# Patient Record
Sex: MALE | Race: BLACK OR AFRICAN AMERICAN | NOT HISPANIC OR LATINO | Employment: OTHER | URBAN - METROPOLITAN AREA
[De-identification: names, ages, dates, MRNs, and addresses within clinical notes are randomized per-mention and may not be internally consistent; named-entity substitution may affect disease eponyms.]

---

## 2017-01-02 ENCOUNTER — TELEPHONE (OUTPATIENT)
Dept: PEDIATRICS | Facility: CLINIC | Age: 62
End: 2017-01-02

## 2017-01-02 ENCOUNTER — OFFICE VISIT (OUTPATIENT)
Dept: PEDIATRICS | Facility: CLINIC | Age: 62
End: 2017-01-02
Payer: COMMERCIAL

## 2017-01-02 VITALS
HEART RATE: 81 BPM | BODY MASS INDEX: 23.13 KG/M2 | DIASTOLIC BLOOD PRESSURE: 87 MMHG | WEIGHT: 186 LBS | HEIGHT: 75 IN | TEMPERATURE: 97 F | OXYGEN SATURATION: 100 % | SYSTOLIC BLOOD PRESSURE: 123 MMHG

## 2017-01-02 DIAGNOSIS — Z12.11 COLON CANCER SCREENING: Primary | ICD-10-CM

## 2017-01-02 DIAGNOSIS — I73.9 PAD (PERIPHERAL ARTERY DISEASE) (H): ICD-10-CM

## 2017-01-02 DIAGNOSIS — I50.22 CHRONIC SYSTOLIC HEART FAILURE (H): ICD-10-CM

## 2017-01-02 DIAGNOSIS — M54.50 CHRONIC BILATERAL LOW BACK PAIN WITHOUT SCIATICA: ICD-10-CM

## 2017-01-02 DIAGNOSIS — I48.20 CHRONIC ATRIAL FIBRILLATION (H): ICD-10-CM

## 2017-01-02 DIAGNOSIS — C61 MALIGNANT NEOPLASM OF PROSTATE (H): ICD-10-CM

## 2017-01-02 DIAGNOSIS — G89.29 CHRONIC BILATERAL LOW BACK PAIN WITHOUT SCIATICA: ICD-10-CM

## 2017-01-02 DIAGNOSIS — K85.80 OTHER ACUTE PANCREATITIS WITHOUT INFECTION OR NECROSIS: ICD-10-CM

## 2017-01-02 PROCEDURE — 99214 OFFICE O/P EST MOD 30 MIN: CPT | Performed by: INTERNAL MEDICINE

## 2017-01-02 RX ORDER — ACETAMINOPHEN 500 MG
500-1000 TABLET ORAL EVERY 6 HOURS PRN
Status: ON HOLD | COMMUNITY
End: 2019-10-25

## 2017-01-02 ASSESSMENT — ANXIETY QUESTIONNAIRES
2. NOT BEING ABLE TO STOP OR CONTROL WORRYING: NOT AT ALL
GAD7 TOTAL SCORE: 0
IF YOU CHECKED OFF ANY PROBLEMS ON THIS QUESTIONNAIRE, HOW DIFFICULT HAVE THESE PROBLEMS MADE IT FOR YOU TO DO YOUR WORK, TAKE CARE OF THINGS AT HOME, OR GET ALONG WITH OTHER PEOPLE: NOT DIFFICULT AT ALL
3. WORRYING TOO MUCH ABOUT DIFFERENT THINGS: NOT AT ALL
6. BECOMING EASILY ANNOYED OR IRRITABLE: NOT AT ALL
1. FEELING NERVOUS, ANXIOUS, OR ON EDGE: NOT AT ALL
5. BEING SO RESTLESS THAT IT IS HARD TO SIT STILL: NOT AT ALL
7. FEELING AFRAID AS IF SOMETHING AWFUL MIGHT HAPPEN: NOT AT ALL

## 2017-01-02 ASSESSMENT — PAIN SCALES - GENERAL: PAINLEVEL: NO PAIN (0)

## 2017-01-02 ASSESSMENT — PATIENT HEALTH QUESTIONNAIRE - PHQ9: 5. POOR APPETITE OR OVEREATING: NOT AT ALL

## 2017-01-02 NOTE — TELEPHONE ENCOUNTER
Forms received from:  [facility]   Phone number listed: [phone]   Fax listed: [phone]  Date received: 1-2-17  Form description: SN 1 day 1  Once forms are completed, please return to [facility] via fax.  Is patient requesting to be contacted when forms are completed: n/a  Form placed: provider desk  Mela Merida

## 2017-01-02 NOTE — NURSING NOTE
"Chief Complaint   Patient presents with     Hospital F/U       Initial /87 mmHg  Pulse 81  Temp(Src) 97  F (36.1  C) (Oral)  Ht 6' 2.5\" (1.892 m)  Wt 186 lb (84.369 kg)  BMI 23.57 kg/m2  SpO2 100% Estimated body mass index is 23.57 kg/(m^2) as calculated from the following:    Height as of this encounter: 6' 2.5\" (1.892 m).    Weight as of this encounter: 186 lb (84.369 kg).  BP completed using cuff size: regular, right arm.  Rosa Conti MA      "

## 2017-01-02 NOTE — Clinical Note
[facility]    01/02/2017    Patient: Constantino Taylor  YOB: 1955  Medical Record Number: [id number]    Controlled Substance Agreement  I understand that my care provider has prescribed controlled substances (narcotics, tranquilizers, and/or stimulants) to help manage my condition(s).  I am taking this medicine to help me function or work.  I know that this is strong medicine.  It could have serious side effects and even cause a dependency on the drug.  If I stop these medicines suddenly, I could have severe withdrawal symptoms.    The risks, benefits, and side effects of these medication(s) were explained to me.  I agree that:  1. I will take part in other treatments as advised by my provider.  This may be psychiatry or counseling, physical therapy, behavioral therapy, group treatment, or a referral to a pain clinic.  I will reduce or stop my medicine when my provider tells me to do so.   2. I will take my medicines as prescribed.  I will not change the dose or schedule unless my provider tells me to.  There will be no refills if I  run out early.   I may be contacted at any time without warning and asked to complete a drug test or pill count.   3. I will keep all my appointments at the clinic.  If I miss appointments or fail to follow instructions, my provider may stop my medicine.  4. I will not ask other providers to prescribe controlled substances. And I will not accept controlled substances from other people. If I need another prescribed controlled substance for a new reason, I will notify my provider within one business day.  5. If I enroll in the [place] Medical Marijuana program, I will tell my provider.  I will also sign an agreement to share my medical records with my provider.  6. I will use one pharmacy to fill all of my controlled substance prescriptions.  If my prescription is mailed to my pharmacy, it may take 5 to 7 days for my medicine to be ready.  7. I  understand that my provider, clinic care team, and pharmacy can track controlled substance prescriptions from other providers through a central database (prescription monitoring program).  8. I will bring in my list of medications (or my medicine bottles) each time I come to the clinic.  Page 1 of 2      [facility]    01/02/2017    Patient: Constantino Taylor  YOB: 1955  Medical Record Number: [id number]    9. Refills of controlled substances will be made only during office hours.  It is up to me to make sure that I do not run out of my medicines on weekends or holidays.    10. I am responsible for my prescriptions.  If the medicine is lost or stolen, it will not be replaced.   I also agree not to share these medicines with anyone.  11. I agree to not use ANY illegal or recreational drugs.  This includes marijuana, cocaine, bath salts or other drugs.  I agree not to use alcohol unless my provider says I may.  I agree to give urine samples whenever asked.  If I fail to give a urine sample, the provider may stop my medicine.     12. I will tell my nurse or provider right away if I become pregnant or have a new medical problem treated outside of [facility].  13. I understand that this medicine can affect my thinking and judgment.  It may be unsafe for me to drive, use machinery and do dangerous tasks.  I will not do any of these things until I know how the medicine affects me.  If my dose changes, I will wait to see how it affects me.  I will contact my provider if I have concerns about medicine side effects.  I understand that if I do not follow any of the conditions above, my prescriptions or treatment may be stopped.    I agree that my provider, clinic care team, and pharmacy may work with any city, state or federal law enforcement agency that investigates the misuse, sale, or other diversion of my controlled medicine. I will allow my provider to discuss my care with or share  a copy of this agreement with any other treating provider, pharmacy or emergency room where I receive care.  I agree to give up (waive) any right of privacy or confidentiality with respect to these authorizations.   I have read this agreement and have asked questions about anything I did not understand.   ___________________________________    ___________________________  Patient Signature                                                             Date and Time  ___________________________________     ____________________________  Witness                                                                              Date and Time  ___________________________________  Avery Duke MD  Page 2 of 2  Opioid Pain Medicines (also known as Narcotics)  What You Need to Know      What are opioids?   Opioids are pain medicines that must be prescribed by a doctor. Examples are:     morphine (MS Contin, Josefina)    oxycodone (Oxycontin)    oxycodone and acetaminophen (Percocet)    hydrocodone and acetaminophen (Vicodin, Norco)     fentanyl patch (Duragesic)     hydromorphone (Dilaudid)     methadone     What do opioids do well?   Opioids are best for short-term pain after a surgery or injury. They also work well for cancer pain. Unlike other pain medicines, they do not cause liver or kidney failure or ulcers. They may help some people with long-lasting (chronic) pain.     What do opioids NOT do well?   Opioids never get rid of pain entirely, and they do not work well for most patients with chronic pain. Opioids do not reduce swelling, one of the causes of pain. They also don t work well for nerve pain.     Side effects  Talk to your doctor before you start or decide to keep taking one of these medicines. Side effects include:    Lowers your breathing rate enough that it could cause death    Death due to taking more than the prescribed dose    Serious lifelong opioid use      Dependence is not the same as addiction. Addiction  is when people keep using a substance that harms their body, their mind or their relations with others. If you have a history of drug or alcohol abuse, taking opioids can cause a relapse.  Over time, opioids don t work as well. Most people will need higher and higher doses. The higher the dose, the more serious the side effects. We don t know the long-term effects of opioids.   People who have used opioids for a long time have a lower quality of life, worse depression, higher levels of pain and more visits to doctors.  Overdose from prescription drugs is the second leading cause of death in the [place] The risk of overdose rises when opioids are taken with other drugs such as:    Medicines used for anxiety and panic attacks (such as lorazepam, alprazolam, clonazepam    Other sedatives    Alcohol    Illegal drugs such as heroin  Never share your opioids with others. Be sure to store opioids in a secure place, locked if possible.Young children can easily swallow them and overdose.     Are there other ways to manage pain?  Ways to help reduce pain:    Exercise every day.    Treat health problems that may be causing pain.    Treat mental health problems like depression and anxiety.     Worse depression symptoms; Less pleasure in things you usually enjoy    Feeling tired or sluggish    Slower thoughts or cloudy thinking    Being more sensitive to pain over time; Pain is harder to control.    Trouble sleeping or restless sleep    Changes in hormone levels (for example, less testosterone).     Changes in sex drive or ability to have sex    Long lasting nausea and constipation    Trouble breathing while asleep; This is worse with lung problems like COPD or sleep apnea.    Unsafe driving    Getting sick more often    Itching    Feeling dizzy    Dry mouth    Sweating    Trouble emptying the bladder (peeing). This is worse if you have an enlarged prostate or get urinary tract infections (UTIs).    What else should I know about  opioids?  When someone takes opioids for too long or too often, they become dependent. This means that if you stop or reduce the medicine too quickly, you will have withdrawal symptoms.          Practice good sleep habits.  Try to go to bed and get up at the same time every day.    Stop smoking.  Tobacco use can make pain worse.    Do things that you enjoy.    Find a way to work through pain without drugs.  Try deep breathing, meditation, visual imagery and aromatherapy.    Ask your doctor to help you create a plan to manage your pain.

## 2017-01-02 NOTE — PROGRESS NOTES
SUBJECTIVE:                                                    Constantino Taylor is a 61 year old male who presents to clinic today for the following health issues:    Hospital Follow-up Visit:    Hospital/Nursing Home/IP Rehab Facility: [facility]  Date of Admission: 12/20/16  Date of Discharge: 12/23/16  Reason(s) for Admission: Pancreatitis             Problems taking medications regularly:  None       Medication changes since discharge: Tylenol and Nicotine Gum       Problems adhering to non-medication therapy:  None  Pancreatitis developed time each year.  Not drinking much over the holidays 4 beers and a little wine  Two small bottles and 3 beers.  Tuesday and got out Friday.  marajuana use in the past.  Sent out on tylenol.  Oxycodone ( not using it since that time.)  Without medications.  (  After coming home from the hospital few days.  Leg still hurting, right in the stump area.  Throbbing.    Bone is hurting not able to put weight on the area.  Working on placing less socks and holders in the area.  Gabapentin did not help, lyrica.  Kept on liquid diet while there.  hepatitis  Summary of hospitalization:  CareEverywhere information obtained and reviewed  Diagnostic Tests/Treatments reviewed.  Follow up needed: continued follow-up with urology, hepatology, vascular.  Other Healthcare Providers Involved in Patient s Care:         None  Update since discharge: improved. Patient has stopped all alcohol use and still wants to be considered for medical THC program  Does still use marajuana intermittently when pain is increased.  Still feeling severe bone pain when trying to use prosthesis      Post Discharge Medication Reconciliation: discharge medications reconciled, continue medications without change.  Plan of care communicated with patient     Coding guidelines for this visit:  Type of Medical   Decision Making Face-to-Face Visit       within 7 Days of discharge Face-to-Face Visit        within 14 days  of discharge   Moderate Complexity [id number]   High Complexity [id number]                Problem list and histories reviewed & adjusted, as indicated.  Additional history: as documented    Patient Active Problem List   Diagnosis     Cerebral infarction (H)     Hepatitis C     Tobacco use disorder     Chronic low back pain     Acute pancreatitis     Hyperlipidemia LDL goal <70     Hypertension goal BP (blood pressure) < 140/90     Nonischemic dilated cardiomyopathy (HCC)     Malignant neoplasm of prostate (H)     Erectile dysfunction     Eczema     PAD (peripheral artery disease) (H)     S/P BKA (below knee amputation) unilateral (H)     Unilateral complete BKA, left, sequela (H)     Constipation     Encounter for counseling     Acute renal failure (H)     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     Past Surgical History   Procedure Laterality Date     Removal of blood clots in arm       Amputate leg below knee Left 6/19/2015     Procedure: AMPUTATE LEG BELOW KNEE;  Surgeon: Odessa Browning MD;  Location:  OR       Social History   Substance Use Topics     Smoking status: Former Smoker -- 0.30 packs/day for 40 years     Types: Cigarettes     Quit date: 02/08/2015     Smokeless tobacco: Former User     Alcohol Use: 3.0 oz/week     6 Cans of beer per week      Comment: couple of beers per episode, several times a week     Family History   Problem Relation Age of Onset     CANCER Mother      brain     CANCER Brother          Current Outpatient Prescriptions   Medication Sig Dispense Refill     acetaminophen (TYLENOL) 500 MG tablet Take 500-1,000 mg by mouth every 6 hours as needed for mild pain       nicotine polacrilex (NICORELIEF) 2 MG gum Place 2 mg inside cheek as needed for smoking cessation       oxyCODONE (ROXICODONE) 10 MG IR tablet Take 1 tablet (10 mg) by mouth  every 6 hours as needed for moderate to severe pain 3 per day 90 tablet 0     UNABLE TO FIND MEDICATION NAME: Hydrocortisone 0.5 % topical cream-Take 1 application by topical route as needed.       polyethylene glycol (MIRALAX) powder Take 17 g by mouth as needed for constipation 510 g 1     triamcinolone (KENALOG) 0.1 % cream Take 1 apply by topical route two times a day as needed       sennosides (SENOKOT) 8.6 MG tablet Take 2 tablets by mouth 2 times daily 120 tablet 3     lisinopril (PRINIVIL,ZESTRIL) 10 MG tablet Take 1 tablet (10 mg) by mouth daily 90 tablet 0     Tadalafil (CIALIS) 2.5 MG TABS Take 2.5 mg by mouth daily Never use with nitroglycerin, terazosin or doxazosin. 90 tablet 1     rivaroxaban ANTICOAGULANT (XARELTO) 20 MG TABS tablet Take 1 tablet (20 mg) by mouth daily (with dinner) 90 tablet 1     aspirin 81 MG EC tablet Take 1 tablet (81 mg) by mouth daily 90 tablet 3     metoprolol (TOPROL-XL) 200 MG 24 hr tablet Take 1 tablet (200 mg) by mouth daily 90 tablet 4     atorvastatin (LIPITOR) 40 MG tablet Take 1 tablet (40 mg) by mouth At Bedtime 90 tablet 4     No Known Allergies  Recent Labs   Lab Test  10/17/16   1143  09/21/16   0932  05/18/16   0859  04/05/16   1229   05/10/15   1648   04/08/15   0720  11/25/14   1203   04/30/14   1021  01/06/14   1103  07/12/13   1109   11/15/12   0845   A1C   --    --    --    --    --    --    --    --   5.8   --    --   5.8  5.4   --    --    LDL  46   --    --    --    --    --    --   89   --    --   78   --    --    < >   --    HDL  41   --    --    --    --    --    --   40*   --    --   41   --    --    < >   --    TRIG  91   --    --    --    --    --    --   92   --    --   85   --    --    < >   --    ALT   --   24  16  20   < >   --    --   34   --    < >   --   69  37   --   52   CR  1.53*  1.30*  1.21  1.34*   < >  1.90*   < >  1.37*  1.02   < >   --   0.86  1.08   < >  0.87   GFRESTIMATED  47*  57*  61  55*   < >  37*   < >  53*  75   < >   --    ">90  71   < >  >90   GFRESTBLACK  57*  68  74  66   < >  44*   < >  65  >90   GFR Calc     < >   --   >90  86   < >  >90   POTASSIUM  4.6  4.1  3.9  4.3   < >  5.0   < >  3.9  4.5   < >   --   4.5  4.1   < >  3.6   TSH   --    --    --    --    --   0.99   --    --    --    --    --    --    --    --   1.18    < > = values in this interval not displayed.        ROS:  Constitutional, HEENT, cardiovascular, pulmonary, gi and gu systems are negative, except as otherwise noted.    OBJECTIVE:                                                    /87 mmHg  Pulse 81  Temp(Src) 97  F (36.1  C) (Oral)  Ht 6' 2.5\" (1.892 m)  Wt 186 lb (84.369 kg)  BMI 23.57 kg/m2  SpO2 100%  Body mass index is 23.57 kg/(m^2).  GENERAL: healthy, alert and no distress  EYES: Eyes grossly normal to inspection, PERRL and conjunctivae and sclerae normal  HENT: ear canals and TM's normal, nose and mouth without ulcers or lesions  NECK: no adenopathy, no asymmetry, masses, or scars and thyroid normal to palpation  RESP: lungs clear to auscultation - no rales, rhonchi or wheezes  CV: regular rate and rhythm, normal S1 S2, no S3 or S4, no murmur, click or rub, no peripheral edema and peripheral pulses strong  ABDOMEN: soft, nontender, no hepatosplenomegaly, no masses and bowel sounds normal  MS: no gross musculoskeletal defects noted, no edema  SKIN: no suspicious lesions or rashes  NEURO: Normal strength and tone, mentation intact and speech normal  BACK: no CVA tenderness, no paralumbar tenderness    Diagnostic Test Results:  Results for orders placed or performed in visit on 10/17/16   Lipid panel reflex to direct LDL   Result Value Ref Range    Cholesterol 105 <200 mg/dL    Triglycerides 91 <150 mg/dL    HDL Cholesterol 41 >39 mg/dL    LDL Cholesterol Calculated 46 <100 mg/dL    Non HDL Cholesterol 64 <130 mg/dL   Basic metabolic panel   Result Value Ref Range    Sodium 139 133 - 144 mmol/L    Potassium 4.6 3.4 - 5.3 " mmol/L    Chloride 104 94 - 109 mmol/L    Carbon Dioxide 29 20 - 32 mmol/L    Anion Gap 6 3 - 14 mmol/L    Glucose 90 70 - 99 mg/dL    Urea Nitrogen 27 7 - 30 mg/dL    Creatinine 1.53 (H) 0.66 - 1.25 mg/dL    GFR Estimate 47 (L) >60 mL/min/1.7m2    GFR Estimate If Black 57 (L) >60 mL/min/1.7m2    Calcium 9.9 8.5 - 10.1 mg/dL        ASSESSMENT/PLAN:                                                        ICD-10-CM    1. Colon cancer screening Z12.11 Fecal colorectal cancer screen (FIT)   2. Other acute pancreatitis without infection or necrosis K85.80    3. Chronic bilateral low back pain without sciatica M54.5     G89.29    4. Malignant neoplasm of prostate (H) C61    5. PAD (peripheral artery disease) (H) I73.9    6. Chronic systolic heart failure (H) I50.22    7. Chronic atrial fibrillation (H) I48.2        Patient updated on pain contract. No alcohol and will ask to weam off marajuana as we approach pain evaluation  Patient updated pain contract.  At this time with acute pancreatitis and chronic back and stump pain without ability to respond to gabapentin or lyrica, will continue oxycodone 90 per month until better soluation presents itself.          Avery Duke MD  [facility]

## 2017-01-02 NOTE — MR AVS SNAPSHOT
"              After Visit Summary   1/2/2017    Constantino Taylor    MRN: [id number]           Patient Information     Date Of Birth          1955        Visit Information        Provider Department      1/2/2017 10:40 AM Avery Duke MD [facility]         Follow-ups after your visit        Your next 10 appointments already scheduled     Feb 15, 2017  9:00 AM   Lab with [facility] LAB   [facility] ([facility])    [address]  1st Floor  [place] [id number] [phone]            Feb 15, 2017  9:40 AM   (Arrive by 9:25 AM)   RETURN HEART FAILURE with Norah Brown MD   [facility] ([facility])    [address]  3rd Floor  [place] [id number] [phone]              Who to contact     If you have questions or need follow up information about today's clinic visit or your schedule please contact [facility] directly at [phone].  Normal or non-critical lab and imaging results will be communicated to you by [employer], letter or phone within 4 business days after the clinic has received the results. If you do not hear from us within 7 days, please contact the clinic through [employer] or phone. If you have a critical or abnormal lab result, we will notify you by phone as soon as possible.  Submit refill requests through [employer] or call your pharmacy and they will forward the refill request to us. Please allow 3 business days for your refill to be completed.          Additional Information About Your Visit        [employer] Information     [employer] lets you send messages to your doctor, view your test results, renew your prescriptions, schedule appointments and more. To sign up, go to [email or web address] . Click on \"Log in\" on the left side of the screen, which will take you to the Welcome page. Then click on \"Sign up Now\" on the right side of the page.     You will be asked " "to enter the access code listed below, as well as some personal information. Please follow the directions to create your username and password.     Your access code is: [email or web address]  Expires: 1/15/2017 10:38 AM     Your access code will  in 90 days. If you need help or a new code, please call your [facility] or [phone].        Your Vitals Were     Pulse Temperature Height BMI (Body Mass Index) Pulse Oximetry       81 97  F (36.1  C) (Oral) 6' 2.5\" (1.892 m) 23.57 kg/m2 100%        Blood Pressure from Last 3 Encounters:   17 123/87   10/17/16 126/84   16 136/90    Weight from Last 3 Encounters:   17 186 lb (84.369 kg)   10/17/16 193 lb (87.544 kg)   16 189 lb 11.2 oz (86.047 kg)              Today, you had the following     No orders found for display         Today's Medication Changes          These changes are accurate as of: 17 11:26 AM.  If you have any questions, ask your nurse or doctor.               Stop taking these medicines if you haven't already. Please contact your care team if you have questions.     nicotine 21 MG/24HR 24 hr patch   Commonly known as:  NICODERM CQ   Stopped by:  Avery Duke MD                    Primary Care Provider Office Phone # Fax #    Avery Duke [phone]       [address] [address]        Thank you!     Thank you for choosing [facility]  for your care. Our goal is always to provide you with excellent care. Hearing back from our patients is one way we can continue to improve our services. Please take a few minutes to complete the written survey that you may receive in the mail after your visit with us. Thank you!             Your Updated Medication List - Protect others around you: Learn how to safely use, store and throw away your medicines at www.disposemymeds.org.          This list is accurate as of: 17 11:26 AM.  Always use " your most recent med list.                   Brand Name Dispense Instructions for use    acetaminophen 500 MG tablet    TYLENOL     Take 500-1,000 mg by mouth every 6 hours as needed for mild pain       aspirin 81 MG EC tablet     90 tablet    Take 1 tablet (81 mg) by mouth daily       atorvastatin 40 MG tablet    LIPITOR    90 tablet    Take 1 tablet (40 mg) by mouth At Bedtime       lisinopril 10 MG tablet    PRINIVIL/ZESTRIL    90 tablet    Take 1 tablet (10 mg) by mouth daily       metoprolol 200 MG 24 hr tablet    TOPROL-XL    90 tablet    Take 1 tablet (200 mg) by mouth daily       MIRALAX powder   Generic drug:  polyethylene glycol     510 g    Take 17 g by mouth as needed for constipation       NICORELIEF 2 MG gum   Generic drug:  nicotine polacrilex      Place 2 mg inside cheek as needed for smoking cessation       oxyCODONE 10 MG IR tablet    ROXICODONE    90 tablet    Take 1 tablet (10 mg) by mouth every 6 hours as needed for moderate to severe pain 3 per day       rivaroxaban ANTICOAGULANT 20 MG Tabs tablet    XARELTO    90 tablet    Take 1 tablet (20 mg) by mouth daily (with dinner)       sennosides 8.6 MG tablet    SENOKOT    120 tablet    Take 2 tablets by mouth 2 times daily       Tadalafil 2.5 MG Tabs    CIALIS    90 tablet    Take 2.5 mg by mouth daily Never use with nitroglycerin, terazosin or doxazosin.       triamcinolone 0.1 % cream    KENALOG     Take 1 apply by topical route two times a day as needed       UNABLE TO FIND      MEDICATION NAME: Hydrocortisone 0.5 % topical cream-Take 1 application by topical route as needed.

## 2017-01-03 ASSESSMENT — PATIENT HEALTH QUESTIONNAIRE - PHQ9: SUM OF ALL RESPONSES TO PHQ QUESTIONS 1-9: 3

## 2017-01-03 ASSESSMENT — ANXIETY QUESTIONNAIRES: GAD7 TOTAL SCORE: 0

## 2017-01-04 ENCOUNTER — TELEPHONE (OUTPATIENT)
Dept: PEDIATRICS | Facility: CLINIC | Age: 62
End: 2017-01-04

## 2017-01-04 NOTE — TELEPHONE ENCOUNTER
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       PT:     OT:     Skilled Nursing:     Pt Provider: Dr Avery Duke    Phone Number Homecare Nurse can be reached at: [phone]    Can we leave a detailed message on this number? no            Best Time: Today    Call taken on 1/4/2017 at 2:26 PM by Dayanara Cooley

## 2017-01-04 NOTE — TELEPHONE ENCOUNTER
Called Tracie from [facility] to notify of message below from provider. Unable to reach, left a message to call back to RN triage line.    Viviane Love RN  [facility]

## 2017-01-04 NOTE — TELEPHONE ENCOUNTER
Ana returned call, I advised her provider approved recertification for home care.  Janet Tucker RN  [facility]

## 2017-01-04 NOTE — TELEPHONE ENCOUNTER
Routed to provider to ok re-certification for HC.     Viviane Love, RN  [facility]

## 2017-01-06 DIAGNOSIS — B19.20 HEPATITIS C VIRUS INFECTION WITHOUT HEPATIC COMA, UNSPECIFIED CHRONICITY: Primary | ICD-10-CM

## 2017-01-09 ENCOUNTER — TELEPHONE (OUTPATIENT)
Dept: PEDIATRICS | Facility: CLINIC | Age: 62
End: 2017-01-09

## 2017-01-09 NOTE — TELEPHONE ENCOUNTER
Forms received from:  [facility]   Phone number listed: [phone]   Fax listed: [phone]  Date received: 1-9-17  Form description: SN 1 week 1  Once forms are completed, please return to [facility] via fax.  Is patient requesting to be contacted when forms are completed: n/a  Form placed: provider desk  Mela Merida

## 2017-01-10 ENCOUNTER — TELEPHONE (OUTPATIENT)
Dept: PEDIATRICS | Facility: CLINIC | Age: 62
End: 2017-01-10

## 2017-01-10 NOTE — TELEPHONE ENCOUNTER
Forms received from:  [facility]   Phone number listed: [phone]   Fax listed: [phone]  Date received: 1-10-17  Form description: SN 1 day 1  Once forms are completed, please return to [facility] via fax.  Is patient requesting to be contacted when forms are completed: n/a  Form placed: provider desk  Mela Merida

## 2017-01-11 ENCOUNTER — TELEPHONE (OUTPATIENT)
Dept: PEDIATRICS | Facility: CLINIC | Age: 62
End: 2017-01-11

## 2017-01-11 DIAGNOSIS — I10 HYPERTENSION GOAL BP (BLOOD PRESSURE) < 140/90: Primary | ICD-10-CM

## 2017-01-11 DIAGNOSIS — I63.19 CEREBRAL INFARCTION DUE TO EMBOLISM OF OTHER PRECEREBRAL ARTERY (H): ICD-10-CM

## 2017-01-11 RX ORDER — ATORVASTATIN CALCIUM 40 MG/1
40 TABLET, FILM COATED ORAL AT BEDTIME
Qty: 90 TABLET | Refills: 4 | Status: SHIPPED | OUTPATIENT
Start: 2017-01-11 | End: 2017-08-31

## 2017-01-11 RX ORDER — METOPROLOL SUCCINATE 200 MG/1
200 TABLET, EXTENDED RELEASE ORAL DAILY
Qty: 90 TABLET | Refills: 4 | Status: SHIPPED | OUTPATIENT
Start: 2017-01-11 | End: 2017-08-31

## 2017-01-11 NOTE — TELEPHONE ENCOUNTER
Metoprolol    Last Written Prescription Date: 12/4/16  Last Fill Quantity: 90, # refills: 4  Last Office Visit with [facility] or [facility] prescribing provider: 1/2/17       POTASSIUM   Date Value Ref Range Status   10/17/2016 4.6 3.4 - 5.3 mmol/L Final     CREATININE   Date Value Ref Range Status   10/17/2016 1.53* 0.66 - 1.25 mg/dL Final     BP Readings from Last 3 Encounters:   01/02/17 123/87   10/17/16 126/84   09/21/16 136/90     Routing refill request to provider for review/approval because:  Plan not noted.    Atorvastatin       Last Written Prescription Date: 12/30/16  Last Fill Quantity: 90, # refills: 4  Last Office Visit with [facility], [facility] or [facility] prescribing provider:  1/2/1/17   Future Office Visit:      CHOLESTEROL   Date Value Ref Range Status   10/17/2016 105 <200 mg/dL Final     HDL CHOLESTEROL   Date Value Ref Range Status   10/17/2016 41 >39 mg/dL Final     LDL CHOLESTEROL CALCULATED   Date Value Ref Range Status   10/17/2016 46 <100 mg/dL Final     Comment:     Desirable:       <100 mg/dl     TRIGLYCERIDES   Date Value Ref Range Status   10/17/2016 91 <150 mg/dL Final     Comment:     Fasting specimen     CHOLESTEROL/HDL RATIO   Date Value Ref Range Status   04/08/2015 3.6 0.0 - 5.0 Final     ALT   Date Value Ref Range Status   09/21/2016 24 0 - 70 U/L Final      Routing refill request to provider for review/approval because:  Plan not noted.  Angelica Siegel RN [facility] Triage.

## 2017-01-11 NOTE — TELEPHONE ENCOUNTER
Reason for Call:  Medication or medication refill:    Do you use a [facility]?  Name of the pharmacy and phone number for the current request:  [facility] [address], [facility] [phone]    Name of the medication requested: atorvastatin 40mg & metoprolol 200mg    Other request: If medication can't be refilled Le would like a phone call.      Can we leave a detailed message on this number? YES    Phone number patient can be reached at: Other phone number:  [phone]    Best Time: anytime    Call taken on 1/11/2017 at 9:01 AM by Cinthya Villanueva

## 2017-01-27 ENCOUNTER — TELEPHONE (OUTPATIENT)
Dept: FAMILY MEDICINE | Facility: CLINIC | Age: 62
End: 2017-01-27

## 2017-01-27 DIAGNOSIS — G90.50 RSD (REFLEX SYMPATHETIC DYSTROPHY): Primary | ICD-10-CM

## 2017-01-27 RX ORDER — OXYCODONE HYDROCHLORIDE 10 MG/1
10 TABLET ORAL EVERY 6 HOURS PRN
Qty: 90 TABLET | Refills: 0 | Status: SHIPPED | OUTPATIENT
Start: 2017-01-30 | End: 2017-03-06

## 2017-01-27 NOTE — TELEPHONE ENCOUNTER
Reason for Call:  Medication or medication refill:    Do you use a [facility]?  Name of the pharmacy and phone number for the current request:  [facility]     Name of the medication requested: oxycodone    Other request:     Can we leave a detailed message on this number? YES    Phone number patient can be reached at: Home number on file [phone] (home)    Best Time: anytime    Call taken on 1/27/2017 at 10:20 AM by Mely Smith

## 2017-01-27 NOTE — TELEPHONE ENCOUNTER
Oxycodone  10 mg IR  Last Written Prescription Date: 12/20/16  Last Fill Quantity: 90,  # refills: 0   Last Office Visit with [facility] or [facility] prescribing provider: 1/2/17    Routing refill request to provider for review/approval because:  Drug not on the [facility] refill protocol   Viviane Love RN  [facility]

## 2017-01-27 NOTE — TELEPHONE ENCOUNTER
Prescription of oxyCODONE (ROXICODONE) 10 MG IR tablet was brought to the  and patient informed to .

## 2017-02-06 ENCOUNTER — OFFICE VISIT (OUTPATIENT)
Dept: GASTROENTEROLOGY | Facility: CLINIC | Age: 62
End: 2017-02-06
Attending: INTERNAL MEDICINE
Payer: COMMERCIAL

## 2017-02-06 ENCOUNTER — TELEPHONE (OUTPATIENT)
Dept: PEDIATRICS | Facility: CLINIC | Age: 62
End: 2017-02-06

## 2017-02-06 VITALS
SYSTOLIC BLOOD PRESSURE: 151 MMHG | TEMPERATURE: 98.5 F | HEIGHT: 74 IN | OXYGEN SATURATION: 99 % | HEART RATE: 94 BPM | DIASTOLIC BLOOD PRESSURE: 93 MMHG | BODY MASS INDEX: 24.49 KG/M2 | RESPIRATION RATE: 18 BRPM | WEIGHT: 190.8 LBS

## 2017-02-06 DIAGNOSIS — B18.2 CHRONIC HEPATITIS C WITHOUT HEPATIC COMA (H): ICD-10-CM

## 2017-02-06 DIAGNOSIS — B19.20 HEPATITIS C VIRUS INFECTION WITHOUT HEPATIC COMA, UNSPECIFIED CHRONICITY: ICD-10-CM

## 2017-02-06 DIAGNOSIS — I50.22 CHRONIC SYSTOLIC HEART FAILURE (H): Primary | ICD-10-CM

## 2017-02-06 DIAGNOSIS — B18.2 CHRONIC HEPATITIS C WITHOUT HEPATIC COMA (H): Primary | ICD-10-CM

## 2017-02-06 LAB
ALBUMIN SERPL-MCNC: 3.8 G/DL (ref 3.4–5)
ALP SERPL-CCNC: 84 U/L (ref 40–150)
ALT SERPL W P-5'-P-CCNC: 20 U/L (ref 0–70)
ANION GAP SERPL CALCULATED.3IONS-SCNC: 9 MMOL/L (ref 3–14)
AST SERPL W P-5'-P-CCNC: 17 U/L (ref 0–45)
BILIRUB DIRECT SERPL-MCNC: 0.1 MG/DL (ref 0–0.2)
BILIRUB SERPL-MCNC: 0.6 MG/DL (ref 0.2–1.3)
BUN SERPL-MCNC: 16 MG/DL (ref 7–30)
CALCIUM SERPL-MCNC: 8.9 MG/DL (ref 8.5–10.1)
CHLORIDE SERPL-SCNC: 110 MMOL/L (ref 94–109)
CO2 SERPL-SCNC: 25 MMOL/L (ref 20–32)
CREAT SERPL-MCNC: 1.4 MG/DL (ref 0.66–1.25)
ERYTHROCYTE [DISTWIDTH] IN BLOOD BY AUTOMATED COUNT: 13.4 % (ref 10–15)
GFR SERPL CREATININE-BSD FRML MDRD: 52 ML/MIN/1.7M2
GLUCOSE SERPL-MCNC: 120 MG/DL (ref 70–99)
HCT VFR BLD AUTO: 44 % (ref 40–53)
HGB BLD-MCNC: 14.7 G/DL (ref 13.3–17.7)
INR PPP: 1.11 (ref 0.86–1.14)
MCH RBC QN AUTO: 31 PG (ref 26.5–33)
MCHC RBC AUTO-ENTMCNC: 33.4 G/DL (ref 31.5–36.5)
MCV RBC AUTO: 93 FL (ref 78–100)
PLATELET # BLD AUTO: 206 10E9/L (ref 150–450)
POTASSIUM SERPL-SCNC: 3.9 MMOL/L (ref 3.4–5.3)
PROT SERPL-MCNC: 8.4 G/DL (ref 6.8–8.8)
RBC # BLD AUTO: 4.74 10E12/L (ref 4.4–5.9)
SODIUM SERPL-SCNC: 144 MMOL/L (ref 133–144)
WBC # BLD AUTO: 5.6 10E9/L (ref 4–11)

## 2017-02-06 PROCEDURE — 80076 HEPATIC FUNCTION PANEL: CPT | Performed by: INTERNAL MEDICINE

## 2017-02-06 PROCEDURE — 80048 BASIC METABOLIC PNL TOTAL CA: CPT | Performed by: INTERNAL MEDICINE

## 2017-02-06 PROCEDURE — 36415 COLL VENOUS BLD VENIPUNCTURE: CPT | Performed by: INTERNAL MEDICINE

## 2017-02-06 PROCEDURE — 87522 HEPATITIS C REVRS TRNSCRPJ: CPT | Performed by: INTERNAL MEDICINE

## 2017-02-06 PROCEDURE — 85027 COMPLETE CBC AUTOMATED: CPT | Performed by: INTERNAL MEDICINE

## 2017-02-06 PROCEDURE — 99213 OFFICE O/P EST LOW 20 MIN: CPT | Mod: ZF

## 2017-02-06 PROCEDURE — G0179 MD RECERTIFICATION HHA PT: HCPCS | Performed by: INTERNAL MEDICINE

## 2017-02-06 PROCEDURE — 85610 PROTHROMBIN TIME: CPT | Performed by: INTERNAL MEDICINE

## 2017-02-06 ASSESSMENT — PAIN SCALES - GENERAL: PAINLEVEL: SEVERE PAIN (6)

## 2017-02-06 NOTE — MR AVS SNAPSHOT
After Visit Summary   2/6/2017    Constantino Taylor    MRN: [id number]           Patient Information     Date Of Birth          1955        Visit Information        Provider Department      2/6/2017 10:00 AM Cheyenne Mansfield MD [facility]        Today's Diagnoses     Chronic hepatitis C without hepatic coma (H)    -  1        Follow-ups after your visit        Follow-up notes from your care team     Return if symptoms worsen or fail to improve.      Your next 10 appointments already scheduled     Feb 08, 2017  3:00 PM   Return Visit with Zeeshan Varghese MD   Radiation Oncology Clinic ([facility])    [facility]  1st Floor  [address] [facility] [address]   [phone]            Feb 15, 2017  9:00 AM   Lab with  LAB   [facility] ([facility])    [address] [place] [address]  1st Floor  [facility] [address] [phone]            Feb 15, 2017  9:40 AM   (Arrive by 9:25 AM)   RETURN HEART FAILURE with Norah Brown MD   [facility] ([facility])    [address] [place] [address]  3rd Floor  [facility] [address] [phone]            Mar 03, 2017  1:30 PM   (Arrive by 1:15 PM)   New Patient Visit with Az Morton MD   [facility] ([facility])    [address] [place] [address]  4th Floor  [facility] [address] [phone]              Future tests that were ordered for you today     Open Future Orders        Priority Expected Expires Ordered    Hepatitis C RNA quantitative Routine 2/15/2017 3/8/2017 2/6/2017    Hepatitis C RNA quantitative Routine  3/8/2017 2/6/2017            Who to contact     If you have questions or need follow up information about today's clinic visit or your schedule please contact [facility] directly at [phone].  Normal or non-critical lab and imaging results will be  "communicated to you by MyChart, letter or phone within 4 business days after the clinic has received the results. If you do not hear from us within 7 days, please contact the clinic through [employer] or phone. If you have a critical or abnormal lab result, we will notify you by phone as soon as possible.  Submit refill requests through [employer] or call your pharmacy and they will forward the refill request to us. Please allow 3 business days for your refill to be completed.          Additional Information About Your Visit        DineroMail Information     [employer] gives you secure access to your electronic health record. If you see a primary care provider, you can also send messages to your care team and make appointments. If you have questions, please call your primary care clinic.  If you do not have a primary care provider, please call [phone] and they will assist you.        Care EveryWhere ID     This is your Care EveryWhere ID. This could be used by other organizations to access your [facility] medical records  [id number]        Your Vitals Were     Pulse Temperature Respirations Height BMI (Body Mass Index) Pulse Oximetry    94 98.5  F (36.9  C) (Oral) 18 1.892 m (6' 2.49\") 24.18 kg/m2 99%       Blood Pressure from Last 3 Encounters:   02/06/17 151/93   01/02/17 123/87   10/17/16 126/84    Weight from Last 3 Encounters:   02/06/17 86.546 kg (190 lb 12.8 oz)   01/02/17 84.369 kg (186 lb)   10/17/16 87.544 kg (193 lb)               Primary Care Provider Office Phone # Fax #    Avery Duke [phone]       [facility] [address] [facility] [address]        Thank you!     Thank you for choosing [facility]  for your care. Our goal is always to provide you with excellent care. Hearing back from our patients is one way we can continue to improve our services. Please take a few minutes to complete the written survey that you may receive in the mail after your " visit with us. Thank you!             Your Updated Medication List - Protect others around you: Learn how to safely use, store and throw away your medicines at www.disposemymeds.org.          This list is accurate as of: 2/6/17 10:25 AM.  Always use your most recent med list.                   Brand Name Dispense Instructions for use    acetaminophen 500 MG tablet    TYLENOL     Take 500-1,000 mg by mouth every 6 hours as needed for mild pain       aspirin 81 MG EC tablet     90 tablet    Take 1 tablet (81 mg) by mouth daily       atorvastatin 40 MG tablet    LIPITOR    90 tablet    Take 1 tablet (40 mg) by mouth At Bedtime       lisinopril 10 MG tablet    PRINIVIL/ZESTRIL    90 tablet    Take 1 tablet (10 mg) by mouth daily       metoprolol 200 MG 24 hr tablet    TOPROL-XL    90 tablet    Take 1 tablet (200 mg) by mouth daily       MIRALAX powder   Generic drug:  polyethylene glycol     510 g    Take 17 g by mouth as needed for constipation       NICORELIEF 2 MG gum   Generic drug:  nicotine polacrilex      Place 2 mg inside cheek as needed for smoking cessation       oxyCODONE 10 MG IR tablet    ROXICODONE    90 tablet    Take 1 tablet (10 mg) by mouth every 6 hours as needed for moderate to severe pain 3 per day       rivaroxaban ANTICOAGULANT 20 MG Tabs tablet    XARELTO    90 tablet    Take 1 tablet (20 mg) by mouth daily (with dinner)       sennosides 8.6 MG tablet    SENOKOT    120 tablet    Take 2 tablets by mouth 2 times daily       Tadalafil 2.5 MG Tabs    CIALIS    90 tablet    Take 2.5 mg by mouth daily Never use with nitroglycerin, terazosin or doxazosin.       triamcinolone 0.1 % cream    KENALOG     Take 1 apply by topical route two times a day as needed       UNABLE TO FIND      MEDICATION NAME: Hydrocortisone 0.5 % topical cream-Take 1 application by topical route as needed.
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
## 2017-02-06 NOTE — NURSING NOTE
"Chief Complaint   Patient presents with     RECHECK     Hep C, finished Harvoni medication approx 6 months ago       Initial /93 mmHg  Pulse 94  Temp(Src) 98.5  F (36.9  C) (Oral)  Resp 18  Ht 1.892 m (6' 2.49\")  Wt 86.546 kg (190 lb 12.8 oz)  BMI 24.18 kg/m2  SpO2 99% Estimated body mass index is 24.18 kg/(m^2) as calculated from the following:    Height as of this encounter: 1.892 m (6' 2.49\").    Weight as of this encounter: 86.546 kg (190 lb 12.8 oz).  Medication Reconciliation: complete    "

## 2017-02-06 NOTE — TELEPHONE ENCOUNTER
Forms received from:  [facility]   Phone number listed: [phone]   Fax listed: [phone]  Date received: 2-6-17  Form description: [facility]  Once forms are completed, please return to [place] via fax.  Is patient requesting to be contacted when forms are completed: n/a  Form placed: RN folder  Mela Merida    Form given to RN to review med list.  Order pended for provider to sign.
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
## 2017-02-07 DIAGNOSIS — C61 MALIGNANT NEOPLASM OF PROSTATE (H): Primary | ICD-10-CM

## 2017-02-08 ENCOUNTER — OFFICE VISIT (OUTPATIENT)
Dept: RADIATION ONCOLOGY | Facility: CLINIC | Age: 62
End: 2017-02-08
Attending: RADIOLOGY
Payer: COMMERCIAL

## 2017-02-08 ENCOUNTER — PRE VISIT (OUTPATIENT)
Dept: CARDIOLOGY | Facility: CLINIC | Age: 62
End: 2017-02-08

## 2017-02-08 VITALS
HEART RATE: 95 BPM | WEIGHT: 190 LBS | DIASTOLIC BLOOD PRESSURE: 93 MMHG | BODY MASS INDEX: 24.08 KG/M2 | SYSTOLIC BLOOD PRESSURE: 138 MMHG | OXYGEN SATURATION: 97 %

## 2017-02-08 DIAGNOSIS — C61 MALIGNANT NEOPLASM OF PROSTATE (H): ICD-10-CM

## 2017-02-08 DIAGNOSIS — B18.2 CHRONIC HEPATITIS C WITHOUT HEPATIC COMA (H): ICD-10-CM

## 2017-02-08 DIAGNOSIS — I50.22 CHRONIC SYSTOLIC HEART FAILURE (H): ICD-10-CM

## 2017-02-08 DIAGNOSIS — I50.22 CHRONIC SYSTOLIC HEART FAILURE (H): Primary | ICD-10-CM

## 2017-02-08 DIAGNOSIS — C61 MALIGNANT NEOPLASM OF PROSTATE (H): Primary | ICD-10-CM

## 2017-02-08 LAB
ANION GAP SERPL CALCULATED.3IONS-SCNC: 7 MMOL/L (ref 3–14)
BUN SERPL-MCNC: 14 MG/DL (ref 7–30)
CALCIUM SERPL-MCNC: 9.1 MG/DL (ref 8.5–10.1)
CHLORIDE SERPL-SCNC: 103 MMOL/L (ref 94–109)
CO2 SERPL-SCNC: 28 MMOL/L (ref 20–32)
CREAT SERPL-MCNC: 1.31 MG/DL (ref 0.66–1.25)
GFR SERPL CREATININE-BSD FRML MDRD: 56 ML/MIN/1.7M2
GLUCOSE SERPL-MCNC: 81 MG/DL (ref 70–99)
POTASSIUM SERPL-SCNC: 3.5 MMOL/L (ref 3.4–5.3)
PSA SERPL-MCNC: 3.03 UG/L (ref 0–4)
SODIUM SERPL-SCNC: 138 MMOL/L (ref 133–144)

## 2017-02-08 PROCEDURE — 84153 ASSAY OF PSA TOTAL: CPT | Performed by: INTERNAL MEDICINE

## 2017-02-08 PROCEDURE — 36415 COLL VENOUS BLD VENIPUNCTURE: CPT | Performed by: INTERNAL MEDICINE

## 2017-02-08 PROCEDURE — 99212 OFFICE O/P EST SF 10 MIN: CPT | Performed by: RADIOLOGY

## 2017-02-08 PROCEDURE — 80048 BASIC METABOLIC PNL TOTAL CA: CPT | Performed by: INTERNAL MEDICINE

## 2017-02-08 PROCEDURE — 87522 HEPATITIS C REVRS TRNSCRPJ: CPT | Performed by: INTERNAL MEDICINE

## 2017-02-08 ASSESSMENT — PAIN SCALES - GENERAL: PAINLEVEL: SEVERE PAIN (6)

## 2017-02-08 NOTE — MR AVS SNAPSHOT
After Visit Summary   2/8/2017    Constantino Taylor    MRN: [id number]           Patient Information     Date Of Birth          1955        Visit Information        Provider Department      2/8/2017 3:00 PM Zeeshan Varghese MD Rad[facility]        Today's Diagnoses     Malignant neoplasm of prostate (H)    -  1        Follow-ups after your visit        Your next 10 appointments already scheduled     Feb 08, 2017  3:00 PM   Return Visit with Zeeshan Varghese MD   Radiation Oncology Clinic ([facility])    [facility]  1st Floor  [address] [address]   [phone]            Feb 15, 2017  9:00 AM   Lab with  LAB   [facility] ([facility])    [address]  1st Floor  [place] [address]   [phone]            Feb 15, 2017  9:40 AM   (Arrive by 9:25 AM)   RETURN HEART FAILURE with Norah Brown MD   [facility] ([facility])    [address]  3rd Floor  [place] [address]   [phone]            Mar 03, 2017  1:30 PM   (Arrive by 1:15 PM)   New Patient Visit with Az Morton MD   [facility] ([facility])    [address]  4th Floor  [place] [address]   [phone]            Aug 07, 2017  2:00 PM   LAB with ACUTE CARE LAB [facility] [place] ([facility])    [address] [address]              Patient must bring picture ID.  Patient should be prepared to give a urine specimen  Please do not eat 10-12 hours before your appointment if you are coming in fasting for labs on lipids, cholesterol, or glucose (sugar).  Pregnant women should follow their Care Team instructions. Water with medications is okay. Do not drink coffee or other fluids.   If you have concerns about taking   your medications, please ask at office or if scheduling via [employer], send a message by clicking on Secure Messaging, Message Your Care Team.            Aug 09, 2017  2:00 PM   Return Visit with Zeeshan Varghese MD   Radiation Oncology Clinic ([facility])    [facility]  1st Floor  [address] [address]   [phone]              Who to contact     Please call your clinic at [phone] to:    Ask questions about your health    Make or cancel appointments    Discuss your medicines    Learn about your test results    Speak to your doctor   If you have compliments or concerns about an experience at your clinic, or if you wish to file a complaint, please contact [facility] Patient Relations at [phone] or email us at [email or web address]         Additional Information About Your Visit        [employer] Information     [employer] gives you secure access to your electronic health record. If you see a primary care provider, you can also send messages to your care team and make appointments. If you have questions, please call your primary care clinic.  If you do not have a primary care provider, please call [phone] and they will assist you.      [employer] is an electronic gateway that provides easy, online access to your medical records. With [employer], you can request a clinic appointment, read your test results, renew a prescription or communicate with your care team.     To access your existing account, please contact your [facility] or call [phone] for assistance.        Care EveryWhere ID     This is your Care EveryWhere ID. This could be used by other organizations to access your [facility] medical records  [id number]        Your Vitals Were     Pulse Pulse Oximetry                95 97%           Blood Pressure from Last 3 Encounters:   02/08/17 138/93   02/06/17 151/93   01/02/17  123/87    Weight from Last 3 Encounters:   02/08/17 86.183 kg (190 lb)   02/06/17 86.546 kg (190 lb 12.8 oz)   01/02/17 84.369 kg (186 lb)              Today, you had the following     No orders found for display       Primary Care Provider Office Phone # Fax #    A[facility] [phone]       [facility] [address] [facility] [address]        Thank you!     Thank you for choosing [facility]  for your care. Our goal is always to provide you with excellent care. Hearing back from our patients is one way we can continue to improve our services. Please take a few minutes to complete the written survey that you may receive in the mail after your visit with us. Thank you!             Your Updated Medication List - Protect others around you: Learn how to safely use, store and throw away your medicines at www.disposemymeds.org.          This list is accurate as of: 2/8/17  2:22 PM.  Always use your most recent med list.                   Brand Name Dispense Instructions for use    acetaminophen 500 MG tablet    TYLENOL     Take 500-1,000 mg by mouth every 6 hours as needed for mild pain       aspirin 81 MG EC tablet     90 tablet    Take 1 tablet (81 mg) by mouth daily       atorvastatin 40 MG tablet    LIPITOR    90 tablet    Take 1 tablet (40 mg) by mouth At Bedtime       lisinopril 10 MG tablet    PRINIVIL/ZESTRIL    90 tablet    Take 1 tablet (10 mg) by mouth daily       metoprolol 200 MG 24 hr tablet    TOPROL-XL    90 tablet    Take 1 tablet (200 mg) by mouth daily       MIRALAX powder   Generic drug:  polyethylene glycol     510 g    Take 17 g by mouth as needed for constipation       NICORELIEF 2 MG gum   Generic drug:  nicotine polacrilex      Place 2 mg inside cheek as needed for smoking cessation       oxyCODONE 10 MG IR tablet    ROXICODONE    90 tablet    Take 1 tablet (10 mg) by mouth every 6 hours as needed for moderate to severe pain 3 per  day       rivaroxaban ANTICOAGULANT 20 MG Tabs tablet    XARELTO    90 tablet    Take 1 tablet (20 mg) by mouth daily (with dinner)       sennosides 8.6 MG tablet    SENOKOT    120 tablet    Take 2 tablets by mouth 2 times daily       Tadalafil 2.5 MG Tabs    CIALIS    90 tablet    Take 2.5 mg by mouth daily Never use with nitroglycerin, terazosin or doxazosin.       triamcinolone 0.1 % cream    KENALOG     Take 1 apply by topical route two times a day as needed       UNABLE TO FIND      MEDICATION NAME: Hydrocortisone 0.5 % topical cream-Take 1 application by topical route as needed.

## 2017-02-08 NOTE — LETTER
2017       RE: Constantino Taylor  [address]     [place] [address]     Dear Colleague,    Thank you for referring your patient, Constantino Taylor, to the RAD[facility]ATION ONCOLOGY CLINIC. Please see a copy of my visit note below.    FOLLOW-UP VISIT    Patient Name: Constantino Taylor      : 1955     Age: 61 year old        ______________________________________________________________________________     Chief Complaint   Patient presents with     Cancer     follow up for Radiation therapy pelvis, prostate cancer 7560cGy completed 10/26/12      /93 mmHg  Pulse 95  Wt 86.183 kg (190 lb)  SpO2 97%     Date Radiation Completed: 10/26/12    Pain  Current history of pain associated with this visit:   Intensity: 6/10  Current: sharp  Location: left lower leg  Treatment: oxycodone    Labs  Other Labs: Yes: 17    Imaging  None        PSA:   PSA   Date Value Ref Range Status   2016 0.94 0 - 4 ug/L Final   10/06/2015 0.72 0 - 4 ug/L Final   2015 1.10 0 - 4 ug/L Final   10/08/2014 0.42 0 - 4 ug/L Final   2014 0.34 0 - 4 ug/L Final   2013 1.19 0 - 4 ug/L Final     Comment:     PSA results are about 7% lower than our prior method due to a methodology   change   on 2011.   2013 2.72 0 - 4 ug/L Final     Comment:     PSA results are about 7% lower than our prior method due to a methodology   change   on 2011.   11/15/2012 3.82 0 - 4 ug/L Final     Comment:     PSA results are about 7% lower than our prior method due to a methodology   change   on 2011.   2012 8.02* 0 - 4 ug/L Final   10/26/2011 4.76* 0 - 4 ug/L Final     Testosterone Level:   TESTOSTERONE TOTAL   Date Value Ref Range Status   10/06/2015 547 240 - 950 ng/dL Final     Comment:     This test was developed and its performance characteristics determined by the   [facility],  [facility] Laboratory. It has   not been cleared  or approved by the FDA. The laboratory is regulated under   CLIA   as qualified to perform high-complexity testing. This test is used for   clinical   purposes. It should not be regarded as investigational or for research.     04/08/2015 694 240 - 950 ng/dL Final     Comment:     This test was developed and its performance characteristics determined by the   [facility],  Special Chemistry Laboratory. It has   not been cleared or approved by the FDA. The laboratory is regulated under   CLIA   as qualified to perform high-complexity testing. This test is used for   clinical   purposes. It should not be regarded as investigational or for research.     01/06/2014 194* 240 - 950 ng/dL Final       On-Study AUA Symptom Score (PQ)  AUA (American Urological Association) Symptom Score  1. Over the past month, how often have you had a sensation of not emptying your bladder completely after you finished urinating?: Less than 1 time in 5  2. Over the past month, how often have you had to urinate again less than two hours after you finished urinating?: About half the time  3. Over the past month, how often have you found you stopped and started again several times when you urinated?: Less than 1 time in 5  4. Over the past month, how often have you found it difficult to postpone urination?: Not at all  5. Over the past month, how often have you had a weak urine stream?: Less than 1 time in 5  6. Over the past month, how often have you had to push or strain to begin urinating?: Less than 1 time in 5  7. Over the past month, how many times did you typically get up to urinate from the time you went to bed at night until the time you got up in the morning?: 4 times  AUA Score: 11    Diarrhea: 0- None    Constipation: 1- Occassional or intermittent s/s; occasional use of stool softners, laxatives, enema         Other Appointments:     MD Name: Dr Brown Appointment Date: 2/15/17   MD Name: Appointment Date:    MD Name: Appointment Date:   Other Appointment Notes:     Residual Radiation side effect: Denies any side effects     Additional Instructions:                 RADIATION ONCOLOGY FOLLOW UP  February 8, 2017    DISEASE TREATED: Prostate carcinoma, [id number], pre-RT PSA 8.02, [place] 3+4=7.    INTERVAL SINCE COMPLETION OF RADIOTHERAPY: Approximately 4.5 years (radiation completed 10/26/2012)     TYPE OF RADIATION THERAPY ADMINISTERED:  7560 cGy (4500 cGy to the pelvis + 3060 cGy boost to the prostate).    HORMONE THERAPY:   Lupron:  #1-5/2012   #2-1/14/13  #3-8/14/13   #4-3/4/14 (45 mg)  Total duration: 28 months    SUBJECTIVE:    is a 61 year old gentleman here for routine follow up after completion of radiation therapy and long term ADT. He was lost to followup after our last visit in October 2015, and returns now after realizing he was overdue for a prostate cancer follow up.     On our visit today, he reports that his urination and bowel habits are stable. His AUA symptom score is 11/35; in October 2015 it was 15/35. He reports no persistent ADT effects, and reports his erectile function has essentially returned to baseline. He obtained PSA prior to our visit so results were not available during our discussions.     OBJECTIVE:  /93 mmHg  Pulse 95  Wt 86.183 kg (190 lb)  SpO2 97%  GENERAL:  Appears well, in no acute distress.   Rectal examination was deferred.     LABS:      PSA   Date Value Ref Range Status   02/08/2017 3.03 0 - 4 ug/L Final     Comment:     Assay Method:  Chemiluminescence using Siemens Vista analyzer   02/12/2016 0.94 0 - 4 ug/L Final   10/06/2015 0.72 0 - 4 ug/L Final   04/08/2015 1.10 0 - 4 ug/L Final   10/08/2014 0.42 0 - 4 ug/L Final     Testosterone Total   4/8/15: 694  1/6/14: 194    IMPRESSION: He has an elevated PSA today at 3.0, compared to his prior post-ADT baseline closer to 1.0. He has no new bony pain or obstructive symptoms.  RECOMMENDATIONS: Repeat PSA in 1  month. If it remains elevated, we will obtain bone scan and CT abdomen/pelvis and see him back in clinic. If it is closer to his previous levels (~1.0), we will have him repeat and see him for followup in 6 months.     Franky Leigh MD PGY-3  Radiation Oncology Resident, [facility]  Phone: [phone]      Again, thank you for allowing me to participate in the care of your patient.      Sincerely,    Zeeshan Varghese MD

## 2017-02-08 NOTE — PROGRESS NOTES
RADIATION ONCOLOGY FOLLOW UP  February 8, 2017    DISEASE TREATED: Prostate carcinoma, [id number], pre-RT PSA 8.02, [place] 3+4=7.    INTERVAL SINCE COMPLETION OF RADIOTHERAPY: Approximately 4.5 years (radiation completed 10/26/2012)     TYPE OF RADIATION THERAPY ADMINISTERED:  7560 cGy (4500 cGy to the pelvis + 3060 cGy boost to the prostate).    HORMONE THERAPY:   Lupron:  #1-5/2012   #2-1/14/13  #3-8/14/13   #4-3/4/14 (45 mg)  Total duration: 28 months    SUBJECTIVE:    is a 61 year old gentleman here for routine follow up after completion of radiation therapy and long term ADT. He was lost to followup after our last visit in October 2015, and returns now after realizing he was overdue for a prostate cancer follow up.     On our visit today, he reports that his urination and bowel habits are stable. His AUA symptom score is 11/35; in October 2015 it was 15/35. He reports no persistent ADT effects, and reports his erectile function has essentially returned to baseline. He obtained PSA prior to our visit so results were not available during our discussions.     OBJECTIVE:  /93 mmHg  Pulse 95  Wt 86.183 kg (190 lb)  SpO2 97%  GENERAL:  Appears well, in no acute distress.   Rectal examination was deferred.     LABS:      PSA   Date Value Ref Range Status   02/08/2017 3.03 0 - 4 ug/L Final     Comment:     Assay Method:  Chemiluminescence using Siemens Vista analyzer   02/12/2016 0.94 0 - 4 ug/L Final   10/06/2015 0.72 0 - 4 ug/L Final   04/08/2015 1.10 0 - 4 ug/L Final   10/08/2014 0.42 0 - 4 ug/L Final     Testosterone Total   4/8/15: 694  1/6/14: 194    IMPRESSION: He has an elevated PSA today at 3.0, compared to his prior post-ADT baseline closer to 1.0. He has no new bony pain or obstructive symptoms.  RECOMMENDATIONS: Repeat PSA in 1 month. If it remains elevated, we will obtain bone scan and CT abdomen/pelvis and see him back in clinic. If it is closer to his previous levels (~1.0), we will have  him repeat and see him for followup in 6 months.     Franky Leigh MD PGY-3  Radiation Oncology Resident, [facility]  Phone: [phone]

## 2017-02-08 NOTE — LETTER
February 14, 2017       TO: Constantino LA Brandon  [address]     [place] [address]       Dear Mr. Taylor,    We recently retested you for the Hepatitis C virus, and the results show that the virus was Not Detected in your system. This means you are cured of hepatitis C! Congratulations! Please feel free to contact me at [phone] if you have any questions or concerns.     Thank you,     Paula Goins LPN  [facility] - Hepatitis C Program      Component      Latest Ref Rng & Units 2/8/2017   HCV RNA Quant IU/ml      HCVND [IU]/mL HCV RNA Not Detected . . .   Log of HCV RNA Qt      <1.2 Log IU/mL Not Calculated

## 2017-02-08 NOTE — PROGRESS NOTES
FOLLOW-UP VISIT    Patient Name: Constantino Taylor      : 1955     Age: 61 year old        ______________________________________________________________________________     Chief Complaint   Patient presents with     Cancer     follow up for Radiation therapy pelvis, prostate cancer 7560cGy completed 10/26/12      /93 mmHg  Pulse 95  Wt 86.183 kg (190 lb)  SpO2 97%     Date Radiation Completed: 10/26/12    Pain  Current history of pain associated with this visit:   Intensity: 6/10  Current: sharp  Location: left lower leg  Treatment: oxycodone    Labs  Other Labs: Yes: 17    Imaging  None        PSA:   PSA   Date Value Ref Range Status   2016 0.94 0 - 4 ug/L Final   10/06/2015 0.72 0 - 4 ug/L Final   2015 1.10 0 - 4 ug/L Final   10/08/2014 0.42 0 - 4 ug/L Final   2014 0.34 0 - 4 ug/L Final   2013 1.19 0 - 4 ug/L Final     Comment:     PSA results are about 7% lower than our prior method due to a methodology   change   on 2011.   2013 2.72 0 - 4 ug/L Final     Comment:     PSA results are about 7% lower than our prior method due to a methodology   change   on 2011.   11/15/2012 3.82 0 - 4 ug/L Final     Comment:     PSA results are about 7% lower than our prior method due to a methodology   change   on 2011.   2012 8.02* 0 - 4 ug/L Final   10/26/2011 4.76* 0 - 4 ug/L Final     Testosterone Level:   TESTOSTERONE TOTAL   Date Value Ref Range Status   10/06/2015 547 240 - 950 ng/dL Final     Comment:     This test was developed and its performance characteristics determined by the   [facility],  Special Chemistry Laboratory. It has   not been cleared or approved by the FDA. The laboratory is regulated under   CLIA   as qualified to perform high-complexity testing. This test is used for   clinical   purposes. It should not be regarded as investigational or for research.     2015 694 240 - 950 ng/dL  Final     Comment:     This test was developed and its performance characteristics determined by the   [facility],  Special Chemistry Laboratory. It has   not been cleared or approved by the FDA. The laboratory is regulated under   CLIA   as qualified to perform high-complexity testing. This test is used for   clinical   purposes. It should not be regarded as investigational or for research.     01/06/2014 194* 240 - 950 ng/dL Final       On-Study AUA Symptom Score (PQ)  AUA (American Urological Association) Symptom Score  1. Over the past month, how often have you had a sensation of not emptying your bladder completely after you finished urinating?: Less than 1 time in 5  2. Over the past month, how often have you had to urinate again less than two hours after you finished urinating?: About half the time  3. Over the past month, how often have you found you stopped and started again several times when you urinated?: Less than 1 time in 5  4. Over the past month, how often have you found it difficult to postpone urination?: Not at all  5. Over the past month, how often have you had a weak urine stream?: Less than 1 time in 5  6. Over the past month, how often have you had to push or strain to begin urinating?: Less than 1 time in 5  7. Over the past month, how many times did you typically get up to urinate from the time you went to bed at night until the time you got up in the morning?: 4 times  AUA Score: 11    Diarrhea: 0- None    Constipation: 1- Occassional or intermittent s/s; occasional use of stool softners, laxatives, enema         Other Appointments:     MD Name: Dr Brown Appointment Date: 2/15/17   MD Name: Appointment Date:   MD Name: Appointment Date:   Other Appointment Notes:     Residual Radiation side effect: Denies any side effects     Additional Instructions:

## 2017-02-09 LAB
HCV RNA SERPL NAA+PROBE-ACNC: NORMAL [IU]/ML
HCV RNA SERPL NAA+PROBE-ACNC: NORMAL [IU]/ML
HCV RNA SERPL NAA+PROBE-LOG IU: NORMAL LOG IU/ML
HCV RNA SERPL NAA+PROBE-LOG IU: NORMAL LOG IU/ML

## 2017-02-09 NOTE — PROGRESS NOTES
Quick Note:    Results discussed directly with patient while patient was present. Any further details documented in the note.     Wandy Kent RN  ______

## 2017-02-10 NOTE — TELEPHONE ENCOUNTER
Med Rec completed and returned to Team 3 TC.    Susan Burton, Pharm.D., [facility]  Medication Therapy Management Pharmacist  [phone]

## 2017-02-13 ENCOUNTER — PRE VISIT (OUTPATIENT)
Dept: CARDIOLOGY | Facility: CLINIC | Age: 62
End: 2017-02-13

## 2017-02-15 ENCOUNTER — OFFICE VISIT (OUTPATIENT)
Dept: CARDIOLOGY | Facility: CLINIC | Age: 62
End: 2017-02-15
Attending: INTERNAL MEDICINE
Payer: COMMERCIAL

## 2017-02-15 VITALS
BODY MASS INDEX: 24.29 KG/M2 | SYSTOLIC BLOOD PRESSURE: 139 MMHG | HEIGHT: 75 IN | WEIGHT: 195.4 LBS | DIASTOLIC BLOOD PRESSURE: 84 MMHG | OXYGEN SATURATION: 97 % | HEART RATE: 71 BPM

## 2017-02-15 DIAGNOSIS — I10 ESSENTIAL HYPERTENSION WITH GOAL BLOOD PRESSURE LESS THAN 140/90: ICD-10-CM

## 2017-02-15 DIAGNOSIS — C61 MALIGNANT NEOPLASM OF PROSTATE (H): ICD-10-CM

## 2017-02-15 DIAGNOSIS — I48.20 CHRONIC ATRIAL FIBRILLATION (H): ICD-10-CM

## 2017-02-15 DIAGNOSIS — I50.22 CHRONIC SYSTOLIC HEART FAILURE (H): Primary | ICD-10-CM

## 2017-02-15 LAB — PSA SERPL-MCNC: 3.08 UG/L (ref 0–4)

## 2017-02-15 PROCEDURE — 84153 ASSAY OF PSA TOTAL: CPT | Performed by: RADIOLOGY

## 2017-02-15 PROCEDURE — 36415 COLL VENOUS BLD VENIPUNCTURE: CPT | Performed by: RADIOLOGY

## 2017-02-15 PROCEDURE — 99214 OFFICE O/P EST MOD 30 MIN: CPT | Mod: GC | Performed by: INTERNAL MEDICINE

## 2017-02-15 PROCEDURE — 99212 OFFICE O/P EST SF 10 MIN: CPT | Mod: ZF

## 2017-02-15 ASSESSMENT — PAIN SCALES - GENERAL: PAINLEVEL: NO PAIN (0)
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
## 2017-02-15 NOTE — MR AVS SNAPSHOT
After Visit Summary   2/15/2017    Constantino Taylor    MRN: [id number]           Patient Information     Date Of Birth          1955        Visit Information        Provider Department      2/15/2017 9:40 AM Norah Brown MD [facility]        Care Instructions    You were seen today in the Cardiovascular Clinic at the [facility].     Cardiology Providers you saw during your visit:  Dr. Brown    Recommendations:   No changes to your plan of care.  Take your medication as prescribed  Eat a heart healthy, low sodium diet.  Get 20 to 30 minutes of aerobic exercise 4 to 5 times per week as tolerated. (Examples of aerobic exercise include: walking, bicycling, swimming, running).    Follow-up:  With Dr. Brown in 6 months with labs    Results:     Orders Only on 02/15/2017   Component Date Value Ref Range Status     PSA 02/15/2017 3.08  0 - 4 ug/L Final    Assay Method:  Chemiluminescence using Siemens Vista analyzer       For emergencies call 911.    For any scheduling needs, please call [phone], press option #1 then option #1 again.    Thank you for your visit today!     Please call if you have any questions or concerns.    Maribel Kent RN  Cardiology Care Coordinator  [phone], press option # 1 to be routed to the [place] then option # 3 for medical questions to speak with a nurse    If you have an urgent need after business hours or over the weekend please call [phone] and ask for the cardiology fellow on call.     If you have a hard time paying for your medications visit http://www.needymeds.org/ to see where you might be able to get your medications the cheapest along with patient assistance programs available.    Heart failure patients and family members are welcome you to come to one of our heart failure support group meetings on the following dates from 1-2 pm : 12/5/16. These all take place on the 8th floor of the our hospital building in  the [facility] Cafeteria Conference Room. Let us know if you have any questions.            Follow-ups after your visit        Your next 10 appointments already scheduled     Mar 03, 2017  1:30 PM CST   (Arrive by 1:15 PM)   New Patient Visit with Az Morton MD   [facility] ([facility])    [address]  4th Floor  [facility] [address]   [phone]            Mar 08, 2017  1:00 PM CST   LAB with ACUTE CARE LAB [facility] [place], Lab ([facility])    [address] [address]              Patient must bring picture ID.  Patient should be prepared to give a urine specimen  Please do not eat 10-12 hours before your appointment if you are coming in fasting for labs on lipids, cholesterol, or glucose (sugar).  Pregnant women should follow their Care Team instructions. Water with medications is okay. Do not drink coffee or other fluids.   If you have concerns about taking  your medications, please ask at office or if scheduling via Kle[employer], send a message by clicking on Secure Messaging, Message Your Care Team.            Aug 07, 2017  2:00 PM CDT   LAB with ACUTE CARE LAB [facility] [place], [facility] ([facility])    [address] [address]              Patient must bring picture ID.  Patient should be prepared to give a urine specimen  Please do not eat 10-12 hours before your appointment if you are coming in fasting for labs on lipids, cholesterol, or glucose (sugar).  Pregnant women should follow their Care Team instructions. Water with medications is okay. Do not drink coffee or other fluids.   If you have concerns about taking  your medications, please ask at office or if scheduling via Kleer, send a message by clicking on Secure Messaging, Message Your Care Team.            Aug 09, 2017  2:00 PM CDT   Return Visit  with Zeeshan Varghese MD   [facility] ([facility])    [facility]  1st Floor  [address] [address]   [phone]            Aug 30, 2017  9:00 AM CDT   Lab with  LAB   [facility] ([facility])    [address]  1st Floor  [facility] [id number] [phone]            Aug 30, 2017  9:40 AM CDT   (Arrive by 9:25 AM)   RETURN HEART FAILURE with Norah Brown MD   [facility] ([facility])    [address]  3rd Floor  [facility] [id number] [phone]              Who to contact     If you have questions or need follow up information about today's clinic visit or your schedule please contact [facility] directly at [phone].  Normal or non-critical lab and imaging results will be communicated to you by [employer], letter or phone within 4 business days after the clinic has received the results. If you do not hear from us within 7 days, please contact the clinic through [employer] or phone. If you have a critical or abnormal lab result, we will notify you by phone as soon as possible.  Submit refill requests through [employer] or call your pharmacy and they will forward the refill request to us. Please allow 3 business days for your refill to be completed.          Additional Information About Your Visit        MyChart Information     [employer] gives you secure access to your electronic health record. If you see a primary care provider, you can also send messages to your care team and make appointments. If you have questions, please call your primary care clinic.  If you do not have a primary care provider, please call [phone] and they will assist you.        Care EveryWhere ID     This is your Care EveryWhere ID. This could be used by other organizations to access your [place] medical records  [id number]        Your Vitals Were     Pulse  "Height Pulse Oximetry BMI (Body Mass Index)          71 1.905 m (6' 3\") 97% 24.42 kg/m2         Blood Pressure from Last 3 Encounters:   02/15/17 139/84   02/08/17 (!) 138/93   02/06/17 (!) 151/93    Weight from Last 3 Encounters:   02/15/17 88.6 kg (195 lb 6.4 oz)   02/08/17 86.2 kg (190 lb)   02/06/17 86.5 kg (190 lb 12.8 oz)              Today, you had the following     No orders found for display       Primary Care Provider Office Phone # Fax #    Avery Duke -[phone]       [address] [address]        Thank you!     Thank you for choosing [facility]  for your care. Our goal is always to provide you with excellent care. Hearing back from our patients is one way we can continue to improve our services. Please take a few minutes to complete the written survey that you may receive in the mail after your visit with us. Thank you!             Your Updated Medication List - Protect others around you: Learn how to safely use, store and throw away your medicines at www.disposemymeds.org.          This list is accurate as of: 2/15/17  9:57 AM.  Always use your most recent med list.                   Brand Name Dispense Instructions for use    acetaminophen 500 MG tablet    TYLENOL     Take 500-1,000 mg by mouth every 6 hours as needed for mild pain       aspirin 81 MG EC tablet     90 tablet    Take 1 tablet (81 mg) by mouth daily       atorvastatin 40 MG tablet    LIPITOR    90 tablet    Take 1 tablet (40 mg) by mouth At Bedtime       lisinopril 10 MG tablet    PRINIVIL/ZESTRIL    90 tablet    Take 1 tablet (10 mg) by mouth daily       metoprolol 200 MG 24 hr tablet    TOPROL-XL    90 tablet    Take 1 tablet (200 mg) by mouth daily       MIRALAX powder   Generic drug:  polyethylene glycol     510 g    Take 17 g by mouth as needed for constipation       NICORELIEF 2 MG gum   Generic drug:  nicotine polacrilex      Place 2 mg inside cheek " as needed for smoking cessation       oxyCODONE 10 MG IR tablet    ROXICODONE    90 tablet    Take 1 tablet (10 mg) by mouth every 6 hours as needed for moderate to severe pain 3 per day       rivaroxaban ANTICOAGULANT 20 MG Tabs tablet    XARELTO    90 tablet    Take 1 tablet (20 mg) by mouth daily (with dinner)       sennosides 8.6 MG tablet    SENOKOT    120 tablet    Take 2 tablets by mouth 2 times daily       Tadalafil 2.5 MG Tabs    CIALIS    90 tablet    Take 2.5 mg by mouth daily Never use with nitroglycerin, terazosin or doxazosin.       triamcinolone 0.1 % cream    KENALOG     Take 1 apply by topical route two times a day as needed       UNABLE TO FIND      MEDICATION NAME: Hydrocortisone 0.5 % topical cream-Take 1 application by topical route as needed.

## 2017-02-15 NOTE — PATIENT INSTRUCTIONS
You were seen today in the Cardiovascular Clinic at the [facility].     Cardiology Providers you saw during your visit:  Dr. Brown    Recommendations:   No changes to your plan of care.  Take your medication as prescribed  Eat a heart healthy, low sodium diet.  Get 20 to 30 minutes of aerobic exercise 4 to 5 times per week as tolerated. (Examples of aerobic exercise include: walking, bicycling, swimming, running).    Follow-up:  With Dr. Brown in 6 months with labs    Results:     Orders Only on 02/15/2017   Component Date Value Ref Range Status     PSA 02/15/2017 3.08  0 - 4 ug/L Final    Assay Method:  Chemiluminescence using Siemens Vista analyzer       For emergencies call 911.    For any scheduling needs, please call [phone], press option #1 then option #1 again.    Thank you for your visit today!     Please call if you have any questions or concerns.    Maribel Kent RN  Cardiology Care Coordinator  [phone], press option # 1 to be routed to the [place] then option # 3 for medical questions to speak with a nurse    If you have an urgent need after business hours or over the weekend please call [phone] and ask for the cardiology fellow on call.     If you have a hard time paying for your medications visit http://www.needymeds.org/ to see where you might be able to get your medications the cheapest along with patient assistance programs available.    Heart failure patients and family members are welcome you to come to one of our heart failure support group meetings on the following dates from 1-2 pm : 12/5/16. These all take place on the 8th floor of the our hospital building in the [facility] Cafeteria Conference Room. Let us know if you have any questions.

## 2017-02-15 NOTE — NURSING NOTE
Chief Complaint   Patient presents with     Follow Up For     61 yr old male with h/o chronic systolic HF presenting for follow up

## 2017-02-15 NOTE — NURSING NOTE
Diet: Patient instructed regarding a heart healthy diet, including discussion of reduced fat and sodium intake. Patient demonstrated understanding of this information and agreed to call with further questions or concerns.  Labs: Patient was given results of the laboratory testing obtained today.  Patient demonstrated understanding of this information and agreed to call with further questions or concerns.   Return Appointment: Patient given instructions regarding scheduling next clinic visit. Patient demonstrated understanding of this information and agreed to call with further questions or concerns.  Patient stated he understood all health information given and agreed to call with further questions or concerns.

## 2017-02-20 ENCOUNTER — PRE VISIT (OUTPATIENT)
Dept: ANESTHESIOLOGY | Facility: CLINIC | Age: 62
End: 2017-02-20

## 2017-02-20 NOTE — TELEPHONE ENCOUNTER
1.  Date/reason for appt:  3/03/17   Reflex Sympathetic Dystrophy    2.  Referring provider:  Internal, Dr Duke    3.  Call to patient (Yes / No - short description):  No, referred.  Records reviewed.  All records are in [facility] and imaging is in PACS.

## 2017-02-23 ENCOUNTER — TELEPHONE (OUTPATIENT)
Dept: PEDIATRICS | Facility: CLINIC | Age: 62
End: 2017-02-23

## 2017-02-23 NOTE — TELEPHONE ENCOUNTER
Spoke with daughter and confirmed that form was received.  She would like a call when this has been done.

## 2017-02-23 NOTE — TELEPHONE ENCOUNTER
Forms received from: [employer]   Phone number listed: [phone]   Fax listed: [phone]  Date received: 2-23-17  Form description: FMLA  Once forms are completed, please return to Adriano De Leon via fax.  Is patient requesting to be contacted when forms are completed: n/a  Form placed: provider richard Merida

## 2017-03-01 ENCOUNTER — TELEPHONE (OUTPATIENT)
Dept: PEDIATRICS | Facility: CLINIC | Age: 62
End: 2017-03-01

## 2017-03-02 NOTE — TELEPHONE ENCOUNTER
Daughter calling to check on status.  She did fax a second copy to the clinic.    Dr Duke, do you still have forms for this patient?

## 2017-03-03 ENCOUNTER — OFFICE VISIT (OUTPATIENT)
Dept: ANESTHESIOLOGY | Facility: CLINIC | Age: 62
End: 2017-03-03

## 2017-03-03 DIAGNOSIS — M79.609 AMPUTATION STUMP PAIN (H): Primary | ICD-10-CM

## 2017-03-03 DIAGNOSIS — T87.89 AMPUTATION STUMP PAIN (H): Primary | ICD-10-CM
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
## 2017-03-03 NOTE — TELEPHONE ENCOUNTER
Left detailed message, informing daughter that this was done.  Bee had copy of form, sent to be abstracted.

## 2017-03-03 NOTE — NURSING NOTE
Pt was here for Medical Marijuana,  informed him that our clinic does not prescribe, he was asked to follow up with is PCP.     Pt was agreeable and pleasant.   LPN brought pt to the first floor .  Wandy Jean-Baptiste LPN

## 2017-03-03 NOTE — MR AVS SNAPSHOT
After Visit Summary   3/3/2017    Constantino Taylor    MRN: [id number]           Patient Information     Date Of Birth          1955        Visit Information        Provider Department      3/3/2017 1:30 PM Az Morton MD [facility]         Follow-ups after your visit        Your next 10 appointments already scheduled     Feb 15, 2017  9:00 AM   Lab with  LAB   [facility] ([facility])    [address]  1st Floor  [facility] [address]   [phone]            Feb 15, 2017  9:40 AM   (Arrive by 9:25 AM)   RETURN HEART FAILURE with Norah Brown MD   [facility] ([facility])    [address]  3rd Floor  [facility] [address]   [phone]            Mar 03, 2017  1:30 PM   (Arrive by 1:15 PM)   New Patient Visit with Az Morton MD   [facility] ([facility])    [address]  4th Floor  [facility] [address]   [phone]            Mar 08, 2017  1:00 PM   [facility] ([facility])    [address] [facility] [address]              Patient must bring picture ID.  Patient should be prepared to give a urine specimen  Please do not eat 10-12 hours before your appointment if you are coming in fasting for labs on lipids, cholesterol, or glucose (sugar).  Pregnant women should follow their Care Team instructions. Water with medications is okay. Do not drink coffee or other fluids.   If you have concerns about taking  your medications, please ask at office or if scheduling via Olac[employer]st, send a message by clicking on Secure Messaging, Message Your Care Team.            Aug 07, 2017  2:00 PM   LAB [facility] ([facility])    [address]  [facility] [address]              Patient must bring picture ID.  Patient should be prepared to give a urine specimen  Please do not eat 10-12 hours before your appointment if you are coming in fasting for labs on lipids, cholesterol, or glucose (sugar).  Pregnant women should follow their Care Team instructions. Water with medications is okay. Do not drink coffee or other fluids.   If you have concerns about taking  your medications, please ask at office or if scheduling via [employer], send a message by clicking on Secure Messaging, Message Your Care Team.            Aug 09, 2017  2:00 PM   Return Visit with Zeeshan Varghese MD   [facility] Oncology [facility] ([facility])    [facility]  1st Floor  [address] [facility] [address] [phone]              Future tests that were ordered for you today     Open Future Orders        Priority Expected Expires Ordered    PSA tumor marker Routine 3/8/2017 8/7/2017 2/8/2017            Who to contact     Please call your clinic at [phone] to:    Ask questions about your health    Make or cancel appointments    Discuss your medicines    Learn about your test results    Speak to your doctor   If you have compliments or concerns about an experience at your clinic, or if you wish to file a complaint, please contact [facility] Patient Relations at [phone] or email us at [email or web address]         Additional Information About Your Visit        Etablehart Information     [employer] gives you secure access to your electronic health record. If you see a primary care provider, you can also send messages to your care team and make appointments. If you have questions, please call your primary care clinic.  If you do not have a primary care provider, please call [phone] and they will assist you.      W[employer]lect is an electronic gateway that provides easy, online access to your  medical records. With Cloudnexa, you can request a clinic appointment, read your test results, renew a prescription or communicate with your care team.     To access your existing account, please contact your [facility] or call [phone] for assistance.        Care EveryWhere ID     This is your Care EveryWhere ID. This could be used by other organizations to access your [place] medical records  [id number]         Blood Pressure from Last 3 Encounters:   02/08/17 138/93   02/06/17 151/93   01/02/17 123/87    Weight from Last 3 Encounters:   02/08/17 86.183 kg (190 lb)   02/06/17 86.546 kg (190 lb 12.8 oz)   01/02/17 84.369 kg (186 lb)              Today, you had the following     No orders found for display       Primary Care Provider Office Phone # Fax #    Avery Duke [phone]       [place] [address] [facility] [address]        Thank you!     Thank you for choosing [facility]  for your care. Our goal is always to provide you with excellent care. Hearing back from our patients is one way we can continue to improve our services. Please take a few minutes to complete the written survey that you may receive in the mail after your visit with us. Thank you!             Your Updated Medication List - Protect others around you: Learn how to safely use, store and throw away your medicines at www.disposemymeds.org.          This list is accurate as of: 2/9/17 12:09 PM.  Always use your most recent med list.                   Brand Name Dispense Instructions for use    acetaminophen 500 MG tablet    TYLENOL     Take 500-1,000 mg by mouth every 6 hours as needed for mild pain       aspirin 81 MG EC tablet     90 tablet    Take 1 tablet (81 mg) by mouth daily       atorvastatin 40 MG tablet    LIPITOR    90 tablet    Take 1 tablet (40 mg) by mouth At Bedtime       lisinopril 10 MG tablet     PRINIVIL/ZESTRIL    90 tablet    Take 1 tablet (10 mg) by mouth daily       metoprolol 200 MG 24 hr tablet    TOPROL-XL    90 tablet    Take 1 tablet (200 mg) by mouth daily       MIRALAX powder   Generic drug:  polyethylene glycol     510 g    Take 17 g by mouth as needed for constipation       NICORELIEF 2 MG gum   Generic drug:  nicotine polacrilex      Place 2 mg inside cheek as needed for smoking cessation       oxyCODONE 10 MG IR tablet    ROXICODONE    90 tablet    Take 1 tablet (10 mg) by mouth every 6 hours as needed for moderate to severe pain 3 per day       rivaroxaban ANTICOAGULANT 20 MG Tabs tablet    XARELTO    90 tablet    Take 1 tablet (20 mg) by mouth daily (with dinner)       sennosides 8.6 MG tablet    SENOKOT    120 tablet    Take 2 tablets by mouth 2 times daily       Tadalafil 2.5 MG Tabs    CIALIS    90 tablet    Take 2.5 mg by mouth daily Never use with nitroglycerin, terazosin or doxazosin.       triamcinolone 0.1 % cream    KENALOG     Take 1 apply by topical route two times a day as needed       UNABLE TO FIND      MEDICATION NAME: Hydrocortisone 0.5 % topical cream-Take 1 application by topical route as needed.
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
## 2017-03-06 ENCOUNTER — TELEPHONE (OUTPATIENT)
Dept: PEDIATRICS | Facility: CLINIC | Age: 62
End: 2017-03-06

## 2017-03-06 DIAGNOSIS — G90.50 RSD (REFLEX SYMPATHETIC DYSTROPHY): ICD-10-CM

## 2017-03-06 NOTE — TELEPHONE ENCOUNTER
Reason for Call: Request for an order or referral:    Order or referral being requested: Skilled Nursing 1x/week for 6 weeks + 3 PRN    Date needed: as soon as possible    Has the patient been seen by the PCP for this problem? YES    Additional comments:     Phone number Patient can be reached at:  Other phone number:  [phone]    Best Time:      Can we leave a detailed message on this number?  YES    Call taken on 3/6/2017 at 1:45 PM by Lucas Winter

## 2017-03-06 NOTE — TELEPHONE ENCOUNTER
Routing refill request to provider for review/approval because:  Drug not on the [facility] refill protocol   Angelica Siegel RN [facility] Triage.

## 2017-03-06 NOTE — TELEPHONE ENCOUNTER
Patient's daughter calling in to state that [employer] still states that they have not received the FMLA forms from our clinic.  Daughter, Dacia, is requesting that the forms be faxed to her at this fax number: [phone].  In addition to faxing, she is requesting that a hard copy be mailed to her at this address:  Dacia Taylor, [address] [place] [address], [place]  [address].  If any questions, please give her a call.    Izabela LIMON.  Patient Representative  [place]

## 2017-03-06 NOTE — TELEPHONE ENCOUNTER
Reason for Call:  Medication or medication refill:    Do you use a [facility]?  Name of the pharmacy and phone number for the current request:  Will  from     Name of the medication requested: oxyCODONE (ROXICODONE) 10 MG IR tablet    Other request: none    Can we leave a detailed message on this number? YES    Phone number patient can be reached at: Home number on file [phone] (home)    Best Time: any      Call taken on 3/6/2017 at 4:24 PM by Wandy Pereira

## 2017-03-06 NOTE — TELEPHONE ENCOUNTER
Called jim and she stated that patient had a change in condition. He went to the ER for HTN. She is requesting SN listed below.    Routed to provider to OK orders. RN unable to do so as patient had change in condition.    Viviane Love RN  [facility]

## 2017-03-07 RX ORDER — OXYCODONE HYDROCHLORIDE 10 MG/1
10 TABLET ORAL EVERY 6 HOURS PRN
Qty: 90 TABLET | Refills: 0 | Status: SHIPPED | OUTPATIENT
Start: 2017-03-07 | End: 2017-03-09

## 2017-03-09 ENCOUNTER — OFFICE VISIT (OUTPATIENT)
Dept: PEDIATRICS | Facility: CLINIC | Age: 62
End: 2017-03-09
Payer: COMMERCIAL

## 2017-03-09 VITALS
BODY MASS INDEX: 24.37 KG/M2 | HEART RATE: 94 BPM | TEMPERATURE: 97.3 F | WEIGHT: 195 LBS | DIASTOLIC BLOOD PRESSURE: 73 MMHG | SYSTOLIC BLOOD PRESSURE: 121 MMHG | OXYGEN SATURATION: 98 %

## 2017-03-09 DIAGNOSIS — G90.50 RSD (REFLEX SYMPATHETIC DYSTROPHY): ICD-10-CM

## 2017-03-09 DIAGNOSIS — Z71.6 ENCOUNTER FOR SMOKING CESSATION COUNSELING: ICD-10-CM

## 2017-03-09 DIAGNOSIS — Z12.11 SCREEN FOR COLON CANCER: Primary | ICD-10-CM

## 2017-03-09 PROCEDURE — 80307 DRUG TEST PRSMV CHEM ANLYZR: CPT | Mod: 90 | Performed by: INTERNAL MEDICINE

## 2017-03-09 PROCEDURE — 99000 SPECIMEN HANDLING OFFICE-LAB: CPT | Performed by: INTERNAL MEDICINE

## 2017-03-09 PROCEDURE — 99214 OFFICE O/P EST MOD 30 MIN: CPT | Performed by: INTERNAL MEDICINE

## 2017-03-09 RX ORDER — OXYCODONE HYDROCHLORIDE 10 MG/1
10 TABLET ORAL EVERY 6 HOURS PRN
Qty: 90 TABLET | Refills: 0 | Status: SHIPPED | OUTPATIENT
Start: 2017-03-09 | End: 2017-04-07

## 2017-03-09 ASSESSMENT — PAIN SCALES - GENERAL: PAINLEVEL: MODERATE PAIN (5)

## 2017-03-09 NOTE — MR AVS SNAPSHOT
After Visit Summary   3/9/2017    Constantino Taylor    MRN: [id number]           Patient Information     Date Of Birth          1955        Visit Information        Provider Department      3/9/2017 9:20 AM Avery Duke MD [facility]        Today's Diagnoses     Screen for colon cancer    -  1    RSD (reflex sympathetic dystrophy)        Encounter for smoking cessation counseling           Follow-ups after your visit        Your next 10 appointments already scheduled     Aug 07, 2017  2:00 PM CDT   LAB with ACUTE CARE LAB [facility] ([facility])    [address] [facility] [address]              Patient must bring picture ID.  Patient should be prepared to give a urine specimen  Please do not eat 10-12 hours before your appointment if you are coming in fasting for labs on lipids, cholesterol, or glucose (sugar).  Pregnant women should follow their Care Team instructions. Water with medications is okay. Do not drink coffee or other fluids.   If you have concerns about taking  your medications, please ask at office or if scheduling via Private [employer], send a message by clicking on Secure Messaging, Message Your Care Team.            Aug 09, 2017  2:00 PM CDT   Return Visit with Zeeshan Varghese MD   Radiation Oncology Clinic ([facility])    [facility]  1st Floor  [address] [facility] [address] [phone]            Aug 30, 2017  9:00 AM CDT   Lab with  LAB   [facility] ([facility])    [address] [place] [address]  [address] [facility] [id number] [phone]            Aug 30, 2017  9:40 AM CDT   (Arrive by 9:25 AM)   RETURN HEART FAILURE with Norah Brown MD   [facility] Care ([facility])    [address] [facility]  3rd Floor  [facility] [address] [phone]              Future tests  that were ordered for you today     Open Future Orders        Priority Expected Expires Ordered    Fecal colorectal cancer screen FIT - Future (S+30) Routine 3/30/2017 4/8/2017 3/9/2017            Who to contact     If you have questions or need follow up information about today's clinic visit or your schedule please contact [facility] directly at [phone].  Normal or non-critical lab and imaging results will be communicated to you by Quantum OP[employer]art, letter or phone within 4 business days after the clinic has received the results. If you do not hear from us within 7 days, please contact the clinic through Quantum OPShart or phone. If you have a critical or abnormal lab result, we will notify you by phone as soon as possible.  Submit refill requests through [employer] or call your pharmacy and they will forward the refill request to us. Please allow 3 business days for your refill to be completed.          Additional Information About Your Visit        Quantum OPSharMailana Information     [employer] gives you secure access to your electronic health record. If you see a primary care provider, you can also send messages to your care team and make appointments. If you have questions, please call your primary care clinic.  If you do not have a primary care provider, please call [phone] and they will assist you.        Care EveryWhere ID     This is your Care EveryWhere ID. This could be used by other organizations to access your [facility] medical records  [id number]        Your Vitals Were     Pulse Temperature Pulse Oximetry BMI (Body Mass Index)          94 97.3  F (36.3  C) (Oral) 98% 24.37 kg/m2         Blood Pressure from Last 3 Encounters:   03/09/17 121/73   02/15/17 139/84   02/08/17 (!) 138/93    Weight from Last 3 Encounters:   03/09/17 195 lb (88.5 kg)   02/15/17 195 lb 6.4 oz (88.6 kg)   02/08/17 190 lb (86.2 kg)              We Performed the Following     Drug Screen Comprehensive , Urine with Reported Meds  (Pain Care Package)          Where to get your medicines      These medications were sent to [facility] [address]  [address] [facility] [address]     Phone:  [phone]     nicotine polacrilex 2 MG gum         Some of these will need a paper prescription and others can be bought over the counter.  Ask your nurse if you have questions.     Bring a paper prescription for each of these medications     oxyCODONE 10 MG IR tablet          Primary Care Provider Office Phone # Fax #    Avery Duke [phone]       [facility] [address] [facility] [address]        Thank you!     Thank you for choosing [facility]  for your care. Our goal is always to provide you with excellent care. Hearing back from our patients is one way we can continue to improve our services. Please take a few minutes to complete the written survey that you may receive in the mail after your visit with us. Thank you!             Your Updated Medication List - Protect others around you: Learn how to safely use, store and throw away your medicines at www.disposemymeds.org.          This list is accurate as of: 3/9/17  9:45 AM.  Always use your most recent med list.                   Brand Name Dispense Instructions for use    acetaminophen 500 MG tablet    TYLENOL     Take 500-1,000 mg by mouth every 6 hours as needed for mild pain       aspirin 81 MG EC tablet     90 tablet    Take 1 tablet (81 mg) by mouth daily       atorvastatin 40 MG tablet    LIPITOR    90 tablet    Take 1 tablet (40 mg) by mouth At Bedtime       lisinopril 10 MG tablet    PRINIVIL/ZESTRIL    90 tablet    Take 1 tablet (10 mg) by mouth daily       metoprolol 200 MG 24 hr tablet    TOPROL-XL    90 tablet    Take 1 tablet (200 mg) by mouth daily       MIRALAX powder   Generic drug:  polyethylene glycol     510 g    Take 17 g by  mouth as needed for constipation       nicotine polacrilex 2 MG gum    NICORELIEF    30 tablet    Place 1 each (2 mg) inside cheek as needed for smoking cessation Reported on 3/9/2017       oxyCODONE 10 MG IR tablet    ROXICODONE    90 tablet    Take 1 tablet (10 mg) by mouth every 6 hours as needed for moderate to severe pain 3 per day       rivaroxaban ANTICOAGULANT 20 MG Tabs tablet    XARELTO    90 tablet    Take 1 tablet (20 mg) by mouth daily (with dinner)       sennosides 8.6 MG tablet    SENOKOT    120 tablet    Take 2 tablets by mouth 2 times daily       Tadalafil 2.5 MG Tabs    CIALIS    90 tablet    Take 2.5 mg by mouth daily Never use with nitroglycerin, terazosin or doxazosin.       triamcinolone 0.1 % cream    KENALOG     Take 1 apply by topical route two times a day as needed       UNABLE TO FIND      MEDICATION NAME: Hydrocortisone 0.5 % topical cream-Take 1 application by topical route as needed.

## 2017-03-09 NOTE — NURSING NOTE
"Chief Complaint   Patient presents with     Recheck Medication       Initial /73 (BP Location: Right arm, Patient Position: Chair, Cuff Size: Adult Regular)  Pulse 94  Temp 97.3  F (36.3  C) (Oral)  Wt 195 lb (88.5 kg)  SpO2 98%  BMI 24.37 kg/m2 Estimated body mass index is 24.37 kg/(m^2) as calculated from the following:    Height as of 2/15/17: 6' 3\" (1.905 m).    Weight as of this encounter: 195 lb (88.5 kg).  Medication Reconciliation: complete   SMA Luther    "

## 2017-03-09 NOTE — PROGRESS NOTES
SUBJECTIVE:                                                    Constantino Taylor is a 61 year old male who presents to clinic today for the following health issues:      Medication Followup of all medications    Taking Medication as prescribed: yes    Side Effects:  None    Medication Helping Symptoms:  yes     Pain was not helpful.  Did not have the certification for the marajuana  Blood pressure  Smoking tends to make it go up  Tried the gum in th past   Not drinking that much        Problem list and histories reviewed & adjusted, as indicated.  Additional history: as documented    Patient Active Problem List   Diagnosis     Cerebral infarction (H)     Hepatitis C     Tobacco use disorder     Chronic low back pain     Acute pancreatitis     Hyperlipidemia LDL goal <70     Hypertension goal BP (blood pressure) < 140/90     Nonischemic dilated cardiomyopathy (HCC)     Malignant neoplasm of prostate (H)     Erectile dysfunction     Eczema     PAD (peripheral artery disease) (H)     S/P BKA (below knee amputation) unilateral (H)     Unilateral complete BKA, left, sequela (H)     Constipation     Encounter for counseling     Acute renal failure (H)     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     Past Surgical History   Procedure Laterality Date     Removal of blood clots in arm       Amputate leg below knee Left 6/19/2015     Procedure: AMPUTATE LEG BELOW KNEE;  Surgeon: Odessa Browning MD;  Location:  OR       Social History   Substance Use Topics     Smoking status: Current Every Day Smoker     Packs/day: 0.30     Years: 40.00     Types: Cigarettes     Last attempt to quit: 2/8/2015     Smokeless tobacco: Former User     Alcohol use 1.2 - 3.6 oz/week     2 - 6 Cans of beer per week      Comment: couple of beers per episode, several times a week     Family History   Problem  Relation Age of Onset     CANCER Mother      brain     CANCER Brother          Current Outpatient Prescriptions   Medication Sig Dispense Refill     oxyCODONE (ROXICODONE) 10 MG IR tablet Take 1 tablet (10 mg) by mouth every 6 hours as needed for moderate to severe pain 3 per day 90 tablet 0     nicotine polacrilex (NICORELIEF) 2 MG gum Place 1 each (2 mg) inside cheek as needed for smoking cessation Reported on 3/9/2017 30 tablet 3     metoprolol (TOPROL-XL) 200 MG 24 hr tablet Take 1 tablet (200 mg) by mouth daily 90 tablet 4     atorvastatin (LIPITOR) 40 MG tablet Take 1 tablet (40 mg) by mouth At Bedtime 90 tablet 4     acetaminophen (TYLENOL) 500 MG tablet Take 500-1,000 mg by mouth every 6 hours as needed for mild pain       UNABLE TO FIND MEDICATION NAME: Hydrocortisone 0.5 % topical cream-Take 1 application by topical route as needed.       polyethylene glycol (MIRALAX) powder Take 17 g by mouth as needed for constipation 510 g 1     triamcinolone (KENALOG) 0.1 % cream Take 1 apply by topical route two times a day as needed       lisinopril (PRINIVIL,ZESTRIL) 10 MG tablet Take 1 tablet (10 mg) by mouth daily 90 tablet 0     rivaroxaban ANTICOAGULANT (XARELTO) 20 MG TABS tablet Take 1 tablet (20 mg) by mouth daily (with dinner) 90 tablet 1     aspirin 81 MG EC tablet Take 1 tablet (81 mg) by mouth daily 90 tablet 3     sennosides (SENOKOT) 8.6 MG tablet Take 2 tablets by mouth 2 times daily (Patient not taking: Reported on 2/15/2017) 120 tablet 3     Tadalafil (CIALIS) 2.5 MG TABS Take 2.5 mg by mouth daily Never use with nitroglycerin, terazosin or doxazosin. (Patient not taking: Reported on 3/9/2017) 90 tablet 1     No Known Allergies  Recent Labs   Lab Test  02/08/17   1306  02/06/17   0855  10/17/16   1143  09/21/16   0932  05/18/16   0859   05/10/15   1648   04/08/15   0720  11/25/14   1203   04/30/14   1021  01/06/14   1103  07/12/13   1109   11/15/12   0845   A1C   --    --    --    --    --    --     --    --    --   5.8   --    --   5.8  5.4   --    --    LDL   --    --   46   --    --    --    --    --   89   --    --   78   --    --    < >   --    HDL   --    --   41   --    --    --    --    --   40*   --    --   41   --    --    < >   --    TRIG   --    --   91   --    --    --    --    --   92   --    --   85   --    --    < >   --    ALT   --   20   --   24  16   < >   --    --   34   --    < >   --   69  37   --   52   CR  1.31*  1.40*  1.53*  1.30*  1.21   < >  1.90*   < >  1.37*  1.02   < >   --   0.86  1.08   < >  0.87   GFRESTIMATED  56*  52*  47*  57*  61   < >  37*   < >  53*  75   < >   --   >90  71   < >  >90   GFRESTBLACK  68  63  57*  68  74   < >  44*   < >  65  >90   GFR Calc     < >   --   >90  86   < >  >90   POTASSIUM  3.5  3.9  4.6  4.1  3.9   < >  5.0   < >  3.9  4.5   < >   --   4.5  4.1   < >  3.6   TSH   --    --    --    --    --    --   0.99   --    --    --    --    --    --    --    --   1.18    < > = values in this interval not displayed.        Reviewed and updated as needed this visit by clinical staff  Tobacco  Allergies  Med Hx  Surg Hx  Fam Hx  Soc Hx      Reviewed and updated as needed this visit by Provider         ROS:  Constitutional, HEENT, cardiovascular, pulmonary, gi and gu systems are negative, except as otherwise noted.    OBJECTIVE:                                                    /73 (BP Location: Right arm, Patient Position: Chair, Cuff Size: Adult Regular)  Pulse 94  Temp 97.3  F (36.3  C) (Oral)  Wt 195 lb (88.5 kg)  SpO2 98%  BMI 24.37 kg/m2  Body mass index is 24.37 kg/(m^2).  GENERAL: healthy, alert and no distress  NECK: no adenopathy, no asymmetry, masses, or scars and thyroid normal to palpation  RESP: lungs clear to auscultation - no rales, rhonchi or wheezes  CV: regular rate and rhythm, normal S1 S2, no S3 or S4, no murmur, click or rub, no peripheral edema and peripheral pulses strong  ABDOMEN: soft, nontender, no  hepatosplenomegaly, no masses and bowel sounds normal  MS: no gross musculoskeletal defects noted, no edema    Diagnostic Test Results:  Results for orders placed or performed in visit on 02/15/17   PSA tumor marker   Result Value Ref Range    PSA 3.08 0 - 4 ug/L        ASSESSMENT/PLAN:                                                        ICD-10-CM    1. Screen for colon cancer Z12.11 Fecal colorectal cancer screen FIT - Future (S+30)   2. RSD (reflex sympathetic dystrophy) G90.50 Drug Screen Comprehensive , Urine with Reported Meds (Pain Care Package)     oxyCODONE (ROXICODONE) 10 MG IR tablet   3. Encounter for smoking cessation counseling Z71.6 nicotine polacrilex (NICORELIEF) 2 MG gum    Z72.0              Avery Duke MD  [facility]

## 2017-03-13 LAB — PAIN DRUG SCR UR W RPTD MEDS: NORMAL

## 2017-03-16 ENCOUNTER — MEDICAL CORRESPONDENCE (OUTPATIENT)
Dept: HEALTH INFORMATION MANAGEMENT | Facility: CLINIC | Age: 62
End: 2017-03-16

## 2017-03-17 PROCEDURE — 82274 ASSAY TEST FOR BLOOD FECAL: CPT | Performed by: INTERNAL MEDICINE

## 2017-03-23 DIAGNOSIS — Z12.11 COLON CANCER SCREENING: ICD-10-CM

## 2017-03-24 ENCOUNTER — TELEPHONE (OUTPATIENT)
Dept: FAMILY MEDICINE | Facility: CLINIC | Age: 62
End: 2017-03-24

## 2017-03-24 DIAGNOSIS — R19.5 POSITIVE FIT (FECAL IMMUNOCHEMICAL TEST): Primary | ICD-10-CM

## 2017-03-24 LAB — HEMOCCULT STL QL IA: POSITIVE

## 2017-03-24 NOTE — TELEPHONE ENCOUNTER
Please call patient. His FIT test was positive today. We will now need him to complete a colonoscopy.   I have placed the referral and he should help schedule.

## 2017-03-24 NOTE — TELEPHONE ENCOUNTER
Called patient at [phone] (home) to notify him of message below from provider. Unable to reach, left a VM to call back to RN triage line.  '  Viviane Love RN  [facility]

## 2017-03-24 NOTE — TELEPHONE ENCOUNTER
Constantino's return call transferred to me; I advised him of FIT test result.  He is willing to do the colonoscopy and did this at [facility] [place] in the past.  Provided him with scheduling number: [phone].   He will call on Monday, they close at 4 pm on Friday.  Janet Tucker RN  [facility]

## 2017-04-07 DIAGNOSIS — I10 HYPERTENSION GOAL BP (BLOOD PRESSURE) < 140/90: ICD-10-CM

## 2017-04-07 DIAGNOSIS — I48.20 CHRONIC ATRIAL FIBRILLATION (H): ICD-10-CM

## 2017-04-07 DIAGNOSIS — G90.50 RSD (REFLEX SYMPATHETIC DYSTROPHY): ICD-10-CM

## 2017-04-07 RX ORDER — LISINOPRIL 10 MG/1
10 TABLET ORAL DAILY
Qty: 90 TABLET | Refills: 0 | Status: SHIPPED | OUTPATIENT
Start: 2017-04-07 | End: 2017-06-10

## 2017-04-07 RX ORDER — OXYCODONE HYDROCHLORIDE 10 MG/1
10 TABLET ORAL EVERY 6 HOURS PRN
Qty: 90 TABLET | Refills: 0 | Status: SHIPPED | OUTPATIENT
Start: 2017-04-07 | End: 2017-05-15

## 2017-04-07 NOTE — TELEPHONE ENCOUNTER
Reason for Call:  Medication or medication refill:    Do you use a [facility]?  Name of the pharmacy and phone number for the current request:  [facility] [address], [facility]\"" [phone]    Name of the medication requested: oxyCODONE (ROXICODONE) 10 MG IR tablet    Other request: please call patient when ready for     Can we leave a detailed message on this number? YES    Phone number patient can be reached at: Home number on file [phone] (home)    Best Time: any    Call taken on 4/7/2017 at 9:43 AM by Mela Merida    oxyCODONE (ROXICODONE) 10 MG IR tablet      Last Written Prescription Date:  3-9-17  Last Fill Quantity: 90,   # refills: 0  Last Office Visit with [facility] prescribing provider: 3-9-17  Future Office visit:   none    Routing refill request to provider for review/approval because:  Drug not on the [facility] refill protocol or controlled substance

## 2017-04-07 NOTE — TELEPHONE ENCOUNTER
Xarelto 20 mg           Last Written Prescription Date: 7/8/16  Last Fill Quantity: 90, # refills: 1    Last Office Visit with [facility] or [facility] prescribing provider:  3/9/17   Future Office Visit:       Lab Results   Component Value Date    WBC 5.6 02/06/2017     Lab Results   Component Value Date    RBC 4.74 02/06/2017     Lab Results   Component Value Date    HGB 14.7 02/06/2017     Lab Results   Component Value Date    HCT 44.0 02/06/2017     No components found for: MCT  Lab Results   Component Value Date    MCV 93 02/06/2017     Lab Results   Component Value Date    MCH 31.0 02/06/2017     Lab Results   Component Value Date    MCHC 33.4 02/06/2017     Lab Results   Component Value Date    RDW 13.4 02/06/2017     Lab Results   Component Value Date     02/06/2017     Lab Results   Component Value Date    AST 17 02/06/2017     Lab Results   Component Value Date    ALT 20 02/06/2017     Creatinine   Date Value Ref Range Status   02/08/2017 1.31 (H) 0.66 - 1.25 mg/dL Final   Routing refill request to provider for review/approval because:  Cr is high    Lisinopril 10 mg      Last Written Prescription Date: 11/28/16  Last Fill Quantity: 90, # refills: 0  Last Office Visit with [facility] or [facility] prescribing provider: 3/9/17       Potassium   Date Value Ref Range Status   02/08/2017 3.5 3.4 - 5.3 mmol/L Final     Creatinine   Date Value Ref Range Status   02/08/2017 1.31 (H) 0.66 - 1.25 mg/dL Final     BP Readings from Last 3 Encounters:   03/09/17 121/73   02/15/17 139/84   02/08/17 (!) 138/93     Routing refill request to provider for review/approval because:  Cr is high  Viviane Love RN  [facility]

## 2017-04-07 NOTE — TELEPHONE ENCOUNTER
Reason for Call:  Medication or medication refill:    Do you use a [facility]?  Name of the pharmacy and phone number for the current request:  [facility], [facility] [phone]    Name of the medication requested: lisinopril (PRINIVIL,ZESTRIL) 10 MG tablet, rivaroxaban ANTICOAGULANT (XARELTO) 20 MG    Other request: Home care calling, this was requested a few weeks ago, but do not see encounter started.  Please call patient when done.    Can we leave a detailed message on this number? YES    Phone number patient can be reached at: Home number on file [phone] (home)    Best Time: any    Call taken on 4/7/2017 at 1:02 PM by Mela Merida    lisinopril (PRINIVIL,ZESTRIL) 10 MG tablet      Last Written Prescription Date: 11-28-16  Last Fill Quantity: 90, # refills: 0  [place] [facility] [place] [facility] or  Health prescribing provider: 3-9-17       Potassium   Date Value Ref Range Status   02/08/2017 3.5 3.4 - 5.3 mmol/L Final     Creatinine   Date Value Ref Range Status   02/08/2017 1.31 (H) 0.66 - 1.25 mg/dL Final     BP Readings from Last 3 Encounters:   03/09/17 121/73   02/15/17 139/84   02/08/17 (!) 138/93     rivaroxaban ANTICOAGULANT (XARELTO) 20 MG           Last Written Prescription Date: 7-8-16  Last Fill Quantity: 90, # refills: 1    Last Office Visit with [facility] or [facility] prescribing provider:  3-9-17   Future Office Visit:   none    Lab Results   Component Value Date    WBC 5.6 02/06/2017     Lab Results   Component Value Date    RBC 4.74 02/06/2017     Lab Results   Component Value Date    HGB 14.7 02/06/2017     Lab Results   Component Value Date    HCT 44.0 02/06/2017     No components found for: MCT  Lab Results   Component Value Date    MCV 93 02/06/2017     Lab Results   Component Value Date    MCH 31.0 02/06/2017     Lab Results   Component Value Date    MCHC 33.4 02/06/2017     Lab Results   Component Value Date    RDW 13.4 02/06/2017     Lab Results   Component Value  Date     02/06/2017     Lab Results   Component Value Date    AST 17 02/06/2017     Lab Results   Component Value Date    ALT 20 02/06/2017     Creatinine   Date Value Ref Range Status   02/08/2017 1.31 (H) 0.66 - 1.25 mg/dL Final

## 2017-04-14 ENCOUNTER — TELEPHONE (OUTPATIENT)
Dept: PEDIATRICS | Facility: CLINIC | Age: 62
End: 2017-04-14

## 2017-04-14 NOTE — TELEPHONE ENCOUNTER
Provider most recently Ok'd orders for SN for patient 3/6/17.     SN ordered as requested per [facility] 5 standing orders for Home Care, Assisted Living or Nursing Home Evaluations and Treatments, Mammogram (Reflex diagnostic), FIT test, Colonoscopy  Called Amna to give a verbal ok for SN as requested. Left a detailed message on confidential VM. Also left RN triage line to call back in case of any questions.      Viviane Love RN  [facility]

## 2017-04-14 NOTE — TELEPHONE ENCOUNTER
Reason for Call: Request for an order or referral:    Order or referral being requested: SKilled Nursing 1x week for 3 weeks and 3 visits as needed.    Date needed: as soon as possible    Has the patient been seen by the PCP for this problem? YES    Additional comments: Please call RNAmna, from [facility] back with verbal orders.    Phone number Patient can be reached at:  Other phone number:  [phone]*    Best Time:  anytime    Can we leave a detailed message on this number?  YES    Call taken on 4/14/2017 at 4:37 PM by Izabela Buchanan

## 2017-04-18 ENCOUNTER — TELEPHONE (OUTPATIENT)
Dept: PEDIATRICS | Facility: CLINIC | Age: 62
End: 2017-04-18

## 2017-04-18 ENCOUNTER — TRANSFERRED RECORDS (OUTPATIENT)
Dept: HEALTH INFORMATION MANAGEMENT | Facility: CLINIC | Age: 62
End: 2017-04-18

## 2017-04-18 NOTE — TELEPHONE ENCOUNTER
Forms received from: [facility]   Phone number listed: [phone]   Fax listed: [phone]  Date received: 04/18/2017  Form description: Orders-SN 1 Week 3, 3 As Needed  Once forms are completed, please return to [facility] via fax.  Is patient requesting to be contacted when forms are completed: NA    Form placed: In provider's basket  Natty Guerrier

## 2017-04-21 ENCOUNTER — TELEPHONE (OUTPATIENT)
Dept: PEDIATRICS | Facility: CLINIC | Age: 62
End: 2017-04-21

## 2017-04-21 NOTE — TELEPHONE ENCOUNTER
Yes, we can hold xarelto for 3 days and with that short of time, bridging will likely not be beneficial

## 2017-04-21 NOTE — TELEPHONE ENCOUNTER
Reason for Call:  Other     Detailed comments: Renetta with [facility] calling to communicate that they attempted to do a colonoscopy on 04/18 and Dr Duke did not allow to hold the patient's blood thinner. They did find a large polyp and are wondering if Dr Duke would agree to holding patient's xarelto for 3 days for polyp removal  and whether any bridging is needed.    Phone Number Patient can be reached at: Other phone number: [phone], press option 1 twice    Best Time: any    Can we leave a detailed message on this number? YES    Call taken on 4/21/2017 at 12:11 PM by Natty Guerrier      [phone], press option 1 twice

## 2017-04-21 NOTE — TELEPHONE ENCOUNTER
Called [facility] and notified them of message below regarding hold orders for the Xarelto. They had no other concerns/requests.     Viviane Love RN  [facility]

## 2017-04-27 ENCOUNTER — TRANSFERRED RECORDS (OUTPATIENT)
Dept: HEALTH INFORMATION MANAGEMENT | Facility: CLINIC | Age: 62
End: 2017-04-27

## 2017-05-11 ENCOUNTER — CARE COORDINATION (OUTPATIENT)
Dept: CARE COORDINATION | Facility: CLINIC | Age: 62
End: 2017-05-11

## 2017-05-11 NOTE — LETTER
Health Care Home - Access Care Plan    About Me  Patient Name:  Constantino Taylor    YOB: 1955  Age:                            61 year old   [place] MRN:         [id number] Telephone Information:     Home Phone [phone]   Mobile [phone]       Address:    [address]     [place] [address] Email address:  [email or web address]      Emergency Contact(s)  Name Relationship Lgl Grd Work Phone Home Phone Mobile Phone   1. JUAN MIGUEL HENSON Sister No  [phone]   2. NO SECONDARY C* Other   None              Health Maintenance:      My Access Plan  Medical Emergency 911   Questions or concerns during clinic hours Primary Clinic Line, I will call the clinic directly: Primary Clinic: [facility]- [phone]   24 Hour Appointment Line [phone] or  [phone])  (toll free)   24 Hour Nurse Line [phone] (toll free)   Questions or concerns outside clinic hours 24 Hour Appointment Line, I will call the after-hours on-call line:   [facility] [phone] or [phone]) (toll-free)   Preferred Urgent Care     Preferred Hospital     Preferred Pharmacy [facility] [address] [address] Behavioral Health Crisis Line [facility], [phone] or [phone]     My Care Team Members  Patient Care Team       Relationship Specialty Notifications Start End    Avery Duke MD PCP - General Internal Medicine  5/14/15     Phone: [phone] Fax: [phone]         [facility] [address] [facility] [address]    Wandy Kent, RN Nurse Coordinator Cardiology Admissions 6/2/15     Phone: [phone] Fax: [phone]        Piper Polk, RN Nurse Coordinator Vascular Surgery Abnormal results only, Admissions 7/16/15     Phone: [phone] Pager: [phone]        Odessa Browning  MD PATTEN Vascular Surgery  8/10/15     Phone: [phone] Fax: [phone]         [facility] [address] [facility] [address] [facility] [address]    Aydin Myers MD MD Physical Medicine & Rehabilitation - Pain Medicine  1/25/16     Phone: [phone] Fax: [phone]         [facility] [address] [facility] [address]    Norah Brown MD MD Cardiology  2/7/16     Phone: [phone] Fax: [phone]         [facility] [address] [facility] [address]    Cheyenne Mansfield MD MD Internal Medicine  2/9/16     Phone: [phone] Fax: [phone]         [facility] [address] [facility] [address]    Darin Dickson, RN Clinic Care Coordinator Nurse Admissions 5/11/17     Comment:  phone:  [phone]        My Medical and Care Information  Problem List   Patient Active Problem List   Diagnosis     Cerebral infarction (H)     Hepatitis C     Tobacco use disorder     Chronic low back pain     Acute pancreatitis     Hyperlipidemia LDL goal <70     Hypertension goal BP (blood pressure) < 140/90     Nonischemic dilated cardiomyopathy (HCC)     Malignant neoplasm of prostate (H)     Erectile dysfunction     Eczema     PAD (peripheral artery disease) (H)     S/P BKA (below knee amputation) unilateral (H)     Unilateral complete BKA, left, sequela (H)     Constipation     Encounter for counseling     Acute renal failure (H)     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

## 2017-05-11 NOTE — LETTER
[address] [address]  [phone]    June 1, 2017    Constantino ABHINAV Taylor  [address]     [place] [address]    Dear Constantino,  I am the Clinic Care Coordinator that works with your primary care provider's clinic. I wanted to introduce myself and provide you with my contact information for you to be able to call me with any questions or concerns. Below is a description of what Clinic Care Coordination is and how I can further assist you.     The Clinic Care Coordinator role is a Registered Nurse and/or  who understands the health care system. The goal of Clinic Care Coordination is to help you manage your health and improve access to the [facility] in the most efficient manner.  The Registered Nurse can assist you in meeting your health care goals by providing education, coordinating services, and strengthening the communication among your providers. The  can assist you with financial, behavioral, psychosocial, and chemical dependency and counseling/psychiatric resources.    Please feel free to keep this letter and contact information to contact me at [phone] with any further questions or concerns that may arise. We at [facility] are focused on providing you with the highest-quality healthcare experience possible and that all starts with you.       Sincerely,     Darin Yan, MSN, RN, PHN  N Clinic Care Coordinator  [facility]  Phone: [phone]  [email or web address]    Enclosed: I have enclosed a copy of a 24 Hour Access Plan. This has helpful phone numbers for you to call when needed. Please keep this in an easy to access place to use as needed.

## 2017-05-15 DIAGNOSIS — G90.50 RSD (REFLEX SYMPATHETIC DYSTROPHY): ICD-10-CM

## 2017-05-15 NOTE — TELEPHONE ENCOUNTER
Controlled Substance Refill Request for Oxycodone 10mg    Last refill: 4/7/17 - 90qty    Last clinic visit: 3/9/17     Next appt: None with PCP    Controlled substance agreement on file: Yes:  Date 1.2.17.    Documentation in problem list reviewed:  No - not in problem list (RSD)    Processing:  Mail to pended pharmacy

## 2017-05-15 NOTE — TELEPHONE ENCOUNTER
Routing refill request to provider for review/approval because:  Drug not on the [facility] refill protocol       Karin Stone RN  [facility]

## 2017-05-15 NOTE — TELEPHONE ENCOUNTER
Reason for Call:  Medication or medication refill:    Do you use a [facility]?  Name of the pharmacy and phone number for the current request:  [facility] in chart    Name of the medication requested: oxyCODONE (ROXICODONE) 10 MG IR tablet    Other request: .    Can we leave a detailed message on this number? YES    Phone number patient can be reached at: Home number on file [phone] (home)    Best Time: any    Call taken on 5/15/2017 at 10:24 AM by Mely Smith

## 2017-05-16 RX ORDER — OXYCODONE HYDROCHLORIDE 10 MG/1
10 TABLET ORAL EVERY 6 HOURS PRN
Qty: 90 TABLET | Refills: 0 | Status: SHIPPED | OUTPATIENT
Start: 2017-05-16 | End: 2017-06-15

## 2017-05-16 NOTE — TELEPHONE ENCOUNTER
Patient calling to check on status of refill request. TC asked if it was correct that script was to be mailed to pended pharmacy as documented by RN in message below. Patient got very upset and started yelling at TC stating he never told them to mail it to the pharmacy. Patient would like to pick the script up at the .

## 2017-05-16 NOTE — TELEPHONE ENCOUNTER
Prescription of oxyCODONE (ROXICODONE) 10 MG IR tablet was brought to the .  Left detailed message, informing patient.

## 2017-06-01 NOTE — PROGRESS NOTES
Clinic Care Coordination Contact  [facility]/Voicemail    Referral Source: [employer] Outreach  Clinical Data: Care Coordinator Outreach  Outreach attempted x 1.  Left message on voicemail with call back information and requested return call.  Plan: Care Coordinator will mail out care coordination introduction letter with care coordinator contact information and explanation of care coordination services. Care Coordinator will try to reach patient again in 3-5 business days.  The patient was driving and unavailable for talking.  The Care Coordination RN will attempt to contact the patient next week.      Darin Yan, MSN, RN, PHN  [facility] Clinic Care Coordinator  [facility]  Phone: [phone]  [email or web address]

## 2017-06-06 ENCOUNTER — CARE COORDINATION (OUTPATIENT)
Dept: CARE COORDINATION | Facility: CLINIC | Age: 62
End: 2017-06-06

## 2017-06-06 NOTE — LETTER
Health Care Home - Access Care Plan    About Me  Patient Name:  Constantino Taylor    YOB: 1955  Age:                            61 year old   [place] MRN:         [id number] Telephone Information:     Home Phone [phone]   Mobile [phone]       Address:    [address]     [facility] [address] Email address:  [email or web address]      Emergency Contact(s)  Name Relationship Lgl Grd Work Phone Home Phone Mobile Phone   1. JUAN MIGUEL HENSON Sister No  [phone]   2. NO SECONDARY C* Other   None              Health Maintenance:      My Access Plan  Medical Emergency 914   Questions or concerns during clinic hours Primary Clinic Line, I will call the clinic directly:     24 Hour Appointment Line [phone] or  [phone])  (toll free)   24 Hour Nurse Line [phone] (toll free)   Questions or concerns outside clinic hours 24 Hour Appointment Line, I will call the after-hours on-call line:   [place] [phone] or [phone]) (toll-free)   Preferred Urgent Care     Preferred Hospital     Preferred Pharmacy [facility] [address] [place] [address] AT SEC OF [facility]     Behavioral Health Crisis Line Crisis Connection, [phone] or 911     My Care Team Members  Patient Care Team       Relationship Specialty Notifications Start End    Avery Duke MD PCP - General Internal Medicine  5/14/15     Phone: [phone] Fax: [phone]         [facility] [address] [facility] [address]    Wandy Kent, RN Nurse Coordinator Cardiology Admissions 6/2/15     Phone: [phone] Fax: [phone]        Piper Polk, RN Nurse Coordinator Vascular Surgery Abnormal results only, Admissions 7/16/15     Phone: [phone] Pager: [phone]        Odessa Browning MD MD Vascular Surgery  8/10/15     Phone: [phone] Fax:  [phone]         [facility] [address] [facility] [address] [facility] [address]    Aydin Myers MD MD Physical Medicine & Rehabilitation - Pain Medicine  1/25/16     Phone: [phone] Fax: [phone]         [facility] [address] [facility] [address]    Norah Brown MD MD Cardiology  2/7/16     Phone: [phone] Fax: [phone]         [facility] [address] [facility] [address] [facility] [address]    Cheyenne Mansfield MD MD Internal Medicine  2/9/16     Phone: [phone] Fax: [phone]         [facility] [address] [facility] [address] [facility] [address]    Darin Dickson, RN Clinic Care Coordinator Nurse Admissions 5/11/17     Comment:  phone:  [phone]        My Medical and Care Information  Problem List   Patient Active Problem List   Diagnosis     Cerebral infarction (H)     Hepatitis C     Tobacco use disorder     Chronic low back pain     Acute pancreatitis     Hyperlipidemia LDL goal <70     Hypertension goal BP (blood pressure) < 140/90     Nonischemic dilated cardiomyopathy (HCC)     Malignant neoplasm of prostate (H)     Erectile dysfunction     Eczema     PAD (peripheral artery disease) (H)     S/P BKA (below knee amputation) unilateral (H)     Unilateral complete BKA, left, sequela (H)     Constipation     Encounter for counseling     Acute renal failure (H)     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

## 2017-06-06 NOTE — LETTER
[address] [address]  [phone]  June 6, 2017    Constantino ABHINAV Taylor  [address]     [facility] [address]    Dear Constantino,  I am the Clinic Care Coordinator that works with your primary care provider's clinic. I wanted to introduce myself and provide you with my contact information for you to be able to call me with any questions or concerns. Below is a description of what Clinic Care Coordination is and how I can further assist you.     The Clinic Care Coordinator role is a Registered Nurse and/or  who understands the health care system. The goal of Clinic Care Coordination is to help you manage your health and improve access to the [facility] in the most efficient manner.  The Registered Nurse can assist you in meeting your health care goals by providing education, coordinating services, and strengthening the communication among your providers. The  can assist you with financial, behavioral, psychosocial, and chemical dependency and counseling/psychiatric resources.    Please feel free to keep this letter and contact information to contact me at [phone] with any further questions or concerns that may arise. We at [facility] are focused on providing you with the highest-quality healthcare experience possible and that all starts with you.       Sincerely,     Darin Yan, MSN, RN, PHN  N Clinic Care Coordinator  [facility]  Phone: [phone]  [email or web address]  Enclosed: I have enclosed a copy of a 24 Hour Access Plan. This has helpful phone numbers for you to call when needed. Please keep this in an easy to access place to use as needed. and I have enclosed a copy of the Complex Care Plan. This has helpful information and goals that we have talked about. Please keep this in an easy to access place to use as needed.

## 2017-06-06 NOTE — PROGRESS NOTES
Clinic Care Coordination Contact  OUTREACH    Referral Information:  Referral Source: Pro-Active Outreach  Reason for Contact: follow up complex medical problems. Cerebral infarction, pancreatitis, low back pain, hep C, left BKA, cardiovascular, PAD, CKD. Renal disease, aseptic necrosis of head and neck of femur.  Care Conference: No     Universal Utilization:   ED Visits in last year: 0  Hospital visits in last year: 0  Last PCP appointment: 03/09/17  Missed Appointments: 4  Concerns: oncology  Multiple Providers or Specialists: FP. cardiology, gastro,     Clinical Concerns:  Current Medical Concerns: Cerebral infarction, pancreatitis, low back pain, hep C, left BKA, cardiovascular, PAD, CKD. Renal disease, aseptic necrosis of head and neck of femur.    Current Behavioral Concerns: none noted    Education Provided to patient: rationale for good meal planning and reinforced the patient's desire to plan better meals.   Clinical Pathway Name: None  Clinical Pathway: None    Medication Management:  Reviewed and questions answered and concerns addressed.     Functional Status:  Mobility Status: Independent  Equipment Currently Used at Home: none  Transportation: family and bus.           Psychosocial:  Current living arrangement:: I live alone  Financial/Insurance: medica access ability       Resources and Interventions:  Current Resources:  (n/a);  (n/a)  PAS Number:  (n/a)  Senior Linkage Line Referral Placed:  (n/a)  Advanced Care Plans/Directives on file:: No  Referrals Placed:  (n/a)     Goals:   Goal 1 Statement: I will work on planning meals ahead with a starting goal of 1-2 per week.  Goal 1 Progression Percent: 0%  Goal 1 Progression Date: 06/06/17              Barriers: hard to do on a limited income  Strengths: motivation  Patient/Caregiver understanding: The patient has a good understanding of the disease process, not always good with his limitations.  Frequency of Care Coordination: 2-3 weeks  Upcoming  appointment: 06/15/17     Plan: 1).  The patient will work on planning meals to improve the nutritional intake.  2).  The patient will attend all upcoming appointments with providers.  3).  The patient will call with any questions or concerns that may arise.   4).  The Care Coordination RN will contact the patient for a follow up call in 2-3 weeks.  5).  Care Coordination to remain available for the patient to contact in the event of future needs.      Darin Yan, MSN, RN, PHN  [facility] Clinic Care Coordinator  [facility]  Phone: [phone]  [email or web address]

## 2017-06-06 NOTE — LETTER
[facility]  Complex Care Plan  About Me  Patient Name:  Constantino Taylor    YOB: 1955  Age:   61 year old   [place] MRN: [id number] Telephone Information:     Home Phone [phone]   Mobile [phone]       Address:    [facility]     [facility] [address] Email address:  [email or web address]      Emergency Contact(s)  Name Relationship Lgl Grd Work Phone Home Phone Mobile Phone   1. JUAN MIGUEL HENSON Sister No  [phone]   2. NO SECONDARY C* Other   None            Primary language:  English     needed? No   [place] [facility]:  [phone] op. 1  Other communication barriers: No  Preferred Method of Communication:  Phone  Current living arrangement: I live alone  Mobility Status/ Medical Equipment: Independent  Other information to know about me:    Health Maintenance  Health Maintenance Reviewed: Up to date    My Access Plan  Medical Emergency 911   Primary Clinic Line [facility]- [phone]   24 Hour Appointment Line [phone] or  [phone]) (toll-free)   24 Hour Nurse Line [phone] (toll-free)   Preferred Urgent Care     Preferred Hospital     Preferred Pharmacy [facility] [address] [address] AT [address] OF [address] Behavioral Health Crisis Line Crisis Connection, [phone] or 911     My Care Team Members  Patient Care Team       Relationship Specialty Notifications Start End    Avery Duke MD PCP - General Internal Medicine  5/14/15     Phone: [phone] Fax: [phone]         [facility] [address] [facility] [address]    Wandy Kent, RN Nurse Coordinator Cardiology Admissions 6/2/15     Phone: [phone] Fax: [phone]        Piper Polk, RN Nurse Coordinator Vascular Surgery Abnormal results only, Admissions 7/16/15     Phone: [phone] Pager:  [phone]        Odessa Browning MD MD Vascular Surgery  8/10/15     Phone: [phone] Fax: [phone]         [facility] [address] [facility] [address] [facility] [address]    Aydin Myers MD MD Physical Medicine & Rehabilitation - Pain Medicine  1/25/16     Phone: [phone] Fax: [phone]         [facility] [address] [facility] [address] [facility] [address]    Norah Brown MD MD Cardiology  2/7/16     Phone: [phone] Fax: [phone]         [facility] [address] [facility] [address] [facility] [address]    Cheyenne Mansfield MD MD Internal Medicine  2/9/16     Phone: [phone] Fax: [phone]         [facility] [address] [facility] [address] [facility] [address]    Darin Dickson, RN Clinic Care Coordinator Nurse Admissions 5/11/17     Comment:  phone:  [phone]         My Care Plans  Self Management and Treatment Plan  Goals and (Comments)  Goal #1: I will work on planning meals ahead with a starting goal of 1-2 per week.      0% of goal reached        Action Plans on File: None  Advance Care Plans/Directives Type:   Type Advanced Care Plans/Directives:  (unknown)    My Medical and Care Information  Problem List   Patient Active Problem List   Diagnosis     Cerebral infarction (H)     Hepatitis C     Tobacco use disorder     Chronic low back pain     Acute pancreatitis     Hyperlipidemia LDL goal <70     Hypertension goal BP (blood pressure) < 140/90     Nonischemic dilated cardiomyopathy (HCC)     Malignant neoplasm of prostate (H)     Erectile dysfunction     Eczema     PAD (peripheral artery disease) (H)     S/P BKA (below knee amputation) unilateral (H)     Unilateral complete BKA, left, sequela (H)     Constipation     Encounter for counseling     Acute renal failure (H)     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

## 2017-06-10 DIAGNOSIS — I10 HYPERTENSION GOAL BP (BLOOD PRESSURE) < 140/90: ICD-10-CM

## 2017-06-12 RX ORDER — LISINOPRIL 10 MG/1
TABLET ORAL
Qty: 90 TABLET | Refills: 0 | Status: SHIPPED | OUTPATIENT
Start: 2017-06-12 | End: 2017-07-14

## 2017-06-12 NOTE — TELEPHONE ENCOUNTER
Lisinopril      Last Written Prescription Date: 4/7/17  Last Fill Quantity: 90, # refills: 0  Last Office Visit with [facility], [facility] or [facility] prescribing provider: 3/9/17  Next 5 appointments (look out 90 days)     Jun 23, 2017 11:20 AM CDT   SHORT with Avery Duke MD   [facility] ([facility])    [address] [place] [id number] [phone]                   Potassium   Date Value Ref Range Status   02/08/2017 3.5 3.4 - 5.3 mmol/L Final     Creatinine   Date Value Ref Range Status   02/08/2017 1.31 (H) 0.66 - 1.25 mg/dL Final     BP Readings from Last 3 Encounters:   03/09/17 121/73   02/15/17 139/84   02/08/17 (!) 138/93       Prescription approved per [facility] Refill Protocol.     Lilia Simental RN

## 2017-06-15 ENCOUNTER — TELEPHONE (OUTPATIENT)
Dept: FAMILY MEDICINE | Facility: CLINIC | Age: 62
End: 2017-06-15

## 2017-06-15 DIAGNOSIS — G90.50 RSD (REFLEX SYMPATHETIC DYSTROPHY): ICD-10-CM

## 2017-06-15 RX ORDER — OXYCODONE HYDROCHLORIDE 10 MG/1
10 TABLET ORAL EVERY 6 HOURS PRN
Qty: 90 TABLET | Refills: 0 | Status: SHIPPED | OUTPATIENT
Start: 2017-06-15 | End: 2017-07-20

## 2017-06-15 NOTE — TELEPHONE ENCOUNTER
Oxycodone (ROXICODONE) 10 MG IR tablet    Last Written Prescription Date:  05/16/2017  Last Fill Quantity: 90,   # refills: 0  Last Office Visit with [facility], [facility] or [facility] prescribing provider: 03/09/2017  Future Office visit:    Next 5 appointments (look out 90 days)     Jun 23, 2017 11:20 AM CDT   SHORT with Avery Duke MD   [facility] ([facility])    [address] [place] [id number] [phone]                   Routing refill request to provider for review/approval because:  Drug not on the [facility], [facility] or [facility] refill protocol or controlled substance

## 2017-06-15 NOTE — TELEPHONE ENCOUNTER
Prescription of oxyCODONE (ROXICODONE) 10 MG IR tablet was brought to the  and patient informed to .  Reminded patient on 6-23-17 appointment with PCP.

## 2017-06-21 ENCOUNTER — TELEPHONE (OUTPATIENT)
Dept: FAMILY MEDICINE | Facility: CLINIC | Age: 62
End: 2017-06-21

## 2017-06-21 NOTE — TELEPHONE ENCOUNTER
Forms received from: [employer]   Phone number listed: [phone]   Fax listed: [phone]  Date received: 6-21-17  Form description: ICD 10 codes  Once forms are completed, please return to [facility] via fax.  Is patient requesting to be contacted when forms are completed: n/a  Form placed: provider desk  Mela Merida

## 2017-06-23 ENCOUNTER — TELEPHONE (OUTPATIENT)
Dept: FAMILY MEDICINE | Facility: CLINIC | Age: 62
End: 2017-06-23

## 2017-06-23 DIAGNOSIS — I50.22 CHRONIC SYSTOLIC (CONGESTIVE) HEART FAILURE (H): Primary | ICD-10-CM

## 2017-06-23 NOTE — TELEPHONE ENCOUNTER
L/M to call nurse line in voicemail for [facility]  for this patient. [facility] medications do not match.    Sandra Chan RN  [facility]

## 2017-06-23 NOTE — TELEPHONE ENCOUNTER
Forms received from:  [facility]   Phone number listed: [phone]   Fax listed: [phone]  Date received: 6-23-17  Form description: [facility]  Once forms are completed, please return to [facility] via fax.  Is patient requesting to be contacted when forms are completed: n/a  Form placed: RN folder  Mela Merida    Form given to RN to review med list.  Order pended for provider to sign.

## 2017-06-26 PROCEDURE — G0179 MD RECERTIFICATION HHA PT: HCPCS | Performed by: INTERNAL MEDICINE

## 2017-06-26 NOTE — TELEPHONE ENCOUNTER
Mis Galvan [phone] spoke to Mis who requests we write correct medications on form and have PCP sign and return forms.    Medications reconciled - form to PCP for signature.      Sandra Chan RN  [facility]

## 2017-07-03 ENCOUNTER — CARE COORDINATION (OUTPATIENT)
Dept: CARE COORDINATION | Facility: CLINIC | Age: 62
End: 2017-07-03

## 2017-07-03 NOTE — PROGRESS NOTES
Clinic Care Coordination Contact  OUTREACH    Referral Information:  Referral Source: Pro-Active Outreach  Reason for Contact: RN CC call to patient for follow up  Care Conference: No     Universal Utilization:   ED Visits in last year: 0  Hospital visits in last year: 0  Last PCP appointment: 03/09/17  Missed Appointments: 5  Concerns: oncology  Multiple Providers or Specialists: FP. cardiology, gastro,     Clinical Concerns:  Current Medical Concerns: Cerebral infarction, pancreatitis, low back pain, hep C, left BKA, cardiovascular, PAD, CKD. Renal disease, aseptic necrosis of head and neck of femur.    Current Behavioral Concerns: n/a    Education Provided to patient: RN CC continued to reinforce education for proper nutrition.   Clinical Pathway Name: None    Medication Management:  Patient independent in medication management and verbalizes adherence and understanding of medication regimen.       Functional Status:  Mobility Status: Independent  Equipment Currently Used at Home: none  Transportation: family provides, pt takes bus if needed           Psychosocial:  Current living arrangement:: I live alone  Financial/Insurance: No concerns at this time.  Patient reports home care discharged him 1 month ago.     Resources and Interventions:  Current Resources:  (n/a);  (n/a)  PAS Number:  (n/a)  Senior Linkage Line Referral Placed:  (n/a)  Advanced Care Plans/Directives on file:: No  Referrals Placed:  (n/a)     Goals:   Goal 1 Statement: I will work on planning meals ahead with a starting goal of 1-2 per week.  Goal 1 Progression Percent: 50%  Goal 1 Progression Date: 07/03/17              Barriers: limited income, multiple health issues  Strengths: Pt verbalizes willingness to work with care coordination and is making progress towards his goals.  Patient/Caregiver understanding: Patient states he has been able to plan at least 1 meal per week and will work towards planning 2 meals/week.  Patient did not keep  his scheduled appointment with is PCP on 6/23/17.  Patient states he plans on keeping his future appointment with his PCP on 7/6/17.  Frequency of Care Coordination: monthly  Upcoming appointment: 07/06/17 (PCP)     Plan:   Patient will keep his upcoming appointment with PCP on 7/6/17.  Patient will work towards planning 2 meals/week.  The patient will call with any questions or concerns that may arise.  RN CC will follow up with patient in 1 month.    Melissa Behl BSN, RN, PHN  [facility] Care Coordinator  [phone]  [email or web address]

## 2017-07-10 ENCOUNTER — CARE COORDINATION (OUTPATIENT)
Dept: CARE COORDINATION | Facility: CLINIC | Age: 62
End: 2017-07-10

## 2017-07-10 NOTE — PROGRESS NOTES
Clinic Care Coordination Contact    Situation: Patient chart reviewed by care coordinator.    Background: The Care Coordination RN is receiving a warm handoff from another care coordinator.  The case will be marked accept.      Darin Yan, MSN, RN, PHN  [facility] Clinic Care Coordinator  [facility]  Phone: [phone]  [email or web address]

## 2017-07-14 DIAGNOSIS — I10 HYPERTENSION GOAL BP (BLOOD PRESSURE) < 140/90: ICD-10-CM

## 2017-07-14 RX ORDER — LISINOPRIL 10 MG/1
TABLET ORAL
Qty: 90 TABLET | Refills: 3 | Status: SHIPPED | OUTPATIENT
Start: 2017-07-14 | End: 2017-08-31

## 2017-07-14 NOTE — TELEPHONE ENCOUNTER
lisinopril (PRINIVIL/ZESTRIL) 10 MG tablet      Last Written Prescription Date: 6-12-17  Last Fill Quantity: 90, # refills: 0  Last Office Visit with G, P or [facility] prescribing provider: 3-9-17       Potassium   Date Value Ref Range Status   02/08/2017 3.5 3.4 - 5.3 mmol/L Final     Creatinine   Date Value Ref Range Status   02/08/2017 1.31 (H) 0.66 - 1.25 mg/dL Final     BP Readings from Last 3 Encounters:   03/09/17 121/73   02/15/17 139/84   02/08/17 (!) 138/93

## 2017-07-14 NOTE — TELEPHONE ENCOUNTER
Routing refill request to provider for review/approval because:  Labs out of range:  Lisinopril.    Lilia Simental RN

## 2017-07-20 ENCOUNTER — TELEPHONE (OUTPATIENT)
Dept: FAMILY MEDICINE | Facility: CLINIC | Age: 62
End: 2017-07-20

## 2017-07-20 DIAGNOSIS — G90.50 RSD (REFLEX SYMPATHETIC DYSTROPHY): ICD-10-CM

## 2017-07-20 RX ORDER — OXYCODONE HYDROCHLORIDE 10 MG/1
10 TABLET ORAL EVERY 6 HOURS PRN
Qty: 90 TABLET | Refills: 0 | Status: SHIPPED | OUTPATIENT
Start: 2017-07-20 | End: 2017-08-31

## 2017-07-20 NOTE — TELEPHONE ENCOUNTER
Reason for Call:  Medication or medication refill:    Do you use a [facility]?  Name of the pharmacy and phone number for the current request:   at desk    Name of the medication requested: Oxycodone refill    Other request:     Can we leave a detailed message on this number? YES    Phone number patient can be reached at: Home number on file [phone] (home)    Best Time: Any    Call taken on 7/20/2017 at 8:37 AM by Lucas Winter

## 2017-07-20 NOTE — TELEPHONE ENCOUNTER
Prescription of oxyCODONE (ROXICODONE) 10 MG IR tablet was brought to the  and patient informed to .  Patient scheduled with PCP on 8-21-17.

## 2017-07-20 NOTE — TELEPHONE ENCOUNTER
Routing refill request to provider for review/approval because:  Drug not on the [facility] refill protocol  Oxycodone    Last filled 6/15/17 for 90 tabs    Sandra Chan RN  [facility]

## 2017-08-04 DIAGNOSIS — C61 MALIGNANT NEOPLASM OF PROSTATE (H): Primary | ICD-10-CM

## 2017-08-18 ENCOUNTER — CARE COORDINATION (OUTPATIENT)
Dept: CARE COORDINATION | Facility: CLINIC | Age: 62
End: 2017-08-18

## 2017-08-18 NOTE — PROGRESS NOTES
Clinic Care Coordination Contact    Situation: Patient chart reviewed by care coordinator.    Background: The patient has a problem list that includes.  Acute pancreatitis      Other    Advanced directives, counseling/discussion     Cerebral infarction (H)     Hepatitis C     Tobacco use disorder     Chronic low back pain     Hyperlipidemia LDL goal <70     Hypertension goal BP (blood pressure) < 140/90     Nonischemic dilated cardiomyopathy (HCC)     Malignant neoplasm of prostate (H)     Erectile dysfunction     Eczema     PAD (peripheral artery disease) (H)     S/P BKA (below knee amputation) unilateral (H)     Unilateral complete BKA, left, sequela (H)     Constipation     Encounter for counseling     Acute renal failure (H)     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     Coronary atherosclerosis     Atrial fibrillation (H)     Chronic systolic heart failure (H)       Assessment: The patient was working on better nutrition as a goal.  Per the chart the patient has been a frequent no show or cancellation for appointments.  The next scheduled appointment is 8-31-17 with Dr. Duke.  The patient does have transportation with the insurance so that should not be the issue for attending the appointments.  The Care Coordination RN will monitor the chart for attendance at the next appointment.  The patient has a history of a cerebral infarct and a below the knee amputation the concern is could either of these diagnoses be interfering with the patient being able to attend appointments.        Plan/Recommendations: 1).  The patient will continue to work on nutritious meals to eat.  2).  The patient will attend all scheduled appointments with the PCP and any specialists.  3).  Care Coordination RN will monitor chart to see that the patient attends appointments.  4).  Care Coordination RN will make a follow up call to the patient again in 4 weeks.  5).  Care Coordination to remain available for the patient to contact  in the event of future needs.          Darin Yan, MSN, RN, PHN  N Clinic Care Coordinator  [facility]  Phone: [phone]  [email or web address]

## 2017-08-31 ENCOUNTER — OFFICE VISIT (OUTPATIENT)
Dept: FAMILY MEDICINE | Facility: CLINIC | Age: 62
End: 2017-08-31
Payer: COMMERCIAL

## 2017-08-31 VITALS
OXYGEN SATURATION: 100 % | DIASTOLIC BLOOD PRESSURE: 64 MMHG | SYSTOLIC BLOOD PRESSURE: 126 MMHG | TEMPERATURE: 97.8 F | WEIGHT: 201 LBS | HEART RATE: 80 BPM | BODY MASS INDEX: 25.12 KG/M2

## 2017-08-31 DIAGNOSIS — Z71.6 ENCOUNTER FOR SMOKING CESSATION COUNSELING: ICD-10-CM

## 2017-08-31 DIAGNOSIS — I48.20 CHRONIC ATRIAL FIBRILLATION (H): ICD-10-CM

## 2017-08-31 DIAGNOSIS — L30.9 CHRONIC DERMATITIS: ICD-10-CM

## 2017-08-31 DIAGNOSIS — I48.91 ATRIAL FIBRILLATION WITH RVR (H): ICD-10-CM

## 2017-08-31 DIAGNOSIS — G90.50 RSD (REFLEX SYMPATHETIC DYSTROPHY): ICD-10-CM

## 2017-08-31 DIAGNOSIS — I63.19 CEREBRAL INFARCTION DUE TO EMBOLISM OF OTHER PRECEREBRAL ARTERY (H): ICD-10-CM

## 2017-08-31 DIAGNOSIS — I10 HYPERTENSION GOAL BP (BLOOD PRESSURE) < 140/90: ICD-10-CM

## 2017-08-31 DIAGNOSIS — I42.0 DILATED CARDIOMYOPATHY (H): ICD-10-CM

## 2017-08-31 DIAGNOSIS — K59.01 SLOW TRANSIT CONSTIPATION: Primary | ICD-10-CM

## 2017-08-31 LAB
ANION GAP SERPL CALCULATED.3IONS-SCNC: 8 MMOL/L (ref 3–14)
BUN SERPL-MCNC: 15 MG/DL (ref 7–30)
CALCIUM SERPL-MCNC: 9.6 MG/DL (ref 8.5–10.1)
CHLORIDE SERPL-SCNC: 105 MMOL/L (ref 94–109)
CO2 SERPL-SCNC: 26 MMOL/L (ref 20–32)
CREAT SERPL-MCNC: 1.44 MG/DL (ref 0.66–1.25)
GFR SERPL CREATININE-BSD FRML MDRD: 50 ML/MIN/1.7M2
GLUCOSE SERPL-MCNC: 93 MG/DL (ref 70–99)
POTASSIUM SERPL-SCNC: 4.5 MMOL/L (ref 3.4–5.3)
SODIUM SERPL-SCNC: 139 MMOL/L (ref 133–144)

## 2017-08-31 PROCEDURE — 99214 OFFICE O/P EST MOD 30 MIN: CPT | Performed by: INTERNAL MEDICINE

## 2017-08-31 PROCEDURE — 80048 BASIC METABOLIC PNL TOTAL CA: CPT | Performed by: INTERNAL MEDICINE

## 2017-08-31 PROCEDURE — 36415 COLL VENOUS BLD VENIPUNCTURE: CPT | Performed by: INTERNAL MEDICINE

## 2017-08-31 RX ORDER — POLYETHYLENE GLYCOL 3350 17 G/17G
17 POWDER, FOR SOLUTION ORAL PRN
Qty: 510 G | Refills: 1 | Status: SHIPPED | OUTPATIENT
Start: 2017-08-31 | End: 2022-09-30

## 2017-08-31 RX ORDER — TRIAMCINOLONE ACETONIDE 1 MG/G
CREAM TOPICAL
Qty: 80 G | Refills: 3 | Status: SHIPPED | OUTPATIENT
Start: 2017-08-31 | End: 2020-02-21

## 2017-08-31 RX ORDER — METOPROLOL SUCCINATE 200 MG/1
200 TABLET, EXTENDED RELEASE ORAL DAILY
Qty: 90 TABLET | Refills: 4 | Status: SHIPPED | OUTPATIENT
Start: 2017-08-31 | End: 2018-01-12

## 2017-08-31 RX ORDER — OXYCODONE HYDROCHLORIDE 10 MG/1
10 TABLET ORAL EVERY 6 HOURS PRN
Qty: 90 TABLET | Refills: 0 | Status: SHIPPED | OUTPATIENT
Start: 2017-08-31 | End: 2017-10-05

## 2017-08-31 RX ORDER — ATORVASTATIN CALCIUM 40 MG/1
40 TABLET, FILM COATED ORAL AT BEDTIME
Qty: 90 TABLET | Refills: 4 | Status: SHIPPED | OUTPATIENT
Start: 2017-08-31 | End: 2018-01-12

## 2017-08-31 RX ORDER — LISINOPRIL 10 MG/1
TABLET ORAL
Qty: 90 TABLET | Refills: 3 | Status: SHIPPED | OUTPATIENT
Start: 2017-08-31 | End: 2017-09-13

## 2017-08-31 ASSESSMENT — PAIN SCALES - GENERAL: PAINLEVEL: NO PAIN (0)

## 2017-08-31 NOTE — PROGRESS NOTES
SUBJECTIVE:   Constantino Taylor is a 61 year old male who presents to clinic today for the following health issues:      Chronic Pain Follow-Up  Type / Location of Pain: Back and leg  Analgesia/pain control:       Recent changes:  worse      Overall control: Tolerable with discomfort  Activity level/function:      Daily activities:  Unable to perform most daily activities - chores, hobbies, social activities, driving.    Walking a lot scooter is broken       Work:  Unable to work  Adverse effects:  No  Adherance    Taking medication as directed?  Yes    Participating in other treatments: not applicable  Risk Factors:    Sleep:  Fair only about 5 hrs a night    Mood/anxiety:  controlled    Recent family or social stressors:  none noted    Other aggravating factors: none  PHQ-9 SCORE 12/10/2015 1/2/2017   Total Score 7 3     MAGDALENA-7 SCORE 1/2/2017   Total Score 0     Encounter-Level CSA - 01/02/2017:          Controlled Substance Agreement - Scan on 1/3/2017  1:03 PM : CONTROLLED SUBSTANCE AGREEMENT (below)                  Amount of exercise or physical activity: 1 day/week for an average of 30-45 minutes    Problems taking medications regularly: No    Medication side effects: none    Diet: regular (no restrictions)          Problem list and histories reviewed & adjusted, as indicated.  Additional history: as documented    Patient Active Problem List   Diagnosis     Cerebral infarction (H)     Hepatitis C     Tobacco use disorder     Chronic low back pain     Acute pancreatitis     Hyperlipidemia LDL goal <70     Hypertension goal BP (blood pressure) < 140/90     Nonischemic dilated cardiomyopathy (HCC)     Malignant neoplasm of prostate (H)     Erectile dysfunction     Eczema     PAD (peripheral artery disease) (H)     S/P BKA (below knee amputation) unilateral (H)     Unilateral complete BKA, left, sequela (H)     Constipation     Encounter for counseling     Acute renal failure (H)     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2 - 3.6 oz/week     2 - 6 Cans of beer per week      Comment: couple of beers per episode, several times a week     Family History   Problem Relation Age of Onset     CANCER Mother      brain     CANCER Brother          Current Outpatient Prescriptions   Medication Sig Dispense Refill     aspirin 81 MG EC tablet Take 1 tablet (81 mg) by mouth daily 90 tablet 3     atorvastatin (LIPITOR) 40 MG tablet Take 1 tablet (40 mg) by mouth At Bedtime 90 tablet 4     lisinopril (PRINIVIL/ZESTRIL) 10 MG tablet TAKE 1 TABLET(10 MG) BY MOUTH DAILY 90 tablet 3     metoprolol (TOPROL-XL) 200 MG 24 hr tablet Take 1 tablet (200 mg) by mouth daily 90 tablet 4     nicotine polacrilex (NICORELIEF) 2 MG gum Place 1 each (2 mg) inside cheek as needed for smoking cessation Reported on 3/9/2017 30 tablet 3     oxyCODONE (ROXICODONE) 10 MG IR tablet Take 1 tablet (10 mg) by mouth every 6 hours as needed for moderate to severe pain 3 per day 90 tablet 0     polyethylene glycol (MIRALAX) powder Take 17 g by mouth as needed for constipation 510 g 1     rivaroxaban ANTICOAGULANT (XARELTO) 20 MG TABS tablet Take 1 tablet (20 mg) by mouth daily (with dinner) 90 tablet 1     triamcinolone (KENALOG) 0.1 % cream Take 1 apply by topical route two times a day as needed 80 g 3     acetaminophen (TYLENOL) 500 MG tablet Take 500-1,000 mg by mouth every 6  hours as needed for mild pain       UNABLE TO FIND MEDICATION NAME: Hydrocortisone 0.5 % topical cream-Take 1 application by topical route as needed.       sennosides (SENOKOT) 8.6 MG tablet Take 2 tablets by mouth 2 times daily (Patient not taking: Reported on 8/31/2017) 120 tablet 3     Tadalafil (CIALIS) 2.5 MG TABS Take 2.5 mg by mouth daily Never use with nitroglycerin, terazosin or doxazosin. (Patient not taking: Reported on 8/31/2017) 90 tablet 1     No Known Allergies  Recent Labs   Lab Test  08/31/17   1250  02/08/17   1306  02/06/17   0855  10/17/16   1143  09/21/16   0932  05/18/16   0859   05/10/15   1648   04/08/15   0720  11/25/14   1203   04/30/14   1021  01/06/14   1103  07/12/13   1109   11/15/12   0845   A1C   --    --    --    --    --    --    --    --    --    --   5.8   --    --   5.8  5.4   --    --    LDL   --    --    --   46   --    --    --    --    --   89   --    --   78   --    --    < >   --    HDL   --    --    --   41   --    --    --    --    --   40*   --    --   41   --    --    < >   --    TRIG   --    --    --   91   --    --    --    --    --   92   --    --   85   --    --    < >   --    ALT   --    --   20   --   24  16   < >   --    --   34   --    < >   --   69  37   --   52   CR  1.44*  1.31*  1.40*  1.53*  1.30*  1.21   < >  1.90*   < >  1.37*  1.02   < >   --   0.86  1.08   < >  0.87   GFRESTIMATED  50*  56*  52*  47*  57*  61   < >  37*   < >  53*  75   < >   --   >90  71   < >  >90   GFRESTBLACK  61  68  63  57*  68  74   < >  44*   < >  65  >90   GFR Calc     < >   --   >90  86   < >  >90   POTASSIUM  4.5  3.5  3.9  4.6  4.1  3.9   < >  5.0   < >  3.9  4.5   < >   --   4.5  4.1   < >  3.6   TSH   --    --    --    --    --    --    --   0.99   --    --    --    --    --    --    --    --   1.18    < > = values in this interval not displayed.            Reviewed and updated as needed this visit by clinical staffTobacclouis  Allergies  Meds  Med Hx  Surg  Hx  Fam Hx  Soc Hx      Reviewed and updated as needed this visit by Provider         ROS:  Constitutional, HEENT, cardiovascular, pulmonary, gi and gu systems are negative, except as otherwise noted.      OBJECTIVE:   /64  Pulse 80  Temp 97.8  F (36.6  C) (Oral)  Wt 201 lb (91.2 kg)  SpO2 100%  BMI 25.12 kg/m2  Body mass index is 25.12 kg/(m^2).  GENERAL: healthy, alert and no distress  NECK: no adenopathy, no asymmetry, masses, or scars and thyroid normal to palpation  RESP: lungs clear to auscultation - no rales, rhonchi or wheezes  CV: regular rate and rhythm, normal S1 S2, no S3 or S4, no murmur, click or rub, no peripheral edema and peripheral pulses strong  ABDOMEN: soft, nontender, no hepatosplenomegaly, no masses and bowel sounds normal  MS: no gross musculoskeletal defects noted, no edema  Stump intact with prosthesis attached    Diagnostic Test Results:  Results for orders placed or performed in visit on 08/31/17   Basic metabolic panel   Result Value Ref Range    Sodium 139 133 - 144 mmol/L    Potassium 4.5 3.4 - 5.3 mmol/L    Chloride 105 94 - 109 mmol/L    Carbon Dioxide 26 20 - 32 mmol/L    Anion Gap 8 3 - 14 mmol/L    Glucose 93 70 - 99 mg/dL    Urea Nitrogen 15 7 - 30 mg/dL    Creatinine 1.44 (H) 0.66 - 1.25 mg/dL    GFR Estimate 50 (L) >60 mL/min/1.7m2    GFR Estimate If Black 61 >60 mL/min/1.7m2    Calcium 9.6 8.5 - 10.1 mg/dL       ASSESSMENT/PLAN:       ICD-10-CM    1. Slow transit constipation K59.01 polyethylene glycol (MIRALAX) powder   2. Atrial fibrillation with RVR (H) I48.91 aspirin 81 MG EC tablet   3. Dilated cardiomyopathy (H) I42.0 aspirin 81 MG EC tablet   4. 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 Hypertension goal BP (blood pressure) < 140/90 I10 lisinopril (PRINIVIL/ZESTRIL) 10 MG tablet     metoprolol (TOPROL-XL) 200 MG 24 hr tablet     Basic metabolic panel   6. Encounter for smoking  cessation counseling Z71.6 nicotine polacrilex (NICORELIEF) 2 MG gum    Z72.0    7. RSD (reflex sympathetic dystrophy) G90.50 oxyCODONE (ROXICODONE) 10 MG IR tablet   8. Chronic atrial fibrillation (H) I48.2 rivaroxaban ANTICOAGULANT (XARELTO) 20 MG TABS tablet   9. Chronic dermatitis L30.9 triamcinolone (KENALOG) 0.1 % cream             Avery Duke MD  [facility]

## 2017-08-31 NOTE — NURSING NOTE
"Chief Complaint   Patient presents with     Pain Management       Initial BP (!) 151/101 (BP Location: Right arm, Patient Position: Chair, Cuff Size: Adult Regular)  Pulse 80  Temp 97.8  F (36.6  C) (Oral)  Wt 201 lb (91.2 kg)  SpO2 100%  BMI 25.12 kg/m2 Estimated body mass index is 25.12 kg/(m^2) as calculated from the following:    Height as of 2/15/17: 6' 3\" (1.905 m).    Weight as of this encounter: 201 lb (91.2 kg).  Medication Reconciliation: complete   Rosa Conti MA      "

## 2017-08-31 NOTE — MR AVS SNAPSHOT
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)        Hypertension goal BP (blood pressure) < 140/90        Encounter for smoking cessation counseling        RSD (reflex sympathetic dystrophy)        Chronic atrial fibrillation (H)        Chronic dermatitis           Follow-ups after your visit        Your next 10 appointments already scheduled     Sep 06, 2017 10:00 AM CDT   (Arrive by 9:45 AM)   LAB with ACUTE CARE LAB [facility], [place], Lab ([facility], [facility])    [address] [facility] [address]              Patient must bring picture ID. Patient should be prepared to give a urine specimen  Please do not eat 10-12 hours before your appointment if you are coming in fasting for labs on lipids, cholesterol, or glucose (sugar). Pregnant women should follow their Care Team instructions. Water with medications is okay. Do not drink coffee or other fluids. If you have concerns about taking  your medications, please ask at office or if scheduling via [employer], send a message by clicking on Secure Messaging, Message Your Care Team.            Sep 06, 2017 11:00 AM CDT   Return Visit with Zeeshan Varghese MD   Radiation Oncology Clinic ([facility])    [facility]  1st Floor  [address] [facility] [id number] [phone]            Sep 13, 2017  8:30 AM CDT   Lab with  LAB    Health Lab ([facility])    [address] [place] [address]    [phone]            Sep 13, 2017  9:00 AM CDT   (Arrive by 8:45 AM)   RETURN HEART FAILURE with Norah Brown MD   [facility] ([facility])    [address] [address] [phone]              Who to contact     If you have questions or need follow up information about today's clinic visit or your schedule please contact [facility] directly at [phone].  Normal or non-critical lab and imaging results will be communicated to you by [employer], letter or phone within 4 business days after the clinic has received the results. If you do not hear from us within 7 days, please contact the clinic through [employer] ([employer]t or phone. If you have a critical or abnormal lab result, we will notify you by phone as soon as possible.  Submit refill requests through [email or web address] or call your pharmacy and they will forward the refill request to us. Please allow 3 business days for your refill to be completed.          Additional Information About Your Visit        [employer] Information     [email or web address] gives you secure access to your electronic health record. If you see a primary care provider, you can also send messages to your care team and make appointments. If you have questions, please call your primary care clinic.  If you do not have a primary care provider, please call [phone] and they will assist you.        Care EveryWhere ID     This is your Care EveryWhere ID. This could be used by other organizations to access your [place] medical records  [id number]        Your Vitals Were     Pulse Temperature Pulse Oximetry BMI (Body Mass Index)          80 97.8  F (36.6  C) (Oral) 100% 25.12 kg/m2         Blood Pressure from Last 3 Encounters:   08/31/17 126/64   03/09/17 121/73   02/15/17 139/84    Weight from Last 3 Encounters:   08/31/17 201 lb (91.2 kg)   03/09/17 195 lb (88.5 kg)   02/15/17 195 lb 6.4 oz (88.6 kg)              We  Performed the Following     Basic metabolic panel          Where to get your medicines      These medications were sent to [facility] [address] [facility] [address]  [address] [facility] [address]     Phone:  [phone]     aspirin 81 MG EC tablet    atorvastatin 40 MG tablet    lisinopril 10 MG tablet    metoprolol 200 MG 24 hr tablet    nicotine polacrilex 2 MG gum    polyethylene glycol powder    rivaroxaban ANTICOAGULANT 20 MG Tabs tablet    triamcinolone 0.1 % cream         Some of these will need a paper prescription and others can be bought over the counter.  Ask your nurse if you have questions.     Bring a paper prescription for each of these medications     oxyCODONE 10 MG IR tablet          Primary Care Provider Office Phone # Fax #    Avery Duke [phone]       [address] [facility] [address]        Equal Access to Services     [facility] : Hadii pravin cox hadasho Soomaali, waaxda luqadaha, qaybta kaalmada adeegyada, tahir newsome haydimitri [place] . So [facility] [phone].    ATENCIÓN: Si habla español, tiene a chacon disposición servicios gratuitos de asistencia lingüística. Slade al [phone].    We comply with applicable federal civil rights laws and [place] laws. We do not discriminate on the basis of race, color, national origin, age, disability sex, sexual orientation or gender identity.            Thank you!     Thank you for choosing [facility]  for your care. Our goal is always to provide you with excellent care. Hearing back from our patients is one way we can continue to improve our services. Please take a few minutes to complete the written survey that you may receive in the mail after your visit with us. Thank you!             Your Updated Medication List - Protect others around you: Learn how to safely use, store and throw away your medicines at  www.disposemymeds.org.          This list is accurate as of: 8/31/17 12:44 PM.  Always use your most recent med list.                   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)       lisinopril 10 MG tablet    PRINIVIL/ZESTRIL    90 tablet    TAKE 1 TABLET(10 MG) BY MOUTH DAILY    Hypertension goal BP (blood pressure) < 140/90       metoprolol 200 MG 24 hr tablet    TOPROL-XL    90 tablet    Take 1 tablet (200 mg) by mouth daily    Hypertension goal BP (blood pressure) < 140/90       nicotine polacrilex 2 MG gum    NICORELIEF    30 tablet    Place 1 each (2 mg) inside cheek as needed for smoking cessation Reported on 3/9/2017    Encounter for smoking cessation counseling       oxyCODONE 10 MG IR tablet    ROXICODONE    90 tablet    Take 1 tablet (10 mg) by mouth every 6 hours as needed for moderate to severe pain 3 per day    RSD (reflex sympathetic dystrophy)       polyethylene glycol powder    MIRALAX    510 g    Take 17 g by mouth as needed for constipation    Slow transit constipation       rivaroxaban ANTICOAGULANT 20 MG Tabs tablet    XARELTO    90 tablet    Take 1 tablet (20 mg) by mouth daily (with dinner)    Chronic atrial fibrillation (H)       sennosides 8.6 MG tablet    SENOKOT    120 tablet    Take 2 tablets by mouth 2 times daily    Slow transit constipation       Tadalafil 2.5 MG Tabs    CIALIS    90 tablet    Take 2.5 mg by mouth daily Never use with nitroglycerin, terazosin or doxazosin.    Drug-induced erectile dysfunction       triamcinolone 0.1 % cream    KENALOG     80 g    Take 1 apply by topical route two times a day as needed    Chronic dermatitis       UNABLE TO FIND      MEDICATION NAME: Hydrocortisone 0.5 % topical cream-Take 1 application by topical route as needed.

## 2017-09-05 DIAGNOSIS — C61 MALIGNANT NEOPLASM OF PROSTATE (H): ICD-10-CM

## 2017-09-05 LAB — PSA SERPL-MCNC: 4.36 UG/L (ref 0–4)

## 2017-09-05 PROCEDURE — 84153 ASSAY OF PSA TOTAL: CPT | Performed by: RADIOLOGY

## 2017-09-05 PROCEDURE — 36415 COLL VENOUS BLD VENIPUNCTURE: CPT | Performed by: RADIOLOGY

## 2017-09-06 ENCOUNTER — OFFICE VISIT (OUTPATIENT)
Dept: RADIATION ONCOLOGY | Facility: CLINIC | Age: 62
End: 2017-09-06
Attending: RADIOLOGY
Payer: COMMERCIAL

## 2017-09-06 DIAGNOSIS — C61 MALIGNANT NEOPLASM OF PROSTATE (H): Primary | ICD-10-CM

## 2017-09-06 PROCEDURE — 99213 OFFICE O/P EST LOW 20 MIN: CPT | Performed by: RADIOLOGY

## 2017-09-06 NOTE — LETTER
2017       RE: Constantino Taylor  [address]   [place] [facility] [address]     Dear Colleague,    Thank you for referring your patient, Constantino Taylor, to the RADIATION ONCOLOGY CLINIC. Please see a copy of my visit note below.    FOLLOW-UP VISIT    Patient Name: Constantino Taylor      : 1955     Age: 61 year old        ______________________________________________________________________________     Chief Complaint   Patient presents with     Cancer     F/U for radiation to the pelvis     There were no vitals taken for this visit.     Date Radiation Completed: Radiation therapy pelvis, prostate cancer 7560cGy completed 10/26/12     Pain  Denies    Labs  Other Labs: Yes: PSA 17, here at the [facility]    Imaging  None        PSA:   PSA   Date Value Ref Range Status   2017 4.36 (H) 0 - 4 ug/L Final     Comment:     Assay Method:  Chemiluminescence using Siemens Vista analyzer   02/15/2017 3.08 0 - 4 ug/L Final     Comment:     Assay Method:  Chemiluminescence using Siemens Vista analyzer   2017 3.03 0 - 4 ug/L Final     Comment:     Assay Method:  Chemiluminescence using Siemens Vista analyzer   2016 0.94 0 - 4 ug/L Final   10/06/2015 0.72 0 - 4 ug/L Final   2015 1.10 0 - 4 ug/L Final   10/08/2014 0.42 0 - 4 ug/L Final   2014 0.34 0 - 4 ug/L Final   2013 1.19 0 - 4 ug/L Final     Comment:     PSA results are about 7% lower than our prior method due to a methodology   change   on 2011.   2013 2.72 0 - 4 ug/L Final     Comment:     PSA results are about 7% lower than our prior method due to a methodology   change   on 2011.     Testosterone Level:   Testosterone Total   Date Value Ref Range Status   10/06/2015 547 240 - 950 ng/dL Final     Comment:     This test was developed and its performance characteristics determined by the   [facility],  Special Chemistry Laboratory. It has   not been cleared  or approved by the FDA. The laboratory is regulated under   CLIA   as qualified to perform high-complexity testing. This test is used for   clinical   purposes. It should not be regarded as investigational or for research.     04/08/2015 694 240 - 950 ng/dL Final     Comment:     This test was developed and its performance characteristics determined by the   [facility],  Special Chemistry Laboratory. It has   not been cleared or approved by the FDA. The laboratory is regulated under   CLIA   as qualified to perform high-complexity testing. This test is used for   clinical   purposes. It should not be regarded as investigational or for research.     01/06/2014 194 (L) 240 - 950 ng/dL Final       On-Study AUA Symptom Score (PQ)  AUA (American Urological Association) Symptom Score  1. Over the past month, how often have you had a sensation of not emptying your bladder completely after you finished urinating?: Less than half the time  2. Over the past month, how often have you had to urinate again less than two hours after you finished urinating?: Almost always  3. Over the past month, how often have you found you stopped and started again several times when you urinated?: Not at all  4. Over the past month, how often have you found it difficult to postpone urination?: Almost always  5. Over the past month, how often have you had a weak urine stream?: Not at all  6. Over the past month, how often have you had to push or strain to begin urinating?: Not at all  7. Over the past month, how many times did you typically get up to urinate from the time you went to bed at night until the time you got up in the morning?: 4 times  AUA Score: 16    Diarrhea: 0- None    Constipation: 0- None    Other Appointments: No    MD Name:  Appointment Date:    MD Name: Appointment Date:   MD Name: Appointment Date:   Other Appointment Notes:     Residual Radiation side effect: No complaints r/t radiation. Pt does  get up frequently at night to urinate causing lack of sleep and fatigue     Additional Instructions: Recently had a colonoscopy where they removed 3 polyps.   Pt will be seeing cardiology soon and will talk about the elevated B/P    Nurse face-to-face time: Level 3:  10 min face to face time        RADIATION ONCOLOGY FOLLOW UP  February 8, 2017    DISEASE TREATED: Prostate carcinoma, [id number], pre-RT PSA 8.02, [place] 3+4=7.  INTERVAL SINCE COMPLETION OF RADIOTHERAPY: Approximately 5 years (radiation completed 10/26/2012)     TYPE OF RADIATION THERAPY ADMINISTERED:  7560 cGy (4500 cGy to the pelvis + 3060 cGy boost to the prostate).    HORMONE THERAPY:   Lupron:  #1-5/25/2012 (30mg)  #2-1/14/13(30 mg)  #3-8/14/13(30 mg)  #4-3/4/14 (45 mg)  Total duration: 28 months (Not continuous due to compliance)    SUBJECTIVE:    is a 61 year old gentleman here for routine follow up after completion of radiation therapy and long term ADT. At last visit in February 2017, the patient's PSA was noted to have increased to a value of 3.08, after having nadired at 0.34(1/6/2014) after definitive RT.  On our visit today, he reports continued sexual dysfunction, for which he is taking Viagra. His AUA symptom score today is 16/35; worsened form 11/35 at last visit. The largest contributions to his current score are urinary frequency and urinary urgency. He reports no persistent ADT effects. He denies any back pain, fatigue, constipation, diarrhea or pain at any other site.    OBJECTIVE:  Vitals: Weight: 203.1, Pulse: 80, RR: 16, BP: 163/112, O2 sat: 99% on RA  GENERAL:  Appears well, in no acute distress.   Rectal: GABRIELA unremarkable for gross recurrence    LABS:  09/05/2017 4.36 (H)   02/15/2017 3.08   02/08/2017 3.03   02/12/2016 0.94   10/06/2015 0.72   04/08/2015 1.10   10/08/2014 0.42   01/06/2014 0.34   08/13/2013 1.19   01/07/2013 2.72         Testosterone Total   4/8/15: 694  1/6/14: 194  IMPRESSION: Rising post treatment  PSA, indicative of biochemical recurrence. Doubling time ~ 12 months.  RECOMMENDATIONS: We will proceed with a CT scan of the abdomen and pelvis, as well as a bone scan. We'll have the patient's testosterone assessed and refer him to see medical oncology, Dr. Rodriguez.  Facundo Guadarrama MD-PhD PGY-2  Radiation Oncology Resident, [facility]    I saw the patient with the resident.  I agree with the resident's note and plan of care.      MOI Varghese M.D.  Department of Radiation Oncology  [facility]

## 2017-09-06 NOTE — MR AVS SNAPSHOT
After Visit Summary   9/6/2017    Constantino Taylor    MRN: [id number]           Patient Information     Date Of Birth          1955        Visit Information        Provider Department      9/6/2017 11:00 AM Zeeshan aVrghese MD Radiation Oncology Clinic        Today's Diagnoses     Malignant neoplasm of prostate (H)    -  1       Follow-ups after your visit        Additional Services     ONC/HEME ADULT REFERRAL       Your provider has referred you to: [facility]: Adult Specialty and Infusion Clinic [facility] [phone]   To see Dr Rodriguez [email or web address]    Please be aware that coverage of these services is subject to the terms and limitations of your health insurance plan.  Call member services at your health plan with any benefit or coverage questions.      Please bring the following with you to your appointment:    (1) Any X-Rays, CTs or MRIs which have been performed.  Contact the facility where they were done to arrange for  prior to your scheduled appointment.   (2) List of current medications  (3) This referral request   (4) Any documents/labs given to you for this referral                  Your next 10 appointments already scheduled     Sep 13, 2017  8:30 AM CDT   Lab with  LAB   [facility] ([facility])    [address]  1st Floor  [facility] [address] [phone]            Sep 13, 2017  9:00 AM CDT   (Arrive by 8:45 AM)   RETURN HEART FAILURE with Norah Brown MD   [facility] ([facility])    [address]  3rd Floor  [place] [address] [phone]            Sep 20, 2017  3:15 PM CDT   (Arrive by 3:00 PM)   New Patient Visit with Quinn Rodriguez MD   [facility] ([facility])    [address]  2nd Floor  [place] [address] [phone]               Who to contact     Please call your clinic at [phone] to:    Ask questions about your health    Make or cancel appointments    Discuss your medicines    Learn about your test results    Speak to your doctor   If you have compliments or concerns about an experience at your clinic, or if you wish to file a complaint, please contact [facility] Patient Relations at [phone] or email us at [email or web address]         Additional Information About Your Visit        aXess americahart Information     Boundlesst gives you secure access to your electronic health record. If you see a primary care provider, you can also send messages to your care team and make appointments. If you have questions, please call your primary care clinic.  If you do not have a primary care provider, please call [phone] and they will assist you.      Jugo is an electronic gateway that provides easy, online access to your medical records. With Jugo, you can request a clinic appointment, read your test results, renew a prescription or communicate with your care team.     To access your existing account, please contact your [facility] or call [phone] for assistance.        Care EveryWhere ID     This is your Care EveryWhere ID. This could be used by other organizations to access your [place] medical records  [id number]         Blood Pressure from Last 3 Encounters:   08/31/17 126/64   03/09/17 121/73   02/15/17 139/84    Weight from Last 3 Encounters:   08/31/17 91.2 kg (201 lb)   03/09/17 88.5 kg (195 lb)   02/15/17 88.6 kg (195 lb 6.4 oz)              We Performed the Following     ONC/HEME ADULT REFERRAL     Testosterone total        Primary Care Provider Office Phone # Fax #    [facility] [phone]       [address] [facility] [address]        Equal Access to Services     PARKER DOMINGUEZ : philip Erickson  territiffany tahir jordan. So [facility] [phone].    ATENCIÓN: Si ingrid garduno, tiene a chacon disposición servicios gratuitos de asistencia lingüística. Slade al [phone].    We comply with applicable federal civil rights laws and [place] laws. We do not discriminate on the basis of race, color, national origin, age, disability sex, sexual orientation or gender identity.            Thank you!     Thank you for choosing [facility]  for your care. Our goal is always to provide you with excellent care. Hearing back from our patients is one way we can continue to improve our services. Please take a few minutes to complete the written survey that you may receive in the mail after your visit with us. Thank you!             Your Updated Medication List - Protect others around you: Learn how to safely use, store and throw away your medicines at www.disposemymeds.org.          This list is accurate as of: 9/6/17 11:33 AM.  Always use your most recent med list.                   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)       lisinopril 10 MG tablet    PRINIVIL/ZESTRIL    90 tablet    TAKE 1 TABLET(10 MG) BY MOUTH DAILY    Hypertension goal BP (blood pressure) < 140/90       metoprolol 200 MG 24 hr tablet    TOPROL-XL    90 tablet    Take 1 tablet (200 mg) by mouth daily    Hypertension goal BP (blood pressure) < 140/90       nicotine polacrilex 2 MG gum    NICORELIEF    30 tablet    Place 1 each (2 mg) inside cheek as  needed for smoking cessation Reported on 3/9/2017    Encounter for smoking cessation counseling       oxyCODONE 10 MG IR tablet    ROXICODONE    90 tablet    Take 1 tablet (10 mg) by mouth every 6 hours as needed for moderate to severe pain 3 per day    RSD (reflex sympathetic dystrophy)       polyethylene glycol powder    MIRALAX    510 g    Take 17 g by mouth as needed for constipation    Slow transit constipation       rivaroxaban ANTICOAGULANT 20 MG Tabs tablet    XARELTO    90 tablet    Take 1 tablet (20 mg) by mouth daily (with dinner)    Chronic atrial fibrillation (H)       sennosides 8.6 MG tablet    SENOKOT    120 tablet    Take 2 tablets by mouth 2 times daily    Slow transit constipation       Tadalafil 2.5 MG Tabs    CIALIS    90 tablet    Take 2.5 mg by mouth daily Never use with nitroglycerin, terazosin or doxazosin.    Drug-induced erectile dysfunction       triamcinolone 0.1 % cream    KENALOG    80 g    Take 1 apply by topical route two times a day as needed    Chronic dermatitis       UNABLE TO FIND      MEDICATION NAME: Hydrocortisone 0.5 % topical cream-Take 1 application by topical route as needed.

## 2017-09-06 NOTE — PROGRESS NOTES
FOLLOW-UP VISIT    Patient Name: Constantino Taylor      : 1955     Age: 61 year old        ______________________________________________________________________________     Chief Complaint   Patient presents with     Cancer     F/U for radiation to the pelvis     There were no vitals taken for this visit.     Date Radiation Completed: Radiation therapy pelvis, prostate cancer 7560cGy completed 10/26/12     Pain  Denies    Labs  Other Labs: Yes: PSA 17, here at the [facility]    Imaging  None        PSA:   PSA   Date Value Ref Range Status   2017 4.36 (H) 0 - 4 ug/L Final     Comment:     Assay Method:  Chemiluminescence using Siemens Vista analyzer   02/15/2017 3.08 0 - 4 ug/L Final     Comment:     Assay Method:  Chemiluminescence using Siemens Vista analyzer   2017 3.03 0 - 4 ug/L Final     Comment:     Assay Method:  Chemiluminescence using Siemens Vista analyzer   2016 0.94 0 - 4 ug/L Final   10/06/2015 0.72 0 - 4 ug/L Final   2015 1.10 0 - 4 ug/L Final   10/08/2014 0.42 0 - 4 ug/L Final   2014 0.34 0 - 4 ug/L Final   2013 1.19 0 - 4 ug/L Final     Comment:     PSA results are about 7% lower than our prior method due to a methodology   change   on 2011.   2013 2.72 0 - 4 ug/L Final     Comment:     PSA results are about 7% lower than our prior method due to a methodology   change   on 2011.     Testosterone Level:   Testosterone Total   Date Value Ref Range Status   10/06/2015 547 240 - 950 ng/dL Final     Comment:     This test was developed and its performance characteristics determined by the   [facility],  Special Chemistry Laboratory. It has   not been cleared or approved by the FDA. The laboratory is regulated under   CLIA   as qualified to perform high-complexity testing. This test is used for   clinical   purposes. It should not be regarded as investigational or for research.     2015 699 204 -  950 ng/dL Final     Comment:     This test was developed and its performance characteristics determined by the   [facility],  Special Chemistry Laboratory. It has   not been cleared or approved by the FDA. The laboratory is regulated under   CLIA   as qualified to perform high-complexity testing. This test is used for   clinical   purposes. It should not be regarded as investigational or for research.     01/06/2014 194 (L) 240 - 950 ng/dL Final       On-Study AUA Symptom Score (PQ)  AUA (American Urological Association) Symptom Score  1. Over the past month, how often have you had a sensation of not emptying your bladder completely after you finished urinating?: Less than half the time  2. Over the past month, how often have you had to urinate again less than two hours after you finished urinating?: Almost always  3. Over the past month, how often have you found you stopped and started again several times when you urinated?: Not at all  4. Over the past month, how often have you found it difficult to postpone urination?: Almost always  5. Over the past month, how often have you had a weak urine stream?: Not at all  6. Over the past month, how often have you had to push or strain to begin urinating?: Not at all  7. Over the past month, how many times did you typically get up to urinate from the time you went to bed at night until the time you got up in the morning?: 4 times  AUA Score: 16    Diarrhea: 0- None    Constipation: 0- None    Other Appointments: No    MD Name:  Appointment Date:    MD Name: Appointment Date:   MD Name: Appointment Date:   Other Appointment Notes:     Residual Radiation side effect: No complaints r/t radiation. Pt does get up frequently at night to urinate causing lack of sleep and fatigue     Additional Instructions: Recently had a colonoscopy where they removed 3 polyps.   Pt will be seeing cardiology soon and will talk about the elevated B/P    Nurse  face-to-face time: Level 3:  10 min face to face time

## 2017-09-06 NOTE — PROGRESS NOTES
RADIATION ONCOLOGY FOLLOW UP  February 8, 2017    DISEASE TREATED: Prostate carcinoma, [id number], pre-RT PSA 8.02, [place] 3+4=7.  INTERVAL SINCE COMPLETION OF RADIOTHERAPY: Approximately 5 years (radiation completed 10/26/2012)     TYPE OF RADIATION THERAPY ADMINISTERED:  7560 cGy (4500 cGy to the pelvis + 3060 cGy boost to the prostate).    HORMONE THERAPY:   Lupron:  #1-5/25/2012 (30mg)  #2-1/14/13(30 mg)  #3-8/14/13(30 mg)  #4-3/4/14 (45 mg)  Total duration: 28 months (Not continuous due to compliance)    SUBJECTIVE:    is a 61 year old gentleman here for routine follow up after completion of radiation therapy and long term ADT. At last visit in February 2017, the patient's PSA was noted to have increased to a value of 3.08, after having nadired at 0.34(1/6/2014) after definitive RT.  On our visit today, he reports continued sexual dysfunction, for which he is taking Viagra. His AUA symptom score today is 16/35; worsened form 11/35 at last visit. The largest contributions to his current score are urinary frequency and urinary urgency. He reports no persistent ADT effects. He denies any back pain, fatigue, constipation, diarrhea or pain at any other site.    OBJECTIVE:  Vitals: Weight: 203.1, Pulse: 80, RR: 16, BP: 163/112, O2 sat: 99% on RA  GENERAL:  Appears well, in no acute distress.   Rectal: GABRIELA unremarkable for gross recurrence    LABS:  09/05/2017 4.36 (H)   02/15/2017 3.08   02/08/2017 3.03   02/12/2016 0.94   10/06/2015 0.72   04/08/2015 1.10   10/08/2014 0.42   01/06/2014 0.34   08/13/2013 1.19   01/07/2013 2.72         Testosterone Total   4/8/15: 694  1/6/14: 194  IMPRESSION: Rising post treatment PSA, indicative of biochemical recurrence. Doubling time ~ 12 months.  RECOMMENDATIONS: We will proceed with a CT scan of the abdomen and pelvis, as well as a bone scan. We'll have the patient's testosterone assessed and refer him to see medical oncology, Dr. Rodriguez.  Facundo Guadarrama MD-PhD  PGY-2  Radiation Oncology Resident, [facility]    I saw the patient with the resident.  I agree with the resident's note and plan of care.      MOI Varghese M.D.  Department of Radiation Oncology  [employer]

## 2017-09-11 ENCOUNTER — HOSPITAL ENCOUNTER (OUTPATIENT)
Dept: NUCLEAR MEDICINE | Facility: CLINIC | Age: 62
Setting detail: NUCLEAR MEDICINE
End: 2017-09-11
Attending: RADIOLOGY
Payer: COMMERCIAL

## 2017-09-11 ENCOUNTER — HOSPITAL ENCOUNTER (OUTPATIENT)
Dept: CT IMAGING | Facility: CLINIC | Age: 62
Discharge: HOME | End: 2017-09-11
Attending: RADIOLOGY | Admitting: RADIOLOGY
Payer: COMMERCIAL

## 2017-09-11 ENCOUNTER — PRE VISIT (OUTPATIENT)
Dept: CARDIOLOGY | Facility: CLINIC | Age: 62
End: 2017-09-11

## 2017-09-11 DIAGNOSIS — C61 MALIGNANT NEOPLASM OF PROSTATE (H): ICD-10-CM

## 2017-09-11 DIAGNOSIS — I50.22 CHRONIC SYSTOLIC CONGESTIVE HEART FAILURE (H): Primary | ICD-10-CM

## 2017-09-11 PROCEDURE — A9503 TC99M MEDRONATE: HCPCS | Performed by: RADIOLOGY

## 2017-09-11 PROCEDURE — 78306 BONE IMAGING WHOLE BODY: CPT

## 2017-09-11 PROCEDURE — 34300033 ZZH RX 343: Performed by: RADIOLOGY

## 2017-09-11 RX ORDER — TC 99M MEDRONATE 20 MG/10ML
20-30 INJECTION, POWDER, LYOPHILIZED, FOR SOLUTION INTRAVENOUS ONCE
Status: COMPLETED | OUTPATIENT
Start: 2017-09-11 | End: 2017-09-11

## 2017-09-11 RX ORDER — IOPAMIDOL 755 MG/ML
123 INJECTION, SOLUTION INTRAVASCULAR ONCE
Status: COMPLETED | OUTPATIENT
Start: 2017-09-11 | End: 2017-09-11

## 2017-09-11 RX ADMIN — IOPAMIDOL 123 ML: 755 INJECTION, SOLUTION INTRAVENOUS at 09:33

## 2017-09-11 RX ADMIN — TC 99M MEDRONATE 24.2 MCI.: 20 INJECTION, POWDER, LYOPHILIZED, FOR SOLUTION INTRAVENOUS at 09:16

## 2017-09-13 ENCOUNTER — TELEPHONE (OUTPATIENT)
Dept: FAMILY MEDICINE | Facility: CLINIC | Age: 62
End: 2017-09-13

## 2017-09-13 ENCOUNTER — OFFICE VISIT (OUTPATIENT)
Dept: CARDIOLOGY | Facility: CLINIC | Age: 62
End: 2017-09-13
Attending: INTERNAL MEDICINE
Payer: COMMERCIAL

## 2017-09-13 VITALS
DIASTOLIC BLOOD PRESSURE: 97 MMHG | BODY MASS INDEX: 25.29 KG/M2 | OXYGEN SATURATION: 99 % | WEIGHT: 203.4 LBS | SYSTOLIC BLOOD PRESSURE: 135 MMHG | HEART RATE: 84 BPM | HEIGHT: 75 IN

## 2017-09-13 DIAGNOSIS — I10 HYPERTENSION GOAL BP (BLOOD PRESSURE) < 140/90: ICD-10-CM

## 2017-09-13 DIAGNOSIS — I50.22 CHRONIC SYSTOLIC CONGESTIVE HEART FAILURE (H): ICD-10-CM

## 2017-09-13 LAB
ANION GAP SERPL CALCULATED.3IONS-SCNC: 5 MMOL/L (ref 3–14)
BUN SERPL-MCNC: 18 MG/DL (ref 7–30)
CALCIUM SERPL-MCNC: 8.8 MG/DL (ref 8.5–10.1)
CHLORIDE SERPL-SCNC: 105 MMOL/L (ref 94–109)
CO2 SERPL-SCNC: 27 MMOL/L (ref 20–32)
CREAT SERPL-MCNC: 1.48 MG/DL (ref 0.66–1.25)
GFR SERPL CREATININE-BSD FRML MDRD: 49 ML/MIN/1.7M2
GLUCOSE SERPL-MCNC: 119 MG/DL (ref 70–99)
POTASSIUM SERPL-SCNC: 3.9 MMOL/L (ref 3.4–5.3)
SODIUM SERPL-SCNC: 137 MMOL/L (ref 133–144)

## 2017-09-13 PROCEDURE — 99212 OFFICE O/P EST SF 10 MIN: CPT | Mod: ZF

## 2017-09-13 PROCEDURE — 36415 COLL VENOUS BLD VENIPUNCTURE: CPT | Performed by: INTERNAL MEDICINE

## 2017-09-13 PROCEDURE — 99214 OFFICE O/P EST MOD 30 MIN: CPT | Mod: GC | Performed by: INTERNAL MEDICINE

## 2017-09-13 PROCEDURE — 80048 BASIC METABOLIC PNL TOTAL CA: CPT | Performed by: INTERNAL MEDICINE

## 2017-09-13 RX ORDER — LISINOPRIL 20 MG/1
20 TABLET ORAL DAILY
Qty: 90 TABLET | Refills: 3 | Status: SHIPPED | OUTPATIENT
Start: 2017-09-13 | End: 2018-01-12

## 2017-09-13 ASSESSMENT — PAIN SCALES - GENERAL: PAINLEVEL: NO PAIN (0)

## 2017-09-13 NOTE — MR AVS SNAPSHOT
After Visit Summary   9/13/2017    Constantino Taylor    MRN: [id number]           Patient Information     Date Of Birth          1955        Visit Information        Provider Department      9/13/2017 9:00 AM Norah Brown MD [facility]        Today's Diagnoses     Hypertension goal BP (blood pressure) < 140/90          Care Instructions    You were seen today in the Cardiovascular Clinic at the [facility].     Cardiology Providers you saw during your visit: Dr Norah Brown    Diagnosis: History of cardiomyopathy     Results: Dr Brown reviewed the results of your labs with you in clinic today    Recommendations:   1.  Increase your Lisinopril to 20 mg 1 tablet daily.  2. Will have labs drawn on 9/20- BMP.  Please make sure that you are taking the Lisinopril 20 mg daily for these labs.  3.  Please monitor your heart rate and blood pressure daily and call in a couple of weeks with these numbers and report if your headaches have resolved.  You can take the prescription for a blood pressure cuff to a durable medical supply company-  [facility] on [facility] in [facility].       Follow-up: Follow up with Dr Brown in 6 months.       For emergencies call 911.    For any scheduling needs or nursing related questions, please call [phone].    Thank you for your visit today! If you have questions or concerns about today's visit, please call me.      Juan Nelson RN  RN Care Coordinator  [facility]  [phone]    Press option 1 for the University and then 3 for nursing related questions                  Follow-ups after your visit        Follow-up notes from your care team     Return in about 6 months (around 3/13/2018) for Dr Brown Follow up Dx: Chronic systolic heart failure.      Your next 10 appointments already scheduled     Sep 20, 2017  2:00 PM CDT   LAB with  LAB    [facility] Lab ([facility])     [address] [place] [id number] [phone]           Patient must bring picture ID. Patient should be prepared to give a urine specimen  Please do not eat 10-12 hours before your appointment if you are coming in fasting for labs on lipids, cholesterol, or glucose (sugar). Pregnant women should follow their Care Team instructions. Water with medications is okay. Do not drink coffee or other fluids. If you have concerns about taking  your medications, please ask at office or if scheduling via Glowpoint, send a message by clicking on Secure Messaging, Message Your Care Team.            Sep 20, 2017  3:15 PM CDT   (Arrive by 3:00 PM)   New Patient Visit with Quinn Rodriguez MD   [facility] ([facility])    [address] [place] [id number] [phone]            Mar 14, 2018  8:00 AM CDT   (Arrive by 7:45 AM)   RETURN HEART FAILURE with Norah Brown MD   [facility] ([facility])    [address] [place] [id number] [phone]              Who to contact     If you have questions or need follow up information about today's clinic visit or your schedule please contact [facility] directly at [phone].  Normal or non-critical lab and imaging results will be communicated to you by [employer], letter or phone within 4 business days after the clinic has received the results. If you do not hear from us within 7 days, please contact the clinic through [employer] or phone. If you have a critical or abnormal lab result, we will notify you by phone as soon as possible.  Submit refill requests through Glowpoint or call your pharmacy and they will forward the refill request to us. Please allow 3 business days for your refill to be completed.          Additional Information About Your Visit        Glowpoint Information     Glowpoint gives you secure access to  "your electronic health record. If you see a primary care provider, you can also send messages to your care team and make appointments. If you have questions, please call your primary care clinic.  If you do not have a primary care provider, please call [phone] and they will assist you.        Care EveryWhere ID     This is your Care EveryWhere ID. This could be used by other organizations to access your [place] medical records  [id number]        Your Vitals Were     Pulse Height Pulse Oximetry BMI (Body Mass Index)          84 1.905 m (6' 3\") 99% 25.42 kg/m2         Blood Pressure from Last 3 Encounters:   09/13/17 (!) 135/97   08/31/17 126/64   03/09/17 121/73    Weight from Last 3 Encounters:   09/13/17 92.3 kg (203 lb 6.4 oz)   08/31/17 91.2 kg (201 lb)   03/09/17 88.5 kg (195 lb)              Today, you had the following     No orders found for display         Today's Medication Changes          These changes are accurate as of: 9/13/17 10:10 AM.  If you have any questions, ask your nurse or doctor.               Start taking these medicines.        Dose/Directions    order for DME   Used for:  Hypertension goal BP (blood pressure) < 140/90   Started by:  Norah Brown MD        Please dispense blood pressure machine and cuff.   Quantity:  1 each   Refills:  0         These medicines have changed or have updated prescriptions.        Dose/Directions    lisinopril 20 MG tablet   Commonly known as:  PRINIVIL/ZESTRIL   This may have changed:    - medication strength  - how much to take  - how to take this  - when to take this   Used for:  Hypertension goal BP (blood pressure) < 140/90   Changed by:  Norah Brown MD        Dose:  20 mg   Take 1 tablet (20 mg) by mouth daily TAKE 1 TABLET(10 MG) BY MOUTH DAILY   Quantity:  90 tablet   Refills:  3            Where to get your medicines      These medications were sent to [facility] - [place], [place] - [facility] [address]  [address] [facility] [address]    Hours:  Mon-Fri 8:30am-5:00pm Toll Free [phone] Phone:  [phone]     lisinopril 20 MG tablet         Some of these will need a paper prescription and others can be bought over the counter.  Ask your nurse if you have questions.     Bring a paper prescription for each of these medications     order for DME                Primary Care Provider Office Phone # Fax #    [facility] [phone] [address] [facility] [address]        Equal Access to Services     [facility]: Hadii aad ku hadasho Soomaali, waaxda luqadaha, qaybta kaalmada ademechelleyada, tahir [place] . So [facility] [phone].    ATENCIÓN: Si habla español, tiene a chacon disposición servicios gratuitos de asistencia lingüística. Ll[facility] [phone].    We comply with applicable federal civil rights laws and [place] laws. We do not discriminate on the basis of race, color, national origin, age, disability sex, sexual orientation or gender identity.            Thank you!     Thank you for choosing [facility]  for your care. Our goal is always to provide you with excellent care. Hearing back from our patients is one way we can continue to improve our services. Please take a few minutes to complete the written survey that you may receive in the mail after your visit with us. Thank you!             Your Updated Medication List - Protect others around you: Learn how to safely use, store and throw away your medicines at www.disposemymeds.org.          This list is accurate as of: 9/13/17 10:10 AM.  Always use your most recent med list.                   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)       lisinopril 20 MG tablet    PRINIVIL/ZESTRIL    90 tablet    Take 1 tablet (20 mg) by mouth daily TAKE 1 TABLET(10 MG) BY MOUTH DAILY    Hypertension goal BP (blood pressure) < 140/90       metoprolol 200 MG 24 hr tablet    TOPROL-XL    90 tablet    Take 1 tablet (200 mg) by mouth daily    Hypertension goal BP (blood pressure) < 140/90       nicotine polacrilex 2 MG gum    NICORELIEF    30 tablet    Place 1 each (2 mg) inside cheek as needed for smoking cessation Reported on 3/9/2017    Encounter for smoking cessation counseling       order for DME     1 each    Please dispense blood pressure machine and cuff.    Hypertension goal BP (blood pressure) < 140/90       oxyCODONE 10 MG IR tablet    ROXICODONE    90 tablet    Take 1 tablet (10 mg) by mouth every 6 hours as needed for moderate to severe pain 3 per day    RSD (reflex sympathetic dystrophy)       polyethylene glycol powder    MIRALAX    510 g    Take 17 g by mouth as needed for constipation    Slow transit constipation       rivaroxaban ANTICOAGULANT 20 MG Tabs tablet    XARELTO    90 tablet    Take 1 tablet (20 mg) by mouth daily (with dinner)    Chronic atrial fibrillation (H)       sennosides 8.6 MG tablet    SENOKOT    120 tablet    Take 2 tablets by mouth 2 times daily    Slow transit constipation       Tadalafil 2.5 MG Tabs    CIALIS    90 tablet    Take 2.5 mg by mouth daily Never use with nitroglycerin, terazosin or doxazosin.    Drug-induced erectile dysfunction       triamcinolone 0.1 % cream    KENALOG    80 g    Take 1 apply by topical route two times a day as needed    Chronic dermatitis       UNABLE TO FIND      MEDICATION NAME: Hydrocortisone 0.5 % topical cream-Take 1 application by topical route as needed.

## 2017-09-13 NOTE — NURSING NOTE
Labs: Patient was given results of the laboratory testing obtained today. Patient was instructed to return for the next laboratory testing in 1 week . Patient demonstrated understanding of this information and agreed to call with further questions or concerns.     Med Reconcile: Reviewed and verified all current medications with the patient. The updated medication list was printed and given to the patient.    Return Appointment: Follow up with Dr Brown in 6 month with labs.  Patient given instructions regarding scheduling next clinic visit. Patient demonstrated understanding of this information and agreed to call with further questions or concerns.    Medication Change: Increase Lisinopril to 20 mg daily. Patient was educated regarding prescribed medication change, including discussion of the indication, administration, side effects, and when to report to MD or RN. Patient demonstrated understanding of this information and agreed to call with further questions or concerns.    Patient stated he understood all health information given and agreed to call with further questions or concerns.    Juan Nelson, RN  RN Care Coordinator  [facility]  [phone]

## 2017-09-13 NOTE — PATIENT INSTRUCTIONS
You were seen today in the Cardiovascular Clinic at the [facility].     Cardiology Providers you saw during your visit: Dr Norah Brown    Diagnosis: History of cardiomyopathy     Results: Dr Brown reviewed the results of your labs with you in clinic today    Recommendations:   1.  Increase your Lisinopril to 20 mg 1 tablet daily.  2. Will have labs drawn on 9/20- BMP.  Please make sure that you are taking the Lisinopril 20 mg daily for these labs.  3.  Please monitor your heart rate and blood pressure daily and call in a couple of weeks with these numbers and report if your headaches have resolved.  You can take the prescription for a blood pressure cuff to a durable medical supply company-  [employer] on [facility] in [facility].       Follow-up: Follow up with Dr Brown in 6 months.       For emergencies call 911.    For any scheduling needs or nursing related questions, please call [phone].    Thank you for your visit today! If you have questions or concerns about today's visit, please call me.      Juan Nelson RN  RN Care Coordinator  [facility]  [phone]    Press option 1 for the University and then 3 for nursing related questions
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
## 2017-09-13 NOTE — NURSING NOTE
Chief Complaint   Patient presents with     Follow Up For     heart problem--61 year old male with history of A fib, CVA, Left BKA, hypertension, hyperlipidemia and chronic systolic heart failure secondary to nonischemic cardiomyopathy presenting for follow up     Vitals were taken and medications were reconciled.  Abdi Rodriguez, LONA  8:46 AM

## 2017-09-13 NOTE — TELEPHONE ENCOUNTER
Forms received from: [employer]   Phone number listed: [phone]   Fax listed: [phone]  Date received: 9-13-17  Form description: mobility products, repair  Once forms are completed, please return to [employer] via fax.  Is patient requesting to be contacted when forms are completed: n/a  Form placed: provider desk  Mela Merida
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
## 2017-09-15 ENCOUNTER — CARE COORDINATION (OUTPATIENT)
Dept: CARE COORDINATION | Facility: CLINIC | Age: 62
End: 2017-09-15

## 2017-09-15 NOTE — PROGRESS NOTES
Clinic Care Coordination Contact  [facility]/Voicemail    Referral Source: [employer] Outreach  Clinical Data: Care Coordinator Outreach  Outreach attempted x 1.  Left message on voicemail with call back information and requested return call.  Plan: Care Coordinator mailed out care coordination introduction letter on 6-6-17. Care Coordinator will try to reach patient again in 3-5 business days.      Darin Yan, MSN, RN, PHN  N Clinic Care Coordinator  [facility]  Phone: [phone]  [email or web address]

## 2017-09-20 ENCOUNTER — ONCOLOGY VISIT (OUTPATIENT)
Dept: ONCOLOGY | Facility: CLINIC | Age: 62
End: 2017-09-20
Attending: INTERNAL MEDICINE
Payer: COMMERCIAL

## 2017-09-20 VITALS
SYSTOLIC BLOOD PRESSURE: 104 MMHG | OXYGEN SATURATION: 99 % | TEMPERATURE: 98.7 F | WEIGHT: 202 LBS | HEART RATE: 71 BPM | RESPIRATION RATE: 18 BRPM | BODY MASS INDEX: 25.25 KG/M2 | DIASTOLIC BLOOD PRESSURE: 79 MMHG

## 2017-09-20 DIAGNOSIS — C61 MALIGNANT NEOPLASM OF PROSTATE (H): Primary | ICD-10-CM

## 2017-09-20 LAB
ALBUMIN SERPL-MCNC: 3.6 G/DL (ref 3.4–5)
ALP SERPL-CCNC: 80 U/L (ref 40–150)
ALT SERPL W P-5'-P-CCNC: 23 U/L (ref 0–70)
ANION GAP SERPL CALCULATED.3IONS-SCNC: 4 MMOL/L (ref 3–14)
AST SERPL W P-5'-P-CCNC: 14 U/L (ref 0–45)
BASOPHILS # BLD AUTO: 0 10E9/L (ref 0–0.2)
BASOPHILS NFR BLD AUTO: 0.3 %
BILIRUB SERPL-MCNC: 0.5 MG/DL (ref 0.2–1.3)
BUN SERPL-MCNC: 18 MG/DL (ref 7–30)
CALCIUM SERPL-MCNC: 9.2 MG/DL (ref 8.5–10.1)
CHLORIDE SERPL-SCNC: 106 MMOL/L (ref 94–109)
CO2 SERPL-SCNC: 29 MMOL/L (ref 20–32)
CREAT SERPL-MCNC: 1.53 MG/DL (ref 0.66–1.25)
DIFFERENTIAL METHOD BLD: NORMAL
EOSINOPHIL # BLD AUTO: 0.4 10E9/L (ref 0–0.7)
EOSINOPHIL NFR BLD AUTO: 5.2 %
ERYTHROCYTE [DISTWIDTH] IN BLOOD BY AUTOMATED COUNT: 12.5 % (ref 10–15)
GFR SERPL CREATININE-BSD FRML MDRD: 47 ML/MIN/1.7M2
GLUCOSE SERPL-MCNC: 84 MG/DL (ref 70–99)
HCT VFR BLD AUTO: 45.5 % (ref 40–53)
HGB BLD-MCNC: 15.5 G/DL (ref 13.3–17.7)
IMM GRANULOCYTES # BLD: 0 10E9/L (ref 0–0.4)
IMM GRANULOCYTES NFR BLD: 0.3 %
LDH SERPL L TO P-CCNC: 119 U/L (ref 85–227)
LYMPHOCYTES # BLD AUTO: 1.4 10E9/L (ref 0.8–5.3)
LYMPHOCYTES NFR BLD AUTO: 19.9 %
MCH RBC QN AUTO: 31.3 PG (ref 26.5–33)
MCHC RBC AUTO-ENTMCNC: 34.1 G/DL (ref 31.5–36.5)
MCV RBC AUTO: 92 FL (ref 78–100)
MONOCYTES # BLD AUTO: 0.5 10E9/L (ref 0–1.3)
MONOCYTES NFR BLD AUTO: 7.6 %
NEUTROPHILS # BLD AUTO: 4.7 10E9/L (ref 1.6–8.3)
NEUTROPHILS NFR BLD AUTO: 66.7 %
NRBC # BLD AUTO: 0 10*3/UL
NRBC BLD AUTO-RTO: 0 /100
PLATELET # BLD AUTO: 213 10E9/L (ref 150–450)
POTASSIUM SERPL-SCNC: 4.3 MMOL/L (ref 3.4–5.3)
PROT SERPL-MCNC: 8.4 G/DL (ref 6.8–8.8)
PSA SERPL-MCNC: 4.62 UG/L (ref 0–4)
RBC # BLD AUTO: 4.95 10E12/L (ref 4.4–5.9)
SODIUM SERPL-SCNC: 139 MMOL/L (ref 133–144)
WBC # BLD AUTO: 7.1 10E9/L (ref 4–11)

## 2017-09-20 PROCEDURE — 85025 COMPLETE CBC W/AUTO DIFF WBC: CPT | Performed by: INTERNAL MEDICINE

## 2017-09-20 PROCEDURE — 84403 ASSAY OF TOTAL TESTOSTERONE: CPT | Performed by: INTERNAL MEDICINE

## 2017-09-20 PROCEDURE — 99212 OFFICE O/P EST SF 10 MIN: CPT | Mod: ZF

## 2017-09-20 PROCEDURE — 99205 OFFICE O/P NEW HI 60 MIN: CPT | Mod: ZP | Performed by: INTERNAL MEDICINE

## 2017-09-20 PROCEDURE — 84153 ASSAY OF PSA TOTAL: CPT | Performed by: INTERNAL MEDICINE

## 2017-09-20 PROCEDURE — 80053 COMPREHEN METABOLIC PANEL: CPT | Performed by: INTERNAL MEDICINE

## 2017-09-20 PROCEDURE — 83615 LACTATE (LD) (LDH) ENZYME: CPT | Performed by: INTERNAL MEDICINE

## 2017-09-20 PROCEDURE — 36415 COLL VENOUS BLD VENIPUNCTURE: CPT | Performed by: INTERNAL MEDICINE

## 2017-09-20 ASSESSMENT — PAIN SCALES - GENERAL: PAINLEVEL: NO PAIN (0)
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
## 2017-09-20 NOTE — NURSING NOTE
"Oncology Rooming Note    September 20, 2017 3:15 PM   Constantino Taylor is a 61 year old male who presents for:    Chief Complaint   Patient presents with     Oncology Clinic Visit     Prostate CA     Initial Vitals: /79  Pulse 71  Temp 98.7  F (37.1  C) (Oral)  Resp 18  Wt 91.6 kg (202 lb)  SpO2 99%  BMI 25.25 kg/m2 Estimated body mass index is 25.25 kg/(m^2) as calculated from the following:    Height as of 9/13/17: 1.905 m (6' 3\").    Weight as of this encounter: 91.6 kg (202 lb). Body surface area is 2.2 meters squared.  No Pain (0) Comment: Data Unavailable   No LMP for male patient.  Allergies reviewed: Yes  Medications reviewed: Yes    Medications: Medication refills not needed today.  Pharmacy name entered into [facility]:    [id number] - [place], [place] - [address] [id number] - [place], [place] - [address] AT [address] [facility] - [place], [place] - [address] [place] [address] AVE   WRITTEN PRESCRIPTION REQUESTED    Clinical concerns:      6 minutes for nursing intake (face to face time)     Rosi Marie CMA              "

## 2017-09-20 NOTE — MR AVS SNAPSHOT
After Visit Summary   9/20/2017    Constantino Taylor    MRN: [id number]           Patient Information     Date Of Birth          1955        Visit Information        Provider Department      9/20/2017 3:15 PM Quinn Rodriguez MD [facility]        Today's Diagnoses     Malignant neoplasm of prostate (H)    -  1       Follow-ups after your visit        Your next 10 appointments already scheduled     Dec 20, 2017 10:15 AM CST   [facility] with  [facility]   [facility] ([facility])    [address] [place] [address]  2nd Floor  [facility] [id number] [phone]            Dec 20, 2017 10:45 AM CST   (Arrive by 10:30 AM)   New Patient Visit with Quinn Rodriguez MD   [facility] ([facility])    [address] [place] [address]  2nd [facility] [address] [phone]            Mar 14, 2018  8:00 AM CDT   (Arrive by 7:45 AM)   RETURN HEART FAILURE with Norah Brown MD   [facility] ([facility])    [address] [place] [address]  3rd Floor  [facility] [address] [phone]              Future tests that were ordered for you today     Open Standing Orders        Priority Remaining Interval Expires Ordered    CBC with platelets differential Routine 24/25 9/20/2018 9/20/2017    Comprehensive metabolic panel Routine 24/25 9/20/2018 9/20/2017    Lactate Dehydrogenase Routine 24/25 9/20/2018 9/20/2017    PSA tumor marker Routine 24/25 9/20/2018 9/20/2017    Testosterone total Routine 24/25 9/20/2018 9/20/2017            Who to contact     If you have questions or need follow up information about today's clinic visit or your schedule please contact [facility] directly at [phone].  Normal or non-critical lab and imaging results will be communicated to you by MyChart, letter or phone within 4 business days after the clinic  has received the results. If you do not hear from us within 7 days, please contact the clinic through [employer] or phone. If you have a critical or abnormal lab result, we will notify you by phone as soon as possible.  Submit refill requests through [employer] or call your pharmacy and they will forward the refill request to us. Please allow 3 business days for your refill to be completed.          Additional Information About Your Visit        [employer] Information     [employer] gives you secure access to your electronic health record. If you see a primary care provider, you can also send messages to your care team and make appointments. If you have questions, please call your primary care clinic.  If you do not have a primary care provider, please call [phone] and they will assist you.        Care EveryWhere ID     This is your Care EveryWhere ID. This could be used by other organizations to access your [place] medical records  [id number]        Your Vitals Were     Pulse Temperature Respirations Pulse Oximetry BMI (Body Mass Index)       71 98.7  F (37.1  C) (Oral) 18 99% 25.25 kg/m2        Blood Pressure from Last 3 Encounters:   09/20/17 104/79   09/13/17 (!) 135/97   08/31/17 126/64    Weight from Last 3 Encounters:   09/20/17 91.6 kg (202 lb)   09/13/17 92.3 kg (203 lb 6.4 oz)   08/31/17 91.2 kg (201 lb)              We Performed the Following     CBC with platelets differential     Comprehensive metabolic panel     Lactate Dehydrogenase     PSA tumor marker     Testosterone total        Primary Care Provider Office Phone # Fax #    Avery Duke [phone]       [address] [facility] [address]        Equal Access to Services     MARYANN DOMINGUEZ : Hadisela Solis, philip waller, tahir jordan. So [facility] [phone].    ATENCIÓN: Si habla español, tiene a chacon disposición servicios gratuitos de asistencia  lingüística. Slade al [phone].    We comply with applicable federal civil rights laws and [place] laws. We do not discriminate on the basis of race, color, national origin, age, disability sex, sexual orientation or gender identity.            Thank you!     Thank you for choosing [facility]  for your care. Our goal is always to provide you with excellent care. Hearing back from our patients is one way we can continue to improve our services. Please take a few minutes to complete the written survey that you may receive in the mail after your visit with us. Thank you!             Your Updated Medication List - Protect others around you: Learn how to safely use, store and throw away your medicines at www.disposemymeds.org.          This list is accurate as of: 9/20/17  4:23 PM.  Always use your most recent med list.                   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)       lisinopril 20 MG tablet    PRINIVIL/ZESTRIL    90 tablet    Take 1 tablet (20 mg) by mouth daily TAKE 1 TABLET(10 MG) BY MOUTH DAILY    Hypertension goal BP (blood pressure) < 140/90       metoprolol 200 MG 24 hr tablet    TOPROL-XL    90 tablet    Take 1 tablet (200 mg) by mouth daily    Hypertension goal BP (blood pressure) < 140/90       nicotine polacrilex 2 MG gum    NICORELIEF    30 tablet    Place 1 each (2 mg) inside cheek as needed for smoking cessation Reported on 3/9/2017    Encounter for smoking cessation counseling       order for DME     1 each    Please dispense blood  pressure machine and cuff.    Hypertension goal BP (blood pressure) < 140/90       oxyCODONE 10 MG IR tablet    ROXICODONE    90 tablet    Take 1 tablet (10 mg) by mouth every 6 hours as needed for moderate to severe pain 3 per day    RSD (reflex sympathetic dystrophy)       polyethylene glycol powder    MIRALAX    510 g    Take 17 g by mouth as needed for constipation    Slow transit constipation       rivaroxaban ANTICOAGULANT 20 MG Tabs tablet    XARELTO    90 tablet    Take 1 tablet (20 mg) by mouth daily (with dinner)    Chronic atrial fibrillation (H)       sennosides 8.6 MG tablet    SENOKOT    120 tablet    Take 2 tablets by mouth 2 times daily    Slow transit constipation       tadalafil 2.5 MG tablet    CIALIS    90 tablet    Take 2.5 mg by mouth daily Never use with nitroglycerin, terazosin or doxazosin.    Drug-induced erectile dysfunction       triamcinolone 0.1 % cream    KENALOG    80 g    Take 1 apply by topical route two times a day as needed    Chronic dermatitis       UNABLE TO FIND      MEDICATION NAME: Hydrocortisone 0.5 % topical cream-Take 1 application by topical route as needed.

## 2017-09-21 NOTE — PROGRESS NOTES
Clinic Care Coordination Contact  [facility]/Voicemail    Referral Source: [employer] Outreach  Clinical Data: Care Coordinator Outreach  Outreach attempted x 3.  Left message on voicemail with call back information and requested return call.  Plan: Care Coordinator mailed out care coordination introduction letter on 6-6-17. Care Coordinator will outreach again in 4 weeks.  The patient did attend the 8-31-17 appointment as was scheduled.  The patient has transportation with the insurance company so there should not be a barrier there.  The patient has a broken scooter per that visit and therefore is needing to walk most of the time.  The Care Coordination RN will make a follow up call to the patient again in 4 weeks.    Darin Yan, MSN, RN, PHN  [facility] Care Coordinator  [facility]  Phone: [phone]  [email or web address]

## 2017-09-22 LAB — TESTOST SERPL-MCNC: 474 NG/DL (ref 240–950)

## 2017-09-22 NOTE — PROGRESS NOTES
"Clinic Care Coordination Contact  Care Team Conversations    The patient called today and stated that he was doing fine.  He is getting a new PCA.  The other concern that the patient had was that his bus card did not work.  He will be contacting the [employer] worker to have that fixed.  The patient would not go into much detail although he did state that he was doing fine.  Very difficult to direct the call away from the \"immediate\" needs of the patient in his eyes.      The Care Coordination RN will contact the patient again in 4 weeks for follow up.    Darin Yan, MSN, RN, PHN  N Clinic Care Coordinator  [facility]  Phone: [phone]  [email or web address]        "

## 2017-10-05 DIAGNOSIS — G90.50 RSD (REFLEX SYMPATHETIC DYSTROPHY): ICD-10-CM

## 2017-10-05 NOTE — TELEPHONE ENCOUNTER
Reason for Call:  Medication or medication refill:    Name of the pharmacy and phone number for the current request:  Hard Copy    Name of the medication requested: Oxycodone    Other request: Please call when completed.    Can we leave a detailed message on this number? YES    Phone number patient can be reached at: Home number on file [phone] (home)    Best Time: anytime    Call taken on 10/5/2017 at 5:39 PM by Nilam Kemp

## 2017-10-05 NOTE — TELEPHONE ENCOUNTER
Last Rx was 90 tabs on 8/31/17.  Last seen by PCP 8/31/17, unsure of follow up plan, has been seen by cardiology and had some imaging since then.    Routing refill request to provider for review/approval because:  Drug not on the [facility] refill protocol   Janet Tucker, RN  [facility]

## 2017-10-06 RX ORDER — OXYCODONE HYDROCHLORIDE 10 MG/1
10 TABLET ORAL EVERY 6 HOURS PRN
Qty: 90 TABLET | Refills: 0 | Status: SHIPPED | OUTPATIENT
Start: 2017-10-06 | End: 2017-11-08

## 2017-10-06 NOTE — TELEPHONE ENCOUNTER
Patient calling to get up date on medication process for refill, patient was notified refills take 48 to 72 business hours to complete. Patient states he is completely out of this medication.    .Charley Rodriguez  Patient Representative

## 2017-10-13 ENCOUNTER — TELEPHONE (OUTPATIENT)
Dept: FAMILY MEDICINE | Facility: CLINIC | Age: 62
End: 2017-10-13

## 2017-10-13 NOTE — TELEPHONE ENCOUNTER
Reason for Call:  Other     Detailed comments: Dacia lynch is the patients daughter and she needs some FMLA papers filled out by the TC for PCP, she will be faxing the forms today 10/13/17 please call  Dacia with any questions     Phone Number Patient can be reached at: Other phone number:  [phone]    Best Time: any    Can we leave a detailed message on this number? YES    Call taken on 10/13/2017 at 1:37 PM by Luann Mercado

## 2017-10-17 ENCOUNTER — CARE COORDINATION (OUTPATIENT)
Dept: CARDIOLOGY | Facility: CLINIC | Age: 62
End: 2017-10-17

## 2017-10-17 NOTE — PROGRESS NOTES
Patient was last seen by Dr. Brown in September.  His lisinopril was increased and it was recommended he get a BP cuff for home monitoring and call us with his BP's.  We have not heard back from him as yet.  Attempted to leave a message on his phone, but phone would not take the message, it was cut off.

## 2017-10-17 NOTE — TELEPHONE ENCOUNTER
Forms received from: Dacia-daughter   Phone number listed: [phone]   Fax listed: [phone]  Date received: 10-17-17  Form description: FMLA  Once forms are completed, please return to Dacia, [phone] and mail.  Is patient requesting to be contacted when forms are completed: n/a  Form placed: provider richard Merida

## 2017-10-19 ENCOUNTER — CARE COORDINATION (OUTPATIENT)
Dept: CARE COORDINATION | Facility: CLINIC | Age: 62
End: 2017-10-19

## 2017-10-19 NOTE — PROGRESS NOTES
Clinic Care Coordination Contact  Care Team Conversations    The Care Coordination RN spoke with the patient.  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  The patient is concerned for a sore opening in this area and requested seeing the PCP.  The Care Coordination RN assisted the patient in making an appointment for Tuesday 10-24-17 at 8:20 am.  In completing the chart review it is noted that the patient was to be monitoring his B/P and when asked he has not been monitoring it.  The patient did state that his CAD[employer] worker ordered a B/P cuff for him today and it will be delivered to his house.  The Care Coordination RN instructed the patient that if he receives it prior to the appointment on Tuesday to bring it along and someone can go through the teaching with him and answer any questions that he may have.  The patient agreed to this plan.  The patient has no other questions or concerns at this time.  Reminded of the appointment on Tuesday prior to ending the call.    Plan:  1).  The patient will get the blood pressure cuff and begin recording the value of a daily reading.  2).   The patient will make and attend all follow up appointments with the PCP and the specialists.   3).   The patient will continue to work on his nutritional meals.  4).  The Care Coordination RN will make a follow up call to the patient again in 3-4 weeks.  5).   Care Coordination to remain available for the patient to contact in the event of future needs.      Darin Yan, MSN, RN, PHN  [facility] Care Coordinator  [facility]  Phone: [phone]  [email or web address]

## 2017-10-30 ENCOUNTER — CARE COORDINATION (OUTPATIENT)
Dept: CARDIOLOGY | Facility: CLINIC | Age: 62
End: 2017-10-30

## 2017-10-30 NOTE — PROGRESS NOTES
Patient called to follow up in regards to blood pressures after Lisinopril increased at last visit.  Constantino states that he has not gotten his blood pressure cuff yet.  He took the prescription for a blood pressure cuff and monitor to a [facility] but they would not accept it.  He states that he faxed it to his  and was told that it would be mailed to him.  He has not checked his mail yet.  He notes that his headaches have improved with the increase in the Lisinopril, not gone completely.  Asked patient to check his mail and follow up with his  if he has not received his blood pressure cuff.  Also informed patient that we could fax the prescription for the monitor to a DME as well.  Constantino states that he understands information provided and will call back with any further questions or concerns.    Juan Nelson, RN  RN Care Coordinator  [facility]  [phone]

## 2017-11-07 ENCOUNTER — TRANSFERRED RECORDS (OUTPATIENT)
Dept: HEALTH INFORMATION MANAGEMENT | Facility: CLINIC | Age: 62
End: 2017-11-07

## 2017-11-08 ENCOUNTER — TELEPHONE (OUTPATIENT)
Dept: FAMILY MEDICINE | Facility: CLINIC | Age: 62
End: 2017-11-08

## 2017-11-08 DIAGNOSIS — G90.50 RSD (REFLEX SYMPATHETIC DYSTROPHY): ICD-10-CM

## 2017-11-08 RX ORDER — OXYCODONE HYDROCHLORIDE 10 MG/1
10 TABLET ORAL EVERY 6 HOURS PRN
Qty: 90 TABLET | Refills: 0 | Status: SHIPPED | OUTPATIENT
Start: 2017-11-08 | End: 2017-12-06

## 2017-11-08 NOTE — TELEPHONE ENCOUNTER
oxycodone      Last Written Prescription Date: 10/6/17  Last Fill Quantity: 90,  # refills: 0   Last Office Visit with [facility], P or [facility] prescribing provider: 8/31/17    Routing refill request to provider for review/approval because:  Drug not on the [facility] refill protocol     Janet Tucker RN  [facility]

## 2017-11-08 NOTE — TELEPHONE ENCOUNTER
Patient picked up his script at the  on 11/8/17.    Izabela ALY  Patient Representative  [place]

## 2017-11-30 ENCOUNTER — CARE COORDINATION (OUTPATIENT)
Dept: CARE COORDINATION | Facility: CLINIC | Age: 62
End: 2017-11-30

## 2017-11-30 NOTE — PROGRESS NOTES
Clinic Care Coordination Contact  Care Team Conversations    The patient has a care coordinator with [employer] as well as CADI worker, ILS worker, and PCA services.  Therefore the clinic Care Coordination RN will no longer contact the patient.      Darin Yan, MSN, RN, PHN  [facility] Care Coordinator  [facility]  Phone: [phone]  [email or web address]

## 2017-12-06 ENCOUNTER — TELEPHONE (OUTPATIENT)
Dept: FAMILY MEDICINE | Facility: CLINIC | Age: 62
End: 2017-12-06

## 2017-12-06 DIAGNOSIS — G90.50 RSD (REFLEX SYMPATHETIC DYSTROPHY): ICD-10-CM

## 2017-12-06 NOTE — TELEPHONE ENCOUNTER
Reason for Call:  Other prescription    Detailed comments: needs refill of oxyCODONE IR (ROXICODONE) 10 MG tablet please call when ready to be picked up at      Phone Number Patient can be reached at: Home number on file [phone] (home)    Best Time: any    Can we leave a detailed message on this number? YES    Call taken on 12/6/2017 at 2:50 PM by Luann Mercado

## 2017-12-07 RX ORDER — OXYCODONE HYDROCHLORIDE 10 MG/1
10 TABLET ORAL EVERY 6 HOURS PRN
Qty: 90 TABLET | Refills: 0 | Status: SHIPPED | OUTPATIENT
Start: 2017-12-07 | End: 2018-01-10

## 2017-12-08 ENCOUNTER — TRANSFERRED RECORDS (OUTPATIENT)
Dept: HEALTH INFORMATION MANAGEMENT | Facility: CLINIC | Age: 62
End: 2017-12-08

## 2017-12-08 NOTE — TELEPHONE ENCOUNTER
Prescription of oxyCODONE IR (ROXICODONE) 10 MG tablet was brought to the  and patient informed to .  Patient is scheduled with PCP on 12-14-17, reminded of appointment.

## 2018-01-10 DIAGNOSIS — G90.50 RSD (REFLEX SYMPATHETIC DYSTROPHY): ICD-10-CM

## 2018-01-10 RX ORDER — OXYCODONE HYDROCHLORIDE 10 MG/1
10 TABLET ORAL EVERY 6 HOURS PRN
Qty: 90 TABLET | Refills: 0 | Status: SHIPPED | OUTPATIENT
Start: 2018-01-10 | End: 2018-02-14

## 2018-01-10 NOTE — TELEPHONE ENCOUNTER
Prescription of oxyCODONE IR (ROXICODONE) 10 MG tablet was brought to the  and patient informed to .  Reminded patient of appointment scheduled with PCP on 1-12-18.

## 2018-01-10 NOTE — TELEPHONE ENCOUNTER
Requested Prescriptions   Pending Prescriptions Disp Refills     oxyCODONE IR (ROXICODONE) 10 MG tablet 90 tablet 0     Sig: Take 1 tablet (10 mg) by mouth every 6 hours as needed for moderate to severe pain 3 per day    There is no refill protocol information for this order         Last Written Prescription Date:  12-7-17  Last Fill Quantity: 90,   # refills: 0  Last Office Visit: 8-31-17  Future Office visit:    Next 5 appointments (look out 90 days)     Jan 12, 2018  8:20 AM CST   SHORT with Avery Duke MD   [facility] ([facility]    [address] [place] [id number] [phone]                   Routing refill request to provider for review/approval because:  Drug not on the [facility], [facility] or [facility] refill protocol or controlled substance

## 2018-01-12 ENCOUNTER — OFFICE VISIT (OUTPATIENT)
Dept: FAMILY MEDICINE | Facility: CLINIC | Age: 63
End: 2018-01-12
Payer: COMMERCIAL

## 2018-01-12 VITALS
SYSTOLIC BLOOD PRESSURE: 121 MMHG | WEIGHT: 205 LBS | DIASTOLIC BLOOD PRESSURE: 78 MMHG | TEMPERATURE: 96.6 F | OXYGEN SATURATION: 100 % | HEART RATE: 66 BPM | BODY MASS INDEX: 25.62 KG/M2

## 2018-01-12 DIAGNOSIS — I10 HYPERTENSION GOAL BP (BLOOD PRESSURE) < 140/90: ICD-10-CM

## 2018-01-12 DIAGNOSIS — I42.0 DILATED CARDIOMYOPATHY (H): ICD-10-CM

## 2018-01-12 DIAGNOSIS — G89.4 CHRONIC PAIN SYNDROME: ICD-10-CM

## 2018-01-12 DIAGNOSIS — E78.5 HYPERLIPIDEMIA LDL GOAL <100: Primary | ICD-10-CM

## 2018-01-12 DIAGNOSIS — N52.2 DRUG-INDUCED ERECTILE DYSFUNCTION: ICD-10-CM

## 2018-01-12 DIAGNOSIS — I48.20 CHRONIC ATRIAL FIBRILLATION (H): ICD-10-CM

## 2018-01-12 DIAGNOSIS — I63.19 CEREBRAL INFARCTION DUE TO EMBOLISM OF OTHER PRECEREBRAL ARTERY (H): ICD-10-CM

## 2018-01-12 DIAGNOSIS — I48.91 ATRIAL FIBRILLATION WITH RVR (H): ICD-10-CM

## 2018-01-12 LAB
ALT SERPL W P-5'-P-CCNC: 29 U/L (ref 0–70)
ANION GAP SERPL CALCULATED.3IONS-SCNC: 7 MMOL/L (ref 3–14)
BUN SERPL-MCNC: 17 MG/DL (ref 7–30)
CALCIUM SERPL-MCNC: 9.2 MG/DL (ref 8.5–10.1)
CHLORIDE SERPL-SCNC: 104 MMOL/L (ref 94–109)
CHOLEST SERPL-MCNC: 96 MG/DL
CO2 SERPL-SCNC: 27 MMOL/L (ref 20–32)
CREAT SERPL-MCNC: 1.64 MG/DL (ref 0.66–1.25)
GFR SERPL CREATININE-BSD FRML MDRD: 43 ML/MIN/1.7M2
GLUCOSE SERPL-MCNC: 115 MG/DL (ref 70–99)
HDLC SERPL-MCNC: 39 MG/DL
LDLC SERPL CALC-MCNC: 42 MG/DL
NONHDLC SERPL-MCNC: 57 MG/DL
POTASSIUM SERPL-SCNC: 4 MMOL/L (ref 3.4–5.3)
SODIUM SERPL-SCNC: 138 MMOL/L (ref 133–144)
TRIGL SERPL-MCNC: 74 MG/DL

## 2018-01-12 PROCEDURE — 99214 OFFICE O/P EST MOD 30 MIN: CPT | Performed by: INTERNAL MEDICINE

## 2018-01-12 PROCEDURE — 84460 ALANINE AMINO (ALT) (SGPT): CPT | Performed by: INTERNAL MEDICINE

## 2018-01-12 PROCEDURE — 80061 LIPID PANEL: CPT | Performed by: INTERNAL MEDICINE

## 2018-01-12 PROCEDURE — 36415 COLL VENOUS BLD VENIPUNCTURE: CPT | Performed by: INTERNAL MEDICINE

## 2018-01-12 PROCEDURE — 80048 BASIC METABOLIC PNL TOTAL CA: CPT | Performed by: INTERNAL MEDICINE

## 2018-01-12 PROCEDURE — 99000 SPECIMEN HANDLING OFFICE-LAB: CPT | Performed by: INTERNAL MEDICINE

## 2018-01-12 PROCEDURE — 80307 DRUG TEST PRSMV CHEM ANLYZR: CPT | Mod: 90 | Performed by: INTERNAL MEDICINE

## 2018-01-12 RX ORDER — TADALAFIL 2.5 MG/1
2.5 TABLET ORAL DAILY
Qty: 90 TABLET | Refills: 1 | Status: SHIPPED | OUTPATIENT
Start: 2018-01-12 | End: 2019-03-07

## 2018-01-12 RX ORDER — ATORVASTATIN CALCIUM 40 MG/1
40 TABLET, FILM COATED ORAL AT BEDTIME
Qty: 90 TABLET | Refills: 4 | Status: SHIPPED | OUTPATIENT
Start: 2018-01-12 | End: 2019-03-07

## 2018-01-12 RX ORDER — METOPROLOL SUCCINATE 200 MG/1
200 TABLET, EXTENDED RELEASE ORAL DAILY
Qty: 90 TABLET | Refills: 4 | Status: SHIPPED | OUTPATIENT
Start: 2018-01-12 | End: 2019-03-07

## 2018-01-12 RX ORDER — LISINOPRIL 20 MG/1
20 TABLET ORAL DAILY
Qty: 90 TABLET | Refills: 3 | Status: SHIPPED | OUTPATIENT
Start: 2018-01-12 | End: 2018-05-09

## 2018-01-12 ASSESSMENT — ANXIETY QUESTIONNAIRES
3. WORRYING TOO MUCH ABOUT DIFFERENT THINGS: NOT AT ALL
7. FEELING AFRAID AS IF SOMETHING AWFUL MIGHT HAPPEN: NOT AT ALL
5. BEING SO RESTLESS THAT IT IS HARD TO SIT STILL: NOT AT ALL
6. BECOMING EASILY ANNOYED OR IRRITABLE: NOT AT ALL
IF YOU CHECKED OFF ANY PROBLEMS ON THIS QUESTIONNAIRE, HOW DIFFICULT HAVE THESE PROBLEMS MADE IT FOR YOU TO DO YOUR WORK, TAKE CARE OF THINGS AT HOME, OR GET ALONG WITH OTHER PEOPLE: NOT DIFFICULT AT ALL
1. FEELING NERVOUS, ANXIOUS, OR ON EDGE: NOT AT ALL
2. NOT BEING ABLE TO STOP OR CONTROL WORRYING: NOT AT ALL
GAD7 TOTAL SCORE: 0

## 2018-01-12 ASSESSMENT — PATIENT HEALTH QUESTIONNAIRE - PHQ9
5. POOR APPETITE OR OVEREATING: NOT AT ALL
SUM OF ALL RESPONSES TO PHQ QUESTIONS 1-9: 0

## 2018-01-12 NOTE — NURSING NOTE
"Chief Complaint   Patient presents with     Recheck Medication     Health Maintenance     Lipid, MAGDALENA, PHQ-9       Initial /78 (BP Location: Right arm, Patient Position: Chair, Cuff Size: Adult Regular)  Pulse 66  Temp 96.6  F (35.9  C) (Oral)  Wt 205 lb (93 kg)  SpO2 100%  BMI 25.62 kg/m2 Estimated body mass index is 25.62 kg/(m^2) as calculated from the following:    Height as of 9/13/17: 6' 3\" (1.905 m).    Weight as of this encounter: 205 lb (93 kg).  Medication Reconciliation: complete   Ericka See KENNY Ortiz      "

## 2018-01-12 NOTE — PROGRESS NOTES
SUBJECTIVE:   Constantino Taylor is a 62 year old male who presents to clinic today for the following health issues:      Medication Followup of all meds    Taking Medication as prescribed: yes    Side Effects:  None    Medication Helping Symptoms:  yes       Area of inclusion cyst.  8 years.   Left sided propsthesis      Pt would like refills on all his meds.        Problem list and histories reviewed & adjusted, as indicated.  Additional history: as documented    Patient Active Problem List   Diagnosis     Cerebral infarction (H)     Hepatitis C     Tobacco use disorder     Chronic low back pain     Acute pancreatitis     Hyperlipidemia LDL goal <70     Hypertension goal BP (blood pressure) < 140/90     Nonischemic dilated cardiomyopathy (HCC)     Malignant neoplasm of prostate (H)     Erectile dysfunction     Eczema     PAD (peripheral artery disease) (H)     S/P BKA (below knee amputation) unilateral (H)     Unilateral complete BKA, left, sequela (H)     Constipation     Encounter for counseling     Acute renal failure (H)     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2 - 3.6 oz/week     2 - 6 Cans of beer per week      Comment: couple of beers per episode, several times a week     Family History   Problem Relation Age of Onset     CANCER Mother      brain     CANCER Brother          Current Outpatient Prescriptions    Medication Sig Dispense Refill     lisinopril (PRINIVIL/ZESTRIL) 20 MG tablet Take 1 tablet (20 mg) by mouth daily TAKE 1 TABLET(10 MG) BY MOUTH DAILY 90 tablet 3     atorvastatin (LIPITOR) 40 MG tablet Take 1 tablet (40 mg) by mouth At Bedtime 90 tablet 4     metoprolol succinate (TOPROL-XL) 200 MG 24 hr tablet Take 1 tablet (200 mg) by mouth daily 90 tablet 4     aspirin 81 MG EC tablet Take 1 tablet (81 mg) by mouth daily 90 tablet 3     rivaroxaban ANTICOAGULANT (XARELTO) 20 MG TABS tablet Take 1 tablet (20 mg) by mouth daily (with dinner) 90 tablet 3     tadalafil (CIALIS) 2.5 MG tablet Take 1 tablet (2.5 mg) by mouth daily Never use with nitroglycerin, terazosin or doxazosin. 90 tablet 1     oxyCODONE IR (ROXICODONE) 10 MG tablet Take 1 tablet (10 mg) by mouth every 6 hours as needed for moderate to severe pain 3 per day 90 tablet 0     order for DME Please dispense blood pressure machine and cuff. 1 each 0     nicotine polacrilex (NICORELIEF) 2 MG gum Place 1 each (2 mg) inside cheek as needed for smoking cessation Reported on 3/9/2017 30 tablet 3     polyethylene glycol (MIRALAX) powder Take 17 g by mouth as needed for constipation 510 g 1     triamcinolone (KENALOG) 0.1 % cream Take 1 apply by topical route two times a day as needed 80 g 3     acetaminophen (TYLENOL) 500 MG tablet Take 500-1,000 mg by mouth every 6 hours as needed for mild pain       UNABLE TO FIND MEDICATION NAME: Hydrocortisone 0.5 % topical cream-Take 1 application by topical route as needed.       sennosides (SENOKOT) 8.6 MG tablet Take 2 tablets by mouth 2 times daily 120 tablet 3     [DISCONTINUED] lisinopril (PRINIVIL/ZESTRIL) 20 MG tablet Take 1 tablet (20 mg) by mouth daily TAKE 1 TABLET(10 MG) BY MOUTH DAILY 90 tablet 3     [DISCONTINUED] atorvastatin (LIPITOR) 40 MG tablet Take 1 tablet (40 mg) by mouth At Bedtime 90 tablet 4     [DISCONTINUED] metoprolol (TOPROL-XL) 200 MG 24 hr tablet Take 1 tablet (200 mg) by mouth daily 90  tablet 4     [DISCONTINUED] rivaroxaban ANTICOAGULANT (XARELTO) 20 MG TABS tablet Take 1 tablet (20 mg) by mouth daily (with dinner) 90 tablet 1     [DISCONTINUED] Tadalafil (CIALIS) 2.5 MG TABS Take 2.5 mg by mouth daily Never use with nitroglycerin, terazosin or doxazosin. 90 tablet 1     No Known Allergies  Recent Labs   Lab Test  09/20/17   1432  09/13/17   0814   02/06/17   0855  10/17/16   1143  09/21/16   0932   05/10/15   1648   04/08/15   0720  11/25/14   1203   04/30/14   1021  01/06/14   1103  07/12/13   1109   11/15/12   0845   A1C   --    --    --    --    --    --    --    --    --    --   5.8   --    --   5.8  5.4   --    --    LDL   --    --    --    --   46   --    --    --    --   89   --    --   78   --    --    < >   --    HDL   --    --    --    --   41   --    --    --    --   40*   --    --   41   --    --    < >   --    TRIG   --    --    --    --   91   --    --    --    --   92   --    --   85   --    --    < >   --    ALT  23   --    --   20   --   24   < >   --    --   34   --    < >   --   69  37   --   52   CR  1.53*  1.48*   < >  1.40*  1.53*  1.30*   < >  1.90*   < >  1.37*  1.02   < >   --   0.86  1.08   < >  0.87   GFRESTIMATED  47*  49*   < >  52*  47*  57*   < >  37*   < >  53*  75   < >   --   >90  71   < >  >90   GFRESTBLACK  56*  59*   < >  63  57*  68   < >  44*   < >  65  >90   GFR Calc     < >   --   >90  86   < >  >90   POTASSIUM  4.3  3.9   < >  3.9  4.6  4.1   < >  5.0   < >  3.9  4.5   < >   --   4.5  4.1   < >  3.6   TSH   --    --    --    --    --    --    --   0.99   --    --    --    --    --    --    --    --   1.18    < > = values in this interval not displayed.            Reviewed and updated as needed this visit by clinical staffTobacco  Allergies  Meds  Med Hx  Surg Hx  Fam Hx  Soc Hx      Reviewed and updated as needed this visit by Provider         ROS:  Constitutional, HEENT, cardiovascular, pulmonary, gi and gu systems are negative,  except as otherwise noted.      OBJECTIVE:   /78 (BP Location: Right arm, Patient Position: Chair, Cuff Size: Adult Regular)  Pulse 66  Temp 96.6  F (35.9  C) (Oral)  Wt 205 lb (93 kg)  SpO2 100%  BMI 25.62 kg/m2  Body mass index is 25.62 kg/(m^2).  GENERAL: healthy, alert and no distress  EYES: Eyes grossly normal to inspection, PERRL and conjunctivae and sclerae normal  HENT: ear canals and TM's normal, nose and mouth without ulcers or lesions  NECK: no adenopathy, no asymmetry, masses, or scars and thyroid normal to palpation  RESP: lungs clear to auscultation - no rales, rhonchi or wheezes  CV: regular rate and rhythm, normal S1 S2, no S3 or S4, no murmur, click or rub, no peripheral edema and peripheral pulses strong  ABDOMEN: soft, nontender, no hepatosplenomegaly, no masses and bowel sounds normal  MS: no gross musculoskeletal defects noted, no edema  SKIN: no suspicious lesions or rashes  NEURO: Normal strength and tone, mentation intact and speech normal  BACK: no CVA tenderness, no paralumbar tenderness  PSYCH: mentation appears normal, affect normal/bright    Diagnostic Test Results:  Results for orders placed or performed in visit on 09/20/17   CBC with platelets differential   Result Value Ref Range    WBC 7.1 4.0 - 11.0 10e9/L    RBC Count 4.95 4.4 - 5.9 10e12/L    Hemoglobin 15.5 13.3 - 17.7 g/dL    Hematocrit 45.5 40.0 - 53.0 %    MCV 92 78 - 100 fl    MCH 31.3 26.5 - 33.0 pg    MCHC 34.1 31.5 - 36.5 g/dL    RDW 12.5 10.0 - 15.0 %    Platelet Count 213 150 - 450 10e9/L    Diff Method Automated Method     % Neutrophils 66.7 %    % Lymphocytes 19.9 %    % Monocytes 7.6 %    % Eosinophils 5.2 %    % Basophils 0.3 %    % Immature Granulocytes 0.3 %    Nucleated RBCs 0 0 /100    Absolute Neutrophil 4.7 1.6 - 8.3 10e9/L    Absolute Lymphocytes 1.4 0.8 - 5.3 10e9/L    Absolute Monocytes 0.5 0.0 - 1.3 10e9/L    Absolute Eosinophils 0.4 0.0 - 0.7 10e9/L    Absolute Basophils 0.0 0.0 - 0.2 10e9/L     Abs Immature Granulocytes 0.0 0 - 0.4 10e9/L    Absolute Nucleated RBC 0.0    Comprehensive metabolic panel   Result Value Ref Range    Sodium 139 133 - 144 mmol/L    Potassium 4.3 3.4 - 5.3 mmol/L    Chloride 106 94 - 109 mmol/L    Carbon Dioxide 29 20 - 32 mmol/L    Anion Gap 4 3 - 14 mmol/L    Glucose 84 70 - 99 mg/dL    Urea Nitrogen 18 7 - 30 mg/dL    Creatinine 1.53 (H) 0.66 - 1.25 mg/dL    GFR Estimate 47 (L) >60 mL/min/1.7m2    GFR Estimate If Black 56 (L) >60 mL/min/1.7m2    Calcium 9.2 8.5 - 10.1 mg/dL    Bilirubin Total 0.5 0.2 - 1.3 mg/dL    Albumin 3.6 3.4 - 5.0 g/dL    Protein Total 8.4 6.8 - 8.8 g/dL    Alkaline Phosphatase 80 40 - 150 U/L    ALT 23 0 - 70 U/L    AST 14 0 - 45 U/L   Lactate Dehydrogenase   Result Value Ref Range    Lactate Dehydrogenase 119 85 - 227 U/L   PSA tumor marker   Result Value Ref Range    PSA 4.62 (H) 0 - 4 ug/L   Testosterone total   Result Value Ref Range    Testosterone Total 474 240 - 950 ng/dL       ASSESSMENT/PLAN:       ICD-10-CM    1. Hyperlipidemia LDL goal <100 E78.5 Lipid panel reflex to direct LDL Fasting     ALT   2. Hypertension goal BP (blood pressure) < 140/90 I10 lisinopril (PRINIVIL/ZESTRIL) 20 MG tablet     metoprolol succinate (TOPROL-XL) 200 MG 24 hr tablet     Basic metabolic panel   3. 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 Atrial fibrillation with RVR (H) I48.91 aspirin 81 MG EC tablet   5. Dilated cardiomyopathy (H) I42.0 aspirin 81 MG EC tablet   6. Chronic atrial fibrillation (H) I48.2 rivaroxaban ANTICOAGULANT (XARELTO) 20 MG TABS tablet   7. Drug-induced erectile dysfunction N52.2 tadalafil (CIALIS) 2.5 MG tablet   8. Chronic pain syndrome G89.4 Drug  Screen Comprehensive, Urine w/o Reported Meds (Pain Care Package)             Avery Duke MD  [facility]

## 2018-01-12 NOTE — MR AVS SNAPSHOT
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  1st Floor  [place] [address] [phone]            Mar 14, 2018  8:00 AM CDT   (Arrive by 7:45 AM)   RETURN HEART FAILURE with Norah Brown MD   [facility] ([facility])    [address]  [address]  [place] [address] [phone]              Who to contact     If you have questions or need follow up information about today's clinic visit or your schedule please contact [facility] directly at [phone].  Normal or non-critical lab and imaging results will be communicated to you by MyChart, letter or phone within 4 business days after the clinic has received the results. If you do not hear from us within 7 days, please contact the clinic through MyChart or phone. If you have a critical or abnormal lab result, we will notify you by phone as soon as possible.  Submit refill requests through [employer] or call your pharmacy and they will forward the refill request to us. Please allow 3 business days for your refill to be completed.          Additional Information About Your  Visit        [employer] Information     [employer] gives you secure access to your electronic health record. If you see a primary care provider, you can also send messages to your care team and make appointments. If you have questions, please call your primary care clinic.  If you do not have a primary care provider, please call [phone] and they will assist you.        Care EveryWhere ID     This is your Care EveryWhere ID. This could be used by other organizations to access your [facility] medical records  [id number]        Your Vitals Were     Pulse Temperature Pulse Oximetry BMI (Body Mass Index)          66 96.6  F (35.9  C) (Oral) 100% 25.62 kg/m2         Blood Pressure from Last 3 Encounters:   01/12/18 121/78   09/20/17 104/79   09/13/17 (!) 135/97    Weight from Last 3 Encounters:   01/12/18 205 lb (93 kg)   09/20/17 202 lb (91.6 kg)   09/13/17 203 lb 6.4 oz (92.3 kg)              We Performed the Following     ALT     Basic metabolic panel     Drug  Screen Comprehensive, Urine w/o Reported Meds (Pain Care Package)     Lipid panel reflex to direct LDL Fasting          Where to get your medicines      These medications [facility] [address] [address] [facility] [address] [address]     Phone:  [phone]     aspirin 81 MG EC tablet    atorvastatin 40 MG tablet    lisinopril 20 MG tablet    metoprolol succinate 200 MG 24 hr tablet    rivaroxaban ANTICOAGULANT 20 MG Tabs tablet    tadalafil 2.5 MG tablet          Primary Care Provider Office Phone # Fax #    Avery Duke [phone]       [address] [facility] [address]        Equal Access to Services     MARYANN DOMINGUEZ AH: Hadii pravin Solis, waaxda luqadaha, qaybta kaalmada christieda, tahir nava. So [facility] [phone].    ATENCIÓN: Si habla español, tiene a chacon disposición servicios gratuitos de asistencia  lingüística. Slade al [phone].    We comply with applicable federal civil rights laws and [place] laws. We do not discriminate on the basis of race, color, national origin, age, disability, sex, sexual orientation, or gender identity.            Thank you!     Thank you for choosing [facility]  for your care. Our goal is always to provide you with excellent care. Hearing back from our patients is one way we can continue to improve our services. Please take a few minutes to complete the written survey that you may receive in the mail after your visit with us. Thank you!             Your Updated Medication List - Protect others around you: Learn how to safely use, store and throw away your medicines at www.disposemymeds.org.          This list is accurate as of: 1/12/18  9:03 AM.  Always use your most recent med list.                   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)       lisinopril 20 MG tablet    PRINIVIL/ZESTRIL    90 tablet    Take 1 tablet (20 mg) by mouth daily TAKE 1 TABLET(10 MG) BY MOUTH DAILY    Hypertension goal BP (blood pressure) < 140/90       metoprolol succinate 200 MG 24 hr tablet    TOPROL-XL    90 tablet    Take 1 tablet (200 mg) by mouth daily    Hypertension goal BP (blood pressure) < 140/90       nicotine polacrilex 2 MG gum    NICORELIEF    30 tablet    Place 1 each (2 mg) inside cheek as needed for smoking cessation Reported on 3/9/2017    Encounter for smoking cessation counseling       order for DME     1 each    Please  dispense blood pressure machine and cuff.    Hypertension goal BP (blood pressure) < 140/90       oxyCODONE IR 10 MG tablet    ROXICODONE    90 tablet    Take 1 tablet (10 mg) by mouth every 6 hours as needed for moderate to severe pain 3 per day    RSD (reflex sympathetic dystrophy)       polyethylene glycol powder    MIRALAX    510 g    Take 17 g by mouth as needed for constipation    Slow transit constipation       rivaroxaban ANTICOAGULANT 20 MG Tabs tablet    XARELTO    90 tablet    Take 1 tablet (20 mg) by mouth daily (with dinner)    Chronic atrial fibrillation (H)       sennosides 8.6 MG tablet    SENOKOT    120 tablet    Take 2 tablets by mouth 2 times daily    Slow transit constipation       tadalafil 2.5 MG tablet    CIALIS    90 tablet    Take 1 tablet (2.5 mg) by mouth daily Never use with nitroglycerin, terazosin or doxazosin.    Drug-induced erectile dysfunction       triamcinolone 0.1 % cream    KENALOG    80 g    Take 1 apply by topical route two times a day as needed    Chronic dermatitis       UNABLE TO FIND      MEDICATION NAME: Hydrocortisone 0.5 % topical cream-Take 1 application by topical route as needed.

## 2018-01-13 ASSESSMENT — ANXIETY QUESTIONNAIRES: GAD7 TOTAL SCORE: 0

## 2018-01-18 LAB — COMPREHEN DRUG ANALYSIS UR: NORMAL

## 2018-01-25 ENCOUNTER — OFFICE VISIT (OUTPATIENT)
Dept: FAMILY MEDICINE | Facility: CLINIC | Age: 63
End: 2018-01-25
Payer: COMMERCIAL

## 2018-01-25 VITALS
DIASTOLIC BLOOD PRESSURE: 103 MMHG | SYSTOLIC BLOOD PRESSURE: 154 MMHG | TEMPERATURE: 97.8 F | OXYGEN SATURATION: 98 % | HEART RATE: 127 BPM

## 2018-01-25 DIAGNOSIS — Z89.512 HX OF BKA, LEFT (H): Primary | ICD-10-CM

## 2018-01-25 DIAGNOSIS — Z71.6 ENCOUNTER FOR SMOKING CESSATION COUNSELING: ICD-10-CM

## 2018-01-25 PROCEDURE — 99213 OFFICE O/P EST LOW 20 MIN: CPT | Performed by: INTERNAL MEDICINE

## 2018-01-25 RX ORDER — VARENICLINE TARTRATE 1 MG/1
1 TABLET, FILM COATED ORAL 2 TIMES DAILY
Qty: 56 TABLET | Refills: 2 | Status: SHIPPED | OUTPATIENT
Start: 2018-01-25 | End: 2019-03-07

## 2018-01-25 ASSESSMENT — PAIN SCALES - GENERAL: PAINLEVEL: MODERATE PAIN (5)

## 2018-01-25 NOTE — NURSING NOTE
"Chief Complaint   Patient presents with     Forms       Initial BP (!) 154/103 (BP Location: Right arm, Patient Position: Chair, Cuff Size: Adult Regular)  Pulse 127  Temp 97.8  F (36.6  C) (Oral)  SpO2 98% Estimated body mass index is 25.62 kg/(m^2) as calculated from the following:    Height as of 9/13/17: 6' 3\" (1.905 m).    Weight as of 1/12/18: 205 lb (93 kg).  Medication Reconciliation: complete   Rosa Conti MA      "

## 2018-01-25 NOTE — MR AVS SNAPSHOT
After Visit Summary   1/25/2018    Constantino Taylor    MRN: [id number]           Patient Information     Date Of Birth          1955        Visit Information        Provider Department      1/25/2018 12:20 PM Avery Duke MD [facility]        Today's Diagnoses     Hx of BKA, left (H)    -  1    Encounter for smoking cessation counseling          Care Instructions    1.  Take script to medical supply store ( [facility])              Follow-ups after your visit        Your next 10 appointments already scheduled     Mar 14, 2018  7:30 AM CDT   Lab with  LAB   [facility] ([facility])    [address]  1st Floor  [facility] [id number] [phone]            Mar 14, 2018  8:00 AM CDT   (Arrive by 7:45 AM)   RETURN HEART FAILURE with Norah Brown MD   [facility] ([facility])    [address]  [address]  [place] [facility] [place] [id number] [phone]              Who to contact     If you have questions or need follow up information about today's clinic visit or your schedule please contact [facility] directly at [phone].  Normal or non-critical lab and imaging results will be communicated to you by MyChart, letter or phone within 4 business days after the clinic has received the results. If you do not hear from us within 7 days, please contact the clinic through MyChart or phone. If you have a critical or abnormal lab result, we will notify you by phone as soon as possible.  Submit refill requests through [employer] or call your pharmacy and they will forward the refill request to us. Please allow 3 business days for your refill to be completed.          Additional Information About Your Visit        MyChart Information     [employer] gives you secure access to your electronic health record. If you see a primary care provider, you can also  send messages to your care team and make appointments. If you have questions, please call your primary care clinic.  If you do not have a primary care provider, please call [phone] and they will assist you.        Care EveryWhere ID     This is your Care EveryWhere ID. This could be used by other organizations to access your [place] medical records  [id number]        Your Vitals Were     Pulse Temperature Pulse Oximetry             127 97.8  F (36.6  C) (Oral) 98%          Blood Pressure from Last 3 Encounters:   01/25/18 (!) 154/103   01/12/18 121/78   09/20/17 104/79    Weight from Last 3 Encounters:   01/12/18 205 lb (93 kg)   09/20/17 202 lb (91.6 kg)   09/13/17 203 lb 6.4 oz (92.3 kg)              Today, you had the following     No orders found for display         Today's Medication Changes          These changes are accurate as of 1/25/18 12:26 PM.  If you have any questions, ask your nurse or doctor.               Start taking these medicines.        Dose/Directions    varenicline 1 MG tablet   Commonly known as:  CHANTIX   Used for:  Hx of BKA, left (H), Encounter for smoking cessation counseling   Started by:  Avery Duke MD        Dose:  1 mg   Take 1 tablet (1 mg) by mouth 2 times daily   Quantity:  56 tablet   Refills:  2         These medicines have changed or have updated prescriptions.        Dose/Directions    * order for DME   This may have changed:  Another medication with the same name was added. Make sure you understand how and when to take each.   Used for:  Hypertension goal BP (blood pressure) < 140/90   Changed by:  Avery Duke MD        Please dispense blood pressure machine and cuff.   Quantity:  1 each   Refills:  0       * order for DME   This may have changed:  You were already taking a medication with the same name, and this prescription was added. Make sure you understand how and when to take each.   Used for:  Hx of BKA, left (H)   Changed by:  Avery Duke  "MD        Equipment being ordered: self adhesive bandage 4\" by 8\"   Quantity:  30 each   Refills:  11       * Notice:  This list has 2 medication(s) that are the same as other medications prescribed for you. Read the directions carefully, and ask your doctor or other care provider to review them with you.         Where to get your medicines      These medications were sent to [facility] [id number] [id number] [facility] AT SEC OF [facility] & [place]  [address] [address]     Phone:  [phone]     varenicline 1 MG tablet         Some of these will need a paper prescription and others can be bought over the counter.  Ask your nurse if you have questions.     Bring a paper prescription for each of these medications     order for DME                Primary Care Provider Office Phone # Fax #    Avery Duke [phone]       [address] [facility] [address]        Equal Access to Services     PARKER DOMINGUEZ AH: Hadii pravin ku hadasho Soomaali, waaxda luqadaha, qaybta kaalmada adeegyada, waxay idiin haylettyn marija [place] . So [facility] [phone].    ATENCIÓN: Si habla español, tiene a chacon disposición servicios gratuitos de asistencia lingüística. Llame al [phone].    We comply with applicable federal civil rights laws and [place] laws. We do not discriminate on the basis of race, color, national origin, age, disability, sex, sexual orientation, or gender identity.            Thank you!     Thank you for choosing [facility]  for your care. Our goal is always to provide you with excellent care. Hearing back from our patients is one way we can continue to improve our services. Please take a few minutes to complete the written survey that you may receive in the mail after your visit with us. Thank you!             Your Updated Medication List - Protect others around you: Learn how to safely use, store and throw away your " "medicines at www.disposemymeds.org.          This list is accurate as of 1/25/18 12:26 PM.  Always use your most recent med list.                   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)       lisinopril 20 MG tablet    PRINIVIL/ZESTRIL    90 tablet    Take 1 tablet (20 mg) by mouth daily TAKE 1 TABLET(10 MG) BY MOUTH DAILY    Hypertension goal BP (blood pressure) < 140/90       metoprolol succinate 200 MG 24 hr tablet    TOPROL-XL    90 tablet    Take 1 tablet (200 mg) by mouth daily    Hypertension goal BP (blood pressure) < 140/90       nicotine polacrilex 2 MG gum    NICORELIEF    30 tablet    Place 1 each (2 mg) inside cheek as needed for smoking cessation Reported on 3/9/2017    Encounter for smoking cessation counseling       * order for DME     1 each    Please dispense blood pressure machine and cuff.    Hypertension goal BP (blood pressure) < 140/90       * order for DME     30 each    Equipment being ordered: self adhesive bandage 4\" by 8\"    Hx of BKA, left (H)       oxyCODONE IR 10 MG tablet    ROXICODONE    90 tablet    Take 1 tablet (10 mg) by mouth every 6 hours as needed for moderate to severe pain 3 per day    RSD (reflex sympathetic dystrophy)       polyethylene glycol powder    MIRALAX    510 g    Take 17 g by mouth as needed for constipation    Slow transit constipation       rivaroxaban ANTICOAGULANT 20 MG Tabs tablet    XARELTO    90 tablet    Take 1 tablet (20 mg) by mouth daily (with dinner)    Chronic atrial fibrillation (H)       sennosides 8.6 MG tablet "    SENOKOT    120 tablet    Take 2 tablets by mouth 2 times daily    Slow transit constipation       tadalafil 2.5 MG tablet    CIALIS    90 tablet    Take 1 tablet (2.5 mg) by mouth daily Never use with nitroglycerin, terazosin or doxazosin.    Drug-induced erectile dysfunction       triamcinolone 0.1 % cream    KENALOG    80 g    Take 1 apply by topical route two times a day as needed    Chronic dermatitis       UNABLE TO FIND      MEDICATION NAME: Hydrocortisone 0.5 % topical cream-Take 1 application by topical route as needed.        varenicline 1 MG tablet    CHANTIX    56 tablet    Take 1 tablet (1 mg) by mouth 2 times daily    Hx of MARIO, left (H), Encounter for smoking cessation counseling       * Notice:  This list has 2 medication(s) that are the same as other medications prescribed for you. Read the directions carefully, and ask your doctor or other care provider to review them with you.

## 2018-01-25 NOTE — PROGRESS NOTES
SUBJECTIVE:   Constantino Taylor is a 62 year old male who presents to clinic today for the following health issues:    Needs paperwork fill out    Completed    Patient with need for new bandage on left knee prosthesis to keep from rubbing          Problem list and histories reviewed & adjusted, as indicated.  Additional history: as documented    Patient Active Problem List   Diagnosis     Cerebral infarction (H)     Hepatitis C     Tobacco use disorder     Chronic low back pain     Acute pancreatitis     Hyperlipidemia LDL goal <70     Hypertension goal BP (blood pressure) < 140/90     Nonischemic dilated cardiomyopathy (HCC)     Malignant neoplasm of prostate (H)     Erectile dysfunction     Eczema     PAD (peripheral artery disease) (H)     S/P BKA (below knee amputation) unilateral (H)     Unilateral complete BKA, left, sequela (H)     Constipation     Encounter for counseling     Acute renal failure (H)     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2 - 3.6 oz/week     2 - 6 Cans of beer per week      Comment: couple of beers per episode, several times a week     Family History   Problem Relation Age of Onset     CANCER Mother      brain     CANCER Brother          Current Outpatient Prescriptions   Medication Sig Dispense Refill     varenicline (CHANTIX) 1 MG tablet Take 1 tablet (1 mg) by mouth 2 times daily 56 tablet 2      "order for DME Equipment being ordered: self adhesive bandage 4\" by 8\" 30 each 11     lisinopril (PRINIVIL/ZESTRIL) 20 MG tablet Take 1 tablet (20 mg) by mouth daily TAKE 1 TABLET(10 MG) BY MOUTH DAILY 90 tablet 3     atorvastatin (LIPITOR) 40 MG tablet Take 1 tablet (40 mg) by mouth At Bedtime 90 tablet 4     metoprolol succinate (TOPROL-XL) 200 MG 24 hr tablet Take 1 tablet (200 mg) by mouth daily 90 tablet 4     aspirin 81 MG EC tablet Take 1 tablet (81 mg) by mouth daily 90 tablet 3     rivaroxaban ANTICOAGULANT (XARELTO) 20 MG TABS tablet Take 1 tablet (20 mg) by mouth daily (with dinner) 90 tablet 3     tadalafil (CIALIS) 2.5 MG tablet Take 1 tablet (2.5 mg) by mouth daily Never use with nitroglycerin, terazosin or doxazosin. 90 tablet 1     oxyCODONE IR (ROXICODONE) 10 MG tablet Take 1 tablet (10 mg) by mouth every 6 hours as needed for moderate to severe pain 3 per day 90 tablet 0     order for DME Please dispense blood pressure machine and cuff. 1 each 0     nicotine polacrilex (NICORELIEF) 2 MG gum Place 1 each (2 mg) inside cheek as needed for smoking cessation Reported on 3/9/2017 30 tablet 3     polyethylene glycol (MIRALAX) powder Take 17 g by mouth as needed for constipation 510 g 1     triamcinolone (KENALOG) 0.1 % cream Take 1 apply by topical route two times a day as needed 80 g 3     acetaminophen (TYLENOL) 500 MG tablet Take 500-1,000 mg by mouth every 6 hours as needed for mild pain       UNABLE TO FIND MEDICATION NAME: Hydrocortisone 0.5 % topical cream-Take 1 application by topical route as needed.       sennosides (SENOKOT) 8.6 MG tablet Take 2 tablets by mouth 2 times daily 120 tablet 3     No Known Allergies    Reviewed and updated as needed this visit by clinical staff       Reviewed and updated as needed this visit by Provider         ROS:  Constitutional, HEENT, cardiovascular, pulmonary, gi and gu systems are negative, except as otherwise noted.    OBJECTIVE:     BP (!) 154/103 (BP " Location: Right arm, Patient Position: Chair, Cuff Size: Adult Regular)  Pulse 127  Temp 97.8  F (36.6  C) (Oral)  SpO2 98%  There is no height or weight on file to calculate BMI.  GENERAL: healthy, alert and no distress  EYES: Eyes grossly normal to inspection, PERRL and conjunctivae and sclerae normal  NECK: no adenopathy, no asymmetry, masses, or scars and thyroid normal to palpation  RESP: lungs clear to auscultation - no rales, rhonchi or wheezes  CV: regular rate and rhythm, normal S1 S2, no S3 or S4, no murmur, click or rub, no peripheral edema and peripheral pulses strong  ABDOMEN: soft, nontender, no hepatosplenomegaly, no masses and bowel sounds normal  MS: no gross musculoskeletal defects noted, no edema    Diagnostic Test Results:  Results for orders placed or performed in visit on 01/12/18   Basic metabolic panel   Result Value Ref Range    Sodium 138 133 - 144 mmol/L    Potassium 4.0 3.4 - 5.3 mmol/L    Chloride 104 94 - 109 mmol/L    Carbon Dioxide 27 20 - 32 mmol/L    Anion Gap 7 3 - 14 mmol/L    Glucose 115 (H) 70 - 99 mg/dL    Urea Nitrogen 17 7 - 30 mg/dL    Creatinine 1.64 (H) 0.66 - 1.25 mg/dL    GFR Estimate 43 (L) >60 mL/min/1.7m2    GFR Estimate If Black 52 (L) >60 mL/min/1.7m2    Calcium 9.2 8.5 - 10.1 mg/dL   Lipid panel reflex to direct LDL Fasting   Result Value Ref Range    Cholesterol 96 <200 mg/dL    Triglycerides 74 <150 mg/dL    HDL Cholesterol 39 (L) >39 mg/dL    LDL Cholesterol Calculated 42 <100 mg/dL    Non HDL Cholesterol 57 <130 mg/dL   Drug  Screen Comprehensive, Urine w/o Reported Meds (Pain Care Package)   Result Value Ref Range    Comprehen Drug Analysis UR FINAL    ALT   Result Value Ref Range    ALT 29 0 - 70 U/L       ASSESSMENT/PLAN:       ICD-10-CM    1. Hx of BKA, left (H) Z89.512 varenicline (CHANTIX) 1 MG tablet     order for DME   2. Encounter for smoking cessation counseling Z71.6 varenicline (CHANTIX) 1 MG tablet    Z72.0      Patient did not take BP meds this  morning  Will recheck at next visit    Paperwork filled out      Avery Duke MD  [facility]

## 2018-02-14 DIAGNOSIS — G90.50 RSD (REFLEX SYMPATHETIC DYSTROPHY): ICD-10-CM

## 2018-02-14 RX ORDER — OXYCODONE HYDROCHLORIDE 10 MG/1
10 TABLET ORAL EVERY 6 HOURS PRN
Qty: 90 TABLET | Refills: 0 | Status: SHIPPED | OUTPATIENT
Start: 2018-02-14 | End: 2018-03-16

## 2018-02-14 NOTE — TELEPHONE ENCOUNTER
Prescription of oxyCODONE IR (ROXICODONE) 10 MG tablet was brought to the  and patient informed to .

## 2018-02-14 NOTE — TELEPHONE ENCOUNTER
Reason for Call:  Medication or medication refill:    Do you use a [facility]?  Name of the pharmacy and phone number for the current request:  [facility] [address] [address] AT [address] [facility]    Name of the medication requested: oxyCODONE IR (ROXICODONE) 10 MG tablet    Other request:     Can we leave a detailed message on this number? YES    Phone number patient can be reached at: Home number on file [phone] (home)    Best Time: anytime    Call taken on 2/14/2018 at 1:07 PM by Nini Singh

## 2018-02-15 NOTE — TELEPHONE ENCOUNTER
Patient picked up his script at the  on 2/15/18.    Izabela ALY  Patient Representative  [place]

## 2018-03-13 ENCOUNTER — PRE VISIT (OUTPATIENT)
Dept: CARDIOLOGY | Facility: CLINIC | Age: 63
End: 2018-03-13

## 2018-03-13 DIAGNOSIS — I50.22 CHRONIC SYSTOLIC HEART FAILURE (H): Primary | ICD-10-CM

## 2018-03-13 DIAGNOSIS — I42.0 NONISCHEMIC DILATED CARDIOMYOPATHY (H): ICD-10-CM

## 2018-03-16 ENCOUNTER — TELEPHONE (OUTPATIENT)
Dept: FAMILY MEDICINE | Facility: CLINIC | Age: 63
End: 2018-03-16

## 2018-03-16 DIAGNOSIS — G90.50 RSD (REFLEX SYMPATHETIC DYSTROPHY): ICD-10-CM

## 2018-03-16 RX ORDER — OXYCODONE HYDROCHLORIDE 10 MG/1
10 TABLET ORAL EVERY 6 HOURS PRN
Qty: 90 TABLET | Refills: 0 | Status: SHIPPED | OUTPATIENT
Start: 2018-03-16 | End: 2018-04-19

## 2018-03-16 NOTE — TELEPHONE ENCOUNTER
Requested Prescriptions   Pending Prescriptions Disp Refills     oxyCODONE IR (ROXICODONE) 10 MG tablet 90 tablet 0     Sig: Take 1 tablet (10 mg) by mouth every 6 hours as needed for moderate to severe pain 3 per day    There is no refill protocol information for this order        Last Written Prescription Date:  2/14/2018  Last Fill Quantity: 90,  # refills: 0   Last office visit: 1/25/2018 with prescribing provider:  Srikanth MARTIN   Future Office Visit:    Routing refill request to provider for review/approval because:  Drug not on the G refill protocol       Sandra Chan RN  [facility]

## 2018-03-16 NOTE — TELEPHONE ENCOUNTER
Reason for Call:  Other prescription    Detailed comments: patient called and would like to get a prescription for oxyCODONE IR (ROXICODONE) 10 MG tablet to be picked up at the . Please call when ready to e picked up     Phone Number Patient can be reached at: Home number on file [phone] (home)    Best Time: any    Can we leave a detailed message on this number? YES    Call taken on 3/16/2018 at 2:13 PM by Luann Mercado

## 2018-04-02 ENCOUNTER — CARE COORDINATION (OUTPATIENT)
Dept: CARDIOLOGY | Facility: CLINIC | Age: 63
End: 2018-04-02

## 2018-04-02 NOTE — PROGRESS NOTES
Contacted patient after receiving message from triage this morning that patient was requesting a sooner appointment with Dr. Brown as he has been having chest pain. He reports that he has been having chest pain which he states is hard to describe, but it is near the left side of his chest and feels like a tightness, not sharp or pressure type occurring intermittently, not related to exertion. He continues to use his [employer] and denies it is worse with activity. He feels it could be from his medicines or possibly from milk.  He has not tried antacids. I will review with Dr. Brown to see if we can move his May appointment up a few weeks, but instructed him to come to the ER if pain worsens and/or accompanied by palpitations, nausea, diaphoresis.  He also wanted to try antacids first and will get some today.

## 2018-04-19 ENCOUNTER — TELEPHONE (OUTPATIENT)
Dept: FAMILY MEDICINE | Facility: CLINIC | Age: 63
End: 2018-04-19

## 2018-04-19 DIAGNOSIS — G90.50 RSD (REFLEX SYMPATHETIC DYSTROPHY): ICD-10-CM

## 2018-04-19 RX ORDER — OXYCODONE HYDROCHLORIDE 10 MG/1
10 TABLET ORAL EVERY 6 HOURS PRN
Qty: 90 TABLET | Refills: 0 | Status: SHIPPED | OUTPATIENT
Start: 2018-04-19 | End: 2018-05-21

## 2018-04-19 NOTE — TELEPHONE ENCOUNTER
Reason for Call:  Medication or medication refill:    Do you use a [facility]?  Name of the pharmacy and phone number for the current request:  [facility] [place] [address] [place] [address]    Name of the medication requested: oxyCODONE IR (ROXICODONE) 10 MG tablet    Other request:     Can we leave a detailed message on this number? YES    Phone number patient can be reached at: Home number on file [phone] (home)    Best Time: anytime    Call taken on 4/19/2018 at 8:16 AM by Nini Singh

## 2018-04-19 NOTE — TELEPHONE ENCOUNTER
Requested Prescriptions   Pending Prescriptions Disp Refills     oxyCODONE IR (ROXICODONE) 10 MG tablet 90 tablet 0     Sig: Take 1 tablet (10 mg) by mouth every 6 hours as needed for moderate to severe pain 3 per day    There is no refill protocol information for this order        Last Written Prescription Date:  3/16/2018  Last Fill Quantity: 90,  # refills: 0   Last office visit: 1/25/2018 with prescribing provider:  Srikanth - history of BKA   Future Office Visit:    Routing refill request to provider for review/approval because:  Drug not on the G refill protocol       Sandra Chan RN  [facility]

## 2018-04-20 NOTE — TELEPHONE ENCOUNTER
Reason for Call:  Other call back    Detailed comments: Patient called back upset that nobody called him back yesterday telling him if the prescription was ready or not. Patient states he will be calling back at 12:00 if no one calls him back    Phone Number Patient can be reached at: Home number on file [phone] (home)    Best Time: anytime    Can we leave a detailed message on this number? YES    Call taken on 4/20/2018 at 8:17 AM by Nini Singh

## 2018-04-20 NOTE — TELEPHONE ENCOUNTER
Prescription of oxyCODONE IR (ROXICODONE) 10 MG tablet was brought to the  and patient informed to .  Reminded patient of 72 hour refill process.

## 2018-04-23 ENCOUNTER — TELEPHONE (OUTPATIENT)
Dept: FAMILY MEDICINE | Facility: CLINIC | Age: 63
End: 2018-04-23

## 2018-04-23 NOTE — TELEPHONE ENCOUNTER
Forms received from: [facility]   Phone number listed: [phone]   Fax listed: [phone]  Date received: 4-23-18  Form description: wheelchair repairs  Once forms are completed, please return to [place] via fax.  Is patient requesting to be contacted when forms are completed: n/a  Form placed: provider richard Merida

## 2018-05-07 ENCOUNTER — TELEPHONE (OUTPATIENT)
Dept: FAMILY MEDICINE | Facility: CLINIC | Age: 63
End: 2018-05-07

## 2018-05-07 ENCOUNTER — TELEPHONE (OUTPATIENT)
Dept: CARDIOLOGY | Facility: CLINIC | Age: 63
End: 2018-05-07

## 2018-05-07 DIAGNOSIS — I50.9 CHF (CONGESTIVE HEART FAILURE) (H): Primary | ICD-10-CM

## 2018-05-07 DIAGNOSIS — R26.81 UNSTEADY GAIT: Primary | ICD-10-CM

## 2018-05-07 NOTE — TELEPHONE ENCOUNTER
Called patient.  He stated that he had a stroke in 2006, and he has never got his balance back.  He would like physical therapy to help with this.  He stated his balance is not worse, but he is thinking that physical therapy will help him.    Routed to PCP to advise.  Angelica Siegel RN [facility] Triage.

## 2018-05-07 NOTE — TELEPHONE ENCOUNTER
[facility] Call Center    Phone Message    May a detailed message be left on voicemail: yes    Reason for Call: Other: Per call from PT is requesting a call back to discuss if he needs labs prior to his APPT on 5/9/18 with Dr Brown     Action Taken: Message routed to:  [facility] ([facility] Cardiology

## 2018-05-07 NOTE — TELEPHONE ENCOUNTER
Reason for Call:  Other     Detailed comments: Patient would like a referral to physical therapy    Phone Number Patient can be reached at: Home number on file [phone] (home)    Best Time:     Can we leave a detailed message on this number? Not Applicable    Call taken on 5/7/2018 at 11:35 AM by Tavia Easton

## 2018-05-08 NOTE — TELEPHONE ENCOUNTER
I called Mr. Taylor back, he denies having chest pain however he was trying to get in with Dr. Brown as he missed his last appt in March due to being in [place] for his uncle's , and was rescheduled for 3 months later. I let him know that he needs labs and ECHO prior. The ECHO though will likely be after his appt.  Constantino verbalized ubderstanding.

## 2018-05-09 ENCOUNTER — OFFICE VISIT (OUTPATIENT)
Dept: CARDIOLOGY | Facility: CLINIC | Age: 63
End: 2018-05-09
Attending: INTERNAL MEDICINE
Payer: COMMERCIAL

## 2018-05-09 ENCOUNTER — RADIANT APPOINTMENT (OUTPATIENT)
Dept: CARDIOLOGY | Facility: CLINIC | Age: 63
End: 2018-05-09
Payer: COMMERCIAL

## 2018-05-09 VITALS
OXYGEN SATURATION: 99 % | DIASTOLIC BLOOD PRESSURE: 88 MMHG | SYSTOLIC BLOOD PRESSURE: 149 MMHG | HEIGHT: 75 IN | BODY MASS INDEX: 25.61 KG/M2 | WEIGHT: 206 LBS | HEART RATE: 88 BPM

## 2018-05-09 DIAGNOSIS — I42.0 NONISCHEMIC DILATED CARDIOMYOPATHY (H): ICD-10-CM

## 2018-05-09 DIAGNOSIS — I50.9 CHF (CONGESTIVE HEART FAILURE) (H): ICD-10-CM

## 2018-05-09 DIAGNOSIS — Z89.519 S/P BKA (BELOW KNEE AMPUTATION) UNILATERAL (H): Primary | ICD-10-CM

## 2018-05-09 DIAGNOSIS — I48.20 CHRONIC ATRIAL FIBRILLATION (H): ICD-10-CM

## 2018-05-09 DIAGNOSIS — I10 HYPERTENSION GOAL BP (BLOOD PRESSURE) < 140/90: ICD-10-CM

## 2018-05-09 DIAGNOSIS — I50.22 CHRONIC SYSTOLIC HEART FAILURE (H): ICD-10-CM

## 2018-05-09 LAB
ANION GAP SERPL CALCULATED.3IONS-SCNC: 9 MMOL/L (ref 3–14)
BUN SERPL-MCNC: 20 MG/DL (ref 7–30)
CALCIUM SERPL-MCNC: 8.8 MG/DL (ref 8.5–10.1)
CHLORIDE SERPL-SCNC: 107 MMOL/L (ref 94–109)
CO2 SERPL-SCNC: 21 MMOL/L (ref 20–32)
CREAT SERPL-MCNC: 1.41 MG/DL (ref 0.66–1.25)
GFR SERPL CREATININE-BSD FRML MDRD: 51 ML/MIN/1.7M2
GLUCOSE SERPL-MCNC: 192 MG/DL (ref 70–99)
POTASSIUM SERPL-SCNC: 4.1 MMOL/L (ref 3.4–5.3)
SODIUM SERPL-SCNC: 137 MMOL/L (ref 133–144)

## 2018-05-09 PROCEDURE — 93010 ELECTROCARDIOGRAM REPORT: CPT | Mod: ZP | Performed by: INTERNAL MEDICINE

## 2018-05-09 PROCEDURE — 99214 OFFICE O/P EST MOD 30 MIN: CPT | Mod: ZP | Performed by: INTERNAL MEDICINE

## 2018-05-09 PROCEDURE — 36415 COLL VENOUS BLD VENIPUNCTURE: CPT | Performed by: INTERNAL MEDICINE

## 2018-05-09 PROCEDURE — 93005 ELECTROCARDIOGRAM TRACING: CPT | Mod: ZF

## 2018-05-09 PROCEDURE — G0463 HOSPITAL OUTPT CLINIC VISIT: HCPCS | Mod: 25,ZF

## 2018-05-09 PROCEDURE — 80048 BASIC METABOLIC PNL TOTAL CA: CPT | Performed by: INTERNAL MEDICINE

## 2018-05-09 RX ORDER — LISINOPRIL 40 MG/1
40 TABLET ORAL DAILY
Qty: 90 TABLET | Refills: 3 | Status: SHIPPED | OUTPATIENT
Start: 2018-05-09 | End: 2019-03-07

## 2018-05-09 ASSESSMENT — PAIN SCALES - GENERAL: PAINLEVEL: NO PAIN (0)

## 2018-05-09 NOTE — NURSING NOTE
Cardiac Monitors: Patient was instructed regarding the indication, function, care and prompt return of a 24 hour BP monitor.   Cardiac Testing: Patient given instructions regarding EKG. Discussed purpose, preparation, procedure and when to expect results reported back to the patient. Patient demonstrated understanding of this information and agreed to call with further questions or concerns.  Diet: Patient instructed regarding a heart healthy diet, including discussion of reduced fat and sodium intake. Patient demonstrated understanding of this information and agreed to call with further questions or concerns.  Labs: Patient was given results of the laboratory testing obtained today. Patient was instructed to return for the next laboratory testing in 4 weeks (BMP) . Patient demonstrated understanding of this information and agreed to call with further questions or concerns.   Med Reconcile: Reviewed and verified all current medications with the patient. The updated medication list was printed and given to the patient.  Return Appointment: Patient given instructions regarding scheduling next clinic visit,  in 4 months with labs prior with Dr. Brown. Referral sent to pain clinic.Patient demonstrated understanding of this information and agreed to call with further questions or concerns.  Medication Change: Patient was educated regarding prescribed medication change, including discussion of the indication, administration, side effects, and when to report to MD or RN. Patient demonstrated understanding of this information and agreed to call with further questions or concerns.  Patient stated he understood all health information given and agreed to call with further questions or concerns.

## 2018-05-09 NOTE — PATIENT INSTRUCTIONS
Cardiology Providers you saw during your visit:  Dr. Brown    Medication changes:  Please increase Lisinopril to 40 mg by mouth daily.    Follow up:  1-24 hour ambulatory BP  2-4 weeks labs BMP  3-Referral to pain clinic for medical Marijuana.  4-EKG today- Atrial fibrillation    Please return to clinic for follow-up:  With Dr. Brown in 4 months with labs prior.      Please call if you have any questions or concerns.    Follow the American Heart Association Diet and Lifestyle recommendations:  Limit saturated fat, trans fat, sodium, red meat, sweets and sugar-sweetened beverages. If you choose to eat red meat, compare labels and select the leanest cuts available.  Aim for at least 150 minutes of moderate physical activity or 75 minutes of vigorous physical activity - or an equal combination of both - each week.    During business hours: [phone], press option # 1 to be routed to the [facility] then option # 3 for medical questions to speak with a nurse      After hours, weekends or holidays: On Call Cardiologist- [phone]   option #4 and ask to speak to the on-call Cardiologist. Inform them you are a CORE/heart failure patient at the [facility].      Silvia Ballesteros, RN  Cardiology Nurse Care Coordinator    Keep up the good work!    Take Care!
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
## 2018-05-09 NOTE — MR AVS SNAPSHOT
After Visit Summary   5/9/2018    Constantino Taylor    MRN: [id number]           Patient Information     Date Of Birth          1955        Visit Information        Provider Department      5/9/2018 8:20 AM Norah Brown MD [facility] Care        Today's Diagnoses     S/P BKA (below knee amputation) unilateral (H)    -  1    Chronic atrial fibrillation (H)        Nonischemic dilated cardiomyopathy (HCC)        Hypertension goal BP (blood pressure) < 140/90          Care Instructions    Cardiology Providers you saw during your visit:  Dr. Brown    Medication changes:  Please increase Lisinopril to 40 mg by mouth daily.    Follow up:  1-24 hour ambulatory BP  2-4 weeks labs BMP  3-Referral to pain clinic for medical Marijuana.  4-EKG today- Atrial fibrillation    Please return to clinic for follow-up:  With Dr. Brown in 4 months with labs prior.      Please call if you have any questions or concerns.    Follow the American Heart Association Diet and Lifestyle recommendations:  Limit saturated fat, trans fat, sodium, red meat, sweets and sugar-sweetened beverages. If you choose to eat red meat, compare labels and select the leanest cuts available.  Aim for at least 150 minutes of moderate physical activity or 75 minutes of vigorous physical activity - or an equal combination of both - each week.    During business hours: [phone], press option # 1 to be routed to the [facility] then option # 3 for medical questions to speak with a nurse      After hours, weekends or holidays: On Call Cardiologist- [phone]   option #4 and ask to speak to the on-call Cardiologist. Inform them you are a CORE/heart failure patient at the [facility].      Silvia Ballesteros RN  Cardiology Nurse Care Coordinator    Keep up the good work!    Take Care!            Follow-ups after your visit        Additional Services     [facility] REFERRAL       Please call [phone] to make  an appointment. Clinic is located: [facility] [address] [place] [address] [id number] 4th [place] [address]    Please complete the following questions:    Procedure/Referral: Referral Only -  Comprehensive Evaluation and Management  What are your specific questions for the pain specialist? Medical Hilda- Patient currently smokes Marijuana for pain and appetite            Follow-Up with Advanced Heart Failure Clinic                 Your next 10 appointments already scheduled     Jun 20, 2018  7:30 AM CDT   Lab with  LAB   [facility] ([facility])    [address] [place] [address]  1st Floor  [facility] [id number] [phone]            Jun 20, 2018  8:00 AM CDT   (Arrive by 7:45 AM)   RETURN HEART FAILURE with Norah Brown MD   [facility] ([facility])    [address] [place] [address]  [address]  [facility] [id number] [phone]              Future tests that were ordered for you today     Open Future Orders        Priority Expected Expires Ordered    24 Hour Blood Pressure Monitor - Adult Routine  6/23/2018 5/9/2018    Basic metabolic panel Routine 9/9/2018 5/9/2019 5/9/2018    Follow-Up with Advanced Heart Failure Clinic Routine 9/9/2018 5/9/2019 5/9/2018    Basic metabolic panel Routine 6/6/2018 5/9/2019 5/9/2018            Who to contact     If you have questions or need follow up information about today's clinic visit or your schedule please contact [facility] directly at [phone].  Normal or non-critical lab and imaging results will be communicated to you by MyChart, letter or phone within 4 business days after the clinic has received the results. If you do not hear from us within 7 days, please contact the clinic through MyChart or phone. If you have a critical or abnormal lab result, we will notify you by phone as soon as possible.  Submit refill requests through [facility] [facility] or call your pharmacy and they will forward  "the refill request to us. Please allow 3 business days for your refill to be completed.          Additional Information About Your Visit        Its Time Compliancehart Information     [employer] gives you secure access to your electronic health record. If you see a primary care provider, you can also send messages to your care team and make appointments. If you have questions, please call your primary care clinic.  If you do not have a primary care provider, please call [phone] and they will assist you.        Care EveryWhere ID     This is your Care EveryWhere ID. This could be used by other organizations to access your [facility] medical records  [id number]        Your Vitals Were     Pulse Height Pulse Oximetry BMI (Body Mass Index)          88 1.905 m (6' 3\") 99% 25.75 kg/m2         Blood Pressure from Last 3 Encounters:   05/09/18 149/88   01/25/18 (!) 154/103   01/12/18 121/78    Weight from Last 3 Encounters:   05/09/18 93.4 kg (206 lb)   01/12/18 93 kg (205 lb)   09/20/17 91.6 kg (202 lb)              We Performed the Following     EKG 12-lead, tracing only (Same Day)     [facility] REFERRAL          Today's Medication Changes          These changes are accurate as of 5/9/18  9:57 AM.  If you have any questions, ask your nurse or doctor.               These medicines have changed or have updated prescriptions.        Dose/Directions    lisinopril 40 MG tablet   Commonly known as:  PRINIVIL/ZESTRIL   This may have changed:    - medication strength  - how much to take   Used for:  Hypertension goal BP (blood pressure) < 140/90   Changed by:  Norah Brown MD        Dose:  40 mg   Take 1 tablet (40 mg) by mouth daily TAKE 1 TABLET(10 MG) BY MOUTH DAILY   Quantity:  90 tablet   Refills:  3            Where to get your medicines      These medications were sent to [id number] - [place] [address]  [address] [place] [address] " [id number]     Phone:  [phone]     lisinopril 40 MG tablet                Primary Care Provider Office Phone # Fax #    [facility] [phone] [address] [facility] [address]        Equal Access to Services     [facility] ALBERTO : Hadii aad ku hadasho Soomaali, waaxda luqadaha, qaybta kaalmada adeegyada, waxay blakein haylettyn trevamechelle dhillon padmini nava. So [facility] [phone].    ATENCIÓN: Si habla español, tiene a chacon disposición servicios gratuitos de asistencia lingüística. Brittanyame al [phone].    We comply with applicable federal civil rights laws and [place] laws. We do not discriminate on the basis of race, color, national origin, age, disability, sex, sexual orientation, or gender identity.            Thank you!     Thank you for choosing [facility]  for your care. Our goal is always to provide you with excellent care. Hearing back from our patients is one way we can continue to improve our services. Please take a few minutes to complete the written survey that you may receive in the mail after your visit with us. Thank you!             Your Updated Medication List - Protect others around you: Learn how to safely use, store and throw away your medicines at www.disposemymeds.org.          This list is accurate as of 5/9/18  9:57 AM.  Always use your most recent med list.                   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)       lisinopril 40 MG tablet    PRINIVIL/ZESTRIL    90 tablet    Take 1 tablet (40 mg) by mouth daily  "TAKE 1 TABLET(10 MG) BY MOUTH DAILY    Hypertension goal BP (blood pressure) < 140/90       metoprolol succinate 200 MG 24 hr tablet    TOPROL-XL    90 tablet    Take 1 tablet (200 mg) by mouth daily    Hypertension goal BP (blood pressure) < 140/90       nicotine polacrilex 2 MG gum    NICORELIEF    30 tablet    Place 1 each (2 mg) inside cheek as needed for smoking cessation Reported on 3/9/2017    Encounter for smoking cessation counseling       * order for DME     1 each    Please dispense blood pressure machine and cuff.    Hypertension goal BP (blood pressure) < 140/90       * order for DME     30 each    Equipment being ordered: self adhesive bandage 4\" by 8\"    Hx of BKA, left (H)       oxyCODONE IR 10 MG tablet    ROXICODONE    90 tablet    Take 1 tablet (10 mg) by mouth every 6 hours as needed for moderate to severe pain 3 per day    RSD (reflex sympathetic dystrophy)       polyethylene glycol powder    MIRALAX    510 g    Take 17 g by mouth as needed for constipation    Slow transit constipation       rivaroxaban ANTICOAGULANT 20 MG Tabs tablet    XARELTO    90 tablet    Take 1 tablet (20 mg) by mouth daily (with dinner)    Chronic atrial fibrillation (H)       sennosides 8.6 MG tablet    SENOKOT    120 tablet    Take 2 tablets by mouth 2 times daily    Slow transit constipation       tadalafil 2.5 MG tablet    CIALIS    90 tablet    Take 1 tablet (2.5 mg) by mouth daily Never use with nitroglycerin, terazosin or doxazosin.    Drug-induced erectile dysfunction       triamcinolone 0.1 % cream    KENALOG    80 g    Take 1 apply by topical route two times a day as needed    Chronic dermatitis       UNABLE TO FIND      MEDICATION NAME: Hydrocortisone 0.5 % topical cream-Take 1 application by topical route as needed.        varenicline 1 MG tablet    CHANTIX    56 tablet    Take 1 tablet (1 mg) by mouth 2 times daily    Hx of BKA, left (H), Encounter for smoking cessation counseling       * Notice:  This list " has 2 medication(s) that are the same as other medications prescribed for you. Read the directions carefully, and ask your doctor or other care provider to review them with you.

## 2018-05-09 NOTE — NURSING NOTE
Chief Complaint   Patient presents with     Follow Up For     Return Heart Failure     Vitals were taken and medications were reconciled.     Renee Oneal,HYACINTHA  7:56 AM

## 2018-05-10 LAB — INTERPRETATION ECG - MUSE: NORMAL

## 2018-05-17 NOTE — TELEPHONE ENCOUNTER
FUTURE VISIT INFORMATION      FUTURE VISIT INFORMATION:    Date: 5/24/18    Time:     Location: [facility]  REFERRAL INFORMATION:    Referring provider:  Dr. Brown    Referring providers clinic:   Orthopedics    Reason for visit/diagnosis  s/p bka unilateral    RECORDS REQUESTED FROM:       Clinic name Comments Records Status Imaging Status    Dr. Brown referring internal internal                                   RECORDS STATUS

## 2018-05-21 DIAGNOSIS — G90.50 RSD (REFLEX SYMPATHETIC DYSTROPHY): ICD-10-CM

## 2018-05-21 NOTE — TELEPHONE ENCOUNTER
Reason for Call:  Medication or medication refill:    Do you use a [facility]?  Name of the pharmacy and phone number for the current request:  [facility]     Name of the medication requested: oxyCODONE IR (ROXICODONE) 10 MG tablet    Other request: Patient will  at     Can we leave a detailed message on this number? YES    Phone number patient can be reached at: Home number on file [phone] (home)    Best Time: Any time    Thank you!  Princess ALY  Patient Representative  [facility]      Call taken on 5/21/2018 at 8:22 AM by Princess Zaman

## 2018-05-21 NOTE — TELEPHONE ENCOUNTER
Requested Prescriptions   Pending Prescriptions Disp Refills     oxyCODONE IR (ROXICODONE) 10 MG tablet 90 tablet 0     Sig: Take 1 tablet (10 mg) by mouth every 6 hours as needed for moderate to severe pain 3 per day    There is no refill protocol information for this order        Last Written Prescription Date:  4/16/2018  Last Fill Quantity: 90,  # refills: 0   Last office visit: 1/25/2018 with prescribing provider:  Srikanth - history of BKA   Future Office Visit:    Routing refill request to provider for review/approval because:  Drug not on the G refill protocol       Sandra Chan RN  [facility]

## 2018-05-22 RX ORDER — OXYCODONE HYDROCHLORIDE 10 MG/1
10 TABLET ORAL EVERY 6 HOURS PRN
Qty: 90 TABLET | Refills: 0 | Status: SHIPPED | OUTPATIENT
Start: 2018-05-22 | End: 2018-06-20

## 2018-05-24 ENCOUNTER — OFFICE VISIT (OUTPATIENT)
Dept: ANESTHESIOLOGY | Facility: CLINIC | Age: 63
End: 2018-05-24
Payer: COMMERCIAL

## 2018-05-24 ENCOUNTER — PRE VISIT (OUTPATIENT)
Dept: ANESTHESIOLOGY | Facility: CLINIC | Age: 63
End: 2018-05-24

## 2018-05-24 VITALS — OXYGEN SATURATION: 100 % | SYSTOLIC BLOOD PRESSURE: 152 MMHG | DIASTOLIC BLOOD PRESSURE: 103 MMHG | HEART RATE: 71 BPM

## 2018-05-24 DIAGNOSIS — T87.89 PAINFUL AMPUTATION STUMP (H): Primary | ICD-10-CM

## 2018-05-24 DIAGNOSIS — M79.609 PAINFUL AMPUTATION STUMP (H): Primary | ICD-10-CM

## 2018-05-24 ASSESSMENT — PAIN SCALES - GENERAL: PAINLEVEL: SEVERE PAIN (6)

## 2018-05-24 NOTE — MR AVS SNAPSHOT
After Visit Summary   5/24/2018    Constantino Taylor    MRN: [id number]           Patient Information     Date Of Birth          1955        Visit Information        Provider Department      5/24/2018 9:00 AM Chas Hatch MD [facility]        Care Instructions    1. Start taking tylenol 1000mg up to three times daily as needed for pain.     2. Medical cannabis program:  [phone].    3. DME prescription for TENs unit given to you.  You may take this prescription to any medical supply store.      Follow up: As needed       To speak with a nurse, schedule/reschedule/cancel a clinic appointment, or request a medication refill call: [phone]     You can also reach us by I-Works: [email or web address]    For refills, please call on Monday, 1 week before your medication runs out. The doctors are not always in clinic, so this gives us time to get your prescriptions ready.  Please let us know the name of the medication you are requesting a refill of.                                     Follow-ups after your visit        Your next 10 appointments already scheduled     May 30, 2018 10:00 AM CDT   (Arrive by 9:45 AM)   Vestibular Eval with Adriano Miller PT   [facility] ([facility])    [address]  4th Floor  [facility] [address] [phone]            Jun 20, 2018  7:30 AM CDT   Lab with  LAB   [facility] ([facility])    [address] [place] [address]  1st Floor  [facility] [address] [phone]            Jun 20, 2018  8:00 AM CDT   (Arrive by 7:45 AM)   RETURN HEART FAILURE with Norah Brown MD   [facility] ([facility])    [address] [place] [address]  [address]  [facility] [address] [phone]              Who to contact     Please call your clinic at [phone] to:    Ask questions about your health    Make or cancel  appointments    Discuss your medicines    Learn about your test results    Speak to your doctor            Additional Information About Your Visit        CENTERSONICharZorap Information     Jinn gives you secure access to your electronic health record. If you see a primary care provider, you can also send messages to your care team and make appointments. If you have questions, please call your primary care clinic.  If you do not have a primary care provider, please call [phone] and they will assist you.      Jinn is an electronic gateway that provides easy, online access to your medical records. With Jinn, you can request a clinic appointment, read your test results, renew a prescription or communicate with your care team.     To access your existing account, please contact your [facility] or call [phone] for assistance.        Care EveryWhere ID     This is your Care EveryWhere ID. This could be used by other organizations to access your [place] medical records  [id number]        Your Vitals Were     Pulse Pulse Oximetry                71 100%           Blood Pressure from Last 3 Encounters:   05/24/18 (!) 152/103   05/09/18 149/88   01/25/18 (!) 154/103    Weight from Last 3 Encounters:   05/09/18 93.4 kg (206 lb)   01/12/18 93 kg (205 lb)   09/20/17 91.6 kg (202 lb)              Today, you had the following     No orders found for display       Primary Care Provider Office Phone # Fax #    [facility] [phone]       [address] [facility] [address]        Equal Access to Services     PARKER DOMINGUEZ : Hadii aad ku hadasho Soanaali, waaxda luqadaha, qaybta kaalmada ciera, tahir [place] . So [facility] [phone].    ATENCIÓN: Si habla español, tiene a chacon disposición servicios gratuitos de asistencia lingüística. Llame al [phone].    We comply with applicable federal civil rights laws and [place] laws. We  do not discriminate on the basis of race, color, national origin, age, disability, sex, sexual orientation, or gender identity.            Thank you!     Thank you for choosing [facility]  for your care. Our goal is always to provide you with excellent care. Hearing back from our patients is one way we can continue to improve our services. Please take a few minutes to complete the written survey that you may receive in the mail after your visit with us. Thank you!             Your Updated Medication List - Protect others around you: Learn how to safely use, store and throw away your medicines at www.disposemymeds.org.          This list is accurate as of 5/24/18  9:44 AM.  Always use your most recent med list.                   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)       lisinopril 40 MG tablet    PRINIVIL/ZESTRIL    90 tablet    Take 1 tablet (40 mg) by mouth daily TAKE 1 TABLET(10 MG) BY MOUTH DAILY    Hypertension goal BP (blood pressure) < 140/90       metoprolol succinate 200 MG 24 hr tablet    TOPROL-XL    90 tablet    Take 1 tablet (200 mg) by mouth daily    Hypertension goal BP (blood pressure) < 140/90       nicotine polacrilex 2 MG gum    NICORELIEF    30 tablet    Place 1 each (2 mg) inside cheek as needed for smoking cessation Reported on 3/9/2017    Encounter for smoking cessation counseling       * order for DME     1 each    Please dispense blood pressure machine and cuff.    Hypertension goal BP (blood pressure) < 140/90     "   * order for DME     30 each    Equipment being ordered: self adhesive bandage 4\" by 8\"    Hx of BKA, left (H)       oxyCODONE IR 10 MG tablet    ROXICODONE    90 tablet    Take 1 tablet (10 mg) by mouth every 6 hours as needed for moderate to severe pain 3 per day    RSD (reflex sympathetic dystrophy)       polyethylene glycol powder    MIRALAX    510 g    Take 17 g by mouth as needed for constipation    Slow transit constipation       rivaroxaban ANTICOAGULANT 20 MG Tabs tablet    XARELTO    90 tablet    Take 1 tablet (20 mg) by mouth daily (with dinner)    Chronic atrial fibrillation (H)       sennosides 8.6 MG tablet    SENOKOT    120 tablet    Take 2 tablets by mouth 2 times daily    Slow transit constipation       tadalafil 2.5 MG tablet    CIALIS    90 tablet    Take 1 tablet (2.5 mg) by mouth daily Never use with nitroglycerin, terazosin or doxazosin.    Drug-induced erectile dysfunction       triamcinolone 0.1 % cream    KENALOG    80 g    Take 1 apply by topical route two times a day as needed    Chronic dermatitis       UNABLE TO FIND      MEDICATION NAME: Hydrocortisone 0.5 % topical cream-Take 1 application by topical route as needed.        varenicline 1 MG tablet    CHANTIX    56 tablet    Take 1 tablet (1 mg) by mouth 2 times daily    Hx of BKA, left (H), Encounter for smoking cessation counseling       * Notice:  This list has 2 medication(s) that are the same as other medications prescribed for you. Read the directions carefully, and ask your doctor or other care provider to review them with you.      "
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
## 2018-05-24 NOTE — NURSING NOTE
AVS given and reviewed with pt.  Pt verbalized understanding and declined any questions.     Priscila Camp, RN, BSN

## 2018-05-24 NOTE — PATIENT INSTRUCTIONS
1. Start taking tylenol 1000mg up to three times daily as needed for pain.     2. Medical cannabis program:  [phone].    3. DME prescription for TENs unit given to you.  You may take this prescription to any medical supply store.      Follow up: As needed       To speak with a nurse, schedule/reschedule/cancel a clinic appointment, or request a medication refill call: [phone]     You can also reach us by [employer]: [email or web address]    For refills, please call on Monday, 1 week before your medication runs out. The doctors are not always in clinic, so this gives us time to get your prescriptions ready.  Please let us know the name of the medication you are requesting a refill of.

## 2018-05-29 ENCOUNTER — HOSPITAL ENCOUNTER (OUTPATIENT)
Dept: CARDIOLOGY | Facility: CLINIC | Age: 63
Discharge: HOME | End: 2018-05-29
Attending: INTERNAL MEDICINE | Admitting: INTERNAL MEDICINE
Payer: COMMERCIAL

## 2018-05-29 DIAGNOSIS — I42.0 NONISCHEMIC DILATED CARDIOMYOPATHY (H): ICD-10-CM

## 2018-05-29 PROCEDURE — 93790 AMBL BP MNTR W/SW I&R: CPT | Performed by: INTERNAL MEDICINE

## 2018-05-29 PROCEDURE — 93788 AMBL BP MNTR W/SW A/R: CPT | Performed by: INTERNAL MEDICINE

## 2018-05-30 ENCOUNTER — THERAPY VISIT (OUTPATIENT)
Dept: PHYSICAL THERAPY | Facility: CLINIC | Age: 63
End: 2018-05-30
Attending: INTERNAL MEDICINE
Payer: COMMERCIAL

## 2018-05-30 DIAGNOSIS — R26.81 UNSTEADY GAIT: ICD-10-CM

## 2018-05-30 NOTE — MR AVS SNAPSHOT
After Visit Summary   5/30/2018    Constantino Taylor    MRN: [id number]           Patient Information     Date Of Birth          1955        Visit Information        Provider Department      5/30/2018 10:00 AM Adriano Miller PT [facility]        Today's Diagnoses     Unsteady gait           Follow-ups after your visit        Your next 10 appointments already scheduled     Jun 13, 2018  1:15 PM CDT   (Arrive by 1:00 PM)   Neuro Treatment with Adriano Miller PT    [facility] ([facility])    [address] [place] [address]  4th [facility] [address]   [phone]            Jun 20, 2018  7:30 AM CDT   Lab with ECHO LAB   [facility] ([facility])    [address] [place] [id number]   [phone]            Jun 20, 2018  8:00 AM CDT   (Arrive by 7:45 AM)   RETURN HEART FAILURE with Norah Borwn MD   [facility] ([facility])    [address] [place] [address]   [phone]            Jun 27, 2018  9:00 AM CDT   (Arrive by 8:45 AM)   [facility])    [address] [place] [address]   [phone]            Jul 18, 2018  9:00 AM CDT   (Arrive by 8:45 AM)   Neuro Treatment with Adriano Miller PT   [facility] ([facility])    [address] [place] [id number] [phone]            Aug 08, 2018  9:00 AM CDT   (Arrive by 8:45 AM)   Neuro Treatment with [facility] ([facility])    [address] [place] [id number] [phone]              Who to contact     Please call your clinic at [phone] to:    Ask questions about your health    Make or cancel appointments    Discuss your medicines    Learn about  your test results    Speak to your doctor            Additional Information About Your Visit        [employer] Information     [employer] gives you secure access to your electronic health record. If you see a primary care provider, you can also send messages to your care team and make appointments. If you have questions, please call your primary care clinic.  If you do not have a primary care provider, please call [phone] and they will assist you.      [employer] is an electronic gateway that provides easy, online access to your medical records. With [employer], you can request a clinic appointment, read your test results, renew a prescription or communicate with your care team.     To access your existing account, please contact your [facility] or call [phone] for assistance.        Care EveryWhere ID     This is your Care EveryWhere ID. This could be used by other organizations to access your [facility] medical records  [id number]         Blood Pressure from Last 3 Encounters:   05/24/18 (!) 152/103   05/09/18 149/88   01/25/18 (!) 154/103    Weight from Last 3 Encounters:   05/09/18 93.4 kg (206 lb)   01/12/18 93 kg (205 lb)   09/20/17 91.6 kg (202 lb)              Today, you had the following     No orders found for display       Primary Care Provider Office Phone # Fax #    Avery Duke [phone]       [address] [facility] [address]        Equal Access to Services     [facility]: Hadii pravin cox hadasho Soomaali, waaxda luqadaha, qaybta kaalmada adeegyada, tahir [place] . So [facility] [phone].    ATENCIÓN: Si habla español, tiene a chacon disposición servicios gratuitos de asistencia lingüística. Llame al [phone].    We comply with applicable federal civil rights laws and [place] laws. We do not discriminate on the basis of race, color, national origin, age, disability, sex, sexual orientation, or gender  "identity.            Thank you!     Thank you for choosing [facility]  for your care. Our goal is always to provide you with excellent care. Hearing back from our patients is one way we can continue to improve our services. Please take a few minutes to complete the written survey that you may receive in the mail after your visit with us. Thank you!             Your Updated Medication List - Protect others around you: Learn how to safely use, store and throw away your medicines at www.disposemymeds.org.          This list is accurate as of 5/30/18  3:02 PM.  Always use your most recent med list.                   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)       lisinopril 40 MG tablet    PRINIVIL/ZESTRIL    90 tablet    Take 1 tablet (40 mg) by mouth daily TAKE 1 TABLET(10 MG) BY MOUTH DAILY    Hypertension goal BP (blood pressure) < 140/90       metoprolol succinate 200 MG 24 hr tablet    TOPROL-XL    90 tablet    Take 1 tablet (200 mg) by mouth daily    Hypertension goal BP (blood pressure) < 140/90       nicotine polacrilex 2 MG gum    NICORELIEF    30 tablet    Place 1 each (2 mg) inside cheek as needed for smoking cessation Reported on 3/9/2017    Encounter for smoking cessation counseling       * order for DME     1 each    Please dispense blood pressure machine and cuff.    Hypertension goal BP (blood pressure) < 140/90       * order for DME     30 each    Equipment being ordered: self adhesive bandage 4\" by 8\"    Hx of BKA, left (H)       oxyCODONE IR 10 MG tablet    " ROXICODONE    90 tablet    Take 1 tablet (10 mg) by mouth every 6 hours as needed for moderate to severe pain 3 per day    RSD (reflex sympathetic dystrophy)       polyethylene glycol powder    MIRALAX    510 g    Take 17 g by mouth as needed for constipation    Slow transit constipation       rivaroxaban ANTICOAGULANT 20 MG Tabs tablet    XARELTO    90 tablet    Take 1 tablet (20 mg) by mouth daily (with dinner)    Chronic atrial fibrillation (H)       sennosides 8.6 MG tablet    SENOKOT    120 tablet    Take 2 tablets by mouth 2 times daily    Slow transit constipation       tadalafil 2.5 MG tablet    CIALIS    90 tablet    Take 1 tablet (2.5 mg) by mouth daily Never use with nitroglycerin, terazosin or doxazosin.    Drug-induced erectile dysfunction       triamcinolone 0.1 % cream    KENALOG    80 g    Take 1 apply by topical route two times a day as needed    Chronic dermatitis       UNABLE TO FIND      MEDICATION NAME: Hydrocortisone 0.5 % topical cream-Take 1 application by topical route as needed.        varenicline 1 MG tablet    CHANTIX    56 tablet    Take 1 tablet (1 mg) by mouth 2 times daily    Hx of BKA, left (H), Encounter for smoking cessation counseling       * Notice:  This list has 2 medication(s) that are the same as other medications prescribed for you. Read the directions carefully, and ask your doctor or other care provider to review them with you.
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
## 2018-06-04 ENCOUNTER — CARE COORDINATION (OUTPATIENT)
Dept: CARDIOLOGY | Facility: CLINIC | Age: 63
End: 2018-06-04
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
## 2018-06-19 ENCOUNTER — TELEPHONE (OUTPATIENT)
Dept: FAMILY MEDICINE | Facility: CLINIC | Age: 63
End: 2018-06-19

## 2018-06-19 NOTE — TELEPHONE ENCOUNTER
Forms received from: Eun Kohler   Phone number listed: [phone]  Date received: 6-19-18  Form description: FMLA  Once forms are completed, please return to [facility] via [phone].  Is patient requesting to be contacted when forms are completed: n/a  Form placed: provider desk  Mela Merida

## 2018-06-20 ENCOUNTER — TELEPHONE (OUTPATIENT)
Dept: FAMILY MEDICINE | Facility: CLINIC | Age: 63
End: 2018-06-20

## 2018-06-20 DIAGNOSIS — G90.50 RSD (REFLEX SYMPATHETIC DYSTROPHY): ICD-10-CM

## 2018-06-20 RX ORDER — OXYCODONE HYDROCHLORIDE 10 MG/1
10 TABLET ORAL EVERY 6 HOURS PRN
Qty: 90 TABLET | Refills: 0 | Status: SHIPPED | OUTPATIENT
Start: 2018-06-20 | End: 2018-07-18

## 2018-06-20 NOTE — TELEPHONE ENCOUNTER
Requested Prescriptions   Pending Prescriptions Disp Refills     oxyCODONE IR (ROXICODONE) 10 MG tablet 90 tablet 0     Sig: Take 1 tablet (10 mg) by mouth every 6 hours as needed for moderate to severe pain 3 per day    There is no refill protocol information for this order         Last Written Prescription Date:  5/22/18  Last Fill Quantity: 90,   # refills: 0  Last Office Visit: 1/25/18  Future Office visit:       Routing refill request to provider for review/approval because:  Drug not on the [facility], [facility] or [facility] refill protocol or controlled substance

## 2018-06-21 NOTE — TELEPHONE ENCOUNTER
Reason for Call:  Other call back    Detailed comments: Dacia, patient's daughter, calling to make sure the dr received these forms. She would like a call when they are finished if you have time.    Phone Number Patient can be reached at: Other phone number:  [phone]*    Best Time: anytime    Can we leave a detailed message on this number? YES    Call taken on 6/21/2018 at 4:30 PM by Juan Max

## 2018-06-29 ENCOUNTER — CARE COORDINATION (OUTPATIENT)
Dept: CARDIOLOGY | Facility: CLINIC | Age: 63
End: 2018-06-29

## 2018-06-29 DIAGNOSIS — I42.0 NONISCHEMIC DILATED CARDIOMYOPATHY (H): ICD-10-CM

## 2018-06-29 LAB
ANION GAP SERPL CALCULATED.3IONS-SCNC: 8 MMOL/L (ref 3–14)
BUN SERPL-MCNC: 23 MG/DL (ref 7–30)
CALCIUM SERPL-MCNC: 8.7 MG/DL (ref 8.5–10.1)
CHLORIDE SERPL-SCNC: 110 MMOL/L (ref 94–109)
CO2 SERPL-SCNC: 23 MMOL/L (ref 20–32)
CREAT SERPL-MCNC: 1.39 MG/DL (ref 0.66–1.25)
GFR SERPL CREATININE-BSD FRML MDRD: 52 ML/MIN/1.7M2
GLUCOSE SERPL-MCNC: 124 MG/DL (ref 70–99)
POTASSIUM SERPL-SCNC: 4 MMOL/L (ref 3.4–5.3)
SODIUM SERPL-SCNC: 140 MMOL/L (ref 133–144)

## 2018-06-29 NOTE — PROGRESS NOTES
Last Basic Metabolic Panel:  Lab Results   Component Value Date     06/29/2018      Lab Results   Component Value Date    POTASSIUM 4.0 06/29/2018     Lab Results   Component Value Date    CHLORIDE 110 06/29/2018     Lab Results   Component Value Date    STUART 8.7 06/29/2018     Lab Results   Component Value Date    CO2 23 06/29/2018     Lab Results   Component Value Date    BUN 23 06/29/2018     Lab Results   Component Value Date    CR 1.39 06/29/2018     Lab Results   Component Value Date     06/29/2018     Post increasing Lisinopril labs above stable; no changes at this time. Patient verbalized understanding and is agreeable with plan

## 2018-07-18 ENCOUNTER — TELEPHONE (OUTPATIENT)
Dept: FAMILY MEDICINE | Facility: CLINIC | Age: 63
End: 2018-07-18

## 2018-07-18 ENCOUNTER — THERAPY VISIT (OUTPATIENT)
Dept: PHYSICAL THERAPY | Facility: CLINIC | Age: 63
End: 2018-07-18
Payer: COMMERCIAL

## 2018-07-18 DIAGNOSIS — R26.81 UNSTEADY GAIT: Primary | ICD-10-CM

## 2018-07-18 DIAGNOSIS — G90.50 RSD (REFLEX SYMPATHETIC DYSTROPHY): ICD-10-CM

## 2018-07-18 RX ORDER — OXYCODONE HYDROCHLORIDE 10 MG/1
10 TABLET ORAL EVERY 6 HOURS PRN
Qty: 90 TABLET | Refills: 0 | Status: SHIPPED | OUTPATIENT
Start: 2018-07-18 | End: 2018-08-20

## 2018-07-18 NOTE — TELEPHONE ENCOUNTER
Reason for Call:  Medication or medication refill:    Do you use a [facility]?  Name of the pharmacy and phone number for the current request:  Will  at     Name of the medication requested: oxyCODONE IR (ROXICODONE) 10 MG tablet    Other request: please call patient when ready, he states he's due for a refill on 7/20    Can we leave a detailed message on this number? YES    Phone number patient can be reached at: Home number on file [phone] (home)    Best Time: anytime    Call taken on 7/18/2018 at 12:01 PM by Juan Max

## 2018-07-18 NOTE — TELEPHONE ENCOUNTER
Requested Prescriptions   Pending Prescriptions Disp Refills     oxyCODONE IR (ROXICODONE) 10 MG tablet 90 tablet 0     Sig: Take 1 tablet (10 mg) by mouth every 6 hours as needed for moderate to severe pain 3 per day    There is no refill protocol information for this order        Last Written Prescription Date:  6/20/2018  Last Fill Quantity: 90,  # refills: 0   Last office visit: 1/25/2018 with prescribing provider:  Srikanth - history of BKA   Future Office Visit:      Routing refill request to provider for review/approval because:  Drug not on the [facility] refill protocol       Sandra Chan RN  [facility]

## 2018-08-20 ENCOUNTER — TELEPHONE (OUTPATIENT)
Dept: FAMILY MEDICINE | Facility: CLINIC | Age: 63
End: 2018-08-20

## 2018-08-20 DIAGNOSIS — G90.50 RSD (REFLEX SYMPATHETIC DYSTROPHY): ICD-10-CM

## 2018-08-20 NOTE — TELEPHONE ENCOUNTER
Requested Prescriptions   Pending Prescriptions Disp Refills     oxyCODONE IR (ROXICODONE) 10 MG tablet 90 tablet 0     Sig: Take 1 tablet (10 mg) by mouth every 6 hours as needed for moderate to severe pain 3 per day    There is no refill protocol information for this order        Last Written Prescription Date:  7/18/2018  Last Fill Quantity: 90,  # refills: 0   Last office visit: 1/25/2018 with prescribing provider:  Srikanth - history of BKA   Future Office Visit:    Routing refill request to provider for review/approval because:  Drug not on the [facility] refill protocol       Sandra Chan RN  [facility]

## 2018-08-20 NOTE — TELEPHONE ENCOUNTER
Reason for Call:  Medication or medication refill:    Do you use a [facility]?  Name of the pharmacy and phone number for the current request:   at     Name of the medication requested: oxyCODONE IR (ROXICODONE) 10 MG tablet    Other request: call patient when script ir ready for     Can we leave a detailed message on this number? YES    Phone number patient can be reached at: Home number on file [phone] (home)    Best Time: anytime    Call taken on 8/20/2018 at 8:56 AM by Belen Pearl

## 2018-08-21 RX ORDER — OXYCODONE HYDROCHLORIDE 10 MG/1
10 TABLET ORAL EVERY 6 HOURS PRN
Qty: 90 TABLET | Refills: 0 | Status: SHIPPED | OUTPATIENT
Start: 2018-08-21 | End: 2018-09-19

## 2018-08-21 NOTE — TELEPHONE ENCOUNTER
Attempt # 1  Called patient at home number.[phone]  Was call answered?  Yes, explained to patient prescription is in process but will not be ready to  until tomorrow. Patient verbalized understanding.    Sandra Chan RN  [facility]

## 2018-08-21 NOTE — TELEPHONE ENCOUNTER
Patient calling again regarding refill request. He states it was due to be filled yesterday and he is out of the medication.

## 2018-08-22 NOTE — TELEPHONE ENCOUNTER
Patient picked up his script at the  on 8/22/18.    Izabela ALY  Patient Representative  [place]

## 2018-09-12 ENCOUNTER — OFFICE VISIT (OUTPATIENT)
Dept: CARDIOLOGY | Facility: CLINIC | Age: 63
End: 2018-09-12
Attending: INTERNAL MEDICINE
Payer: COMMERCIAL

## 2018-09-12 VITALS
BODY MASS INDEX: 25.95 KG/M2 | HEART RATE: 81 BPM | DIASTOLIC BLOOD PRESSURE: 91 MMHG | HEIGHT: 75 IN | SYSTOLIC BLOOD PRESSURE: 130 MMHG | WEIGHT: 208.7 LBS | OXYGEN SATURATION: 100 %

## 2018-09-12 DIAGNOSIS — I42.0 NONISCHEMIC DILATED CARDIOMYOPATHY (H): Primary | ICD-10-CM

## 2018-09-12 DIAGNOSIS — I42.0 NONISCHEMIC DILATED CARDIOMYOPATHY (H): ICD-10-CM

## 2018-09-12 LAB
ANION GAP SERPL CALCULATED.3IONS-SCNC: 7 MMOL/L (ref 3–14)
BUN SERPL-MCNC: 16 MG/DL (ref 7–30)
CALCIUM SERPL-MCNC: 9 MG/DL (ref 8.5–10.1)
CHLORIDE SERPL-SCNC: 108 MMOL/L (ref 94–109)
CO2 SERPL-SCNC: 26 MMOL/L (ref 20–32)
CREAT SERPL-MCNC: 1.48 MG/DL (ref 0.66–1.25)
GFR SERPL CREATININE-BSD FRML MDRD: 48 ML/MIN/1.7M2
GLUCOSE SERPL-MCNC: 109 MG/DL (ref 70–99)
POTASSIUM SERPL-SCNC: 3.6 MMOL/L (ref 3.4–5.3)
SODIUM SERPL-SCNC: 142 MMOL/L (ref 133–144)

## 2018-09-12 PROCEDURE — 36415 COLL VENOUS BLD VENIPUNCTURE: CPT | Performed by: INTERNAL MEDICINE

## 2018-09-12 PROCEDURE — 80048 BASIC METABOLIC PNL TOTAL CA: CPT | Performed by: INTERNAL MEDICINE

## 2018-09-12 PROCEDURE — G0463 HOSPITAL OUTPT CLINIC VISIT: HCPCS | Mod: ZF

## 2018-09-12 PROCEDURE — 99214 OFFICE O/P EST MOD 30 MIN: CPT | Mod: GC | Performed by: INTERNAL MEDICINE

## 2018-09-12 RX ORDER — SPIRONOLACTONE 25 MG/1
25 TABLET ORAL DAILY
Qty: 90 TABLET | Refills: 3 | Status: SHIPPED | OUTPATIENT
Start: 2018-09-12 | End: 2019-03-07

## 2018-09-12 ASSESSMENT — PAIN SCALES - GENERAL: PAINLEVEL: NO PAIN (0)

## 2018-09-12 NOTE — PATIENT INSTRUCTIONS
Cardiology Providers you saw during your visit:  Dr. Brown    Medication changes:  Please START taking Spironolactone 25 mg by mouth once daily.    Follow up:  1- Blood work ( basic metabolic panel) next week 9/19/2018    2- Please return to clinic for follow-up:  With Dr. Brown in 4 months with labs prior    3- We will contact the pain clinic for considering medical Marijuana.    Please call if you have:  1. Weight gain of more than 2 pounds in a day or 5 pounds in a week  2. Increased shortness of breath, swelling or bloating  3. Dizziness, lightheadedness   4. Any questions or concerns.     Follow the American Heart Association Diet and Lifestyle recommendations:  Limit saturated fat, trans fat, sodium, red meat, sweets and sugar-sweetened beverages. If you choose to eat red meat, compare labels and select the leanest cuts available.  Aim for at least 150 minutes of moderate physical activity or 75 minutes of vigorous physical activity - or an equal combination of both - each week.    During business hours: [phone], press option # 1 to be routed to the [facility] then option # 3 for medical questions to speak with a nurse     After hours, weekends or holidays: On Call Cardiologist- [phone]   option #4 and ask to speak to the on-call Cardiologist. Inform them you are a CORE/heart failure patient at the [facility].      Silvia Ballesteros RN  Cardiology Nurse Care Coordinator    Keep up the good work!    Take Care!

## 2018-09-12 NOTE — NURSING NOTE
Chief Complaint   Patient presents with     Follow Up For     Chronic systolic heart failure     Vitals were taken and medications were reconciled.     Maribel Moncada MA    8:22 AM
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
## 2018-09-12 NOTE — MR AVS SNAPSHOT
After Visit Summary   9/12/2018    Constantino Taylor    MRN: [id number]           Patient Information     Date Of Birth          1955        Visit Information        Provider Department      9/12/2018 8:20 AM Norah Brown MD [facility]        Today's Diagnoses     Nonischemic dilated cardiomyopathy (HCC)    -  1      Care Instructions    Cardiology Providers you saw during your visit:  Dr. Brown    Medication changes:  Please START taking Spironolactone 25 mg by mouth once daily.    Follow up:  1- Blood work ( basic metabolic panel) next week 9/19/2018    2- Please return to clinic for follow-up:  With Dr. Brown in 4 months with labs prior    3- We will contact the pain clinic for considering medical Marijuana.    Please call if you have:  1. Weight gain of more than 2 pounds in a day or 5 pounds in a week  2. Increased shortness of breath, swelling or bloating  3. Dizziness, lightheadedness   4. Any questions or concerns.     Follow the American Heart Association Diet and Lifestyle recommendations:  Limit saturated fat, trans fat, sodium, red meat, sweets and sugar-sweetened beverages. If you choose to eat red meat, compare labels and select the leanest cuts available.  Aim for at least 150 minutes of moderate physical activity or 75 minutes of vigorous physical activity - or an equal combination of both - each week.    During business hours: [phone], press option # 1 to be routed to the [facility] then option # 3 for medical questions to speak with a nurse     After hours, weekends or holidays: On Call Cardiologist- [phone]   option #4 and ask to speak to the on-call Cardiologist. Inform them you are a CORE/heart failure patient at the [facility].      Silvia Ballesteros, RN  Cardiology Nurse Care Coordinator    Keep up the good work!    Take Care!            Follow-ups after your visit        Additional Services     Follow-Up with Advanced Heart Failure Clinic                  Your next 10 appointments already scheduled     Sep 19, 2018  7:00 AM CDT   LAB with  LAB   [facility] ([facility])    [address]  [address] [facility] [address] [phone]           Please do not eat 10-12 hours before your appointment if you are coming in fasting for labs on lipids, cholesterol, or glucose (sugar). This does not apply to pregnant women. Water, hot tea and black coffee (with nothing added) are okay. Do not drink other fluids, diet soda or chew gum.            Jan 16, 2019 10:30 AM CST   Lab with  LAB   [facility] ([facility])    [address]  [address] [facility] [address] [phone]            Jan 16, 2019 11:00 AM CST   (Arrive by 10:45 AM)   RETURN HEART FAILURE with Norah Brown MD   [facility] ([facility])    [address] [place] [id number] [phone]              Future tests that were ordered for you today     Open Future Orders        Priority Expected Expires Ordered    Basic metabolic panel Routine 1/12/2019 9/12/2019 9/12/2018    Follow-Up with Advanced Heart Failure Clinic Routine 1/12/2019 9/12/2019 9/12/2018    Basic metabolic panel Routine 9/19/2018 9/12/2019 9/12/2018            Who to contact     If you have questions or need follow up information about today's clinic visit or your schedule please contact [facility] directly at [phone].  Normal or non-critical lab and imaging results will be communicated to you by MyChart, letter or phone within 4 business days after the clinic has received the results. If you do not hear from us within 7 days, please contact the clinic through [employer] or phone. If you have a critical or abnormal lab result, we will notify you by phone as soon as possible.  Submit refill requests through Epiclist or call your pharmacy and they will forward the refill request to us.  "Please allow 3 business days for your refill to be completed.          Additional Information About Your Visit        Send the [employer] Information     [employer] gives you secure access to your electronic health record. If you see a primary care provider, you can also send messages to your care team and make appointments. If you have questions, please call your primary care clinic.  If you do not have a primary care provider, please call [phone] and they will assist you.        Care EveryWhere ID     This is your Care EveryWhere ID. This could be used by other organizations to access your [place] medical records  [id number]        Your Vitals Were     Pulse Height Pulse Oximetry BMI (Body Mass Index)          81 1.905 m (6' 3\") 100% 26.09 kg/m2         Blood Pressure from Last 3 Encounters:   09/12/18 (!) 130/91   05/24/18 (!) 152/103   05/09/18 149/88    Weight from Last 3 Encounters:   09/12/18 94.7 kg (208 lb 11.2 oz)   05/09/18 93.4 kg (206 lb)   01/12/18 93 kg (205 lb)              We Performed the Following     Follow-Up with Advanced Heart Failure Clinic          Today's Medication Changes          These changes are accurate as of 9/12/18  9:12 AM.  If you have any questions, ask your nurse or doctor.               Start taking these medicines.        Dose/Directions    spironolactone 25 MG tablet   Commonly known as:  ALDACTONE   Used for:  Nonischemic dilated cardiomyopathy (H)   Started by:  Norah Brown MD        Dose:  25 mg   Take 1 tablet (25 mg) by mouth daily   Quantity:  90 tablet   Refills:  3            Where to get your medicines      These medications were sent to [facility] [address] [address] [address] [facility] [address] [address] [address] [facility] [address]     Phone:  [phone]     spironolactone 25 MG tablet                Primary Care Provider Office Phone # Fax #    [facility] [phone]       [address]  Levine, Susan. \Hospital Has a New Name and Outlook.\"" [id number]        Equal Access to Services     PARKER DOMINGUEZ : Hadii pravin cox hadflorencioo Soanaali, waaxda luqadaha, qaybta kadommitali duttonrufinomitali, tahir newsome haydimitri butlerlissetteaustin nava. So [facility] [place] [phone].    ATENCIÓN: Si habla español, tiene a chacon disposición servicios gratuitos de asistencia lingüística. Llame al [phone].    We comply with applicable federal civil rights laws and [place] laws. We do not discriminate on the basis of race, color, national origin, age, disability, sex, sexual orientation, or gender identity.            Thank you!     Thank you for choosing [facility]  for your care. Our goal is always to provide you with excellent care. Hearing back from our patients is one way we can continue to improve our services. Please take a few minutes to complete the written survey that you may receive in the mail after your visit with us. Thank you!             Your Updated Medication List - Protect others around you: Learn how to safely use, store and throw away your medicines at www.disposemymeds.org.          This list is accurate as of 9/12/18  9:12 AM.  Always use your most recent med list.                   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)       lisinopril 40 MG tablet    PRINIVIL/ZESTRIL    90 tablet    Take 1 tablet (40 mg) by mouth daily TAKE 1 TABLET(10 MG) BY MOUTH DAILY    Hypertension goal BP (blood pressure) < 140/90       metoprolol succinate 200 MG 24 hr tablet    TOPROL-XL    90 tablet    Take 1 tablet (200 mg) by  "mouth daily    Hypertension goal BP (blood pressure) < 140/90       nicotine polacrilex 2 MG gum    NICORELIEF    30 tablet    Place 1 each (2 mg) inside cheek as needed for smoking cessation Reported on 3/9/2017    Encounter for smoking cessation counseling       * order for DME     1 each    Please dispense blood pressure machine and cuff.    Hypertension goal BP (blood pressure) < 140/90       * order for DME     30 each    Equipment being ordered: self adhesive bandage 4\" by 8\"    Hx of BKA, left (H)       oxyCODONE IR 10 MG tablet    ROXICODONE    90 tablet    Take 1 tablet (10 mg) by mouth every 6 hours as needed for moderate to severe pain 3 per day    RSD (reflex sympathetic dystrophy)       polyethylene glycol powder    MIRALAX    510 g    Take 17 g by mouth as needed for constipation    Slow transit constipation       rivaroxaban ANTICOAGULANT 20 MG Tabs tablet    XARELTO    90 tablet    Take 1 tablet (20 mg) by mouth daily (with dinner)    Chronic atrial fibrillation (H)       sennosides 8.6 MG tablet    SENOKOT    120 tablet    Take 2 tablets by mouth 2 times daily    Slow transit constipation       spironolactone 25 MG tablet    ALDACTONE    90 tablet    Take 1 tablet (25 mg) by mouth daily    Nonischemic dilated cardiomyopathy (H)       tadalafil 2.5 MG tablet    CIALIS    90 tablet    Take 1 tablet (2.5 mg) by mouth daily Never use with nitroglycerin, terazosin or doxazosin.    Drug-induced erectile dysfunction       triamcinolone 0.1 % cream    KENALOG    80 g    Take 1 apply by topical route two times a day as needed    Chronic dermatitis       UNABLE TO FIND      MEDICATION NAME: Hydrocortisone 0.5 % topical cream-Take 1 application by topical route as needed.        varenicline 1 MG tablet    CHANTIX    56 tablet    Take 1 tablet (1 mg) by mouth 2 times daily    Hx of BKA, left (H), Encounter for smoking cessation counseling       * Notice:  This list has 2 medication(s) that are the same as other " medications prescribed for you. Read the directions carefully, and ask your doctor or other care provider to review them with you.

## 2018-09-12 NOTE — NURSING NOTE
Diet: Patient instructed regarding a heart healthy diet, including discussion of reduced fat and sodium intake. Patient demonstrated understanding of this information and agreed to call with further questions or concerns.  Labs: Patient was given results of the laboratory testing obtained today. Patient was instructed to return for the next laboratory testing in 1 week 9/19/2018 . Patient demonstrated understanding of this information and agreed to call with further questions or concerns.     New Medication: Patient was educated regarding newly prescribed medication, including discussion of  the indication, administration, side effects, and when to report to MD or RN. Patient demonstrated understanding of this information and agreed to call with further questions or concerns.  Return Appointment: Patient given instructions regarding scheduling next clinic visit. Patient demonstrated understanding of this information and agreed to call with further questions or concerns.  Patient stated he understood all health information given and agreed to call with further questions or concerns.
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
## 2018-09-19 ENCOUNTER — TELEPHONE (OUTPATIENT)
Dept: FAMILY MEDICINE | Facility: CLINIC | Age: 63
End: 2018-09-19

## 2018-09-19 ENCOUNTER — CARE COORDINATION (OUTPATIENT)
Dept: CARDIOLOGY | Facility: CLINIC | Age: 63
End: 2018-09-19

## 2018-09-19 DIAGNOSIS — G90.50 RSD (REFLEX SYMPATHETIC DYSTROPHY): ICD-10-CM

## 2018-09-19 RX ORDER — OXYCODONE HYDROCHLORIDE 10 MG/1
10 TABLET ORAL EVERY 6 HOURS PRN
Qty: 90 TABLET | Refills: 0 | Status: SHIPPED | OUTPATIENT
Start: 2018-09-19 | End: 2018-10-17

## 2018-09-19 NOTE — TELEPHONE ENCOUNTER
Patient picked up his script at the  on 9/19/18.    Izabela ALY  Patient Representative  [place]

## 2018-09-19 NOTE — TELEPHONE ENCOUNTER
Reason for Call:  Medication or medication refill:    Do you use a [facility]?  Name of the pharmacy and phone number for the current request:   Will  at .    Name of the medication requested: oxyCODONE IR (ROXICODONE) 10 MG tablet    Other request:     Can we leave a detailed message on this number? YES    Phone number patient can be reached at: Home number on file [phone] (home)    Best Time:     Call taken on 9/19/2018 at 9:36 AM by Mely Smith

## 2018-09-19 NOTE — TELEPHONE ENCOUNTER
Requested Prescriptions   Pending Prescriptions Disp Refills     oxyCODONE IR (ROXICODONE) 10 MG tablet 90 tablet 0     Sig: Take 1 tablet (10 mg) by mouth every 6 hours as needed for moderate to severe pain 3 per day    There is no refill protocol information for this order        Last refill 8/21/18 # 90  Last OV 1/25/18.    Routing refill request to provider for review/approval because:  Drug not on the [facility] refill protocol   Angelica Siegel RN [facility] Triage.

## 2018-09-19 NOTE — PROGRESS NOTES
Constantino was seen by Dr. Brown 9/20, started on spironolactone and scheduled to have labs rechecked 9/19.  He missed his scheduled lab appt today 9/19.  I called him, he confirmed he started the sipro last week, forgot his lab today today. We -rescheduled his lab appt to tomorrow 9/20 at 9am.  Esperanza Orona RN

## 2018-09-20 DIAGNOSIS — I42.0 NONISCHEMIC DILATED CARDIOMYOPATHY (H): ICD-10-CM

## 2018-09-20 LAB
ANION GAP SERPL CALCULATED.3IONS-SCNC: 8 MMOL/L (ref 3–14)
BUN SERPL-MCNC: 22 MG/DL (ref 7–30)
CALCIUM SERPL-MCNC: 9.2 MG/DL (ref 8.5–10.1)
CHLORIDE SERPL-SCNC: 106 MMOL/L (ref 94–109)
CO2 SERPL-SCNC: 24 MMOL/L (ref 20–32)
CREAT SERPL-MCNC: 1.42 MG/DL (ref 0.66–1.25)
GFR SERPL CREATININE-BSD FRML MDRD: 51 ML/MIN/1.7M2
GLUCOSE SERPL-MCNC: 114 MG/DL (ref 70–99)
POTASSIUM SERPL-SCNC: 4.2 MMOL/L (ref 3.4–5.3)
SODIUM SERPL-SCNC: 138 MMOL/L (ref 133–144)

## 2018-09-24 ENCOUNTER — TELEPHONE (OUTPATIENT)
Dept: FAMILY MEDICINE | Facility: CLINIC | Age: 63
End: 2018-09-24

## 2018-09-24 NOTE — TELEPHONE ENCOUNTER
Forms received from: [employer]   Phone number listed: [phone]   Fax listed: [phone]  Date received: 09/24/18  Form description: Response Form  Once forms are completed, please return to [employer] via Fax.  Is patient requesting to be contacted when forms are completed: NA    Form placed: In providers linda Daly

## 2018-09-28 ENCOUNTER — TELEPHONE (OUTPATIENT)
Dept: ONCOLOGY | Facility: CLINIC | Age: 63
End: 2018-09-28

## 2018-09-28 NOTE — TELEPHONE ENCOUNTER
Tobacco Treatment Team at the [facility] attempted to reach Mr. Taylor on 9/28/2018 regarding the tobacco cessation program to help Mr. Taylor to quit smoking. We will attempt to reach Mr. Taylor another time.

## 2018-10-17 DIAGNOSIS — G90.50 RSD (REFLEX SYMPATHETIC DYSTROPHY): ICD-10-CM

## 2018-10-17 NOTE — TELEPHONE ENCOUNTER
Reason for Call:  Medication or medication refill:    Do you use a [facility]?  Name of the pharmacy and phone number for the current request:  [facility]     Name of the medication requested: oxyCODONE IR (ROXICODONE)    Other request: No    Can we leave a detailed message on this number? YES    Phone number patient can be reached at: Cell number on file:    Telephone Information:   Mobile [phone]       Best Time: Anytime    Call taken on 10/17/2018 at 3:58 PM by Tati So

## 2018-10-17 NOTE — TELEPHONE ENCOUNTER
Oxycodone 10 mg       Last Written Prescription Date:  9/19/18  Last Fill Quantity: 90,   # refills: 0  Last Office Visit: 1/25/18  Future Office visit:       Routing refill request to provider for review/approval because:  Drug not on the [facility], [facility] or [facility] refill protocol or controlled substance    Viviane Love RN  [facility]

## 2018-10-19 ENCOUNTER — TRANSFERRED RECORDS (OUTPATIENT)
Dept: HEALTH INFORMATION MANAGEMENT | Facility: CLINIC | Age: 63
End: 2018-10-19

## 2018-10-19 RX ORDER — OXYCODONE HYDROCHLORIDE 10 MG/1
10 TABLET ORAL EVERY 6 HOURS PRN
Qty: 90 TABLET | Refills: 0 | Status: SHIPPED | OUTPATIENT
Start: 2018-10-19 | End: 2018-11-20

## 2018-10-22 DIAGNOSIS — I48.91 ATRIAL FIBRILLATION WITH RVR (H): ICD-10-CM

## 2018-10-22 DIAGNOSIS — I42.0 DILATED CARDIOMYOPATHY (H): ICD-10-CM

## 2018-10-22 NOTE — TELEPHONE ENCOUNTER
"Requested Prescriptions   Pending Prescriptions Disp Refills     ASPIRIN LOW DOSE 81 MG EC tablet [Pharmacy Med Name: ASPIRIN 81MG EC LOW DOSE TABLETS] 90 tablet 0    Last Written Prescription Date:  1-12-18  Last Fill Quantity: 90,  # refills: 3   Last office visit: 1/25/2018 with prescribing provider:  1-25-18   Future Office Visit:     Sig: TAKE 1 TABLET(81 MG) BY MOUTH DAILY    Analgesics (Non-Narcotic Tylenol and ASA Only) Passed    10/22/2018 10:30 AM       Passed - Recent (12 mo) or future (30 days) visit within the authorizing provider's specialty    Patient had office visit in the last 12 months or has a visit in the next 30 days with authorizing provider or within the authorizing provider's specialty.  See \"Patient Info\" tab in inbasket, or \"Choose Columns\" in Meds & Orders section of the refill encounter.             Passed - Patient is age 20 years or older    If ASA is flagged for ages under 20 years old. Forward to provider for confirmation Ryes Syndrome is not a concern.                "

## 2018-10-23 RX ORDER — ASPIRIN 81 MG/1
TABLET, COATED ORAL
Qty: 90 TABLET | Refills: 0 | Status: SHIPPED | OUTPATIENT
Start: 2018-10-23 | End: 2019-03-07

## 2018-10-23 NOTE — TELEPHONE ENCOUNTER
"It appears 90 tabs with 3 refills were sent to another [facility] on 1/12/18.   Can't they pull this refill over?       I called [facility], on hold an extended period with \"one caller ahead of me\".     I cued up one 90 day refill, patient last seen in Jan 2018.    Routing refill request to provider for review/approval because:  Drug interaction warning        Janet Tucker RN  [facility]      "

## 2018-11-05 ENCOUNTER — TELEPHONE (OUTPATIENT)
Dept: FAMILY MEDICINE | Facility: CLINIC | Age: 63
End: 2018-11-05

## 2018-11-05 NOTE — TELEPHONE ENCOUNTER
"Reason for Call: Appointment, Requested Provider:  Avery Duke MD    PCP: Avery Duke    Reason for visit: Leg swollen    Duration of symptoms: Ongoing issue    Have you been treated for this in the past? Yes    Additional comments: patient asked to see Dr. Duke as soon as possible. Informed patient that the soonest appointment with him is 11/14. Patient began shouting \" I cant wait that long!\" and repeated this multiple times. Informed patient I will send a message to see if Dr. Duke could work him in sooner. Please call back to discuss.    Can we leave a detailed message on this number? YES    Phone number patient can be reached at: Home number on file [phone] (home)    Best Time: Anytime    Call taken on 11/5/2018 at 3:17 PM by Bryanna Herrera    "

## 2018-11-06 NOTE — TELEPHONE ENCOUNTER
TC contacted patient and he states that the problem is that his prosthetic leg is not fitting correctly. He needs forms completed to get a new prosthetic leg. TC scheduled patient for appointment on 11/07/18.

## 2018-11-07 ENCOUNTER — OFFICE VISIT (OUTPATIENT)
Dept: FAMILY MEDICINE | Facility: CLINIC | Age: 63
End: 2018-11-07
Payer: COMMERCIAL

## 2018-11-07 VITALS
TEMPERATURE: 97.5 F | DIASTOLIC BLOOD PRESSURE: 75 MMHG | OXYGEN SATURATION: 98 % | SYSTOLIC BLOOD PRESSURE: 112 MMHG | HEART RATE: 76 BPM

## 2018-11-07 DIAGNOSIS — E63.9 POOR NUTRITION: Primary | ICD-10-CM

## 2018-11-07 DIAGNOSIS — T87.9 BKA STUMP COMPLICATION (H): ICD-10-CM

## 2018-11-07 PROCEDURE — 99213 OFFICE O/P EST LOW 20 MIN: CPT | Performed by: INTERNAL MEDICINE

## 2018-11-07 RX ORDER — MV-MINS/FOLIC/LYCOPENE/GINKGO 400-300MCG
1 TABLET ORAL DAILY
Qty: 100 TABLET | Refills: 3 | Status: SHIPPED | OUTPATIENT
Start: 2018-11-07 | End: 2020-06-01

## 2018-11-07 NOTE — PROGRESS NOTES
SUBJECTIVE:   Constantino Taylor is a 62 year old male who presents to clinic today for the following health issues:    Discuss forms for a new prosthetic leg, old prosthetic is rubbing against stump.    Patient getting skin irritation, callous and breakdown from rubbing in the socket of current artificial leg            Problem list and histories reviewed & adjusted, as indicated.  Additional history: as documented    Patient Active Problem List   Diagnosis     Cerebral infarction (H)     Hepatitis C     Tobacco use disorder     Chronic low back pain     Acute pancreatitis     Hyperlipidemia LDL goal <70     Hypertension goal BP (blood pressure) < 140/90     Nonischemic dilated cardiomyopathy (HCC)     Malignant neoplasm of prostate (H)     Erectile dysfunction     Eczema     PAD (peripheral artery disease) (H)     S/P BKA (below knee amputation) unilateral (H)     Unilateral complete BKA, left, sequela (H)     Constipation     Encounter for counseling     Acute renal failure (H)     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     Past Surgical History:   Procedure Laterality Date     AMPUTATE LEG BELOW KNEE Left 6/19/2015    Procedure: AMPUTATE LEG BELOW KNEE;  Surgeon: Odessa Browning MD;  Location: [facility] OR     removal of blood clots in arm         Social History   Substance Use Topics     Smoking status: Former Smoker     Packs/day: 0.30     Years: 40.00     Types: Cigarettes     Quit date: 3/12/2018     Smokeless tobacco: Former User     Alcohol use 1.2 - 3.6 oz/week     2 - 6 Cans of beer per week      Comment: couple of beers per episode, several times a week     Family History   Problem Relation Age of Onset     Cancer Mother      brain     Cancer Brother          Current Outpatient Prescriptions   Medication Sig Dispense Refill     ASPIRIN LOW DOSE 81 MG EC tablet TAKE 1  "TABLET(81 MG) BY MOUTH DAILY 90 tablet 0     atorvastatin (LIPITOR) 40 MG tablet Take 1 tablet (40 mg) by mouth At Bedtime 90 tablet 4     lisinopril (PRINIVIL/ZESTRIL) 40 MG tablet Take 1 tablet (40 mg) by mouth daily TAKE 1 TABLET(10 MG) BY MOUTH DAILY 90 tablet 3     metoprolol succinate (TOPROL-XL) 200 MG 24 hr tablet Take 1 tablet (200 mg) by mouth daily 90 tablet 4     Multiple Vitamins-Minerals (ONE-A-DAY MENS 50+ ADVANTAGE) TABS Take 1 each by mouth daily 100 tablet 3     order for DME Equipment being ordered: self adhesive bandage 4\" by 8\" 30 each 11     order for DME Please dispense blood pressure machine and cuff. 1 each 0     oxyCODONE IR (ROXICODONE) 10 MG tablet Take 1 tablet (10 mg) by mouth every 6 hours as needed for moderate to severe pain 3 per day 90 tablet 0     polyethylene glycol (MIRALAX) powder Take 17 g by mouth as needed for constipation 510 g 1     rivaroxaban ANTICOAGULANT (XARELTO) 20 MG TABS tablet Take 1 tablet (20 mg) by mouth daily (with dinner) 90 tablet 3     sennosides (SENOKOT) 8.6 MG tablet Take 2 tablets by mouth 2 times daily 120 tablet 3     spironolactone (ALDACTONE) 25 MG tablet Take 1 tablet (25 mg) by mouth daily 90 tablet 3     triamcinolone (KENALOG) 0.1 % cream Take 1 apply by topical route two times a day as needed 80 g 3     UNABLE TO FIND MEDICATION NAME: Hydrocortisone 0.5 % topical cream-Take 1 application by topical route as needed.       acetaminophen (TYLENOL) 500 MG tablet Take 500-1,000 mg by mouth every 6 hours as needed for mild pain       nicotine polacrilex (NICORELIEF) 2 MG gum Place 1 each (2 mg) inside cheek as needed for smoking cessation Reported on 3/9/2017 (Patient not taking: Reported on 5/9/2018) 30 tablet 3     tadalafil (CIALIS) 2.5 MG tablet Take 1 tablet (2.5 mg) by mouth daily Never use with nitroglycerin, terazosin or doxazosin. (Patient not taking: Reported on 5/9/2018) 90 tablet 1     varenicline (CHANTIX) 1 MG tablet Take 1 tablet (1 " mg) by mouth 2 times daily (Patient not taking: Reported on 5/9/2018) 56 tablet 2     No Known Allergies  Recent Labs   Lab Test  09/20/18   1016  09/12/18   0803   01/12/18   0923  09/20/17   1432   02/06/17   0855  10/17/16   1143   05/10/15   1648   04/08/15   0720  11/25/14   1203   01/06/14   1103  07/12/13   1109   11/15/12   0845   A1C   --    --    --    --    --    --    --    --    --    --    --    --   5.8   --   5.8  5.4   --    --    LDL   --    --    --   42   --    --    --   46   --    --    --   89   --    < >   --    --    < >   --    HDL   --    --    --   39*   --    --    --   41   --    --    --   40*   --    < >   --    --    < >   --    TRIG   --    --    --   74   --    --    --   91   --    --    --   92   --    < >   --    --    < >   --    ALT   --    --    --   29  23   --   20   --    < >   --    --   34   --    < >  69  37   --   52   CR  1.42*  1.48*   < >  1.64*  1.53*   < >  1.40*  1.53*   < >  1.90*   < >  1.37*  1.02   < >  0.86  1.08   < >  0.87   GFRESTIMATED  51*  48*   < >  43*  47*   < >  52*  47*   < >  37*   < >  53*  75   < >  >90  71   < >  >90   GFRESTBLACK  61  59*   < >  52*  56*   < >  63  57*   < >  44*   < >  65  >90   GFR Calc     < >  >90  86   < >  >90   POTASSIUM  4.2  3.6   < >  4.0  4.3   < >  3.9  4.6   < >  5.0   < >  3.9  4.5   < >  4.5  4.1   < >  3.6   TSH   --    --    --    --    --    --    --    --    --   0.99   --    --    --    --    --    --    --   1.18    < > = values in this interval not displayed.      BP Readings from Last 3 Encounters:   11/07/18 112/75   09/12/18 (!) 130/91   05/24/18 (!) 152/103    Wt Readings from Last 3 Encounters:   09/12/18 208 lb 11.2 oz (94.7 kg)   05/09/18 206 lb (93.4 kg)   01/12/18 205 lb (93 kg)                    Reviewed and updated as needed this visit by clinical staff  Tobacco  Allergies  Meds  Med Hx  Surg Hx  Fam Hx  Soc Hx      Reviewed and updated as needed this visit by  Provider         ROS:  Constitutional, HEENT, cardiovascular, pulmonary, gi and gu systems are negative, except as otherwise noted.    OBJECTIVE:     /75 (BP Location: Left arm, Patient Position: Chair, Cuff Size: Adult Regular)  Pulse 76  Temp 97.5  F (36.4  C) (Oral)  SpO2 98%  There is no height or weight on file to calculate BMI.  GENERAL: healthy, alert and no distress  EYES: Eyes grossly normal to inspection, PERRL and conjunctivae and sclerae normal  HENT: ear canals and TM's normal, nose and mouth without ulcers or lesions  NECK: no adenopathy, no asymmetry, masses, or scars and thyroid normal to palpation  RESP: lungs clear to auscultation - no rales, rhonchi or wheezes  CV: regular rate and rhythm, normal S1 S2, no S3 or S4, no murmur, click or rub, no peripheral edema and peripheral pulses strong  ABDOMEN: soft, nontender, no hepatosplenomegaly, no masses and bowel sounds normal  MS: no gross musculoskeletal defects noted, no edema  SKIN: no suspicious lesions or rashes  NEURO: Normal strength and tone, mentation intact and speech normal  BACK: no CVA tenderness, no paralumbar tenderness  PSYCH: mentation appears normal, affect normal/bright  LYMPH: no cervical, supraclavicular, axillary, or inguinal adenopathy  Callous formation with skin breakdown from rubbing  BKA stump    Diagnostic Test Results:  Results for orders placed or performed in visit on 09/20/18   Basic metabolic panel   Result Value Ref Range    Sodium 138 133 - 144 mmol/L    Potassium 4.2 3.4 - 5.3 mmol/L    Chloride 106 94 - 109 mmol/L    Carbon Dioxide 24 20 - 32 mmol/L    Anion Gap 8 3 - 14 mmol/L    Glucose 114 (H) 70 - 99 mg/dL    Urea Nitrogen 22 7 - 30 mg/dL    Creatinine 1.42 (H) 0.66 - 1.25 mg/dL    GFR Estimate 51 (L) >60 mL/min/1.7m2    GFR Estimate If Black 61 >60 mL/min/1.7m2    Calcium 9.2 8.5 - 10.1 mg/dL       ASSESSMENT/PLAN:       ICD-10-CM    1. Poor nutrition E63.9 Multiple Vitamins-Minerals (ONE-A-DAY MENS  50+ ADVANTAGE) TABS   2. BKA stump complication (H) T87.9      Completed paperwork for re-do of the prosthesis socket  Continue controll of risk factors          Avery Duke MD  [facility]

## 2018-11-07 NOTE — MR AVS SNAPSHOT
After Visit Summary   11/7/2018    Constantino Taylor    MRN: [id number]           Patient Information     Date Of Birth          1955        Visit Information        Provider Department      11/7/2018 12:40 PM Avery Duke MD [facility]        Today's Diagnoses     Poor nutrition    -  1       Follow-ups after your visit        Your next 10 appointments already scheduled     Jan 16, 2019 10:30 AM CST   Lab with [facility] LAB   [facility] ([facility]    [address]  1st Floor  [facility] [id number] [phone]            Jan 16, 2019 11:00 AM CST   (Arrive by 10:45 AM)   RETURN HEART FAILURE with Norah Brown MD   [facility] ([facility])    [address]  [address]  [facility] [address] [phone]              Who to contact     If you have questions or need follow up information about today's clinic visit or your schedule please contact [facility] directly at [phone].  Normal or non-critical lab and imaging results will be communicated to you by MyChart, letter or phone within 4 business days after the clinic has received the results. If you do not hear from us within 7 days, please contact the clinic through [employer] or phone. If you have a critical or abnormal lab result, we will notify you by phone as soon as possible.  Submit refill requests through Buffer or call your pharmacy and they will forward the refill request to us. Please allow 3 business days for your refill to be completed.          Additional Information About Your Visit        [employer] Information     Buffer gives you secure access to your electronic health record. If you see a primary care provider, you can also send messages to your care team and make appointments. If you have questions, please call your primary care clinic.  If you do not have a primary care provider, please  call [phone] and they will assist you.        Care EveryWhere ID     This is your Care EveryWhere ID. This could be used by other organizations to access your [place] medical records  [id number]        Your Vitals Were     Pulse Temperature Pulse Oximetry             76 97.5  F (36.4  C) (Oral) 98%          Blood Pressure from Last 3 Encounters:   11/07/18 112/75   09/12/18 (!) 130/91   05/24/18 (!) 152/103    Weight from Last 3 Encounters:   09/12/18 208 lb 11.2 oz (94.7 kg)   05/09/18 206 lb (93.4 kg)   01/12/18 205 lb (93 kg)              Today, you had the following     No orders found for display         Today's Medication Changes          These changes are accurate as of 11/7/18  1:23 PM.  If you have any questions, ask your nurse or doctor.               Start taking these medicines.        Dose/Directions    ONE-A-DAY MENS 50+ ADVANTAGE Tabs   Used for:  Poor nutrition   Started by:  Avery Duke MD        Dose:  1 each   Take 1 each by mouth daily   Quantity:  100 tablet   Refills:  3            Where to get your medicines      These medications were sent to [email or web address] [address] [place] [address] [address] [address] [address] [facility] [address]     Phone:  [phone]     ONE-A-DAY MENS 50+ ADVANTAGE Tabs                Primary Care Provider Office Phone # Fax #    Avery Duke [phone]       [address] [address]        Equal Access to Services     [facility]: Hadii pravin velascoo Sodarlin, waaxda luqadaha, qaybta kaalmatahir meza . So [facility] [phone].    ATENCIÓN: Si habla español, tiene a chacon disposición servicios gratuitos de asistencia lingüística. Llame al [phone].    We comply with applicable federal civil rights laws and [place] laws. We do not discriminate on the basis of race, color, national origin, age, disability, sex,  sexual orientation, or gender identity.            Thank you!     Thank you for choosing [facility]  for your care. Our goal is always to provide you with excellent care. Hearing back from our patients is one way we can continue to improve our services. Please take a few minutes to complete the written survey that you may receive in the mail after your visit with us. Thank you!             Your Updated Medication List - Protect others around you: Learn how to safely use, store and throw away your medicines at www.disposemymeds.org.          This list is accurate as of 11/7/18  1:23 PM.  Always use your most recent med list.                   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)       lisinopril 40 MG tablet    PRINIVIL/ZESTRIL    90 tablet    Take 1 tablet (40 mg) by mouth daily TAKE 1 TABLET(10 MG) BY MOUTH DAILY    Hypertension goal BP (blood pressure) < 140/90       metoprolol succinate 200 MG 24 hr tablet    TOPROL-XL    90 tablet    Take 1 tablet (200 mg) by mouth daily    Hypertension goal BP (blood pressure) < 140/90       nicotine polacrilex 2 MG gum    NICORELIEF    30 tablet    Place 1 each (2 mg) inside cheek as needed for smoking cessation Reported on 3/9/2017    Encounter for smoking cessation counseling       ONE-A-DAY MENS 50+ ADVANTAGE Tabs     100 tablet    Take 1 each by mouth daily    Poor nutrition       * order for DME     1 each    Please dispense blood pressure machine and cuff.    Hypertension goal  "BP (blood pressure) < 140/90       * order for DME     30 each    Equipment being ordered: self adhesive bandage 4\" by 8\"    Hx of BKA, left (H)       oxyCODONE IR 10 MG tablet    ROXICODONE    90 tablet    Take 1 tablet (10 mg) by mouth every 6 hours as needed for moderate to severe pain 3 per day    RSD (reflex sympathetic dystrophy)       polyethylene glycol powder    MIRALAX    510 g    Take 17 g by mouth as needed for constipation    Slow transit constipation       rivaroxaban ANTICOAGULANT 20 MG Tabs tablet    XARELTO    90 tablet    Take 1 tablet (20 mg) by mouth daily (with dinner)    Chronic atrial fibrillation (H)       sennosides 8.6 MG tablet    SENOKOT    120 tablet    Take 2 tablets by mouth 2 times daily    Slow transit constipation       spironolactone 25 MG tablet    ALDACTONE    90 tablet    Take 1 tablet (25 mg) by mouth daily    Nonischemic dilated cardiomyopathy (H)       tadalafil 2.5 MG tablet    CIALIS    90 tablet    Take 1 tablet (2.5 mg) by mouth daily Never use with nitroglycerin, terazosin or doxazosin.    Drug-induced erectile dysfunction       triamcinolone 0.1 % cream    KENALOG    80 g    Take 1 apply by topical route two times a day as needed    Chronic dermatitis       UNABLE TO FIND      MEDICATION NAME: Hydrocortisone 0.5 % topical cream-Take 1 application by topical route as needed.        varenicline 1 MG tablet    CHANTIX    56 tablet    Take 1 tablet (1 mg) by mouth 2 times daily    Hx of BKA, left (H), Encounter for smoking cessation counseling       * Notice:  This list has 2 medication(s) that are the same as other medications prescribed for you. Read the directions carefully, and ask your doctor or other care provider to review them with you.      "

## 2018-11-14 ENCOUNTER — TELEPHONE (OUTPATIENT)
Dept: FAMILY MEDICINE | Facility: CLINIC | Age: 63
End: 2018-11-14

## 2018-11-14 NOTE — TELEPHONE ENCOUNTER
Forms received from: [facility]   Phone number listed: [phone]   Fax listed: [phone]  Date received: 11/14/18  Form description: Letter of Medical Necessity  Once forms are completed, please return to [facility] via Fax.  Is patient requesting to be contacted when forms are completed: NA    Form placed: in providers linda Daly

## 2018-11-15 ENCOUNTER — MEDICAL CORRESPONDENCE (OUTPATIENT)
Dept: HEALTH INFORMATION MANAGEMENT | Facility: CLINIC | Age: 63
End: 2018-11-15

## 2018-11-20 DIAGNOSIS — G90.50 RSD (REFLEX SYMPATHETIC DYSTROPHY): ICD-10-CM

## 2018-11-20 RX ORDER — OXYCODONE HYDROCHLORIDE 10 MG/1
10 TABLET ORAL EVERY 6 HOURS PRN
Qty: 90 TABLET | Refills: 0 | Status: SHIPPED | OUTPATIENT
Start: 2018-11-20 | End: 2018-12-19

## 2018-11-20 NOTE — TELEPHONE ENCOUNTER
Requested Prescriptions   Pending Prescriptions Disp Refills     oxyCODONE IR (ROXICODONE) 10 MG tablet 90 tablet 0     Sig: Take 1 tablet (10 mg) by mouth every 6 hours as needed for moderate to severe pain 3 per day    There is no refill protocol information for this order        Last Written Prescription Date:  10/19/2018  Last Fill Quantity: 90,  # refills: 0  Last office visit: 11/7/2018 with prescribing provider:  Srikanth guevara   Future Office Visit:    Routing refill request to provider for review/approval because:  Drug not on the [facility] refill protocol       Sandra Chan RN  [facility]

## 2018-11-20 NOTE — TELEPHONE ENCOUNTER
Reason for Call:  Medication or medication refill:    Do you use a [facility]?  Name of the pharmacy and phone number for the current request:   Patient     Name of the medication requested: oxyCODONE IR (ROXICODONE) 10 MG tablet    Other request:  no    Can we leave a detailed message on this number? YES    Phone number patient can be reached at: Home number on file [phone] (home)    Best Time:  Anytime     Call taken on 11/20/2018 at 9:01 AM by Jayleen Wright

## 2018-12-06 ENCOUNTER — OFFICE VISIT (OUTPATIENT)
Dept: FAMILY MEDICINE | Facility: CLINIC | Age: 63
End: 2018-12-06
Payer: COMMERCIAL

## 2018-12-06 VITALS
BODY MASS INDEX: 25.87 KG/M2 | DIASTOLIC BLOOD PRESSURE: 78 MMHG | WEIGHT: 207 LBS | TEMPERATURE: 97.7 F | SYSTOLIC BLOOD PRESSURE: 118 MMHG | OXYGEN SATURATION: 99 % | HEART RATE: 94 BPM

## 2018-12-06 DIAGNOSIS — I73.9 PAD (PERIPHERAL ARTERY DISEASE) (H): ICD-10-CM

## 2018-12-06 DIAGNOSIS — I42.0 NONISCHEMIC DILATED CARDIOMYOPATHY (H): ICD-10-CM

## 2018-12-06 DIAGNOSIS — I10 HYPERTENSION GOAL BP (BLOOD PRESSURE) < 140/90: ICD-10-CM

## 2018-12-06 DIAGNOSIS — I48.0 PAROXYSMAL ATRIAL FIBRILLATION (H): ICD-10-CM

## 2018-12-06 DIAGNOSIS — E78.5 HYPERLIPIDEMIA LDL GOAL <70: ICD-10-CM

## 2018-12-06 DIAGNOSIS — G90.50 REFLEX SYMPATHETIC DYSTROPHY: Primary | ICD-10-CM

## 2018-12-06 DIAGNOSIS — C61 MALIGNANT NEOPLASM OF PROSTATE (H): ICD-10-CM

## 2018-12-06 DIAGNOSIS — Z23 NEED FOR SHINGLES VACCINE: ICD-10-CM

## 2018-12-06 PROCEDURE — 99214 OFFICE O/P EST MOD 30 MIN: CPT | Mod: 25 | Performed by: INTERNAL MEDICINE

## 2018-12-06 PROCEDURE — 90750 HZV VACC RECOMBINANT IM: CPT | Performed by: INTERNAL MEDICINE

## 2018-12-06 PROCEDURE — 90471 IMMUNIZATION ADMIN: CPT | Performed by: INTERNAL MEDICINE

## 2018-12-06 RX ORDER — LIDOCAINE 50 MG/G
OINTMENT TOPICAL PRN
Qty: 50 G | Refills: 3 | Status: SHIPPED | OUTPATIENT
Start: 2018-12-06 | End: 2019-01-07

## 2018-12-06 NOTE — PROGRESS NOTES
SUBJECTIVE:   Constantino Taylor is a 63 year old male who presents to clinic today for the following health issues:    Discuss cream for leg for pain.  Forms for new pain clinic;    [facility]   [address] [place] [address]   Phone# [phone], Fax# [phone]  Pt's email [email or web address]            Problem list and histories reviewed & adjusted, as indicated.  Additional history: as documented    Patient Active Problem List   Diagnosis     Cerebral infarction (H)     Hepatitis C     Tobacco use disorder     Chronic low back pain     Acute pancreatitis     Hyperlipidemia LDL goal <70     Hypertension goal BP (blood pressure) < 140/90     Nonischemic dilated cardiomyopathy (HCC)     Malignant neoplasm of prostate (H)     Erectile dysfunction     Eczema     PAD (peripheral artery disease) (H)     S/P BKA (below knee amputation) unilateral (H)     Unilateral complete BKA, left, sequela (H)     Constipation     Encounter for counseling     Acute renal failure (H)     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     Past Surgical History:   Procedure Laterality Date     AMPUTATE LEG BELOW KNEE Left 6/19/2015    Procedure: AMPUTATE LEG BELOW KNEE;  Surgeon: Odessa Browning MD;  Location: [facility] OR     removal of blood clots in arm         Social History   Substance Use Topics     Smoking status: Former Smoker     Packs/day: 0.30     Years: 40.00     Types: Cigarettes     Quit date: 3/12/2018     Smokeless tobacco: Former User     Alcohol use 1.2 - 3.6 oz/week     2 - 6 Cans of beer per week      Comment: couple of beers per episode, several times a week     Family History   Problem Relation Age of Onset     Cancer Mother      brain     Cancer Brother          Current Outpatient Prescriptions   Medication Sig Dispense Refill     ASPIRIN LOW  "DOSE 81 MG EC tablet TAKE 1 TABLET(81 MG) BY MOUTH DAILY 90 tablet 0     atorvastatin (LIPITOR) 40 MG tablet Take 1 tablet (40 mg) by mouth At Bedtime 90 tablet 4     lidocaine (XYLOCAINE) 5 % external ointment Apply topically as needed for moderate pain 50 g 3     lisinopril (PRINIVIL/ZESTRIL) 40 MG tablet Take 1 tablet (40 mg) by mouth daily TAKE 1 TABLET(10 MG) BY MOUTH DAILY 90 tablet 3     metoprolol succinate (TOPROL-XL) 200 MG 24 hr tablet Take 1 tablet (200 mg) by mouth daily 90 tablet 4     Multiple Vitamins-Minerals (ONE-A-DAY MENS 50+ ADVANTAGE) TABS Take 1 each by mouth daily 100 tablet 3     order for DME Equipment being ordered: self adhesive bandage 4\" by 8\" 30 each 11     order for DME Please dispense blood pressure machine and cuff. 1 each 0     oxyCODONE IR (ROXICODONE) 10 MG tablet Take 1 tablet (10 mg) by mouth every 6 hours as needed for moderate to severe pain 3 per day 90 tablet 0     polyethylene glycol (MIRALAX) powder Take 17 g by mouth as needed for constipation 510 g 1     rivaroxaban ANTICOAGULANT (XARELTO) 20 MG TABS tablet Take 1 tablet (20 mg) by mouth daily (with dinner) 90 tablet 3     sennosides (SENOKOT) 8.6 MG tablet Take 2 tablets by mouth 2 times daily 120 tablet 3     spironolactone (ALDACTONE) 25 MG tablet Take 1 tablet (25 mg) by mouth daily 90 tablet 3     triamcinolone (KENALOG) 0.1 % cream Take 1 apply by topical route two times a day as needed 80 g 3     UNABLE TO FIND MEDICATION NAME: Hydrocortisone 0.5 % topical cream-Take 1 application by topical route as needed.       acetaminophen (TYLENOL) 500 MG tablet Take 500-1,000 mg by mouth every 6 hours as needed for mild pain       nicotine polacrilex (NICORELIEF) 2 MG gum Place 1 each (2 mg) inside cheek as needed for smoking cessation Reported on 3/9/2017 (Patient not taking: Reported on 5/9/2018) 30 tablet 3     tadalafil (CIALIS) 2.5 MG tablet Take 1 tablet (2.5 mg) by mouth daily Never use with nitroglycerin, terazosin " or doxazosin. (Patient not taking: Reported on 5/9/2018) 90 tablet 1     varenicline (CHANTIX) 1 MG tablet Take 1 tablet (1 mg) by mouth 2 times daily (Patient not taking: Reported on 5/9/2018) 56 tablet 2     No Known Allergies  Recent Labs   Lab Test  09/20/18   1016  09/12/18   0803   01/12/18   0923  09/20/17   1432   02/06/17   0855  10/17/16   1143   05/10/15   1648   04/08/15   0720  11/25/14   1203   01/06/14   1103  07/12/13   1109   11/15/12   0845   A1C   --    --    --    --    --    --    --    --    --    --    --    --   5.8   --   5.8  5.4   --    --    LDL   --    --    --   42   --    --    --   46   --    --    --   89   --    < >   --    --    < >   --    HDL   --    --    --   39*   --    --    --   41   --    --    --   40*   --    < >   --    --    < >   --    TRIG   --    --    --   74   --    --    --   91   --    --    --   92   --    < >   --    --    < >   --    ALT   --    --    --   29  23   --   20   --    < >   --    --   34   --    < >  69  37   --   52   CR  1.42*  1.48*   < >  1.64*  1.53*   < >  1.40*  1.53*   < >  1.90*   < >  1.37*  1.02   < >  0.86  1.08   < >  0.87   GFRESTIMATED  51*  48*   < >  43*  47*   < >  52*  47*   < >  37*   < >  53*  75   < >  >90  71   < >  >90   GFRESTBLACK  61  59*   < >  52*  56*   < >  63  57*   < >  44*   < >  65  >90   GFR Calc     < >  >90  86   < >  >90   POTASSIUM  4.2  3.6   < >  4.0  4.3   < >  3.9  4.6   < >  5.0   < >  3.9  4.5   < >  4.5  4.1   < >  3.6   TSH   --    --    --    --    --    --    --    --    --   0.99   --    --    --    --    --    --    --   1.18    < > = values in this interval not displayed.      BP Readings from Last 3 Encounters:   12/06/18 118/78   11/07/18 112/75   09/12/18 (!) 130/91    Wt Readings from Last 3 Encounters:   12/06/18 207 lb (93.9 kg)   09/12/18 208 lb 11.2 oz (94.7 kg)   05/09/18 206 lb (93.4 kg)                    Reviewed and updated as needed this visit by clinical  staff  Tobacco  Allergies  Meds  Med Hx  Surg Hx  Fam Hx  Soc Hx      Reviewed and updated as needed this visit by Provider         ROS:  Constitutional, HEENT, cardiovascular, pulmonary, gi and gu systems are negative, except as otherwise noted.    OBJECTIVE:     /78 (BP Location: Right arm, Patient Position: Chair, Cuff Size: Adult Large)  Pulse 94  Temp 97.7  F (36.5  C) (Oral)  Wt 207 lb (93.9 kg)  SpO2 99%  BMI 25.87 kg/m2  Body mass index is 25.87 kg/(m^2).  GENERAL: healthy, alert and no distress  EYES: Eyes grossly normal to inspection, PERRL and conjunctivae and sclerae normal  HENT: ear canals and TM's normal, nose and mouth without ulcers or lesions  NECK: no adenopathy, no asymmetry, masses, or scars and thyroid normal to palpation  RESP: lungs clear to auscultation - no rales, rhonchi or wheezes  CV: regular rate and rhythm, normal S1 S2, no S3 or S4, no murmur, click or rub, no peripheral edema and peripheral pulses strong  ABDOMEN: soft, nontender, no hepatosplenomegaly, no masses and bowel sounds normal  MS: no gross musculoskeletal defects noted, no edema  SKIN: no suspicious lesions or rashes  NEURO: Normal strength and tone, mentation intact and speech normal  BACK: no CVA tenderness, no paralumbar tenderness  PSYCH: mentation appears normal, affect normal/bright  LYMPH: no cervical, supraclavicular, axillary, or inguinal adenopathy    Diagnostic Test Results:  Results for orders placed or performed in visit on 09/20/18   Basic metabolic panel   Result Value Ref Range    Sodium 138 133 - 144 mmol/L    Potassium 4.2 3.4 - 5.3 mmol/L    Chloride 106 94 - 109 mmol/L    Carbon Dioxide 24 20 - 32 mmol/L    Anion Gap 8 3 - 14 mmol/L    Glucose 114 (H) 70 - 99 mg/dL    Urea Nitrogen 22 7 - 30 mg/dL    Creatinine 1.42 (H) 0.66 - 1.25 mg/dL    GFR Estimate 51 (L) >60 mL/min/1.7m2    GFR Estimate If Black 61 >60 mL/min/1.7m2    Calcium 9.2 8.5 - 10.1 mg/dL       ASSESSMENT/PLAN:        ICD-10-CM    1. Reflex sympathetic dystrophy G90.50 lidocaine (XYLOCAINE) 5 % external ointment   2. Need for shingles vaccine Z23 HC ZOSTER VACCINE RECOMBINANT ADJUVANTED IM NJX     VACCINE ADMINISTRATION, INITIAL   3. Nonischemic dilated cardiomyopathy (HCC) I42.0    4. Malignant neoplasm of prostate (H) C61    5. Hyperlipidemia LDL goal <70 E78.5    6. Hypertension goal BP (blood pressure) < 140/90 I10    7. PAD (peripheral artery disease) (H) I73.9    8. Paroxysmal atrial fibrillation (H) I48.0        Records shared with pain clinic  Will trial topical lidocaine around the sore areas on the stump      Avery Duke MD  [facility]

## 2018-12-06 NOTE — MR AVS SNAPSHOT
After Visit Summary   12/6/2018    Constantino Taylor    MRN: [id number]           Patient Information     Date Of Birth          1955        Visit Information        Provider Department      12/6/2018 9:00 AM Avery Duke MD [facility]        Today's Diagnoses     Reflex sympathetic dystrophy    -  1    Need for shingles vaccine           Follow-ups after your visit        Your next 10 appointments already scheduled     Jan 16, 2019 10:30 AM CST   Lab with  LAB   [facility] ([facility])    [address]  1st Floor  [facility] [id number] [phone]            Jan 16, 2019 11:00 AM CST   (Arrive by 10:45 AM)   RETURN HEART FAILURE with Norah Brown MD   [facility] ([facility])    [address]  [address]  [place] [address] [phone]              Who to contact     If you have questions or need follow up information about today's clinic visit or your schedule please contact [facility] directly at [phone].  Normal or non-critical lab and imaging results will be communicated to you by Microsaichart, letter or phone within 4 business days after the clinic has received the results. If you do not hear from us within 7 days, please contact the clinic through Microsaichart or phone. If you have a critical or abnormal lab result, we will notify you by phone as soon as possible.  Submit refill requests through Face-Me or call your pharmacy and they will forward the refill request to us. Please allow 3 business days for your refill to be completed.          Additional Information About Your Visit        Microsaichart Information     Face-Me gives you secure access to your electronic health record. If you see a primary care provider, you can also send messages to your care team and make appointments. If you have questions, please call your primary care clinic.  If you  do not have a primary care provider, please call [phone] and they will assist you.        Care EveryWhere ID     This is your Care EveryWhere ID. This could be used by other organizations to access your [place] medical records  [id number]        Your Vitals Were     Pulse Temperature Pulse Oximetry BMI (Body Mass Index)          94 97.7  F (36.5  C) (Oral) 99% 25.87 kg/m2         Blood Pressure from Last 3 Encounters:   12/06/18 118/78   11/07/18 112/75   09/12/18 (!) 130/91    Weight from Last 3 Encounters:   12/06/18 207 lb (93.9 kg)   09/12/18 208 lb 11.2 oz (94.7 kg)   05/09/18 206 lb (93.4 kg)              We Performed the Following     HC ZOSTER VACCINE RECOMBINANT ADJUVANTED IM NJX          Today's Medication Changes          These changes are accurate as of 12/6/18 10:01 AM.  If you have any questions, ask your nurse or doctor.               Start taking these medicines.        Dose/Directions    lidocaine 5 % external ointment   Commonly known as:  XYLOCAINE   Used for:  Reflex sympathetic dystrophy   Started by:  Avery Duke MD        Apply topically as needed for moderate pain   Quantity:  50 g   Refills:  3            Where to get your medicines      These medications were sent to [facility] [address] [place] [address] [employer] AT [address] OF [address] [place] [address] [employer] [address]     Phone:  [phone]     lidocaine 5 % external ointment                Primary Care Provider Office Phone # Fax #    Avery Duke [phone]       [address] [facility] [address]        Equal Access to Services     [facility] HospitalZEESHAN : Hadii pravin bass Sodarlin, waaxda luqadaha, qaybta kaalmatahir meza. So [facility] [phone].    ATENCIÓN: Si habla español, tiene a chacon disposición servicios gratuitos de asistencia lingüística. Llame al [phone].    We comply with applicable federal  civil rights laws and [place] laws. We do not discriminate on the basis of race, color, national origin, age, disability, sex, sexual orientation, or gender identity.            Thank you!     Thank you for choosing [facility]  for your care. Our goal is always to provide you with excellent care. Hearing back from our patients is one way we can continue to improve our services. Please take a few minutes to complete the written survey that you may receive in the mail after your visit with us. Thank you!             Your Updated Medication List - Protect others around you: Learn how to safely use, store and throw away your medicines at www.disposemymeds.org.          This list is accurate as of 12/6/18 10:01 AM.  Always use your most recent med list.                   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)       lidocaine 5 % external ointment    XYLOCAINE    50 g    Apply topically as needed for moderate pain    Reflex sympathetic dystrophy       lisinopril 40 MG tablet    PRINIVIL/ZESTRIL    90 tablet    Take 1 tablet (40 mg) by mouth daily TAKE 1 TABLET(10 MG) BY MOUTH DAILY    Hypertension goal BP (blood pressure) < 140/90       metoprolol succinate  MG 24 hr tablet    TOPROL-XL    90 tablet    Take 1 tablet (200 mg) by mouth daily    Hypertension goal BP (blood pressure) < 140/90       nicotine 2 MG gum    NICORELIEF    30 tablet    Place 1 each (2 mg) inside cheek as needed for smoking cessation Reported on  "3/9/2017    Encounter for smoking cessation counseling       ONE-A-DAY MENS 50+ ADVANTAGE Tabs     100 tablet    Take 1 each by mouth daily    Poor nutrition       * order for DME     1 each    Please dispense blood pressure machine and cuff.    Hypertension goal BP (blood pressure) < 140/90       * order for DME     30 each    Equipment being ordered: self adhesive bandage 4\" by 8\"    Hx of BKA, left (H)       oxyCODONE IR 10 MG tablet    ROXICODONE    90 tablet    Take 1 tablet (10 mg) by mouth every 6 hours as needed for moderate to severe pain 3 per day    RSD (reflex sympathetic dystrophy)       polyethylene glycol powder    MIRALAX    510 g    Take 17 g by mouth as needed for constipation    Slow transit constipation       rivaroxaban ANTICOAGULANT 20 MG Tabs tablet    XARELTO    90 tablet    Take 1 tablet (20 mg) by mouth daily (with dinner)    Chronic atrial fibrillation (H)       sennosides 8.6 MG tablet    SENOKOT    120 tablet    Take 2 tablets by mouth 2 times daily    Slow transit constipation       spironolactone 25 MG tablet    ALDACTONE    90 tablet    Take 1 tablet (25 mg) by mouth daily    Nonischemic dilated cardiomyopathy (H)       tadalafil 2.5 MG tablet    CIALIS    90 tablet    Take 1 tablet (2.5 mg) by mouth daily Never use with nitroglycerin, terazosin or doxazosin.    Drug-induced erectile dysfunction       triamcinolone 0.1 % external cream    KENALOG    80 g    Take 1 apply by topical route two times a day as needed    Chronic dermatitis       UNABLE TO FIND      MEDICATION NAME: Hydrocortisone 0.5 % topical cream-Take 1 application by topical route as needed.        varenicline 1 MG tablet    CHANTIX    56 tablet    Take 1 tablet (1 mg) by mouth 2 times daily    Hx of BKA, left (H), Encounter for smoking cessation counseling       * Notice:  This list has 2 medication(s) that are the same as other medications prescribed for you. Read the directions carefully, and ask your doctor or other " care provider to review them with you.

## 2018-12-19 DIAGNOSIS — G90.50 RSD (REFLEX SYMPATHETIC DYSTROPHY): ICD-10-CM

## 2018-12-19 RX ORDER — OXYCODONE HYDROCHLORIDE 10 MG/1
10 TABLET ORAL EVERY 6 HOURS PRN
Qty: 90 TABLET | Refills: 0 | Status: SHIPPED | OUTPATIENT
Start: 2018-12-19 | End: 2019-01-21

## 2018-12-19 NOTE — TELEPHONE ENCOUNTER
Reason for Call:  Medication or medication refill:    Do you use a [facility]?  Name of the pharmacy and phone number for the current request:   at     Name of the medication requested: oxyCODONE IR (ROXICODONE) 10 MG tablet      Can we leave a detailed message on this number? YES    Phone number patient can be reached at: Home number on file [phone] (home)    Best Time: Anytime    Call taken on 12/19/2018 at 12:19 PM by Susan Daly

## 2018-12-28 ENCOUNTER — OFFICE VISIT (OUTPATIENT)
Dept: FAMILY MEDICINE | Facility: CLINIC | Age: 63
End: 2018-12-28
Payer: COMMERCIAL

## 2018-12-28 VITALS
DIASTOLIC BLOOD PRESSURE: 76 MMHG | OXYGEN SATURATION: 99 % | WEIGHT: 207 LBS | BODY MASS INDEX: 25.87 KG/M2 | SYSTOLIC BLOOD PRESSURE: 120 MMHG | TEMPERATURE: 96.9 F | HEART RATE: 84 BPM

## 2018-12-28 DIAGNOSIS — E78.5 HYPERLIPIDEMIA LDL GOAL <70: ICD-10-CM

## 2018-12-28 DIAGNOSIS — I10 HYPERTENSION GOAL BP (BLOOD PRESSURE) < 140/90: Primary | ICD-10-CM

## 2018-12-28 DIAGNOSIS — I48.0 PAROXYSMAL ATRIAL FIBRILLATION (H): ICD-10-CM

## 2018-12-28 DIAGNOSIS — T87.9 BKA STUMP COMPLICATION (H): ICD-10-CM

## 2018-12-28 DIAGNOSIS — C61 MALIGNANT NEOPLASM OF PROSTATE (H): ICD-10-CM

## 2018-12-28 PROCEDURE — 99213 OFFICE O/P EST LOW 20 MIN: CPT | Performed by: FAMILY MEDICINE

## 2018-12-28 ASSESSMENT — PAIN SCALES - GENERAL: PAINLEVEL: EXTREME PAIN (8)
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
## 2019-01-07 ENCOUNTER — OFFICE VISIT (OUTPATIENT)
Dept: FAMILY MEDICINE | Facility: CLINIC | Age: 64
End: 2019-01-07
Payer: COMMERCIAL

## 2019-01-07 VITALS — TEMPERATURE: 97.5 F | DIASTOLIC BLOOD PRESSURE: 91 MMHG | SYSTOLIC BLOOD PRESSURE: 139 MMHG

## 2019-01-07 DIAGNOSIS — G90.523 REFLEX SYMPATHETIC DYSTROPHY OF BOTH LOWER EXTREMITIES: Primary | ICD-10-CM

## 2019-01-07 PROCEDURE — 99214 OFFICE O/P EST MOD 30 MIN: CPT | Performed by: INTERNAL MEDICINE

## 2019-01-07 RX ORDER — GABAPENTIN 300 MG/1
300-600 CAPSULE ORAL 3 TIMES DAILY
Qty: 540 CAPSULE | Refills: 3 | Status: SHIPPED | OUTPATIENT
Start: 2019-01-07 | End: 2019-08-15

## 2019-01-07 NOTE — PATIENT INSTRUCTIONS
1.  First 3 days 300 mg at bedtime increase by 1 capsule every three days until 2 capsule three times a day

## 2019-01-07 NOTE — PROGRESS NOTES
SUBJECTIVE:   Constantino Taylor is a 63 year old male who presents to clinic today for the following health issues:    Recheck left leg; having swelling and pain in left leg.    Wheelchair  This first week put it on  Working with the wheelchair   Scooter and transition   Not wearing the prosthesis  Pressure hurting the area  [facility]   Got it previously   Leg getting sore and initially stay off the leg and worsening  Then start swelling up and then swelling goes down             Problem list and histories reviewed & adjusted, as indicated.  Additional history: as documented    Patient Active Problem List   Diagnosis     Cerebral infarction (H)     Hepatitis C     Tobacco use disorder     Chronic low back pain     Acute pancreatitis     Hyperlipidemia LDL goal <70     Hypertension goal BP (blood pressure) < 140/90     Nonischemic dilated cardiomyopathy (HCC)     Malignant neoplasm of prostate (H)     Erectile dysfunction     Eczema     PAD (peripheral artery disease) (H)     S/P BKA (below knee amputation) unilateral (H)     Unilateral complete BKA, left, sequela (H)     Constipation     Encounter for counseling     Acute renal failure (H)     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     Past Surgical History:   Procedure Laterality Date     AMPUTATE LEG BELOW KNEE Left 2015    Procedure: AMPUTATE LEG BELOW KNEE;  Surgeon: Odessa Browning MD;  Location: [facility] OR     removal of blood clots in arm         Social History     Tobacco Use     Smoking status: Former Smoker     Packs/day: 0.30     Years: 40.00     Pack years: 12.00     Types: Cigarettes     Last attempt to quit: 3/12/2018     Years since quittin.8     Smokeless tobacco: Former User   Substance Use Topics     Alcohol use: Yes     Alcohol/week: 1.2 - 3.6 oz     Types: 2 - 6 Cans of beer per week  "    Comment: couple of beers per episode, several times a week     Family History   Problem Relation Age of Onset     Cancer Mother         brain     Cancer Brother          Current Outpatient Medications   Medication Sig Dispense Refill     ASPIRIN LOW DOSE 81 MG EC tablet TAKE 1 TABLET(81 MG) BY MOUTH DAILY 90 tablet 0     atorvastatin (LIPITOR) 40 MG tablet Take 1 tablet (40 mg) by mouth At Bedtime 90 tablet 4     gabapentin (NEURONTIN) 300 MG capsule Take 1-2 capsules (300-600 mg) by mouth 3 times daily 540 capsule 3     lisinopril (PRINIVIL/ZESTRIL) 40 MG tablet Take 1 tablet (40 mg) by mouth daily TAKE 1 TABLET(10 MG) BY MOUTH DAILY 90 tablet 3     metoprolol succinate (TOPROL-XL) 200 MG 24 hr tablet Take 1 tablet (200 mg) by mouth daily 90 tablet 4     Multiple Vitamins-Minerals (ONE-A-DAY MENS 50+ ADVANTAGE) TABS Take 1 each by mouth daily 100 tablet 3     order for DME Equipment being ordered: self adhesive bandage 4\" by 8\" 30 each 11     order for DME Please dispense blood pressure machine and cuff. 1 each 0     oxyCODONE IR (ROXICODONE) 10 MG tablet Take 1 tablet (10 mg) by mouth every 6 hours as needed for moderate to severe pain 3 per day 90 tablet 0     polyethylene glycol (MIRALAX) powder Take 17 g by mouth as needed for constipation 510 g 1     rivaroxaban ANTICOAGULANT (XARELTO) 20 MG TABS tablet Take 1 tablet (20 mg) by mouth daily (with dinner) 90 tablet 3     sennosides (SENOKOT) 8.6 MG tablet Take 2 tablets by mouth 2 times daily 120 tablet 3     spironolactone (ALDACTONE) 25 MG tablet Take 1 tablet (25 mg) by mouth daily 90 tablet 3     triamcinolone (KENALOG) 0.1 % cream Take 1 apply by topical route two times a day as needed 80 g 3     UNABLE TO FIND MEDICATION NAME: Hydrocortisone 0.5 % topical cream-Take 1 application by topical route as needed.       acetaminophen (TYLENOL) 500 MG tablet Take 500-1,000 mg by mouth every 6 hours as needed for mild pain       nicotine polacrilex (NICORELIEF) " 2 MG gum Place 1 each (2 mg) inside cheek as needed for smoking cessation Reported on 3/9/2017 (Patient not taking: Reported on 5/9/2018) 30 tablet 3     tadalafil (CIALIS) 2.5 MG tablet Take 1 tablet (2.5 mg) by mouth daily Never use with nitroglycerin, terazosin or doxazosin. (Patient not taking: Reported on 5/9/2018) 90 tablet 1     varenicline (CHANTIX) 1 MG tablet Take 1 tablet (1 mg) by mouth 2 times daily (Patient not taking: Reported on 5/9/2018) 56 tablet 2     No Known Allergies  Recent Labs   Lab Test 09/20/18  1016 09/12/18  0803  01/12/18  0923 09/20/17  1432  02/06/17  0855 10/17/16  1143  05/10/15  1648  04/08/15  0720 11/25/14  1203  01/06/14  1103 07/12/13  1109  11/15/12  0845   A1C  --   --   --   --   --   --   --   --   --   --   --   --  5.8  --  5.8 5.4  --   --    LDL  --   --   --  42  --   --   --  46  --   --   --  89  --    < >  --   --    < >  --    HDL  --   --   --  39*  --   --   --  41  --   --   --  40*  --    < >  --   --    < >  --    TRIG  --   --   --  74  --   --   --  91  --   --   --  92  --    < >  --   --    < >  --    ALT  --   --   --  29 23  --  20  --    < >  --   --  34  --    < > 69 37  --  52   CR 1.42* 1.48*   < > 1.64* 1.53*   < > 1.40* 1.53*   < > 1.90*   < > 1.37* 1.02   < > 0.86 1.08   < > 0.87   GFRESTIMATED 51* 48*   < > 43* 47*   < > 52* 47*   < > 37*   < > 53* 75   < > >90 71   < > >90   GFRESTBLACK 61 59*   < > 52* 56*   < > 63 57*   < > 44*   < > 65 >90   GFR Calc     < > >90 86   < > >90   POTASSIUM 4.2 3.6   < > 4.0 4.3   < > 3.9 4.6   < > 5.0   < > 3.9 4.5   < > 4.5 4.1   < > 3.6   TSH  --   --   --   --   --   --   --   --   --  0.99  --   --   --   --   --   --   --  1.18    < > = values in this interval not displayed.      BP Readings from Last 3 Encounters:   01/07/19 (!) 139/91   12/28/18 120/76   12/06/18 118/78    Wt Readings from Last 3 Encounters:   12/28/18 93.9 kg (207 lb)   12/06/18 93.9 kg (207 lb)   09/12/18 94.7 kg (208 lb  11.2 oz)                    Reviewed and updated as needed this visit by clinical staff  Allergies       Reviewed and updated as needed this visit by Provider         ROS:  Constitutional, HEENT, cardiovascular, pulmonary, gi and gu systems are negative, except as otherwise noted.    OBJECTIVE:     BP (!) 139/91 (BP Location: Right arm, Patient Position: Chair, Cuff Size: Adult Regular)   Temp 97.5  F (36.4  C) (Oral)   There is no height or weight on file to calculate BMI.  GENERAL: healthy, alert and no distress  EYES: Eyes grossly normal to inspection, PERRL and conjunctivae and sclerae normal  HENT: ear canals and TM's normal, nose and mouth without ulcers or lesions  NECK: no adenopathy, no asymmetry, masses, or scars and thyroid normal to palpation  RESP: lungs clear to auscultation - no rales, rhonchi or wheezes  CV: regular rate and rhythm, normal S1 S2, no S3 or S4, no murmur, click or rub, no peripheral edema and peripheral pulses strong  ABDOMEN: soft, nontender, no hepatosplenomegaly, no masses and bowel sounds normal  MS: no gross musculoskeletal defects noted, no edema  SKIN: no specific lesion or skin breakdown    NEURO: Normal strength and tone, mentation intact and changes of neuropathy in the stump base    Diagnostic Test Results:  Results for orders placed or performed in visit on 09/20/18   Basic metabolic panel   Result Value Ref Range    Sodium 138 133 - 144 mmol/L    Potassium 4.2 3.4 - 5.3 mmol/L    Chloride 106 94 - 109 mmol/L    Carbon Dioxide 24 20 - 32 mmol/L    Anion Gap 8 3 - 14 mmol/L    Glucose 114 (H) 70 - 99 mg/dL    Urea Nitrogen 22 7 - 30 mg/dL    Creatinine 1.42 (H) 0.66 - 1.25 mg/dL    GFR Estimate 51 (L) >60 mL/min/1.7m2    GFR Estimate If Black 61 >60 mL/min/1.7m2    Calcium 9.2 8.5 - 10.1 mg/dL       ASSESSMENT/PLAN:       ICD-10-CM    1. Reflex sympathetic dystrophy of both lower extremities G90.523 gabapentin (NEURONTIN) 300 MG capsule     Patient with likely reflex  sympatheitc dystrophy  Will titrate gabapentin  Continue narcotics per plan and may need to update chronic pain care package          Avery Duke MD  [facility]

## 2019-01-08 ENCOUNTER — TELEPHONE (OUTPATIENT)
Dept: PHYSICAL MEDICINE AND REHAB | Facility: CLINIC | Age: 64
End: 2019-01-08

## 2019-01-08 NOTE — TELEPHONE ENCOUNTER
[facility] Call Center    Phone Message    May a detailed message be left on voicemail: yes    Reason for Call: Other: Pt called in frustrated, he says he has been leaving messages for over a week trying to get an appt. Patient says [facility] has referred him to see Dr. Allen. Please call him back to make an appointment.      Action Taken: Message routed to:  [facility] ([facility] Physical Medicine & Rehab

## 2019-01-08 NOTE — TELEPHONE ENCOUNTER
Patient scheduled with Dr Marti 1/18/19. Patient will arrange to have the prosthetist from [facility] at the appointment.

## 2019-01-16 ENCOUNTER — TELEPHONE (OUTPATIENT)
Dept: FAMILY MEDICINE | Facility: CLINIC | Age: 64
End: 2019-01-16

## 2019-01-16 NOTE — TELEPHONE ENCOUNTER
Reason for Call:  Other call back    Detailed comments: Patient's daughter, Dacia calling. She will be faxing FMLA paper to you, would like it filled out asap. Please look out for the fax. Thank you.    Phone Number Patient can be reached at: Other phone number:  [phone]    Best Time: Anytime    Can we leave a detailed message on this number? YES    Call taken on 1/16/2019 at 5:17 AM by Chilo Ortiz

## 2019-01-17 NOTE — TELEPHONE ENCOUNTER
Forms received from: Patient   Phone number listed: [phone]   Fax listed: [phone]  Date received: 01/17/19  Form description: FMLA  Once forms are completed, please return to Iliana Kim via Fax.  Is patient requesting to be contacted when forms are completed: NA    Form placed: in providers basket  [facility]

## 2019-01-18 ENCOUNTER — OFFICE VISIT (OUTPATIENT)
Dept: PHYSICAL MEDICINE AND REHAB | Facility: CLINIC | Age: 64
End: 2019-01-18
Payer: COMMERCIAL

## 2019-01-18 VITALS
BODY MASS INDEX: 26.75 KG/M2 | SYSTOLIC BLOOD PRESSURE: 170 MMHG | DIASTOLIC BLOOD PRESSURE: 109 MMHG | OXYGEN SATURATION: 100 % | WEIGHT: 214 LBS | HEART RATE: 101 BPM

## 2019-01-18 DIAGNOSIS — Z89.512 HX OF BKA, LEFT (H): Primary | ICD-10-CM

## 2019-01-18 DIAGNOSIS — G54.6 PHANTOM LIMB PAIN (H): ICD-10-CM

## 2019-01-18 DIAGNOSIS — T87.89 PAIN OF AMPUTATION STUMP OF LEFT LOWER EXTREMITY (H): ICD-10-CM

## 2019-01-18 DIAGNOSIS — R26.2 IMPAIRED AMBULATION: ICD-10-CM

## 2019-01-18 DIAGNOSIS — M79.605 PAIN OF AMPUTATION STUMP OF LEFT LOWER EXTREMITY (H): ICD-10-CM

## 2019-01-18 ASSESSMENT — PAIN SCALES - GENERAL: PAINLEVEL: SEVERE PAIN (7)
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
## 2019-02-05 ENCOUNTER — TELEPHONE (OUTPATIENT)
Dept: FAMILY MEDICINE | Facility: CLINIC | Age: 64
End: 2019-02-05

## 2019-02-05 NOTE — TELEPHONE ENCOUNTER
Reason for Call:  Other call back    Detailed comments: Patient's daughter, delisa, called in regarding the [facility] paperwork that Dr. Duke had filled out. She states the employer never received the paperwork and she would like to have it re faxed to [phone] as well as have a copy mailed to her at [address] [place] [address]. Please call with any questions     Phone Number Patient can be reached at: [phone]    Best Time: any     Can we leave a detailed message on this number? YES    Call taken on 2/5/2019 at 1:42 PM by Radha Aguilar

## 2019-02-05 NOTE — TELEPHONE ENCOUNTER
Requested information faxed to number provided and Mailed.  [facility]  Team 3 Coordinator

## 2019-02-06 NOTE — TELEPHONE ENCOUNTER
Reason for Call:  Other     Detailed comments:  Dacia, daughter of patient is calling because she called yesterday and gave us the fax number as to where the FMLA forms needed to be faxed.  She gave us the wrong fax number.  Please re-fax the FMLA forms to fax # [phone]    Phone Number Patient can be reached at: Other phone number:  Dacia/ daughter/ [phone]    Best Time: any    Can we leave a detailed message on this number? YES    Call taken on 2/6/2019 at 10:56 AM by Mely Smith

## 2019-02-18 ENCOUNTER — TELEPHONE (OUTPATIENT)
Dept: FAMILY MEDICINE | Facility: CLINIC | Age: 64
End: 2019-02-18

## 2019-02-18 NOTE — TELEPHONE ENCOUNTER
Forms received from: [facility]   Phone number listed: [phone]   Fax listed: [phone]  Date received: 02/18/19  Form description: Letter of Medical Necessity  Once forms are completed, please return to [facility] via Fax.  Is patient requesting to be contacted when forms are completed: NA    Form placed: in providers linda Daly

## 2019-02-19 ENCOUNTER — TELEPHONE (OUTPATIENT)
Dept: FAMILY MEDICINE | Facility: CLINIC | Age: 64
End: 2019-02-19

## 2019-02-19 DIAGNOSIS — G90.50 RSD (REFLEX SYMPATHETIC DYSTROPHY): ICD-10-CM

## 2019-02-19 RX ORDER — OXYCODONE HYDROCHLORIDE 10 MG/1
10 TABLET ORAL EVERY 6 HOURS PRN
Qty: 90 TABLET | Refills: 0 | Status: SHIPPED | OUTPATIENT
Start: 2019-02-19 | End: 2019-03-19

## 2019-02-19 NOTE — TELEPHONE ENCOUNTER
Reason for Call:  Medication or medication refill:    Do you use a [facility]?  Name of the pharmacy and phone number for the current request:  Will  script at     Name of the medication requested: oxyCODONE IR (ROXICODONE) 10 MG tablet    Other request:     Can we leave a detailed message on this number? YES    Phone number patient can be reached at: Home number on file [phone] (home)    Best Time: any    Call taken on 2/19/2019 at 12:06 PM by Mely Smith

## 2019-02-19 NOTE — TELEPHONE ENCOUNTER
Requested information faxed to number provided.  [facility]  Team 3 Coordinator

## 2019-02-19 NOTE — TELEPHONE ENCOUNTER
Requested Prescriptions   Pending Prescriptions Disp Refills     oxyCODONE IR (ROXICODONE) 10 MG tablet 90 tablet 0     Sig: Take 1 tablet (10 mg) by mouth every 6 hours as needed for moderate to severe pain 3 per day    There is no refill protocol information for this order        Last Written Prescription Date:  1/21/2019  Last Fill Quantity: 90,  # refills: 0   Last office visit: 1/7/2019 with prescribing provider:  Srikanth   Future Office Visit:      Routing refill request to provider for review/approval because:  Drug not on the G refill protocol       Sandra Chan RN  [facility]

## 2019-03-01 ENCOUNTER — TRANSFERRED RECORDS (OUTPATIENT)
Dept: HEALTH INFORMATION MANAGEMENT | Facility: CLINIC | Age: 64
End: 2019-03-01

## 2019-03-01 LAB
ALT SERPL-CCNC: 25 IU/L (ref 12–68)
AST SERPL-CCNC: 17 IU/L (ref 12–37)
EJECTION FRACTION: 40
GLUCOSE SERPL-MCNC: 105 MG/DL (ref 74–106)

## 2019-03-02 LAB
CHOLEST SERPL-MCNC: 112 MG/DL
HDLC SERPL-MCNC: 35 MG/DL
LDLC SERPL CALC-MCNC: 58 MG/DL
TRIGL SERPL-MCNC: 97 MG/DL

## 2019-03-03 LAB
CREAT SERPL-MCNC: 1.44 MG/DL (ref 0.7–1.3)
GFR SERPL CREATININE-BSD FRML MDRD: 50 ML/MIN
POTASSIUM SERPL-SCNC: 4.1 MMOL/L (ref 3.5–5.1)

## 2019-03-07 ENCOUNTER — OFFICE VISIT (OUTPATIENT)
Dept: FAMILY MEDICINE | Facility: CLINIC | Age: 64
End: 2019-03-07
Payer: COMMERCIAL

## 2019-03-07 VITALS
WEIGHT: 210 LBS | OXYGEN SATURATION: 99 % | SYSTOLIC BLOOD PRESSURE: 112 MMHG | BODY MASS INDEX: 26.25 KG/M2 | HEART RATE: 94 BPM | DIASTOLIC BLOOD PRESSURE: 74 MMHG

## 2019-03-07 DIAGNOSIS — I48.20 CHRONIC ATRIAL FIBRILLATION (H): ICD-10-CM

## 2019-03-07 DIAGNOSIS — N40.0 BENIGN PROSTATIC HYPERPLASIA, UNSPECIFIED WHETHER LOWER URINARY TRACT SYMPTOMS PRESENT: Primary | ICD-10-CM

## 2019-03-07 DIAGNOSIS — I63.19 CEREBRAL INFARCTION DUE TO EMBOLISM OF OTHER PRECEREBRAL ARTERY (H): ICD-10-CM

## 2019-03-07 DIAGNOSIS — I42.0 DILATED CARDIOMYOPATHY (H): ICD-10-CM

## 2019-03-07 DIAGNOSIS — I48.91 ATRIAL FIBRILLATION WITH RVR (H): ICD-10-CM

## 2019-03-07 DIAGNOSIS — I10 HYPERTENSION GOAL BP (BLOOD PRESSURE) < 140/90: ICD-10-CM

## 2019-03-07 PROCEDURE — 99214 OFFICE O/P EST MOD 30 MIN: CPT | Performed by: INTERNAL MEDICINE

## 2019-03-07 RX ORDER — METOPROLOL SUCCINATE 100 MG/1
100 TABLET, EXTENDED RELEASE ORAL DAILY
Qty: 90 TABLET | Refills: 3 | COMMUNITY
Start: 2019-03-07 | End: 2019-05-15

## 2019-03-07 RX ORDER — SENNOSIDES A AND B 8.6 MG/1
1 TABLET, FILM COATED ORAL 2 TIMES DAILY
COMMUNITY
End: 2019-10-14

## 2019-03-07 RX ORDER — ATORVASTATIN CALCIUM 40 MG/1
40 TABLET, FILM COATED ORAL AT BEDTIME
Qty: 90 TABLET | Refills: 4 | Status: SHIPPED | OUTPATIENT
Start: 2019-03-07 | End: 2019-08-15

## 2019-03-07 RX ORDER — LISINOPRIL 40 MG/1
40 TABLET ORAL DAILY
Qty: 90 TABLET | Refills: 3 | COMMUNITY
Start: 2019-03-07 | End: 2019-05-15

## 2019-03-07 RX ORDER — TAMSULOSIN HYDROCHLORIDE 0.4 MG/1
0.4 CAPSULE ORAL DAILY
Qty: 90 CAPSULE | Refills: 3 | Status: SHIPPED | OUTPATIENT
Start: 2019-03-07 | End: 2020-02-05

## 2019-03-07 RX ORDER — ATORVASTATIN CALCIUM 40 MG/1
TABLET, FILM COATED ORAL
Qty: 90 TABLET | Refills: 0 | Status: CANCELLED | OUTPATIENT
Start: 2019-03-07

## 2019-03-07 RX ORDER — RIVAROXABAN 20 MG/1
TABLET, FILM COATED ORAL
Qty: 90 TABLET | Refills: 0 | Status: CANCELLED | OUTPATIENT
Start: 2019-03-07

## 2019-03-07 RX ORDER — METOPROLOL SUCCINATE 100 MG/1
100 TABLET, EXTENDED RELEASE ORAL DAILY
COMMUNITY
Start: 2019-03-04 | End: 2019-08-15

## 2019-03-07 RX ORDER — GABAPENTIN 300 MG/1
300-600 CAPSULE ORAL 3 TIMES DAILY
COMMUNITY
End: 2019-03-07

## 2019-03-07 NOTE — TELEPHONE ENCOUNTER
"Requested Prescriptions   Pending Prescriptions Disp Refills     ASPIRIN LOW DOSE 81 MG EC tablet [Pharmacy Med Name: ASPIRIN 81MG EC LOW DOSE  TABLETS] 90 tablet 0    Last Written Prescription Date:  10-23-18  Last Fill Quantity: 90,  # refills: 0   Last office visit: 1/7/2019 with prescribing provider:  1-7-19   Future Office Visit:   Next 5 appointments (look out 90 days)    Mar 07, 2019  2:20 PM CST  SHORT with Avery Duke MD  [facility] [address] [place] [address]  [phone]          Sig: TAKE 1 TABLET(81 MG) BY MOUTH DAILY    Analgesics (Non-Narcotic Tylenol and ASA Only) Passed - 3/7/2019 11:55 AM       Passed - Recent (12 mo) or future (30 days) visit within the authorizing provider's specialty    Patient had office visit in the last 12 months or has a visit in the next 30 days with authorizing provider or within the authorizing provider's specialty.  See \"Patient Info\" tab in inbasket, or \"Choose Columns\" in Meds & Orders section of the refill encounter.             Passed - Patient is age 20 years or older    If ASA is flagged for ages under 20 years old. Forward to provider for confirmation Ryes Syndrome is not a concern.             Passed - Medication is active on med list        atorvastatin (LIPITOR) 40 MG tablet [Pharmacy Med Name: ATORVASTATIN 40MG TABLETS] 90 tablet 0    Last Written Prescription Date:  1-12-18  Last Fill Quantity: 90,  # refills: 4   Last office visit: 1/7/2019 with prescribing provider:     Future Office Visit:   Next 5 appointments (look out 90 days)    Mar 07, 2019  2:20 PM CST  SHORT with Avery Duke MD  [facility] ([facility]) [address] [address]  [phone]          Sig: TAKE 1 TABLET(40 MG) BY MOUTH AT BEDTIME    Statins Protocol Failed - 3/7/2019 11:55 AM       Failed - LDL on " "file in past 12 months    Recent Labs   Lab Test 01/12/18  0923   LDL 42            Passed - No abnormal creatine kinase in past 12 months    Recent Labs   Lab Test 08/11/11  1036                  Passed - Recent (12 mo) or future (30 days) visit within the authorizing provider's specialty    Patient had office visit in the last 12 months or has a visit in the next 30 days with authorizing provider or within the authorizing provider's specialty.  See \"Patient Info\" tab in inbasket, or \"Choose Columns\" in Meds & Orders section of the refill encounter.             Passed - Medication is active on med list       Passed - Patient is age 18 or older        XARELTO 20 MG TABS tablet [Pharmacy Med Name: XARELTO 20MG TABLETS] 90 tablet 0    Last Written Prescription Date:  1-12-18  Last Fill Quantity: 90,  # refills: 3   Last office visit: 1/7/2019 with prescribing provider:     Future Office Visit:   Next 5 appointments (look out 90 days)    Mar 07, 2019  2:20 PM CST  SHORT with Avery Duke MD  [facility] ([facility]) [address] [place] [id number] [phone]          Sig: TAKE 1 TABLET(20 MG) BY MOUTH DAILY WITH DINNER    Direct Oral Anticoagulant Agents Failed - 3/7/2019 11:55 AM       Failed - Normal Platelets on file in past 12 months    Recent Labs   Lab Test 09/20/17  1432                 Failed - Creatinine Clearance greater than 50 ml/min on file in past 3 mos    No lab results found.         Failed - Serum creatinine less than or equal to 1.4 on file in past 3 mos    Recent Labs   Lab Test 09/20/18  1016   CR 1.42*            Passed - Medication is active on med list       Passed - Patient is 18 years of age or older       Passed - Recent (6 mo) or future (30 days) visit within the authorizing provider's specialty          "

## 2019-03-07 NOTE — PROGRESS NOTES
SUBJECTIVE:   Constantino Taylor is a 63 year old male who presents to clinic today for the following health issues:  1. Left sided weakness; now resolved, he has history of CVA 2005 : patient has chronic atrial fibrillation and on xarelto but has not taken over past several days and prior was taking every other day. He did take it in AM of admission. MRI did show thromboembolic occlusion right distal M1 segment but did not show acute infarct And was taken to IR and status post Embolectomy of near occlusive right MCA thrombus. he is now back to baseline and back on Xare;to. The importance of taking his medications were explained to patient number of times. His echo did not describe clot.   2. Chronic atrial fibrillation, heart rate OK, needs to be compliant with his  anticoagulation , continue rate control with beta blocker, he takes metoprolol 200 qd But admits to not taking over past week or so and prir to that has missed a lot of doses so restaretd at lower dose of 100 mg a day. his blood pressure will need to be monitored as out patient and medications adjusted. Back on xarelto   3. Chronic systolic heart failure, nonischemic cardiomyopathy,  HFrEF; last EF was 40-45%, compensated , EF still at 40 %, needs to be on medications, for now restarted beta blocker , ? Start back Lisinopril as has occluded a renal artery , . Needs follow up echo as out patient and eval for potential ICD. stress test 2015 showed normal myocardial perfusion , had coronary angio 2012  That showed normal coronary arteries   4. Acute kidney injury on chronic kidney disease, received gentle IV fluids As has cardiomyopathy , avoid nephrotoxic agents. ACEi on hold   5. Alcohol use: did not go through withdrawal   6. Hypertension: back on lower dose beta blocker As above, lisinopril stopped as also has left renal artery occlusion with left renal atrophy on CT  9/2017 so ? ACEi ARB use   7. Chronic heparin c: followed by hepatology , liver  function tests OK   8. PVD: history left BKA , had R ICA  PTA MARGARITA occlusion  , left distal SFA , left renal artery occlusion   9. Chronic phantom pain: on chronic narcotics , was recently started on  Neurontin ? Dose started 100 tid   10. Dyslipidemia: continue statins  11. Code status: full     Saturday 5-6 days   xarelto   Urinating too much.  FLomax.      DISCHARGE MEDICATIONS:  Current Discharge Medication List     MEDICATIONS CONTINUED WITH CHANGES   Details   metoprolol succinate, XL, (TOPROL XL) 100 mg oral extended release tablet 24 HR Take 1 tablet (100 mg) by mouth once daily.  Qty: 60 tablet, Refills: 0       MEDICATIONS CONTINUED UNCHANGED   Details   acetaminophen (TYLENOL) 500 mg oral tablet Take 2 tablets (1,000 mg) by mouth every 6 (six) hours as needed for Pain (Max 6 tabs in 24 hrs).  Qty: 30 tablet, Refills: 0     aspirin 81 mg oral enteric coated tablet Take 81 mg by mouth once daily.     atorvastatin (LIPITOR) 40 mg oral tablet Take 40 mg by mouth at bedtime.     gabapentin (NEURONTIN) 300 mg oral capsule Take 300-600 mg by mouth three times a day.     oxyCODONE 10 mg oral tablet Take 10 mg by mouth every 6 (six) hours as needed.     rivaroxaban (XARELTO) 20 mg oral tablet Take 20 mg by mouth once a day with evening meal.     senna (SENOKOT) 8.6 mg oral tablet Take 1 tablet by mouth twice a day as needed.            Hospital Follow-up Visit:    Hospital/Nursing Home/ Rehab Facility: [facility]  Date of Admission: 3/1/19  Date of Discharge: 3/3/19  Reason(s) for Admission: CVA            Problems taking medications regularly:  None       Medication changes since discharge: REVIEW MEDICATION       Problems adhering to non-medication therapy:  None    Summary of hospitalization:  CareEverywhere information obtained and reviewed  Diagnostic Tests/Treatments reviewed.  Follow up needed: has follow-up with neurology and cardiology    Other Healthcare Providers Involved in Patient s Care:          None  Update since discharge: improved.     Post Discharge Medication Reconciliation: discharge medications reconciled and changed, per note/orders (see AVS).  Plan of care communicated with patient     Coding guidelines for this visit:  Type of Medical   Decision Making Face-to-Face Visit       within 7 Days of discharge Face-to-Face Visit        within 14 days of discharge   Moderate Complexity 85307 [id number]   High Complexity [id number]                  Problem list and histories reviewed & adjusted, as indicated.  Additional history: as documented    Patient Active Problem List   Diagnosis     Cerebral infarction (H)     Hepatitis C     Tobacco use disorder     Chronic low back pain     Acute pancreatitis     Hyperlipidemia LDL goal <70     Hypertension goal BP (blood pressure) < 140/90     Nonischemic dilated cardiomyopathy (HCC)     Malignant neoplasm of prostate (H)     Erectile dysfunction     Eczema     PAD (peripheral artery disease) (H)     S/P BKA (below knee amputation) unilateral (H)     Unilateral complete BKA, left, sequela (H)     Constipation     Encounter for counseling     Acute renal failure (H)     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     Past Surgical History:   Procedure Laterality Date     AMPUTATE LEG BELOW KNEE Left 2015    Procedure: AMPUTATE LEG BELOW KNEE;  Surgeon: Odessa Browning MD;  Location: [facility] OR     removal of blood clots in arm         Social History     Tobacco Use     Smoking status: Former Smoker     Packs/day: 0.30     Years: 40.00     Pack years: 12.00     Types: Cigarettes     Last attempt to quit: 3/12/2018     Years since quittin.9     Smokeless tobacco: Former User   Substance Use Topics     Alcohol use: Yes     Alcohol/week: 1.2 - 3.6 oz     Types: 2 - 6 Cans of beer per week     Comment: couple of beers per episode,  "several times a week     Family History   Problem Relation Age of Onset     Cancer Mother         brain     Cancer Brother          Current Outpatient Medications   Medication Sig Dispense Refill     acetaminophen (TYLENOL) 500 MG tablet Take 500-1,000 mg by mouth every 6 hours as needed for mild pain       aspirin (ASPIRIN LOW DOSE) 81 MG EC tablet TAKE 1 TABLET(81 MG) BY MOUTH DAILY 90 tablet 3     atorvastatin (LIPITOR) 40 MG tablet Take 1 tablet (40 mg) by mouth At Bedtime 90 tablet 4     gabapentin (NEURONTIN) 300 MG capsule Take 1-2 capsules (300-600 mg) by mouth 3 times daily 540 capsule 3     lisinopril (PRINIVIL/ZESTRIL) 40 MG tablet Take 1 tablet (40 mg) by mouth daily 90 tablet 3     metoprolol succinate ER (TOPROL-XL) 100 MG 24 hr tablet Take 100 mg by mouth daily       metoprolol succinate ER (TOPROL-XL) 100 MG 24 hr tablet Take 1 tablet (100 mg) by mouth daily 90 tablet 3     Multiple Vitamins-Minerals (ONE-A-DAY MENS 50+ ADVANTAGE) TABS Take 1 each by mouth daily 100 tablet 3     order for DME Equipment being ordered: self adhesive bandage 4\" by 8\" 30 each 11     order for DME Please dispense blood pressure machine and cuff. 1 each 0     oxyCODONE IR (ROXICODONE) 10 MG tablet Take 1 tablet (10 mg) by mouth every 6 hours as needed for moderate to severe pain 3 per day 90 tablet 0     polyethylene glycol (MIRALAX) powder Take 17 g by mouth as needed for constipation 510 g 1     rivaroxaban ANTICOAGULANT (XARELTO) 20 MG TABS tablet Take 1 tablet (20 mg) by mouth daily (with dinner) 90 tablet 3     senna (SENNA-TIME) 8.6 MG tablet Take 1 tablet by mouth 2 times daily       sennosides (SENOKOT) 8.6 MG tablet Take 2 tablets by mouth 2 times daily 120 tablet 3     tamsulosin (FLOMAX) 0.4 MG capsule Take 1 capsule (0.4 mg) by mouth daily 90 capsule 3     triamcinolone (KENALOG) 0.1 % cream Take 1 apply by topical route two times a day as needed 80 g 3     UNABLE TO FIND MEDICATION NAME: Hydrocortisone 0.5 % " topical cream-Take 1 application by topical route as needed.       No Known Allergies  Recent Labs   Lab Test 09/20/18  1016 09/12/18  0803  01/12/18  0923 09/20/17  1432  02/06/17  0855 10/17/16  1143  05/10/15  1648  04/08/15  0720 11/25/14  1203  01/06/14  1103 07/12/13  1109  11/15/12  0845   A1C  --   --   --   --   --   --   --   --   --   --   --   --  5.8  --  5.8 5.4  --   --    LDL  --   --   --  42  --   --   --  46  --   --   --  89  --    < >  --   --    < >  --    HDL  --   --   --  39*  --   --   --  41  --   --   --  40*  --    < >  --   --    < >  --    TRIG  --   --   --  74  --   --   --  91  --   --   --  92  --    < >  --   --    < >  --    ALT  --   --   --  29 23  --  20  --    < >  --   --  34  --    < > 69 37  --  52   CR 1.42* 1.48*   < > 1.64* 1.53*   < > 1.40* 1.53*   < > 1.90*   < > 1.37* 1.02   < > 0.86 1.08   < > 0.87   GFRESTIMATED 51* 48*   < > 43* 47*   < > 52* 47*   < > 37*   < > 53* 75   < > >90 71   < > >90   GFRESTBLACK 61 59*   < > 52* 56*   < > 63 57*   < > 44*   < > 65 >90   GFR Calc     < > >90 86   < > >90   POTASSIUM 4.2 3.6   < > 4.0 4.3   < > 3.9 4.6   < > 5.0   < > 3.9 4.5   < > 4.5 4.1   < > 3.6   TSH  --   --   --   --   --   --   --   --   --  0.99  --   --   --   --   --   --   --  1.18    < > = values in this interval not displayed.      BP Readings from Last 3 Encounters:   03/07/19 112/74   01/18/19 (!) 170/109   01/07/19 (!) 139/91    Wt Readings from Last 3 Encounters:   03/07/19 95.3 kg (210 lb)   01/18/19 97.1 kg (214 lb)   12/28/18 93.9 kg (207 lb)                    Reviewed and updated as needed this visit by clinical staff  Allergies  Meds       Reviewed and updated as needed this visit by Provider         ROS:  Constitutional, HEENT, cardiovascular, pulmonary, gi and gu systems are negative, except as otherwise noted.    OBJECTIVE:     /74 (BP Location: Right arm, Patient Position: Chair, Cuff Size: Adult Regular)   Pulse 94    Wt 95.3 kg (210 lb)   SpO2 99%   BMI 26.25 kg/m    Body mass index is 26.25 kg/m .  GENERAL: healthy, alert and no distress  EYES: Eyes grossly normal to inspection, PERRL and conjunctivae and sclerae normal  HENT: ear canals and TM's normal, nose and mouth without ulcers or lesions  NECK: no adenopathy, no asymmetry, masses, or scars and thyroid normal to palpation  RESP: lungs clear to auscultation - no rales, rhonchi or wheezes  CV: regular rate and rhythm, normal S1 S2, no S3 or S4, no murmur, click or rub, no peripheral edema and peripheral pulses strong  ABDOMEN: soft, nontender, no hepatosplenomegaly, no masses and bowel sounds normal  MS: no gross musculoskeletal defects noted, no edema  SKIN: no suspicious lesions or rashes  NEURO: Normal strength and tone, mentation intact and speech normal  BACK: no CVA tenderness, no paralumbar tenderness  PSYCH: mentation appears normal, affect normal/bright  LYMPH: no cervical, supraclavicular, axillary, or inguinal adenopathy    Diagnostic Test Results:  Results for orders placed or performed in visit on 09/20/18   Basic metabolic panel   Result Value Ref Range    Sodium 138 133 - 144 mmol/L    Potassium 4.2 3.4 - 5.3 mmol/L    Chloride 106 94 - 109 mmol/L    Carbon Dioxide 24 20 - 32 mmol/L    Anion Gap 8 3 - 14 mmol/L    Glucose 114 (H) 70 - 99 mg/dL    Urea Nitrogen 22 7 - 30 mg/dL    Creatinine 1.42 (H) 0.66 - 1.25 mg/dL    GFR Estimate 51 (L) >60 mL/min/1.7m2    GFR Estimate If Black 61 >60 mL/min/1.7m2    Calcium 9.2 8.5 - 10.1 mg/dL       ASSESSMENT/PLAN:       ICD-10-CM    1. Benign prostatic hyperplasia, unspecified whether lower urinary tract symptoms present N40.0 tamsulosin (FLOMAX) 0.4 MG capsule     aspirin (ASPIRIN LOW DOSE) 81 MG EC tablet   2. Hypertension goal BP (blood pressure) < 140/90 I10 lisinopril (PRINIVIL/ZESTRIL) 40 MG tablet     aspirin (ASPIRIN LOW DOSE) 81 MG EC tablet   3. Cerebral infarction due to embolism of other precerebral artery  (H) I63.19 atorvastatin (LIPITOR) 40 MG tablet     aspirin (ASPIRIN LOW DOSE) 81 MG EC tablet   4. Atrial fibrillation with RVR (H) I48.91 aspirin (ASPIRIN LOW DOSE) 81 MG EC tablet     CARDIOLOGY EVAL ADULT REFERRAL   5. Dilated cardiomyopathy (H) I42.0 aspirin (ASPIRIN LOW DOSE) 81 MG EC tablet     CARDIOLOGY EVAL ADULT REFERRAL   6. Chronic atrial fibrillation (H) I48.2 aspirin (ASPIRIN LOW DOSE) 81 MG EC tablet     rivaroxaban ANTICOAGULANT (XARELTO) 20 MG TABS tablet     CARDIOLOGY EVAL ADULT REFERRAL       Reviewed the importance of compliance.  Patient is already quoting TV commercials about why he wants off his xarelto and other medications  Reviewed the stroke risk on and off medication and the reasons for these medications over 10 minutes  Patient is aggreeable to continuing  Has restarted lisinopril .  Was off at hospital due to renal insufficiency    Avery Duke MD  [facility]

## 2019-03-08 RX ORDER — ASPIRIN 81 MG/1
TABLET, COATED ORAL
Qty: 90 TABLET | Refills: 0 | OUTPATIENT
Start: 2019-03-08

## 2019-03-19 ENCOUNTER — TELEPHONE (OUTPATIENT)
Dept: FAMILY MEDICINE | Facility: CLINIC | Age: 64
End: 2019-03-19

## 2019-03-19 DIAGNOSIS — G90.50 RSD (REFLEX SYMPATHETIC DYSTROPHY): ICD-10-CM

## 2019-03-19 RX ORDER — OXYCODONE HYDROCHLORIDE 10 MG/1
10 TABLET ORAL EVERY 6 HOURS PRN
Qty: 90 TABLET | Refills: 0 | Status: SHIPPED | OUTPATIENT
Start: 2019-03-19 | End: 2019-04-15

## 2019-03-19 NOTE — TELEPHONE ENCOUNTER
Reason for Call:  Medication or medication refill:    Do you use a [facility]?  Name of the pharmacy and phone number for the current request:  Patient      Name of the medication requested: oxyCODONE IR (ROXICODONE) 10 MG tablet    Other request:  no    Can we leave a detailed message on this number? YES    Phone number patient can be reached at: Home number on file [phone] (home)    Best Time:  Anytime     Call taken on 3/19/2019 at 12:25 PM by Jayleen Wright

## 2019-03-19 NOTE — TELEPHONE ENCOUNTER
Refilled and given to TC.   Please remind patient Dr. Duke wanted to see him in 4 weeks from his last appointment, so he should schedule soon.   Cass Willoughby PA-C

## 2019-03-19 NOTE — TELEPHONE ENCOUNTER
Requested Prescriptions   Pending Prescriptions Disp Refills     oxyCODONE IR (ROXICODONE) 10 MG tablet 90 tablet 0     Sig: Take 1 tablet (10 mg) by mouth every 6 hours as needed for moderate to severe pain 3 per day    There is no refill protocol information for this order        Last Written Prescription Date:  2/19/2019  Last Fill Quantity: 90,  # refills: 0   Last office visit: 3/7/2019 with prescribing provider:  Srikanth    Future Office Visit:    Routing refill request to provider for review/approval because:  Drug not on the [facility] refill protocol       Sandra Chan RN  [facility]

## 2019-03-19 NOTE — TELEPHONE ENCOUNTER
Informed pt prescription for Oxycodone was brought to  for , informed him to schedule appt.  Susan Daly  Team 3 Coordinator

## 2019-04-11 ENCOUNTER — TELEPHONE (OUTPATIENT)
Dept: FAMILY MEDICINE | Facility: CLINIC | Age: 64
End: 2019-04-11

## 2019-04-11 NOTE — TELEPHONE ENCOUNTER
Forms received from: [facility] [employer]   Phone number listed: [phone]   Fax listed: [phone]  Date received: 04/11/19  Form description: Order  Once forms are completed, please return to [facility] via Fax.  Is patient requesting to be contacted when forms are completed: NA   Form placed: in providers linda Daly

## 2019-04-12 NOTE — TELEPHONE ENCOUNTER
Requested information faxed to number provided.  [facility]  Team 3 Coordinator

## 2019-04-15 ENCOUNTER — OFFICE VISIT (OUTPATIENT)
Dept: FAMILY MEDICINE | Facility: CLINIC | Age: 64
End: 2019-04-15
Payer: COMMERCIAL

## 2019-04-15 VITALS
SYSTOLIC BLOOD PRESSURE: 111 MMHG | HEART RATE: 82 BPM | DIASTOLIC BLOOD PRESSURE: 68 MMHG | OXYGEN SATURATION: 99 % | TEMPERATURE: 97.6 F

## 2019-04-15 DIAGNOSIS — I63.30 CEREBRAL INFARCTION DUE TO THROMBOSIS OF CEREBRAL ARTERY (H): ICD-10-CM

## 2019-04-15 DIAGNOSIS — G90.50 RSD (REFLEX SYMPATHETIC DYSTROPHY): ICD-10-CM

## 2019-04-15 DIAGNOSIS — Z12.11 SPECIAL SCREENING FOR MALIGNANT NEOPLASMS, COLON: Primary | ICD-10-CM

## 2019-04-15 DIAGNOSIS — Z11.4 SCREENING FOR HIV (HUMAN IMMUNODEFICIENCY VIRUS): ICD-10-CM

## 2019-04-15 LAB
BASOPHILS # BLD AUTO: 0 10E9/L (ref 0–0.2)
BASOPHILS NFR BLD AUTO: 0.1 %
DIFFERENTIAL METHOD BLD: NORMAL
EOSINOPHIL # BLD AUTO: 0.2 10E9/L (ref 0–0.7)
EOSINOPHIL NFR BLD AUTO: 3.5 %
ERYTHROCYTE [DISTWIDTH] IN BLOOD BY AUTOMATED COUNT: 14.2 % (ref 10–15)
HCT VFR BLD AUTO: 42.4 % (ref 40–53)
HGB BLD-MCNC: 14.1 G/DL (ref 13.3–17.7)
LYMPHOCYTES # BLD AUTO: 1.7 10E9/L (ref 0.8–5.3)
LYMPHOCYTES NFR BLD AUTO: 25 %
MCH RBC QN AUTO: 30.1 PG (ref 26.5–33)
MCHC RBC AUTO-ENTMCNC: 33.3 G/DL (ref 31.5–36.5)
MCV RBC AUTO: 91 FL (ref 78–100)
MONOCYTES # BLD AUTO: 0.6 10E9/L (ref 0–1.3)
MONOCYTES NFR BLD AUTO: 8.4 %
NEUTROPHILS # BLD AUTO: 4.4 10E9/L (ref 1.6–8.3)
NEUTROPHILS NFR BLD AUTO: 63 %
PLATELET # BLD AUTO: 228 10E9/L (ref 150–450)
RBC # BLD AUTO: 4.68 10E12/L (ref 4.4–5.9)
WBC # BLD AUTO: 6.9 10E9/L (ref 4–11)

## 2019-04-15 PROCEDURE — 87389 HIV-1 AG W/HIV-1&-2 AB AG IA: CPT | Performed by: INTERNAL MEDICINE

## 2019-04-15 PROCEDURE — 99214 OFFICE O/P EST MOD 30 MIN: CPT | Performed by: INTERNAL MEDICINE

## 2019-04-15 PROCEDURE — 85025 COMPLETE CBC W/AUTO DIFF WBC: CPT | Performed by: INTERNAL MEDICINE

## 2019-04-15 PROCEDURE — 80048 BASIC METABOLIC PNL TOTAL CA: CPT | Performed by: INTERNAL MEDICINE

## 2019-04-15 PROCEDURE — 36415 COLL VENOUS BLD VENIPUNCTURE: CPT | Performed by: INTERNAL MEDICINE

## 2019-04-15 RX ORDER — OXYCODONE HYDROCHLORIDE 10 MG/1
10 TABLET ORAL EVERY 6 HOURS PRN
Qty: 90 TABLET | Refills: 0 | Status: SHIPPED | OUTPATIENT
Start: 2019-04-15 | End: 2019-05-20

## 2019-04-15 ASSESSMENT — ANXIETY QUESTIONNAIRES
GAD7 TOTAL SCORE: 1
6. BECOMING EASILY ANNOYED OR IRRITABLE: NOT AT ALL
7. FEELING AFRAID AS IF SOMETHING AWFUL MIGHT HAPPEN: NOT AT ALL
5. BEING SO RESTLESS THAT IT IS HARD TO SIT STILL: NOT AT ALL
1. FEELING NERVOUS, ANXIOUS, OR ON EDGE: NOT AT ALL
2. NOT BEING ABLE TO STOP OR CONTROL WORRYING: NOT AT ALL
3. WORRYING TOO MUCH ABOUT DIFFERENT THINGS: NOT AT ALL
IF YOU CHECKED OFF ANY PROBLEMS ON THIS QUESTIONNAIRE, HOW DIFFICULT HAVE THESE PROBLEMS MADE IT FOR YOU TO DO YOUR WORK, TAKE CARE OF THINGS AT HOME, OR GET ALONG WITH OTHER PEOPLE: NOT DIFFICULT AT ALL

## 2019-04-15 ASSESSMENT — PATIENT HEALTH QUESTIONNAIRE - PHQ9
SUM OF ALL RESPONSES TO PHQ QUESTIONS 1-9: 5
5. POOR APPETITE OR OVEREATING: SEVERAL DAYS

## 2019-04-15 NOTE — PROGRESS NOTES
SUBJECTIVE:   Constantino Taylor is a 63 year old male who presents to clinic today for the following   health issues:    Medication review  Headaches are present  One time big dizzy spell  Moving and     Since the stroke messing with eyes   getting eye exam        Additional history: as documented    Reviewed  and updated as needed this visit by clinical staff  Tobacco  Allergies  Meds  Med Hx  Surg Hx  Fam Hx  Soc Hx        Reviewed and updated as needed this visit by Provider         Patient Active Problem List   Diagnosis     Cerebral infarction (H)     Hepatitis C     Tobacco use disorder     Chronic low back pain     Acute pancreatitis     Hyperlipidemia LDL goal <70     Hypertension goal BP (blood pressure) < 140/90     Nonischemic dilated cardiomyopathy (HCC)     Malignant neoplasm of prostate (H)     Erectile dysfunction     Eczema     PAD (peripheral artery disease) (H)     S/P BKA (below knee amputation) unilateral (H)     Unilateral complete BKA, left, sequela (H)     Constipation     Encounter for counseling     Acute renal failure (H)     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     Acquired absence of leg below knee (H)     Avascular necrosis of hip (H)     Personal history of prostate cancer     Past Surgical History:   Procedure Laterality Date     AMPUTATE LEG BELOW KNEE Left 2015    Procedure: AMPUTATE LEG BELOW KNEE;  Surgeon: Odessa Browning MD;  Location: [facility] OR     removal of blood clots in arm         Social History     Tobacco Use     Smoking status: Former Smoker     Packs/day: 0.30     Years: 40.00     Pack years: 12.00     Types: Cigarettes     Last attempt to quit: 3/12/2018     Years since quittin.0     Smokeless tobacco: Former User   Substance Use Topics     Alcohol use: Yes     Alcohol/week: 1.2 - 3.6 oz     Types: 2 - 6 Cans  "of beer per week     Comment: couple of beers per episode, several times a week     Family History   Problem Relation Age of Onset     Cancer Mother         brain     Cancer Brother          Current Outpatient Medications   Medication Sig Dispense Refill     aspirin (ASPIRIN LOW DOSE) 81 MG EC tablet TAKE 1 TABLET(81 MG) BY MOUTH DAILY 90 tablet 3     atorvastatin (LIPITOR) 40 MG tablet Take 1 tablet (40 mg) by mouth At Bedtime 90 tablet 4     gabapentin (NEURONTIN) 300 MG capsule Take 1-2 capsules (300-600 mg) by mouth 3 times daily 540 capsule 3     lisinopril (PRINIVIL/ZESTRIL) 40 MG tablet Take 1 tablet (40 mg) by mouth daily 90 tablet 3     metoprolol succinate ER (TOPROL-XL) 100 MG 24 hr tablet Take 100 mg by mouth daily       metoprolol succinate ER (TOPROL-XL) 100 MG 24 hr tablet Take 1 tablet (100 mg) by mouth daily 90 tablet 3     Multiple Vitamins-Minerals (ONE-A-DAY MENS 50+ ADVANTAGE) TABS Take 1 each by mouth daily 100 tablet 3     order for DME Equipment being ordered: self adhesive bandage 4\" by 8\" 30 each 11     order for DME Please dispense blood pressure machine and cuff. 1 each 0     oxyCODONE IR (ROXICODONE) 10 MG tablet Take 1 tablet (10 mg) by mouth every 6 hours as needed for moderate to severe pain 3 per day 90 tablet 0     polyethylene glycol (MIRALAX) powder Take 17 g by mouth as needed for constipation 510 g 1     rivaroxaban ANTICOAGULANT (XARELTO) 20 MG TABS tablet Take 1 tablet (20 mg) by mouth daily (with dinner) 90 tablet 3     senna (SENNA-TIME) 8.6 MG tablet Take 1 tablet by mouth 2 times daily       sennosides (SENOKOT) 8.6 MG tablet Take 2 tablets by mouth 2 times daily 120 tablet 3     tamsulosin (FLOMAX) 0.4 MG capsule Take 1 capsule (0.4 mg) by mouth daily 90 capsule 3     triamcinolone (KENALOG) 0.1 % cream Take 1 apply by topical route two times a day as needed 80 g 3     UNABLE TO FIND MEDICATION NAME: Hydrocortisone 0.5 % topical cream-Take 1 application by topical route " as needed.       acetaminophen (TYLENOL) 500 MG tablet Take 500-1,000 mg by mouth every 6 hours as needed for mild pain       No Known Allergies  Recent Labs   Lab Test 04/15/19  1024 03/03/19 03/02/19 03/01/19 01/12/18  0923 09/20/17  1432  10/17/16  1143  05/10/15  1648  11/25/14  1203  01/06/14  1103 07/12/13  1109  11/15/12  0845   A1C  --   --   --   --   --   --   --   --   --   --   --   --  5.8  --  5.8 5.4  --   --    LDL  --   --  58  --   --  42  --   --  46  --   --    < >  --    < >  --   --    < >  --    HDL  --   --  35*  --   --  39*  --   --  41  --   --    < >  --    < >  --   --    < >  --    TRIG  --   --  97  --   --  74  --   --  91  --   --    < >  --    < >  --   --    < >  --    ALT  --   --   --  25  --  29 23   < >  --    < >  --    < >  --    < > 69 37  --  52   CR 1.92* 1.44*  --   --    < > 1.64* 1.53*   < > 1.53*   < > 1.90*   < > 1.02   < > 0.86 1.08   < > 0.87   GFRESTIMATED 37* 50*  --   --    < > 43* 47*   < > 47*   < > 37*   < > 75   < > >90 71   < > >90   GFRESTBLACK 42* 60*  --   --    < > 52* 56*   < > 57*   < > 44*   < > >90   GFR Calc     < > >90 86   < > >90   POTASSIUM 4.5 4.1  --   --    < > 4.0 4.3   < > 4.6   < > 5.0   < > 4.5   < > 4.5 4.1   < > 3.6   TSH  --   --   --   --   --   --   --   --   --   --  0.99  --   --   --   --   --   --  1.18    < > = values in this interval not displayed.      BP Readings from Last 3 Encounters:   04/15/19 111/68   03/07/19 112/74   01/18/19 (!) 170/109    Wt Readings from Last 3 Encounters:   03/07/19 95.3 kg (210 lb)   01/18/19 97.1 kg (214 lb)   12/28/18 93.9 kg (207 lb)                    ROS:  Constitutional, HEENT, cardiovascular, pulmonary, gi and gu systems are negative, except as otherwise noted.    OBJECTIVE:     /68 (BP Location: Left arm, Patient Position: Chair, Cuff Size: Adult Regular)   Pulse 82   Temp 97.6  F (36.4  C) (Oral)   SpO2 99%   There is no height or weight on file to calculate  BMI.  GENERAL: healthy, alert and no distress  EYES: Eyes grossly normal to inspection, PERRL and conjunctivae and sclerae normal  HENT: ear canals and TM's normal, nose and mouth without ulcers or lesions  NECK: no adenopathy, no asymmetry, masses, or scars and thyroid normal to palpation  RESP: lungs clear to auscultation - no rales, rhonchi or wheezes  CV: regular rate and rhythm, normal S1 S2, no S3 or S4, no murmur, click or rub, no peripheral edema and peripheral pulses strong  ABDOMEN: soft, nontender, no hepatosplenomegaly, no masses and bowel sounds normal  MS: no gross musculoskeletal defects noted, no edema  SKIN: no suspicious lesions or rashes  NEURO: Normal strength and tone, mentation intact and speech normal  BACK: no CVA tenderness, no paralumbar tenderness  PSYCH: mentation appears normal, affect normal/bright  LYMPH: no cervical, supraclavicular, axillary, or inguinal adenopathy    Diagnostic Test Results:  Results for orders placed or performed in visit on 04/15/19   HIV Antigen Antibody Combo   Result Value Ref Range    HIV Antigen Antibody Combo Nonreactive NR^Nonreactive       CBC with platelets differential   Result Value Ref Range    WBC 6.9 4.0 - 11.0 10e9/L    RBC Count 4.68 4.4 - 5.9 10e12/L    Hemoglobin 14.1 13.3 - 17.7 g/dL    Hematocrit 42.4 40.0 - 53.0 %    MCV 91 78 - 100 fl    MCH 30.1 26.5 - 33.0 pg    MCHC 33.3 31.5 - 36.5 g/dL    RDW 14.2 10.0 - 15.0 %    Platelet Count 228 150 - 450 10e9/L    Diff Method Automated Method     % Neutrophils 63.0 %    % Lymphocytes 25.0 %    % Monocytes 8.4 %    % Eosinophils 3.5 %    % Basophils 0.1 %    Absolute Neutrophil 4.4 1.6 - 8.3 10e9/L    Absolute Lymphocytes 1.7 0.8 - 5.3 10e9/L    Absolute Monocytes 0.6 0.0 - 1.3 10e9/L    Absolute Eosinophils 0.2 0.0 - 0.7 10e9/L    Absolute Basophils 0.0 0.0 - 0.2 10e9/L   Basic metabolic panel   Result Value Ref Range    Sodium 144 133 - 144 mmol/L    Potassium 4.5 3.4 - 5.3 mmol/L    Chloride 110  (H) 94 - 109 mmol/L    Carbon Dioxide 27 20 - 32 mmol/L    Anion Gap 7 3 - 14 mmol/L    Glucose 97 70 - 99 mg/dL    Urea Nitrogen 32 (H) 7 - 30 mg/dL    Creatinine 1.92 (H) 0.66 - 1.25 mg/dL    GFR Estimate 37 (L) >60 mL/min/[1.73_m2]    GFR Estimate If Black 42 (L) >60 mL/min/[1.73_m2]    Calcium 9.3 8.5 - 10.1 mg/dL       ASSESSMENT/PLAN:       ICD-10-CM    1. Special screening for malignant neoplasms, colon Z12.11 Fecal colorectal cancer screen (FIT)   2. RSD (reflex sympathetic dystrophy) G90.50 CBC with platelets differential     Basic metabolic panel     oxyCODONE IR (ROXICODONE) 10 MG tablet     CANCELED: Drug  Screen Comprehensive, Urine w/o Reported Meds (Pain Care Package)   3. Screening for HIV (human immunodeficiency virus) Z11.4 HIV Antigen Antibody Combo   4. Cerebral infarction due to thrombosis of cerebral artery (H) I63.30        Patient with thrombectomy.  S/p CVA with residual slurred speech, weakness  Right distal M1 segment and right MCA branches  Partially related to non compliance with medications  Left sided weakness mostly resolved although is at fall risk due to left BKA amputation  Back on xarelto  Patient with afib on metoprolol at 200 mg daily although does miss doses    Chronic heart failure last EF = 40 -45 percent  Consider icd  Lisinopril has to be used carefully due to NINA  Continue neurontin and decrease narcotic dependence      Avery Duke MD  [facility]

## 2019-04-16 LAB
ANION GAP SERPL CALCULATED.3IONS-SCNC: 7 MMOL/L (ref 3–14)
BUN SERPL-MCNC: 32 MG/DL (ref 7–30)
CALCIUM SERPL-MCNC: 9.3 MG/DL (ref 8.5–10.1)
CHLORIDE SERPL-SCNC: 110 MMOL/L (ref 94–109)
CO2 SERPL-SCNC: 27 MMOL/L (ref 20–32)
CREAT SERPL-MCNC: 1.92 MG/DL (ref 0.66–1.25)
GFR SERPL CREATININE-BSD FRML MDRD: 37 ML/MIN/{1.73_M2}
GLUCOSE SERPL-MCNC: 97 MG/DL (ref 70–99)
HIV 1+2 AB+HIV1 P24 AG SERPL QL IA: NONREACTIVE
POTASSIUM SERPL-SCNC: 4.5 MMOL/L (ref 3.4–5.3)
SODIUM SERPL-SCNC: 144 MMOL/L (ref 133–144)

## 2019-04-16 ASSESSMENT — ANXIETY QUESTIONNAIRES: GAD7 TOTAL SCORE: 1

## 2019-04-17 PROBLEM — M87.059 AVASCULAR NECROSIS OF HIP (H): Status: ACTIVE | Noted: 2019-04-17

## 2019-05-15 ENCOUNTER — MEDICAL CORRESPONDENCE (OUTPATIENT)
Dept: HEALTH INFORMATION MANAGEMENT | Facility: CLINIC | Age: 64
End: 2019-05-15

## 2019-05-15 ENCOUNTER — PATIENT OUTREACH (OUTPATIENT)
Dept: PHYSICAL MEDICINE AND REHAB | Facility: CLINIC | Age: 64
End: 2019-05-15

## 2019-05-15 ENCOUNTER — TELEPHONE (OUTPATIENT)
Dept: FAMILY MEDICINE | Facility: CLINIC | Age: 64
End: 2019-05-15

## 2019-05-15 DIAGNOSIS — I10 HYPERTENSION GOAL BP (BLOOD PRESSURE) < 140/90: ICD-10-CM

## 2019-05-15 RX ORDER — LISINOPRIL 40 MG/1
40 TABLET ORAL DAILY
Qty: 90 TABLET | Refills: 3 | Status: SHIPPED | OUTPATIENT
Start: 2019-05-15 | End: 2019-08-15

## 2019-05-15 RX ORDER — METOPROLOL SUCCINATE 100 MG/1
100 TABLET, EXTENDED RELEASE ORAL DAILY
Qty: 90 TABLET | Refills: 3 | Status: SHIPPED | OUTPATIENT
Start: 2019-05-15 | End: 2019-08-15

## 2019-05-15 NOTE — TELEPHONE ENCOUNTER
Nurse sent my chart message informing patient to contact pharmacy for refills.      Sandra Chan RN  [facility]

## 2019-05-15 NOTE — TELEPHONE ENCOUNTER
Called patient who states pharmacy never received these two scripts.   Nurse sees now they were cued up as historical so did not print or go electronically to pharmacy.    Nurse sent per protocol to pharmacy.    Sandra Chan RN  [facility]

## 2019-05-15 NOTE — TELEPHONE ENCOUNTER
Reason for Call:  Medication or medication refill:    Do you use a [facility]?  Name of the pharmacy and phone number for the current request: [facility] on  [address], [place] [address]    Name of the medication requested: metoprolol succinate ER (TOPROL-XL) 100 MG 24 hr tablet and lisinopril (PRINIVIL/ZESTRIL) 40 MG tablet    Other request: None    Can we leave a detailed message on this number? YES    Phone number patient can be reached at: Cell number on file:    Telephone Information:   Mobile [phone]       Best Time: Anytime    Call taken on 5/15/2019 at 10:54 AM by Dorothy Yeung

## 2019-05-17 DIAGNOSIS — G90.50 RSD (REFLEX SYMPATHETIC DYSTROPHY): ICD-10-CM

## 2019-05-17 NOTE — TELEPHONE ENCOUNTER
Reason for Call:  Medication or medication refill:    Do you use a [facility]?  Name of the pharmacy and phone number for the current request:  attached below     Name of the medication requested: oxyCODONE IR (ROXICODONE) 10 MG tablet    Other request: n/a    Can we leave a detailed message on this number? YES    Phone number patient can be reached at: Home number on file [phone] (home)    Best Time: Anytime    Call taken on 5/17/2019 at 9:51 AM by Shelbi Day

## 2019-05-20 RX ORDER — OXYCODONE HYDROCHLORIDE 10 MG/1
10 TABLET ORAL EVERY 6 HOURS PRN
Qty: 90 TABLET | Refills: 0 | Status: SHIPPED | OUTPATIENT
Start: 2019-05-20 | End: 2019-06-20

## 2019-05-20 NOTE — TELEPHONE ENCOUNTER
Requested Prescriptions   Pending Prescriptions Disp Refills     oxyCODONE IR (ROXICODONE) 10 MG tablet 90 tablet 0     Sig: Take 1 tablet (10 mg) by mouth every 6 hours as needed for moderate to severe pain 3 per day       There is no refill protocol information for this order        Routing to PCP.  Rosa Conti MA

## 2019-05-20 NOTE — TELEPHONE ENCOUNTER
Prescription for Oxycodone was brought to the  for   Susan [facility]  Team 3 Coordinator

## 2019-06-20 DIAGNOSIS — G90.50 RSD (REFLEX SYMPATHETIC DYSTROPHY): ICD-10-CM

## 2019-06-20 RX ORDER — OXYCODONE HYDROCHLORIDE 10 MG/1
10 TABLET ORAL EVERY 6 HOURS PRN
Qty: 90 TABLET | Refills: 0 | Status: SHIPPED | OUTPATIENT
Start: 2019-06-20 | End: 2019-07-18

## 2019-06-20 NOTE — TELEPHONE ENCOUNTER
Reason for Call:  Medication or medication refill:    Do you use a [facility]?  Name of the pharmacy and phone number for the current request:  Will at     Name of the medication requested: oxyCODONE IR (ROXICODONE) 10 MG tablet    Other request: N/A    Can we leave a detailed message on this number? YES    Phone number patient can be reached at: Home number on file [phone] (home)    Best Time: Anytime    Call taken on 6/20/2019 at 7:53 AM by Osmany Gee

## 2019-06-20 NOTE — TELEPHONE ENCOUNTER
Requested Prescriptions   Pending Prescriptions Disp Refills     oxyCODONE IR (ROXICODONE) 10 MG tablet 90 tablet 0     Sig: Take 1 tablet (10 mg) by mouth every 6 hours as needed for moderate to severe pain 3 per day       There is no refill protocol information for this order        Last refill 5/20/19 # 90  Last OV 4/15/19    Routing refill request to provider for review/approval because:  Drug not on the [facility] refill protocol   Angelica Siegel RN [facility].

## 2019-06-21 NOTE — TELEPHONE ENCOUNTER
Prescription  For Roxicodone was brought to the   [facility]  Team 3 Coordinator

## 2019-06-21 NOTE — TELEPHONE ENCOUNTER
Patient picked up his script at the  on 6/21/19.    Izabela ALY  Patient Representative  [place]

## 2019-07-17 DIAGNOSIS — G90.50 RSD (REFLEX SYMPATHETIC DYSTROPHY): ICD-10-CM

## 2019-07-17 NOTE — TELEPHONE ENCOUNTER
Reason for Call:  Medication or medication refill:    Do you use a [facility]?  No    Name of the medication requested: oxyCODONE IR (ROXICODONE)    Other request: Please call patient when ready for pickup    Can we leave a detailed message on this number? YES    Phone number patient can be reached at: Home number on file [phone] (home)    Best Time: Anytime    Call taken on 7/17/2019 at 1:06 PM by Makenna Baugh

## 2019-07-17 NOTE — TELEPHONE ENCOUNTER
Requested Prescriptions   Pending Prescriptions Disp Refills     oxyCODONE IR (ROXICODONE) 10 MG tablet 90 tablet 0     Sig: Take 1 tablet (10 mg) by mouth every 6 hours as needed for moderate to severe pain 3 per day       There is no refill protocol information for this order        Last Written Prescription Date:  6/20/2019  Last Fill Quantity: 90,  # refills: 0   Last office visit: 4/15/2019 with prescribing provider:     Future Office Visit:    Routing refill request to provider for review/approval because:  Drug not on the [facility] refill protocol       Sandra Chan RN  [facility]

## 2019-07-18 RX ORDER — OXYCODONE HYDROCHLORIDE 10 MG/1
10 TABLET ORAL EVERY 6 HOURS PRN
Qty: 90 TABLET | Refills: 0 | Status: SHIPPED | OUTPATIENT
Start: 2019-07-18 | End: 2019-08-15

## 2019-08-01 NOTE — TELEPHONE ENCOUNTER
Patient picked up script @  7-2017/md   coagulation(Bleeding disorder R/T clinical cond/anti-coags)/age(85 years old or older)

## 2019-08-02 ENCOUNTER — TELEPHONE (OUTPATIENT)
Dept: FAMILY MEDICINE | Facility: CLINIC | Age: 64
End: 2019-08-02

## 2019-08-02 NOTE — TELEPHONE ENCOUNTER
Patients daughter would like to talk with a TC about the patients FMLA please call Dacia at [phone]

## 2019-08-02 NOTE — TELEPHONE ENCOUNTER
"Reason for Call:  Other     Detailed comments: patients daughter would like to talk to a TC about the patients FMLA     Phone Number Patient can be reached at: Other phone number:      Best Time: ***    Can we leave a detailed message on this number? { :[id number]::\"YES\"}    Call taken on 8/2/2019 at 3:29 PM by Luann Mercado    "

## 2019-08-13 DIAGNOSIS — I63.19 CEREBRAL INFARCTION DUE TO EMBOLISM OF OTHER PRECEREBRAL ARTERY (H): ICD-10-CM

## 2019-08-13 DIAGNOSIS — I48.20 CHRONIC ATRIAL FIBRILLATION (H): ICD-10-CM

## 2019-08-13 DIAGNOSIS — I10 HYPERTENSION GOAL BP (BLOOD PRESSURE) < 140/90: ICD-10-CM

## 2019-08-13 DIAGNOSIS — I42.0 DILATED CARDIOMYOPATHY (H): ICD-10-CM

## 2019-08-13 DIAGNOSIS — N40.0 BENIGN PROSTATIC HYPERPLASIA, UNSPECIFIED WHETHER LOWER URINARY TRACT SYMPTOMS PRESENT: ICD-10-CM

## 2019-08-13 DIAGNOSIS — I48.91 ATRIAL FIBRILLATION WITH RVR (H): ICD-10-CM

## 2019-08-13 NOTE — TELEPHONE ENCOUNTER
"Requested Prescriptions   Pending Prescriptions Disp Refills     aspirin (ASA) 81 MG EC tablet [Pharmacy Med Name: ASPIRIN EC 81 MG TABLET] 10 tablet 0     Sig: TAKE 1 TABLET BY MOUTH EVERY DAY  Last Written Prescription Date:  3/7/19  Last Fill Quantity: 90,  # refills: 3   Last office visit: 4/15/2019 with prescribing provider:  Srikanth   Future Office Visit:   Next 5 appointments (look out 90 days)    Aug 15, 2019 10:40 AM CDT  SHORT with Avery Duke MD  [facility] ([address] [address]  [phone]                Analgesics (Non-Narcotic Tylenol and ASA Only) Passed - 8/13/2019  1:26 PM        Passed - Recent (12 mo) or future (30 days) visit within the authorizing provider's specialty     Patient had office visit in the last 12 months or has a visit in the next 30 days with authorizing provider or within the authorizing provider's specialty.  See \"Patient Info\" tab in inbasket, or \"Choose Columns\" in Meds & Orders section of the refill encounter.              Passed - Patient is age 20 years or older     If ASA is flagged for ages under 20 years old. Forward to provider for confirmation Ryes Syndrome is not a concern.              Passed - Medication is active on med list          "

## 2019-08-15 ENCOUNTER — OFFICE VISIT (OUTPATIENT)
Dept: FAMILY MEDICINE | Facility: CLINIC | Age: 64
End: 2019-08-15
Payer: COMMERCIAL

## 2019-08-15 ENCOUNTER — TELEPHONE (OUTPATIENT)
Dept: FAMILY MEDICINE | Facility: CLINIC | Age: 64
End: 2019-08-15

## 2019-08-15 ENCOUNTER — TELEPHONE (OUTPATIENT)
Dept: ONCOLOGY | Facility: CLINIC | Age: 64
End: 2019-08-15

## 2019-08-15 VITALS
HEART RATE: 79 BPM | OXYGEN SATURATION: 99 % | WEIGHT: 196 LBS | BODY MASS INDEX: 24.5 KG/M2 | DIASTOLIC BLOOD PRESSURE: 83 MMHG | SYSTOLIC BLOOD PRESSURE: 122 MMHG | TEMPERATURE: 98.5 F

## 2019-08-15 DIAGNOSIS — I48.91 ATRIAL FIBRILLATION WITH RVR (H): ICD-10-CM

## 2019-08-15 DIAGNOSIS — C61 PROSTATE CANCER (H): Primary | ICD-10-CM

## 2019-08-15 DIAGNOSIS — G90.523 REFLEX SYMPATHETIC DYSTROPHY OF BOTH LOWER EXTREMITIES: ICD-10-CM

## 2019-08-15 DIAGNOSIS — G90.50 RSD (REFLEX SYMPATHETIC DYSTROPHY): ICD-10-CM

## 2019-08-15 DIAGNOSIS — I63.19 CEREBRAL INFARCTION DUE TO EMBOLISM OF OTHER PRECEREBRAL ARTERY (H): ICD-10-CM

## 2019-08-15 DIAGNOSIS — I10 HYPERTENSION GOAL BP (BLOOD PRESSURE) < 140/90: ICD-10-CM

## 2019-08-15 DIAGNOSIS — I48.20 CHRONIC ATRIAL FIBRILLATION (H): ICD-10-CM

## 2019-08-15 DIAGNOSIS — N40.0 BENIGN PROSTATIC HYPERPLASIA, UNSPECIFIED WHETHER LOWER URINARY TRACT SYMPTOMS PRESENT: ICD-10-CM

## 2019-08-15 DIAGNOSIS — I42.0 DILATED CARDIOMYOPATHY (H): ICD-10-CM

## 2019-08-15 LAB
AMPHETAMINES UR QL: NOT DETECTED NG/ML
ANION GAP SERPL CALCULATED.3IONS-SCNC: 5 MMOL/L (ref 3–14)
BARBITURATES UR QL SCN: NOT DETECTED NG/ML
BASOPHILS # BLD AUTO: 0 10E9/L (ref 0–0.2)
BASOPHILS NFR BLD AUTO: 0.1 %
BENZODIAZ UR QL SCN: NOT DETECTED NG/ML
BUN SERPL-MCNC: 20 MG/DL (ref 7–30)
BUPRENORPHINE UR QL: NOT DETECTED NG/ML
CALCIUM SERPL-MCNC: 9.7 MG/DL (ref 8.5–10.1)
CANNABINOIDS UR QL: ABNORMAL NG/ML
CHLORIDE SERPL-SCNC: 104 MMOL/L (ref 94–109)
CO2 SERPL-SCNC: 29 MMOL/L (ref 20–32)
COCAINE UR QL SCN: NOT DETECTED NG/ML
CREAT SERPL-MCNC: 1.44 MG/DL (ref 0.66–1.25)
D-METHAMPHET UR QL: NOT DETECTED NG/ML
DIFFERENTIAL METHOD BLD: NORMAL
EOSINOPHIL # BLD AUTO: 0.1 10E9/L (ref 0–0.7)
EOSINOPHIL NFR BLD AUTO: 1.2 %
ERYTHROCYTE [DISTWIDTH] IN BLOOD BY AUTOMATED COUNT: 13.9 % (ref 10–15)
GFR SERPL CREATININE-BSD FRML MDRD: 52 ML/MIN/{1.73_M2}
GLUCOSE SERPL-MCNC: 117 MG/DL (ref 70–99)
HCT VFR BLD AUTO: 45 % (ref 40–53)
HGB BLD-MCNC: 15.3 G/DL (ref 13.3–17.7)
LYMPHOCYTES # BLD AUTO: 1.2 10E9/L (ref 0.8–5.3)
LYMPHOCYTES NFR BLD AUTO: 15.8 %
MCH RBC QN AUTO: 31.2 PG (ref 26.5–33)
MCHC RBC AUTO-ENTMCNC: 34 G/DL (ref 31.5–36.5)
MCV RBC AUTO: 92 FL (ref 78–100)
METHADONE UR QL SCN: NOT DETECTED NG/ML
MONOCYTES # BLD AUTO: 0.5 10E9/L (ref 0–1.3)
MONOCYTES NFR BLD AUTO: 6.9 %
NEUTROPHILS # BLD AUTO: 5.5 10E9/L (ref 1.6–8.3)
NEUTROPHILS NFR BLD AUTO: 76 %
OPIATES UR QL SCN: NOT DETECTED NG/ML
OXYCODONE UR QL SCN: ABNORMAL NG/ML
PCP UR QL SCN: NOT DETECTED NG/ML
PLATELET # BLD AUTO: 236 10E9/L (ref 150–450)
POTASSIUM SERPL-SCNC: 4.4 MMOL/L (ref 3.4–5.3)
PROPOXYPH UR QL: NOT DETECTED NG/ML
PSA SERPL-MCNC: 32.3 UG/L (ref 0–4)
RBC # BLD AUTO: 4.91 10E12/L (ref 4.4–5.9)
SODIUM SERPL-SCNC: 138 MMOL/L (ref 133–144)
TRICYCLICS UR QL SCN: NOT DETECTED NG/ML
WBC # BLD AUTO: 7.3 10E9/L (ref 4–11)

## 2019-08-15 PROCEDURE — 84153 ASSAY OF PSA TOTAL: CPT | Performed by: INTERNAL MEDICINE

## 2019-08-15 PROCEDURE — 80306 DRUG TEST PRSMV INSTRMNT: CPT | Performed by: INTERNAL MEDICINE

## 2019-08-15 PROCEDURE — 99214 OFFICE O/P EST MOD 30 MIN: CPT | Performed by: INTERNAL MEDICINE

## 2019-08-15 PROCEDURE — 85025 COMPLETE CBC W/AUTO DIFF WBC: CPT | Performed by: INTERNAL MEDICINE

## 2019-08-15 PROCEDURE — 80048 BASIC METABOLIC PNL TOTAL CA: CPT | Performed by: INTERNAL MEDICINE

## 2019-08-15 PROCEDURE — 36415 COLL VENOUS BLD VENIPUNCTURE: CPT | Performed by: INTERNAL MEDICINE

## 2019-08-15 RX ORDER — OXYCODONE HYDROCHLORIDE 10 MG/1
10 TABLET ORAL EVERY 6 HOURS PRN
Qty: 90 TABLET | Refills: 0 | Status: SHIPPED | OUTPATIENT
Start: 2019-08-15 | End: 2019-09-18

## 2019-08-15 RX ORDER — ATORVASTATIN CALCIUM 40 MG/1
40 TABLET, FILM COATED ORAL AT BEDTIME
Qty: 90 TABLET | Refills: 4 | Status: SHIPPED | OUTPATIENT
Start: 2019-08-15 | End: 2020-06-01

## 2019-08-15 RX ORDER — GABAPENTIN 300 MG/1
300-600 CAPSULE ORAL 3 TIMES DAILY
Qty: 540 CAPSULE | Refills: 3 | Status: SHIPPED | OUTPATIENT
Start: 2019-08-15 | End: 2020-02-05

## 2019-08-15 RX ORDER — METOPROLOL SUCCINATE 100 MG/1
100 TABLET, EXTENDED RELEASE ORAL DAILY
Qty: 90 TABLET | Refills: 3 | Status: SHIPPED | OUTPATIENT
Start: 2019-08-15 | End: 2020-06-01

## 2019-08-15 RX ORDER — LISINOPRIL 40 MG/1
40 TABLET ORAL DAILY
Qty: 90 TABLET | Refills: 3 | Status: SHIPPED | OUTPATIENT
Start: 2019-08-15 | End: 2020-06-01

## 2019-08-15 NOTE — TELEPHONE ENCOUNTER
Reason for Call:  Other call back    Detailed comments: Pt's daughter would like to speak with nurse regarding FMLA forms.    Phone Number Patient can be reached at: Other phone number:  [phone]    Best Time: Anytime    Can we leave a detailed message on this number? NO    Call taken on 8/15/2019 at 10:29 AM by Khalida Pozo

## 2019-08-15 NOTE — TELEPHONE ENCOUNTER
Spoke with pts daughter and she stated he had a ppt today and she was going to check with pt to see about the forms  Susan Daly  Team 3 Coordinator     Susan Daly  Team 3 Coordinator

## 2019-08-15 NOTE — TELEPHONE ENCOUNTER
Reason for Call:  Other     Detailed comments: Patient daughter requesting for care team to refax to [phone]. Please advise.     Phone Number Patient can be reached at: Other phone number:  [phone]    Best Time: Anytime    Can we leave a detailed message on this number? YES    Call taken on 8/15/2019 at 4:19 PM by Makenna Baugh

## 2019-08-15 NOTE — PROGRESS NOTES
Subjective     Constantino Taylor is a 63 year old male who presents to clinic today for the following health issues:    HPI   Recheck leg pain  Aspirin  Getting headaches    Patient has been on aspirin and   xarelto since the time of the stroke  IS maintaining on the metoprolo and xarelto for AFIB  Gabapentin has run low and phatom pains return  Still on chronic narcotic medications for the phantom limb pain  As well    Has good prosthesis for right BKA stump is intact    Patient Active Problem List   Diagnosis     Cerebral infarction (H)     Hepatitis C     Tobacco use disorder     Chronic low back pain     Acute pancreatitis     Hyperlipidemia LDL goal <70     Hypertension goal BP (blood pressure) < 140/90     Nonischemic dilated cardiomyopathy (HCC)     Malignant neoplasm of prostate (H)     Erectile dysfunction     Eczema     PAD (peripheral artery disease) (H)     S/P BKA (below knee amputation) unilateral (H)     Unilateral complete BKA, left, sequela (H)     Constipation     Encounter for counseling     Acute renal failure (H)     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     Acquired absence of leg below knee (H)     Avascular necrosis of hip (H)     Personal history of prostate cancer     Past Surgical History:   Procedure Laterality Date     AMPUTATE LEG BELOW KNEE Left 2015    Procedure: AMPUTATE LEG BELOW KNEE;  Surgeon: Odessa Browning MD;  Location: [facility] OR     removal of blood clots in arm         Social History     Tobacco Use     Smoking status: Former Smoker     Packs/day: 0.30     Years: 40.00     Pack years: 12.00     Types: Cigarettes     Last attempt to quit: 3/12/2018     Years since quittin.4     Smokeless tobacco: Former User   Substance Use Topics     Alcohol use: Yes     Alcohol/week: 1.2 - 3.6 oz     Types: 2 - 6 Cans of beer per week      "Comment: couple of beers per episode, several times a week     Family History   Problem Relation Age of Onset     Cancer Mother         brain     Cancer Brother          Current Outpatient Medications   Medication Sig Dispense Refill     aspirin (ASPIRIN LOW DOSE) 81 MG EC tablet TAKE 1 TABLET(81 MG) BY MOUTH DAILY 90 tablet 3     atorvastatin (LIPITOR) 40 MG tablet Take 1 tablet (40 mg) by mouth At Bedtime 90 tablet 4     gabapentin (NEURONTIN) 300 MG capsule Take 1-2 capsules (300-600 mg) by mouth 3 times daily 540 capsule 3     lisinopril (PRINIVIL/ZESTRIL) 40 MG tablet Take 1 tablet (40 mg) by mouth daily 90 tablet 3     metoprolol succinate ER (TOPROL-XL) 100 MG 24 hr tablet Take 1 tablet (100 mg) by mouth daily 90 tablet 3     Multiple Vitamins-Minerals (ONE-A-DAY MENS 50+ ADVANTAGE) TABS Take 1 each by mouth daily 100 tablet 3     oxyCODONE IR (ROXICODONE) 10 MG tablet Take 1 tablet (10 mg) by mouth every 6 hours as needed for moderate to severe pain 3 per day 90 tablet 0     polyethylene glycol (MIRALAX) powder Take 17 g by mouth as needed for constipation 510 g 1     rivaroxaban ANTICOAGULANT (XARELTO) 20 MG TABS tablet Take 1 tablet (20 mg) by mouth daily (with dinner) 90 tablet 3     acetaminophen (TYLENOL) 500 MG tablet Take 500-1,000 mg by mouth every 6 hours as needed for mild pain       order for DME Equipment being ordered: self adhesive bandage 4\" by 8\" (Patient not taking: Reported on 8/15/2019) 30 each 11     order for DME Please dispense blood pressure machine and cuff. (Patient not taking: Reported on 8/15/2019) 1 each 0     senna (SENNA-TIME) 8.6 MG tablet Take 1 tablet by mouth 2 times daily       sennosides (SENOKOT) 8.6 MG tablet Take 2 tablets by mouth 2 times daily 120 tablet 3     tamsulosin (FLOMAX) 0.4 MG capsule Take 1 capsule (0.4 mg) by mouth daily 90 capsule 3     triamcinolone (KENALOG) 0.1 % cream Take 1 apply by topical route two times a day as needed 80 g 3     UNABLE TO FIND " MEDICATION NAME: Hydrocortisone 0.5 % topical cream-Take 1 application by topical route as needed.       No Known Allergies  Recent Labs   Lab Test 08/15/19  1126 04/15/19  1024  03/02/19 03/01/19 01/12/18  0923 09/20/17  1432  10/17/16  1143  05/10/15  1648  11/25/14  1203  01/06/14  1103 07/12/13  1109  11/15/12  0845   A1C  --   --   --   --   --   --   --   --   --   --   --   --   --  5.8  --  5.8 5.4  --   --    LDL  --   --   --  58  --   --  42  --   --  46  --   --    < >  --    < >  --   --    < >  --    HDL  --   --   --  35*  --   --  39*  --   --  41  --   --    < >  --    < >  --   --    < >  --    TRIG  --   --   --  97  --   --  74  --   --  91  --   --    < >  --    < >  --   --    < >  --    ALT  --   --   --   --  25  --  29 23   < >  --    < >  --    < >  --    < > 69 37  --  52   CR 1.44* 1.92*   < >  --   --    < > 1.64* 1.53*   < > 1.53*   < > 1.90*   < > 1.02   < > 0.86 1.08   < > 0.87   GFRESTIMATED 52* 37*   < >  --   --    < > 43* 47*   < > 47*   < > 37*   < > 75   < > >90 71   < > >90   GFRESTBLACK 60* 42*   < >  --   --    < > 52* 56*   < > 57*   < > 44*   < > >90   GFR Calc     < > >90 86   < > >90   POTASSIUM 4.4 4.5   < >  --   --    < > 4.0 4.3   < > 4.6   < > 5.0   < > 4.5   < > 4.5 4.1   < > 3.6   TSH  --   --   --   --   --   --   --   --   --   --   --  0.99  --   --   --   --   --   --  1.18    < > = values in this interval not displayed.          Reviewed and updated as needed this visit by Provider         Review of Systems   ROS COMP: Constitutional, HEENT, cardiovascular, pulmonary, gi and gu systems are negative, except as otherwise noted.      Objective    /83 (BP Location: Left arm, Patient Position: Chair, Cuff Size: Adult Large)   Pulse 79   Temp 98.5  F (36.9  C) (Oral)   Wt 88.9 kg (196 lb)   SpO2 99%   BMI 24.50 kg/m    Body mass index is 24.5 kg/m .  Physical Exam   GENERAL: healthy, alert and no distress  EYES: Eyes grossly normal to  inspection, PERRL and conjunctivae and sclerae normal  HENT: ear canals and TM's normal, nose and mouth without ulcers or lesions  NECK: no adenopathy, no asymmetry, masses, or scars and thyroid normal to palpation  RESP: lungs clear to auscultation - no rales, rhonchi or wheezes  CV: regular rate and rhythm, normal S1 S2, no S3 or S4, no murmur, click or rub, no peripheral edema and peripheral pulses strong  ABDOMEN: soft, nontender, no hepatosplenomegaly, no masses and bowel sounds normal  MS: right AKA stump clean dry and intact    SKIN: no suspicious lesions or rashes  NEURO: Normal strength and tone, mentation intact and speech normal  PSYCH: mentation appears normal, affect normal/bright    Diagnostic Test Results:  Labs reviewed in Epic  Results for orders placed or performed in visit on 08/15/19   Drug Abuse Screen Panel 13, Urine (Pain Care Package)   Result Value Ref Range    Cannabinoids (88-pvr-5-carboxy-9-THC) Detected, Abnormal Result (A) NDET^Not Detected ng/mL    Phencyclidine (Phencyclidine) Not Detected NDET^Not Detected ng/mL    Cocaine (Benzoylecgonine) Not Detected NDET^Not Detected ng/mL    Methamphetamine (d-Methamphetamine) Not Detected NDET^Not Detected ng/mL    Opiates (Morphine) Not Detected NDET^Not Detected ng/mL    Amphetamine (d-Amphetamine) Not Detected NDET^Not Detected ng/mL    Benzodiazepines (Nordiazepam) Not Detected NDET^Not Detected ng/mL    Tricyclic Antidepressants (Desipramine) Not Detected NDET^Not Detected ng/mL    Methadone (Methadone) Not Detected NDET^Not Detected ng/mL    Barbiturates (Butalbital) Not Detected NDET^Not Detected ng/mL    Oxycodone (Oxycodone) Detected, Abnormal Result (A) NDET^Not Detected ng/mL    Propoxyphene (Norpropoxyphene) Not Detected NDET^Not Detected ng/mL    Buprenorphine (Buprenorphine) Not Detected NDET^Not Detected ng/mL   Basic metabolic panel   Result Value Ref Range    Sodium 138 133 - 144 mmol/L    Potassium 4.4 3.4 - 5.3 mmol/L     Chloride 104 94 - 109 mmol/L    Carbon Dioxide 29 20 - 32 mmol/L    Anion Gap 5 3 - 14 mmol/L    Glucose 117 (H) 70 - 99 mg/dL    Urea Nitrogen 20 7 - 30 mg/dL    Creatinine 1.44 (H) 0.66 - 1.25 mg/dL    GFR Estimate 52 (L) >60 mL/min/[1.73_m2]    GFR Estimate If Black 60 (L) >60 mL/min/[1.73_m2]    Calcium 9.7 8.5 - 10.1 mg/dL   CBC with platelets and differential   Result Value Ref Range    WBC 7.3 4.0 - 11.0 10e9/L    RBC Count 4.91 4.4 - 5.9 10e12/L    Hemoglobin 15.3 13.3 - 17.7 g/dL    Hematocrit 45.0 40.0 - 53.0 %    MCV 92 78 - 100 fl    MCH 31.2 26.5 - 33.0 pg    MCHC 34.0 31.5 - 36.5 g/dL    RDW 13.9 10.0 - 15.0 %    Platelet Count 236 150 - 450 10e9/L    % Neutrophils 76.0 %    % Lymphocytes 15.8 %    % Monocytes 6.9 %    % Eosinophils 1.2 %    % Basophils 0.1 %    Absolute Neutrophil 5.5 1.6 - 8.3 10e9/L    Absolute Lymphocytes 1.2 0.8 - 5.3 10e9/L    Absolute Monocytes 0.5 0.0 - 1.3 10e9/L    Absolute Eosinophils 0.1 0.0 - 0.7 10e9/L    Absolute Basophils 0.0 0.0 - 0.2 10e9/L    Diff Method Automated Method    PSA, tumor marker   Result Value Ref Range    PSA 32.30 (H) 0 - 4 ug/L           Assessment & Plan       ICD-10-CM    1. Prostate cancer (H) C61 ONC/HEME ADULT REFERRAL     PSA, tumor marker   2. Atrial fibrillation with RVR (H) I48.91 aspirin (ASPIRIN LOW DOSE) 81 MG EC tablet   3. Dilated cardiomyopathy (H) I42.0 aspirin (ASPIRIN LOW DOSE) 81 MG EC tablet   4. Hypertension goal BP (blood pressure) < 140/90 I10 aspirin (ASPIRIN LOW DOSE) 81 MG EC tablet     metoprolol succinate ER (TOPROL-XL) 100 MG 24 hr tablet     lisinopril (PRINIVIL/ZESTRIL) 40 MG tablet     Basic metabolic panel     CBC with platelets and differential   5. 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) I63.19 aspirin (ASPIRIN LOW DOSE) 81 MG EC tablet     atorvastatin (LIPITOR) 40 MG tablet   6. Chronic atrial fibrillation (H) I48.2 aspirin (ASPIRIN LOW DOSE) 81 MG EC tablet     rivaroxaban ANTICOAGULANT  (XARELTO) 20 MG TABS tablet     CARDIOLOGY EVAL ADULT REFERRAL   7. Benign prostatic hyperplasia, unspecified whether lower urinary tract symptoms present N40.0 aspirin (ASPIRIN LOW DOSE) 81 MG EC tablet   8. Reflex sympathetic dystrophy of both lower extremities G90.523 gabapentin (NEURONTIN) 300 MG capsule     Drug Abuse Screen Panel 13, Urine (Pain Care Package)   9. RSD (reflex sympathetic dystrophy) G90.50 oxyCODONE IR (ROXICODONE) 10 MG tablet     Drug Abuse Screen Panel 13, Urine (Pain Care Package)              No follow-ups on file.    Avery Duke MD  [facility]

## 2019-08-15 NOTE — TELEPHONE ENCOUNTER
Daughter called back in stating that she never received these forms. I told daughter that our  faxed them over around 1:30pm. Daughter is requesting for them to be faxed over again to make sure she receives them. Please fax them over again as requested by daughter Dacia.    Thank you,    Klever Dawson  Patient Representative

## 2019-08-15 NOTE — TELEPHONE ENCOUNTER
ONCOLOGY INTAKE: Records Information      APPT INFORMATION:  Referring provider:  Dr. Avery Duke  Referring provider s clinic:  [facility]  Reason for visit/diagnosis:  Prostate cancer (H) [C61]  Has patient been notified of appointment date and time?: NA    RECORDS INFORMATION:  Were the records received with the referral (via [employer])? Internal Referral    ADDITIONAL INFORMATION:  Left VM with hours and phone. Will try again on [id number] if Pt has not been scheduled. Referral in [facility].

## 2019-08-16 NOTE — TELEPHONE ENCOUNTER
- PPI daily    Requested information faxed to number provided.  [facility]  Team 3 Coordinator

## 2019-08-16 NOTE — TELEPHONE ENCOUNTER
Patient's daughter Dacia calling stating she had problems with her fax machine. She is requesting that forms be refaxed to her at [phone].

## 2019-08-16 NOTE — TELEPHONE ENCOUNTER
Requested information faxed to number provided.  [facility]  Team 3 Coordinator

## 2019-08-21 ENCOUNTER — MEDICAL CORRESPONDENCE (OUTPATIENT)
Dept: HEALTH INFORMATION MANAGEMENT | Facility: CLINIC | Age: 64
End: 2019-08-21

## 2019-09-05 ENCOUNTER — TELEPHONE (OUTPATIENT)
Dept: FAMILY MEDICINE | Facility: CLINIC | Age: 64
End: 2019-09-05

## 2019-09-05 NOTE — TELEPHONE ENCOUNTER
Requested information faxed to number provided.  [facility]  Team 3 Coordinator

## 2019-09-05 NOTE — TELEPHONE ENCOUNTER
Forms received from: [employer]   Phone number listed: [phone]   Fax listed: [phone]  Date received: 09/05/19  Form description: Physician RX  Once forms are completed, please return to [employer] via Fax.  Is patient requesting to be contacted when forms are completed: NA    Form placed: in providers linda Daly

## 2019-09-16 DIAGNOSIS — G90.50 RSD (REFLEX SYMPATHETIC DYSTROPHY): ICD-10-CM

## 2019-09-16 NOTE — TELEPHONE ENCOUNTER
Reason for Call:  Medication or medication refill:    Do you use a [facility]?  Name of the pharmacy and phone number for the current request: [facility] [id number] [place], [place] - [address]     Name of the medication requested: oxyCODONE IR (ROXICODONE) 10 MG tablet    Other request: Would like call back if he is able to receive a refill. It was explained to him that it says 0 refills on his chart.    Can we leave a detailed message on this number? YES    Phone number patient can be reached at: Cell number on file:    Telephone Information:   Mobile [phone]       Best Time: Anytime    Call taken on 9/16/2019 at 2:52 PM by Stephanie Rowan

## 2019-09-16 NOTE — TELEPHONE ENCOUNTER
Requested Prescriptions   Pending Prescriptions Disp Refills     oxyCODONE IR (ROXICODONE) 10 MG tablet 90 tablet 0     Sig: Take 1 tablet (10 mg) by mouth every 6 hours as needed for moderate to severe pain 3 per day       There is no refill protocol information for this order        Last refill 8/15/19  Last OV 8/15/19.    Routing refill request to provider for review/approval because:  Drug not on the [facility] refill protocol   Angelica Siegel RN,BSN  [facility]

## 2019-09-18 RX ORDER — OXYCODONE HYDROCHLORIDE 10 MG/1
10 TABLET ORAL EVERY 6 HOURS PRN
Qty: 90 TABLET | Refills: 0 | Status: SHIPPED | OUTPATIENT
Start: 2019-09-18 | End: 2019-10-18

## 2019-09-18 NOTE — TELEPHONE ENCOUNTER
Prescription for Oxycodone IR was brought to the   [facility]  Team 3 Coordinator

## 2019-09-20 NOTE — TELEPHONE ENCOUNTER
Patient picked up his script at the  on 9/20/19.    Izabela ALY  Patient Representative  [place]

## 2019-10-02 ENCOUNTER — ONCOLOGY VISIT (OUTPATIENT)
Dept: ONCOLOGY | Facility: CLINIC | Age: 64
End: 2019-10-02
Attending: INTERNAL MEDICINE
Payer: COMMERCIAL

## 2019-10-02 ENCOUNTER — TRANSFERRED RECORDS (OUTPATIENT)
Dept: HEALTH INFORMATION MANAGEMENT | Facility: CLINIC | Age: 64
End: 2019-10-02

## 2019-10-02 VITALS
TEMPERATURE: 98.2 F | OXYGEN SATURATION: 99 % | WEIGHT: 208 LBS | SYSTOLIC BLOOD PRESSURE: 124 MMHG | HEART RATE: 76 BPM | BODY MASS INDEX: 26 KG/M2 | RESPIRATION RATE: 18 BRPM | DIASTOLIC BLOOD PRESSURE: 88 MMHG

## 2019-10-02 DIAGNOSIS — C61 MALIGNANT NEOPLASM OF PROSTATE (H): Primary | ICD-10-CM

## 2019-10-02 LAB
ALBUMIN SERPL-MCNC: 4 G/DL (ref 3.4–5)
ALP SERPL-CCNC: 87 U/L (ref 40–150)
ALT SERPL W P-5'-P-CCNC: 19 U/L (ref 0–70)
ANION GAP SERPL CALCULATED.3IONS-SCNC: 6 MMOL/L (ref 3–14)
AST SERPL W P-5'-P-CCNC: 21 U/L (ref 0–45)
BASOPHILS # BLD AUTO: 0 10E9/L (ref 0–0.2)
BASOPHILS NFR BLD AUTO: 0.3 %
BILIRUB SERPL-MCNC: 0.7 MG/DL (ref 0.2–1.3)
BUN SERPL-MCNC: 24 MG/DL (ref 7–30)
CALCIUM SERPL-MCNC: 9.3 MG/DL (ref 8.5–10.1)
CHLORIDE SERPL-SCNC: 102 MMOL/L (ref 94–109)
CO2 SERPL-SCNC: 26 MMOL/L (ref 20–32)
CREAT SERPL-MCNC: 1.35 MG/DL (ref 0.66–1.25)
DIFFERENTIAL METHOD BLD: NORMAL
EOSINOPHIL # BLD AUTO: 0.2 10E9/L (ref 0–0.7)
EOSINOPHIL NFR BLD AUTO: 3.6 %
ERYTHROCYTE [DISTWIDTH] IN BLOOD BY AUTOMATED COUNT: 12.7 % (ref 10–15)
GFR SERPL CREATININE-BSD FRML MDRD: 56 ML/MIN/{1.73_M2}
GLUCOSE SERPL-MCNC: 111 MG/DL (ref 70–99)
HCT VFR BLD AUTO: 45.5 % (ref 40–53)
HGB BLD-MCNC: 15.3 G/DL (ref 13.3–17.7)
IMM GRANULOCYTES # BLD: 0 10E9/L (ref 0–0.4)
IMM GRANULOCYTES NFR BLD: 0.3 %
LDH SERPL L TO P-CCNC: 142 U/L (ref 85–227)
LYMPHOCYTES # BLD AUTO: 1.1 10E9/L (ref 0.8–5.3)
LYMPHOCYTES NFR BLD AUTO: 18.1 %
MCH RBC QN AUTO: 31 PG (ref 26.5–33)
MCHC RBC AUTO-ENTMCNC: 33.6 G/DL (ref 31.5–36.5)
MCV RBC AUTO: 92 FL (ref 78–100)
MONOCYTES # BLD AUTO: 0.5 10E9/L (ref 0–1.3)
MONOCYTES NFR BLD AUTO: 8.3 %
NEUTROPHILS # BLD AUTO: 4.1 10E9/L (ref 1.6–8.3)
NEUTROPHILS NFR BLD AUTO: 69.4 %
NRBC # BLD AUTO: 0 10*3/UL
NRBC BLD AUTO-RTO: 0 /100
PLATELET # BLD AUTO: 218 10E9/L (ref 150–450)
POTASSIUM SERPL-SCNC: 4.2 MMOL/L (ref 3.4–5.3)
PROT SERPL-MCNC: 8.6 G/DL (ref 6.8–8.8)
PSA SERPL-MCNC: 31.7 UG/L (ref 0–4)
RBC # BLD AUTO: 4.94 10E12/L (ref 4.4–5.9)
SODIUM SERPL-SCNC: 134 MMOL/L (ref 133–144)
WBC # BLD AUTO: 5.9 10E9/L (ref 4–11)

## 2019-10-02 PROCEDURE — 99215 OFFICE O/P EST HI 40 MIN: CPT | Mod: ZP | Performed by: INTERNAL MEDICINE

## 2019-10-02 PROCEDURE — 84403 ASSAY OF TOTAL TESTOSTERONE: CPT | Performed by: INTERNAL MEDICINE

## 2019-10-02 PROCEDURE — 84153 ASSAY OF PSA TOTAL: CPT | Performed by: INTERNAL MEDICINE

## 2019-10-02 PROCEDURE — G0463 HOSPITAL OUTPT CLINIC VISIT: HCPCS | Mod: ZF

## 2019-10-02 PROCEDURE — 83615 LACTATE (LD) (LDH) ENZYME: CPT | Performed by: INTERNAL MEDICINE

## 2019-10-02 PROCEDURE — 85025 COMPLETE CBC W/AUTO DIFF WBC: CPT | Performed by: INTERNAL MEDICINE

## 2019-10-02 PROCEDURE — 36415 COLL VENOUS BLD VENIPUNCTURE: CPT

## 2019-10-02 PROCEDURE — 80053 COMPREHEN METABOLIC PANEL: CPT | Performed by: INTERNAL MEDICINE

## 2019-10-02 ASSESSMENT — PAIN SCALES - GENERAL: PAINLEVEL: SEVERE PAIN (7)
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
## 2019-10-02 NOTE — NURSING NOTE
"Oncology Rooming Note    October 2, 2019 11:11 AM   Constantino Taylor is a 63 year old male who presents for:    Chief Complaint   Patient presents with     Oncology Clinic Visit     New; Prostate Ca     Initial Vitals: /88   Pulse 76   Temp 98.2  F (36.8  C) (Oral)   Resp 18   Wt 94.3 kg (208 lb)   SpO2 99%   BMI 26.00 kg/m   Estimated body mass index is 26 kg/m  as calculated from the following:    Height as of 9/12/18: 1.905 m (6' 3\").    Weight as of this encounter: 94.3 kg (208 lb). Body surface area is 2.23 meters squared.  Severe Pain (7) Comment: Data Unavailable   No LMP for male patient.  Allergies reviewed: Yes  Medications reviewed: Yes    Medications: Medication refills not needed today.  Pharmacy name entered into WHOOP:    [id number] - [place], [place] - [address] AT [facility]  [id number] - [place], [place] - [address] AT [facility] [address] [facility] - [place], [place] - [address]  WRITTEN PRESCRIPTION REQUESTED  [id number] - [place], [place] - [address] AT [facility] [address]  [facility] [id number] [place] [facility] - [place], [place] - [address]    Clinical concerns: Pt here to establish care for prostate cancer. Dr Rodriguez was notified.      Rosi Marie CMA              "

## 2019-10-02 NOTE — NURSING NOTE
Labs completed via venipuncture, patient discharged.    See flow sheets.    Rosi Marie CMA ([facility])
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
## 2019-10-04 LAB — TESTOST SERPL-MCNC: 523 NG/DL (ref 240–950)

## 2019-10-07 ENCOUNTER — DOCUMENTATION ONLY (OUTPATIENT)
Dept: CARE COORDINATION | Facility: CLINIC | Age: 64
End: 2019-10-07

## 2019-10-09 ENCOUNTER — HOSPITAL ENCOUNTER (OUTPATIENT)
Dept: CT IMAGING | Facility: CLINIC | Age: 64
Discharge: HOME | End: 2019-10-09
Attending: INTERNAL MEDICINE | Admitting: INTERNAL MEDICINE
Payer: COMMERCIAL

## 2019-10-09 ENCOUNTER — HOSPITAL ENCOUNTER (OUTPATIENT)
Dept: NUCLEAR MEDICINE | Facility: CLINIC | Age: 64
Setting detail: NUCLEAR MEDICINE
End: 2019-10-09
Attending: INTERNAL MEDICINE
Payer: COMMERCIAL

## 2019-10-09 DIAGNOSIS — C61 MALIGNANT NEOPLASM OF PROSTATE (H): ICD-10-CM

## 2019-10-09 PROCEDURE — 78306 BONE IMAGING WHOLE BODY: CPT

## 2019-10-09 PROCEDURE — 25000128 H RX IP 250 OP 636: Performed by: STUDENT IN AN ORGANIZED HEALTH CARE EDUCATION/TRAINING PROGRAM

## 2019-10-09 PROCEDURE — 71260 CT THORAX DX C+: CPT

## 2019-10-09 PROCEDURE — 34300033 ZZH RX 343: Performed by: INTERNAL MEDICINE

## 2019-10-09 PROCEDURE — A9503 TC99M MEDRONATE: HCPCS | Performed by: INTERNAL MEDICINE

## 2019-10-09 PROCEDURE — 74177 CT ABD & PELVIS W/CONTRAST: CPT

## 2019-10-09 RX ORDER — TC 99M MEDRONATE 20 MG/10ML
25 INJECTION, POWDER, LYOPHILIZED, FOR SOLUTION INTRAVENOUS ONCE
Status: COMPLETED | OUTPATIENT
Start: 2019-10-09 | End: 2019-10-09

## 2019-10-09 RX ORDER — IOPAMIDOL 755 MG/ML
127 INJECTION, SOLUTION INTRAVASCULAR ONCE
Status: COMPLETED | OUTPATIENT
Start: 2019-10-09 | End: 2019-10-09

## 2019-10-09 RX ADMIN — TC 99M MEDRONATE 27.2 MCI.: 20 INJECTION, POWDER, LYOPHILIZED, FOR SOLUTION INTRAVENOUS at 10:44

## 2019-10-09 RX ADMIN — IOPAMIDOL 127 ML: 755 INJECTION, SOLUTION INTRAVENOUS at 11:06

## 2019-10-14 ENCOUNTER — ONCOLOGY VISIT (OUTPATIENT)
Dept: ONCOLOGY | Facility: CLINIC | Age: 64
End: 2019-10-14
Attending: INTERNAL MEDICINE
Payer: COMMERCIAL

## 2019-10-14 ENCOUNTER — APPOINTMENT (OUTPATIENT)
Dept: LAB | Facility: CLINIC | Age: 64
End: 2019-10-14
Attending: INTERNAL MEDICINE
Payer: COMMERCIAL

## 2019-10-14 VITALS
OXYGEN SATURATION: 97 % | TEMPERATURE: 98.8 F | RESPIRATION RATE: 16 BRPM | DIASTOLIC BLOOD PRESSURE: 94 MMHG | SYSTOLIC BLOOD PRESSURE: 155 MMHG | HEART RATE: 97 BPM

## 2019-10-14 DIAGNOSIS — C61 MALIGNANT NEOPLASM OF PROSTATE (H): Primary | ICD-10-CM

## 2019-10-14 DIAGNOSIS — C61 PROSTATE CANCER (H): ICD-10-CM

## 2019-10-14 PROBLEM — E78.5 DYSLIPIDEMIA: Status: ACTIVE | Noted: 2019-03-02

## 2019-10-14 LAB
ALBUMIN SERPL-MCNC: 3.8 G/DL (ref 3.4–5)
ALP SERPL-CCNC: 78 U/L (ref 40–150)
ALT SERPL W P-5'-P-CCNC: 17 U/L (ref 0–70)
ANION GAP SERPL CALCULATED.3IONS-SCNC: 5 MMOL/L (ref 3–14)
AST SERPL W P-5'-P-CCNC: 12 U/L (ref 0–45)
BASOPHILS # BLD AUTO: 0 10E9/L (ref 0–0.2)
BASOPHILS NFR BLD AUTO: 0.3 %
BILIRUB SERPL-MCNC: 0.5 MG/DL (ref 0.2–1.3)
BUN SERPL-MCNC: 19 MG/DL (ref 7–30)
CALCIUM SERPL-MCNC: 8.8 MG/DL (ref 8.5–10.1)
CHLORIDE SERPL-SCNC: 105 MMOL/L (ref 94–109)
CO2 SERPL-SCNC: 29 MMOL/L (ref 20–32)
CREAT SERPL-MCNC: 1.34 MG/DL (ref 0.66–1.25)
DIFFERENTIAL METHOD BLD: NORMAL
EOSINOPHIL # BLD AUTO: 0.1 10E9/L (ref 0–0.7)
EOSINOPHIL NFR BLD AUTO: 0.9 %
ERYTHROCYTE [DISTWIDTH] IN BLOOD BY AUTOMATED COUNT: 13.1 % (ref 10–15)
GFR SERPL CREATININE-BSD FRML MDRD: 56 ML/MIN/{1.73_M2}
GLUCOSE SERPL-MCNC: 92 MG/DL (ref 70–99)
HCT VFR BLD AUTO: 41 % (ref 40–53)
HGB BLD-MCNC: 13.9 G/DL (ref 13.3–17.7)
IMM GRANULOCYTES # BLD: 0 10E9/L (ref 0–0.4)
IMM GRANULOCYTES NFR BLD: 0.3 %
LDH SERPL L TO P-CCNC: 155 U/L (ref 85–227)
LYMPHOCYTES # BLD AUTO: 1.3 10E9/L (ref 0.8–5.3)
LYMPHOCYTES NFR BLD AUTO: 16.3 %
MCH RBC QN AUTO: 31.4 PG (ref 26.5–33)
MCHC RBC AUTO-ENTMCNC: 33.9 G/DL (ref 31.5–36.5)
MCV RBC AUTO: 93 FL (ref 78–100)
MONOCYTES # BLD AUTO: 0.6 10E9/L (ref 0–1.3)
MONOCYTES NFR BLD AUTO: 7.2 %
NEUTROPHILS # BLD AUTO: 5.8 10E9/L (ref 1.6–8.3)
NEUTROPHILS NFR BLD AUTO: 75 %
NRBC # BLD AUTO: 0 10*3/UL
NRBC BLD AUTO-RTO: 0 /100
PLATELET # BLD AUTO: 205 10E9/L (ref 150–450)
POTASSIUM SERPL-SCNC: 3.9 MMOL/L (ref 3.4–5.3)
PROT SERPL-MCNC: 8 G/DL (ref 6.8–8.8)
PSA SERPL-MCNC: 30 UG/L (ref 0–4)
RBC # BLD AUTO: 4.43 10E12/L (ref 4.4–5.9)
SODIUM SERPL-SCNC: 140 MMOL/L (ref 133–144)
WBC # BLD AUTO: 7.7 10E9/L (ref 4–11)

## 2019-10-14 PROCEDURE — 84153 ASSAY OF PSA TOTAL: CPT | Performed by: INTERNAL MEDICINE

## 2019-10-14 PROCEDURE — G0463 HOSPITAL OUTPT CLINIC VISIT: HCPCS | Mod: ZF,25

## 2019-10-14 PROCEDURE — 80053 COMPREHEN METABOLIC PANEL: CPT | Performed by: INTERNAL MEDICINE

## 2019-10-14 PROCEDURE — G0008 ADMIN INFLUENZA VIRUS VAC: HCPCS

## 2019-10-14 PROCEDURE — 85025 COMPLETE CBC W/AUTO DIFF WBC: CPT | Performed by: INTERNAL MEDICINE

## 2019-10-14 PROCEDURE — 25000128 H RX IP 250 OP 636: Mod: ZF | Performed by: PHYSICIAN ASSISTANT

## 2019-10-14 PROCEDURE — 99214 OFFICE O/P EST MOD 30 MIN: CPT | Mod: ZP | Performed by: PHYSICIAN ASSISTANT

## 2019-10-14 PROCEDURE — 96402 CHEMO HORMON ANTINEOPL SQ/IM: CPT

## 2019-10-14 PROCEDURE — 96372 THER/PROPH/DIAG INJ SC/IM: CPT | Mod: ZF

## 2019-10-14 PROCEDURE — 84403 ASSAY OF TOTAL TESTOSTERONE: CPT | Performed by: INTERNAL MEDICINE

## 2019-10-14 PROCEDURE — 83615 LACTATE (LD) (LDH) ENZYME: CPT | Performed by: INTERNAL MEDICINE

## 2019-10-14 RX ADMIN — LEUPROLIDE ACETATE 22.5 MG: KIT at 16:07

## 2019-10-14 ASSESSMENT — PAIN SCALES - GENERAL: PAINLEVEL: NO PAIN (0)

## 2019-10-14 NOTE — NURSING NOTE
Lupron 22.5 mg was administered intramusclar in Right Ventrogluteal. Patient tolerated injection with no incident. See MAR.     Mela Smith CMA ([facility]) October 14, 2019 4:13 PM
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
## 2019-10-14 NOTE — NURSING NOTE
Chief Complaint   Patient presents with     Oncology Clinic Visit     Return - Malignant neoplasm of prostate       Blood Draw     Labs drawn via  by RN in lab. VS taken.      Labs drawn via venipuncture. Vital signs taken. Checked into next appointment.   Regina Lind RN

## 2019-10-17 LAB — TESTOST SERPL-MCNC: 476 NG/DL (ref 240–950)

## 2019-10-18 ENCOUNTER — TELEPHONE (OUTPATIENT)
Dept: FAMILY MEDICINE | Facility: CLINIC | Age: 64
End: 2019-10-18

## 2019-10-18 DIAGNOSIS — G90.50 RSD (REFLEX SYMPATHETIC DYSTROPHY): ICD-10-CM

## 2019-10-18 DIAGNOSIS — I42.0 NONISCHEMIC DILATED CARDIOMYOPATHY (H): ICD-10-CM

## 2019-10-18 DIAGNOSIS — I48.0 PAROXYSMAL ATRIAL FIBRILLATION (H): Primary | ICD-10-CM

## 2019-10-18 RX ORDER — OXYCODONE HYDROCHLORIDE 10 MG/1
10 TABLET ORAL EVERY 6 HOURS PRN
Qty: 90 TABLET | Refills: 0 | Status: ON HOLD | OUTPATIENT
Start: 2019-10-18 | End: 2019-11-01

## 2019-10-18 NOTE — TELEPHONE ENCOUNTER
Patient was last seen 8/15/19 by Dr. Duke.    Follow up next month was advised.  Not yet scheduled.    Requested Prescriptions   Pending Prescriptions Disp Refills     oxyCODONE IR (ROXICODONE) 10 MG tablet 90 tablet 0     Sig: Take 1 tablet (10 mg) by mouth every 6 hours as needed for moderate to severe pain 3 per day       There is no refill protocol information for this order        Routing refill request to provider for review/approval because:  Drug not on the [facility] refill protocol     Janet Tucker RN  [facility]

## 2019-10-18 NOTE — TELEPHONE ENCOUNTER
Reason for Call:  Medication or medication refill:    Do you use a [facility]?  No     Name of the pharmacy and phone number for the current request:     [id number] - [place], [place] - [address] [place] [address] AT [address]    Name of the medication requested: Hydrocodone    Other request: Patient is asking to  a script today at the [facility] .  Patient was informed to call his pharmacy to request, but refused states he needs to  at the clinic    Can we leave a detailed message on this number? YES    Phone number patient can be reached at: Home number on file [phone] (home)    Best Time: ASAP    Call taken on 10/18/2019 at 8:43 AM by Corine Sanchez

## 2019-10-18 NOTE — TELEPHONE ENCOUNTER
Reason for Call:  Medication or medication refill:    Do you use a [facility]?  No    Name of the medication requested: oxyCODONE IR (ROXICODONE)     Other request: Patient stated he usually gets medication filled at [facility] - [phone]    Can we leave a detailed message on this number? YES    Phone number patient can be reached at: Home number on file [phone] (home)    Best Time: Anytime    Call taken on 10/18/2019 at 8:45 AM by Makenna Baugh

## 2019-10-21 ENCOUNTER — TRANSFERRED RECORDS (OUTPATIENT)
Dept: HEALTH INFORMATION MANAGEMENT | Facility: CLINIC | Age: 64
End: 2019-10-21

## 2019-10-21 NOTE — TELEPHONE ENCOUNTER
Attempt # 1  Called patient at home number.[phone]  Was call answered?  No answer, left message to call nurse line at [phone]    Sandra Chan RN  [facility]

## 2019-10-22 ENCOUNTER — TRANSFERRED RECORDS (OUTPATIENT)
Dept: HEALTH INFORMATION MANAGEMENT | Facility: CLINIC | Age: 64
End: 2019-10-22

## 2019-10-22 NOTE — TELEPHONE ENCOUNTER
RN spoke with patient, states he received his medication and does not have further questions.     Estrella Ratliff RN, BSN, PHN  [facility] [place]

## 2019-10-23 ENCOUNTER — TELEPHONE (OUTPATIENT)
Dept: FAMILY MEDICINE | Facility: CLINIC | Age: 64
End: 2019-10-23
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
## 2019-10-23 NOTE — TELEPHONE ENCOUNTER
Reason for call:  Patient reporting a symptom    Symptom or request: Leg swollen    Duration (how long have symptoms been present): Unknown    Have you been treated for this before? Yes    Additional comments: Pt states he is currently admitted at [facility] and they want to operate. Pt would like to speak with Dr Duke or team ASAP.    Phone Number patient can be reached at:  Home number on file [phone] (home)    Best Time:  ASAP    Can we leave a detailed message on this number:  NO    Call taken on 10/23/2019 at 10:35 AM by Khalida Pozo

## 2019-10-24 ENCOUNTER — HOSPITAL ENCOUNTER (INPATIENT)
Facility: CLINIC | Age: 64
LOS: 8 days | Discharge: HOME HEALTH | DRG: 493 | End: 2019-11-01
Attending: INTERNAL MEDICINE | Admitting: INTERNAL MEDICINE
Payer: COMMERCIAL

## 2019-10-24 DIAGNOSIS — G47.00 INSOMNIA, UNSPECIFIED TYPE: ICD-10-CM

## 2019-10-24 DIAGNOSIS — Z89.519 S/P BKA (BELOW KNEE AMPUTATION) UNILATERAL (H): Primary | ICD-10-CM

## 2019-10-24 DIAGNOSIS — M86.00 ACUTE HEMATOGENOUS OSTEOMYELITIS, UNSPECIFIED SITE (H): ICD-10-CM

## 2019-10-24 DIAGNOSIS — I73.9 PAD (PERIPHERAL ARTERY DISEASE) (H): ICD-10-CM

## 2019-10-24 PROBLEM — M86.9 OSTEOMYELITIS (H): Status: ACTIVE | Noted: 2019-10-24

## 2019-10-24 PROCEDURE — 40000141 ZZH STATISTIC PERIPHERAL IV START W/O US GUIDANCE

## 2019-10-24 PROCEDURE — 36415 COLL VENOUS BLD VENIPUNCTURE: CPT | Performed by: STUDENT IN AN ORGANIZED HEALTH CARE EDUCATION/TRAINING PROGRAM

## 2019-10-24 PROCEDURE — 87040 BLOOD CULTURE FOR BACTERIA: CPT | Performed by: STUDENT IN AN ORGANIZED HEALTH CARE EDUCATION/TRAINING PROGRAM

## 2019-10-24 PROCEDURE — 25000128 H RX IP 250 OP 636: Performed by: INTERNAL MEDICINE

## 2019-10-24 PROCEDURE — 80053 COMPREHEN METABOLIC PANEL: CPT | Performed by: STUDENT IN AN ORGANIZED HEALTH CARE EDUCATION/TRAINING PROGRAM

## 2019-10-24 PROCEDURE — 86140 C-REACTIVE PROTEIN: CPT | Performed by: STUDENT IN AN ORGANIZED HEALTH CARE EDUCATION/TRAINING PROGRAM

## 2019-10-24 PROCEDURE — 80320 DRUG SCREEN QUANTALCOHOLS: CPT | Performed by: STUDENT IN AN ORGANIZED HEALTH CARE EDUCATION/TRAINING PROGRAM

## 2019-10-24 PROCEDURE — 99223 1ST HOSP IP/OBS HIGH 75: CPT | Mod: AI | Performed by: INTERNAL MEDICINE

## 2019-10-24 PROCEDURE — 85610 PROTHROMBIN TIME: CPT | Performed by: STUDENT IN AN ORGANIZED HEALTH CARE EDUCATION/TRAINING PROGRAM

## 2019-10-24 PROCEDURE — 25000132 ZZH RX MED GY IP 250 OP 250 PS 637: Performed by: STUDENT IN AN ORGANIZED HEALTH CARE EDUCATION/TRAINING PROGRAM

## 2019-10-24 PROCEDURE — 25800030 ZZH RX IP 258 OP 636: Performed by: INTERNAL MEDICINE

## 2019-10-24 PROCEDURE — 12000001 ZZH R&B MED SURG/OB UMMC

## 2019-10-24 PROCEDURE — 85027 COMPLETE CBC AUTOMATED: CPT | Performed by: STUDENT IN AN ORGANIZED HEALTH CARE EDUCATION/TRAINING PROGRAM

## 2019-10-24 RX ORDER — LIDOCAINE 40 MG/G
CREAM TOPICAL
Status: DISCONTINUED | OUTPATIENT
Start: 2019-10-24 | End: 2019-11-01 | Stop reason: HOSPADM

## 2019-10-24 RX ORDER — LISINOPRIL 40 MG/1
40 TABLET ORAL DAILY
Status: DISCONTINUED | OUTPATIENT
Start: 2019-10-25 | End: 2019-11-01 | Stop reason: HOSPADM

## 2019-10-24 RX ORDER — OXYCODONE HYDROCHLORIDE 10 MG/1
10 TABLET ORAL EVERY 8 HOURS
Status: DISCONTINUED | OUTPATIENT
Start: 2019-10-24 | End: 2019-10-27

## 2019-10-24 RX ORDER — POLYETHYLENE GLYCOL 3350 17 G/17G
17 POWDER, FOR SOLUTION ORAL DAILY PRN
Status: DISCONTINUED | OUTPATIENT
Start: 2019-10-24 | End: 2019-10-27

## 2019-10-24 RX ORDER — ATORVASTATIN CALCIUM 40 MG/1
40 TABLET, FILM COATED ORAL AT BEDTIME
Status: DISCONTINUED | OUTPATIENT
Start: 2019-10-24 | End: 2019-11-01 | Stop reason: HOSPADM

## 2019-10-24 RX ORDER — ACETAMINOPHEN 500 MG
500 TABLET ORAL EVERY 6 HOURS PRN
Status: DISCONTINUED | OUTPATIENT
Start: 2019-10-24 | End: 2019-11-01 | Stop reason: HOSPADM

## 2019-10-24 RX ORDER — METOPROLOL SUCCINATE 100 MG/1
100 TABLET, EXTENDED RELEASE ORAL DAILY
Status: DISCONTINUED | OUTPATIENT
Start: 2019-10-25 | End: 2019-11-01 | Stop reason: HOSPADM

## 2019-10-24 RX ORDER — ACETAMINOPHEN 325 MG/1
325 TABLET ORAL EVERY 4 HOURS PRN
Status: DISCONTINUED | OUTPATIENT
Start: 2019-10-24 | End: 2019-10-24

## 2019-10-24 RX ORDER — TAMSULOSIN HYDROCHLORIDE 0.4 MG/1
0.4 CAPSULE ORAL DAILY
Status: DISCONTINUED | OUTPATIENT
Start: 2019-10-25 | End: 2019-11-01 | Stop reason: HOSPADM

## 2019-10-24 RX ORDER — GABAPENTIN 300 MG/1
300-600 CAPSULE ORAL 3 TIMES DAILY
Status: DISCONTINUED | OUTPATIENT
Start: 2019-10-25 | End: 2019-11-01 | Stop reason: HOSPADM

## 2019-10-24 RX ORDER — NALOXONE HYDROCHLORIDE 0.4 MG/ML
.1-.4 INJECTION, SOLUTION INTRAMUSCULAR; INTRAVENOUS; SUBCUTANEOUS
Status: DISCONTINUED | OUTPATIENT
Start: 2019-10-24 | End: 2019-11-01 | Stop reason: HOSPADM

## 2019-10-24 RX ADMIN — VANCOMYCIN HYDROCHLORIDE 2000 MG: 10 INJECTION, POWDER, LYOPHILIZED, FOR SOLUTION INTRAVENOUS at 22:53

## 2019-10-24 RX ADMIN — OXYCODONE HYDROCHLORIDE 10 MG: 10 TABLET ORAL at 22:53

## 2019-10-24 ASSESSMENT — ACTIVITIES OF DAILY LIVING (ADL)
BATHING: 0-->INDEPENDENT
TRANSFERRING: 1-->ASSISTIVE EQUIPMENT
DRESS: 0-->INDEPENDENT
BATHING: 0-->INDEPENDENT
RETIRED_COMMUNICATION: 0-->UNDERSTANDS/COMMUNICATES WITHOUT DIFFICULTY
RETIRED_COMMUNICATION: 0-->UNDERSTANDS/COMMUNICATES WITHOUT DIFFICULTY
COGNITION: 0 - NO COGNITION ISSUES REPORTED
AMBULATION: 1-->ASSISTIVE EQUIPMENT
FALL_HISTORY_WITHIN_LAST_SIX_MONTHS: NO
TOILETING: 0-->INDEPENDENT
FALL_HISTORY_WITHIN_LAST_SIX_MONTHS: NO
RETIRED_EATING: 0-->INDEPENDENT
TOILETING: 0-->INDEPENDENT
DRESS: 0-->INDEPENDENT
COGNITION: 0 - NO COGNITION ISSUES REPORTED
AMBULATION: 1-->ASSISTIVE EQUIPMENT
RETIRED_EATING: 0-->INDEPENDENT
SWALLOWING: 0-->SWALLOWS FOODS/LIQUIDS WITHOUT DIFFICULTY
SWALLOWING: 0-->SWALLOWS FOODS/LIQUIDS WITHOUT DIFFICULTY
TRANSFERRING: 1-->ASSISTIVE EQUIPMENT

## 2019-10-24 ASSESSMENT — MIFFLIN-ST. JEOR: SCORE: 1805.63

## 2019-10-24 NOTE — PROGRESS NOTES
[facility]  Transfer Triage Note    Date of call: 10/24/19  Time of call: 10:06 AM    Reason for Transfer:2nd opinion on need for I&D in OR  Diagnosis: Hx lt BKA (4-5 yrs ago at [facility]), admitted to [facility] with 5 days of stump pain/swelling, imaging shows OM, myositic, cellulitis, and now has GPC bacteremia. [facility] ortho recomm I and D in  OR, but pt disagrees. Dr. Cano hospitalist at [facility] spoke with [facility] ortho who deferred to vasc surg Dr. Wilkins at [facility] (who did amputation), and they looked at MRI and feel a second opinion is merited and she doesn't think I and D in the OR is necessary.    Outside Records: Available Care Everywhere, Disc with imaging will come with patient  Additional records requested to be faxed to [phone].    Stability of Patient: Patient is vitally stable, with no critical labs, and will likely remain stable throughout the transfer process    Expected Time of Arrival for Transfer: 0-8 hours    Recommendations for Management and Stabilization: Not needed    Additional Comments: to med surg    Rosa Cast MD

## 2019-10-24 NOTE — LETTER
Transition Communication Hand-off for Care Transitions to Next Level of Care Provider    Name: Constantino Taylor  : 1955  MRN #: [id number]  Primary Care Provider: Avery Duke     Primary Clinic: [address] [place] [id number]     Reason for Hospitalization:  left knee abscess  below the knee amputation  Osteomyelitis (H)  Admit Date/Time: 10/24/2019  7:06 PM  Discharge Date: 2019  Payor Source: Payor: MEDICA / Plan: MEDICA ACCESS ABILITY MA / Product Type: HMO /            Reason for Communication Hand-off Referral: Other Continuity of care    Discharge Plan:       Concern for non-adherence with plan of care:  No  Discharge Needs Assessment:  Needs      Most Recent Value   Anticipated Changes Related to Illness  none          Follow-up specialty is recommended: Yes    Follow-up plan:    Future Appointments   Date Time Provider Department Center   2019  4:00 PM  LAB UCLAB [facility]   2019  4:30 PM Norah Brown MD [facility]   2019  2:20 PM Michelle Matute MD [facility]   2019  4:00 PM Lon Davis MD [facility]   2020  9:45 AM  [facility] LAB DRAW UCONL [facility]   2020 10:20 AM Debbi Short PA-C [facility]   2020 12:00 PM  ONCOLOGY INFUSION [facility]       Any outstanding tests or procedures:        Radiology & Cardiology Orders     Future Labs/Procedures Complete By Expires    MR Tibia Fibula Lower Leg Right wo Contrast  2019 (Approximate) 2020    Process Instructions:    Administration of IV contrast (contrast agent, dose, and amount) will be tailored to this examination per the appropriate written protocol listed in the Protocol E-Book, or by the supervising imaging provider.     Comments:    MRI without contrast of LLE to be done in 5-6 weeks        Referrals     Future Labs/Procedures    Home care nursing referral     Comments:    ___________________________________________________  [facility]  Phone: [phone]  Fax: [phone]  ___________________________________________________      RN skilled nursing visit. RN to assess vital signs and weight, respiratory and cardiac status, pain level and activity tolerance, incision for signs/symptoms of infection, hydration, nutrition and bowel status and home safety.  RN to teach medication management.  RN to provide lab draws and wound vac dressing changes MWF.    Your provider has ordered home care nursing services. If you have not been contacted within 2 days of your discharge please call the inpatient department phone number at [phone] .            So Recommendations:  Patient discharging home today with a home wound vac and plan for [facility] RN support for wound vac dressing changes.  Please refer to AVS for additional information.      Wandy Lind RN    AVS/Discharge Summary is the source of truth; this is a helpful guide for improved communication of patient story

## 2019-10-25 ENCOUNTER — HOME INFUSION (PRE-WILLOW HOME INFUSION) (OUTPATIENT)
Dept: PHARMACY | Facility: CLINIC | Age: 64
End: 2019-10-25

## 2019-10-25 PROBLEM — M87.059 AVASCULAR NECROSIS OF HIP (H): Status: RESOLVED | Noted: 2019-04-17 | Resolved: 2019-10-25

## 2019-10-25 LAB
ALBUMIN SERPL-MCNC: 3 G/DL (ref 3.4–5)
ALP SERPL-CCNC: 81 U/L (ref 40–150)
ALT SERPL W P-5'-P-CCNC: 14 U/L (ref 0–70)
ANION GAP SERPL CALCULATED.3IONS-SCNC: 5 MMOL/L (ref 3–14)
AST SERPL W P-5'-P-CCNC: 15 U/L (ref 0–45)
BILIRUB SERPL-MCNC: 0.4 MG/DL (ref 0.2–1.3)
BUN SERPL-MCNC: 16 MG/DL (ref 7–30)
CALCIUM SERPL-MCNC: 8.8 MG/DL (ref 8.5–10.1)
CHLORIDE SERPL-SCNC: 105 MMOL/L (ref 94–109)
CO2 SERPL-SCNC: 26 MMOL/L (ref 20–32)
CREAT SERPL-MCNC: 1.41 MG/DL (ref 0.66–1.25)
CRP SERPL-MCNC: 81 MG/L (ref 0–8)
ERYTHROCYTE [DISTWIDTH] IN BLOOD BY AUTOMATED COUNT: 12.5 % (ref 10–15)
ETHANOL SERPL-MCNC: <0.01 G/DL
GFR SERPL CREATININE-BSD FRML MDRD: 53 ML/MIN/{1.73_M2}
GLUCOSE BLDC GLUCOMTR-MCNC: 106 MG/DL (ref 70–99)
GLUCOSE BLDC GLUCOMTR-MCNC: 131 MG/DL (ref 70–99)
GLUCOSE SERPL-MCNC: 110 MG/DL (ref 70–99)
HCT VFR BLD AUTO: 40.6 % (ref 40–53)
HGB BLD-MCNC: 13 G/DL (ref 13.3–17.7)
INR PPP: 2.12 (ref 0.86–1.14)
MCH RBC QN AUTO: 30.1 PG (ref 26.5–33)
MCHC RBC AUTO-ENTMCNC: 32 G/DL (ref 31.5–36.5)
MCV RBC AUTO: 94 FL (ref 78–100)
PLATELET # BLD AUTO: 267 10E9/L (ref 150–450)
POTASSIUM SERPL-SCNC: 4.1 MMOL/L (ref 3.4–5.3)
PROT SERPL-MCNC: 8.1 G/DL (ref 6.8–8.8)
RBC # BLD AUTO: 4.32 10E12/L (ref 4.4–5.9)
SODIUM SERPL-SCNC: 136 MMOL/L (ref 133–144)
VANCOMYCIN SERPL-MCNC: 11.7 MG/L
WBC # BLD AUTO: 11.1 10E9/L (ref 4–11)

## 2019-10-25 PROCEDURE — 00000146 ZZHCL STATISTIC GLUCOSE BY METER IP

## 2019-10-25 PROCEDURE — 80202 ASSAY OF VANCOMYCIN: CPT | Performed by: INTERNAL MEDICINE

## 2019-10-25 PROCEDURE — 25000132 ZZH RX MED GY IP 250 OP 250 PS 637: Performed by: STUDENT IN AN ORGANIZED HEALTH CARE EDUCATION/TRAINING PROGRAM

## 2019-10-25 PROCEDURE — 36415 COLL VENOUS BLD VENIPUNCTURE: CPT | Performed by: INTERNAL MEDICINE

## 2019-10-25 PROCEDURE — 40000556 ZZH STATISTIC PERIPHERAL IV START W US GUIDANCE

## 2019-10-25 PROCEDURE — 12000001 ZZH R&B MED SURG/OB UMMC

## 2019-10-25 PROCEDURE — 99233 SBSQ HOSP IP/OBS HIGH 50: CPT | Mod: GC | Performed by: INTERNAL MEDICINE

## 2019-10-25 RX ORDER — DOXYCYCLINE 100 MG/1
100 CAPSULE ORAL 2 TIMES DAILY
Status: DISCONTINUED | OUTPATIENT
Start: 2019-10-25 | End: 2019-11-01 | Stop reason: HOSPADM

## 2019-10-25 RX ORDER — AMOXICILLIN 500 MG/1
1000 CAPSULE ORAL 2 TIMES DAILY
Status: DISCONTINUED | OUTPATIENT
Start: 2019-10-25 | End: 2019-11-01 | Stop reason: HOSPADM

## 2019-10-25 RX ORDER — ACETAMINOPHEN 325 MG/1
325-650 TABLET ORAL EVERY 6 HOURS PRN
COMMUNITY
End: 2020-06-01

## 2019-10-25 RX ADMIN — GABAPENTIN 600 MG: 300 CAPSULE ORAL at 13:08

## 2019-10-25 RX ADMIN — ACETAMINOPHEN 500 MG: 500 TABLET, FILM COATED ORAL at 13:11

## 2019-10-25 RX ADMIN — TAMSULOSIN HYDROCHLORIDE 0.4 MG: 0.4 CAPSULE ORAL at 08:20

## 2019-10-25 RX ADMIN — LISINOPRIL 40 MG: 40 TABLET ORAL at 08:20

## 2019-10-25 RX ADMIN — OXYCODONE HYDROCHLORIDE 10 MG: 10 TABLET ORAL at 22:24

## 2019-10-25 RX ADMIN — AMOXICILLIN 1000 MG: 500 CAPSULE ORAL at 19:44

## 2019-10-25 RX ADMIN — ATORVASTATIN CALCIUM 40 MG: 40 TABLET, FILM COATED ORAL at 00:08

## 2019-10-25 RX ADMIN — METOPROLOL SUCCINATE 100 MG: 100 TABLET, EXTENDED RELEASE ORAL at 08:20

## 2019-10-25 RX ADMIN — GABAPENTIN 600 MG: 300 CAPSULE ORAL at 19:45

## 2019-10-25 RX ADMIN — OXYCODONE HYDROCHLORIDE 10 MG: 10 TABLET ORAL at 13:40

## 2019-10-25 RX ADMIN — OXYCODONE HYDROCHLORIDE 10 MG: 10 TABLET ORAL at 06:49

## 2019-10-25 RX ADMIN — GABAPENTIN 600 MG: 300 CAPSULE ORAL at 08:20

## 2019-10-25 RX ADMIN — DOXYCYCLINE 100 MG: 100 CAPSULE ORAL at 19:44

## 2019-10-25 RX ADMIN — ATORVASTATIN CALCIUM 40 MG: 40 TABLET, FILM COATED ORAL at 22:24

## 2019-10-25 ASSESSMENT — ACTIVITIES OF DAILY LIVING (ADL)
ADLS_ACUITY_SCORE: 16
ADLS_ACUITY_SCORE: 12

## 2019-10-25 NOTE — PROGRESS NOTES
[phone].     Pt is A&Ox4. VSS on RA. Pt tolerated 2grNa diet. Vanco was discontinued, is now on PO abx. Denies pain. Continue to monitor and follow POC.

## 2019-10-25 NOTE — PHARMACY-VANCOMYCIN DOSING SERVICE
Pharmacy Vancomycin Initial Note  Date of Service 2019  Patient's  1955  63 year old, male    Indication: Bacteremia    Current estimated CrCl = Estimated Creatinine Clearance: 74.9 mL/min (A) (based on SCr of 1.34 mg/dL (H)).    Creatinine for last 3 days  No results found for requested labs within last 72 hours.    Recent Vancomycin Level(s) for last 3 days  No results found for requested labs within last 72 hours.      Vancomycin IV Administrations (past 72 hours)      No vancomycin orders with administrations in past 72 hours.                Nephrotoxins and other renal medications (From now, onward)    Start     Dose/Rate Route Frequency Ordered Stop    10/24/19 2115  vancomycin (VANCOCIN) 2,000 mg in sodium chloride 0.9 % 500 mL intermittent infusion      2,000 mg  over 2 Hours Intravenous EVERY 24 HOURS 10/24/19 2103            Contrast Orders - past 72 hours (72h ago, onward)    None                Plan:  1.  Continue vancomycin 2000 mg IV q24h (initiated at [facility] on 10/22 at ~2100 daily- continuing this dose)  2.  Goal Trough Level: 15-20 mg/L   3.  Pharmacy will check trough levels as appropriate in 1-3 Days.    4. Serum creatinine levels will be ordered daily for the first week of therapy and at least twice weekly for subsequent weeks.    5. [place] method utilized to dose vancomycin therapy: continuing dose initiated at [facility] on 10/22     Dacia Caba RP
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
## 2019-10-25 NOTE — PLAN OF CARE
"BP (!) 151/63 (BP Location: Left arm)   Pulse 87   Temp 99.3  F (37.4  C) (Oral)   Resp 18   Ht 1.905 m (6' 3\")   Wt 91.5 kg (201 lb 11.5 oz)   SpO2 96%   BMI 25.21 kg/m      Status: Pain & swelling of L BKA site  Activity: Independently transfers into wheelchair.  Neuros: A&Ox4.  Cardiac: WDL ex: chronic afib, denies chest pain.  Respiratory: WDL on RA, denies SOB.  GI/: +BS, Voids spont, not saving. LBM 10/22, at baseline per pt.  Diet: Tolerates regular diet; NPO at midnight.  Skin/Incisions: Intact, ex: L BKA site red, slightly swollen, and warm to the touch.  Lines/Drains: L PIV SL + int abx.  Pain/Nausea: C/o tolerable pain with movement or touching of L BKA site. TID PO oxycodone given x1. Denies nausea.  New Changes: Pt was admitted to unit at 1915 this shift for increased pain and swelling at L BKA site after refusing an I&D at OSH.  Plan: Continue to monitor and follow POC.    "

## 2019-10-25 NOTE — PROGRESS NOTES
[facility] [place]    Progress Note - Renaldo 2 Service        Date of Admission:  10/24/2019    Assessment & Plan   Constantino Taylor is a 63 year old male admitted on 10/24/2019. His PMH is significant for L BKA, A fib on Xarelto, HFrEF (40%), HTN, HLD, CAD, prostate cancer, chronic HCV, alcohol & marijuana us who was transferred from [facility] on 10/24 for further work-up & management of L tibia osteomyelitis with abscess and bacteremia.     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 MRI showing abscess & osteomyelitis of L stump. On Vancomycin per [facility] ID. Will need source control which could be AKA (patient refused), debridement, or I&D.  Currently with no sign of systemic infection. Blood cultures at [facility] is positive for Staphylococcus pettenkoferi with repeat blood cultures negative to date, likely contaminant.  - Discontinue Vancomycin  - Switch to Doxycycline (100 mg BID) + Amoxicillin (1 g BID) (plan > 6 weeks)  - F/U blood cultures  - Vascular surgery consult; appreciate recommendations  - Orthopedics consult; appreciate recommendations  - ID consult; appreciate recommendations      Atrial fibrillation  In AF with controlled rate. [id number] = 5, on Xarelto for anticoagulation.  - Holding Xarelto (possible procedure), last dose 10/24   - Continue PTA Metoprolol 100 mg/day for rate control      Chronic systolic HF, EF ~40%  NICM  History of CVA  HTN  HLD  PVD (R ICA  PTA MARGARITA occlusion, L distal SFA , L renal artery occlusion)  Last echo in March 2019, EF ~40%.  - Monitor BP  - Hold-off Aspirin (possible procedure)  - Continue PTA Atorvastatin 40 mg/day  - Continue PTA Metoprolol 100 mg/day, Lisinopril 40 mg/day    CKD  Baseline Cr 1.4-1.5  - Trend BMP  - Renally dose medications & avoid nephrotoxic agent     Prostate Cancer  BPH  History of stage III prostate cancer treated with  Lupron & radiation. Has biochemical relapse without evidence of metastatic disease on bone scan (10/9/19). Has elevated PSA at 30 on admission. No significant change from 08/2019 at 32.3.  - Continue PTA Tamsulosin 0.4 mg/day     Chronic LBP  Phantom limb pain  - Tylenol 500 mg q6 prn   - Continue PTA Oxycodone 10 mg q8h  - Continue PTA Gabapentin 300-600 mg TID    Alcohol and marijuana abuse   Drinks 1 pint vodka per week + 6 pack of beer. Last drink just prior to arrival. No history of withdrawal. Blood alcohol level negative.  - Observe for signs of withdrawal (autonomic hyperactivity)  - Low threshold to start CIWA, nursing aware      Diet: Combination Diet 2 gm NA Diet    Fluids: None  Lines: PIV  DVT Prophylaxis: None (Anticipating procedures)  Garcia Catheter: not present  Code Status: Full Code      Disposition Plan   Expected discharge: 4 - 7 days, recommended to prior living arrangement once antibiotic plan established.  Entered: Tomer Ponce MD 10/25/2019, 6:30 PM     The patient's care was discussed with the Attending Physician, Dr. Tin Mo.    Tomer Ponce MD  [address]  Pager: [phone]  Please see sticky note for cross cover information  ______________________________________________________________________    Interval History   He complains of mild pain on left leg stump but states that this is much more better compared to how it looked like 5 days ago. Denies fever & chills. Denies chest pain, SOB, or increasing edema. No abnormal bleeding.    Data reviewed today: I reviewed all medications, new labs and imaging results over the last 24 hours. I personally reviewed labs and imaging.    Physical Exam   Vital Signs: Temp: 97.6  F (36.4  C) Temp src: Oral BP: 131/84 Pulse: 91   Resp: 18 SpO2: 99 % O2 Device: None (Room air)    Weight: 201 lbs 11.53 oz     General Appearance: well appearing male laying in bed in [facility], engaged in  conversation.  HEENT: EOMI/PERRL. Neck is supple with full ROM.  Respiratory: Lungs are clear to auscultation bilaterally.  Cardiovascular: irregularly irregular rhythm. No rubs/murmurs/or gallops.  GI: +BS. NT/ND.  Skin: No rashes or petechiae.  LLE: L BKA, stump without laceration or open area or draining discharge, warm to touch, mild pain with palpation. No edema on R leg.  Neurologic: A&Ox3. no FND.    Data   Recent Labs   Lab 10/24/19  2327   WBC 11.1*   HGB 13.0*   MCV 94      INR 2.12*      POTASSIUM 4.1   CHLORIDE 105   CO2 26   BUN 16   CR 1.41*   ANIONGAP 5   STUART 8.8   *   ALBUMIN 3.0*   PROTTOTAL 8.1   BILITOTAL 0.4   ALKPHOS 81   ALT 14   AST 15     No results found for this or any previous visit (from the past 24 hour(s)).  Medications       amoxicillin  1,000 mg Oral BID     atorvastatin  40 mg Oral At Bedtime     doxycycline hyclate  100 mg Oral BID     gabapentin  300-600 mg Oral TID     lisinopril  40 mg Oral Daily     metoprolol succinate ER  100 mg Oral Daily     oxyCODONE  10 mg Oral Q8H     sodium chloride (PF)  3 mL Intracatheter Q8H     tamsulosin  0.4 mg Oral Daily

## 2019-10-25 NOTE — PROGRESS NOTES
Therapy:  IV abx  Insurance: [employer]    Co-Insurance: 100%    In reference to admission on 10/24/19 to check IV abx coverage      Please contact Intake with any questions, [phone] or In Basket pool, [facility] ([phone]).

## 2019-10-25 NOTE — PLAN OF CARE
"Time: 2300 - 0730  Reason for Admission: (L) stump swelling, pain, & redness  Vitals: /72 (BP Location: Left arm)   Pulse 71   Temp 99.2  F (37.3  C) (Oral)   Resp 16   Ht 1.905 m (6' 3\")   Wt 91.5 kg (201 lb 11.5 oz)   SpO2 98%   BMI 25.21 kg/m       Activity: Pt independently transfers into wheelchair. Remained in bed for duration of the shift.   Pain: Pt reports pain around base of residual limb. Scheduled oxy given on previous shift with relief. No additional interventions per pt request.   Neuro: A&O x 4. Calm. CMS intact. Calls appropriately.   Cardiac: WDL. Denies chest pain.  Respiratory: LS slightly diminished. On RA. Denies SOB.   GI/: Voiding spontaneously. Urinal at bedside. BS+. No BM this shift.   Diet: NPO  Skin: WDL ex (L) BKA. Residual limb red, warm to the touch, and slightly swollen.   LDAs: Pt's IV tape came undone overnight causing IV to come out. Vascular contacted for replacement.   New change for this shift: None.  Plan: Continue with POC.     "

## 2019-10-25 NOTE — PLAN OF CARE
NEURO: A&O x4, calls appropriately   RESPIRATORY: LS diminished, denies SOB. On RA   CARDIAC: WDL ex chronic Afib. Denies chest pain   GI/: Voiding spontaneously. No BM this shift   DIET: NPO except meds   PAIN/NAUSEA: C/o pain in L BKA site, PRN Tylenol given. On scheduled Oxycodone   IV ACCESS: R PIV SL   SKIN: L BKA ex erythema, warmth, slight swelling, and sensitivity to touch   ACTIVITY: Independent with WC   LAB: Daily    PLAN: Monitor pain and BKA site inflammation/temperature
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
## 2019-10-25 NOTE — PHARMACY-ADMISSION MEDICATION HISTORY
"Admission medication history interview status for the 10/24/2019 admission is complete. See Epic admission navigator for allergy information, pharmacy, prior to admission medications and immunization status.     Medication history interview sources:  Patient and Epic Dispense Report    Changes made to PTA medication list (reason)  Added: None  Deleted: Hydrocortisone  Changed: Tylenol 500mg-100mg q6h PRN --> Tylenol 325-650mg q6h PRN    Additional medication history information (including reliability of information, actions taken by pharmacist): Patient knew the name and number of medications he takes each day.     - Pt didn't know whether he was applying hydrocortisone or triamcinolone to his arms and legs during different seasons, however he recalled using the \"more potent, 0.1%\" topical steroid.       Prior to Admission medications    Medication Sig Last Dose Taking? Auth Provider   acetaminophen (TYLENOL) 325 MG tablet Take 325-650 mg by mouth every 6 hours as needed for mild pain (Takes infrequently) Past Month at Unknown time Yes Unknown, Entered By History   aspirin (ASPIRIN LOW DOSE) 81 MG EC tablet TAKE 1 TABLET(81 MG) BY MOUTH DAILY Past Week at Unknown time Yes Avery Duke MD   atorvastatin (LIPITOR) 40 MG tablet Take 1 tablet (40 mg) by mouth At Bedtime Past Week at Unknown time Yes Avery Duke MD   gabapentin (NEURONTIN) 300 MG capsule Take 1-2 capsules (300-600 mg) by mouth 3 times daily Past Week at Unknown time Yes Avery Duke MD   lisinopril (PRINIVIL/ZESTRIL) 40 MG tablet Take 1 tablet (40 mg) by mouth daily Past Week at Unknown time Yes Avery Duke MD   metoprolol succinate ER (TOPROL-XL) 100 MG 24 hr tablet Take 1 tablet (100 mg) by mouth daily Past Week at Unknown time Yes Avery Duke MD   oxyCODONE IR (ROXICODONE) 10 MG tablet Take 1 tablet (10 mg) by mouth every 6 hours as needed for moderate to severe pain 3 per day Past Week at Unknown time Yes Avery Duke" "MD   polyethylene glycol (MIRALAX) powder Take 17 g by mouth as needed for constipation  Patient taking differently: Take 17 g by mouth as needed for constipation (1-2 times per month on average)  Past Month at Unknown time Yes Avery Duke MD   rivaroxaban ANTICOAGULANT (XARELTO) 20 MG TABS tablet Take 1 tablet (20 mg) by mouth daily (with dinner) 10/24/2019 at AM Yes Avery Duke MD   tamsulosin (FLOMAX) 0.4 MG capsule Take 1 capsule (0.4 mg) by mouth daily Past Week at Unknown time Yes Avery Duke MD   triamcinolone (KENALOG) 0.1 % cream Take 1 apply by topical route two times a day as needed  Patient taking differently: Take 1 apply by topical route two times a day as needed (uses infrequently) Past Week at Unknown time Yes Avery Duke MD   Multiple Vitamins-Minerals (ONE-A-DAY MENS 50+ ADVANTAGE) TABS Take 1 each by mouth daily More than a month at Unknown time  Avery Duke MD   order for DME Equipment being ordered: self adhesive bandage 4\" by 8\"  Patient not taking: Reported on 8/15/2019   Avery Duke MD   order for DME Please dispense blood pressure machine and cuff.  Patient not taking: Reported on 8/15/2019   Norah Brown MD       Medication history completed by: Yunior Pierre, CierraD    "
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
## 2019-10-26 LAB
ANION GAP SERPL CALCULATED.3IONS-SCNC: 6 MMOL/L (ref 3–14)
BUN SERPL-MCNC: 16 MG/DL (ref 7–30)
CALCIUM SERPL-MCNC: 9.1 MG/DL (ref 8.5–10.1)
CHLORIDE SERPL-SCNC: 104 MMOL/L (ref 94–109)
CO2 SERPL-SCNC: 26 MMOL/L (ref 20–32)
CREAT SERPL-MCNC: 1.36 MG/DL (ref 0.66–1.25)
CRP SERPL-MCNC: 68 MG/L (ref 0–8)
ERYTHROCYTE [DISTWIDTH] IN BLOOD BY AUTOMATED COUNT: 12.7 % (ref 10–15)
GFR SERPL CREATININE-BSD FRML MDRD: 55 ML/MIN/{1.73_M2}
GLUCOSE BLDC GLUCOMTR-MCNC: 123 MG/DL (ref 70–99)
GLUCOSE BLDC GLUCOMTR-MCNC: 163 MG/DL (ref 70–99)
GLUCOSE SERPL-MCNC: 116 MG/DL (ref 70–99)
HCT VFR BLD AUTO: 41.6 % (ref 40–53)
HGB BLD-MCNC: 13.7 G/DL (ref 13.3–17.7)
MCH RBC QN AUTO: 30.3 PG (ref 26.5–33)
MCHC RBC AUTO-ENTMCNC: 32.9 G/DL (ref 31.5–36.5)
MCV RBC AUTO: 92 FL (ref 78–100)
PLATELET # BLD AUTO: 283 10E9/L (ref 150–450)
POTASSIUM SERPL-SCNC: 4.2 MMOL/L (ref 3.4–5.3)
RBC # BLD AUTO: 4.52 10E12/L (ref 4.4–5.9)
SODIUM SERPL-SCNC: 135 MMOL/L (ref 133–144)
WBC # BLD AUTO: 7.6 10E9/L (ref 4–11)

## 2019-10-26 PROCEDURE — 85027 COMPLETE CBC AUTOMATED: CPT | Performed by: STUDENT IN AN ORGANIZED HEALTH CARE EDUCATION/TRAINING PROGRAM

## 2019-10-26 PROCEDURE — 36415 COLL VENOUS BLD VENIPUNCTURE: CPT | Performed by: STUDENT IN AN ORGANIZED HEALTH CARE EDUCATION/TRAINING PROGRAM

## 2019-10-26 PROCEDURE — 86140 C-REACTIVE PROTEIN: CPT | Performed by: STUDENT IN AN ORGANIZED HEALTH CARE EDUCATION/TRAINING PROGRAM

## 2019-10-26 PROCEDURE — 25000132 ZZH RX MED GY IP 250 OP 250 PS 637: Performed by: STUDENT IN AN ORGANIZED HEALTH CARE EDUCATION/TRAINING PROGRAM

## 2019-10-26 PROCEDURE — 87040 BLOOD CULTURE FOR BACTERIA: CPT | Performed by: STUDENT IN AN ORGANIZED HEALTH CARE EDUCATION/TRAINING PROGRAM

## 2019-10-26 PROCEDURE — 00000146 ZZHCL STATISTIC GLUCOSE BY METER IP

## 2019-10-26 PROCEDURE — 25000132 ZZH RX MED GY IP 250 OP 250 PS 637: Performed by: INTERNAL MEDICINE

## 2019-10-26 PROCEDURE — 82565 ASSAY OF CREATININE: CPT | Performed by: STUDENT IN AN ORGANIZED HEALTH CARE EDUCATION/TRAINING PROGRAM

## 2019-10-26 PROCEDURE — 12000001 ZZH R&B MED SURG/OB UMMC

## 2019-10-26 PROCEDURE — 25000128 H RX IP 250 OP 636: Performed by: INTERNAL MEDICINE

## 2019-10-26 PROCEDURE — 80048 BASIC METABOLIC PNL TOTAL CA: CPT | Performed by: STUDENT IN AN ORGANIZED HEALTH CARE EDUCATION/TRAINING PROGRAM

## 2019-10-26 PROCEDURE — 99233 SBSQ HOSP IP/OBS HIGH 50: CPT | Performed by: INTERNAL MEDICINE

## 2019-10-26 RX ORDER — ASPIRIN 81 MG/1
81 TABLET ORAL DAILY
Status: DISCONTINUED | OUTPATIENT
Start: 2019-10-26 | End: 2019-11-01 | Stop reason: HOSPADM

## 2019-10-26 RX ADMIN — OXYCODONE HYDROCHLORIDE 10 MG: 10 TABLET ORAL at 14:26

## 2019-10-26 RX ADMIN — ATORVASTATIN CALCIUM 40 MG: 40 TABLET, FILM COATED ORAL at 21:58

## 2019-10-26 RX ADMIN — TAMSULOSIN HYDROCHLORIDE 0.4 MG: 0.4 CAPSULE ORAL at 08:44

## 2019-10-26 RX ADMIN — METOPROLOL SUCCINATE 100 MG: 100 TABLET, EXTENDED RELEASE ORAL at 08:44

## 2019-10-26 RX ADMIN — AMOXICILLIN 1000 MG: 500 CAPSULE ORAL at 19:52

## 2019-10-26 RX ADMIN — DOXYCYCLINE 100 MG: 100 CAPSULE ORAL at 08:49

## 2019-10-26 RX ADMIN — GABAPENTIN 600 MG: 300 CAPSULE ORAL at 13:16

## 2019-10-26 RX ADMIN — OXYCODONE HYDROCHLORIDE 10 MG: 10 TABLET ORAL at 23:48

## 2019-10-26 RX ADMIN — OXYCODONE HYDROCHLORIDE 10 MG: 10 TABLET ORAL at 05:35

## 2019-10-26 RX ADMIN — ASPIRIN 81 MG: 81 TABLET, COATED ORAL at 14:26

## 2019-10-26 RX ADMIN — LISINOPRIL 40 MG: 40 TABLET ORAL at 08:44

## 2019-10-26 RX ADMIN — ACETAMINOPHEN 500 MG: 500 TABLET, FILM COATED ORAL at 08:44

## 2019-10-26 RX ADMIN — ENOXAPARIN SODIUM 90 MG: 100 INJECTION SUBCUTANEOUS at 19:52

## 2019-10-26 RX ADMIN — AMOXICILLIN 1000 MG: 500 CAPSULE ORAL at 08:44

## 2019-10-26 RX ADMIN — GABAPENTIN 600 MG: 300 CAPSULE ORAL at 08:44

## 2019-10-26 RX ADMIN — DOXYCYCLINE 100 MG: 100 CAPSULE ORAL at 19:53

## 2019-10-26 RX ADMIN — GABAPENTIN 600 MG: 300 CAPSULE ORAL at 19:52

## 2019-10-26 ASSESSMENT — ACTIVITIES OF DAILY LIVING (ADL)
ADLS_ACUITY_SCORE: 14
ADLS_ACUITY_SCORE: 13
ADLS_ACUITY_SCORE: 14
ADLS_ACUITY_SCORE: 13

## 2019-10-26 ASSESSMENT — PAIN DESCRIPTION - DESCRIPTORS: DESCRIPTORS: ACHING

## 2019-10-26 NOTE — PROGRESS NOTES
"VASCULAR SURGERY PROGRESS NOTE    Subjective:  No acute events overnight. Reports swelling in stump significantly improved.    Objective:    Intake/Output Summary (Last 24 hours) at 10/26/2019 1302  Last data filed at 10/26/2019 0841  Gross per 24 hour   Intake 360 ml   Output 1575 ml   Net -1215 ml     Labs:  ROUTINE IP LABS (Last four results)  BMP  Recent Labs   Lab 10/26/19  0652 10/24/19  2327    136   POTASSIUM 4.2 4.1   CHLORIDE 104 105   STUART 9.1 8.8   CO2 26 26   BUN 16 16   CR 1.36* 1.41*   * 110*     CBC  Recent Labs   Lab 10/26/19  0652 10/24/19  2327   WBC 7.6 11.1*   RBC 4.52 4.32*   HGB 13.7 13.0*   HCT 41.6 40.6   MCV 92 94   MCH 30.3 30.1   MCHC 32.9 32.0   RDW 12.7 12.5    267     INR  Recent Labs   Lab 10/24/19  2327   INR 2.12*     PHYSICAL EXAM:  BP (!) 145/92 (BP Location: Left arm)   Pulse 85   Temp 98.4  F (36.9  C) (Oral)   Resp 18   Ht 1.905 m (6' 3\")   Wt 91.5 kg (201 lb 11.5 oz)   SpO2 94%   BMI 25.21 kg/m    General: The patient is alert and oriented. Appropriate. No acute distress  Psych: pleasant affect, answers questions appropriately  Skin: Color appropriate for race, warm, dry.  Extremities: LLE stump intact, mild edema        ASSESSMENT:  63M with L BKA stump abscess and possible osteomyelitis      PLAN:  Continue antibiotics  Keep LLE elevated  Will discuss on Monday debridement vs. SAROJ Gaona MD  Vascular Surgery Fellow  Pgr [phone]                  "

## 2019-10-26 NOTE — PLAN OF CARE
7B PT: Cancel; activity orders remain non-clarified as current activity orders are up ad robby, however waiting for response from ortho to ensure safe mobilization with osteomyelitis and abscess with use of prosthetic. PT will continue to follow and initiate as indicated/appropriate with further clarification.    Addendum 1358: Following discussion with Renaldo Lopez medical team, PT to hold until surgical plan discussed/made on Monday 10/28 as they recommend NWB of L residual limb until then. Per chart review, pt has been pivoting to and from wc independently. Will reschedule PT evaluation as indicated.

## 2019-10-26 NOTE — PLAN OF CARE
1930-2330  Patient voided 325cc, reported pain managed well with scheduled oxycodone, offered no further complaints. Continue POC.

## 2019-10-26 NOTE — PROGRESS NOTES
"BP (!) 145/92 (BP Location: Left arm)   Pulse 85   Temp 98.4  F (36.9  C) (Oral)   Resp 18   Ht 1.905 m (6' 3\")   Wt 91.5 kg (201 lb 11.5 oz)   SpO2 94%   BMI 25.21 kg/m       Neuro: A&Ox4.   Cardiac: Afebrile, VSS.   Respiratory: RA   GI/: Voiding spontaneously. x1 BM this shift.   Diet/appetite: Tolerating diet. Denies nausea   Activity: Up independently.   Pain: oxy- scheduled  Skin: No new deficits noted.  Lines: PIV   Drains: None  Possible  I&D per team.No new complaints.Will continue to monitor and follow plan of care.       "

## 2019-10-26 NOTE — PLAN OF CARE
Patient denies SOB, clear LS. +BS, soft non distended abdomen. Voiding in urinal, adequate urine output. L BKA/ stump edema subsided, compression stocking is worn at all times. Scheduled Oxycodone 10 mg po given. Continue poc.

## 2019-10-26 NOTE — PROGRESS NOTES
[facility], [place]    Progress Note - Renaldo 2 Service        Date of Admission:  10/24/2019    Assessment & Plan   Constantino Taylor is a 63 year old male with history of peripheral vascular disease, atrial fibrillation on Xarelto, HFrEF, coronary artery disease, h/o CVA, prostate cancer on anti-androgen therapy, chronic HCV, as well as left BKA (2015) who presents as a transfer on 10/24 due to left tibia osteomyelitis and intraosseus abscess.     # Left tibia osteomyelitis with interosseus abscess and bacteremia at site of prior BKA  Presents with multiple days of stump pain. MRI showed abscess and osteomyelitis. Narrowed from vancomycin to oral therapy per ID suggestions. Vascular considering I&D early next week.  - continue doxycycline 100 mg twice daily and amoxicililn 1 g twice daily for at least six weeks  - f/u with Dr. Ryan wilson in 5-6 weeks with MRI + contrast of SARA  - appreciate vascular surgery assistance -- would appreciate obtain cultures from abscess should you pursue I&D (if no I&D, would ask IR to drain abscess)    # Atrial fibrillation  Rate controlled, CHADSVASC = 5.   - holding Xarelto given potential I&D  - start therapeutic enoxaparin pending procedure  - pta metoprolol 100 mg daily    CHRONIC PROBLEMS  # HFrEF (EF 40%) 2/2 NICM: pta lisinopril 40 mg daily, metoprolol 100 mg daily  # H/o CVA and PVD: pta aspirin 81 mg daily, atorvastatin 40 mg daily  # CKD: avoid nephrotoxins  # Prostate cancer, BPH  # Phantom limb pain: pta Tylenol, oxycodone, gabapentin  # Alcohol abuse (1 pint weekly + 6 pack of beer), monitor for signs of w/d       Diet: Combination Diet 2 gm NA Diet    Fluids: None  Lines: PIV  DVT Prophylaxis: Therapeutic enoxaparin  Garcia Catheter: not present  Code Status: Full Code      Disposition Plan   Expected discharge: 4 - 7 days, recommended to prior living arrangement once s/p I&D vs vascular procedure.  Entered: Tin Mo MD 10/26/2019,  1:42 PM       MD Renaldo Pemberton 2 Service  [facility] [place]  Pager: [phone]  Please see sticky note for cross cover information  ______________________________________________________________________    Interval History   Reports that the pain in his leg has improved substantially as has the swelling. No fevers or chills; no diarrhea. Hoping to avoid surgery. No acute events overnight.    Data reviewed today: I reviewed all medications, new labs and imaging results over the last 24 hours.    Physical Exam   Vital Signs: Temp: 98.4  F (36.9  C) Temp src: Oral BP: (!) 145/92 Pulse: 85(pulse was 85 but then changed 111 irregular heartbeat pt say)   Resp: 18 SpO2: 94 % O2 Device: None (Room air)    Weight: 201 lbs 11.53 oz  Gen: laying in bed, no acute distress  Pulm: CTAB, nonlabored  GI: non-distended, non-tender  Ext: LLE w/ BKA; stump mildly warm but no induration, swelling, erythema, slight pain w/ palpation.    Data   Reviewed

## 2019-10-27 LAB
CRP SERPL-MCNC: 40 MG/L (ref 0–8)
GLUCOSE BLDC GLUCOMTR-MCNC: 133 MG/DL (ref 70–99)
GLUCOSE BLDC GLUCOMTR-MCNC: 194 MG/DL (ref 70–99)
GLUCOSE SERPL-MCNC: 112 MG/DL (ref 70–99)

## 2019-10-27 PROCEDURE — 36415 COLL VENOUS BLD VENIPUNCTURE: CPT | Performed by: INTERNAL MEDICINE

## 2019-10-27 PROCEDURE — 25000132 ZZH RX MED GY IP 250 OP 250 PS 637: Performed by: STUDENT IN AN ORGANIZED HEALTH CARE EDUCATION/TRAINING PROGRAM

## 2019-10-27 PROCEDURE — 12000001 ZZH R&B MED SURG/OB UMMC

## 2019-10-27 PROCEDURE — 99231 SBSQ HOSP IP/OBS SF/LOW 25: CPT | Mod: GC | Performed by: INTERNAL MEDICINE

## 2019-10-27 PROCEDURE — 82947 ASSAY GLUCOSE BLOOD QUANT: CPT | Performed by: INTERNAL MEDICINE

## 2019-10-27 PROCEDURE — 25000132 ZZH RX MED GY IP 250 OP 250 PS 637: Performed by: INTERNAL MEDICINE

## 2019-10-27 PROCEDURE — 25000128 H RX IP 250 OP 636: Performed by: INTERNAL MEDICINE

## 2019-10-27 PROCEDURE — 00000146 ZZHCL STATISTIC GLUCOSE BY METER IP

## 2019-10-27 PROCEDURE — 86140 C-REACTIVE PROTEIN: CPT | Performed by: INTERNAL MEDICINE

## 2019-10-27 RX ORDER — POLYETHYLENE GLYCOL 3350 17 G/17G
17 POWDER, FOR SOLUTION ORAL DAILY PRN
Status: DISCONTINUED | OUTPATIENT
Start: 2019-10-27 | End: 2019-11-01 | Stop reason: HOSPADM

## 2019-10-27 RX ORDER — POLYETHYLENE GLYCOL 3350 17 G/17G
17 POWDER, FOR SOLUTION ORAL DAILY
Status: DISCONTINUED | OUTPATIENT
Start: 2019-10-27 | End: 2019-10-27

## 2019-10-27 RX ORDER — OXYCODONE HYDROCHLORIDE 5 MG/1
5-10 TABLET ORAL EVERY 8 HOURS PRN
Status: DISCONTINUED | OUTPATIENT
Start: 2019-10-27 | End: 2019-10-30

## 2019-10-27 RX ADMIN — OXYCODONE HYDROCHLORIDE 10 MG: 5 TABLET ORAL at 16:31

## 2019-10-27 RX ADMIN — ATORVASTATIN CALCIUM 40 MG: 40 TABLET, FILM COATED ORAL at 22:16

## 2019-10-27 RX ADMIN — GABAPENTIN 600 MG: 300 CAPSULE ORAL at 20:06

## 2019-10-27 RX ADMIN — DOXYCYCLINE 100 MG: 100 CAPSULE ORAL at 20:07

## 2019-10-27 RX ADMIN — OXYCODONE HYDROCHLORIDE 10 MG: 10 TABLET ORAL at 08:22

## 2019-10-27 RX ADMIN — GABAPENTIN 600 MG: 300 CAPSULE ORAL at 13:11

## 2019-10-27 RX ADMIN — METOPROLOL SUCCINATE 100 MG: 100 TABLET, EXTENDED RELEASE ORAL at 08:22

## 2019-10-27 RX ADMIN — POLYETHYLENE GLYCOL 3350 17 G: 17 POWDER, FOR SOLUTION ORAL at 09:39

## 2019-10-27 RX ADMIN — AMOXICILLIN 1000 MG: 500 CAPSULE ORAL at 20:06

## 2019-10-27 RX ADMIN — GABAPENTIN 600 MG: 300 CAPSULE ORAL at 08:21

## 2019-10-27 RX ADMIN — DOXYCYCLINE 100 MG: 100 CAPSULE ORAL at 09:02

## 2019-10-27 RX ADMIN — ACETAMINOPHEN 500 MG: 500 TABLET, FILM COATED ORAL at 15:31

## 2019-10-27 RX ADMIN — AMOXICILLIN 1000 MG: 500 CAPSULE ORAL at 08:22

## 2019-10-27 RX ADMIN — ENOXAPARIN SODIUM 90 MG: 100 INJECTION SUBCUTANEOUS at 08:22

## 2019-10-27 RX ADMIN — ASPIRIN 81 MG: 81 TABLET, COATED ORAL at 08:21

## 2019-10-27 RX ADMIN — TAMSULOSIN HYDROCHLORIDE 0.4 MG: 0.4 CAPSULE ORAL at 08:21

## 2019-10-27 RX ADMIN — ENOXAPARIN SODIUM 90 MG: 100 INJECTION SUBCUTANEOUS at 20:11

## 2019-10-27 RX ADMIN — LISINOPRIL 40 MG: 40 TABLET ORAL at 08:21

## 2019-10-27 ASSESSMENT — ACTIVITIES OF DAILY LIVING (ADL)
ADLS_ACUITY_SCORE: 13

## 2019-10-27 ASSESSMENT — PAIN DESCRIPTION - DESCRIPTORS: DESCRIPTORS: THROBBING

## 2019-10-27 NOTE — PLAN OF CARE
"Status: Pt on 7b management of L tibia osteomyelitis with abscess and bacteremia. Pt also has hx of a.fib (not currently on xarelto pending surgery), CVA, Chronic systolic HF, Prostate cancer see MD notes for more.   Vitals: /84 (BP Location: Left arm)   Pulse 87   Temp 98.9  F (37.2  C) (Oral)   Resp 18   Ht 1.905 m (6' 3\")   Wt 91.5 kg (201 lb 11.5 oz)   SpO2 100%   BMI 25.21 kg/m    Neuros: A&Ox4. Pleasant affect.   IV: No PIV, MD's aware.   Resp/trach: On RA. Lungs CTA.   Diet: 2G sodium diet, tolerating well. Denies nausea.   Bowel status: No BM this shift. Miralax given.   : Voiding spontaneously via urinal.   Skin: L BKA swelling decreased per pt.   Pain: Phantom limb pain managed with oxy, gabapentin, Tylenol.   Activity: Independently transfers to . Moves independently in bed.   Social: No visitors this shift.   Plan: Consulting vascular surgery regarding debridement vs AKA on Monday. On Abx for >6 wks. Continue to monitor and follow POC.   "

## 2019-10-27 NOTE — PROGRESS NOTES
[facility], [place]    Progress Note - Renaldo 2 Service        Date of Admission:  10/24/2019    Assessment & Plan   Constantino Taylor is a 63 year old male admitted on 10/24/2019. His PMH is significant for L BKA, A fib on Xarelto, HFrEF (40%), HTN, HLD, CAD, prostate cancer, chronic HCV, alcohol & marijuana us who was transferred from [facility] on 10/24 for further work-up & management of L tibia osteomyelitis with abscess and bacteremia.     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 MRI showing abscess & osteomyelitis of L stump. On Vancomycin per [facility] ID. Will need source control which could be AKA (patient refused), debridement, or I&D.  Currently with no sign of systemic infection. Blood cultures at [facility] is positive for Staphylococcus pettenkoferi (R to Oxacillin, S to Clindamycin, Levofloxacin, TMP-SMX, Erythromycin, Vancomycin). Repeat blood cultures (10/24/19) negative to date, likely contaminant.  Remain afebrile with clinical improvement.   - Continue Doxycycline (100 mg BID) + Amoxicillin (1 g BID) (plan > 6 weeks)  - Vascular surgery will discuss options (debridement vs AKA) on Monday  - F/U blood cultures  - Vascular surgery consult; appreciate recommendations  - Orthopedics consult; appreciate recommendations  - ID consult; appreciate recommendations  - Plan f/u CBC, BMP, CRP at 3 weeks and f/u with Dr. Ryan wilson in 6 weeks with MRI + contrast of SARA     Atrial fibrillation  In AF with controlled rate. [id number] = 5, on Xarelto for anticoagulation.  - Holding Xarelto (possible procedure), last dose 10/24   - continue therapeutic enoxaparin pending procedure  - Continue PTA Metoprolol 100 mg/day for rate control      Chronic systolic HF, EF ~40%  NICM  History of CVA  HTN  HLD  PVD (R ICA  PTA MARGARITA occlusion, L distal SFA , L renal artery occlusion)  Last echo in March  2019, EF ~40%.  - Monitor BP  - Hold-off Aspirin (possible procedure)  - Continue PTA Atorvastatin 40 mg/day  - Continue PTA Metoprolol 100 mg/day, Lisinopril 40 mg/day    CKD  Baseline Cr 1.4-1.5  - Trend BMP  - Renally dose medications & avoid nephrotoxic agent     Prostate Cancer  BPH  History of stage III prostate cancer treated with Lupron & radiation. Has biochemical relapse without evidence of metastatic disease on bone scan (10/9/19). Has elevated PSA at 30 on admission. No significant change from 08/2019 at 32.3.  - Continue PTA Tamsulosin 0.4 mg/day     Chronic LBP  Phantom limb pain  - Tylenol 500 mg q6 prn   - Continue PTA Oxycodone 10 mg q8h  - Continue PTA Gabapentin 300-600 mg TID    Alcohol and marijuana abuse   Drinks 1 pint vodka per week + 6 pack of beer. Last drink just prior to arrival. No history of withdrawal. Blood alcohol level negative.  - Observe for signs of withdrawal (autonomic hyperactivity)  - Low threshold to start CIWA, nursing aware      Diet: Combination Diet 2 gm NA Diet    Fluids: None  Lines: PIV  DVT Prophylaxis: None (Anticipating procedures)  Garcia Catheter: not present  Code Status: Full Code      Disposition Plan   Expected discharge: 4 - 7 days, recommended to prior living arrangement once antibiotic plan established.  Entered: Tomer Ponce MD 10/27/2019, 8:36 AM     The patient's care was discussed with the Attending Physician, Dr. Tin Mo.    Tomer Ponce MD  [address], [place]  Pager: [phone]  Please see sticky note for cross cover information  ______________________________________________________________________    Interval History   He complains of mild pain on left leg stump but states that this is much better. Pain is controlled with current regimen. Tolerating oral diet. Denies fever & chills. Denies chest pain, SOB, or increasing edema. No abnormal bleeding.    Data reviewed today: I reviewed  all medications, new labs and imaging results over the last 24 hours. I personally reviewed labs and imaging.    Physical Exam   Vital Signs: Temp: 98.9  F (37.2  C) Temp src: Oral BP: 138/84 Pulse: 87   Resp: 18 SpO2: 100 % O2 Device: None (Room air)    Weight: 201 lbs 11.53 oz     General Appearance: well appearing male laying in bed in [facility], engaged in conversation.  HEENT: EOMI/PERRL. Neck is supple with full ROM.  Respiratory: Lungs are clear to auscultation bilaterally.  Cardiovascular: irregularly irregular rhythm. No rubs/murmurs/or gallops.  GI: +BS. NT/ND.  Skin: No rashes or petechiae.  LLE: L BKA, stump without laceration or open area or draining discharge, warm to touch, mild pain with palpation. No edema on R leg.  Neurologic: A&Ox3. no FND.    Data   Recent Labs   Lab 10/27/19  0655 10/26/19  0652 10/24/19  2327   WBC  --  7.6 11.1*   HGB  --  13.7 13.0*   MCV  --  92 94   PLT  --  283 267   INR  --   --  2.12*   NA  --  135 136   POTASSIUM  --  4.2 4.1   CHLORIDE  --  104 105   CO2  --  26 26   BUN  --  16 16   CR  --  1.36* 1.41*   ANIONGAP  --  6 5   STUART  --  9.1 8.8   * 116* 110*   ALBUMIN  --   --  3.0*   PROTTOTAL  --   --  8.1   BILITOTAL  --   --  0.4   ALKPHOS  --   --  81   ALT  --   --  14   AST  --   --  15     No results found for this or any previous visit (from the past 24 hour(s)).  Medications       amoxicillin  1,000 mg Oral BID     aspirin  81 mg Oral Daily     atorvastatin  40 mg Oral At Bedtime     doxycycline hyclate  100 mg Oral BID     enoxaparin ANTICOAGULANT  1 mg/kg Subcutaneous Q12H     gabapentin  300-600 mg Oral TID     lisinopril  40 mg Oral Daily     metoprolol succinate ER  100 mg Oral Daily     oxyCODONE  10 mg Oral Q8H     sodium chloride (PF)  3 mL Intracatheter Q8H     tamsulosin  0.4 mg Oral Daily

## 2019-10-27 NOTE — PROGRESS NOTES
"SURGERY CROSS COVER NOTE     Paged to patient's bedside at request of medicine team to evaluate left lower extremity infection. Patient states that his leg is much less swollen than it has been at the beginning of the admission. There is a fluctuant area at the edge of his previous incision site on his left sided BKA that he believes developed over the last day.       Vital signs:  Temp: 97.2  F (36.2  C) Temp src: Oral BP: (!) 145/90 Pulse: 89   Resp: 18 SpO2: 100 % O2 Device: None (Room air)   Height: 190.5 cm (6' 3\") Weight: 91.5 kg (201 lb 11.5 oz)  Estimated body mass index is 25.21 kg/m  as calculated from the following:    Height as of this encounter: 1.905 m (6' 3\").    Weight as of this encounter: 91.5 kg (201 lb 11.5 oz).       Exam:   L BKA without any drainage. Fluctuance at the edge of his previous incision site that appears close to the surface. Skin is warm to the touch. Painful to palpation of the left lower extremity. No induration.      A/P:    62 yo male with PMH of L BKA, A fib on Xarelta, chronic HCV, who was admitted with osteomyelitis and abscess bacteremia on 10/24 that now has an area of palpable fluctuance at the end of his BKA. Patient has been vitally stable and afebrile. At this time, will not perform an I&D of abscess as this may well track directly to bone that also has osteomyelitis. Will wait for more definitive treatment.     Discussed with fellow, Dr. Gaona.     Tejal Hendrickson DO PGY-1  General Surgery    "

## 2019-10-27 NOTE — PLAN OF CARE
"Activity: Up ad robby in wheelchair, independently positioning in bed.  Neuro: A&Ox4, flirtatious, able to make needs known.  GI/: +BM today, voiding into urinal spontaneously without difficulty.  Diet: 2gm sodium diet, good appetite, good tolerance.  Incisions/Drains: Left BKA, old scar visible.  IV Access: PIV removed by patient \"accidentally,\" patient refusing another IV, stating \"I don't need no more IV meds, so I don't need no more IV!\"  Labs: BG checks 163 and 123  Vitals: Vitally stable on room air.  Pain: Patient refused HS scheduled oxycodone today, stating adequate pain management at this time.  New changes this shift: IV came out  Plan: Continue POC.    "

## 2019-10-28 LAB
ANION GAP SERPL CALCULATED.3IONS-SCNC: 3 MMOL/L (ref 3–14)
BUN SERPL-MCNC: 21 MG/DL (ref 7–30)
CALCIUM SERPL-MCNC: 9.4 MG/DL (ref 8.5–10.1)
CHLORIDE SERPL-SCNC: 107 MMOL/L (ref 94–109)
CO2 SERPL-SCNC: 25 MMOL/L (ref 20–32)
CREAT SERPL-MCNC: 1.38 MG/DL (ref 0.66–1.25)
CRP SERPL-MCNC: 24 MG/L (ref 0–8)
ERYTHROCYTE [DISTWIDTH] IN BLOOD BY AUTOMATED COUNT: 12.4 % (ref 10–15)
GFR SERPL CREATININE-BSD FRML MDRD: 54 ML/MIN/{1.73_M2}
GLUCOSE BLDC GLUCOMTR-MCNC: 112 MG/DL (ref 70–99)
GLUCOSE SERPL-MCNC: 112 MG/DL (ref 70–99)
HCT VFR BLD AUTO: 43.6 % (ref 40–53)
HGB BLD-MCNC: 14.5 G/DL (ref 13.3–17.7)
MCH RBC QN AUTO: 30.6 PG (ref 26.5–33)
MCHC RBC AUTO-ENTMCNC: 33.3 G/DL (ref 31.5–36.5)
MCV RBC AUTO: 92 FL (ref 78–100)
PLATELET # BLD AUTO: 333 10E9/L (ref 150–450)
POTASSIUM SERPL-SCNC: 4.4 MMOL/L (ref 3.4–5.3)
RBC # BLD AUTO: 4.74 10E12/L (ref 4.4–5.9)
SODIUM SERPL-SCNC: 135 MMOL/L (ref 133–144)
WBC # BLD AUTO: 7.4 10E9/L (ref 4–11)

## 2019-10-28 PROCEDURE — 40000141 ZZH STATISTIC PERIPHERAL IV START W/O US GUIDANCE

## 2019-10-28 PROCEDURE — 99231 SBSQ HOSP IP/OBS SF/LOW 25: CPT | Mod: GC | Performed by: INTERNAL MEDICINE

## 2019-10-28 PROCEDURE — 36415 COLL VENOUS BLD VENIPUNCTURE: CPT | Performed by: INTERNAL MEDICINE

## 2019-10-28 PROCEDURE — 86140 C-REACTIVE PROTEIN: CPT | Performed by: INTERNAL MEDICINE

## 2019-10-28 PROCEDURE — 85027 COMPLETE CBC AUTOMATED: CPT | Performed by: INTERNAL MEDICINE

## 2019-10-28 PROCEDURE — 12000001 ZZH R&B MED SURG/OB UMMC

## 2019-10-28 PROCEDURE — 25000128 H RX IP 250 OP 636: Performed by: INTERNAL MEDICINE

## 2019-10-28 PROCEDURE — 25000132 ZZH RX MED GY IP 250 OP 250 PS 637: Performed by: STUDENT IN AN ORGANIZED HEALTH CARE EDUCATION/TRAINING PROGRAM

## 2019-10-28 PROCEDURE — 25000132 ZZH RX MED GY IP 250 OP 250 PS 637: Performed by: INTERNAL MEDICINE

## 2019-10-28 PROCEDURE — 80048 BASIC METABOLIC PNL TOTAL CA: CPT | Performed by: INTERNAL MEDICINE

## 2019-10-28 PROCEDURE — 00000146 ZZHCL STATISTIC GLUCOSE BY METER IP

## 2019-10-28 RX ORDER — CEFAZOLIN SODIUM 1 G/3ML
1 INJECTION, POWDER, FOR SOLUTION INTRAMUSCULAR; INTRAVENOUS SEE ADMIN INSTRUCTIONS
Status: CANCELLED | OUTPATIENT
Start: 2019-10-28

## 2019-10-28 RX ORDER — CEFAZOLIN SODIUM 2 G/100ML
2 INJECTION, SOLUTION INTRAVENOUS
Status: CANCELLED | OUTPATIENT
Start: 2019-10-28

## 2019-10-28 RX ADMIN — OXYCODONE HYDROCHLORIDE 10 MG: 5 TABLET ORAL at 20:22

## 2019-10-28 RX ADMIN — LISINOPRIL 40 MG: 40 TABLET ORAL at 08:36

## 2019-10-28 RX ADMIN — METOPROLOL SUCCINATE 100 MG: 100 TABLET, EXTENDED RELEASE ORAL at 08:36

## 2019-10-28 RX ADMIN — AMOXICILLIN 1000 MG: 500 CAPSULE ORAL at 20:22

## 2019-10-28 RX ADMIN — ENOXAPARIN SODIUM 90 MG: 100 INJECTION SUBCUTANEOUS at 20:21

## 2019-10-28 RX ADMIN — TAMSULOSIN HYDROCHLORIDE 0.4 MG: 0.4 CAPSULE ORAL at 08:36

## 2019-10-28 RX ADMIN — GABAPENTIN 600 MG: 300 CAPSULE ORAL at 13:28

## 2019-10-28 RX ADMIN — GABAPENTIN 600 MG: 300 CAPSULE ORAL at 20:22

## 2019-10-28 RX ADMIN — ATORVASTATIN CALCIUM 40 MG: 40 TABLET, FILM COATED ORAL at 22:34

## 2019-10-28 RX ADMIN — GABAPENTIN 600 MG: 300 CAPSULE ORAL at 08:36

## 2019-10-28 RX ADMIN — ENOXAPARIN SODIUM 90 MG: 100 INJECTION SUBCUTANEOUS at 08:37

## 2019-10-28 RX ADMIN — OXYCODONE HYDROCHLORIDE 10 MG: 5 TABLET ORAL at 08:36

## 2019-10-28 RX ADMIN — DOXYCYCLINE 100 MG: 100 CAPSULE ORAL at 20:22

## 2019-10-28 RX ADMIN — AMOXICILLIN 1000 MG: 500 CAPSULE ORAL at 08:36

## 2019-10-28 RX ADMIN — DOXYCYCLINE 100 MG: 100 CAPSULE ORAL at 08:43

## 2019-10-28 RX ADMIN — ASPIRIN 81 MG: 81 TABLET, COATED ORAL at 08:36

## 2019-10-28 ASSESSMENT — ACTIVITIES OF DAILY LIVING (ADL)
ADLS_ACUITY_SCORE: 13

## 2019-10-28 NOTE — PLAN OF CARE
"/62 (BP Location: Left arm)   Pulse 79   Temp 96.9  F (36.1  C) (Oral)   Resp 16   Ht 1.905 m (6' 3\")   Wt 91.5 kg (201 lb 11.5 oz)   SpO2 100%   BMI 25.21 kg/m      VSS, denied pain and nausea, lungs clear diminished, slept between cares, no peripheral access, continue with plan of care  "

## 2019-10-28 NOTE — PROGRESS NOTES
"VASCULAR SURGERY PROGRESS NOTE    Subjective:  No acute events overnight, Reports swelling in Left stump improved, however, notes area that \"looks like it will burst\".  Continues to have significant pain with palpation.    Objective:    Intake/Output Summary (Last 24 hours) at 10/28/2019 0854  Last data filed at 10/28/2019 0700  Gross per 24 hour   Intake 240 ml   Output 1350 ml   Net -1110 ml     Labs:  ROUTINE IP LABS (Last four results)  BMP  Recent Labs   Lab 10/28/19  0646 10/27/19  0655 10/26/19  0652 10/24/19  2327     --  135 136   POTASSIUM 4.4  --  4.2 4.1   CHLORIDE 107  --  104 105   STUART 9.4  --  9.1 8.8   CO2 25  --  26 26   BUN 21  --  16 16   CR 1.38*  --  1.36* 1.41*   * 112* 116* 110*     CBC  Recent Labs   Lab 10/28/19  0646 10/26/19  0652 10/24/19  2327   WBC 7.4 7.6 11.1*   RBC 4.74 4.52 4.32*   HGB 14.5 13.7 13.0*   HCT 43.6 41.6 40.6   MCV 92 92 94   MCH 30.6 30.3 30.1   MCHC 33.3 32.9 32.0   RDW 12.4 12.7 12.5    283 267     INR  Recent Labs   Lab 10/24/19  2327   INR 2.12*     PHYSICAL EXAM:  /73 (BP Location: Left arm)   Pulse 104   Temp 98  F (36.7  C) (Oral)   Resp 16   Ht 1.905 m (6' 3\")   Wt 91.5 kg (201 lb 11.5 oz)   SpO2 98%   BMI 25.21 kg/m    General: The patient is alert and oriented. Appropriate. No acute distress  Psych: pleasant affect, answers questions appropriately  Skin: Color appropriate for race, warm, dry.  Respiratory: The patient does not require supplemental oxygen. Breathing unlabored  GI:  Abdomen soft, nontender to light palpation.  Extremities:  LLE stump intact with fluctuance at the edge of his previous incision.  Area of visible purulent drainage noted under skin on medial aspect of the incision.  Skin is warm to touch and very tender to palpation.       ASSESSMENT:  63M with L BKA stump abscess and possible osteomyelitis      PLAN:  -Continue antibotics  -Keep LLE elevated  -Will discuss surgical options with attending, " debridement vs. AKA.    MAGI Banerjee, CNP  Division of Vascular Surgery   [facility]   Pager: [phone]

## 2019-10-28 NOTE — PLAN OF CARE
Activity: Up ad robby to wheelchair, independently positioning in bed.  Neuro: A&Ox4, flirtatious, able to make needs known.  GI/: +Flatus, adequate output in bedside urinal. No BM this shift.  Diet: 2gm sodium diet. Good appetite and good tolerance.  Incisions/Drains: Left BKA, scar on distal end.  IV Access: none per patient preference.  Labs: BG checks were 194 and 133  Vitals: Vitally stable on room air.  Pain: Oxycodone changed to PRN, administered per MAR. Scheduled gabapentin administered.  New changes this shift:  n/a  Plan: Continue POC.

## 2019-10-28 NOTE — PLAN OF CARE
Vitals:    10/27/19 0710 10/27/19 1557 10/27/19 2207 10/28/19 0748   BP: 138/84 (!) 145/90 119/62 135/73   BP Location: Left arm Left arm Left arm Left arm   Pulse: 87 89 79 104   Resp: 18 18 16 16   Temp: 98.9  F (37.2  C) 97.2  F (36.2  C) 96.9  F (36.1  C) 98  F (36.7  C)   TempSrc: Oral Oral Oral Oral   SpO2: 100% 100% 100% 98%   Weight:       Height:           Neuro: alert and oriented     VS: BP stable remain tachycardic sating 98% on room air,     B Hgb 14.5    Cardiac: 104    Pain/Nausea: Oxycodone with relief     Lines/Drains: none     Incision/Wound:    GI/: voiding  adequate     Diet/Appetite: good appetite     Activity: wheel chair     Plan:  ID consult continue with plan of care

## 2019-10-29 ENCOUNTER — ANESTHESIA (OUTPATIENT)
Dept: SURGERY | Facility: CLINIC | Age: 64
DRG: 493 | End: 2019-10-29
Payer: COMMERCIAL

## 2019-10-29 ENCOUNTER — ANESTHESIA EVENT (OUTPATIENT)
Dept: SURGERY | Facility: CLINIC | Age: 64
DRG: 493 | End: 2019-10-29
Payer: COMMERCIAL

## 2019-10-29 LAB
CRP SERPL-MCNC: 16 MG/L (ref 0–8)
GLUCOSE BLDC GLUCOMTR-MCNC: 104 MG/DL (ref 70–99)
GLUCOSE BLDC GLUCOMTR-MCNC: 112 MG/DL (ref 70–99)
GRAM STN SPEC: ABNORMAL
GRAM STN SPEC: ABNORMAL
GRAM STN SPEC: NORMAL
Lab: ABNORMAL
SPECIMEN SOURCE: ABNORMAL
SPECIMEN SOURCE: NORMAL
SPECIMEN SOURCE: NORMAL

## 2019-10-29 PROCEDURE — 37000008 ZZH ANESTHESIA TECHNICAL FEE, 1ST 30 MIN: Performed by: SURGERY

## 2019-10-29 PROCEDURE — 25000128 H RX IP 250 OP 636: Performed by: STUDENT IN AN ORGANIZED HEALTH CARE EDUCATION/TRAINING PROGRAM

## 2019-10-29 PROCEDURE — 40000170 ZZH STATISTIC PRE-PROCEDURE ASSESSMENT II: Performed by: SURGERY

## 2019-10-29 PROCEDURE — 36000053 ZZH SURGERY LEVEL 2 EA 15 ADDTL MIN - UMMC: Performed by: SURGERY

## 2019-10-29 PROCEDURE — 27210794 ZZH OR GENERAL SUPPLY STERILE: Performed by: SURGERY

## 2019-10-29 PROCEDURE — 0QBH0ZZ EXCISION OF LEFT TIBIA, OPEN APPROACH: ICD-10-PCS | Performed by: SURGERY

## 2019-10-29 PROCEDURE — 25000128 H RX IP 250 OP 636: Performed by: SURGERY

## 2019-10-29 PROCEDURE — 37000009 ZZH ANESTHESIA TECHNICAL FEE, EACH ADDTL 15 MIN: Performed by: SURGERY

## 2019-10-29 PROCEDURE — 87077 CULTURE AEROBIC IDENTIFY: CPT | Performed by: SURGERY

## 2019-10-29 PROCEDURE — 87075 CULTR BACTERIA EXCEPT BLOOD: CPT | Performed by: SURGERY

## 2019-10-29 PROCEDURE — 87205 SMEAR GRAM STAIN: CPT | Performed by: SURGERY

## 2019-10-29 PROCEDURE — 25000128 H RX IP 250 OP 636: Performed by: INTERNAL MEDICINE

## 2019-10-29 PROCEDURE — 00000146 ZZHCL STATISTIC GLUCOSE BY METER IP

## 2019-10-29 PROCEDURE — 99233 SBSQ HOSP IP/OBS HIGH 50: CPT | Mod: GC | Performed by: INTERNAL MEDICINE

## 2019-10-29 PROCEDURE — 25800030 ZZH RX IP 258 OP 636: Performed by: STUDENT IN AN ORGANIZED HEALTH CARE EDUCATION/TRAINING PROGRAM

## 2019-10-29 PROCEDURE — 87070 CULTURE OTHR SPECIMN AEROBIC: CPT | Performed by: SURGERY

## 2019-10-29 PROCEDURE — 86140 C-REACTIVE PROTEIN: CPT | Performed by: INTERNAL MEDICINE

## 2019-10-29 PROCEDURE — 27211024 ZZHC OR SUPPLY OTHER OPNP: Performed by: SURGERY

## 2019-10-29 PROCEDURE — 36000051 ZZH SURGERY LEVEL 2 1ST 30 MIN - UMMC: Performed by: SURGERY

## 2019-10-29 PROCEDURE — 36415 COLL VENOUS BLD VENIPUNCTURE: CPT | Performed by: INTERNAL MEDICINE

## 2019-10-29 PROCEDURE — 25000132 ZZH RX MED GY IP 250 OP 250 PS 637: Performed by: STUDENT IN AN ORGANIZED HEALTH CARE EDUCATION/TRAINING PROGRAM

## 2019-10-29 PROCEDURE — 87176 TISSUE HOMOGENIZATION CULTR: CPT | Performed by: SURGERY

## 2019-10-29 PROCEDURE — 71000014 ZZH RECOVERY PHASE 1 LEVEL 2 FIRST HR: Performed by: SURGERY

## 2019-10-29 PROCEDURE — 25000125 ZZHC RX 250: Performed by: STUDENT IN AN ORGANIZED HEALTH CARE EDUCATION/TRAINING PROGRAM

## 2019-10-29 PROCEDURE — 12000001 ZZH R&B MED SURG/OB UMMC

## 2019-10-29 PROCEDURE — 25000125 ZZHC RX 250: Performed by: SURGERY

## 2019-10-29 PROCEDURE — 25000132 ZZH RX MED GY IP 250 OP 250 PS 637: Performed by: INTERNAL MEDICINE

## 2019-10-29 PROCEDURE — 25000128 H RX IP 250 OP 636: Performed by: ANESTHESIOLOGY

## 2019-10-29 PROCEDURE — 25000566 ZZH SEVOFLURANE, EA 15 MIN: Performed by: SURGERY

## 2019-10-29 PROCEDURE — 87186 SC STD MICRODIL/AGAR DIL: CPT | Performed by: SURGERY

## 2019-10-29 RX ORDER — PROPOFOL 10 MG/ML
INJECTION, EMULSION INTRAVENOUS PRN
Status: DISCONTINUED | OUTPATIENT
Start: 2019-10-29 | End: 2019-10-29

## 2019-10-29 RX ORDER — ONDANSETRON 2 MG/ML
INJECTION INTRAMUSCULAR; INTRAVENOUS PRN
Status: DISCONTINUED | OUTPATIENT
Start: 2019-10-29 | End: 2019-10-29

## 2019-10-29 RX ORDER — SODIUM CHLORIDE, SODIUM LACTATE, POTASSIUM CHLORIDE, CALCIUM CHLORIDE 600; 310; 30; 20 MG/100ML; MG/100ML; MG/100ML; MG/100ML
INJECTION, SOLUTION INTRAVENOUS CONTINUOUS
Status: DISCONTINUED | OUTPATIENT
Start: 2019-10-29 | End: 2019-10-29 | Stop reason: HOSPADM

## 2019-10-29 RX ORDER — LIDOCAINE HYDROCHLORIDE 20 MG/ML
INJECTION, SOLUTION INFILTRATION; PERINEURAL PRN
Status: DISCONTINUED | OUTPATIENT
Start: 2019-10-29 | End: 2019-10-29

## 2019-10-29 RX ORDER — METOPROLOL TARTRATE 1 MG/ML
INJECTION, SOLUTION INTRAVENOUS PRN
Status: DISCONTINUED | OUTPATIENT
Start: 2019-10-29 | End: 2019-10-29

## 2019-10-29 RX ORDER — ESMOLOL HYDROCHLORIDE 10 MG/ML
INJECTION INTRAVENOUS PRN
Status: DISCONTINUED | OUTPATIENT
Start: 2019-10-29 | End: 2019-10-29

## 2019-10-29 RX ORDER — ONDANSETRON 4 MG/1
4 TABLET, ORALLY DISINTEGRATING ORAL EVERY 30 MIN PRN
Status: DISCONTINUED | OUTPATIENT
Start: 2019-10-29 | End: 2019-10-29 | Stop reason: HOSPADM

## 2019-10-29 RX ORDER — NALOXONE HYDROCHLORIDE 0.4 MG/ML
.1-.4 INJECTION, SOLUTION INTRAMUSCULAR; INTRAVENOUS; SUBCUTANEOUS
Status: ACTIVE | OUTPATIENT
Start: 2019-10-29 | End: 2019-10-30

## 2019-10-29 RX ORDER — ONDANSETRON 2 MG/ML
4 INJECTION INTRAMUSCULAR; INTRAVENOUS EVERY 30 MIN PRN
Status: DISCONTINUED | OUTPATIENT
Start: 2019-10-29 | End: 2019-10-29 | Stop reason: HOSPADM

## 2019-10-29 RX ORDER — SODIUM CHLORIDE, SODIUM LACTATE, POTASSIUM CHLORIDE, CALCIUM CHLORIDE 600; 310; 30; 20 MG/100ML; MG/100ML; MG/100ML; MG/100ML
INJECTION, SOLUTION INTRAVENOUS CONTINUOUS PRN
Status: DISCONTINUED | OUTPATIENT
Start: 2019-10-29 | End: 2019-10-29

## 2019-10-29 RX ORDER — HYDROMORPHONE HYDROCHLORIDE 2 MG/1
2 TABLET ORAL
Status: COMPLETED | OUTPATIENT
Start: 2019-10-29 | End: 2019-10-29

## 2019-10-29 RX ORDER — HYDROMORPHONE HYDROCHLORIDE 1 MG/ML
.3-.5 INJECTION, SOLUTION INTRAMUSCULAR; INTRAVENOUS; SUBCUTANEOUS EVERY 5 MIN PRN
Status: DISCONTINUED | OUTPATIENT
Start: 2019-10-29 | End: 2019-10-29 | Stop reason: HOSPADM

## 2019-10-29 RX ORDER — OXYCODONE HYDROCHLORIDE 5 MG/1
5 TABLET ORAL EVERY 4 HOURS PRN
Status: DISCONTINUED | OUTPATIENT
Start: 2019-10-29 | End: 2019-10-29

## 2019-10-29 RX ORDER — CEFAZOLIN SODIUM 2 G/100ML
2 INJECTION, SOLUTION INTRAVENOUS
Status: COMPLETED | OUTPATIENT
Start: 2019-10-29 | End: 2019-10-29

## 2019-10-29 RX ORDER — FENTANYL CITRATE 50 UG/ML
INJECTION, SOLUTION INTRAMUSCULAR; INTRAVENOUS PRN
Status: DISCONTINUED | OUTPATIENT
Start: 2019-10-29 | End: 2019-10-29

## 2019-10-29 RX ORDER — FENTANYL CITRATE 50 UG/ML
25-50 INJECTION, SOLUTION INTRAMUSCULAR; INTRAVENOUS
Status: DISCONTINUED | OUTPATIENT
Start: 2019-10-29 | End: 2019-10-29 | Stop reason: HOSPADM

## 2019-10-29 RX ORDER — EPHEDRINE SULFATE 50 MG/ML
INJECTION, SOLUTION INTRAMUSCULAR; INTRAVENOUS; SUBCUTANEOUS PRN
Status: DISCONTINUED | OUTPATIENT
Start: 2019-10-29 | End: 2019-10-29

## 2019-10-29 RX ADMIN — ESMOLOL HYDROCHLORIDE 20 MG: 10 INJECTION, SOLUTION INTRAVENOUS at 18:11

## 2019-10-29 RX ADMIN — ESMOLOL HYDROCHLORIDE 20 MG: 10 INJECTION, SOLUTION INTRAVENOUS at 17:36

## 2019-10-29 RX ADMIN — GABAPENTIN 600 MG: 300 CAPSULE ORAL at 08:12

## 2019-10-29 RX ADMIN — NOREPINEPHRINE BITARTRATE 6.4 MCG: 1 INJECTION INTRAVENOUS at 18:40

## 2019-10-29 RX ADMIN — ESMOLOL HYDROCHLORIDE 20 MG: 10 INJECTION, SOLUTION INTRAVENOUS at 17:42

## 2019-10-29 RX ADMIN — PROPOFOL 100 MG: 10 INJECTION, EMULSION INTRAVENOUS at 17:36

## 2019-10-29 RX ADMIN — CEFAZOLIN SODIUM 2 G: 2 INJECTION, SOLUTION INTRAVENOUS at 17:58

## 2019-10-29 RX ADMIN — FENTANYL CITRATE 50 MCG: 50 INJECTION, SOLUTION INTRAMUSCULAR; INTRAVENOUS at 18:19

## 2019-10-29 RX ADMIN — TAMSULOSIN HYDROCHLORIDE 0.4 MG: 0.4 CAPSULE ORAL at 08:12

## 2019-10-29 RX ADMIN — ROCURONIUM BROMIDE 50 MG: 10 INJECTION INTRAVENOUS at 17:36

## 2019-10-29 RX ADMIN — ENOXAPARIN SODIUM 90 MG: 100 INJECTION SUBCUTANEOUS at 22:20

## 2019-10-29 RX ADMIN — FENTANYL CITRATE 25 MCG: 50 INJECTION, SOLUTION INTRAMUSCULAR; INTRAVENOUS at 19:34

## 2019-10-29 RX ADMIN — LIDOCAINE HYDROCHLORIDE 100 MG: 20 INJECTION, SOLUTION INFILTRATION; PERINEURAL at 17:36

## 2019-10-29 RX ADMIN — HYDROMORPHONE HYDROCHLORIDE 0.5 MG: 1 INJECTION, SOLUTION INTRAMUSCULAR; INTRAVENOUS; SUBCUTANEOUS at 18:19

## 2019-10-29 RX ADMIN — SUGAMMADEX 200 MG: 100 INJECTION, SOLUTION INTRAVENOUS at 18:38

## 2019-10-29 RX ADMIN — DOXYCYCLINE 100 MG: 100 CAPSULE ORAL at 08:13

## 2019-10-29 RX ADMIN — ONDANSETRON 4 MG: 2 INJECTION INTRAMUSCULAR; INTRAVENOUS at 18:12

## 2019-10-29 RX ADMIN — SODIUM CHLORIDE, POTASSIUM CHLORIDE, SODIUM LACTATE AND CALCIUM CHLORIDE: 600; 310; 30; 20 INJECTION, SOLUTION INTRAVENOUS at 17:30

## 2019-10-29 RX ADMIN — ATORVASTATIN CALCIUM 40 MG: 40 TABLET, FILM COATED ORAL at 21:02

## 2019-10-29 RX ADMIN — OXYCODONE HYDROCHLORIDE 10 MG: 5 TABLET ORAL at 20:40

## 2019-10-29 RX ADMIN — ESMOLOL HYDROCHLORIDE 20 MG: 10 INJECTION, SOLUTION INTRAVENOUS at 17:45

## 2019-10-29 RX ADMIN — ACETAMINOPHEN 500 MG: 500 TABLET, FILM COATED ORAL at 22:06

## 2019-10-29 RX ADMIN — FENTANYL CITRATE 50 MCG: 50 INJECTION, SOLUTION INTRAMUSCULAR; INTRAVENOUS at 18:11

## 2019-10-29 RX ADMIN — FENTANYL CITRATE 50 MCG: 50 INJECTION, SOLUTION INTRAMUSCULAR; INTRAVENOUS at 19:03

## 2019-10-29 RX ADMIN — NOREPINEPHRINE BITARTRATE 6.4 MCG: 1 INJECTION INTRAVENOUS at 18:43

## 2019-10-29 RX ADMIN — GABAPENTIN 600 MG: 300 CAPSULE ORAL at 14:41

## 2019-10-29 RX ADMIN — FENTANYL CITRATE 100 MCG: 50 INJECTION, SOLUTION INTRAMUSCULAR; INTRAVENOUS at 17:36

## 2019-10-29 RX ADMIN — ESMOLOL HYDROCHLORIDE 20 MG: 10 INJECTION, SOLUTION INTRAVENOUS at 18:15

## 2019-10-29 RX ADMIN — DEXMEDETOMIDINE HYDROCHLORIDE 12 MCG: 100 INJECTION, SOLUTION INTRAVENOUS at 18:28

## 2019-10-29 RX ADMIN — GABAPENTIN 600 MG: 300 CAPSULE ORAL at 22:05

## 2019-10-29 RX ADMIN — METOPROLOL SUCCINATE 100 MG: 100 TABLET, EXTENDED RELEASE ORAL at 08:13

## 2019-10-29 RX ADMIN — PHENYLEPHRINE HYDROCHLORIDE 100 MCG: 10 INJECTION INTRAVENOUS at 18:34

## 2019-10-29 RX ADMIN — DOXYCYCLINE 100 MG: 100 CAPSULE ORAL at 22:05

## 2019-10-29 RX ADMIN — HYDROMORPHONE HYDROCHLORIDE 2 MG: 2 TABLET ORAL at 22:06

## 2019-10-29 RX ADMIN — Medication 10 MG: at 18:37

## 2019-10-29 RX ADMIN — DEXMEDETOMIDINE HYDROCHLORIDE 8 MCG: 100 INJECTION, SOLUTION INTRAVENOUS at 18:30

## 2019-10-29 RX ADMIN — FENTANYL CITRATE 25 MCG: 50 INJECTION, SOLUTION INTRAMUSCULAR; INTRAVENOUS at 19:20

## 2019-10-29 RX ADMIN — FENTANYL CITRATE 50 MCG: 50 INJECTION, SOLUTION INTRAMUSCULAR; INTRAVENOUS at 19:54

## 2019-10-29 RX ADMIN — METOPROLOL TARTRATE 5 MG: 1 INJECTION, SOLUTION INTRAVENOUS at 18:58

## 2019-10-29 RX ADMIN — ESMOLOL HYDROCHLORIDE 20 MG: 10 INJECTION, SOLUTION INTRAVENOUS at 18:32

## 2019-10-29 RX ADMIN — ROCURONIUM BROMIDE 10 MG: 10 INJECTION INTRAVENOUS at 18:29

## 2019-10-29 RX ADMIN — AMOXICILLIN 1000 MG: 500 CAPSULE ORAL at 22:05

## 2019-10-29 RX ADMIN — AMOXICILLIN 1000 MG: 500 CAPSULE ORAL at 08:12

## 2019-10-29 ASSESSMENT — ACTIVITIES OF DAILY LIVING (ADL)
ADLS_ACUITY_SCORE: 13

## 2019-10-29 ASSESSMENT — PAIN DESCRIPTION - DESCRIPTORS
DESCRIPTORS: JABBING
DESCRIPTORS: JABBING;NAGGING
DESCRIPTORS: JABBING
DESCRIPTORS: JABBING

## 2019-10-29 ASSESSMENT — ENCOUNTER SYMPTOMS: DYSRHYTHMIAS: 1

## 2019-10-29 NOTE — OP NOTE
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 Incision and drainage of left stump abscess  2. Excision of infected distal tibia  3. Placement of VAC veriflo irrigation and wound suction system     Surgeon: SUZI Matuet MD     Assistant: Linda Magana MD     Anesthesia: general     Complications: None     EBL: 50 cc    Specimens: swabs, soft tissue and bone specimens sent for gram stain, culture and sensitivity     Indications: This 63 year old YO male with a remote history of left BKA presented with a deep space abscess in the distal stump  MRI demonstrated bony changes compatible with tibial osteomyelitis. The patient was scheduled for incision and drainage of the stump abscess and excision of infected distal tibial bone. I discussed the risks and benefits of the procedure with the patient, and consent was signed.    Findings: abscess drained, bone debrided back to healthy appearing bone, and VAC dressing placed.     Description of operation: The patient was brought to the operating room and placed in the supine position. After a satisfactory level of general anesthesia, the patient's left lower extremity was prepped and draped in the usual sterile fashion. A time out was called. A horizontal incision was made in the old scar in the distal stump and deepened through the fascia. Several ccs of purulent material was swabbed for culture, and the distal tibia was cleared of surrounding soft tissue. The tibial end was soft and debrided with rongeurs to healthy appearing bone, comprising approximately 2 cm from the original cut end. The surrounding soft tissue and posterior muscle flap were debrided with electrocautery and scissors to healthy appearing tissue. The wound was irrigated copiously with saline. After meticulous hemostasis was assured, a VAC veriflo wound system was placed for irrigation followed by wound suction.  The patient seemed to tolerate the procedure well and was transferred to the PACU in satisfactory condition. I was present, scrubbed and supervised all key and critical portions of this case.      SUZI Matute MD  Professor of Surgery  Division of Vascular Surgery

## 2019-10-29 NOTE — PLAN OF CARE
PT 7B: Needs unlikely for PT as pt at/near baseline functional level. Planned to meet with pt however pt unavailable (out of room) at time of attempt. Cx. Will re-assess tomorrow.
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
## 2019-10-29 NOTE — PROGRESS NOTES
Care Coordinator - Discharge Planning    Admission Date/Time:  10/24/2019  Attending MD:  Sapphire Camacho MD     Data  Chart reviewed, discussed with interdisciplinary team.   Patient was admitted for:   1. S/P BKA (below knee amputation) unilateral (H)    2. PAD (peripheral artery disease) (H)         Assessment   Concerns with insurance coverage for discharge needs: None.  Current Living Situation: Patient lives alone.  Support System: Supportive  Services Involved: DME and PCA  Transportation at Discharge: Per pt he normally takes the bus or lightrail  Transportation to Medical Appointments:    - Name of caregiver: Self  Barriers to Discharge: Medical stability    Pt status reviewed/discussed during care team rounds.  Pt admitted with l tibia osteomyelitis with interosseous abscess.  Pt with a history of left BKA, a-fib, HTN, PVD, CKD.  Plan for I & D, vascular surgery.  Final discharge plan pending medical stability, therapy recommendations.    Met with pt.  Introduced RNCC role.   Per discussion with pt he has 4 hours of PCA services per day.  Pt has a walker, w/c, scooter and crutches.  Pt notes no other services in place.  Pt has used [facility] in the past.  Pt hopeful that he will be able to discharge to home when medically stable.  Pt notes no concerns at this time.    Agreed to f/u with pt when closer to discharge.     Coordination of Care and Referrals: Provided patient/family with options for Home Care and PCA.      Plan  Anticipated Discharge Date:  TBD  Anticipated Discharge Plan:  TBD    Maribel Lind RN BSN, PHN RN Care Coordinator  Internal Medicine   [phone]  Pager: [phone]  Weekend RN Care Coordinator job code * * * [phone]  [facility]  10/29/2019 1:29 PM

## 2019-10-29 NOTE — PROGRESS NOTES
[facility], [place]    Progress Note - Renaldo 2 Service        Date of Admission:  10/24/2019    Assessment & Plan   Constantino Taylor is a 63 year old male admitted on 10/24/2019. His PMH is significant for L BKA, A fib on Xarelto, HFrEF (40%), HTN, HLD, CAD, prostate cancer, chronic HCV, alcohol & marijuana us who was transferred from [facility] on 10/24 for further work-up & management of L tibia osteomyelitis with abscess and bacteremia.     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 MRI showing abscess & osteomyelitis of L stump. On Vancomycin per [facility] ID. Will need source control which could be AKA (patient refused), debridement, or I&D.  Currently with no sign of systemic infection. Blood cultures at [facility] is positive for Staphylococcus pettenkoferi (R to Oxacillin, S to Clindamycin, Levofloxacin, TMP-SMX, Erythromycin, Vancomycin). Repeat blood cultures (10/24/19) negative to date, likely contaminant. Remain afebrile with clinical improvement.   - Continue Doxycycline (100 mg BID) + Amoxicillin (1 g BID) (plan > 6 weeks)  - I&D per vascular surgery plan  - F/U blood cultures  - Vascular surgery consult; appreciate recommendations  - Orthopedics consult; appreciate recommendations  - ID consult; appreciate recommendations (Plan f/u CBC, BMP, CRP at 3 weeks and f/u with Dr. Ryan wilson in 6 weeks with MRI + contrast of SARA)     Atrial fibrillation  In AF with controlled rate. [id number] = 5, on Xarelto for anticoagulation.  - Holding Xarelto (possible procedure), last dose 10/24   - continue therapeutic enoxaparin pending procedure  - Continue PTA Metoprolol 100 mg/day for rate control      Chronic systolic HF, EF ~40%  NICM  History of CVA  HTN  HLD  PVD (R ICA  PTA MARGARITA occlusion, L distal SFA , L renal artery occlusion)  Last echo in March 2019, EF ~40%.  - Monitor BP  - continue  PTA Aspirin 81 mg/day  - Continue PTA Atorvastatin 40 mg/day  - Continue PTA Metoprolol 100 mg/day, Lisinopril 40 mg/day    CKD  Baseline Cr 1.4-1.5  - Trend BMP  - Renally dose medications & avoid nephrotoxic agent     Prostate Cancer  BPH  History of stage III prostate cancer treated with Lupron & radiation. Has biochemical relapse without evidence of metastatic disease on bone scan (10/9/19). Has elevated PSA at 30 on admission. No significant change from 08/2019 at 32.3.  - Continue PTA Tamsulosin 0.4 mg/day     Chronic LBP  Phantom limb pain  - Tylenol 500 mg q6 prn   - Continue PTA Oxycodone 5-10 mg q8h PRN  - Continue PTA Gabapentin 300-600 mg TID    Alcohol and marijuana abuse   Drinks 1 pint vodka per week + 6 pack of beer. Last drink just prior to arrival. No history of withdrawal. Blood alcohol level negative.  - Observe for signs of withdrawal (autonomic hyperactivity)  - Low threshold to start CIWA, nursing aware      Diet: NPO per Anesthesia Guidelines for Procedure/Surgery Except for: Meds (for I&D)   Fluids: None  Lines: PIV  DVT Prophylaxis: None (Anticipating procedures)  Garcia Catheter: not present  Code Status: Full Code      Disposition Plan   Expected discharge: 2 - 3 days, recommended to prior living arrangement once antibiotic plan established.  Entered: Tomer Ponce MD 10/29/2019, 12:51 PM     The patient's care was discussed with the Attending Physician, Dr. Sapphire Camacho.    Tomer Ponce MD  [address]  Pager: [phone]  Please see sticky note for cross cover information  ______________________________________________________________________    Interval History   He complains of mild pain on left leg stump but states that this is much better. Pain is controlled with current regimen. Tolerating oral diet. Denies fever & chills. Denies chest pain, SOB, or increasing edema. No abnormal bleeding.    Data reviewed today:  I reviewed all medications, new labs and imaging results over the last 24 hours. I personally reviewed labs and imaging.    Physical Exam   Vital Signs: Temp: 96.2  F (35.7  C) Temp src: Oral BP: 136/83 Pulse: 86   Resp: 16 SpO2: 100 % O2 Device: None (Room air)    Weight: 201 lbs 11.53 oz     General Appearance: well appearing male laying in bed in [facility], engaged in conversation.  HEENT: EOMI/PERRL. Neck is supple with full ROM.  Respiratory: Lungs are clear to auscultation bilaterally.  Cardiovascular: irregularly irregular rhythm. No rubs/murmurs/or gallops.  GI: +BS. NT/ND.  Skin: No rashes or petechiae.  LLE: L BKA, stump without laceration or open area or draining discharge, warm to touch, mild pain with palpation and fluctuation. No edema on R leg.  Neurologic: A&Ox3. no FND.    Data   Recent Labs   Lab 10/28/19  0646 10/27/19  0655 10/26/19  0652 10/24/19  2327   WBC 7.4  --  7.6 11.1*   HGB 14.5  --  13.7 13.0*   MCV 92  --  92 94     --  283 267   INR  --   --   --  2.12*     --  135 136   POTASSIUM 4.4  --  4.2 4.1   CHLORIDE 107  --  104 105   CO2 25  --  26 26   BUN 21  --  16 16   CR 1.38*  --  1.36* 1.41*   ANIONGAP 3  --  6 5   STUART 9.4  --  9.1 8.8   * 112* 116* 110*   ALBUMIN  --   --   --  3.0*   PROTTOTAL  --   --   --  8.1   BILITOTAL  --   --   --  0.4   ALKPHOS  --   --   --  81   ALT  --   --   --  14   AST  --   --   --  15     No results found for this or any previous visit (from the past 24 hour(s)).  Medications       amoxicillin  1,000 mg Oral BID     aspirin  81 mg Oral Daily     atorvastatin  40 mg Oral At Bedtime     ceFAZolin  2 g Intravenous Pre-Op/Pre-procedure x 1 dose     doxycycline hyclate  100 mg Oral BID     enoxaparin ANTICOAGULANT  1 mg/kg Subcutaneous Q12H     gabapentin  300-600 mg Oral TID     [Held by provider] lisinopril  40 mg Oral Daily     metoprolol succinate ER  100 mg Oral Daily     sodium chloride (PF)  3 mL Intracatheter Q8H     tamsulosin   0.4 mg Oral Daily

## 2019-10-29 NOTE — PLAN OF CARE
Vitals:    10/28/19 2020 10/28/19 2245 10/29/19 0808 10/29/19 1523   BP: 125/74 99/81 136/83 130/80   BP Location: Left arm Left arm Left arm Left arm   Pulse: 82 98 86 75   Resp: 16 16 16 18   Temp: 97.9  F (36.6  C) 97.4  F (36.3  C) 96.2  F (35.7  C) 97  F (36.1  C)   TempSrc: Oral Oral Oral Oral   SpO2: 100% 96% 100% 100%   Weight:       Height:        Afebrile vital stable, sating 100% on room air, lungs sound clear, denies any pain, NPO, took his med with small sips of water, Asprin lisinopril and Lovenox held for procedure,    +BS left BKA started Oozing, dressing apply, Report given to 3C, pt went down at 1640.

## 2019-10-29 NOTE — PLAN OF CARE
Vital signs:  Temp: 97.4  F (36.3  C) Temp src: Oral BP: 99/81 Pulse: 98   Resp: 16 SpO2: 96 % O2 Device: None (Room air)      Neuro: A&Ox4, calm, calls appropriately.   Cardiac: WDL, denies chest pain.   Respiratory: Sats % on RA. LS clear, denies SOB.   Pain: Left BKA stump sore, prn oxycodone & scheduled gabapentin with relief.   Diet: NPO at midnight.   Nausea: Denies nausea.   GI/: Voiding spontaneously. +BS, +flatus.   Skin: Left BKA, skin intact, warm.   LDA: New L PIV SL.   Labs: .   Activity: Independently transfers to .   New changes this shift: NPO after midnight.   Plan: I&D today. Continue POC.

## 2019-10-29 NOTE — BRIEF OP NOTE
[facility], [place]    Brief Operative Note    Pre-operative diagnosis: Abscess and osteomyelitis of left BKA  Post-operative diagnosis Same as pre-operative diagnosis    Procedure: Procedure(s):  IRRIGATION AND DEBRIDEMENT, LEFT LOWER EXTREMITY w/ Veriflow Wound Vac Placement.  Surgeon: Surgeon(s) and Role:     * Michelle Matute MD - Primary     * Linda Magana MD - Resident - Assisting  Anesthesia: General   Estimated blood loss: Less than 50 ml  Drains: None  Specimens:   ID Type Source Tests Collected by Time Destination   1 : Left Stump Abscess Abscess Leg, left ABSCESS CULTURE AEROBIC BACTERIAL, ANAEROBIC BACTERIAL CULTURE, GRAM STAIN Michelle Matute MD 10/29/2019  6:04 PM    2 : Infected Subcutaneous Tissue Tissue Leg, left ANAEROBIC BACTERIAL CULTURE, GRAM STAIN, TISSUE CULTURE AEROBIC BACTERIAL Michelle Matute MD 10/29/2019  6:08 PM    3 : Infected Bone Bone Bone ANAEROBIC BACTERIAL CULTURE, BONE CULTURE AEROBIC BACTERIAL, GRAM STAIN Michelle Matute MD 10/29/2019  6:19 PM      Findings:   Purluent fluid collection, osteomyleities of left BKA stump, debrided back to healthy bone. VaraFlo vac applied..  Complications: None.  Implants: * No implants in log *    Will perform vac change Friday  10 min soak, 3.5 hr negative pressure to -125 mmHg  14 ml triple antibiotic per cycle    Linda Magana MD  Surgery Resident, PGY2

## 2019-10-30 ENCOUNTER — APPOINTMENT (OUTPATIENT)
Dept: PHYSICAL THERAPY | Facility: CLINIC | Age: 64
DRG: 493 | End: 2019-10-30
Attending: INTERNAL MEDICINE
Payer: COMMERCIAL

## 2019-10-30 LAB
BACTERIA SPEC CULT: NO GROWTH
BACTERIA SPEC CULT: NO GROWTH
GLUCOSE BLDC GLUCOMTR-MCNC: 125 MG/DL (ref 70–99)
GLUCOSE BLDC GLUCOMTR-MCNC: 137 MG/DL (ref 70–99)
GLUCOSE BLDC GLUCOMTR-MCNC: 156 MG/DL (ref 70–99)
Lab: NORMAL
Lab: NORMAL
SPECIMEN SOURCE: NORMAL
SPECIMEN SOURCE: NORMAL

## 2019-10-30 PROCEDURE — 40000894 ZZH STATISTIC OT IP EVAL DEFER: Performed by: OCCUPATIONAL THERAPIST

## 2019-10-30 PROCEDURE — 25000128 H RX IP 250 OP 636: Performed by: INTERNAL MEDICINE

## 2019-10-30 PROCEDURE — 99233 SBSQ HOSP IP/OBS HIGH 50: CPT | Mod: GC | Performed by: INTERNAL MEDICINE

## 2019-10-30 PROCEDURE — 97530 THERAPEUTIC ACTIVITIES: CPT | Mod: GP

## 2019-10-30 PROCEDURE — 25000132 ZZH RX MED GY IP 250 OP 250 PS 637: Performed by: STUDENT IN AN ORGANIZED HEALTH CARE EDUCATION/TRAINING PROGRAM

## 2019-10-30 PROCEDURE — 97161 PT EVAL LOW COMPLEX 20 MIN: CPT | Mod: GP

## 2019-10-30 PROCEDURE — 00000146 ZZHCL STATISTIC GLUCOSE BY METER IP

## 2019-10-30 PROCEDURE — 12000001 ZZH R&B MED SURG/OB UMMC

## 2019-10-30 PROCEDURE — 25000132 ZZH RX MED GY IP 250 OP 250 PS 637: Performed by: INTERNAL MEDICINE

## 2019-10-30 RX ORDER — OXYCODONE HYDROCHLORIDE 5 MG/1
5-10 TABLET ORAL EVERY 4 HOURS PRN
Status: DISCONTINUED | OUTPATIENT
Start: 2019-10-30 | End: 2019-11-01 | Stop reason: HOSPADM

## 2019-10-30 RX ADMIN — ACETAMINOPHEN 500 MG: 500 TABLET, FILM COATED ORAL at 16:25

## 2019-10-30 RX ADMIN — GABAPENTIN 600 MG: 300 CAPSULE ORAL at 19:49

## 2019-10-30 RX ADMIN — AMOXICILLIN 1000 MG: 500 CAPSULE ORAL at 19:49

## 2019-10-30 RX ADMIN — OXYCODONE HYDROCHLORIDE 10 MG: 5 TABLET ORAL at 14:01

## 2019-10-30 RX ADMIN — AMOXICILLIN 1000 MG: 500 CAPSULE ORAL at 08:39

## 2019-10-30 RX ADMIN — OXYCODONE HYDROCHLORIDE 10 MG: 5 TABLET ORAL at 04:36

## 2019-10-30 RX ADMIN — OXYCODONE HYDROCHLORIDE 10 MG: 5 TABLET ORAL at 10:09

## 2019-10-30 RX ADMIN — ENOXAPARIN SODIUM 90 MG: 100 INJECTION SUBCUTANEOUS at 08:38

## 2019-10-30 RX ADMIN — METOPROLOL SUCCINATE 100 MG: 100 TABLET, EXTENDED RELEASE ORAL at 08:38

## 2019-10-30 RX ADMIN — TAMSULOSIN HYDROCHLORIDE 0.4 MG: 0.4 CAPSULE ORAL at 08:39

## 2019-10-30 RX ADMIN — GABAPENTIN 600 MG: 300 CAPSULE ORAL at 14:02

## 2019-10-30 RX ADMIN — ASPIRIN 81 MG: 81 TABLET, COATED ORAL at 08:38

## 2019-10-30 RX ADMIN — ENOXAPARIN SODIUM 90 MG: 100 INJECTION SUBCUTANEOUS at 19:53

## 2019-10-30 RX ADMIN — GABAPENTIN 600 MG: 300 CAPSULE ORAL at 08:38

## 2019-10-30 RX ADMIN — LISINOPRIL 40 MG: 40 TABLET ORAL at 08:38

## 2019-10-30 RX ADMIN — OXYCODONE HYDROCHLORIDE 10 MG: 5 TABLET ORAL at 23:59

## 2019-10-30 RX ADMIN — ATORVASTATIN CALCIUM 40 MG: 40 TABLET, FILM COATED ORAL at 21:12

## 2019-10-30 RX ADMIN — DOXYCYCLINE 100 MG: 100 CAPSULE ORAL at 08:38

## 2019-10-30 RX ADMIN — DOXYCYCLINE 100 MG: 100 CAPSULE ORAL at 19:49

## 2019-10-30 RX ADMIN — OXYCODONE HYDROCHLORIDE 10 MG: 5 TABLET ORAL at 19:49

## 2019-10-30 ASSESSMENT — ACTIVITIES OF DAILY LIVING (ADL)
ADLS_ACUITY_SCORE: 13

## 2019-10-30 NOTE — ANESTHESIA POSTPROCEDURE EVALUATION
Anesthesia POST Procedure Evaluation    Patient: Constantino Taylor   MRN:     [id number] Gender:   male   Age:    63 year old :      1955        Preoperative Diagnosis: * No pre-op diagnosis entered *   Procedure(s):  IRRIGATION AND DEBRIDEMENT, LEFT LOWER EXTREMITY w/ Veriflow Wound Vac Placement.   Postop Comments: No value filed.       Anesthesia Type:  Not documented  MAC    Reportable Event: NO     PAIN: Uncomplicated   Sign Out status: Comfortable, Well controlled pain     PONV: No PONV   Sign Out status:  No Nausea or Vomiting     Neuro/Psych: Uneventful perioperative course   Sign Out Status: Preoperative baseline; Age appropriate mentation     Airway/Resp.: Uneventful perioperative course   Sign Out Status: Non labored breathing, age appropriate RR; Resp. Status within EXPECTED Parameters     CV: Uneventful perioperative course   Sign Out status: Appropriate BP and perfusion indices; Appropriate HR/Rhythm     Disposition:   Sign Out in:  PACU  Disposition:  Phase II; Home  Recovery Course: Uneventful  Follow-Up: Not required           Last Anesthesia Record Vitals:  CRNA VITALS  10/29/2019 1825 - 10/29/2019 1925      10/29/2019             EKG:  Sinus tachycardia          Last PACU Vitals:  Vitals Value Taken Time   /94 10/29/2019  7:45 PM   Temp 36.6  C (97.8  F) 10/29/2019  7:30 PM   Pulse 104 10/29/2019  7:40 PM   Resp 20 10/29/2019  7:45 PM   SpO2 97 % 10/29/2019  7:48 PM   Temp src     NIBP     Pulse     SpO2     Resp     Temp     Ht Rate     Temp 2     Vitals shown include unvalidated device data.      Electronically Signed By: Priscila Briscoe MD, 2019, 7:50 PM

## 2019-10-30 NOTE — PLAN OF CARE
Discharge Planner PT   Patient plan for discharge: Home with assist with PCA  Current status: PT evaluation completed and treatment initiated.  Pt has PCA help at home daily and has all equipment needs met.  Pt able to complete transfer to/from w/c IND as well as bed mobility.  Pt will use w/c as mode of mobility until able to ambulate with prosthesis and crutches.  Educated pt on L knee ROM.   Barriers to return to prior living situation: Medical needs  Recommendations for discharge: Home with continued assist from PCA   Rationale for recommendations: When medically ready       Entered by: July Grimaldo 10/30/2019 4:48 PM

## 2019-10-30 NOTE — PLAN OF CARE
Vital signs:  Temp: 98.8  F (37.1  C) Temp src: Oral BP: 123/84 Pulse: 116 Heart Rate: 110 Resp: 20 SpO2: 97 % O2 Device: None (Room air)     Status: POD 0-1 s/p Irrigation and debridement, left lower extremity w/ Veriflow Wound Vac Placement.   Arrived: Pt arrived from PACU to  around 2028.   Neuro: A&Ox4, calm, calls appropriately.   Cardiac: /84, tachy, denies chest pain.   Respiratory: LS clear, denies SOB.   Pain: Pt reported sharp pain at left stump, prn oxycodone, tylenol, dilaudid & gabapentin with relief.   Diet: Tolerating CLD.   Nausea: Denies nausea.   GI/: Voiding spontaneously. +BS, +flatus, no BM.   Skin/Drain: left lower extremity w/ Veriflow Wound Vac Placement is intact.   LDA: PIV x2 SL.   Activity: Pt resting in bed this shift, turned & repositioned with A1.   New changes this shift: Pt requested more pain meds after oxycodone was given. Estrella Obregon MD paged. New order placed for one time dose of oral dilaudid.   Plan: Continue POC.

## 2019-10-30 NOTE — PLAN OF CARE
NEURO: A&O x4  RESPIRATORY: WDL   CARDIAC: WDL   GI/: Voiding spontaneously in urinal. No BM this shift   DIET: Regular diet   PAIN/NAUSEA: C/o pain in L BKA site. PRN Oxycodone given Q 4hrs  SKIN: L BKA site wound vac intact. No new skin issues noted  INCISION/DRAINS: Wound vac   IV ACCESS: R PIV SL   ACTIVITY: Able transfer to  independently, but may need assistance with wound vac machine. Resting in bed this shift   LAB: BG checks twice daily and at NOC  PLAN: Continue with poc

## 2019-10-30 NOTE — PROGRESS NOTES
"VASCULAR SURGERY PROGRESS NOTE    Subjective:  No acute events postop. Pain moderately controlled. Otherwise, doing well. No fevers.    Objective:    Intake/Output Summary (Last 24 hours) at 10/30/2019 1234  Last data filed at 10/30/2019 0823  Gross per 24 hour   Intake 500 ml   Output 1950 ml   Net -1450 ml       Labs:  ROUTINE IP LABS (Last four results)  BMP  Recent Labs   Lab 10/28/19  0646 10/27/19  0655 10/26/19  0652 10/24/19  2327     --  135 136   POTASSIUM 4.4  --  4.2 4.1   CHLORIDE 107  --  104 105   STUART 9.4  --  9.1 8.8   CO2 25  --  26 26   BUN 21  --  16 16   CR 1.38*  --  1.36* 1.41*   * 112* 116* 110*     CBC  Recent Labs   Lab 10/28/19  0646 10/26/19  0652 10/24/19  2327   WBC 7.4 7.6 11.1*   RBC 4.74 4.52 4.32*   HGB 14.5 13.7 13.0*   HCT 43.6 41.6 40.6   MCV 92 92 94   MCH 30.6 30.3 30.1   MCHC 33.3 32.9 32.0   RDW 12.4 12.7 12.5    283 267     INR  Recent Labs   Lab 10/24/19  2327   INR 2.12*     PHYSICAL EXAM:  /78 (BP Location: Left arm)   Pulse 91   Temp 96.9  F (36.1  C) (Oral)   Resp 16   Ht 1.905 m (6' 3\")   Wt 91.5 kg (201 lb 11.5 oz)   SpO2 100%   BMI 25.21 kg/m    General: The patient is alert and oriented. Appropriate. No acute distress  Psych: Pleasant affect, answers questions appropriately  Skin: Warm, dry.  Respiratory: The patient does not require supplemental oxygen. Breathing unlabored.  Extremities:  LLE stump with vac in place, holding suction.       ASSESSMENT:  63M with L BKA stump abscess and tibial osteomyelitis s/p I&D of abscess, excision of infected distal tibia, placement of irrigating wound vac (10/29/19).       PLAN:  -Follow up intra-operative cultures  -Continue antibiotics, tailor as appropriate  -Keep LLE elevated  -Will perform vac change Friday  -Will need home wound care for vac changes after dishcarge  -Continue vac setting 10 min soak, 3.5 hr negative pressure to -125 mmHg, 14 ml triple antibiotic   -Pain control per " primary      Patient seen with vascular surgery staff, Dr. Matute.    Linda Magana MD  Surgery Resident, PGY2

## 2019-10-30 NOTE — PROGRESS NOTES
[facility], [place]    Progress Note - Renaldo 2 Service        Date of Admission:  10/24/2019    Assessment & Plan   Constantino Taylor is a 63 year old male admitted on 10/24/2019. His PMH is significant for L BKA, A fib on Xarelto, HFrEF (40%), HTN, HLD, CAD, prostate cancer, chronic HCV, alcohol & marijuana us who was transferred from [facility] on 10/24 for further work-up & management of L tibia osteomyelitis with abscess and bacteremia.     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 MRI showing abscess & osteomyelitis of L stump. On Vancomycin per [facility] ID. Will need source control which could be AKA (patient refused), debridement, or I&D.  Currently with no sign of systemic infection. Blood cultures at [facility] is positive for Staphylococcus pettenkoferi (R to Oxacillin, S to Clindamycin, Levofloxacin, TMP-SMX, Erythromycin, Vancomycin). Repeat blood cultures (10/24/19) negative to date, likely contaminant. Remain afebrile with clinical improvement.   - Continue Doxycycline (100 mg BID) + Amoxicillin (1 g BID) (plan > 6 weeks)  - Now s/p I&D with placement of irrigating wound vac (10/29/19). Will perform vac change Friday. Will need home wound care for vac changes after discharge  - Pain control with Oxycodone 5-10 mg q4h PRN  - Follow up intra-operative cultures, blood cultures  - Vascular surgery consult; appreciate recommendations  - Orthopedics consult; appreciate recommendations  - ID consult; appreciate recommendations (Plan f/u CBC, BMP, CRP at 3 weeks and f/u with Dr. Davis around in 6 weeks with MRI + contrast of SARA)     Atrial fibrillation  In AF with controlled rate. [id number] = 5, on Xarelto for anticoagulation.  - Holding Xarelto (possible procedure), last dose 10/24   - continue therapeutic enoxaparin pending procedure  - Continue PTA Metoprolol 100 mg/day for rate control       Chronic systolic HF, EF ~40%  NICM  History of CVA  HTN  HLD  PVD (R ICA  PTA MARGARITA occlusion, L distal SFA , L renal artery occlusion)  Last echo in March 2019, EF ~40%.  - Monitor BP  - continue PTA Aspirin 81 mg/day  - Continue PTA Atorvastatin 40 mg/day  - Continue PTA Metoprolol 100 mg/day, Lisinopril 40 mg/day    CKD  Baseline Cr 1.4-1.5  - Trend BMP  - Renally dose medications & avoid nephrotoxic agent     Prostate Cancer  BPH  History of stage III prostate cancer treated with Lupron & radiation. Has biochemical relapse without evidence of metastatic disease on bone scan (10/9/19). Has elevated PSA at 30 on admission. No significant change from 08/2019 at 32.3.  - Continue PTA Tamsulosin 0.4 mg/day     Chronic LBP  Phantom limb pain  - Tylenol 500 mg q6 prn   - Continue PTA Gabapentin 300-600 mg TID    Alcohol and marijuana abuse   Drinks 1 pint vodka per week + 6 pack of beer. Last drink just prior to arrival. No history of withdrawal. Blood alcohol level negative. Now ~7 days from last drink.     Diet: Advance Diet as Tolerated: Regular Diet Adult  Fluids: None  Lines: PIV  DVT Prophylaxis: On therapeutic Lovenox  Garcia Catheter: not present  Code Status: Full Code      Disposition Plan   Expected discharge: 2 - 3 days, recommended to prior living arrangement once antibiotic plan established.  Entered: Tomer Ponce MD 10/30/2019, 3:22 PM     The patient's care was discussed with the Attending Physician, Dr. Sapphire Camacho.    Tomer Ponce MD  [address]  Pager: [phone]  Please see sticky note for cross cover information  ______________________________________________________________________    Interval History   He complains of pain at surgical site that is not controlled and asks for pain medication. Tolerating oral diet. Denies fever & chills. Denies chest pain, SOB, or increasing edema. No abnormal bleeding.    Data reviewed  today: I reviewed all medications, new labs and imaging results over the last 24 hours. I personally reviewed labs and imaging.    Physical Exam   Vital Signs: Temp: 96.9  F (36.1  C) Temp src: Oral BP: 126/78 Pulse: 91 Heart Rate: 110 Resp: 16 SpO2: 100 % O2 Device: None (Room air) Oxygen Delivery: 2 LPM  Weight: 201 lbs 11.53 oz     General Appearance: well appearing male laying in bed in [facility], engaged in conversation.  HEENT: EOMI/PERRL. Neck is supple with full ROM.  Respiratory: Lungs are clear to auscultation bilaterally.  Cardiovascular: irregularly irregular rhythm. No rubs/murmurs/or gallops.  GI: +BS. NT/ND.  Skin: No rashes or petechiae.  LLE: L BKA, stump with vac drain in place. mild pain with palpation. No edema on R leg.  Neurologic: A&Ox3. no FND.    Data   Recent Labs   Lab 10/28/19  0646 10/27/19  0655 10/26/19  0652 10/24/19  2327   WBC 7.4  --  7.6 11.1*   HGB 14.5  --  13.7 13.0*   MCV 92  --  92 94     --  283 267   INR  --   --   --  2.12*     --  135 136   POTASSIUM 4.4  --  4.2 4.1   CHLORIDE 107  --  104 105   CO2 25  --  26 26   BUN 21  --  16 16   CR 1.38*  --  1.36* 1.41*   ANIONGAP 3  --  6 5   STUART 9.4  --  9.1 8.8   * 112* 116* 110*   ALBUMIN  --   --   --  3.0*   PROTTOTAL  --   --   --  8.1   BILITOTAL  --   --   --  0.4   ALKPHOS  --   --   --  81   ALT  --   --   --  14   AST  --   --   --  15     No results found for this or any previous visit (from the past 24 hour(s)).  Medications       amoxicillin  1,000 mg Oral BID     aspirin  81 mg Oral Daily     atorvastatin  40 mg Oral At Bedtime     doxycycline hyclate  100 mg Oral BID     enoxaparin ANTICOAGULANT  1 mg/kg Subcutaneous Q12H     gabapentin  300-600 mg Oral TID     lisinopril  40 mg Oral Daily     metoprolol succinate ER  100 mg Oral Daily     sodium chloride (PF)  3 mL Intracatheter Q8H     tamsulosin  0.4 mg Oral Daily

## 2019-10-30 NOTE — PLAN OF CARE
Physical Therapy Discharge Summary    Reason for therapy discharge:    All goals and outcomes met, no further needs identified.    Progress towards therapy goal(s). See goals on Care Plan in [facility] electronic health record for goal details.  Goals met    Therapy recommendation(s):    Continue home exercise program.

## 2019-10-30 NOTE — ANESTHESIA CARE TRANSFER NOTE
Patient: Constantino Taylor    Procedure(s):  IRRIGATION AND DEBRIDEMENT, LEFT LOWER EXTREMITY w/ Veriflow Wound Vac Placement.    Diagnosis: * No pre-op diagnosis entered *  Diagnosis Additional Information: No value filed.    Anesthesia Type:   MAC     Note:  Airway :Face Mask  Patient transferred to:PACU  Comments: VSS. Breathing spontaneously at a regular rate with adequate tidal volumes and maintaining O2 sats on 6L. Denies nausea. Complained of some stinging pain in LLE. Given 50 mcg of fentanyl. No apparent complications from anesthesia.     Mary Ann Vega MD on 10/29/2019 at 7:11 PM    Handoff Report: Identifed the Patient, Identified the Reponsible Provider, Reviewed the pertinent medical history, Discussed the surgical course, Reviewed Intra-OP anesthesia mangement and issues during anesthesia, Set expectations for post-procedure period and Allowed opportunity for questions and acknowledgement of understanding      Vitals: (Last set prior to Anesthesia Care Transfer)    CRNA VITALS  10/29/2019 1825 - 10/29/2019 1911      10/29/2019             EKG:  Sinus tachycardia                Electronically Signed By: Mary Ann Vega MD  October 29, 2019  7:11 PM

## 2019-10-31 PROCEDURE — 25000132 ZZH RX MED GY IP 250 OP 250 PS 637: Performed by: INTERNAL MEDICINE

## 2019-10-31 PROCEDURE — 25000128 H RX IP 250 OP 636: Performed by: INTERNAL MEDICINE

## 2019-10-31 PROCEDURE — 12000001 ZZH R&B MED SURG/OB UMMC

## 2019-10-31 PROCEDURE — 25000132 ZZH RX MED GY IP 250 OP 250 PS 637: Performed by: STUDENT IN AN ORGANIZED HEALTH CARE EDUCATION/TRAINING PROGRAM

## 2019-10-31 PROCEDURE — 99233 SBSQ HOSP IP/OBS HIGH 50: CPT | Performed by: INTERNAL MEDICINE

## 2019-10-31 RX ADMIN — ENOXAPARIN SODIUM 90 MG: 100 INJECTION SUBCUTANEOUS at 20:36

## 2019-10-31 RX ADMIN — DOXYCYCLINE 100 MG: 100 CAPSULE ORAL at 20:06

## 2019-10-31 RX ADMIN — GABAPENTIN 600 MG: 300 CAPSULE ORAL at 20:06

## 2019-10-31 RX ADMIN — OXYCODONE HYDROCHLORIDE 10 MG: 5 TABLET ORAL at 13:06

## 2019-10-31 RX ADMIN — ATORVASTATIN CALCIUM 40 MG: 40 TABLET, FILM COATED ORAL at 20:38

## 2019-10-31 RX ADMIN — TAMSULOSIN HYDROCHLORIDE 0.4 MG: 0.4 CAPSULE ORAL at 07:41

## 2019-10-31 RX ADMIN — OXYCODONE HYDROCHLORIDE 10 MG: 5 TABLET ORAL at 06:32

## 2019-10-31 RX ADMIN — METOPROLOL SUCCINATE 100 MG: 100 TABLET, EXTENDED RELEASE ORAL at 07:40

## 2019-10-31 RX ADMIN — DOXYCYCLINE 100 MG: 100 CAPSULE ORAL at 07:40

## 2019-10-31 RX ADMIN — AMOXICILLIN 1000 MG: 500 CAPSULE ORAL at 07:40

## 2019-10-31 RX ADMIN — AMOXICILLIN 1000 MG: 500 CAPSULE ORAL at 20:06

## 2019-10-31 RX ADMIN — GABAPENTIN 600 MG: 300 CAPSULE ORAL at 13:09

## 2019-10-31 RX ADMIN — LISINOPRIL 40 MG: 40 TABLET ORAL at 07:40

## 2019-10-31 RX ADMIN — GABAPENTIN 600 MG: 300 CAPSULE ORAL at 07:40

## 2019-10-31 RX ADMIN — ENOXAPARIN SODIUM 90 MG: 100 INJECTION SUBCUTANEOUS at 07:45

## 2019-10-31 RX ADMIN — OXYCODONE HYDROCHLORIDE 10 MG: 5 TABLET ORAL at 20:06

## 2019-10-31 RX ADMIN — ASPIRIN 81 MG: 81 TABLET, COATED ORAL at 07:40

## 2019-10-31 ASSESSMENT — ACTIVITIES OF DAILY LIVING (ADL)
ADLS_ACUITY_SCORE: 13

## 2019-10-31 NOTE — PLAN OF CARE
"/87 (BP Location: Left leg)   Pulse 80   Temp 98.2  F (36.8  C) (Oral)   Resp 16   Ht 1.905 m (6' 3\")   Wt 91.5 kg (201 lb 11.5 oz)   SpO2 100%   BMI 25.21 kg/m      VSS, complained of pain in L stump at wound vac site, states it gets worse when it irrigates, wound vac is intact and patent at -125 mmhg, drank apple juice during night and was able to sleep majority of shift, voiding adequate amounts, PIV is SL, continue with plan of care  "

## 2019-10-31 NOTE — PROGRESS NOTES
Clinical Nutrition Services- Brief Note    Reviewed nutrition risk factors due to LOS. Pt is tolerating a Regular diet, eating well per nursing documentation (100% of 3 meals/day). Patient expressed eating well PTA with no recent changes in appetite or PO intake. Denies any nutrition questions/concerns. No nutrition issues identified at this time. RD will continue to follow per nutrition protocol.  Yamel Gomez RD, LD  5A [phone])/7B floor pager [phone]

## 2019-10-31 NOTE — PLAN OF CARE
"/74 (BP Location: Left arm)   Pulse 80   Temp 98  F (36.7  C) (Oral)   Resp 16   Ht 1.905 m (6' 3\")   Wt 91.5 kg (201 lb 11.5 oz)   SpO2 100%   BMI 25.21 kg/m      NEURO: A&O x4  RESPIRATORY: WDL   CARDIAC: WDL.  GI/: Voiding spontaneously in urinal. +BS. Denies N/V. BMx1 this shift.  DIET: Regular diet. Tolerating well.  PAIN/NAUSEA: C/o intermittent pain in L BKA site. Pain controlled with PRN oxycodone and tylenol.  SKIN: L BKA site wound vac intact.   INCISION/DRAINS: Wound vac set to -125 mm hg with serosanguinous output.  IV ACCESS: R PIV SL   ACTIVITY: Up in wheelchair with assist of 1 for wound vac management.  LAB:  and 137.  PLAN: Continue with poc           "

## 2019-10-31 NOTE — PROGRESS NOTES
[facility], [place]    Progress Note - Renaldo 2 Service        Date of Admission:  10/24/2019    Assessment & Plan   Constantino Taylor is a 63 year old male admitted on 10/24/2019. His PMH is significant for L CALEBA, A fib on Xarelto, HFrEF (40%), HTN, HLD, CAD, prostate cancer, chronic HCV, alcohol & marijuana us who was transferred from [facility] on 10/24 for further work-up & management of L tibia osteomyelitis with abscess and bacteremia.     Changes Today:  - Plan for wound vac change with vacular surgery on Friday 11/1  - Will need home wound care for wound vac changes after discharge, RNCC working on wound vac orders and home care services  - PT -> home with continued assist from prior to admission PCA  - Tissue, Abscess cultures growing staph aureus, susceptibility testing in progress, will continue to follow  - Continue Doxy, Amoxicillin for now, can adjust as needed pending culture susceptibility finalization  - Has only used Oxycodone 10mg x2 on 10/31 -> will re-assess pain medication needs at time of discharge  - Anticipate discharge home tomorrow 11/1 after wound vac change     L tibia osteomyelitis with interosseous abscess and bacteremia  History L BKA (2015)  Presented with pain, swelling, and tenderness at stump. MRI showing abscess & osteomyelitis of L stump. On Vancomycin per [facility] ID. Will need source control which could be AKA (patient refused), debridement, or I&D.  Currently with no sign of systemic infection. Blood cultures at [facility] is positive for Staphylococcus pettenkoferi (R to Oxacillin, S to Clindamycin, Levofloxacin, TMP-SMX, Erythromycin, Vancomycin). Repeat blood cultures (10/24/19) negative to date, likely contaminant. Remain afebrile with clinical improvement.   - Continue Doxycycline (100 mg BID) + Amoxicillin (1 g BID) (plan > 6 weeks)  - Now s/p I&D with placement of irrigating wound vac (10/29/19). Will perform vac change Friday. Will need home wound care  for vac changes after discharge  - Pain control with Oxycodone 5-10 mg q4h PRN  - Follow up intra-operative cultures, blood cultures  - Vascular surgery consult; appreciate recommendations  - Orthopedics consult; appreciate recommendations  - ID consult; appreciate recommendations (Plan f/u CBC, BMP, CRP at 3 weeks and f/u with Dr. Davis around in 6 weeks with MRI + contrast of SARA)     Atrial fibrillation  In AF with controlled rate. [id number] = 5, on Xarelto for anticoagulation.  - Holding Xarelto (possible procedure), last dose 10/24   - continue therapeutic enoxaparin pending procedure  - Continue PTA Metoprolol 100 mg/day for rate control      Chronic systolic HF, EF ~40%  NICM  History of CVA  HTN  HLD  PVD (R ICA  PTA MARGARITA occlusion, L distal SFA , L renal artery occlusion)  Last echo in March 2019, EF ~40%.  - Monitor BP  - continue PTA Aspirin 81 mg/day  - Continue PTA Atorvastatin 40 mg/day  - Continue PTA Metoprolol 100 mg/day, Lisinopril 40 mg/day    CKD  Baseline Cr 1.4-1.5  - Trend BMP  - Renally dose medications & avoid nephrotoxic agent     Prostate Cancer  BPH  History of stage III prostate cancer treated with Lupron & radiation. Has biochemical relapse without evidence of metastatic disease on bone scan (10/9/19). Has elevated PSA at 30 on admission. No significant change from 08/2019 at 32.3.  - Continue PTA Tamsulosin 0.4 mg/day     Chronic LBP  Phantom limb pain  - Tylenol 500 mg q6 prn   - Continue PTA Gabapentin 300-600 mg TID    Alcohol and marijuana abuse   Drinks 1 pint vodka per week + 6 pack of beer. Last drink just prior to arrival. No history of withdrawal. Blood alcohol level negative. Now ~7 days from last drink.     Diet: Advance Diet as Tolerated: Regular Diet Adult  Fluids: None  Lines: PIV  DVT Prophylaxis: On therapeutic Lovenox  Garcia Catheter: not present  Code Status: Full Code      Disposition Plan   Expected discharge: 2 - 3 days, recommended to prior living arrangement  once antibiotic plan established.  Entered: Sapphire Camacho MD 10/31/2019, 5:47 PM     The patient's care was discussed with the Attending Physician, Dr. Sapphire Camacho.    Sapphire Camacho MD  [facility] Hospitalist  Renaldo 2   Text Page    ______________________________________________________________________    Interval History   Significant improvement in pain control today. Has only used 10mg Oxycodone x2 so far and is feeling well. Ate all of his breakfast and lunch. Feels that his mobility is good and is ready to go home. States that he is very invested in making sure that this surgery is a success and undertasnds how important good wound care and antibiotic therapy is to this plan. No concerns or questions, no fevers, chills, or other concerns. Hoping he can discharge home tomorrow.     Data reviewed today: I reviewed all medications, new labs and imaging results over the last 24 hours. I personally reviewed labs and imaging.    Physical Exam   Vital Signs: Temp: 96.6  F (35.9  C) Temp src: Axillary BP: 125/70   Heart Rate: 81 Resp: 16 SpO2: 98 % O2 Device: None (Room air)    Weight: 201 lbs 11.53 oz     General Appearance: well appearing male laying in bed in [facility], engaged in conversation.  HEENT: EOMI/PERRL. Neck is supple with full ROM.  Respiratory: Lungs are clear to auscultation bilaterally.  Cardiovascular: irregularly irregular rhythm. No rubs/murmurs/or gallops.  GI: +BS. NT/ND.  Skin: No rashes or petechiae.  LLE: L BKA, stump with vac drain in place. mild pain with palpation. No edema on R leg.  Neurologic: A&Ox3. no FND.    Data   Recent Labs   Lab 10/28/19  0646 10/27/19  0655 10/26/19  0652 10/24/19  8607   WBC 7.4  --  7.6 11.1*   HGB 14.5  --  13.7 13.0*   MCV 92  --  92 94     --  283 267   INR  --   --   --  2.12*     --  135 136   POTASSIUM 4.4  --  4.2 4.1   CHLORIDE 107  --  104 105   CO2 25  --  26 26   BUN 21  --  16 16   CR 1.38*  --  1.36* 1.41*   ANIONGAP 3  --  6 5   STUART  9.4  --  9.1 8.8   * 112* 116* 110*   ALBUMIN  --   --   --  3.0*   PROTTOTAL  --   --   --  8.1   BILITOTAL  --   --   --  0.4   ALKPHOS  --   --   --  81   ALT  --   --   --  14   AST  --   --   --  15     No results found for this or any previous visit (from the past 24 hour(s)).  Medications       amoxicillin  1,000 mg Oral BID     aspirin  81 mg Oral Daily     atorvastatin  40 mg Oral At Bedtime     doxycycline hyclate  100 mg Oral BID     enoxaparin ANTICOAGULANT  1 mg/kg Subcutaneous Q12H     gabapentin  300-600 mg Oral TID     lisinopril  40 mg Oral Daily     metoprolol succinate ER  100 mg Oral Daily     sodium chloride (PF)  3 mL Intracatheter Q8H     tamsulosin  0.4 mg Oral Daily

## 2019-10-31 NOTE — PLAN OF CARE
[facility]    [place] History and Physical    Date of Admission:  2017 10:16 AM    Primary Care Physician   Primary care provider: No primary care provider on file.    Assessment & Plan   Baby1 Alona Isaac is a Term  appropriate for gestational age female  , mother has scanty  care, diagnosed as pregnant at 33 weeks of gestation. Has Ok  care since.  - GBS postive, treated with one dose of antibiotics less than 4 hrs prior to delivery.  -There is un-clear social history with history of drug abuse in father. Maternal UDS is negative at admission.  -Prolonged and recerrrnet deceleration for at least 2 hrs.  -Meconium stained fluid.  Child was delivered by Vacuum extraction.    Plan admit the child to the nursery:  -Oxygen supplement with c- pap to keep oxygen saturation above 92%  - Normal  care.  - Anticipatory guidance given  - Encourage exclusive breastfeeding  - Hearing screen and first hepatitis B vaccine prior to discharge per orders  - Social work consult  - CBC, blood culture  - Chest xray.prelimenary left pneumothorax. Will repeat left lateral decubitus.   - IV fluid of NS bolus of 20 ml over 60 minutes.  - IV fluid maintains as D10 w at 10ml hr.  - Ampicillin 100mg/kg q12hr  - Gentamycin 4mg/kg q24hr.  - Bactroban cream for head abrasion.    Tawny Mitchell    Pregnancy History   The details of the mother's pregnancy are as follows:  OBSTETRIC HISTORY:  Information for the patient's mother:  Alona Isaac [[id number]]   30 year old    EDC:   Information for the patient's mother:  Alona Isaac [[id number]]   Estimated Date of Delivery: 17    Information for the patient's mother:  Alona Isaac [[id number]]     Obstetric History       T0      L0     SAB0   TAB0   Ectopic0   Multiple0   Live Births0       # Outcome Date GA Lbr Ole/2nd Weight Sex Delivery Anes PTL Lv   1 Current                   Prenatal Labs:   Information for  Vitals:    10/30/19 1602 10/30/19 2356 10/31/19 0700 10/31/19 1448   BP: 128/74 109/87 134/87 125/70   BP Location: Left arm Left leg Right arm Right arm   Pulse: 80      Resp: 16 16 16 16   Temp: 98  F (36.7  C) 98.2  F (36.8  C) 97.7  F (36.5  C) 96.6  F (35.9  C)   TempSrc: Oral Oral Oral Axillary   SpO2: 100% 100% 100% 98%   Weight:       Height:        Afebrile vital stable, sating 100% on room air, lungs clear, +BS,   Left BKA, wound vac intact, elevated on pillow, Oxy  for pain with relief, voiding adequate.Tolerating intake. Continue with plan of care.   the patient's mother:  Alona Isaac [[id number]]     Lab Results   Component Value Date    ABO O 2017    RH  Pos 2017    AS Neg 2017    HEPBANG Nonreactive 2017    CHPCRT  2017     Negative   Negative for C. trachomatis rRNA by transcription mediated amplification.   A negative result by transcription mediated amplification does not preclude the   presence of C. trachomatis infection because results are dependent on proper   and adequate collection, absence of inhibitors, and sufficient rRNA to be   detected.      GCPCRT  2017     Negative   Negative for N. gonorrhoeae rRNA by transcription mediated amplification.   A negative result by transcription mediated amplification does not preclude the   presence of N. gonorrhoeae infection because results are dependent on proper   and adequate collection, absence of inhibitors, and sufficient rRNA to be   detected.      TREPAB Negative 2017    HGB 11.9 2017    PATH  2017       Patient Name: ALONA ISAAC  MR#: [id number]  Specimen #: [id number]  Collected: 2017  Received: 2017  Reported: 2017 09:12  Ordering Phy(s): JOSE HARE    For improved result formatting, select 'View Enhanced Report Format'  under Linked Documents section.    SPECIMEN/STAIN PROCESS:  Pap thin layer prep screening ([facility])       Pap-Cyto x 1, HPV ordered x 1    SOURCE: Cervical, endocervical  ----------------------------------------------------------------   Pap thin layer prep screening ([facility])  SPECIMEN ADEQUACY:  Satisfactory for evaluation.  -Transformation zone component absent.    CYTOLOGIC INTERPRETATION:    Negative for intraepithelial lesion or malignancy         Organism(s):  -Fungal organisms morphologically consistent with Candida spp.  -Marked inflammation present.    Electronically signed out by:  NATALI Edmond ( ASCP)    Processed and screened at [facility],  [facility]    CLINICAL HISTORY:    Pregnant, LEEP: history,    Papanicolaou Test Limitations:  Cervical cytology is a screening test  with limited sensitivity; regular screening is critical for cancer  prevention; Pap tests are primarily effective for the  diagnosis/prevention of squamous cell carcinoma, not adenocarcinomas or  other cancers.    TESTING LAB LOCATION:  [address] [address]  [phone]    COLLECTION SITE:  Client:  [facility]  Location: [facility])         Prenatal Ultrasound:  Information for the patient's mother:  Alona Isaac [[id number]]     Results for orders placed or performed during the hospital encounter of 17   [facility] US Comprehensive Single    Narrative            Comprehensive  ---------------------------------------------------------------------------------------------------------  Pat. Name: ALONA ISAAC       Study Date:  2017 9:29am  Pat. NO:  [id number]        Referring  MD: JOSE HARE  Site:  [facility]       Sonographer: Ana Roche RDMS  :  03/15/1985        Age:   32  ---------------------------------------------------------------------------------------------------------    INDICATION  ---------------------------------------------------------------------------------------------------------  Late prenatal care.      METHOD  ---------------------------------------------------------------------------------------------------------  Transabdominal ultrasound examination. View: Suboptimal view: limited by late gestational age.      PREGNANCY  ---------------------------------------------------------------------------------------------------------  Taylor pregnancy. Number of fetuses: 1.      DATING  ---------------------------------------------------------------------------------------------------------                                           Date                                Details                                                                                       Gest. age                      MACY  LMP                                                                         Cycle: LMP date not known  External assessment          2017                        GA: 34 w + 4 d                                                                           35 w + 5 d                     2017  U/S                                   2017                         based upon AC, BPD, Femur, HC                                                35 w + 4 d                     2017  Assigned dating                  Dating performed on 2017, based on the external assessment (on 2017)             35 w + 5 d                     2017      GENERAL EVALUATION  ---------------------------------------------------------------------------------------------------------  Cardiac activity: present.  bpm.  Fetal movements: visualized.  Presentation: cephalic.  Placenta:  Placental site: anterior, no previa.  Umbilical cord: 3 vessel cord.  Amniotic fluid: Amount of AF: normal amount. MVP 5.6 cm. RUTH 18.5 cm. Q1 5.6 cm, Q2 4.2 cm, Q3 4.9 cm, Q4 3.8 cm.      FETAL BIOMETRY  ---------------------------------------------------------------------------------------------------------  Main Fetal Biometry:  BPD                                   88.6            mm                                         35w 6d                               Hadlock  OFD                                   112.7           mm                                        34w 5d                               Nicolaides  HC                                      320.8          mm                                        36w 1d                               Hadlock  AC                                      321.5          mm                                        36w 0d                               Hadlock  Femur                                  66.1            mm                                        34w 0d                               Parris  Cerebellum tr                       49.5            mm                                                                                   CM                                     7.5              mm                                                                                   Humerus                             60.6            mm                                         35w 1d                              [facility]  Fetal Weight Calculation:  EFW                                   2,702          g                    43%                  35w 2d                              Calos  EFW (lb,oz)                         5 lb 15        oz  Calculated by                            Parris (BPD-HC-AC-FL)  Head / Face / Neck Biometry:                                        5.3              mm                                          Nasal bone                          9.6              mm                                                                                   Amniotic Fluid / FHR:  AF MVP                              5.6             cm                                                                                     RUTH                                     18.5            cm                                                                                     FHR                                    144             bpm                                             FETAL ANATOMY  ---------------------------------------------------------------------------------------------------------  The following structures appear abnormal:  Heart / Thorax                      Ductal arch: tortuous.    The following structures appear normal:  Head / Neck                         Cranium. Head size. Head shape. Lateral ventricles. Choroid plexus. Midline falx. Cavum septi pellucidi. Cerebellum. Cisterna magna.                                              Thalami.                                             Neck.  Face                                   Lips. Profile. Nose. Orbits.  Heart / Thorax                      4-chamber view. RVOT. LVOT. 3-vessel-trachea view. Cardiac position. Cardiac size. Cardiac rhythm.                                             Diaphragm.  Abdomen                             Stomach. Kidneys. Bladder. Liver. Bowel.  Spine / Skelet.                     Cervical spine. Thoracic spine. Lumbar spine.  Extremities                          Right upper arm. Right forearm. Left upper arm. Left forearm. Right upper leg. Right lower leg. Left upper leg. Left lower leg.    The following structures could not be adequately visualized:  Heart / Thorax                      Aortic arch. Bicaval view.  Abdomen                             Abdominal wall. Cord insertion.  Spine / Skelet.                     Sacral spine.  Extremities                          Right hand. Right fingers. Left hand. Left fingers. Right foot. Left foot.    Gender: female.      MATERNAL STRUCTURES  ---------------------------------------------------------------------------------------------------------  Cervix                                  Visualized, Appears Closed.                                             Cervical length 37.6 mm.  Right Ovary                          Visualized.  Left Ovary                            Visualized.      RECOMMENDATION  ---------------------------------------------------------------------------------------------------------  Thank-you for referring your patient for a comprehensive anatomic ultrasound ultrasound. She is late to prenatal care and is dated by a 34 week ultrasound with an unknown  LMP.    Fetal echocardiogram was performed today due to suboptimal cardiac views and an abnormal appearance of the ductal arch, this is resulted separately.    Follow-up ultrasound to assess fetal growth is recommended in  three weeks due to inaccuracy of ultrasound in establishing dates in the third trimester. This can be  scheduled via telemedicine.    Return to primary provider for continued prenatal care.    If you have questions regarding today's evaluation or if we can be of further service, please contact the [facility].    **Fetal anomalies may be present but not detected**.        Impression    IMPRESSION  ---------------------------------------------------------------------------------------------------------  1) Taylor intrauterine pregnancy at 35 & 4/7 weeks gestational age.  2) None of the anomalies commonly detected by ultrasound were evident in the detailed fetal anatomic survey as described above. Anatomy limited by gestational age and  fetal lie as outlined above  3) The ductal arch had a tortuous and abnormal appearance  4) Growth parameters and estimated fetal weight were consistent with dates established by 34 week ultrasound.  5) The amniotic fluid volume appeared normal.  6) Normal fetal activity for gestational age.  7) On transabdominal imaging the cervix appears long and closed.           GBS Status:   Information for the patient's mother:  Alona Isaac [[id number]]     Lab Results   Component Value Date    GBS (A) 2017     Positive  Positive: GBS DNA detected, presumed positive for GBS.   Assay performed on incubated broth culture of specimen using [employer] real-time   PCR.   Group B Streptococci are susceptible to ampicillin, penicillin, and cefazolin,   but may be erythromycin and/or clindamycin resistant. Contact Microbiology if   your patient is allergic to penicillin and you need erythromycin and/or   clindamycin testing to be performed.       -    Maternal History    Information for the patient's mother:  Alona Isaac [[id number]]   No past medical history on file.      Medications given to Mother since admit:  Information for the patient's mother:  Alona Isaac  R [[id number]]     No current outpatient prescriptions on file.       Family History -    I have reviewed this patient's family history    Social History -    I have reviewed this 's social history. History of drug abuse in father. Poor  care  Social history is concerning.    Birth History   Infant Resuscitation Needed: no    Child was born at 10:16 by vacuum extraction. Terminal meconium. Apgar was 7,9 at one and 5 minutes. Point were taken for: one for breathing, one for color, and one for tone.  Child was pale with HR in 180 -190 b/m.  Micky-pharyngeal suction was performed x 2 using Fr 10. Meconium stained fluid (3-5 cc ) was suctioned from the upper  Airway.  Oxygen saturation was 77-80% at 7 minutes of life.10 second of c-pap with only PIP of 20 mmh. Oxygen saturation improved temporary to 93%  Baby has faint grunting,nasal falering and faint left intercostal retraction and substernal retraction.  Baby was transferred to the nursery at 10 minutes of life for further work  She continue to need C-pap of 4-20 for the first 90 minutes of life.  Chest xray shows left pneumothorax. Second left decubitus film shows about 10% left pneumothorax.  Head [place] of 100% Oxygen started.   Baby was able to maintain Xoygen saturation %  Cbc shows WBC of 29.9  Blood glucose is 81, Temp 37.3   Blood culture is pending.  Antibiotics IV started  Continues IV hydration    [place] Birth Information  Birth History     Gestation Age: 40 5/7 wks     Duration of Labor: 1st: 28h 29m / 2nd: 47m         Immunization History   There is no immunization history for the selected administration types on file for this patient.     Physical Exam   Vital Signs:  No data found.     Measurements:  Weight:      Length:      Head circumference:        General:  alert and normally responsive  Skin:  no abnormal markings; normal color without significant rash.  No jaundice  Head/Neck  normal anterior and posterior  fontanelle, intact scalp; Neck without masses.  Head: molding, caput succedaneum and 3cm x1cm length superficial abrasion on the site of the Vacuum.  Ears/Nose/Mouth:  intact canals, patent nares, mouth normal  Thorax:  normal contour, clavicles intact  Lungs: Faint nasal falering.no grunting.Crackles bilateral, slight left intercostal retractions, no increased work of breathing. Good air entry at the bases.  Heart:  normal rate, rhythm.  No murmurs.  Normal femoral pulses.  Abdomen  soft without mass, tenderness, organomegaly, hernia.  Umbilicus normal.  Genitalia:  normal female external genitalia  Anus:  patent  Trunk/Spine  straight, intact  Musculoskeletal:  Normal Lazo and Ortolani maneuvers.  intact without deformity.  Normal digits.  Neurologic:  normal, symmetric tone and strength.  normal reflexes.    Data    Results for orders placed or performed during the hospital encounter of 07/27/17 (from the past 24 hour(s))   Blood culture   Result Value Ref Range    Specimen Description Blood Right Arm     Special Requests 1 ML PEDS BOTTLE     Culture Micro No growth after 4 hours     Micro Report Status Pending    CBC with platelets differential   Result Value Ref Range    WBC 29.9 9.0 - 35.0 10e9/L    RBC Count 4.55 4.1 - 6.7 10e12/L    Hemoglobin 16.9 15.0 - 24.0 g/dL    Hematocrit 51.7 44.0 - 72.0 %     104 - 118 fl    MCH 37.1 33.5 - 41.4 pg    MCHC 32.7 31.5 - 36.5 g/dL    RDW 18.6 (H) 10.0 - 15.0 %    Platelet Count 316 150 - 450 10e9/L    Diff Method Manual Method     % Neutrophils 53.0 %    % Lymphocytes 34.0 %    % Monocytes 9.0 %    % Eosinophils 3.0 %    % Basophils 1.0 %    Nucleated RBCs 7 /100    Absolute Neutrophil 15.8 2.9 - 26.6 10e9/L    Absolute Lymphocytes 10.2 1.7 - 12.9 10e9/L    Absolute Monocytes 2.7 (H) 0.0 - 1.1 10e9/L    Absolute Eosinophils 0.9 (H) 0.0 - 0.7 10e9/L    Absolute Basophils 0.3 (H) 0.0 - 0.2 10e9/L    Absolute Nucleated RBC 2.1    Glucose by meter   Result Value  Ref Range    Glucose 81 40 - 99 mg/dL   XR Chest 1 View    Narrative    CHEST    REPORT:  A right lateral decubitus view of the chest was performed.  This demonstrates a small left-sided pneumothorax.  No air trapping is  seen in the right lung.    IMPRESSION:  SMALL LEFT-SIDED PNEUMOTHORAX.    CHEST - JULY 27, 2017 AT 1138 HOURS    REPORT:  There is a lucency overlying the left hemithorax suspicious  for a pneumothorax.  Decubitus view of the chest is recommended.  There is also some grainy opacity seen in both lungs, most likely  representing wet lung. Heart is normal in size.    IMPRESSION:  PROBABLE WET LUNG WITH SMALL PNEUMOTHORAX.   THE NURSERY  WAS CALLED WITH THIS CRITICAL RESULT.  Exam Date: Jul 27, 2017 02:40:27 PM  Author: VALENCIA PIRES  This report is preliminary and transcribed

## 2019-10-31 NOTE — PROGRESS NOTES
Care Coordinator - Discharge Planning    Admission Date/Time:  10/24/2019  Attending MD:  Sapphire Camacho MD     Data  Chart reviewed, discussed with interdisciplinary team.   Patient was admitted for:   1. S/P BKA (below knee amputation) unilateral (H)    2. PAD (peripheral artery disease) (H)         Assessment   Full assessment completed in previous note     Pt status reviewed/discussed during care team rounds.  Team anticipates that pt will be medically stable for discharge tomorrow. Patient will need home care RN and wound vac arranged for discharge.    Spoke with Juan [facility] rep who initiated wound vac order via [employer].  Paged vascular surgery resident. Dr Magana requesting provider note reference wound measurements as this will be needed in order to finalize wound vac order for home.  Per RNCC note pt has utilized [facility] in the past.  Referral made to [facility] for home RN.  Discharge orders updated.      Will need to fax clinical information to [facility] once provider note updated.     1300:  Script for wound vac placed in front of patients chart.  Dr. Magana aware and will have provider sign tomorrow when vascular surgery changes wound vac.   Met with pt.   Discussed home care RN f/u.   Pt notes no concerns regarding anticipated plan for discharge.  Pt requesting HE w/c transport when cleared for discharge.  Pt has his own w/c with him.     Coordination of Care and Referrals: Provided patient/family with options for DME and Home Care.      Plan  Anticipated Discharge Date:  TBD  Anticipated Discharge Plan:  TBD    Maribel Lind, RN BSN, PHN RN Care Coordinator  Internal Medicine   [phone]  Pager: [phone]  Weekend RN Care Coordinator job code * * * [phone]  [facility]  10/31/2019 12:17 PM

## 2019-10-31 NOTE — PROGRESS NOTES
"VASCULAR SURGERY PROGRESS NOTE    Subjective:  No acute events overnight. Pain better controlled this morning. No complaints.    Objective:    Intake/Output Summary (Last 24 hours) at 10/31/2019 1155  Last data filed at 10/31/2019 0629  Gross per 24 hour   Intake 1203 ml   Output 1750 ml   Net -547 ml         Labs:  ROUTINE IP LABS (Last four results)  BMP  Recent Labs   Lab 10/28/19  0646 10/27/19  0655 10/26/19  0652 10/24/19  2327     --  135 136   POTASSIUM 4.4  --  4.2 4.1   CHLORIDE 107  --  104 105   STUART 9.4  --  9.1 8.8   CO2 25  --  26 26   BUN 21  --  16 16   CR 1.38*  --  1.36* 1.41*   * 112* 116* 110*     CBC  Recent Labs   Lab 10/28/19  0646 10/26/19  0652 10/24/19  2327   WBC 7.4 7.6 11.1*   RBC 4.74 4.52 4.32*   HGB 14.5 13.7 13.0*   HCT 43.6 41.6 40.6   MCV 92 92 94   MCH 30.6 30.3 30.1   MCHC 33.3 32.9 32.0   RDW 12.4 12.7 12.5    283 267     INR  Recent Labs   Lab 10/24/19  2327   INR 2.12*     PHYSICAL EXAM:  /87 (BP Location: Right arm)   Pulse 80   Temp 97.7  F (36.5  C) (Oral)   Resp 16   Ht 1.905 m (6' 3\")   Wt 91.5 kg (201 lb 11.5 oz)   SpO2 100%   BMI 25.21 kg/m    General: The patient is alert and oriented. Appropriate. No acute distress  Psych: Pleasant affect, answers questions appropriately  Skin: Warm, dry.  Respiratory: The patient does not require supplemental oxygen. Breathing unlabored.  Extremities:  LLE stump with vac in place, holding suction.       ASSESSMENT:  63M with L BKA stump abscess and tibial osteomyelitis s/p I&D of abscess, excision of infected distal tibia, placement of irrigating wound vac (10/29/19).       PLAN:  -Follow up intra-operative cultures  -Continue antibiotics per primary   -Keep LLE elevated  -Will perform vac change Friday  -Will need home wound care for vac changes after dishcarge  -Continue vac setting 10 min soak, 3.5 hr negative pressure to -125 mmHg, 14 ml triple antibiotic       Patient seen with vascular surgery " staff, Dr. Matute.    Linda Magana MD  Surgery Resident, PGY2

## 2019-11-01 VITALS
OXYGEN SATURATION: 98 % | BODY MASS INDEX: 25.08 KG/M2 | RESPIRATION RATE: 18 BRPM | SYSTOLIC BLOOD PRESSURE: 115 MMHG | WEIGHT: 201.72 LBS | TEMPERATURE: 97.2 F | DIASTOLIC BLOOD PRESSURE: 71 MMHG | HEART RATE: 80 BPM | HEIGHT: 75 IN

## 2019-11-01 LAB
BACTERIA SPEC CULT: ABNORMAL
BACTERIA SPEC CULT: ABNORMAL
BACTERIA SPEC CULT: NO GROWTH
GLUCOSE BLDC GLUCOMTR-MCNC: 124 MG/DL (ref 70–99)
GLUCOSE BLDC GLUCOMTR-MCNC: 140 MG/DL (ref 70–99)
Lab: ABNORMAL
Lab: NORMAL
SPECIMEN SOURCE: ABNORMAL
SPECIMEN SOURCE: ABNORMAL
SPECIMEN SOURCE: NORMAL

## 2019-11-01 PROCEDURE — 25000132 ZZH RX MED GY IP 250 OP 250 PS 637: Performed by: STUDENT IN AN ORGANIZED HEALTH CARE EDUCATION/TRAINING PROGRAM

## 2019-11-01 PROCEDURE — 00000146 ZZHCL STATISTIC GLUCOSE BY METER IP

## 2019-11-01 PROCEDURE — 25000128 H RX IP 250 OP 636: Performed by: INTERNAL MEDICINE

## 2019-11-01 PROCEDURE — 25000132 ZZH RX MED GY IP 250 OP 250 PS 637: Performed by: INTERNAL MEDICINE

## 2019-11-01 PROCEDURE — 99239 HOSP IP/OBS DSCHRG MGMT >30: CPT | Mod: GC | Performed by: INTERNAL MEDICINE

## 2019-11-01 RX ORDER — OXYCODONE HYDROCHLORIDE 5 MG/1
10 TABLET ORAL EVERY 6 HOURS
Qty: 112 TABLET | Refills: 0 | Status: SHIPPED | OUTPATIENT
Start: 2019-11-01 | End: 2019-11-19

## 2019-11-01 RX ORDER — AMOXICILLIN 500 MG/1
1000 CAPSULE ORAL 2 TIMES DAILY
Qty: 160 CAPSULE | Refills: 0 | Status: SHIPPED | OUTPATIENT
Start: 2019-11-01 | End: 2019-12-11

## 2019-11-01 RX ORDER — DOXYCYCLINE 100 MG/1
100 CAPSULE ORAL 2 TIMES DAILY
Qty: 80 CAPSULE | Refills: 0 | Status: SHIPPED | OUTPATIENT
Start: 2019-11-01 | End: 2019-12-11

## 2019-11-01 RX ADMIN — ASPIRIN 81 MG: 81 TABLET, COATED ORAL at 07:53

## 2019-11-01 RX ADMIN — DOXYCYCLINE 100 MG: 100 CAPSULE ORAL at 07:54

## 2019-11-01 RX ADMIN — LISINOPRIL 40 MG: 40 TABLET ORAL at 07:53

## 2019-11-01 RX ADMIN — ACETAMINOPHEN 500 MG: 500 TABLET, FILM COATED ORAL at 11:22

## 2019-11-01 RX ADMIN — OXYCODONE HYDROCHLORIDE 10 MG: 5 TABLET ORAL at 00:08

## 2019-11-01 RX ADMIN — OXYCODONE HYDROCHLORIDE 10 MG: 5 TABLET ORAL at 16:05

## 2019-11-01 RX ADMIN — OXYCODONE HYDROCHLORIDE 10 MG: 5 TABLET ORAL at 04:04

## 2019-11-01 RX ADMIN — METOPROLOL SUCCINATE 100 MG: 100 TABLET, EXTENDED RELEASE ORAL at 07:53

## 2019-11-01 RX ADMIN — GABAPENTIN 600 MG: 300 CAPSULE ORAL at 07:53

## 2019-11-01 RX ADMIN — OXYCODONE HYDROCHLORIDE 10 MG: 5 TABLET ORAL at 12:12

## 2019-11-01 RX ADMIN — ENOXAPARIN SODIUM 90 MG: 100 INJECTION SUBCUTANEOUS at 07:54

## 2019-11-01 RX ADMIN — OXYCODONE HYDROCHLORIDE 10 MG: 5 TABLET ORAL at 07:54

## 2019-11-01 RX ADMIN — GABAPENTIN 600 MG: 300 CAPSULE ORAL at 14:17

## 2019-11-01 RX ADMIN — AMOXICILLIN 1000 MG: 500 CAPSULE ORAL at 07:53

## 2019-11-01 RX ADMIN — TAMSULOSIN HYDROCHLORIDE 0.4 MG: 0.4 CAPSULE ORAL at 07:53

## 2019-11-01 ASSESSMENT — ACTIVITIES OF DAILY LIVING (ADL)
ADLS_ACUITY_SCORE: 13

## 2019-11-01 NOTE — PROGRESS NOTES
[facility] Lia[facility] met with pt to discuss plans for HC.  Pt to be discharged home 11/1 and has agreed to have [facility] follow with services of . Patient care support center processing referral.  Pt verbalized understanding that initial visit is scheduled for 1 to 2 days after discharge.   Pt has 24 hour phone number for [facility] for any questions or concerns.    Thank you  Esther Velazquez RN, BSN  [facility]  [facility] [place]  [phone]

## 2019-11-01 NOTE — PLAN OF CARE
Vital signs:  Temp: 97.7  F (36.5  C) Temp src: Oral BP: 127/88   Heart Rate: 84 Resp: 18 SpO2: 100 % O2 Device: None (Room air)     Neuro: A&Ox4, cooperative.   Cardiac: /88, P 84, denies chest pain.   Respiratory: LS clear, denies SOB.   Pain: Pt requested prn oxycodone for left stump pain with relief.  Diet: Tolerating regular diet, denies nausea.   GI/: Voiding spontaneously. +BS, +flatus, no BM this shift.   Skin/Drain: left lower extremity with wound vac intact, LLE is elevated on pillow.   LDA: PIV SL.   Activity: Pt independently repositioning in bed, pt slept well overnight, not OOB this shift.   New changes this shift: None.   Plan: Continue POC.

## 2019-11-01 NOTE — PROGRESS NOTES
VASCULAR SURGERY PROGRESS NOTE    SUBJECTIVE:  Patient found resting in bed, endorses adequate pain control at present.       OBJECTIVE:  Vital signs:  Temp: 97.7  F (36.5  C) Temp src: Oral BP: 127/88   Heart Rate: 84 Resp: 18 SpO2: 100 % O2 Device: None (Room air)        Intake/Output Summary (Last 24 hours) at 11/1/2019 0811  Last data filed at 11/1/2019 0405  Gross per 24 hour   Intake 1410 ml   Output 950 ml   Net 460 ml       Vitals:    10/24/19 1918   Weight: 91.5 kg (201 lb 11.5 oz)       PHYSICAL EXAM:  NEURO/PSYCH: The patient is alert and oriented.  Appropriate.  Moves all extremities.    SKIN: warm and dry.  PULMONARY: The patient does not require supplemental oxygen. Unlabored breathing   EXTREMITIES: left BKA stump vac dressing removed, healthy appearing wound bed, no signs of infection, left BKA stump wound measures 6 cm long, 3 cm wide and 3 cm deep, black granufoam sponge placed in wound bed and good suction obtained.         ASSESSMENT:  63M with L BKA stump abscess and tibial osteomyelitis s/p I&D of abscess, excision of infected distal tibia, placement of irrigating wound vac (10/29/19).         PLAN:  - okay to discharge to home from vascular surgery stand point  - left BKA stump wound vac at -125 mmHg continuous suction  - patient will require left BKA stump wound vac changes, MWF with home care  - patient will follow up with Dr. Matute in 3-4 weeks  - overall care per primary team.        Rachel SOW, CNS  Division of Vascular Surgery  [facility]    Pager [phone]  Office [phone]

## 2019-11-01 NOTE — DISCHARGE SUMMARY
[facility]  Discharge Summary - Medicine & Pediatrics       Date of Admission:  10/24/2019  Date of Discharge:  11/1/2019  Discharging Provider: Sapphire Camacho MD  Discharge Service: Renaldo 2    Discharge Diagnoses   Left tibial osteomyelitis and abscess requiring I&D and IV antibiotics    Follow-ups Needed After Discharge   Follow-up Appointments     Adult [facility] Follow-up and recommended labs and tests      Follow up with primary care provider, Avery Duke, within 7 days to   evaluate medication changes, to evaluate post-op pain control, and for   hospital follow-up. No follow up labs or test are needed prior to this   visit.     Please keep appointment with Dr. Bronw, from Cardiology, on November 6, 2019.     Please keep appointment with Dr. Matute, from Vascular Surgery, on   November 21, 2019.     Please keep appointment with Dr. Davis, from Infectious Disease, on   December 11, 2019. Continue doxycycline 100mg orally BID and amoxicillin   1000mg orally BID until seen in clinic. Please have labs drawn (BMP, CBC   with diff, and CRP) in 3 weeks and have results faxed to ID clinic (phone:   [phone]). Please have MRI without contrast of LLE in 5-6 weeks.     Please keep appointment with Dr. Short, from Hematology/Oncology, on   January 14, 2019.     Appointments on [place] and/or [facility] (with [facility] or [facility]   provider or service). Call [phone] if you haven't heard regarding   these appointments within 7 days of discharge.           Unresulted Labs Ordered in the Past 30 Days of this Admission     Date and Time Order Name Status Description    10/29/2019 1820 Bone Culture Aerobic Bacterial Preliminary     10/29/2019 1820 Anaerobic bacterial culture Preliminary     10/29/2019 1809 Anaerobic bacterial culture Preliminary     10/29/2019 1805 Anaerobic bacterial culture Preliminary       These results will be followed up by PCP and infectious  disease team.     Discharge Disposition   Discharged to home  Condition at discharge: Stable    Hospital Course   Constantino Taylor is a 63 year old male with a PMH significant for L BKA, A fib on Xarelto, HFrEF (40%), HTN, HLD, CAD, prostate cancer, chronic HCV, alcohol & marijuana use who was admitted on 10/24/2019. He was transferred from [facility] on 10/24/19 for management of left tibial osteomyelitis c/by abscess and bacteremia. He presented to OSH with pain, swelling, and tenderness around his left stump. MRI at OSH confirmed abscess and osteomyelitis. He was started on Vancomycin per [facility] ID. Blood cultures at [facility] grew Staphylococcus pettenkoferi, likely contaminant. On arrival to the [facility], he was transitioned to amoxicillin and doxycyline. Repeat blood cultures on admission (10/24/19) were negative. Vascular surgery performed I&D of left stump on 10/29 and a wound vac was placed. Surgery was uncomplicated. Cultures from the left leg abscess grew Staphylococcus aureus. He remained afebrile and pain improved during the days after his surgery. He was discharged on oral amoxicillin BID and doxycycline BID, which he should take until his follow up with Dr. Davis from ID on 12/11/19. He should have labs drawn (BMP, CBC with diff, and CRP) in 3 weeks and have results faxed to the ID clinic (fax #: [phone]). He appointments with vascular surgery, cardiology and hem/onc as stated above. MWF wound vac changes to be done by home nurse. PCP to follow up pain management.     Consultations This Hospital Stay   PHARMACY TO DOSE VANCO  VASCULAR ACCESS CARE ADULT IP CONSULT  MEDICATION HISTORY IP PHARMACY CONSULT  PHYSICAL THERAPY ADULT IP CONSULT  VASCULAR SURGERY ADULT IP CONSULT  ORTHOPAEDIC SURGERY ADULT/PEDS IP CONSULT  VASCULAR ACCESS CARE ADULT IP CONSULT  ORTHOPAEDIC SURGERY ADULT/PEDS IP CONSULT  INFECTIOUS DISEASE GENERAL ADULT IP CONSULT  INFECTIOUS DISEASE GENERAL ADULT IP CONSULT  VASCULAR ACCESS CARE ADULT IP  CONSULT  VASCULAR ACCESS CARE ADULT IP CONSULT  OCCUPATIONAL THERAPY ADULT IP CONSULT    Code Status   Full Code       The patient was discussed with Dr. Camacho.     Jose De Jesus Noonan MD  [place] [id number] Service  [facility] [place]  Pager: [phone]  ______________________________________________________________________    Physical Exam   Vital Signs: Temp: 97.2  F (36.2  C) Temp src: Oral BP: 115/71   Heart Rate: 57 Resp: 18 SpO2: 98 % O2 Device: None (Room air)    Weight: 201 lbs 11.53 oz     General Appearance: AOx3, NAD, appropriate  Respiratory: nl effort, clear lung sounds   Cardiovascular: RRR, nl S1+S2, no m/r/g  GI: +BS, NTND, no r/g/r  Skin: no rashes  Extremities: Left BKA dressing c/d/i, wound vac intact and raining, stump elevated on pillow  Neuro: no focal deficits    Left BKA, wound vac intact, elevated on pillow, Oxy  for pain with relief, voiding adequate.Tolerating intake.   [phone]      Primary Care Physician   Avery Duke    Discharge Orders      MR Tibia Fibula Lower Leg Right wo Contrast    MRI without contrast of LLE to be done in 5-6 weeks     Basic metabolic panel    Fax results to ID clinic [phone].     CBC with platelets differential    Fax results to [facility] [phone].       Last Lab Result: Hemoglobin (g/dL)       Date                     Value                 10/28/2019               14.5             ----------     CRP inflammation    Fax results to [facility] [phone].     Home care nursing referral      MD face to face encounter    Documentation of Face to Face and Certification for Home Health Services    I certify that patient: Constantino Taylor is under my care and that I, or a nurse practitioner or physician's assistant working with me, had a face-to-face encounter that meets the physician face-to-face encounter requirements with this patient on: 10/31/2019.    This encounter with the patient was in whole, or in part, for the following medical  condition, which is the primary reason for home health care: His PMH is significant for L BKA, A fib on Xarelto, HFrEF (40%), HTN, HLD, CAD, prostate cancer, chronic HCV, alcohol & marijuana us who was transferred from [facility] on 10/24 for further work-up & management of L tibia osteomyelitis with abscess and bacteremia    I certify that, based on my findings, the following services are medically necessary home health services: Nursing.    My clinical findings support the need for the above services because: Nurse is needed: To assess wound bed, assist with wound vac dressing changes 3x per week after changes in medications or other medical regimen..    Further, I certify that my clinical findings support that this patient is homebound (i.e. absences from home require considerable and taxing effort and are for medical reasons or Church services or infrequently or of short duration when for other reasons) because: Requires assistance of another person or specialized equipment to access medical services because patient: Requires supervision of another for safe transfer...    Based on the above findings. I certify that this patient is confined to the home and needs intermittent skilled nursing care, physical therapy and/or speech therapy.  The patient is under my care, and I have initiated the establishment of the plan of care.  This patient will be followed by a physician who will periodically review the plan of care.  Physician/Provider to provide follow up care: Avery Duke    Attending [facility] physician (the Medicare certified [place] provider): Dr. Camacho  Physician Signature: See electronic signature associated with these discharge orders.  Date: 10/31/2019     Reason for your hospital stay    Left tibial osteomyelitis and abscess requiring I&D and IV antibiotics     Adult [facility] Follow-up and recommended labs and tests    Follow up with primary care provider, Avery Duke, within 7 days to evaluate  medication changes, to evaluate post-op pain control, and for hospital follow-up. No follow up labs or test are needed prior to this visit.     Please keep appointment with Dr. Brown, from Cardiology, on November 6, 2019.     Please keep appointment with Dr. Matute, from Vascular Surgery, on November 21, 2019.     Please keep appointment with Dr. Davis, from Infectious Disease, on December 11, 2019. Continue doxycycline 100mg orally BID and amoxicillin 1000mg orally BID until seen in clinic. Please have labs drawn (BMP, CBC with diff, and CRP) in 3 weeks and have results faxed to ID clinic (phone: [phone]). Please have MRI without contrast of LLE in 5-6 weeks.     Please keep appointment with Dr. Short, from Hematology/Oncology, on January 14, 2019.     Appointments on [place] and/or [facility] (with [facility] or [facility] provider or service). Call [phone] if you haven't heard regarding these appointments within 7 days of discharge.     Activity    Your activity upon discharge: activity as tolerated     When to contact your care team    Call your primary doctor if you have any of the following: temperature greater than 101.5,  increased shortness of breath, chest pain, significantly increased drainage from wound vac, increased swelling or increased pain.     Tubes and drains    You are going home with the following tubes or drains: wound vac.   Recommend MWF wound vac changes with your home nurse.     Full Code     Diet    Follow this diet upon discharge: Regular Diet Adult       Significant Results and Procedures   Most Recent 3 CBC's:  Recent Labs   Lab Test 10/28/19  0646 10/26/19  0652 10/24/19  2327   WBC 7.4 7.6 11.1*   HGB 14.5 13.7 13.0*   MCV 92 92 94    283 267     Most Recent 3 BMP's:  Recent Labs   Lab Test 10/28/19  0646 10/27/19  0655 10/26/19  0652 10/24/19  2327     --  135 136   POTASSIUM 4.4  --  4.2 4.1   CHLORIDE 107  --  104 105   CO2 25  --  26 26   BUN 21  --  16  16   CR 1.38*  --  1.36* 1.41*   ANIONGAP 3  --  6 5   STUART 9.4  --  9.1 8.8   * 112* 116* 110*     Most Recent 2 LFT's:  Recent Labs   Lab Test 10/24/19  2327 10/14/19  1419   AST 15 12   ALT 14 17   ALKPHOS 81 78   BILITOTAL 0.4 0.5     Most Recent 3 INR's:  Recent Labs   Lab Test 10/24/19  2327 02/06/17  0855 09/21/16  0932   INR 2.12* 1.11 1.71*   ,   Results for orders placed or performed during the hospital encounter of 10/09/19   CT Chest/Abdomen/Pelvis w Contrast    Narrative    EXAMINATION: CT CHEST/ABDOMEN/PELVIS W CONTRAST, 10/9/2019 11:23 AM    TECHNIQUE:  Helical CT images from the thoracic inlet through the  symphysis pubis were obtained  with contrast.    COMPARISON: CT 9/11/2017    HISTORY: Prostate cancer with rising PSA; Malignant neoplasm of  prostate (H)    FINDINGS:    Chest:  The thyroid is within normal limits. The heart size is normal.  No pericardial effusion. Biatrial enlargement compatible with history  of atrial fibrillation. Thoracic aorta and main pulmonary artery  diameters are within normal limits. No mediastinal or hilar  lymphadenopathy. Few calcified hilar lymph nodes. The visualized  esophagus is within normal limits.    Nonobstructing mucoid debris within the trachea. No focal  consolidation, pleural effusion, or pneumothorax. A few areas of  centrilobular emphysema in the lung apices.No suspicious pulmonary  nodule.    Abdomen and pelvis: No focal lesion. The gallbladder, spleen, and  adrenal glands are unremarkable. Mild fatty atrophy of the pancreas.  Few dystrophic calcifications located at the pancreatic head. Severely  atrophic left kidney. Multifocal cortical defects involving the right  kidney. No hydronephrosis. Small prostate. Pelvic phleboliths. No  dilated loops of bowel or bowel wall thickening. Normal air-filled  nondilated appendix. Sigmoid diverticulosis without evidence of  diverticulitis. No free air free fluid. Chronic occlusion of the left  common iliac  artery down into the common femoral and superficial  femoral arteries. Chronic occlusion of the internal iliac arteries  bilaterally. 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 Eccentric  filling defect involving the proximal aspect of the SMA resulting in  approximately 80% stenosis, this is his similar to exam from  11/20/2017. No abdominal or pelvic lymphadenopathy.    Bones and soft tissues: Multilevel degenerative changes of the  thoracic and lumbar spine with prominent facet arthropathy of the  lumbar spine. No suspicious osseous findings. Avascular necrosis of  the femoral heads bilaterally without evidence of femoral head  collapse or fragmentation.      Impression    IMPRESSION:   1. No definite evidence to suggest metastatic prostate cancer.  2. Chronic occlusion of the  left renal, bilateral internal iliac,  left common iliac, left common femoral, and proximal superficial  femoral arteries. Eccentric noncalcified plaque of the SMA results in  approximately 80% stenosis. Overall, the extent of atherosclerotic  disease and chronic thrombus burden appears similar to prior exams.  3. Bilateral femoral head avascular necrosis without evidence of  femoral head collapse or fragmentation.  4. Sigmoid diverticulosis without evidence of diverticulitis.  5. Markedly atrophic left kidney.  6. Multifocal cortical defects involving the right kidney compatible  with prior sequela of infection or ischemia.  7. Biatrial enlargement which is compatible with patient's history of  atrial fibrillation.    I have personally reviewed the examination and initial interpretation  and I agree with the findings.    RUEL LAKHANI MD     *Note: Due to a large number of results and/or encounters for the requested time period, some results have not been displayed. A complete set of results can be found in Results Review.       Discharge Medications   Current Discharge Medication List      START taking these medications    Details    amoxicillin (AMOXIL) 500 MG capsule Take 2 capsules (1,000 mg) by mouth 2 times daily  Qty: 160 capsule, Refills: 0    Associated Diagnoses: S/P BKA (below knee amputation) unilateral (H)      doxycycline hyclate (VIBRAMYCIN) 100 MG capsule Take 1 capsule (100 mg) by mouth 2 times daily  Qty: 80 capsule, Refills: 0    Associated Diagnoses: S/P BKA (below knee amputation) unilateral (H)      melatonin 1 MG TABS tablet Take 1 tablet (1 mg) by mouth nightly as needed for sleep  Qty: 30 tablet, Refills: 0    Associated Diagnoses: Insomnia, unspecified type      naloxone (EVZIO) 0.4 MG/0.4ML auto-injector Inject 0.4 mLs (0.4 mg) into the muscle as needed for opioid reversal every 2-3 minutes until assistance arrives  Qty: 0.8 mL, Refills: 1    Associated Diagnoses: S/P BKA (below knee amputation) unilateral (H)         CONTINUE these medications which have CHANGED    Details   oxyCODONE (ROXICODONE) 5 MG tablet Take 2 tablets (10 mg) by mouth every 6 hours for 14 days Try to take this medication every 8 hours or less frequently if pain is tolerable. You can use an extra dose 1 hour before any wound vac changes, if you experience increased pain.  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); Chronic atrial fibrillation; Benign prostatic hyperplasia, unspecified whether lower urinary tract symptoms present      atorvastatin (LIPITOR) 40 MG tablet Take 1 tablet (40 mg)  "by mouth At Bedtime  Qty: 90 tablet, Refills: 4    Associated Diagnoses: 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)      gabapentin (NEURONTIN) 300 MG capsule Take 1-2 capsules (300-600 mg) by mouth 3 times daily  Qty: 540 capsule, Refills: 3    Associated Diagnoses: Reflex sympathetic dystrophy of both lower extremities      lisinopril (PRINIVIL/ZESTRIL) 40 MG tablet Take 1 tablet (40 mg) by mouth daily  Qty: 90 tablet, Refills: 3    Associated Diagnoses: Hypertension goal BP (blood pressure) < 140/90      metoprolol succinate ER (TOPROL-XL) 100 MG 24 hr tablet Take 1 tablet (100 mg) by mouth daily  Qty: 90 tablet, Refills: 3    Associated Diagnoses: Hypertension goal BP (blood pressure) < 140/90      polyethylene glycol (MIRALAX) powder Take 17 g by mouth as needed for constipation  Qty: 510 g, Refills: 1    Associated Diagnoses: Slow transit constipation      rivaroxaban ANTICOAGULANT (XARELTO) 20 MG TABS tablet Take 1 tablet (20 mg) by mouth daily (with dinner)  Qty: 90 tablet, Refills: 3    Associated Diagnoses: Chronic atrial fibrillation      tamsulosin (FLOMAX) 0.4 MG capsule Take 1 capsule (0.4 mg) by mouth daily  Qty: 90 capsule, Refills: 3    Associated Diagnoses: Benign prostatic hyperplasia, unspecified whether lower urinary tract symptoms present      triamcinolone (KENALOG) 0.1 % cream Take 1 apply by topical route two times a day as needed  Qty: 80 g, Refills: 3    Associated Diagnoses: Chronic dermatitis      Multiple Vitamins-Minerals (ONE-A-DAY MENS 50+ ADVANTAGE) TABS Take 1 each by mouth daily  Qty: 100 tablet, Refills: 3    Associated Diagnoses: Poor nutrition      !! order for DME Equipment being ordered: self adhesive bandage 4\" by 8\"  Qty: 30 each, Refills: 11    Associated Diagnoses: Hx of BKA, left (H)      !! order for DME Please dispense blood pressure machine and cuff.  Qty: 1 each, Refills: 0    Associated Diagnoses: Hypertension goal BP (blood pressure) < " 140/90       !! - Potential duplicate medications found. Please discuss with provider.        Allergies   No Known Allergies

## 2019-11-01 NOTE — PROGRESS NOTES
"  Care Coordinator - Discharge Planning    Admission Date/Time:  10/24/2019  Attending MD:  Sapphire Camacho MD     Data  Chart reviewed, discussed with interdisciplinary team.   Patient was admitted for:   1. S/P BKA (below knee amputation) unilateral (H)    2. PAD (peripheral artery disease) (H)    3. Acute hematogenous osteomyelitis, unspecified site (H)         Assessment   Full assessment completed in previous note     Pt status reviewed/discussed during care team rounds.  Pt cleared for discharge today pending confirmation of approval for home wound vac.  Per provider team pt son will be able to transport pt home.    Clinical and script faxed to [facility].   Will f/u with [facility] this afternoon to confirm approval.  [facility] for home RN.  Discharge orders reviewed.     1230: Confirmed with [employer] rep that they had received script and clinical.   Per discussion with Joseph HOWE is requesting another form that the provider will need to complete.  Conference call done with local [facility] rep Juan who confirmed that the second form that Joseph referenced was not needed.   Awaiting confirmation of final approval.       1405: Spoke with Stacy [facility] rep inquiring about the approval status for the home wound vac.   Per discussion with Stacy JOHNSON approval should be coming through shortly at which time we will be able to secure a ready vac from sterile stores.  Updated GAY Lang    1525: Spoke with Humberto [facility] rep inquiring on the status of approval as the [facility] system is still listing the referral as \"submitted\".  Per discussion with Humberto approval should come through in the \"next 5 minutes\".    1535: Received call from SHYAM Martinez Liaison that vac has been approved. Masood from [employer] will release/deliver the vac to .  Updated GAY Singh.        Coordination of Care and Referrals: Provided patient/family with options for DME and Home Care.      Plan  Anticipated Discharge Date:  11/1/19  Anticipated Discharge Plan:  Home " with home care    Maribel Lind, RN BSN, PHN RN Care Coordinator  Internal Medicine   [phone]  Pager: [phone]  Weekend RN Care Coordinator job code * * * [phone]  [facility]  11/1/2019 11:43 AM

## 2019-11-01 NOTE — PLAN OF CARE
"Blood pressure 115/71, pulse 80, temperature 97.2  F (36.2  C), temperature source Oral, resp. rate 18, height 1.905 m (6' 3\"), weight 91.5 kg (201 lb 11.5 oz), SpO2 98 %.    A/O x 4. Pain controlled with oxycodone 10 mg PRN Q 4 hrs. Left BKA wound covered with dressing, waiting for vascular team to change the wound vac, so patient can be discharge to home. PIV SL. Good po intake.Transfer with SBA to wheelchair. Voiding adequate output. Appropriate with call lights. Continue to monitor and follow POC.  "

## 2019-11-01 NOTE — PLAN OF CARE
Temp: 96.6  F (35.9  C) Temp src: Axillary BP: 125/70   Heart Rate: 81 Resp: 16 SpO2: 98 % O2 Device: None (Room air)      [id number]  Pt A/o x4, VSS, C/o pain in L Stump- managed with po oxy. L BKA wound vac in place- WOC nurse will replace tomorrow. R PIV SL. Reg Diet. SBA to wheelchair. Voiding WDL. Pt calls appropriately makes needs known. Will continue to monitor and follow POC.

## 2019-11-02 NOTE — PLAN OF CARE
Discharge  D: Orders for discharge and outpatient medications written.    I: Home medications and return to clinic schedule reviewed with patient. Discharge instructions and parameters for calling Health Care Provider reviewed with pt. Copy of discharge papers given to pt. Patient left at 1715 accompanied by brother? via personal wheelchair. Education completed in EPIC.     A: Patient/family verbalized understanding and was ready for discharge. Pt A&O x4 upon discharge. PIV removed from R hand. Portable wound vac to RLE applied when received from sterile stores; suctioning appropriately. Home Care RN to manage; phone number sent with pt.      P: Patient instructed to  5 medications in Pharmacy. Follow up as scheduled Nov. 6th, 2019.

## 2019-11-04 ENCOUNTER — TELEPHONE (OUTPATIENT)
Dept: VASCULAR SURGERY | Facility: CLINIC | Age: 64
End: 2019-11-04

## 2019-11-04 ENCOUNTER — TELEPHONE (OUTPATIENT)
Dept: FAMILY MEDICINE | Facility: CLINIC | Age: 64
End: 2019-11-04

## 2019-11-04 ENCOUNTER — PATIENT OUTREACH (OUTPATIENT)
Dept: CARE COORDINATION | Facility: CLINIC | Age: 64
End: 2019-11-04

## 2019-11-04 ENCOUNTER — DOCUMENTATION ONLY (OUTPATIENT)
Dept: CARE COORDINATION | Facility: CLINIC | Age: 64
End: 2019-11-04

## 2019-11-04 DIAGNOSIS — Z71.89 OTHER SPECIFIED COUNSELING: ICD-10-CM

## 2019-11-04 ASSESSMENT — ACTIVITIES OF DAILY LIVING (ADL)
DEPENDENT_IADLS:: MEDICATION MANAGEMENT;MONEY MANAGEMENT;TRANSPORTATION;MEAL PREPARATION;SHOPPING;LAUNDRY;COOKING;CLEANING

## 2019-11-04 NOTE — PROGRESS NOTES
Date: 11/6/2019 Status: Danelle   Time: 4:30 PM Length: 30   Visit Type: UMP RETURN HEART FAILURE [[id number]] MATHEW: [id number]   Provider: Norah Brown MD Department:  CARDIOVASCULAR      Patient was contacted by an RN for post DC follow up so no duplicate post DC follow up call will be made

## 2019-11-04 NOTE — TELEPHONE ENCOUNTER
This patient was discharged from [facility] on 11/01/2019.    Discharge Diagnosis:S/P Bka (Below Knee Amputation) Unilateral (H)    Follow-up instructions: Follow up with primary care provider, Avery Duke, within 7 days to evaluate medication changes, to evaluate post-op pain control, and for hospital follow-up. No follow up labs or test are needed prior to this visit.     A follow-up visit has not been scheduled.      Please follow-up with patient.

## 2019-11-04 NOTE — TELEPHONE ENCOUNTER
Attempt #   Called patient at home [phone]  Was call answered?  No answer, left message to call nurse line at [phone] to scheduled hospital f/u appointment    Sandra Chan RN  [facility]

## 2019-11-04 NOTE — TELEPHONE ENCOUNTER
Called Cass with [facility]: instructed to clean wound with saline.  She will call for any further instructions if needed.

## 2019-11-04 NOTE — TELEPHONE ENCOUNTER
Cass called from [facility]. She is seeing the patient today at 430pm. She would like to know if the wound vac needs to be cleaned and if so with what (saline or )?   Please call her back.   Natasha Hu CMA

## 2019-11-04 NOTE — PROGRESS NOTES
Clinic Care Coordination Contact  [facility]/Voicemail    Referral Source: IP Handoff  Clinical Data: Care Coordinator Outreach  Outreach attempted x 1.  Left message on patient's voicemail with call back information and requested return call.  Plan: Care Coordinator will send care coordination introduction letter with care coordinator contact information and explanation of care coordination services via [employer]. Care Coordinator will try to reach patient again in 1-2 business days.          Darin Yan MSN, RN, PHN, [facility]   Primary Care Clinical RN Care Coordinator  [facility]  Employee of [facility] [employer]   [email or web address]  Office: [phone]

## 2019-11-04 NOTE — LETTER
[place] CARE COORDINATION  [address]  [place] [address]    November 4, 2019    Constantino Taylor  [address]   [place] [address]      Dear Constantino,    I am a clinic care coordinator who works with Avery Duke MD at [facility]. I recently tried to call and was unable to reach you. I wanted to introduce myself and provide you with my contact information so that you can call me with questions or concerns about your health care. Below is a description of clinic care coordination and how I can further assist you.     The clinic care coordinator is a registered nurse and/or  who understand the health care system. The goal of clinic care coordination is to help you manage your health and improve access to the [facility] system in the most efficient manner. The registered nurse can assist you in meeting your health care goals by providing education, coordinating services, and strengthening the communication among your providers. The  can assist you with financial, behavioral, psychosocial, chemical dependency, counseling, and/or psychiatric resources.    Please feel free to contact me at [phone], with any questions or concerns. We at [facility] are focused on providing you with the highest-quality healthcare experience possible and that all starts with you.     Sincerely,     Darin Yan MSN, RN, PHN, CCM   Primary Care Clinical RN Care Coordinator  [facility]  Employee of [facility] [employer] [facility] [employer]   [email or web address]  Office: [phone]

## 2019-11-04 NOTE — PROGRESS NOTES
Clinic Care Coordination Contact    Clinic Care Coordination Contact  OUTREACH    Referral Information:  Referral Source: IP Handoff    Primary Diagnosis: Orthopedic    Chief Complaint   Patient presents with     Clinic Care Coordination - Post Hospital     primary care RN CC        [place] Utilization: The patient uses the [facility] system.  For this recent admission he started out at [facility] and was then transferred to the [facility] hospital.  Clinic Utilization  Difficulty keeping appointments:: No  Compliance Concerns: No  No-Show Concerns: No  No PCP office visit in Past Year: No  Utilization    Last refreshed: 11/4/2019  1:09 PM:  Hospital Admissions 1           Last refreshed: 11/4/2019  1:09 PM:  ED Visits 0           Last refreshed: 11/4/2019  1:09 PM:  No Show Count (past year) 5              Current as of: 11/4/2019  1:09 PM              Clinical Concerns:  Current Medical Concerns:  The patient was hospitalized for an infection of his amputation stump requiring I&D as well as IV antibiotics.  The patient was returned to his home with a wound vac and home care.  The patient was very concerned today as he had been home since Friday and has not heard from homecare for an appointment.  The RN CC listened with empathy and encouraged the patient to call [facility] care and the number was given to him.  The RN CC reviewed the information we have on the patient in care management.  The job of the RN care coordinator was explained to the patient and he agreed to have a follow-up call in a week.  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  Education Provided to patient: Options for care coordination and how that differs from the home care nurse.  Pain  Pain (GOAL):: Yes  Type: Acute on Chronic  Location of chronic pain:: stump of amputated leg  Radiating: No  Progression: Constant  Description of pain: Throbbing  Chronic pain severity:: 5  Limitation of routine activities due to chronic pain:: Yes  Description: Unable to perform most daily activities (chores, hobbies, social activities, driving)  Alleviating Factors: Rest, Ice, Pain Medication  Aggravating Factors: Activity, Positioning  Health Maintenance Reviewed: Due/Overdue   Health Maintenance Due   Topic Date Due     PREVENTIVE CARE VISIT  01/06/2015     FIT  03/17/2018     HF ACTION PLAN  10/17/2019     ZOSTER IMMUNIZATION (3 of 3) 01/31/2019     DTAP/TDAP/TD IMMUNIZATION (2 - Td) 10/23/2019     Clinical Pathway: None    Medication Management:  Patient is knowledgeable on medications and is adherent.  No financial concerns reported at this time.  Medication reconciliation was done at this time.  Patient states that he does have assistance for setting up and giving his medications.       Functional Status:  Dependent ADLs:: Wheelchair-independent  Dependent IADLs:: Medication Management, Money Management, Transportation, Meal Preparation, Shopping, Laundry, Cooking, Cleaning  Bed or wheelchair confined:: No  Mobility Status: Independent w/Device  Fallen 2 or more times in the past year?: No  Any fall with injury in the past year?:  No    Living Situation:  Type of residence:: Apartment - handicap accessible    Diet/Exercise/Sleep:  Diet:: Regular  Inadequate nutrition (GOAL):: No  Food Insecurity: No  Tube Feeding: No  Exercise:: Currently not exercising  Inadequate activity/exercise (GOAL):: No  Significant changes in sleep pattern (GOAL): No    Transportation:  Transportation concerns (GOAL):: No  Transportation means:: Medical transport     Psychosocial:  Orthodoxy or spiritual beliefs that impact treatment:: No  Mental health DX:: No  Mental health management concern (GOAL):: No  Informal Support system:: Family     Financial/Insurance:   Financial/Insurance concerns (GOAL):: No     Resources and Interventions:  Current Resources:   List of home care services:: Skilled Nursing, Physicial Therapy, Occupational Therapy;   Community Resources: Home Care, PCA  Supplies used at home:: Wound Care Supplies  Equipment Currently Used at Home: wheelchair, manual, prosthesis    Advance Care Plan/Directive  Advanced Care Plans/Directives on file:: No  Advanced Care Plan/Directive Status: Considering Options    Referrals Placed: None     Goals:         Patient/Caregiver understanding: Patient has a fairly good understanding of the disease process.  And given the right information he also has the patient's necessary.    Outreach Frequency: weekly  Future Appointments              In 2 days  LAB [facility]    In 2 days Norah Brown MD [facility]    In 2 weeks Michelle Matute MD [facility]    In 1 month Lon Davis MD [facility] and Infectious Diseases, [facility]    In 2 months [facility] LAB DRAW [facility]    In 2 months Debbi Short PA-C [facility] Cancer [facility]    In 2 months  28 ATC;  ONCOLOGY INFUSION [facility]          Plan: 1.  The patient will make the call to [facility] to the  set up the initial call and appointment from the staff.  2.  The patient will take all medications as prescribed by the providers any questions regarding the medications should be referred to the provider.  3.  The patient will monitor his leg for signs of infection in between the visits from the home care nurse.  4.  Care Coordination to remain available for the patient to contact in the event of future needs.            Darin Yan MSN, RN, PHN, CCM   Primary Care Clinical RN Care Coordinator  [facility]  Employee of [facility]   [email or web address]  Office: [phone]

## 2019-11-05 LAB
BACTERIA SPEC CULT: NO GROWTH
BACTERIA SPEC CULT: NORMAL
Lab: NORMAL
SPECIMEN SOURCE: NORMAL

## 2019-11-05 NOTE — PROGRESS NOTES
[facility] now requests orders and shares plan of care/discharge summaries for some patients through [employer].  Please REPLY TO THIS MESSAGE OR ROUTE BACK TO THE AUTHOR in order to give authorization for orders when needed.  This is considered a verbal order, you will still receive a faxed copy of orders for signature.  Thank you for your assistance in improving collaboration for our patients.    ORDER  Nursing 2x/week for 1 week; 3x/week for 3 weeks 8 PRN    Wound care to be completed 3x/week MWF. Clean with wound  or NS. Pack with black foam and apply wound vac dressing. Wound vac to be at 125mmHg continously.     Draw CBC with Diff, BMP and CRP 11/22    Cass Osborn RN Critical access hospital  [phone]  [email or web address]

## 2019-11-05 NOTE — TELEPHONE ENCOUNTER
Attempt # 2  Called patient at home [phone]  Was call answered?  Yes, scheduled follow up appointment for Friday 11/8   Patient states is in a lot of pain. Has Oxycodone 5 mg and 10 mg         Sandra Chan RN  [facility]

## 2019-11-06 ENCOUNTER — TELEPHONE (OUTPATIENT)
Dept: FAMILY MEDICINE | Facility: CLINIC | Age: 64
End: 2019-11-06

## 2019-11-06 NOTE — TELEPHONE ENCOUNTER
Forms received from:  Home Care   Phone number listed: [phone]   Fax listed: [phone]  Date received: 11.06.19  Form description: SN  Once forms are completed, please return to  Home Care via Fax.  Is patient requesting to be contacted when forms are completed: NA    Form placed: in provides basket  Susan Resech

## 2019-11-08 ENCOUNTER — HEALTH MAINTENANCE LETTER (OUTPATIENT)
Age: 64
End: 2019-11-08

## 2019-11-08 ENCOUNTER — TELEPHONE (OUTPATIENT)
Dept: FAMILY MEDICINE | Facility: CLINIC | Age: 64
End: 2019-11-08

## 2019-11-08 DIAGNOSIS — T87.43 INFECTION OF AMPUTATION STUMP OF RIGHT LOWER EXTREMITY (H): Primary | ICD-10-CM

## 2019-11-08 NOTE — TELEPHONE ENCOUNTER
Noted, I printed the Epic med list and included in the pages to be faxed back to home care.    Form and encounter routed to Dr. Duke to sign.    Janet Tucker RN  [facility]

## 2019-11-08 NOTE — TELEPHONE ENCOUNTER
"Patient was last seen 8/15/19 by Dr. Duke.    [facility] only has tylenol, atorvastatin, zarelto, baby aspirin, and miralax matching Epic (tylenol dosing is not the same but is same med)    [facility] also lists oxycodone ER 10 mg tab, take every 6 hours PRN pain.      Remaining meds listed on [facility] are not on [facility].    Is patient on the meds listed in Epic in addition to the ones on  [facility]?   Dosing on the oxycodone?   We have 5 mg listed.    I called [facility] at [phone], reception staff put page out to nurse  \"Nicholas\" to call back to RN line at [phone] to discuss.    Maybe we just need to print our list and include with [facility]?      Janet Tucker, RN  [facility]      "

## 2019-11-08 NOTE — TELEPHONE ENCOUNTER
Forms received from:  Home Care   Phone number listed: [phone]   Fax listed: [phone]  Date received: 11.08.19  Form description: Home Health Cart(11.03.19-01.02.20)  Once forms are completed, please return to  Home Care via Fax.  Is patient requesting to be contacted when forms are completed: NA    Form placed: in providers GAY [place]

## 2019-11-08 NOTE — TELEPHONE ENCOUNTER
Mera, [facility] RN called triage line.     RN was able to reconcile medications over the phone. All were correct including oxycodone - In Epic, sign indicates 5mg, 2 tabs, Q6hrs (for total of 10mg).     [facility] RN will see patient on Monday, and confirm dose with patient.     Estrella Ratliff, RN, BSN, PHN  [facility]

## 2019-11-11 ENCOUNTER — PATIENT OUTREACH (OUTPATIENT)
Dept: CARE COORDINATION | Facility: CLINIC | Age: 64
End: 2019-11-11

## 2019-11-11 NOTE — LETTER
[facility]  Complex Care Plan  About Me:    Patient Name:  Constantino Taylor    YOB: 1955  Age:         63 year old   Joyce MRN:    [id number] Telephone Information:  Home Phone [phone]   Mobile [phone]       Address:  [address] [place] [id number] Email address:  [email or web address]      Emergency Contact(s)    Name Relationship Lgl Grd Work Phone Home Phone Mobile Phone   1JUAN MIGUEL ROBERTS Sister No  [phone]           Primary language:  English     needed? No   [place] [employer] [facility]:  [phone] op. 1  Other communication barriers:    Preferred Method of Communication:  Mail  Current living arrangement:    Mobility Status/ Medical Equipment:      Health Maintenance  Health Maintenance Reviewed:      My Access Plan  Medical Emergency 911   Primary Clinic Line [facility] [place] - [phone]   24 Hour Appointment Line [phone] or  [phone]) (toll-free)   24 Hour Nurse Line [phone] (toll-free)   Preferred Urgent Care     Preferred Hospital     [id number] [address] AT SEC OF [facility]     Behavioral Health Crisis Line The National Suicide Prevention Lifeline at 1-325.481.9236 or 911             My Care Team Members  Patient Care Team       Relationship Specialty Notifications Start End    Avery Duke MD PCP - General Internal Medicine  5/14/15     Phone: [phone] Fax: [phone]         [address] [facility] [address]    Avery Duke MD Assigned PCP   7/5/15     Phone: [phone] Fax: [phone]         [address] [facility] [address]    Piper Polk, RN Nurse Coordinator Vascular Surgery Abnormal results only, Admissions 7/16/15     Phone: [phone] Pager: [phone]        Odessa Browning MD MD Vascular Surgery  8/10/15      Phone: [phone] Fax: [phone]         [address] [facility] [address] [facility] [address]    Norah Brown MD MD Cardiology  2/7/16     Phone: [phone] Fax: [phone]         [address] [facility] [address]    Cheyenne Mansfield MD MD Internal Medicine  2/9/16     Quinn Rodriguez MD MD Oncology Admissions 9/25/17     Phone: [phone] Fax: [phone]         [address] [facility] [address]    Bryanna Rubio, RN Specialty Care Coordinator Cardiology Admissions 10/18/19     Phone: [phone]         Norah Brown MD MD Cardiology  10/18/19     Phone: [phone] Fax: [phone]         [address] [facility] [address]    [facility] ([facility]  11/1/19     Phone: [phone]         Darin Dickson, RN Lead Care Coordinator Primary Care - CC Admissions 11/4/19     Phone: [phone] Fax: [phone]                My Care Plans  Self Management and Treatment Plan  Goals and (Comments)  Goals        General    #1  Monitoring (pt-stated)     Notes - Note created  11/11/2019  2:00 PM by Darin Dickson, RN    Goal Statement: I will work with home care to ensure that my bandages and wound vac is taken care of 3 times per week.  I will monitor for signs of infection and report to homecare and the provider.  Date goal set: 11/11/19  Measure of Success: The patient has homecare come 3 times per week and there are no signs of infection.  Barriers: none currently noted  Strengths: engaged in care coordination  Date to Achieve By: 11/11/2020  Patient expressed understanding of goal: yes    Action steps to achieve this goal  1. I will allow homecare to come and care for me 3 times per week.  2. I will monitor for signs of infection such as increased redness or discharge that is a change.                   Action Plans on File:                       Advance Care Plans/Directives Type:        My Medical and Care  Information  Problem List   Patient Active Problem List   Diagnosis     Cerebral infarction (H)     Hepatitis C     Tobacco use disorder     Chronic low back pain     Acute pancreatitis     Hyperlipidemia LDL goal <70     Hypertension goal BP (blood pressure) < 140/90     Nonischemic dilated cardiomyopathy (HCC)     Malignant neoplasm of prostate (H)     Erectile dysfunction     Eczema     PAD (peripheral artery disease) (H)     S/P BKA (below knee amputation) unilateral (H)     Encounter for counseling     Acute renal failure (H)     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    Care Coordination Start Date: No linked episodes   Frequency of Care Coordination:     Form Last Updated: 11/11/2019

## 2019-11-11 NOTE — PROGRESS NOTES
Clinic Care Coordination Contact    Follow Up Progress Note      Assessment: The patient states that he is working with homecare.  They are doing the bandage changes 3 times per week.  The patient is monitoring the area for an increase in redness or drainage that is different than usual.  The patient is having pain at 7/10 and he will use the medications, as well as other modalities to try and reduce that pain.  Medication reconciliation was completed.  The patient agrees to have the RN CC contact him again in 4 weeks.    Goals addressed this encounter:   Goals Addressed                 This Visit's Progress      #1  Monitoring (pt-stated)        Goal Statement: I will work with home care to ensure that my bandages and wound vac is taken care of 3 times per week.  I will monitor for signs of infection and report to homecare and the provider.  Date goal set: 11/11/19  Measure of Success: The patient has homecare come 3 times per week and there are no signs of infection.  Barriers: none currently noted  Strengths: engaged in care coordination  Date to Achieve By: 11/11/2020  Patient expressed understanding of goal: yes    Action steps to achieve this goal  1. I will allow homecare to come and care for me 3 times per week.  2. I will monitor for signs of infection such as increased redness or discharge that is a change.                   Intervention/Education provided during outreach: monitoring for changes in the wound area between visits from homecare.            Plan:   1.  The patient will monitor the wound area for any changes in redness or drainage.  2.  The patient will use the medications and other modalities to decrease the pain level below 7/10.  3.  The patient will take all medications as prescribed by the providers..  4.  Care Coordination to remain available for the patient to contact in the event of future needs.      RN Care Coordinator will follow up in 4 weeks.          Darin Yan MSN, RN, PHN, CCM    Primary Care Clinical RN Care Coordinator  [facility]  Employee of [employer] [facility]   [email or web address]  Office: [phone]

## 2019-11-12 DIAGNOSIS — Z53.9 DIAGNOSIS NOT YET DEFINED: Primary | ICD-10-CM

## 2019-11-12 PROCEDURE — G0180 MD CERTIFICATION HHA PATIENT: HCPCS | Performed by: INTERNAL MEDICINE

## 2019-11-12 PROCEDURE — G0179 MD RECERTIFICATION HHA PT: HCPCS | Performed by: INTERNAL MEDICINE

## 2019-11-15 ENCOUNTER — TELEPHONE (OUTPATIENT)
Dept: FAMILY MEDICINE | Facility: CLINIC | Age: 64
End: 2019-11-15

## 2019-11-15 NOTE — TELEPHONE ENCOUNTER
Correct number [phone] called Renan. Scheduled for hospital follow up 11/19 with Dr. Duke - no further needs.      Sandra Chan RN  [facility]

## 2019-11-15 NOTE — TELEPHONE ENCOUNTER
Reason for Call:  Other appointment    Detailed comments: Saurabh pt's care coordinator would like a call back as pt ws just discharged from hospital and needs to have a follow-up scheduled soon.    Phone Number Patient can be reached at: Other phone number:  [phone]    Best Time: ASAP    Can we leave a detailed message on this number? NO    Call taken on 11/15/2019 at 9:55 AM by Khalida Pozo

## 2019-11-19 ENCOUNTER — OFFICE VISIT (OUTPATIENT)
Dept: FAMILY MEDICINE | Facility: CLINIC | Age: 64
End: 2019-11-19
Payer: COMMERCIAL

## 2019-11-19 VITALS
BODY MASS INDEX: 25.25 KG/M2 | WEIGHT: 202 LBS | OXYGEN SATURATION: 98 % | SYSTOLIC BLOOD PRESSURE: 130 MMHG | HEART RATE: 87 BPM | DIASTOLIC BLOOD PRESSURE: 74 MMHG

## 2019-11-19 DIAGNOSIS — I73.9 PAD (PERIPHERAL ARTERY DISEASE) (H): ICD-10-CM

## 2019-11-19 DIAGNOSIS — G89.29 CHRONIC LOW BACK PAIN WITH SCIATICA, SCIATICA LATERALITY UNSPECIFIED, UNSPECIFIED BACK PAIN LATERALITY: ICD-10-CM

## 2019-11-19 DIAGNOSIS — I50.22 CHRONIC SYSTOLIC HEART FAILURE (H): ICD-10-CM

## 2019-11-19 DIAGNOSIS — E78.5 HYPERLIPIDEMIA LDL GOAL <70: ICD-10-CM

## 2019-11-19 DIAGNOSIS — L02.419 CELLULITIS AND ABSCESS OF LEG: ICD-10-CM

## 2019-11-19 DIAGNOSIS — M54.40 CHRONIC LOW BACK PAIN WITH SCIATICA, SCIATICA LATERALITY UNSPECIFIED, UNSPECIFIED BACK PAIN LATERALITY: ICD-10-CM

## 2019-11-19 DIAGNOSIS — G89.18 ACUTE POST-OPERATIVE PAIN: Primary | ICD-10-CM

## 2019-11-19 DIAGNOSIS — L03.119 CELLULITIS AND ABSCESS OF LEG: ICD-10-CM

## 2019-11-19 DIAGNOSIS — I10 HYPERTENSION GOAL BP (BLOOD PRESSURE) < 140/90: ICD-10-CM

## 2019-11-19 LAB
ANION GAP SERPL CALCULATED.3IONS-SCNC: 6 MMOL/L (ref 3–14)
BASOPHILS # BLD AUTO: 0 10E9/L (ref 0–0.2)
BASOPHILS NFR BLD AUTO: 0.3 %
BUN SERPL-MCNC: 29 MG/DL (ref 7–30)
CALCIUM SERPL-MCNC: 9.4 MG/DL (ref 8.5–10.1)
CHLORIDE SERPL-SCNC: 111 MMOL/L (ref 94–109)
CO2 SERPL-SCNC: 23 MMOL/L (ref 20–32)
CREAT SERPL-MCNC: 1.28 MG/DL (ref 0.66–1.25)
CRP SERPL-MCNC: <2.9 MG/L (ref 0–8)
DIFFERENTIAL METHOD BLD: ABNORMAL
EOSINOPHIL # BLD AUTO: 0.2 10E9/L (ref 0–0.7)
EOSINOPHIL NFR BLD AUTO: 3.4 %
ERYTHROCYTE [DISTWIDTH] IN BLOOD BY AUTOMATED COUNT: 12.9 % (ref 10–15)
GFR SERPL CREATININE-BSD FRML MDRD: 60 ML/MIN/{1.73_M2}
GLUCOSE SERPL-MCNC: 126 MG/DL (ref 70–99)
HCT VFR BLD AUTO: 37.9 % (ref 40–53)
HGB BLD-MCNC: 12.7 G/DL (ref 13.3–17.7)
LYMPHOCYTES # BLD AUTO: 1.3 10E9/L (ref 0.8–5.3)
LYMPHOCYTES NFR BLD AUTO: 20.3 %
MCH RBC QN AUTO: 30.2 PG (ref 26.5–33)
MCHC RBC AUTO-ENTMCNC: 33.5 G/DL (ref 31.5–36.5)
MCV RBC AUTO: 90 FL (ref 78–100)
MONOCYTES # BLD AUTO: 0.5 10E9/L (ref 0–1.3)
MONOCYTES NFR BLD AUTO: 8.4 %
NEUTROPHILS # BLD AUTO: 4.3 10E9/L (ref 1.6–8.3)
NEUTROPHILS NFR BLD AUTO: 67.6 %
PLATELET # BLD AUTO: 202 10E9/L (ref 150–450)
POTASSIUM SERPL-SCNC: 4 MMOL/L (ref 3.4–5.3)
RBC # BLD AUTO: 4.2 10E12/L (ref 4.4–5.9)
SODIUM SERPL-SCNC: 140 MMOL/L (ref 133–144)
WBC # BLD AUTO: 6.4 10E9/L (ref 4–11)

## 2019-11-19 PROCEDURE — 36415 COLL VENOUS BLD VENIPUNCTURE: CPT | Performed by: INTERNAL MEDICINE

## 2019-11-19 PROCEDURE — 86140 C-REACTIVE PROTEIN: CPT | Performed by: INTERNAL MEDICINE

## 2019-11-19 PROCEDURE — 90471 IMMUNIZATION ADMIN: CPT | Performed by: INTERNAL MEDICINE

## 2019-11-19 PROCEDURE — 90714 TD VACC NO PRESV 7 YRS+ IM: CPT | Performed by: INTERNAL MEDICINE

## 2019-11-19 PROCEDURE — 99214 OFFICE O/P EST MOD 30 MIN: CPT | Mod: 25 | Performed by: INTERNAL MEDICINE

## 2019-11-19 PROCEDURE — 85025 COMPLETE CBC W/AUTO DIFF WBC: CPT | Performed by: INTERNAL MEDICINE

## 2019-11-19 PROCEDURE — 80048 BASIC METABOLIC PNL TOTAL CA: CPT | Performed by: INTERNAL MEDICINE

## 2019-11-19 RX ORDER — OXYCODONE HYDROCHLORIDE 10 MG/1
10 TABLET ORAL EVERY 6 HOURS PRN
Qty: 120 TABLET | Refills: 0 | Status: SHIPPED | OUTPATIENT
Start: 2019-11-19 | End: 2019-12-18

## 2019-11-19 NOTE — PROGRESS NOTES
Subjective     Constantino Taylor is a 63 year old male who presents to clinic today for the following health issues:  Please keep appointment with Dr. Davis, from Infectious Disease, on   December 11, 2019. Continue doxycycline 100mg orally BID and amoxicillin   1000mg orally BID until seen in clinic. Please have labs drawn (BMP, CBC   with diff, and CRP) in 3 weeks and have results faxed to ID clinic (phone:   [phone]). Please have MRI without contrast of LLE in 5-6 weeks.  [facility]     Hospital Follow-up Visit:    Hospital/Nursing Home/IP Rehab Facility: [facility]  Date of Admission: 10/24/19  Date of Discharge: 11/1/19  Reason(s) for Admission: PAD     [place] [facility] [place] wound with I and D  Wound vac in place  On antibiotics  No diarrhea vomiting and throwing up  10 mg tab 4 per day.  10 mg tab 5 times daily           Problems taking medications regularly:  None       Medication changes since discharge: None       Problems adhering to non-medication therapy:  None    Summary of hospitalization:  [facility] discharge summary reviewed  Diagnostic Tests/Treatments reviewed.  Follow up needed: ongoing wound care, surgical care and pain managmeent  Other Healthcare Providers Involved in Patient s Care:         Homecare, Specialist appointment - wound care and surgery and Surgical follow-up appointment - r  Update since discharge: improved.     Post Discharge Medication Reconciliation: discharge medications reconciled and changed, per note/orders (see AVS).  Plan of care communicated with patient     Coding guidelines for this visit:  Type of Medical   Decision Making Face-to-Face Visit       within 7 Days of discharge Face-to-Face Visit        within 14 days of discharge   Moderate Complexity [id number]   High Complexity [id number]                Patient Active Problem List   Diagnosis     Cerebral infarction (H)     Hepatitis C     Tobacco use disorder     Chronic  low back pain     Acute pancreatitis     Hyperlipidemia LDL goal <70     Hypertension goal BP (blood pressure) < 140/90     Nonischemic dilated cardiomyopathy (HCC)     Malignant neoplasm of prostate (H)     Erectile dysfunction     Eczema     PAD (peripheral artery disease) (H)     S/P BKA (below knee amputation) unilateral (H)     Encounter for counseling     Acute renal failure (H)     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     Dyslipidemia     Nicotine dependence, uncomplicated     Pyelonephritis     Prostate cancer (H)     Osteomyelitis (H)     Past Surgical History:   Procedure Laterality Date     AMPUTATE LEG BELOW KNEE Left 2015    Procedure: AMPUTATE LEG BELOW KNEE;  Surgeon: Odessa Browning MD;  Location: [facility] OR     IRRIGATION AND DEBRIDEMENT LOWER EXTREMITY, COMBINED Left 10/29/2019    Procedure: IRRIGATION AND DEBRIDEMENT, LEFT LOWER EXTREMITY w/ Veriflow Wound Vac Placement.;  Surgeon: Michelle Matute MD;  Location: [facility] OR     removal of blood clots in arm         Social History     Tobacco Use     Smoking status: Former Smoker     Packs/day: 0.30     Years: 40.00     Pack years: 12.00     Types: Cigarettes     Last attempt to quit: 3/12/2018     Years since quittin.6     Smokeless tobacco: Former User   Substance Use Topics     Alcohol use: Yes     Alcohol/week: 2.0 - 6.0 standard drinks     Types: 2 - 6 Cans of beer per week     Comment: couple of beers per episode, several times a week     Family History   Problem Relation Age of Onset     Cancer Mother         brain     Cancer Brother          Current Outpatient Medications   Medication Sig Dispense Refill     order for DME Equipment being ordered: Wheelchair, manual 1 each 0     oxyCODONE IR (ROXICODONE) 10 MG tablet Take 1 tablet (10 mg) by mouth every 6 hours as needed for moderate to  "severe pain or severe pain 120 tablet 0     acetaminophen (TYLENOL) 325 MG tablet Take 325-650 mg by mouth every 6 hours as needed for mild pain (Takes infrequently)       amoxicillin (AMOXIL) 500 MG capsule Take 2 capsules (1,000 mg) by mouth 2 times daily 160 capsule 0     aspirin (ASPIRIN LOW DOSE) 81 MG EC tablet TAKE 1 TABLET(81 MG) BY MOUTH DAILY 90 tablet 3     atorvastatin (LIPITOR) 40 MG tablet Take 1 tablet (40 mg) by mouth At Bedtime 90 tablet 4     doxycycline hyclate (VIBRAMYCIN) 100 MG capsule Take 1 capsule (100 mg) by mouth 2 times daily 80 capsule 0     gabapentin (NEURONTIN) 300 MG capsule Take 1-2 capsules (300-600 mg) by mouth 3 times daily 540 capsule 3     lisinopril (PRINIVIL/ZESTRIL) 40 MG tablet Take 1 tablet (40 mg) by mouth daily 90 tablet 3     melatonin 1 MG TABS tablet Take 1 tablet (1 mg) by mouth nightly as needed for sleep 30 tablet 0     metoprolol succinate ER (TOPROL-XL) 100 MG 24 hr tablet Take 1 tablet (100 mg) by mouth daily 90 tablet 3     Multiple Vitamins-Minerals (ONE-A-DAY MENS 50+ ADVANTAGE) TABS Take 1 each by mouth daily 100 tablet 3     naloxone (EVZIO) 0.4 MG/0.4ML auto-injector Inject 0.4 mLs (0.4 mg) into the muscle as needed for opioid reversal every 2-3 minutes until assistance arrives 0.8 mL 1     order for DME Equipment being ordered: self adhesive bandage 4\" by 8\" 30 each 11     order for DME Please dispense blood pressure machine and cuff. 1 each 0     polyethylene glycol (MIRALAX) powder Take 17 g by mouth as needed for constipation (Patient taking differently: Take 17 g by mouth as needed for constipation (1-2 times per month on average) ) 510 g 1     rivaroxaban ANTICOAGULANT (XARELTO) 20 MG TABS tablet Take 1 tablet (20 mg) by mouth daily (with dinner) 90 tablet 3     tamsulosin (FLOMAX) 0.4 MG capsule Take 1 capsule (0.4 mg) by mouth daily 90 capsule 3     triamcinolone (KENALOG) 0.1 % cream Take 1 apply by topical route two times a day as needed (Patient " taking differently: Take 1 apply by topical route two times a day as needed (uses infrequently)) 80 g 3     No Known Allergies  Recent Labs   Lab Test 11/19/19  1007 10/28/19  0646  10/24/19  2327 10/14/19  1419 10/02/19  1229  03/02/19 01/12/18  0923  10/17/16  1143  05/10/15  1648  11/25/14  1203  01/06/14  1103 07/12/13  1109  11/15/12  0845   A1C  --   --   --   --   --   --   --   --   --   --   --   --   --   --   --  5.8  --  5.8 5.4  --   --    LDL  --   --   --   --   --   --   --  58  --  42  --  46  --   --    < >  --    < >  --   --    < >  --    HDL  --   --   --   --   --   --   --  35*  --  39*  --  41  --   --    < >  --    < >  --   --    < >  --    TRIG  --   --   --   --   --   --   --  97  --  74  --  91  --   --    < >  --    < >  --   --    < >  --    ALT  --   --   --  14 17 19  --   --    < > 29   < >  --    < >  --    < >  --    < > 69 37  --  52   CR 1.28* 1.38*   < > 1.41* 1.34* 1.35*   < >  --    < > 1.64*   < > 1.53*   < > 1.90*   < > 1.02   < > 0.86 1.08   < > 0.87   GFRESTIMATED 60* 54*   < > 53* 56* 56*   < >  --    < > 43*   < > 47*   < > 37*   < > 75   < > >90 71   < > >90   GFRESTBLACK 69 63   < > 61 65 65   < >  --    < > 52*   < > 57*   < > 44*   < > >90   GFR Calc     < > >90 86   < > >90   POTASSIUM 4.0 4.4   < > 4.1 3.9 4.2   < >  --    < > 4.0   < > 4.6   < > 5.0   < > 4.5   < > 4.5 4.1   < > 3.6   TSH  --   --   --   --   --   --   --   --   --   --   --   --   --  0.99  --   --   --   --   --   --  1.18    < > = values in this interval not displayed.      BP Readings from Last 3 Encounters:   11/19/19 130/74   11/01/19 115/71   10/14/19 (!) 155/94    Wt Readings from Last 3 Encounters:   11/19/19 91.6 kg (202 lb)   10/24/19 91.5 kg (201 lb 11.5 oz)   10/02/19 94.3 kg (208 lb)                      Reviewed and updated as needed this visit by Provider         Review of Systems   ROS COMP: Constitutional, HEENT, cardiovascular, pulmonary, gi and gu systems are  negative, except as otherwise noted.      Objective    /74 (BP Location: Right arm, Patient Position: Chair, Cuff Size: Adult Large)   Pulse 87   Wt 91.6 kg (202 lb)   SpO2 98%   BMI 25.25 kg/m    Body mass index is 25.25 kg/m .  Physical Exam   GENERAL: healthy, alert and no distress  EYES: Eyes grossly normal to inspection, PERRL and conjunctivae and sclerae normal  HENT: ear canals and TM's normal, nose and mouth without ulcers or lesions  NECK: no adenopathy, no asymmetry, masses, or scars and thyroid normal to palpation  RESP: lungs clear to auscultation - no rales, rhonchi or wheezes  CV: regular rate and rhythm, normal S1 S2, no S3 or S4, no murmur, click or rub, no peripheral edema and peripheral pulses strong  ABDOMEN: soft, nontender, no hepatosplenomegaly, no masses and bowel sounds normal  MS: left stump with drain under wound vac dressing going into the open pocket with significant drainage    SKIN: no suspicious lesions or rashes  NEURO: Normal strength and tone, mentation intact and speech normal  BACK: no CVA tenderness, no paralumbar tenderness  PSYCH: mentation appears normal, affect normal/bright  LYMPH: no cervical, supraclavicular, axillary, or inguinal adenopathy    Diagnostic Test Results:  Labs reviewed in Epic  Results for orders placed or performed in visit on 11/19/19   **Basic metabolic panel FUTURE anytime     Status: Abnormal   Result Value Ref Range    Sodium 140 133 - 144 mmol/L    Potassium 4.0 3.4 - 5.3 mmol/L    Chloride 111 (H) 94 - 109 mmol/L    Carbon Dioxide 23 20 - 32 mmol/L    Anion Gap 6 3 - 14 mmol/L    Glucose 126 (H) 70 - 99 mg/dL    Urea Nitrogen 29 7 - 30 mg/dL    Creatinine 1.28 (H) 0.66 - 1.25 mg/dL    GFR Estimate 60 (L) >60 mL/min/[1.73_m2]    GFR Estimate If Black 69 >60 mL/min/[1.73_m2]    Calcium 9.4 8.5 - 10.1 mg/dL   **CBC with platelets differential FUTURE 2mo     Status: Abnormal   Result Value Ref Range    WBC 6.4 4.0 - 11.0 10e9/L    RBC Count  "4.20 (L) 4.4 - 5.9 10e12/L    Hemoglobin 12.7 (L) 13.3 - 17.7 g/dL    Hematocrit 37.9 (L) 40.0 - 53.0 %    MCV 90 78 - 100 fl    MCH 30.2 26.5 - 33.0 pg    MCHC 33.5 31.5 - 36.5 g/dL    RDW 12.9 10.0 - 15.0 %    Platelet Count 202 150 - 450 10e9/L    % Neutrophils 67.6 %    % Lymphocytes 20.3 %    % Monocytes 8.4 %    % Eosinophils 3.4 %    % Basophils 0.3 %    Absolute Neutrophil 4.3 1.6 - 8.3 10e9/L    Absolute Lymphocytes 1.3 0.8 - 5.3 10e9/L    Absolute Monocytes 0.5 0.0 - 1.3 10e9/L    Absolute Eosinophils 0.2 0.0 - 0.7 10e9/L    Absolute Basophils 0.0 0.0 - 0.2 10e9/L    Diff Method Automated Method    CRP, inflammation     Status: None   Result Value Ref Range    CRP Inflammation <2.9 0.0 - 8.0 mg/L           Assessment & Plan       ICD-10-CM    1. Acute post-operative pain G89.18 oxyCODONE IR (ROXICODONE) 10 MG tablet   2. Chronic systolic heart failure (H) I50.22    3. Hypertension goal BP (blood pressure) < 140/90 I10 **Basic metabolic panel FUTURE anytime   4. PAD (peripheral artery disease) (H) I73.9 order for DME   5. Hyperlipidemia LDL goal <70 E78.5    6. Chronic low back pain with sciatica, sciatica laterality unspecified, unspecified back pain laterality M54.40 oxyCODONE IR (ROXICODONE) 10 MG tablet    G89.29    7. Cellulitis and abscess of leg L03.119 **CBC with platelets differential FUTURE 2mo    L02.419 CRP, inflammation     order for DME        BMI:   Estimated body mass index is 25.25 kg/m  as calculated from the following:    Height as of 10/24/19: 1.905 m (6' 3\").    Weight as of this encounter: 91.6 kg (202 lb).   Weight management plan: Discussed healthy diet and exercise guidelines        Regular exercise    No follow-ups on file.    Avery Duke MD  [facility]"

## 2019-11-21 ENCOUNTER — OFFICE VISIT (OUTPATIENT)
Dept: VASCULAR SURGERY | Facility: CLINIC | Age: 64
End: 2019-11-21
Payer: COMMERCIAL

## 2019-11-21 VITALS
HEART RATE: 72 BPM | SYSTOLIC BLOOD PRESSURE: 137 MMHG | OXYGEN SATURATION: 100 % | DIASTOLIC BLOOD PRESSURE: 80 MMHG | TEMPERATURE: 97.5 F

## 2019-11-21 DIAGNOSIS — Z89.519 S/P BKA (BELOW KNEE AMPUTATION) UNILATERAL (H): ICD-10-CM

## 2019-11-21 DIAGNOSIS — Z51.89 VISIT FOR WOUND CHECK: Primary | ICD-10-CM

## 2019-11-21 ASSESSMENT — PAIN SCALES - GENERAL: PAINLEVEL: SEVERE PAIN (7)

## 2019-11-21 NOTE — LETTER
11/21/2019       RE: Constantino Taylor  [address]  [facility] [address]     Dear Colleague,    Thank you for referring your patient, Constantino Taylor, to the [facility] at [facility]. Please see a copy of my visit note below.    VASCULAR SURGERY ATTENDING ATTESTATION NOTE  I have seen and examined this patient with DR. Sood, and I agree with his findings and assessment. Briefly, this 64 YO male returns for evaluation s/p I&D of left BKA stump abscess and debridement of infected tibia. He is currently using a VAC dressing that is changed three times/week. Plan RTC in 6 weeks for monitoring of wound progress.    Michelle Matute MD          VASCULAR SURGERY CLINIC NOTE    HPI:    Pt is a 63 year old year old male with a past medical history significant for with a remote history of left BKA presented with a deep space abscess in the distal stump  MRI demonstrated bony changes compatible with tibial osteomyelitis s/p I&D left stump abscess, excision of infected distal tibia, and placement of VAC veriflo irrigation and wound suction system on 10/29.  Patient returns for postop check.    Subjective:   Patient denies any new medical problems, changes in medications, or recent hospitalizations.  Endorses compliance with wound vac changes every Monday, Wednesday, and Friday.  Denies any f/c/n/v/cp/sob.  Subjectively feels the wound and pain are improving.    Patient scheduled for followup MRI tib/fib, remains on antibiotics, and will be seen by ID on 12/11/19.    Review Of Systems:   General: Denies F/C  Respiratory: Denies SOB  Cardio: Denies CP  Gastrointestinal: Denies N/V  Neurologic: Denies HA  Psychiatric: Denies confusion    Patient Active Problem List   Diagnosis     Cerebral infarction (H)     Hepatitis C     Tobacco use disorder     Chronic low back pain     Acute pancreatitis     Hyperlipidemia LDL goal <70     Hypertension goal BP (blood  pressure) < 140/90     Nonischemic dilated cardiomyopathy (HCC)     Malignant neoplasm of prostate (H)     Erectile dysfunction     Eczema     PAD (peripheral artery disease) (H)     S/P BKA (below knee amputation) unilateral (H)     Encounter for counseling     Acute renal failure (H)     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     Dyslipidemia     Nicotine dependence, uncomplicated     Pyelonephritis     Prostate cancer (H)     Osteomyelitis (H)         Past Surgical History:  Past Surgical History:   Procedure Laterality Date     AMPUTATE LEG BELOW KNEE Left 6/19/2015    Procedure: AMPUTATE LEG BELOW KNEE;  Surgeon: Odessa Browning MD;  Location: [facility] OR     IRRIGATION AND DEBRIDEMENT LOWER EXTREMITY, COMBINED Left 10/29/2019    Procedure: IRRIGATION AND DEBRIDEMENT, LEFT LOWER EXTREMITY w/ Veriflow Wound Vac Placement.;  Surgeon: Michelle Matute MD;  Location: [facility] OR     removal of blood clots in arm           Objective:  Vital Signs:  There were no vitals taken for this visit.    Prior to Admission medications    Medication Sig Start Date End Date Taking? Authorizing Provider   acetaminophen (TYLENOL) 325 MG tablet Take 325-650 mg by mouth every 6 hours as needed for mild pain (Takes infrequently)    Unknown, Entered By History   amoxicillin (AMOXIL) 500 MG capsule Take 2 capsules (1,000 mg) by mouth 2 times daily 11/1/19 12/11/19  Sapphire Camacho MD   aspirin (ASPIRIN LOW DOSE) 81 MG EC tablet TAKE 1 TABLET(81 MG) BY MOUTH DAILY 8/15/19   Avery Duke MD   atorvastatin (LIPITOR) 40 MG tablet Take 1 tablet (40 mg) by mouth At Bedtime 8/15/19   Avery Duke MD   doxycycline hyclate (VIBRAMYCIN) 100 MG capsule Take 1 capsule (100 mg) by mouth 2 times daily 11/1/19 12/11/19  Sapphire Camacho MD   gabapentin (NEURONTIN) 300 MG capsule Take 1-2  "capsules (300-600 mg) by mouth 3 times daily 8/15/19   Avery Duke MD   lisinopril (PRINIVIL/ZESTRIL) 40 MG tablet Take 1 tablet (40 mg) by mouth daily 8/15/19   Avery Duke MD   melatonin 1 MG TABS tablet Take 1 tablet (1 mg) by mouth nightly as needed for sleep 11/1/19 12/1/19  Sapphire Camacho MD   metoprolol succinate ER (TOPROL-XL) 100 MG 24 hr tablet Take 1 tablet (100 mg) by mouth daily 8/15/19   Avery Duke MD   Multiple Vitamins-Minerals (ONE-A-DAY MENS 50+ ADVANTAGE) TABS Take 1 each by mouth daily 11/7/18   Avery Duke MD   naloxone (EVZIO) 0.4 MG/0.4ML auto-injector Inject 0.4 mLs (0.4 mg) into the muscle as needed for opioid reversal every 2-3 minutes until assistance arrives 11/1/19   Sapphire Camacho MD   order for DME Equipment being ordered: Wheelchair, manual 11/19/19   Avery Duke MD   order for DME Equipment being ordered: self adhesive bandage 4\" by 8\" 1/25/18   Avery Duke MD   order for DME Please dispense blood pressure machine and cuff. 9/13/17   Norah Brown MD   oxyCODONE IR (ROXICODONE) 10 MG tablet Take 1 tablet (10 mg) by mouth every 6 hours as needed for moderate to severe pain or severe pain 11/19/19   Avery Duke MD   polyethylene glycol (MIRALAX) powder Take 17 g by mouth as needed for constipation  Patient taking differently: Take 17 g by mouth as needed for constipation (1-2 times per month on average)  8/31/17   Avery Duke MD   rivaroxaban ANTICOAGULANT (XARELTO) 20 MG TABS tablet Take 1 tablet (20 mg) by mouth daily (with dinner) 8/15/19   Avery Duke MD   tamsulosin (FLOMAX) 0.4 MG capsule Take 1 capsule (0.4 mg) by mouth daily 3/7/19   Avery Duke MD   triamcinolone (KENALOG) 0.1 % cream Take 1 apply by topical route two times a day as needed  Patient taking differently: Take 1 apply by topical route two times a day as needed (uses infrequently) 8/31/17   Avery Duke MD       PHYSICAL " EXAM:  General: The patient is alert and oriented.  Moves all extremities.   HEENT: Color appropriate for race, warm, dry  Heart:: RRR  Lungs: Breathing unlabored    GI: Bowel sounds present.  Abdomen soft, nontender to light palpation.  LLE: wound vac in place, good seal, minimal drainage and edema  Neurologic: Grossly intact, EOMs grossly intact    Imaging:  n/a      Assessment:  Pt is a 63 year old year old male with a past medical history significant for with a remote history of left BKA presented with a deep space abscess in the distal stump  MRI demonstrated bony changes compatible with tibial osteomyelitis s/p I&D left stump abscess, excision of infected distal tibia, and placement of VAC veriflo irrigation and wound suction system on 10/29.  Patient returns for postop check.    Plan:  -stable postop  -continue outpatient wound vac therapy  -RTC in 6 weeks for another wound check  -patient understands and agrees to plan    Kevin Sood MD on 11/21/2019 at 2:04 PM

## 2019-11-21 NOTE — NURSING NOTE
Vascular Rooming Note     Constantino Taylor's goals for this visit include:   Chief Complaint   Patient presents with     Follow Up     Constantino, is being seen today for a 6 week  follow up,BKA left leg, shobha vang, no concerns at this time, as reported by patient.     Stacy Nunes LPN

## 2019-11-21 NOTE — PROGRESS NOTES
VASCULAR SURGERY ATTENDING ATTESTATION NOTE  I have seen and examined this patient with DR. Sood, and I agree with his findings and assessment. Briefly, this 64 YO male returns for evaluation s/p I&D of left BKA stump abscess and debridement of infected tibia. He is currently using a VAC dressing that is changed three times/week. Plan RTC in 6 weeks for monitoring of wound progress.    Michelle Matute MD          VASCULAR SURGERY CLINIC NOTE    HPI:    Pt is a 63 year old year old male with a past medical history significant for with a remote history of left BKA presented with a deep space abscess in the distal stump  MRI demonstrated bony changes compatible with tibial osteomyelitis s/p I&D left stump abscess, excision of infected distal tibia, and placement of VAC veriflo irrigation and wound suction system on 10/29.  Patient returns for postop check.    Subjective:   Patient denies any new medical problems, changes in medications, or recent hospitalizations.  Endorses compliance with wound vac changes every Monday, Wednesday, and Friday.  Denies any f/c/n/v/cp/sob.  Subjectively feels the wound and pain are improving.    Patient scheduled for followup MRI tib/fib, remains on antibiotics, and will be seen by ID on 12/11/19.    Review Of Systems:   General: Denies F/C  Respiratory: Denies SOB  Cardio: Denies CP  Gastrointestinal: Denies N/V  Neurologic: Denies HA  Psychiatric: Denies confusion    Patient Active Problem List   Diagnosis     Cerebral infarction (H)     Hepatitis C     Tobacco use disorder     Chronic low back pain     Acute pancreatitis     Hyperlipidemia LDL goal <70     Hypertension goal BP (blood pressure) < 140/90     Nonischemic dilated cardiomyopathy (HCC)     Malignant neoplasm of prostate (H)     Erectile dysfunction     Eczema     PAD (peripheral artery disease) (H)     S/P BKA (below knee amputation) unilateral (H)     Encounter for counseling     Acute renal failure (H)     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     Coronary atherosclerosis     Atrial fibrillation (H)     Chronic systolic heart failure (H)     Advanced directives, counseling/discussion     Dyslipidemia     Nicotine dependence, uncomplicated     Pyelonephritis     Prostate cancer (H)     Osteomyelitis (H)         Past Surgical History:  Past Surgical History:   Procedure Laterality Date     AMPUTATE LEG BELOW KNEE Left 6/19/2015    Procedure: AMPUTATE LEG BELOW KNEE;  Surgeon: Odessa Browning MD;  Location: [facility] OR     IRRIGATION AND DEBRIDEMENT LOWER EXTREMITY, COMBINED Left 10/29/2019    Procedure: IRRIGATION AND DEBRIDEMENT, LEFT LOWER EXTREMITY w/ Veriflow Wound Vac Placement.;  Surgeon: Michelle Matute MD;  Location: [facility] OR     removal of blood clots in arm           Objective:  Vital Signs:  There were no vitals taken for this visit.    Prior to Admission medications    Medication Sig Start Date End Date Taking? Authorizing Provider   acetaminophen (TYLENOL) 325 MG tablet Take 325-650 mg by mouth every 6 hours as needed for mild pain (Takes infrequently)    Unknown, Entered By History   amoxicillin (AMOXIL) 500 MG capsule Take 2 capsules (1,000 mg) by mouth 2 times daily 11/1/19 12/11/19  Sapphire Camacho MD   aspirin (ASPIRIN LOW DOSE) 81 MG EC tablet TAKE 1 TABLET(81 MG) BY MOUTH DAILY 8/15/19   Avery Duke MD   atorvastatin (LIPITOR) 40 MG tablet Take 1 tablet (40 mg) by mouth At Bedtime 8/15/19   Avery Duke MD   doxycycline hyclate (VIBRAMYCIN) 100 MG capsule Take 1 capsule (100 mg) by mouth 2 times daily 11/1/19 12/11/19  Sapphire Camacho MD   gabapentin (NEURONTIN) 300 MG capsule Take 1-2 capsules (300-600 mg) by mouth 3 times daily 8/15/19   Avery Duke MD   lisinopril (PRINIVIL/ZESTRIL) 40 MG tablet Take 1 tablet (40 mg) by mouth daily 8/15/19   Avery Duke MD   melatonin 1 MG TABS tablet Take 1 tablet (1 mg) by mouth nightly as needed for sleep 11/1/19 12/1/19  Janice  "Sapphire Claros MD   metoprolol succinate ER (TOPROL-XL) 100 MG 24 hr tablet Take 1 tablet (100 mg) by mouth daily 8/15/19   Avery Duke MD   Multiple Vitamins-Minerals (ONE-A-DAY MENS 50+ ADVANTAGE) TABS Take 1 each by mouth daily 11/7/18   Avery Duke MD   naloxone (EVZIO) 0.4 MG/0.4ML auto-injector Inject 0.4 mLs (0.4 mg) into the muscle as needed for opioid reversal every 2-3 minutes until assistance arrives 11/1/19   [facility]   order for DME Equipment being ordered: Wheelchair, manual 11/19/19   Avery Duke MD   order for DME Equipment being ordered: self adhesive bandage 4\" by 8\" 1/25/18   Avery Duke MD   order for DME Please dispense blood pressure machine and cuff. 9/13/17   Norah Brown MD   oxyCODONE IR (ROXICODONE) 10 MG tablet Take 1 tablet (10 mg) by mouth every 6 hours as needed for moderate to severe pain or severe pain 11/19/19   Avery Duke MD   polyethylene glycol (MIRALAX) powder Take 17 g by mouth as needed for constipation  Patient taking differently: Take 17 g by mouth as needed for constipation (1-2 times per month on average)  8/31/17   Avery Duke MD   rivaroxaban ANTICOAGULANT (XARELTO) 20 MG TABS tablet Take 1 tablet (20 mg) by mouth daily (with dinner) 8/15/19   Avery Duke MD   tamsulosin (FLOMAX) 0.4 MG capsule Take 1 capsule (0.4 mg) by mouth daily 3/7/19   Avery Duke MD   triamcinolone (KENALOG) 0.1 % cream Take 1 apply by topical route two times a day as needed  Patient taking differently: Take 1 apply by topical route two times a day as needed (uses infrequently) 8/31/17   Avery Duke MD       PHYSICAL EXAM:  General: The patient is alert and oriented.  Moves all extremities.   HEENT: Color appropriate for race, warm, dry  Heart:: RRR  Lungs: Breathing unlabored    GI: Bowel sounds present.  Abdomen soft, nontender to light palpation.  LLE: wound vac in place, good seal, minimal drainage and " edema  Neurologic: Grossly intact, EOMs grossly intact    Imaging:  n/a      Assessment:  Pt is a 63 year old year old male with a past medical history significant for with a remote history of left BKA presented with a deep space abscess in the distal stump  MRI demonstrated bony changes compatible with tibial osteomyelitis s/p I&D left stump abscess, excision of infected distal tibia, and placement of VAC veriflo irrigation and wound suction system on 10/29.  Patient returns for postop check.    Plan:  -stable postop  -continue outpatient wound vac therapy  -RTC in 6 weeks for another wound check  -patient understands and agrees to plan    Kevin Sood MD on 11/21/2019 at 2:04 PM

## 2019-11-22 ENCOUNTER — MEDICAL CORRESPONDENCE (OUTPATIENT)
Dept: HEALTH INFORMATION MANAGEMENT | Facility: CLINIC | Age: 64
End: 2019-11-22

## 2019-11-22 ENCOUNTER — TELEPHONE (OUTPATIENT)
Dept: FAMILY MEDICINE | Facility: CLINIC | Age: 64
End: 2019-11-22

## 2019-11-22 LAB
ANION GAP SERPL CALCULATED.3IONS-SCNC: 8 MMOL/L (ref 3–14)
BASOPHILS # BLD AUTO: 0 10E9/L (ref 0–0.2)
BASOPHILS NFR BLD AUTO: 0.2 %
BUN SERPL-MCNC: 23 MG/DL (ref 7–30)
CALCIUM SERPL-MCNC: 9.2 MG/DL (ref 8.5–10.1)
CHLORIDE SERPL-SCNC: 108 MMOL/L (ref 94–109)
CO2 SERPL-SCNC: 23 MMOL/L (ref 20–32)
CREAT SERPL-MCNC: 1.18 MG/DL (ref 0.66–1.25)
CRP SERPL-MCNC: <2.9 MG/L (ref 0–8)
DIFFERENTIAL METHOD BLD: NORMAL
EOSINOPHIL # BLD AUTO: 0.3 10E9/L (ref 0–0.7)
EOSINOPHIL NFR BLD AUTO: 4.5 %
ERYTHROCYTE [DISTWIDTH] IN BLOOD BY AUTOMATED COUNT: 13 % (ref 10–15)
GFR SERPL CREATININE-BSD FRML MDRD: 66 ML/MIN/{1.73_M2}
GLUCOSE SERPL-MCNC: 131 MG/DL (ref 70–99)
HCT VFR BLD AUTO: 41.2 % (ref 40–53)
HGB BLD-MCNC: 13.6 G/DL (ref 13.3–17.7)
IMM GRANULOCYTES # BLD: 0 10E9/L (ref 0–0.4)
IMM GRANULOCYTES NFR BLD: 0.2 %
LYMPHOCYTES # BLD AUTO: 2 10E9/L (ref 0.8–5.3)
LYMPHOCYTES NFR BLD AUTO: 30.5 %
MCH RBC QN AUTO: 30 PG (ref 26.5–33)
MCHC RBC AUTO-ENTMCNC: 33 G/DL (ref 31.5–36.5)
MCV RBC AUTO: 91 FL (ref 78–100)
MONOCYTES # BLD AUTO: 0.4 10E9/L (ref 0–1.3)
MONOCYTES NFR BLD AUTO: 6.6 %
NEUTROPHILS # BLD AUTO: 3.8 10E9/L (ref 1.6–8.3)
NEUTROPHILS NFR BLD AUTO: 58 %
NRBC # BLD AUTO: 0 10*3/UL
NRBC BLD AUTO-RTO: 0 /100
PLATELET # BLD AUTO: 185 10E9/L (ref 150–450)
POTASSIUM SERPL-SCNC: 3.7 MMOL/L (ref 3.4–5.3)
RBC # BLD AUTO: 4.53 10E12/L (ref 4.4–5.9)
SODIUM SERPL-SCNC: 139 MMOL/L (ref 133–144)
WBC # BLD AUTO: 6.6 10E9/L (ref 4–11)

## 2019-11-22 PROCEDURE — 85025 COMPLETE CBC W/AUTO DIFF WBC: CPT | Performed by: INTERNAL MEDICINE

## 2019-11-22 PROCEDURE — 86140 C-REACTIVE PROTEIN: CPT | Performed by: INTERNAL MEDICINE

## 2019-11-22 PROCEDURE — 80048 BASIC METABOLIC PNL TOTAL CA: CPT | Performed by: INTERNAL MEDICINE

## 2019-11-22 NOTE — TELEPHONE ENCOUNTER
"Reason for Call:  Other     Detailed comments: Saurabh pt's care coordinator with [employer] she faxed in an instruction page on 11/18 letting PCP know the need to electronically  complete form for certificate of Need to use [employer] Special transportation to allow him to bring scooter with to appointments. Saurabh states [employer] no longer has PDF format of form to be able to fax to clinic and is only available do be completed electronically on line. Saurabh wanted to confirm that fax was received, just in case instructions are below.       [email or web address]   click on \"for Providers\" button then   click \"administrative resources\" then   click \"tools\" then   click \"special transportation guideline, requirements and certificate of need form\"  use drop down box that will auto populate all the options     Phone Number Patient can be reached at: Other phone number:  Saurabh [phone]    Best Time: with any question    Can we leave a detailed message on this number? YES    Call taken on 11/22/2019 at 10:33 AM by Belen Pearl      "

## 2019-11-25 ENCOUNTER — ANCILLARY PROCEDURE (OUTPATIENT)
Dept: MRI IMAGING | Facility: CLINIC | Age: 64
End: 2019-11-25
Attending: INTERNAL MEDICINE
Payer: COMMERCIAL

## 2019-11-25 DIAGNOSIS — Z89.519 S/P BKA (BELOW KNEE AMPUTATION) UNILATERAL (H): ICD-10-CM

## 2019-11-27 NOTE — TELEPHONE ENCOUNTER
Saurabh calling back to check on the status of forms. Please contact her to discuss at [phone].

## 2019-11-29 ENCOUNTER — TELEPHONE (OUTPATIENT)
Dept: FAMILY MEDICINE | Facility: CLINIC | Age: 64
End: 2019-11-29

## 2019-11-29 NOTE — TELEPHONE ENCOUNTER
Reason for Call: Request for an order or referral:    Order or referral being requested: Home care orders    Date needed: as soon as possible    Has the patient been seen by the PCP for this problem? YES    Additional comments: Skilled nursing to help with wound vac change 3x per week,   3 x a week for 4 weeks,   2 x a week for 1 week  3 PRN    Phone number Patient can be reached at:  Other phone number:  [phone]    Best Time:  anytime    Can we leave a detailed message on this number?  YES    Call taken on 11/29/2019 at 12:25 PM by Dany Berry

## 2019-11-29 NOTE — TELEPHONE ENCOUNTER
Called [phone] [facility] tau no answer, left message to call nurse line at [phone]       Sandra Chan RN  [facility]

## 2019-12-02 NOTE — TELEPHONE ENCOUNTER
Nicholas with [facility] called back to RN line and left message requesting call back for orders.    I called number provided, spoke to Nicholas by phone, advised her Dr. Duke approved home care orders as requested.    Janet Tucker RN  [facility]

## 2019-12-02 NOTE — TELEPHONE ENCOUNTER
#2 attempt to Call [phone] [facility] tau - no answer, left message to call nurse line at [phone]         Sandra Chan RN  [facility]

## 2019-12-06 NOTE — TELEPHONE ENCOUNTER
Renan calling back to find out if Dr. Duke has filled out the Certificate of need to get his wheelchair.  Please call Renan back at [phone].    Izabela LIMON.  Patient Representative  [place]

## 2019-12-09 NOTE — TELEPHONE ENCOUNTER
Spoke with Saurabh and I stated I will touch base with Srikanth status  [facility]  Team 3 Coordinator

## 2019-12-10 NOTE — TELEPHONE ENCOUNTER
Spoke with Renan and informed her that Dr Duke completed the information online  [facility]  Team 3 Coordinator

## 2019-12-11 ENCOUNTER — OFFICE VISIT (OUTPATIENT)
Dept: INFECTIOUS DISEASES | Facility: CLINIC | Age: 64
End: 2019-12-11
Attending: INTERNAL MEDICINE
Payer: COMMERCIAL

## 2019-12-11 VITALS
TEMPERATURE: 98.1 F | OXYGEN SATURATION: 98 % | BODY MASS INDEX: 25.25 KG/M2 | SYSTOLIC BLOOD PRESSURE: 158 MMHG | WEIGHT: 202 LBS | DIASTOLIC BLOOD PRESSURE: 92 MMHG | HEART RATE: 88 BPM

## 2019-12-11 DIAGNOSIS — M86.662: Primary | ICD-10-CM

## 2019-12-11 DIAGNOSIS — Z89.519 S/P BKA (BELOW KNEE AMPUTATION) UNILATERAL (H): ICD-10-CM

## 2019-12-11 DIAGNOSIS — I48.0 PAROXYSMAL ATRIAL FIBRILLATION (H): ICD-10-CM

## 2019-12-11 DIAGNOSIS — M86.662: ICD-10-CM

## 2019-12-11 DIAGNOSIS — I42.0 NONISCHEMIC DILATED CARDIOMYOPATHY (H): ICD-10-CM

## 2019-12-11 DIAGNOSIS — A49.01 STAPH AUREUS INFECTION: ICD-10-CM

## 2019-12-11 LAB
ALBUMIN SERPL-MCNC: 4 G/DL (ref 3.4–5)
ALP SERPL-CCNC: 89 U/L (ref 40–150)
ALT SERPL W P-5'-P-CCNC: 72 U/L (ref 0–70)
ANION GAP SERPL CALCULATED.3IONS-SCNC: 2 MMOL/L (ref 3–14)
AST SERPL W P-5'-P-CCNC: 38 U/L (ref 0–45)
BASOPHILS # BLD AUTO: 0 10E9/L (ref 0–0.2)
BASOPHILS NFR BLD AUTO: 0.3 %
BILIRUB SERPL-MCNC: 0.3 MG/DL (ref 0.2–1.3)
BUN SERPL-MCNC: 22 MG/DL (ref 7–30)
CALCIUM SERPL-MCNC: 9.4 MG/DL (ref 8.5–10.1)
CHLORIDE SERPL-SCNC: 109 MMOL/L (ref 94–109)
CO2 SERPL-SCNC: 28 MMOL/L (ref 20–32)
CREAT SERPL-MCNC: 1.3 MG/DL (ref 0.66–1.25)
CRP SERPL-MCNC: <2.9 MG/L (ref 0–8)
DIFFERENTIAL METHOD BLD: NORMAL
EOSINOPHIL # BLD AUTO: 0.3 10E9/L (ref 0–0.7)
EOSINOPHIL NFR BLD AUTO: 4.4 %
ERYTHROCYTE [DISTWIDTH] IN BLOOD BY AUTOMATED COUNT: 13.6 % (ref 10–15)
ERYTHROCYTE [SEDIMENTATION RATE] IN BLOOD BY WESTERGREN METHOD: 20 MM/H (ref 0–20)
GFR SERPL CREATININE-BSD FRML MDRD: 58 ML/MIN/{1.73_M2}
GLUCOSE SERPL-MCNC: 88 MG/DL (ref 70–99)
HCT VFR BLD AUTO: 41.6 % (ref 40–53)
HGB BLD-MCNC: 13.7 G/DL (ref 13.3–17.7)
IMM GRANULOCYTES # BLD: 0 10E9/L (ref 0–0.4)
IMM GRANULOCYTES NFR BLD: 0 %
LYMPHOCYTES # BLD AUTO: 1.9 10E9/L (ref 0.8–5.3)
LYMPHOCYTES NFR BLD AUTO: 29.3 %
MCH RBC QN AUTO: 30.4 PG (ref 26.5–33)
MCHC RBC AUTO-ENTMCNC: 32.9 G/DL (ref 31.5–36.5)
MCV RBC AUTO: 92 FL (ref 78–100)
MONOCYTES # BLD AUTO: 0.7 10E9/L (ref 0–1.3)
MONOCYTES NFR BLD AUTO: 10.4 %
NEUTROPHILS # BLD AUTO: 3.5 10E9/L (ref 1.6–8.3)
NEUTROPHILS NFR BLD AUTO: 55.6 %
NRBC # BLD AUTO: 0 10*3/UL
NRBC BLD AUTO-RTO: 0 /100
PLATELET # BLD AUTO: 263 10E9/L (ref 150–450)
POTASSIUM SERPL-SCNC: 4 MMOL/L (ref 3.4–5.3)
PROT SERPL-MCNC: 8.9 G/DL (ref 6.8–8.8)
RBC # BLD AUTO: 4.5 10E12/L (ref 4.4–5.9)
SODIUM SERPL-SCNC: 139 MMOL/L (ref 133–144)
WBC # BLD AUTO: 6.3 10E9/L (ref 4–11)

## 2019-12-11 PROCEDURE — G0463 HOSPITAL OUTPT CLINIC VISIT: HCPCS | Mod: ZF

## 2019-12-11 PROCEDURE — 85652 RBC SED RATE AUTOMATED: CPT

## 2019-12-11 PROCEDURE — 86140 C-REACTIVE PROTEIN: CPT

## 2019-12-11 RX ORDER — DOXYCYCLINE 100 MG/1
100 CAPSULE ORAL 2 TIMES DAILY
Qty: 46 CAPSULE | Refills: 0 | Status: SHIPPED | OUTPATIENT
Start: 2019-12-11 | End: 2020-01-13

## 2019-12-11 RX ORDER — AMOXICILLIN 500 MG/1
1000 CAPSULE ORAL 2 TIMES DAILY
Qty: 90 CAPSULE | Refills: 0 | Status: SHIPPED | OUTPATIENT
Start: 2019-12-11 | End: 2020-01-13

## 2019-12-11 ASSESSMENT — PAIN SCALES - GENERAL: PAINLEVEL: EXTREME PAIN (8)

## 2019-12-11 NOTE — NURSING NOTE
Chief Complaint   Patient presents with     Follow Up     Osteomyelitis of lower left extremity     BP (!) 158/92 (BP Location: Right arm, Patient Position: Sitting, Cuff Size: Adult Regular)   Pulse 88   Temp 98.1  F (36.7  C)   Wt 91.6 kg (202 lb)   SpO2 98%   BMI 25.25 kg/m        Garcia Ratliff, EMT

## 2019-12-16 ENCOUNTER — PATIENT OUTREACH (OUTPATIENT)
Dept: CARE COORDINATION | Facility: CLINIC | Age: 64
End: 2019-12-16

## 2019-12-16 NOTE — PROGRESS NOTES
Clinic Care Coordination Contact  [facility]/Voicemail       Clinical Data: Care Coordinator Outreach  Outreach attempted x 1.  Left message on patient's voicemail with call back information and requested return call.  Plan: Care Coordinator sent care coordination introduction letter on 11/11/19 via mail. Care Coordinator will try to reach patient again in 3-5 business days.          Darin Yan MSN, RN, PHN, CCM   Primary Care Clinical RN Care Coordinator  [facility]  12/16/2019   11:37 AM  [email or web address]  Office: [phone]

## 2019-12-16 NOTE — LETTER
[facility]  [address]  [place] [facility] [place] [facility] [address]    December 20, 2019    Constantino Taylor  [address]   [place] [address]      Dear Constantino,    I have been attempting to reach you since our last contact. I would like to continue to work with you and provide any additional support you may need on achieving your health care related goals. I would appreciate if you would give me a call at [phone] to let me know if you would like to continue working together. I know that there are many things that can affect our ability to communicate and I hope we can continue to work together.    All of us at the [facility] are invested in your health and are here to assist you in meeting your goals.     Sincerely,    Darin Yan MSN, RN, PHN, [facility]   Primary Care Clinical RN Care Coordinator  [facility]  12/20/2019   2:12 PM  [email or web address]  Office: [phone]

## 2019-12-17 ENCOUNTER — OFFICE VISIT (OUTPATIENT)
Dept: VASCULAR SURGERY | Facility: CLINIC | Age: 64
End: 2019-12-17
Payer: COMMERCIAL

## 2019-12-17 DIAGNOSIS — Z51.89 VISIT FOR WOUND CHECK: ICD-10-CM

## 2019-12-17 DIAGNOSIS — Z89.519 S/P BKA (BELOW KNEE AMPUTATION) UNILATERAL (H): Primary | ICD-10-CM

## 2019-12-17 ASSESSMENT — PAIN SCALES - GENERAL: PAINLEVEL: SEVERE PAIN (7)

## 2019-12-17 NOTE — LETTER
12/17/2019       RE: Constantino Taylor  [address]  [place] [address]     Dear Colleague,    Thank you for referring your patient, Constantino Taylor, to the [facility] VASCULAR CLINIC at [facility]. Please see a copy of my visit note below.    VASCULAR SURGERY CLINIC ESTABLISHED PATIENT NOTE    HPI:    Constantino Taylor is a 64 year old year old male with a past medical history significant for with a remote history of left BKA presented with a deep space abscess in the distal stump  MRI demonstrated bony changes compatible with tibial osteomyelitis s/p I&D left stump abscess, excision of infected distal tibia, and placement of VAC veriflo irrigation and wound suction system on 10/29/2019 with Dr. Matute.   Patient returns to clinic today for wound vac change.        SUBJECTIVE:  Patient reports he is doing well.  Left BKA stump pain improved greatly over the past couple weeks.  Endorses compliance with wound vac changes every Monday, Wednesday, and Friday with home care nursing.  Denies any fevers, chills, night sweats or headaches.       OBJECTIVE:  Vital signs:  BP (!) (P) 130/93 (BP Location: Right arm, Patient Position: Chair, Cuff Size: Adult Large)   Pulse (P) 75   SpO2 (P) 98%       Prior to Admission medications    Medication Sig Start Date End Date Taking? Authorizing Provider   acetaminophen (TYLENOL) 325 MG tablet Take 325-650 mg by mouth every 6 hours as needed for mild pain (Takes infrequently)   Yes Unknown, Entered By History   amoxicillin (AMOXIL) 500 MG capsule Take 2 capsules (1,000 mg) by mouth 2 times daily for 22 days 12/11/19 1/2/20 Yes Lon Davis MD   aspirin (ASPIRIN LOW DOSE) 81 MG EC tablet TAKE 1 TABLET(81 MG) BY MOUTH DAILY 8/15/19  Yes Avery Duke MD   atorvastatin (LIPITOR) 40 MG tablet Take 1 tablet (40 mg) by mouth At Bedtime 8/15/19  Yes Avery Duke MD   doxycycline hyclate (VIBRAMYCIN) 100 MG capsule Take  "1 capsule (100 mg) by mouth 2 times daily for 22 days 12/11/19 1/2/20 Yes Lon Davis MD   gabapentin (NEURONTIN) 300 MG capsule Take 1-2 capsules (300-600 mg) by mouth 3 times daily 8/15/19  Yes Avery Duke MD   lisinopril (PRINIVIL/ZESTRIL) 40 MG tablet Take 1 tablet (40 mg) by mouth daily 8/15/19  Yes Avery Duke MD   metoprolol succinate ER (TOPROL-XL) 100 MG 24 hr tablet Take 1 tablet (100 mg) by mouth daily 8/15/19  Yes Avery Duke MD   Multiple Vitamins-Minerals (ONE-A-DAY MENS 50+ ADVANTAGE) TABS Take 1 each by mouth daily 11/7/18  Yes Avery Duke MD   naloxone (EVZIO) 0.4 MG/0.4ML auto-injector Inject 0.4 mLs (0.4 mg) into the muscle as needed for opioid reversal every 2-3 minutes until assistance arrives 11/1/19  Yes Sapphire Camacho MD   order for DME Equipment being ordered: Wheelchair, manual 11/19/19  Yes Avery Duke MD   order for DME Equipment being ordered: self adhesive bandage 4\" by 8\" 1/25/18  Yes Avery Duke MD   order for DME Please dispense blood pressure machine and cuff. 9/13/17  Yes Norah Brown MD   oxyCODONE IR (ROXICODONE) 10 MG tablet Take 1 tablet (10 mg) by mouth every 6 hours as needed for moderate to severe pain or severe pain 11/19/19  Yes Avery Duke MD   polyethylene glycol (MIRALAX) powder Take 17 g by mouth as needed for constipation  Patient taking differently: Take 17 g by mouth as needed for constipation (1-2 times per month on average)  8/31/17  Yes Avery Duke MD   rivaroxaban ANTICOAGULANT (XARELTO) 20 MG TABS tablet Take 1 tablet (20 mg) by mouth daily (with dinner) 8/15/19  Yes Avery Duke MD   tamsulosin (FLOMAX) 0.4 MG capsule Take 1 capsule (0.4 mg) by mouth daily 3/7/19  Yes Avery Duke MD   triamcinolone (KENALOG) 0.1 % cream Take 1 apply by topical route two times a day as needed 8/31/17  Yes Avery Duke MD   melatonin 1 MG TABS tablet Take 1 tablet (1 mg) by mouth nightly as " needed for sleep 11/1/19 12/1/19  Sapphire Camacho MD       PHYSICAL EXAM:  NEURO/PSYCH: The patient is alert and oriented.  Appropriate.  Moves all extremities.   SKIN:  Warm and dry..   PULMONARY: unlabored breathing  EXTREMITIES: left BKA stump wound vac changed, wound bed healthy, granulation tissue present, wound vac reapplied, good seal obtained         ASSESSMENT:  65 year old year old male with a past medical history significant for with a remote history of left BKA presented with a deep space abscess in the distal stump who is s/p I&D left stump abscess, excision of infected distal tibia, and placement of VAC veriflo irrigation and wound suction system on 10/29/2019 with Dr. Matute.  He is doing well at home with improved left BKA stump wound and pain control.       Patient Active Problem List   Diagnosis     Cerebral infarction (H)     Hepatitis C     Tobacco use disorder     Chronic low back pain     Acute pancreatitis     Hyperlipidemia LDL goal <70     Hypertension goal BP (blood pressure) < 140/90     Nonischemic dilated cardiomyopathy (HCC)     Malignant neoplasm of prostate (H)     Erectile dysfunction     Eczema     PAD (peripheral artery disease) (H)     S/P BKA (below knee amputation) unilateral (H)     Encounter for counseling     Acute renal failure (H)     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     Dyslipidemia     Nicotine dependence, uncomplicated     Pyelonephritis     Prostate cancer (H)     Osteomyelitis (H)           PLAN:  - Follow up with Dr. Matute on 1/2/2020 as previously scheduled    - Continue MWF left BKA stump wound vac changes -125 mm Hg    - Continue antibiotics as prescribed by ID physician    - Continue Lipitor and ASA    Rachel SOW, CNS  Division of Vascular Surgery  [facility]    Pager  [phone]  [facility] [phone]

## 2019-12-17 NOTE — PROGRESS NOTES
VASCULAR SURGERY CLINIC ESTABLISHED PATIENT NOTE    HPI:    Constantino Taylor is a 64 year old year old male with a past medical history significant for with a remote history of left BKA presented with a deep space abscess in the distal stump  MRI demonstrated bony changes compatible with tibial osteomyelitis s/p I&D left stump abscess, excision of infected distal tibia, and placement of VAC veriflo irrigation and wound suction system on 10/29/2019 with Dr. Matute.   Patient returns to clinic today for wound vac change.        SUBJECTIVE:  Patient reports he is doing well.  Left BKA stump pain improved greatly over the past couple weeks.  Endorses compliance with wound vac changes every Monday, Wednesday, and Friday with home care nursing.  Denies any fevers, chills, night sweats or headaches.         OBJECTIVE:  Vital signs:  BP (!) (P) 130/93 (BP Location: Right arm, Patient Position: Chair, Cuff Size: Adult Large)   Pulse (P) 75   SpO2 (P) 98%       Prior to Admission medications    Medication Sig Start Date End Date Taking? Authorizing Provider   acetaminophen (TYLENOL) 325 MG tablet Take 325-650 mg by mouth every 6 hours as needed for mild pain (Takes infrequently)   Yes Unknown, Entered By History   amoxicillin (AMOXIL) 500 MG capsule Take 2 capsules (1,000 mg) by mouth 2 times daily for 22 days 12/11/19 1/2/20 Yes Lon Davis MD   aspirin (ASPIRIN LOW DOSE) 81 MG EC tablet TAKE 1 TABLET(81 MG) BY MOUTH DAILY 8/15/19  Yes Avery Duke MD   atorvastatin (LIPITOR) 40 MG tablet Take 1 tablet (40 mg) by mouth At Bedtime 8/15/19  Yes Avery Duke MD   doxycycline hyclate (VIBRAMYCIN) 100 MG capsule Take 1 capsule (100 mg) by mouth 2 times daily for 22 days 12/11/19 1/2/20 Yes Lon Davis MD   gabapentin (NEURONTIN) 300 MG capsule Take 1-2 capsules (300-600 mg) by mouth 3 times daily 8/15/19  Yes Avery Duke MD   lisinopril (PRINIVIL/ZESTRIL) 40 MG tablet Take 1 tablet (40 mg) by  "mouth daily 8/15/19  Yes Avery Duke MD   metoprolol succinate ER (TOPROL-XL) 100 MG 24 hr tablet Take 1 tablet (100 mg) by mouth daily 8/15/19  Yes Avery Duke MD   Multiple Vitamins-Minerals (ONE-A-DAY MENS 50+ ADVANTAGE) TABS Take 1 each by mouth daily 11/7/18  Yes Avery Duke MD   naloxone (EVZIO) 0.4 MG/0.4ML auto-injector Inject 0.4 mLs (0.4 mg) into the muscle as needed for opioid reversal every 2-3 minutes until assistance arrives 11/1/19  Yes Sapphire Camacho MD   order for DME Equipment being ordered: Wheelchair, manual 11/19/19  Yes Avery Duke MD   order for DME Equipment being ordered: self adhesive bandage 4\" by 8\" 1/25/18  Yes Avery Duke MD   order for DME Please dispense blood pressure machine and cuff. 9/13/17  Yes Norah Brown MD   oxyCODONE IR (ROXICODONE) 10 MG tablet Take 1 tablet (10 mg) by mouth every 6 hours as needed for moderate to severe pain or severe pain 11/19/19  Yes Avery Duke MD   polyethylene glycol (MIRALAX) powder Take 17 g by mouth as needed for constipation  Patient taking differently: Take 17 g by mouth as needed for constipation (1-2 times per month on average)  8/31/17  Yes Avery Duke MD   rivaroxaban ANTICOAGULANT (XARELTO) 20 MG TABS tablet Take 1 tablet (20 mg) by mouth daily (with dinner) 8/15/19  Yes Avery Duke MD   tamsulosin (FLOMAX) 0.4 MG capsule Take 1 capsule (0.4 mg) by mouth daily 3/7/19  Yes Avery Duke MD   triamcinolone (KENALOG) 0.1 % cream Take 1 apply by topical route two times a day as needed 8/31/17  Yes Avery Duke MD   melatonin 1 MG TABS tablet Take 1 tablet (1 mg) by mouth nightly as needed for sleep 11/1/19 12/1/19  Sapphire Camacho MD       PHYSICAL EXAM:  NEURO/PSYCH: The patient is alert and oriented.  Appropriate.  Moves all extremities.   SKIN:  Warm and dry..   PULMONARY: unlabored breathing  EXTREMITIES: left BKA stump wound vac changed, wound bed healthy, " granulation tissue present, wound vac reapplied, good seal obtained         ASSESSMENT:  65 year old year old male with a past medical history significant for with a remote history of left BKA presented with a deep space abscess in the distal stump who is s/p I&D left stump abscess, excision of infected distal tibia, and placement of VAC veriflo irrigation and wound suction system on 10/29/2019 with Dr. Matute.  He is doing well at home with improved left BKA stump wound and pain control.       Patient Active Problem List   Diagnosis     Cerebral infarction (H)     Hepatitis C     Tobacco use disorder     Chronic low back pain     Acute pancreatitis     Hyperlipidemia LDL goal <70     Hypertension goal BP (blood pressure) < 140/90     Nonischemic dilated cardiomyopathy (HCC)     Malignant neoplasm of prostate (H)     Erectile dysfunction     Eczema     PAD (peripheral artery disease) (H)     S/P BKA (below knee amputation) unilateral (H)     Encounter for counseling     Acute renal failure (H)     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     Dyslipidemia     Nicotine dependence, uncomplicated     Pyelonephritis     Prostate cancer (H)     Osteomyelitis (H)           PLAN:  - Follow up with Dr. Matute on 1/2/2020 as previously scheduled    - Continue MWF left BKA stump wound vac changes -125 mm Hg    - Continue antibiotics as prescribed by ID physician    - Continue Lipitor and ASA        Rachel SOW, CNS  Division of Vascular Surgery  [facility]    Pager [phone]  Office [phone]

## 2019-12-17 NOTE — PATIENT INSTRUCTIONS
Follow up with Dr. Matute on 1/2/2020 as previously scheduled  Continue MWF left BKA stump wound vac changes -125 mm Hg      With questions, concerns, or to request an appointment, please call either:    [facility] Care Coordinator RN, Vascular Surgery  [phone]    Vascular Call Center  [phone]    To contact someone after 5 pm, on a weekend, or on a Holiday, please call:  [facility]  [phone], option 4 to have the Attending Physician for Vascular Surgery Service paged.

## 2019-12-18 DIAGNOSIS — M54.40 CHRONIC LOW BACK PAIN WITH SCIATICA, SCIATICA LATERALITY UNSPECIFIED, UNSPECIFIED BACK PAIN LATERALITY: ICD-10-CM

## 2019-12-18 DIAGNOSIS — G89.18 ACUTE POST-OPERATIVE PAIN: ICD-10-CM

## 2019-12-18 DIAGNOSIS — G89.29 CHRONIC LOW BACK PAIN WITH SCIATICA, SCIATICA LATERALITY UNSPECIFIED, UNSPECIFIED BACK PAIN LATERALITY: ICD-10-CM

## 2019-12-18 RX ORDER — OXYCODONE HYDROCHLORIDE 10 MG/1
10 TABLET ORAL EVERY 6 HOURS PRN
Qty: 120 TABLET | Refills: 0 | Status: SHIPPED | OUTPATIENT
Start: 2019-12-18 | End: 2020-01-13

## 2019-12-18 NOTE — TELEPHONE ENCOUNTER
Reason for Call:  Medication or medication refill:    Do you use a [facility]?  Name of the pharmacy and phone number for the current request:  [id number]    Name of the medication requested: oxyCODONE IR (ROXICODONE) 10 MG tablet    Other request: Pt would like a call when Rx is sent.    Can we leave a detailed message on this number? NO    Phone number patient can be reached at: Home number on file [phone] (home)    Best Time: Anytime    Call taken on 12/18/2019 at 9:07 AM by Khalida Pozo

## 2019-12-18 NOTE — TELEPHONE ENCOUNTER
Requested Prescriptions   Pending Prescriptions Disp Refills     oxyCODONE IR (ROXICODONE) 10 MG tablet 120 tablet 0     Sig: Take 1 tablet (10 mg) by mouth every 6 hours as needed for moderate to severe pain or severe pain       There is no refill protocol information for this order        Last refill 11/19/19 #120  Last OV 11/19/19    Routing refill request to provider for review/approval because:  Drug not on the [facility] refill protocol     Angelica Siegel RN,BSN  [facility]

## 2019-12-18 NOTE — TELEPHONE ENCOUNTER
Reason for Call:  Other     Detailed comments: patient would like a call back he dint leave a reason for the call back     Phone Number Patient can be reached at: Home number on file [phone] (home)    Best Time: any    Can we leave a detailed message on this number? YES    Call taken on 12/18/2019 at 8:58 AM by Luann Mercado

## 2019-12-20 NOTE — PROGRESS NOTES
Clinic Care Coordination Contact  RUST/Voicemail       Clinical Data: Care Coordinator Outreach  Outreach attempted x 3.  Left message on patient's voicemail with call back information and requested return call.  Plan: Care Coordinator will send unable to contact letter with care coordinator contact information via [employer]. Care Coordinator will try to reach patient again in 3 weeks.        Darin Yan MSN, RN, PHN, CCM   Primary Care Clinical RN Care Coordinator  [facility]  12/20/2019   2:14 PM  [email or web address]  Office: [phone]
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
## 2020-01-02 ENCOUNTER — OFFICE VISIT (OUTPATIENT)
Dept: VASCULAR SURGERY | Facility: CLINIC | Age: 65
End: 2020-01-02
Payer: COMMERCIAL

## 2020-01-02 VITALS — SYSTOLIC BLOOD PRESSURE: 132 MMHG | HEART RATE: 88 BPM | OXYGEN SATURATION: 100 % | DIASTOLIC BLOOD PRESSURE: 100 MMHG

## 2020-01-02 DIAGNOSIS — Z48.89 ENCOUNTER FOR POSTOPERATIVE WOUND CHECK: Primary | ICD-10-CM

## 2020-01-02 ASSESSMENT — PAIN SCALES - GENERAL: PAINLEVEL: SEVERE PAIN (6)

## 2020-01-02 NOTE — PROGRESS NOTES
VASCULAR SURGERY ATTENDING ATTESTATION NOTE  I have seen and examined this patient with Dr. Sood and I agree with his findings and assessment as noted below. Briefly, this 63 YO male underwent I and D of a left BKA stump abscess on 10/29. This required debridement of the distal tibia back to normal appearing bone. He has received oral antibiotics for MSSA since surgery and a VAC dressing. The incision is now completely healed. Recommend discontinuing antibiotics and evaluation for prosthesis. RTC prn.    Michelle Matute MD         VASCULAR SURGERY CLINIC NOTE    HPI:    Constantino Taylor is a 64 year old year old male with a past medical history significant for with a remote history of left BKA presented with a deep space abscess in the distal stump  MRI demonstrated bony changes compatible with tibial osteomyelitis s/p I&D left stump abscess, excision of infected distal tibia, and placement of VAC veriflo irrigation and wound suction system on 10/29/2019 with Dr. Matute.      Subjective:   Last seen in the [facility] clinic on 12/17.  Since then, patient denies any major complaints.  Denies f/c/n/v/cp/sob or incisional drainage.  Notes some improving tenderness on the stump.  Denies any new medical problems, changes in medications, or recent hospitalizations.  Per patient, he finished use of the wvac several days ago.      Per ID's clinic note from 12/11    At this point, prolonging the course of oral doxycycline and amoxicillin (at the current doses) for another three weeks through 1/2/20 to give a ~ ten week course seems prudent, since we are treating with oral rather than IV beta-lactam therapy (even thought the MSSA was highly-sensitive to ampicillin), he still has pain at the site, the 11/25/19 MRI did not show resolution of the soft tissue abscess, the wound VAC is still in place, and his ESR is still borderline at 20 (although his serum CRP is completely normalized).  I renewed the prescriptions for  both antibiotics today through 1/2/20.  We will repeat the laboratory studies on 1/2/120.  I will leave it to Dr. Matute to decide if a repeat left BKA stump MRI scan is required on 1/2/20.  We will tentatively plan to discontinue both antibiotics on 1/2/20 if Dr. Matute feels the legs looks clean and healed at that point.  Assuming Mr. Taylor's leg continues to do well, additional follow-up after that in Infectious Disease Clinic should not be necessary, but I told him I would be happy to see him again in the future should additional ID questions or concerns arise.       Review Of Systems:   General: Denies F/C  Respiratory: Denies SOB  Cardio: Denies CP  Gastrointestinal: Denies N/V  Neurologic: Denies HA  Psychiatric: Denies confusion    Patient Active Problem List   Diagnosis     Cerebral infarction (H)     Hepatitis C     Tobacco use disorder     Chronic low back pain     Acute pancreatitis     Hyperlipidemia LDL goal <70     Hypertension goal BP (blood pressure) < 140/90     Nonischemic dilated cardiomyopathy (HCC)     Malignant neoplasm of prostate (H)     Erectile dysfunction     Eczema     PAD (peripheral artery disease) (H)     S/P BKA (below knee amputation) unilateral (H)     Encounter for counseling     Acute renal failure (H)     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     Dyslipidemia     Nicotine dependence, uncomplicated     Pyelonephritis     Prostate cancer (H)     Osteomyelitis (H)         Past Surgical History:  Past Surgical History:   Procedure Laterality Date     AMPUTATE LEG BELOW KNEE Left 6/19/2015    Procedure: AMPUTATE LEG BELOW KNEE;  Surgeon: Odsesa Browning MD;  Location: [facility] OR     IRRIGATION AND DEBRIDEMENT LOWER EXTREMITY, COMBINED Left 10/29/2019    Procedure: IRRIGATION AND DEBRIDEMENT, LEFT LOWER EXTREMITY w/ Veriflow Wound Vac  "Placement.;  Surgeon: Michelle Matute MD;  Location: [facility] OR     removal of blood clots in arm           Objective:  Vital Signs:  There were no vitals taken for this visit.    Prior to Admission medications    Medication Sig Start Date End Date Taking? Authorizing Provider   acetaminophen (TYLENOL) 325 MG tablet Take 325-650 mg by mouth every 6 hours as needed for mild pain (Takes infrequently)    Unknown, Entered By History   amoxicillin (AMOXIL) 500 MG capsule Take 2 capsules (1,000 mg) by mouth 2 times daily for 22 days 12/11/19 1/2/20  Lon Davis MD   aspirin (ASPIRIN LOW DOSE) 81 MG EC tablet TAKE 1 TABLET(81 MG) BY MOUTH DAILY 8/15/19   Avery Duke MD   atorvastatin (LIPITOR) 40 MG tablet Take 1 tablet (40 mg) by mouth At Bedtime 8/15/19   Avery Duke MD   doxycycline hyclate (VIBRAMYCIN) 100 MG capsule Take 1 capsule (100 mg) by mouth 2 times daily for 22 days 12/11/19 1/2/20  Lon Davis MD   gabapentin (NEURONTIN) 300 MG capsule Take 1-2 capsules (300-600 mg) by mouth 3 times daily 8/15/19   Avery Duke MD   lisinopril (PRINIVIL/ZESTRIL) 40 MG tablet Take 1 tablet (40 mg) by mouth daily 8/15/19   Avery Duke MD   melatonin 1 MG TABS tablet Take 1 tablet (1 mg) by mouth nightly as needed for sleep 11/1/19 12/1/19  Sapphire Camacho MD   metoprolol succinate ER (TOPROL-XL) 100 MG 24 hr tablet Take 1 tablet (100 mg) by mouth daily 8/15/19   Avery Duke MD   Multiple Vitamins-Minerals (ONE-A-DAY MENS 50+ ADVANTAGE) TABS Take 1 each by mouth daily 11/7/18   Avery Duke MD   naloxone (EVZIO) 0.4 MG/0.4ML auto-injector Inject 0.4 mLs (0.4 mg) into the muscle as needed for opioid reversal every 2-3 minutes until assistance arrives 11/1/19   Sapphire Camacho MD   order for DME Equipment being ordered: Wheelchair, manual 11/19/19   Avery Duke MD   order for DME Equipment being ordered: self adhesive bandage 4\" by 8\" 1/25/18   Avery Duke, " MD   order for DME Please dispense blood pressure machine and cuff. 9/13/17   Norah Brown MD   oxyCODONE IR (ROXICODONE) 10 MG tablet Take 1 tablet (10 mg) by mouth every 6 hours as needed for moderate to severe pain or severe pain 12/18/19   Avery Duke MD   polyethylene glycol (MIRALAX) powder Take 17 g by mouth as needed for constipation  Patient taking differently: Take 17 g by mouth as needed for constipation (1-2 times per month on average)  8/31/17   Avery Duke MD   rivaroxaban ANTICOAGULANT (XARELTO) 20 MG TABS tablet Take 1 tablet (20 mg) by mouth daily (with dinner) 8/15/19   Avery Duke MD   tamsulosin (FLOMAX) 0.4 MG capsule Take 1 capsule (0.4 mg) by mouth daily 3/7/19   Avery Duke MD   triamcinolone (KENALOG) 0.1 % cream Take 1 apply by topical route two times a day as needed 8/31/17   Avery Duke MD       PHYSICAL EXAM:  NEURO/PSYCH: The patient is alert and oriented.  Appropriate.  Moves all extremities.   SKIN:  Warm and dry..   PULMONARY: unlabored breathing  EXTREMITIES: left BKA stump wound vac changed, wound bed healthy, granulation tissue present, wound vac reapplied, good seal obtained             Imaging:  n/a      Assessment:  65 year old year old male with a past medical history significant for with a remote history of left BKA presented with a deep space abscess in the distal stump who is s/p I&D left stump abscess, excision of infected distal tibia, and placement of VAC veriflo irrigation and wound suction system on 10/29/2019 with Dr. Matute.  He is doing well at home with improved left BKA stump wound and pain control.     Plan:  -doing well from vascular standpoint  -okay to stop abx, no need for further MRI   -okay to take a bath  -educated to keep incision clean/dry/moisturized   -okay to see the prosthesis group for a stump   -RTC prn    Kevin Sood MD on 1/2/2020 at 12:25 PM

## 2020-01-02 NOTE — NURSING NOTE
Vascular Rooming Note     Constantino Taylor's goals for this visit include:   Chief Complaint   Patient presents with     Follow Up     Constantino, is being seen today for a 6 week follow up BKA left leg, wound check, feeling better, still aching and throbbing, as reported by patient.     Stacy Nunes LPN

## 2020-01-02 NOTE — LETTER
1/2/2020       RE: Constantino Taylor  [address]  [place] [place] [address]     Dear Colleague,    Thank you for referring your patient, Constantino Taylor, to the [facility] at [facility]. Please see a copy of my visit note below.    VASCULAR SURGERY ATTENDING ATTESTATION NOTE  I have seen and examined this patient with Dr. Sood and I agree with his findings and assessment as noted below. Briefly, this 63 YO male underwent I and D of a left BKA stump abscess on 10/29. This required debridement of the distal tibia back to normal appearing bone. He has received oral antibiotics for MSSA since surgery and a VAC dressing. The incision is now completely healed. Recommend discontinuing antibiotics and evaluation for prosthesis. RTC prn.    Michelle Matute MD         VASCULAR SURGERY CLINIC NOTE    HPI:    Constantino Taylor is a 64 year old year old male with a past medical history significant for with a remote history of left BKA presented with a deep space abscess in the distal stump  MRI demonstrated bony changes compatible with tibial osteomyelitis s/p I&D left stump abscess, excision of infected distal tibia, and placement of VAC veriflo irrigation and wound suction system on 10/29/2019 with Dr. Matute.      Subjective:   Last seen in the [facility] clinic on 12/17.  Since then, patient denies any major complaints.  Denies f/c/n/v/cp/sob or incisional drainage.  Notes some improving tenderness on the stump.  Denies any new medical problems, changes in medications, or recent hospitalizations.  Per patient, he finished use of the wvac several days ago.      Per ID's clinic note from 12/11    At this point, prolonging the course of oral doxycycline and amoxicillin (at the current doses) for another three weeks through 1/2/20 to give a ~ ten week course seems prudent, since we are treating with oral rather than IV beta-lactam therapy (even thought the  MSSA was highly-sensitive to ampicillin), he still has pain at the site, the 11/25/19 MRI did not show resolution of the soft tissue abscess, the wound VAC is still in place, and his ESR is still borderline at 20 (although his serum CRP is completely normalized).  I renewed the prescriptions for both antibiotics today through 1/2/20.  We will repeat the laboratory studies on 1/2/120.  I will leave it to Dr. Matute to decide if a repeat left BKA stump MRI scan is required on 1/2/20.  We will tentatively plan to discontinue both antibiotics on 1/2/20 if Dr. Matute feels the legs looks clean and healed at that point.  Assuming Mr. Taylor's leg continues to do well, additional follow-up after that in Infectious Disease Clinic should not be necessary, but I told him I would be happy to see him again in the future should additional ID questions or concerns arise.       Review Of Systems:   General: Denies F/C  Respiratory: Denies SOB  Cardio: Denies CP  Gastrointestinal: Denies N/V  Neurologic: Denies HA  Psychiatric: Denies confusion    Patient Active Problem List   Diagnosis     Cerebral infarction (H)     Hepatitis C     Tobacco use disorder     Chronic low back pain     Acute pancreatitis     Hyperlipidemia LDL goal <70     Hypertension goal BP (blood pressure) < 140/90     Nonischemic dilated cardiomyopathy (HCC)     Malignant neoplasm of prostate (H)     Erectile dysfunction     Eczema     PAD (peripheral artery disease) (H)     S/P BKA (below knee amputation) unilateral (H)     Encounter for counseling     Acute renal failure (H)     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     Dyslipidemia     Nicotine dependence, uncomplicated     Pyelonephritis     Prostate cancer (H)     Osteomyelitis (H)         Past Surgical History:  Past Surgical History:   Procedure  Laterality Date     AMPUTATE LEG BELOW KNEE Left 6/19/2015    Procedure: AMPUTATE LEG BELOW KNEE;  Surgeon: Odessa Browning MD;  Location: [facility] OR     IRRIGATION AND DEBRIDEMENT LOWER EXTREMITY, COMBINED Left 10/29/2019    Procedure: IRRIGATION AND DEBRIDEMENT, LEFT LOWER EXTREMITY w/ Veriflow Wound Vac Placement.;  Surgeon: Michelle Matute MD;  Location: [facility] OR     removal of blood clots in arm           Objective:  Vital Signs:  There were no vitals taken for this visit.    Prior to Admission medications    Medication Sig Start Date End Date Taking? Authorizing Provider   acetaminophen (TYLENOL) 325 MG tablet Take 325-650 mg by mouth every 6 hours as needed for mild pain (Takes infrequently)    Unknown, Entered By History   amoxicillin (AMOXIL) 500 MG capsule Take 2 capsules (1,000 mg) by mouth 2 times daily for 22 days 12/11/19 1/2/20  Lon Davis MD   aspirin (ASPIRIN LOW DOSE) 81 MG EC tablet TAKE 1 TABLET(81 MG) BY MOUTH DAILY 8/15/19   Avery Duke MD   atorvastatin (LIPITOR) 40 MG tablet Take 1 tablet (40 mg) by mouth At Bedtime 8/15/19   Avery Duke MD   doxycycline hyclate (VIBRAMYCIN) 100 MG capsule Take 1 capsule (100 mg) by mouth 2 times daily for 22 days 12/11/19 1/2/20  Lon Davis MD   gabapentin (NEURONTIN) 300 MG capsule Take 1-2 capsules (300-600 mg) by mouth 3 times daily 8/15/19   Avery Duke MD   lisinopril (PRINIVIL/ZESTRIL) 40 MG tablet Take 1 tablet (40 mg) by mouth daily 8/15/19   vAery Duke MD   melatonin 1 MG TABS tablet Take 1 tablet (1 mg) by mouth nightly as needed for sleep 11/1/19 12/1/19  Sapphire Camacho MD   metoprolol succinate ER (TOPROL-XL) 100 MG 24 hr tablet Take 1 tablet (100 mg) by mouth daily 8/15/19   Avery Duke MD   Multiple Vitamins-Minerals (ONE-A-DAY MENS 50+ ADVANTAGE) TABS Take 1 each by mouth daily 11/7/18   Avery Duke MD   naloxone (EVZIO) 0.4 MG/0.4ML auto-injector Inject 0.4 mLs (0.4 mg) into the muscle  "as needed for opioid reversal every 2-3 minutes until assistance arrives 11/1/19   Sapphire Camacho MD   order for DME Equipment being ordered: Wheelchair, manual 11/19/19   Avery Duke MD   order for DME Equipment being ordered: self adhesive bandage 4\" by 8\" 1/25/18   Avery Duke MD   order for DME Please dispense blood pressure machine and cuff. 9/13/17   Norah Brown MD   oxyCODONE IR (ROXICODONE) 10 MG tablet Take 1 tablet (10 mg) by mouth every 6 hours as needed for moderate to severe pain or severe pain 12/18/19   Avery Duke MD   polyethylene glycol (MIRALAX) powder Take 17 g by mouth as needed for constipation  Patient taking differently: Take 17 g by mouth as needed for constipation (1-2 times per month on average)  8/31/17   Avery Duke MD   rivaroxaban ANTICOAGULANT (XARELTO) 20 MG TABS tablet Take 1 tablet (20 mg) by mouth daily (with dinner) 8/15/19   Avery Duke MD   tamsulosin (FLOMAX) 0.4 MG capsule Take 1 capsule (0.4 mg) by mouth daily 3/7/19   Avery Duke MD   triamcinolone (KENALOG) 0.1 % cream Take 1 apply by topical route two times a day as needed 8/31/17   Avery Duke MD       PHYSICAL EXAM:  NEURO/PSYCH: The patient is alert and oriented.  Appropriate.  Moves all extremities.   SKIN:  Warm and dry..   PULMONARY: unlabored breathing  EXTREMITIES: left BKA stump wound vac changed, wound bed healthy, granulation tissue present, wound vac reapplied, good seal obtained             Imaging:  n/a      Assessment:  65 year old year old male with a past medical history significant for with a remote history of left BKA presented with a deep space abscess in the distal stump who is s/p I&D left stump abscess, excision of infected distal tibia, and placement of VAC veriflo irrigation and wound suction system on 10/29/2019 with Dr. Matute.  He is doing well at home with improved left BKA stump wound and pain control.     Plan:  -doing well from " vascular standpoint  -okay to stop abx, no need for further MRI   -okay to take a bath  -educated to keep incision clean/dry/moisturized   -okay to see the prosthesis group for a stump   -RTC prn    Kevin Sood MD on 1/2/2020 at 12:25 PM    Michelle Matute MD

## 2020-01-03 ENCOUNTER — TELEPHONE (OUTPATIENT)
Dept: FAMILY MEDICINE | Facility: CLINIC | Age: 65
End: 2020-01-03

## 2020-01-03 NOTE — TELEPHONE ENCOUNTER
Reason for Call: Request for an order or referral: dena called from home care     Order or referral being requested: skilled nursing 2 times a week for 2 weeks and 1 time a week for 3 weeks and 3PRNs, wet to dry dressing change on left leg     Date needed: as soon as possible    Has the patient been seen by the PCP for this problem? YES    Phone number Patient can be reached at:  Home number on file [phone]    Best Time:  any    Can we leave a detailed message on this number?  YES    Call taken on 1/3/2020 at 3:03 PM by Luann Mercado

## 2020-01-03 NOTE — TELEPHONE ENCOUNTER
Routed to PCP, RN unable to approve dressing orders.    Janet Tucker RN  [facility]

## 2020-01-06 NOTE — TELEPHONE ENCOUNTER
Notified Nicholas of the orders below.  Angelica Siegel RN,BSN  [facility]

## 2020-01-07 DIAGNOSIS — Z89.519 S/P BKA (BELOW KNEE AMPUTATION) (H): Primary | ICD-10-CM

## 2020-01-08 ENCOUNTER — MEDICAL CORRESPONDENCE (OUTPATIENT)
Dept: HEALTH INFORMATION MANAGEMENT | Facility: CLINIC | Age: 65
End: 2020-01-08

## 2020-01-08 ENCOUNTER — PATIENT OUTREACH (OUTPATIENT)
Dept: CARE COORDINATION | Facility: CLINIC | Age: 65
End: 2020-01-08

## 2020-01-08 NOTE — PROGRESS NOTES
Clinic Care Coordination Contact    Follow Up Progress Note      Assessment: The patient has been discharged from homecare and the wound vac has been discontinued.  The patient is very excited.  The patient denies any swelling on either lower limb.  The goal was completed for the bandaging and the wound vac.  With a new goal being set for being fitted for the stump  and eventually the prosthesis.  An updated complex care plan was sent to the patient via [email or web address] ([employer]).  Medication reconciliation was completed with the patient and marked reviewed.  Health maintenance was reviewed with the patient.  The assessment for hypertension was completed with the patient.  There are no questions or concerns at this time.  The RN CC will follow up with the patient again in 4 weeks.     Goals addressed this encounter:   Goals Addressed                 This Visit's Progress      #1  Functional (pt-stated)        Goal Statement: I will call for an appointment with the prothesis department for a stump  and to be fitted for a prothesis.  I will attend the appointments needed to complete this process.  Date Goal set: 1/8/2020  Date to Achieve By: 7/8/2020  Patient expressed understanding of goal: yes  Action steps to achieve this goal:  1. I will call for the first assessment appointment and attend.  2. I will attend all of the appointments needed to get the stump  process complete.  3. I will attend all of the appointments needed to complete the process for the prothesis.              COMPLETED: #1  Monitoring (pt-stated)        Goal Statement: I will work with home care to ensure that my bandages and wound vac is taken care of 3 times per week.  I will monitor for signs of infection and report to homecare and the provider.  Date goal set: 11/11/19  Measure of Success: The patient has homecare come 3 times per week and there are no signs of infection.  Barriers: none currently noted  Strengths: engaged in care  coordination  Date to Achieve By: 11/11/2020  Patient expressed understanding of goal: yes    Action steps to achieve this goal  1. I will allow homecare to come and care for me 3 times per week.  2. I will monitor for signs of infection such as increased redness or discharge that is a change.                   Intervention/Education provided during outreach: The importance of making and following up with the appointments for the stump  and the prothesis creation.     Outreach Frequency: monthly    Plan:   1.  The patient will make and attend the appointments with the prothesis department for the stump  and the prothesis its self.    2.  The patient will monitor the stump for any signs of infection and report to the providers.  3.  The patient will take all medications as prescribed by the provider.    RN  Care Coordinator will follow up in 4 weeks.          Darin Yan MSN, RN, PHN, [facility]   Primary Care Clinical RN Care Coordinator  [facility]  1/8/2020   11:42 AM  [email or web address]  Office: [phone]

## 2020-01-08 NOTE — LETTER
[facility]  Complex Care Plan  About Me:    Patient Name:  Constantino Taylor    YOB: 1955  Age:         64 year old   Joyce MRN:    [id number] Telephone Information:  Home Phone [phone]   Mobile [phone]       Address:  [address] [place] [id number] Email address:  [email or web address]      Emergency Contact(s)    Name Relationship Lgl Grd Work Phone Home Phone Mobile Phone   1. JUAN MIGUEL HENSON Sister No  [phone]   2. NONE Other   none            Primary language:  English     needed? No   [facility]:  [phone] op. 1  Other communication barriers: Cognitive impairment  Preferred Method of Communication:  Mail  Current living arrangement: I live alone  Mobility Status/ Medical Equipment: Independent w/Device    Health Maintenance  Health Maintenance Reviewed: Due/Overdue   Health Maintenance Due   Topic Date Due     PREVENTIVE CARE VISIT  01/06/2015     FIT  03/17/2018     HF ACTION PLAN  10/17/2019     ZOSTER IMMUNIZATION (3 of 3) 01/31/2019         My Access Plan  Medical Emergency 911   Primary Clinic Line [facility] - [phone]   24 Hour Appointment Line [phone] or  [phone]) (toll-free)   24 Hour Nurse Line [phone] (toll-free)   Preferred Urgent Care     Preferred [facility]  [phone]   Preferred Pharmacy [id number] - [place] [address] AT [facility]     Behavioral Health Crisis Line The National Suicide Prevention Lifeline at 1-721.359.2693 or 911             My Care Team Members  Patient Care Team       Relationship Specialty Notifications Start End    Avery Duke MD PCP - General Internal Medicine  5/14/15     Phone: [phone] Fax: [phone]         [address] [facility] [address]     Avery Duke MD Assigned PCP   7/5/15     Phone: [phone] Fax: [phone]         [address] [facility] [address]    Piper Polk, RN Nurse Coordinator Vascular Surgery Abnormal results only, Admissions 7/16/15     Phone: [phone] Pager: [phone]        Odessa Browning MD MD Vascular Surgery  8/10/15     Phone: [phone] Fax: [phone]         [address] [facility] [address] [facility] [address]    Norah Brown MD MD Cardiology  2/7/16     Phone: [phone] Fax: [phone]         [facility] [address] [facility] [address]    Cheyenne Mansfield MD MD Internal Medicine  2/9/16     Quinn Rodriguez MD MD Oncology Admissions 9/25/17     Phone: [phone] Fax: [phone]         [facility] [address] [facility] [address]    Bryanna Rubio, RN Specialty Care Coordinator Cardiology Admissions 10/18/19     Phone: [phone]         Norah Brown MD MD Cardiology  10/18/19     Phone: [phone] Fax: [phone]         [facility] [address] [facility] [address]    Darin Dickson, RN Lead Care Coordinator Primary Care - CC Admissions 11/4/19     Phone: [phone] Fax: [phone]                My Care Plans  Self Management and Treatment Plan  Goals and (Comments)  Goals        General    #1  Functional (pt-stated)     Notes - Note created  1/8/2020 10:50 AM by Darin Dickson, RN    Goal Statement: I will call for an appointment with the prothesis department for a stump  and to be fitted for a prothesis.  I will attend the appointments needed to complete this process.  Date Goal set: 1/8/2020  Date to Achieve By: 7/8/2020  Patient expressed understanding of goal: yes  Action steps to achieve this goal:  1. I will call for the first assessment appointment and attend.  2. I will attend all of the appointments needed to get the stump  process complete.  3. I will attend all of the appointments needed to complete the process for the  prothesis.                   Action Plans on File:                       Advance Care Plans/Directives Type:        My Medical and Care Information  Problem List   Patient Active Problem List   Diagnosis     Cerebral infarction (H)     Hepatitis C     Tobacco use disorder     Chronic low back pain     Acute pancreatitis     Hyperlipidemia LDL goal <70     Hypertension goal BP (blood pressure) < 140/90     Nonischemic dilated cardiomyopathy (HCC)     Malignant neoplasm of prostate (H)     Erectile dysfunction     Eczema     PAD (peripheral artery disease) (H)     S/P BKA (below knee amputation) unilateral (H)     Encounter for counseling     Acute renal failure (H)     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    Care Coordination Start Date: 11/11/2019   Frequency of Care Coordination: monthly   Form Last Updated: 01/08/2020

## 2020-01-13 ENCOUNTER — OFFICE VISIT (OUTPATIENT)
Dept: FAMILY MEDICINE | Facility: CLINIC | Age: 65
End: 2020-01-13
Payer: COMMERCIAL

## 2020-01-13 VITALS — HEART RATE: 89 BPM | OXYGEN SATURATION: 100 % | DIASTOLIC BLOOD PRESSURE: 88 MMHG | SYSTOLIC BLOOD PRESSURE: 138 MMHG

## 2020-01-13 DIAGNOSIS — B18.2 CHRONIC HEPATITIS C WITHOUT HEPATIC COMA (H): ICD-10-CM

## 2020-01-13 DIAGNOSIS — M54.40 CHRONIC LOW BACK PAIN WITH SCIATICA, SCIATICA LATERALITY UNSPECIFIED, UNSPECIFIED BACK PAIN LATERALITY: ICD-10-CM

## 2020-01-13 DIAGNOSIS — I73.9 PAD (PERIPHERAL ARTERY DISEASE) (H): ICD-10-CM

## 2020-01-13 DIAGNOSIS — I48.19 PERSISTENT ATRIAL FIBRILLATION (H): ICD-10-CM

## 2020-01-13 DIAGNOSIS — I50.22 CHRONIC SYSTOLIC HEART FAILURE (H): ICD-10-CM

## 2020-01-13 DIAGNOSIS — N18.30 CKD (CHRONIC KIDNEY DISEASE) STAGE 3, GFR 30-59 ML/MIN (H): ICD-10-CM

## 2020-01-13 DIAGNOSIS — Z89.519 S/P BKA (BELOW KNEE AMPUTATION) UNILATERAL (H): ICD-10-CM

## 2020-01-13 DIAGNOSIS — C61 MALIGNANT NEOPLASM OF PROSTATE (H): ICD-10-CM

## 2020-01-13 DIAGNOSIS — G89.29 CHRONIC LOW BACK PAIN WITH SCIATICA, SCIATICA LATERALITY UNSPECIFIED, UNSPECIFIED BACK PAIN LATERALITY: ICD-10-CM

## 2020-01-13 DIAGNOSIS — M86.662: ICD-10-CM

## 2020-01-13 DIAGNOSIS — G89.18 ACUTE POST-OPERATIVE PAIN: ICD-10-CM

## 2020-01-13 PROCEDURE — 99214 OFFICE O/P EST MOD 30 MIN: CPT | Performed by: INTERNAL MEDICINE

## 2020-01-13 RX ORDER — OXYCODONE HYDROCHLORIDE 10 MG/1
10 TABLET ORAL EVERY 6 HOURS PRN
Qty: 120 TABLET | Refills: 0 | Status: SHIPPED | OUTPATIENT
Start: 2020-01-18 | End: 2020-02-17

## 2020-01-13 NOTE — PROGRESS NOTES
Subjective     Constantino Taylor is a 64 year old male who presents to clinic today for the following health issues:    HPI   Recheck left leg wound  Follow up home care  Got shot at the cancer [facility]  Lupron 22.5 mg          Patient Active Problem List   Diagnosis     Cerebral infarction (H)     Hepatitis C     Tobacco use disorder     Chronic low back pain     Acute pancreatitis     Hyperlipidemia LDL goal <70     Hypertension goal BP (blood pressure) < 140/90     Nonischemic dilated cardiomyopathy (HCC)     Malignant neoplasm of prostate (H)     Erectile dysfunction     Eczema     PAD (peripheral artery disease) (H)     S/P BKA (below knee amputation) unilateral (H)     Encounter for counseling     Acute renal failure (H)     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     Dyslipidemia     Nicotine dependence, uncomplicated     Pyelonephritis     Prostate cancer (H)     Osteomyelitis (H)     Past Surgical History:   Procedure Laterality Date     AMPUTATE LEG BELOW KNEE Left 2015    Procedure: AMPUTATE LEG BELOW KNEE;  Surgeon: Odessa Browning MD;  Location: [facility] OR     IRRIGATION AND DEBRIDEMENT LOWER EXTREMITY, COMBINED Left 10/29/2019    Procedure: IRRIGATION AND DEBRIDEMENT, LEFT LOWER EXTREMITY w/ Veriflow Wound Vac Placement.;  Surgeon: Michelle Matute MD;  Location: [facility] OR     removal of blood clots in arm         Social History     Tobacco Use     Smoking status: Former Smoker     Packs/day: 0.30     Years: 40.00     Pack years: 12.00     Types: Cigarettes     Last attempt to quit: 3/12/2018     Years since quittin.8     Smokeless tobacco: Former User   Substance Use Topics     Alcohol use: Yes     Alcohol/week: 2.0 - 6.0 standard drinks     Types: 2 - 6 Cans of beer per week     Comment: couple of beers per episode, several times a week     Family History  "  Problem Relation Age of Onset     Cancer Mother         brain     Cancer Brother          Current Outpatient Medications   Medication Sig Dispense Refill     acetaminophen (TYLENOL) 325 MG tablet Take 325-650 mg by mouth every 6 hours as needed for mild pain (Takes infrequently)       aspirin (ASPIRIN LOW DOSE) 81 MG EC tablet TAKE 1 TABLET(81 MG) BY MOUTH DAILY 90 tablet 3     atorvastatin (LIPITOR) 40 MG tablet Take 1 tablet (40 mg) by mouth At Bedtime 90 tablet 4     gabapentin (NEURONTIN) 300 MG capsule Take 1-2 capsules (300-600 mg) by mouth 3 times daily 540 capsule 3     lisinopril (PRINIVIL/ZESTRIL) 40 MG tablet Take 1 tablet (40 mg) by mouth daily 90 tablet 3     metoprolol succinate ER (TOPROL-XL) 100 MG 24 hr tablet Take 1 tablet (100 mg) by mouth daily 90 tablet 3     Multiple Vitamins-Minerals (ONE-A-DAY MENS 50+ ADVANTAGE) TABS Take 1 each by mouth daily 100 tablet 3     naloxone (EVZIO) 0.4 MG/0.4ML auto-injector Inject 0.4 mLs (0.4 mg) into the muscle as needed for opioid reversal every 2-3 minutes until assistance arrives 0.8 mL 1     order for DME Equipment being ordered: Wheelchair, manual 1 each 0     order for DME Equipment being ordered: self adhesive bandage 4\" by 8\" 30 each 11     order for DME Please dispense blood pressure machine and cuff. 1 each 0     oxyCODONE IR (ROXICODONE) 10 MG tablet Take 1 tablet (10 mg) by mouth every 6 hours as needed for moderate to severe pain or severe pain 120 tablet 0     polyethylene glycol (MIRALAX) powder Take 17 g by mouth as needed for constipation (Patient taking differently: Take 17 g by mouth as needed for constipation (1-2 times per month on average) ) 510 g 1     rivaroxaban ANTICOAGULANT (XARELTO) 20 MG TABS tablet Take 1 tablet (20 mg) by mouth daily (with dinner) 90 tablet 3     tamsulosin (FLOMAX) 0.4 MG capsule Take 1 capsule (0.4 mg) by mouth daily 90 capsule 3     triamcinolone (KENALOG) 0.1 % cream Take 1 apply by topical route two times " a day as needed 80 g 3     No Known Allergies  Recent Labs   Lab Test 12/11/19  1655 11/22/19  1020  10/24/19  2327 10/14/19  1419  03/02/19 01/12/18  0923  10/17/16  1143  05/10/15  1648  11/25/14  1203  01/06/14  1103 07/12/13  1109  11/15/12  0845   A1C  --   --   --   --   --   --   --   --   --   --   --   --   --   --  5.8  --  5.8 5.4  --   --    LDL  --   --   --   --   --   --  58  --  42  --  46  --   --    < >  --    < >  --   --    < >  --    HDL  --   --   --   --   --   --  35*  --  39*  --  41  --   --    < >  --    < >  --   --    < >  --    TRIG  --   --   --   --   --   --  97  --  74  --  91  --   --    < >  --    < >  --   --    < >  --    ALT 72*  --   --  14 17   < >  --    < > 29   < >  --    < >  --    < >  --    < > 69 37  --  52   CR 1.30* 1.18   < > 1.41* 1.34*   < >  --    < > 1.64*   < > 1.53*   < > 1.90*   < > 1.02   < > 0.86 1.08   < > 0.87   GFRESTIMATED 58* 66   < > 53* 56*   < >  --    < > 43*   < > 47*   < > 37*   < > 75   < > >90 71   < > >90   GFRESTBLACK 68 76   < > 61 65   < >  --    < > 52*   < > 57*   < > 44*   < > >90   GFR Calc     < > >90 86   < > >90   POTASSIUM 4.0 3.7   < > 4.1 3.9   < >  --    < > 4.0   < > 4.6   < > 5.0   < > 4.5   < > 4.5 4.1   < > 3.6   TSH  --   --   --   --   --   --   --   --   --   --   --   --  0.99  --   --   --   --   --   --  1.18    < > = values in this interval not displayed.      BP Readings from Last 3 Encounters:   01/13/20 138/88   01/02/20 (!) 132/100   12/17/19 (!) (P) 130/93    Wt Readings from Last 3 Encounters:   12/11/19 91.6 kg (202 lb)   11/19/19 91.6 kg (202 lb)   10/24/19 91.5 kg (201 lb 11.5 oz)                bp looks good        Reviewed and updated as needed this visit by Provider         Review of Systems   ROS COMP: Constitutional, HEENT, cardiovascular, pulmonary, gi and gu systems are negative, except as otherwise noted.      Objective    /88 (BP Location: Right arm, Patient Position: Chair,  Cuff Size: Adult Regular)   Pulse 89   SpO2 100%   There is no height or weight on file to calculate BMI.  Physical Exam   GENERAL: healthy, alert and no distress  EYES: Eyes grossly normal to inspection, PERRL and conjunctivae and sclerae normal  HENT: ear canals and TM's normal, nose and mouth without ulcers or lesions  NECK: no adenopathy, no asymmetry, masses, or scars and thyroid normal to palpation  RESP: lungs clear to auscultation - no rales, rhonchi or wheezes  CV: regular rate and rhythm, normal S1 S2, no S3 or S4, no murmur, click or rub, no peripheral edema and peripheral pulses strong  ABDOMEN: soft, nontender, no hepatosplenomegaly, no masses and bowel sounds normal  MS: BKA healing with small scab still in place.  Has bony prominence that may limit prosthesis  Still with chronic neuropathic pain and pain pain due to inactivity      SKIN: no suspicious lesions or rashes  PSYCH: mentation appears normal, affect normal/bright  LYMPH: no cervical, supraclavicular, axillary, or inguinal adenopathy    Diagnostic Test Results:  Labs reviewed in Epic  No results found for any visits on 01/13/20.        Assessment & Plan       ICD-10-CM    1. S/P BKA (below knee amputation) unilateral (H) Z89.519    2. Chronic osteomyelitis of left lower leg (H) M86.662    3. Chronic systolic heart failure (H) I50.22    4. PAD (peripheral artery disease) (H) I73.9    5. Persistent atrial fibrillation I48.19    6. Malignant neoplasm of prostate (H) C61    7. Chronic hepatitis C without hepatic coma (H) B18.2    8. Acute post-operative pain G89.18 oxyCODONE IR (ROXICODONE) 10 MG tablet   9. Chronic low back pain with sciatica, sciatica laterality unspecified, unspecified back pain laterality M54.40 oxyCODONE IR (ROXICODONE) 10 MG tablet    G89.29    10. CKD (chronic kidney disease) stage 3, GFR 30-59 ml/min (H) N18.3       patient does not like the side effects of the Lupron.  Reviewed him the alternatives and side effects of  the medications  Not a candidate for surgery and had radiation in the past    I think Lupron is the best option and reinforced.    He does not like the side effects and effects on sexual functions      Regular exercise    Return in about 3 months (around 4/13/2020) for follow up of condition, medication management.    Avery Duke MD  [facility]

## 2020-01-14 ENCOUNTER — ONCOLOGY VISIT (OUTPATIENT)
Dept: ONCOLOGY | Facility: CLINIC | Age: 65
End: 2020-01-14
Attending: PHYSICIAN ASSISTANT
Payer: COMMERCIAL

## 2020-01-14 ENCOUNTER — APPOINTMENT (OUTPATIENT)
Dept: LAB | Facility: CLINIC | Age: 65
End: 2020-01-14
Attending: PHYSICIAN ASSISTANT
Payer: COMMERCIAL

## 2020-01-14 VITALS
HEART RATE: 114 BPM | BODY MASS INDEX: 25.42 KG/M2 | RESPIRATION RATE: 16 BRPM | DIASTOLIC BLOOD PRESSURE: 91 MMHG | WEIGHT: 203.4 LBS | OXYGEN SATURATION: 99 % | SYSTOLIC BLOOD PRESSURE: 145 MMHG | TEMPERATURE: 98.9 F

## 2020-01-14 DIAGNOSIS — C61 MALIGNANT NEOPLASM OF PROSTATE (H): Primary | ICD-10-CM

## 2020-01-14 DIAGNOSIS — C61 PROSTATE CANCER (H): ICD-10-CM

## 2020-01-14 LAB
ALBUMIN SERPL-MCNC: 3.9 G/DL (ref 3.4–5)
ALP SERPL-CCNC: 84 U/L (ref 40–150)
ALT SERPL W P-5'-P-CCNC: 48 U/L (ref 0–70)
ANION GAP SERPL CALCULATED.3IONS-SCNC: 5 MMOL/L (ref 3–14)
AST SERPL W P-5'-P-CCNC: 24 U/L (ref 0–45)
BASOPHILS # BLD AUTO: 0 10E9/L (ref 0–0.2)
BASOPHILS NFR BLD AUTO: 0.6 %
BILIRUB SERPL-MCNC: 0.4 MG/DL (ref 0.2–1.3)
BUN SERPL-MCNC: 25 MG/DL (ref 7–30)
CALCIUM SERPL-MCNC: 9.1 MG/DL (ref 8.5–10.1)
CHLORIDE SERPL-SCNC: 112 MMOL/L (ref 94–109)
CO2 SERPL-SCNC: 26 MMOL/L (ref 20–32)
CREAT SERPL-MCNC: 1.28 MG/DL (ref 0.66–1.25)
DIFFERENTIAL METHOD BLD: NORMAL
EOSINOPHIL # BLD AUTO: 0.3 10E9/L (ref 0–0.7)
EOSINOPHIL NFR BLD AUTO: 5.6 %
ERYTHROCYTE [DISTWIDTH] IN BLOOD BY AUTOMATED COUNT: 13.9 % (ref 10–15)
GFR SERPL CREATININE-BSD FRML MDRD: 60 ML/MIN/{1.73_M2}
GLUCOSE SERPL-MCNC: 95 MG/DL (ref 70–99)
HCT VFR BLD AUTO: 41.8 % (ref 40–53)
HGB BLD-MCNC: 14 G/DL (ref 13.3–17.7)
IMM GRANULOCYTES # BLD: 0 10E9/L (ref 0–0.4)
IMM GRANULOCYTES NFR BLD: 0.2 %
LDH SERPL L TO P-CCNC: 132 U/L (ref 85–227)
LYMPHOCYTES # BLD AUTO: 1.5 10E9/L (ref 0.8–5.3)
LYMPHOCYTES NFR BLD AUTO: 29.8 %
MCH RBC QN AUTO: 30.4 PG (ref 26.5–33)
MCHC RBC AUTO-ENTMCNC: 33.5 G/DL (ref 31.5–36.5)
MCV RBC AUTO: 91 FL (ref 78–100)
MONOCYTES # BLD AUTO: 0.4 10E9/L (ref 0–1.3)
MONOCYTES NFR BLD AUTO: 8.2 %
NEUTROPHILS # BLD AUTO: 2.9 10E9/L (ref 1.6–8.3)
NEUTROPHILS NFR BLD AUTO: 55.6 %
NRBC # BLD AUTO: 0 10*3/UL
NRBC BLD AUTO-RTO: 0 /100
PLATELET # BLD AUTO: 214 10E9/L (ref 150–450)
POTASSIUM SERPL-SCNC: 4 MMOL/L (ref 3.4–5.3)
PROT SERPL-MCNC: 8.6 G/DL (ref 6.8–8.8)
PSA SERPL-MCNC: 22.6 UG/L (ref 0–4)
RBC # BLD AUTO: 4.61 10E12/L (ref 4.4–5.9)
SODIUM SERPL-SCNC: 142 MMOL/L (ref 133–144)
WBC # BLD AUTO: 5.1 10E9/L (ref 4–11)

## 2020-01-14 PROCEDURE — 84403 ASSAY OF TOTAL TESTOSTERONE: CPT | Performed by: PHYSICIAN ASSISTANT

## 2020-01-14 PROCEDURE — G0463 HOSPITAL OUTPT CLINIC VISIT: HCPCS | Mod: ZF

## 2020-01-14 PROCEDURE — 85025 COMPLETE CBC W/AUTO DIFF WBC: CPT | Performed by: PHYSICIAN ASSISTANT

## 2020-01-14 PROCEDURE — 99214 OFFICE O/P EST MOD 30 MIN: CPT | Mod: ZP | Performed by: PHYSICIAN ASSISTANT

## 2020-01-14 PROCEDURE — 84153 ASSAY OF PSA TOTAL: CPT | Performed by: PHYSICIAN ASSISTANT

## 2020-01-14 PROCEDURE — 36415 COLL VENOUS BLD VENIPUNCTURE: CPT

## 2020-01-14 PROCEDURE — 80053 COMPREHEN METABOLIC PANEL: CPT | Performed by: PHYSICIAN ASSISTANT

## 2020-01-14 PROCEDURE — 83615 LACTATE (LD) (LDH) ENZYME: CPT | Performed by: PHYSICIAN ASSISTANT

## 2020-01-14 ASSESSMENT — PAIN SCALES - GENERAL: PAINLEVEL: MODERATE PAIN (4)
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
## 2020-01-14 NOTE — NURSING NOTE
Chief Complaint   Patient presents with     Blood Draw     Labs drawn via  by RN in lab. VS taken.     Rosa Guerra RN

## 2020-01-14 NOTE — NURSING NOTE
"Oncology Rooming Note    January 14, 2020 10:32 AM   Constantino Taylor is a 64 year old male who presents for:    Chief Complaint   Patient presents with     Blood Draw     Labs drawn via  by RN in lab. VS taken.     Oncology Clinic Visit     Return - Malignant neoplasm of prostate      Initial Vitals: BP (!) 145/91 (BP Location: Right arm, Patient Position: Sitting, Cuff Size: Adult Regular)   Pulse 114   Temp 98.9  F (37.2  C) (Oral)   Resp 16   Wt 92.3 kg (203 lb 6.4 oz)   SpO2 99%   BMI 25.42 kg/m   Estimated body mass index is 25.42 kg/m  as calculated from the following:    Height as of 10/24/19: 1.905 m (6' 3\").    Weight as of this encounter: 92.3 kg (203 lb 6.4 oz). Body surface area is 2.21 meters squared.  Moderate Pain (4) Comment: Data Unavailable   No LMP for male patient.  Allergies reviewed: Yes  Medications reviewed: Yes    Medications: Medication refills not needed today.  Pharmacy name entered into [employer]:    [id number] - [place], [place] - [address] AT [facility]  [id number] [facility] [address] AT [facility] OF [address] [facility] - [place], [place] - [address]  WRITTEN PRESCRIPTION REQUESTED  [id number] - [place], [place] - [address] AT [facility] [address]  [facility] [id number] [place] [facility] - [place], [place] - [address]    Clinical concerns: Lab Results and questions about diagnosis.       Po Foster              "

## 2020-01-16 LAB — TESTOST SERPL-MCNC: 102 NG/DL (ref 240–950)

## 2020-02-05 ENCOUNTER — OFFICE VISIT (OUTPATIENT)
Dept: FAMILY MEDICINE | Facility: CLINIC | Age: 65
End: 2020-02-05
Payer: COMMERCIAL

## 2020-02-05 VITALS
WEIGHT: 203 LBS | DIASTOLIC BLOOD PRESSURE: 72 MMHG | BODY MASS INDEX: 25.37 KG/M2 | SYSTOLIC BLOOD PRESSURE: 138 MMHG | OXYGEN SATURATION: 100 % | HEART RATE: 123 BPM

## 2020-02-05 DIAGNOSIS — G90.523 REFLEX SYMPATHETIC DYSTROPHY OF BOTH LOWER EXTREMITIES: ICD-10-CM

## 2020-02-05 DIAGNOSIS — R05.3 CHRONIC COUGH: Primary | ICD-10-CM

## 2020-02-05 DIAGNOSIS — N40.0 BENIGN PROSTATIC HYPERPLASIA, UNSPECIFIED WHETHER LOWER URINARY TRACT SYMPTOMS PRESENT: ICD-10-CM

## 2020-02-05 DIAGNOSIS — Z12.11 SPECIAL SCREENING FOR MALIGNANT NEOPLASMS, COLON: ICD-10-CM

## 2020-02-05 PROCEDURE — 99214 OFFICE O/P EST MOD 30 MIN: CPT | Performed by: INTERNAL MEDICINE

## 2020-02-05 RX ORDER — GABAPENTIN 300 MG/1
300-600 CAPSULE ORAL 3 TIMES DAILY
Qty: 540 CAPSULE | Refills: 3 | Status: SHIPPED | OUTPATIENT
Start: 2020-02-05 | End: 2020-02-21

## 2020-02-05 RX ORDER — PREDNISONE 20 MG/1
20 TABLET ORAL DAILY
Qty: 5 TABLET | Refills: 0 | Status: SHIPPED | OUTPATIENT
Start: 2020-02-05 | End: 2020-04-13

## 2020-02-05 RX ORDER — TAMSULOSIN HYDROCHLORIDE 0.4 MG/1
0.4 CAPSULE ORAL DAILY
Qty: 90 CAPSULE | Refills: 3 | Status: SHIPPED | OUTPATIENT
Start: 2020-02-05 | End: 2020-06-01

## 2020-02-05 NOTE — PROGRESS NOTES
SUBJECTIVE:   CC: Constantino Taylor is an 64 year old male who presents for preventative health visit.     Healthy Habits:    Getting at least 3 servings of Calcium per day:  Yes    Bi-annual eye exam:  Yes    Dental care twice a year:  Yes    Sleep apnea or symptoms of sleep apnea:  None    Diet:  Other    Frequency of exercise:  None    Duration of exercise:  N/A    Taking medications regularly:  Yes    Barriers to taking medications:  None    Medication side effects:  None    PHQ-2 Total Score:    Additional concerns today:  No        Today's PHQ-2 Score:   PHQ-2 (  [employer]) 2020   Q1: Little interest or pleasure in doing things 0   Q2: Feeling down, depressed or hopeless 0   PHQ-2 Score 0       Abuse: Current or Past(Physical, Sexual or Emotional)- No  Do you feel safe in your environment? Yes        Social History     Tobacco Use     Smoking status: Former Smoker     Packs/day: 0.30     Years: 40.00     Pack years: 12.00     Types: Cigarettes     Last attempt to quit: 3/12/2018     Years since quittin.9     Smokeless tobacco: Former User   Substance Use Topics     Alcohol use: Yes     Alcohol/week: 2.0 - 6.0 standard drinks     Types: 2 - 6 Cans of beer per week     Comment: couple of beers per episode, several times a week     If you drink alcohol do you typically have >3 drinks per day or >7 drinks per week? No    Alcohol Use 2014   Prescreen: >3 drinks/day or >7 drinks/week? The patient does not drink >3 drinks per day nor >7 drinks per week.   No flowsheet data found.    Last PSA:   PSA   Date Value Ref Range Status   2020 22.60 (H) 0 - 4 ug/L Final     Comment:     Assay Method:  Chemiluminescence using Siemens Vista analyzer       Reviewed orders with patient. Reviewed health maintenance and updated orders accordingly - Yes  Lab work is in process    Reviewed and updated as needed this visit by clinical staff  Allergies         Reviewed and updated as needed this visit by Provider             Review of Systems  CONSTITUTIONAL: NEGATIVE for fever, chills, change in weight  INTEGUMENTARY/SKIN: NEGATIVE for worrisome rashes, moles or lesions  EYES: NEGATIVE for vision changes or irritation  ENT: NEGATIVE for ear, mouth and throat problems  RESP: NEGATIVE for significant cough or SOB  CV: NEGATIVE for chest pain, palpitations or peripheral edema  GI: NEGATIVE for nausea, abdominal pain, heartburn, or change in bowel habits   male: negative for dysuria, hematuria, decreased urinary stream, erectile dysfunction, urethral discharge  MUSCULOSKELETAL: NEGATIVE for significant arthralgias or myalgia  NEURO: NEGATIVE for weakness, dizziness or paresthesias  PSYCHIATRIC: NEGATIVE for changes in mood or affect    OBJECTIVE:   There were no vitals taken for this visit.    Physical Exam  GENERAL: healthy, alert and no distress  EYES: Eyes grossly normal to inspection, PERRL and conjunctivae and sclerae normal  HENT: ear canals and TM's normal, nose and mouth without ulcers or lesions  NECK: no adenopathy, no asymmetry, masses, or scars and thyroid normal to palpation  RESP: lungs clear to auscultation - no rales, rhonchi or wheezes  CV: regular rate and rhythm, normal S1 S2, no S3 or S4, no murmur, click or rub, no peripheral edema and peripheral pulses strong  ABDOMEN: soft, nontender, no hepatosplenomegaly, no masses and bowel sounds normal  MS: no gross musculoskeletal defects noted, no edema  SKIN: no suspicious lesions or rashes  NEURO: Normal strength and tone, mentation intact and speech normal  BACK: no CVA tenderness, no paralumbar tenderness  PSYCH: mentation appears normal, affect normal/bright  LYMPH: no cervical, supraclavicular, axillary, or inguinal adenopathy    Diagnostic Test Results:  Labs reviewed in Epic  No results found for any visits on 02/05/20.    ASSESSMENT/PLAN:       ICD-10-CM    1. Chronic cough R05 predniSONE (DELTASONE) 20 MG tablet   2. Special screening for malignant  "neoplasms, colon Z12.11 Fecal colorectal cancer screen (FIT)   3. Reflex sympathetic dystrophy of both lower extremities G90.523 gabapentin (NEURONTIN) 300 MG capsule   4. Benign prostatic hyperplasia, unspecified whether lower urinary tract symptoms present N40.0 tamsulosin (FLOMAX) 0.4 MG capsule       COUNSELING:   Reviewed preventive health counseling, as reflected in patient instructions       Regular exercise       Healthy diet/nutrition       Vision screening       Hearing screening    Estimated body mass index is 25.42 kg/m  as calculated from the following:    Height as of 10/24/19: 1.905 m (6' 3\").    Weight as of 1/14/20: 92.3 kg (203 lb 6.4 oz).     Weight management plan: Discussed healthy diet and exercise guidelines     reports that he quit smoking about 22 months ago. His smoking use included cigarettes. He has a 12.00 pack-year smoking history. He has quit using smokeless tobacco.      ICD-10-CM    1. Chronic cough R05 predniSONE (DELTASONE) 20 MG tablet   2. Special screening for malignant neoplasms, colon Z12.11 Fecal colorectal cancer screen (FIT)   3. Reflex sympathetic dystrophy of both lower extremities G90.523 gabapentin (NEURONTIN) 300 MG capsule   4. Benign prostatic hyperplasia, unspecified whether lower urinary tract symptoms present N40.0 tamsulosin (FLOMAX) 0.4 MG capsule       Counseling Resources:  ATP IV Guidelines  Pooled Cohorts Equation Calculator  FRAX Risk Assessment  ICSI Preventive Guidelines  Dietary Guidelines for Americans, 2010  USDA's MyPlate  ASA Prophylaxis  Lung CA Screening    Avery Duke MD  [facility]  "

## 2020-02-07 ENCOUNTER — PATIENT OUTREACH (OUTPATIENT)
Dept: CARE COORDINATION | Facility: CLINIC | Age: 65
End: 2020-02-07

## 2020-02-07 SDOH — ECONOMIC STABILITY: FOOD INSECURITY: WITHIN THE PAST 12 MONTHS, YOU WORRIED THAT YOUR FOOD WOULD RUN OUT BEFORE YOU GOT MONEY TO BUY MORE.: NEVER TRUE

## 2020-02-07 SDOH — HEALTH STABILITY: PHYSICAL HEALTH: ON AVERAGE, HOW MANY DAYS PER WEEK DO YOU ENGAGE IN MODERATE TO STRENUOUS EXERCISE (LIKE A BRISK WALK)?: 0 DAYS

## 2020-02-07 SDOH — ECONOMIC STABILITY: TRANSPORTATION INSECURITY
IN THE PAST 12 MONTHS, HAS LACK OF TRANSPORTATION KEPT YOU FROM MEETINGS, WORK, OR FROM GETTING THINGS NEEDED FOR DAILY LIVING?: NO

## 2020-02-07 SDOH — ECONOMIC STABILITY: TRANSPORTATION INSECURITY
IN THE PAST 12 MONTHS, HAS THE LACK OF TRANSPORTATION KEPT YOU FROM MEDICAL APPOINTMENTS OR FROM GETTING MEDICATIONS?: NO

## 2020-02-07 SDOH — HEALTH STABILITY: PHYSICAL HEALTH: ON AVERAGE, HOW MANY MINUTES DO YOU ENGAGE IN EXERCISE AT THIS LEVEL?: 0 MIN

## 2020-02-07 SDOH — ECONOMIC STABILITY: INCOME INSECURITY: HOW HARD IS IT FOR YOU TO PAY FOR THE VERY BASICS LIKE FOOD, HOUSING, MEDICAL CARE, AND HEATING?: NOT HARD AT ALL

## 2020-02-07 SDOH — ECONOMIC STABILITY: FOOD INSECURITY: WITHIN THE PAST 12 MONTHS, THE FOOD YOU BOUGHT JUST DIDN'T LAST AND YOU DIDN'T HAVE MONEY TO GET MORE.: NEVER TRUE

## 2020-02-07 SDOH — HEALTH STABILITY: MENTAL HEALTH
STRESS IS WHEN SOMEONE FEELS TENSE, NERVOUS, ANXIOUS, OR CAN'T SLEEP AT NIGHT BECAUSE THEIR MIND IS TROUBLED. HOW STRESSED ARE YOU?: ONLY A LITTLE

## 2020-02-07 NOTE — PROGRESS NOTES
Clinic Care Coordination Contact    Follow Up Progress Note      Assessment: The patient was recently in the emergency room due to the fact that he could not get a refill on his oxycodone until the next day.  So therefore he went to the emergency room for pain management.  The patient states that he is using ice as well as Tylenol to spread his oxycodone out a little further.  The patient was seen by the PCP yesterday and needs a follow-up appointment in 3 months.  The RN CC encouraged the patient to get his appointment on the books immediately as Dr. Duke is very difficult to get into.  The patient states that the pain in his leg with a below the knee amputation is unchanged even with the oxycodone.  The RN CC encouraged the patient to speak with his PCP regarding possible work with pain management programs.  Medication reconciliation was completed and marked as reviewed.  Health maintenance was reviewed and questions were answered as well as social determinants of health.    Goals addressed this encounter:   Goals Addressed                 This Visit's Progress      #1  Functional (pt-stated)   On track     Goal Statement: I will call for an appointment with the prothesis department for a stump  and to be fitted for a prothesis.  I will attend the appointments needed to complete this process.  Date Goal set: 1/8/2020  Date to Achieve By: 7/8/2020  Patient expressed understanding of goal: yes  Action steps to achieve this goal:  1. I will call for the first assessment appointment and attend.  2. I will attend all of the appointments needed to get the stump  process complete.  3. I will attend all of the appointments needed to complete the process for the prothesis.                   Intervention/Education provided during outreach: Encouraged the patient to make his recheck appointment very soon as his PCP is difficult to get him with          Plan:   1.  Patient will call for an appointment  with the PCP for his next check.  2.  The patient will make an appointment with the orthotics and prosthetics department to be fitted for a prosthesis.  3.  The patient will use other modalities such as ice, heat, or Tylenol for pain management instead of narcotics.  4.  Care Coordination to remain available for the patient to contact in the event of future needs.      RN  Care Coordinator will follow up in 4 weeks.        Darin Yan MSN, RN, PHN, CCM   Primary Care Clinical RN Care Coordinator  [facility]  2/7/2020   9:39 AM  [email or web address]  Office: [phone]

## 2020-02-17 DIAGNOSIS — G89.18 ACUTE POST-OPERATIVE PAIN: ICD-10-CM

## 2020-02-17 DIAGNOSIS — M54.40 CHRONIC LOW BACK PAIN WITH SCIATICA, SCIATICA LATERALITY UNSPECIFIED, UNSPECIFIED BACK PAIN LATERALITY: ICD-10-CM

## 2020-02-17 DIAGNOSIS — G89.29 CHRONIC LOW BACK PAIN WITH SCIATICA, SCIATICA LATERALITY UNSPECIFIED, UNSPECIFIED BACK PAIN LATERALITY: ICD-10-CM

## 2020-02-17 RX ORDER — OXYCODONE HYDROCHLORIDE 10 MG/1
10 TABLET ORAL EVERY 6 HOURS PRN
Qty: 120 TABLET | Refills: 0 | Status: SHIPPED | OUTPATIENT
Start: 2020-02-17 | End: 2020-03-17

## 2020-02-17 NOTE — TELEPHONE ENCOUNTER
Requested Prescriptions   Pending Prescriptions Disp Refills     oxyCODONE IR (ROXICODONE) 10 MG tablet 120 tablet 0     Sig: Take 1 tablet (10 mg) by mouth every 6 hours as needed for moderate to severe pain or severe pain       There is no refill protocol information for this order         Last Written Prescription Date:  1/18/2020  Last Fill Quantity: 120,   # refills: 0  Last Office Visit: 2/5/20  Future Office visit:       Routing refill request to provider for review/approval because:  Drug not on the [facility], [facility] or [facility] refill protocol or controlled substance

## 2020-02-17 NOTE — TELEPHONE ENCOUNTER
Reason for Call:  Medication or medication refill:    Do you use a [facility]?  Name of the pharmacy and phone number for the current request:  [facility] [address] (pended)    Name of the medication requested: Oxycodone IR 10mg tablet    Other request: none    Can we leave a detailed message on this number? YES    Phone number patient can be reached at: Home number on file [phone] (home)    Best Time: anytime    Call taken on 2/17/2020 at 9:44 AM by Juan Max

## 2020-02-19 ENCOUNTER — TELEPHONE (OUTPATIENT)
Dept: FAMILY MEDICINE | Facility: CLINIC | Age: 65
End: 2020-02-19

## 2020-02-19 DIAGNOSIS — G89.29 CHRONIC LOW BACK PAIN WITH SCIATICA, SCIATICA LATERALITY UNSPECIFIED, UNSPECIFIED BACK PAIN LATERALITY: Primary | ICD-10-CM

## 2020-02-19 DIAGNOSIS — M54.40 CHRONIC LOW BACK PAIN WITH SCIATICA, SCIATICA LATERALITY UNSPECIFIED, UNSPECIFIED BACK PAIN LATERALITY: Primary | ICD-10-CM

## 2020-02-19 NOTE — TELEPHONE ENCOUNTER
Reason for call:  Other   Patient called regarding (reason for call): prescription  Additional comments: [facility] pharmacy is calling wondering if they can change the gabapentin (NEURONTIN) 300 MG capsule to 600 ml for 3 times a day because insurance will not cover 300 ml 6 times a day. Please call back and advise     Phone number to reach patient:  Other phone number:  [facility] [id number] [place] [facility] [place], [place] - [facility] [address]   P: [phone]   F: [phone]  *    Best Time:  any    Can we leave a detailed message on this number?  YES    Travel screening: Not Applicable

## 2020-02-20 RX ORDER — GABAPENTIN 600 MG/1
600 TABLET ORAL 3 TIMES DAILY
Qty: 270 TABLET | Refills: 4 | Status: SHIPPED | OUTPATIENT
Start: 2020-02-20 | End: 2020-06-01

## 2020-02-21 ENCOUNTER — TELEPHONE (OUTPATIENT)
Dept: FAMILY MEDICINE | Facility: CLINIC | Age: 65
End: 2020-02-21
Payer: COMMERCIAL

## 2020-02-21 DIAGNOSIS — T87.43 INFECTION OF AMPUTATION STUMP OF RIGHT LOWER EXTREMITY (H): Primary | ICD-10-CM

## 2020-02-21 NOTE — TELEPHONE ENCOUNTER
Medication reconciliation completed per [facility] orders.     Changes noted     Routed to PCP to review and sign.     Estrella Ratliff, RN, BSN, PHN  [facility] [place]

## 2020-02-21 NOTE — TELEPHONE ENCOUNTER
Forms received from:  Home Care   Phone number listed: [phone]   Fax listed: [phone]  Date received: 02.21.20  Form description: Home Health Cert(01.02.20-03.01.20)  Once forms are completed, please return to  Home Care via Fax.  Is patient requesting to be contacted when forms are completed: NA    Form placed: in providers GAY [place]

## 2020-02-24 DIAGNOSIS — Z53.9 DIAGNOSIS NOT YET DEFINED: Primary | ICD-10-CM

## 2020-02-24 PROCEDURE — G0179 MD RECERTIFICATION HHA PT: HCPCS | Performed by: INTERNAL MEDICINE

## 2020-03-10 ENCOUNTER — PATIENT OUTREACH (OUTPATIENT)
Dept: CARE COORDINATION | Facility: CLINIC | Age: 65
End: 2020-03-10

## 2020-03-10 NOTE — PROGRESS NOTES
Clinic Care Coordination Contact    Follow Up Progress Note      Assessment: The patient is complaining of phantom pains in his stump.  The patient states that this happens every other to every day.  He is concerned about a possible infection.  The patient denies redness, warmth, discharge of any kind in his stump.  The RN CC encouraged the patient to monitor for signs of infection and/or increasing pain and to make an appointment with the provider should this happen.  Medication reconciliation was completed with the patient and marked as reviewed.  Health maintenance was reviewed and questions were answered with the patient.  The assessment for hypertension was completed with the patient today as well as the social determinants of health and they were marked as reviewed.       Goals addressed this encounter:   Goals Addressed                 This Visit's Progress      #1  Functional (pt-stated)   On track     Goal Statement: I will call for an appointment with the prothesis department for a stump  and to be fitted for a prothesis.  I will attend the appointments needed to complete this process.  Date Goal set: 1/8/2020  Date to Achieve By: 7/8/2020  Patient expressed understanding of goal: yes  Action steps to achieve this goal:  1. I will call for the first assessment appointment and attend.  2. I will attend all of the appointments needed to get the stump  process complete.  3. I will attend all of the appointments needed to complete the process for the prothesis.                   Intervention/Education provided during outreach: Reviewed the signs and symptoms of infection and when to notify the provider.     Outreach Frequency: monthly    Plan:   1.  The patient will monitor the stump for signs and symptoms of infection or worsening pain from the phantom pains and notify the provider.  2.  The patient will work on maintaining his strength and stamina to be ready for the prothesis.   3.  The  patient will call for the appointments with prosthetics when his stump is all healed.     RN  Care Coordinator will follow up in 4 weeks.          Darin Yan MSN, RN, PHN, CCM   Primary Care Clinical RN Care Coordinator  [facility]  3/10/2020   10:49 AM  [email or web address]  Office: [phone]

## 2020-03-17 DIAGNOSIS — G89.29 CHRONIC LOW BACK PAIN WITH SCIATICA, SCIATICA LATERALITY UNSPECIFIED, UNSPECIFIED BACK PAIN LATERALITY: ICD-10-CM

## 2020-03-17 DIAGNOSIS — M54.40 CHRONIC LOW BACK PAIN WITH SCIATICA, SCIATICA LATERALITY UNSPECIFIED, UNSPECIFIED BACK PAIN LATERALITY: ICD-10-CM

## 2020-03-17 DIAGNOSIS — G89.18 ACUTE POST-OPERATIVE PAIN: ICD-10-CM

## 2020-03-17 RX ORDER — OXYCODONE HYDROCHLORIDE 10 MG/1
10 TABLET ORAL EVERY 6 HOURS PRN
Qty: 120 TABLET | Refills: 0 | Status: SHIPPED | OUTPATIENT
Start: 2020-03-17 | End: 2020-04-16

## 2020-03-17 NOTE — TELEPHONE ENCOUNTER
Reason for Call:  Other prescription refill request for:    oxyCODONE IR (ROXICODONE) 10 MG tablet    Detailed comments: Please call patient to advise.    Phone Number Patient can be reached at: Cell number on file:    Telephone Information:   Mobile [phone]       Best Time: anytime    Can we leave a detailed message on this number? YES         Call taken on 3/17/2020 at 10:23 AM by Janet Martinez

## 2020-03-17 NOTE — TELEPHONE ENCOUNTER
Requested Prescriptions   Pending Prescriptions Disp Refills     oxyCODONE IR (ROXICODONE) 10 MG tablet 120 tablet 0     Sig: Take 1 tablet (10 mg) by mouth every 6 hours as needed for moderate to severe pain or severe pain       There is no refill protocol information for this order        Last Written Prescription Date:  2/17/2020  Last Fill Quantity: 120,  # refills: 0   Last office visit: 2/5/2020 with prescribing provider:     Future Office Visit:    Routing refill request to provider for review/approval because:  Drug not on the [facility] refill protocol       Sandra Chan RN  [facility]

## 2020-04-07 ENCOUNTER — PATIENT OUTREACH (OUTPATIENT)
Dept: CARE COORDINATION | Facility: CLINIC | Age: 65
End: 2020-04-07

## 2020-04-07 ENCOUNTER — TELEPHONE (OUTPATIENT)
Dept: FAMILY MEDICINE | Facility: CLINIC | Age: 65
End: 2020-04-07

## 2020-04-07 NOTE — TELEPHONE ENCOUNTER
Reason for Call:  Other     Detailed comments: Patient requesting for care team to call. Please advise.     Phone Number Patient can be reached at: Home number on file [phone] (home)    Best Time: Anytime    Can we leave a detailed message on this number? YES    Call taken on 4/7/2020 at 12:11 PM by Makenna Baugh

## 2020-04-07 NOTE — TELEPHONE ENCOUNTER
TC spoke with patient. He will drop off forms at the clinic . TC advised patient to notify  staff to place a note on the forms that they need to be completed by Friday.

## 2020-04-07 NOTE — LETTER
Community [facility]  Complex Care Plan  About Me:    Patient Name:  Constantino Taylor    YOB: 1955  Age:         64 year old   Joyce MRN:    [id number] Telephone Information:  Home Phone [phone]   Mobile [phone]       Address:  [address] [place] [id number] Email address:  [email or web address]      Emergency Contact(s)    Name Relationship Lgl Grd Work Phone Home Phone Mobile Phone   1. JUAN MIGUEL HENSON Sister No  [phone]   2. NONE Other   none            Primary language:  English     needed? No   [place] [facility]:  [phone] op. 1  Other communication barriers: Cognitive impairment  Preferred Method of Communication:  Mail  Current living arrangement: I live alone  Mobility Status/ Medical Equipment: Independent w/Device    Health Maintenance  Health Maintenance Reviewed: Due/Overdue   Health Maintenance Due   Topic Date Due     PREVENTIVE CARE VISIT  01/06/2015     COLORECTAL CANCER SCREENING  03/17/2018     ZOSTER IMMUNIZATION (3 of 3) 01/31/2019     HF ACTION PLAN  10/17/2019     LIPID  03/02/2020         My Access Plan  Medical Emergency 911   Primary Clinic Line [facility] - [phone]   24 Hour Appointment Line [phone] or  [phone]) (toll-free)   24 Hour Nurse Line [phone] (toll-free)   Preferred Urgent Care     Preferred Hospital [facility]  [phone]   Preferred Pharmacy [id number] - [address] AT [facility] OF [facility]     Behavioral Health Crisis Line The National Suicide Prevention Lifeline at 1-809.358.4167 or 911             My Care Team Members  Patient Care Team       Relationship Specialty Notifications Start End    Avery Duke MD PCP - General Internal Medicine  5/14/15     Phone: [phone] Fax: [phone]  [facility] [address]    Avery Duke MD Assigned PCP   7/5/15     Phone: [phone] Fax: [phone]         [address] [facility] [address]    Piper Polk, RN Nurse Coordinator Vascular Surgery Abnormal results only, Admissions 7/16/15     Phone: [phone] Pager: [phone]        Odessa Browning MD MD Vascular Surgery  8/10/15     Phone: [phone] Fax: [phone]         [address] [facility] [address] [facility] [address]    Norah Brown MD MD Cardiology  2/7/16     Phone: [phone] Fax: [phone]         [address] [facility] [address] [facility] [address]    Cheyenne Mansfield MD MD Internal Medicine  2/9/16     Quinn Rodriguez MD MD Oncology Admissions 9/25/17     Phone: [phone] Fax: [phone]         [address] [facility] [address] [facility] [address]    Bryanna Rubio, RN Specialty Care Coordinator Cardiology Admissions 10/18/19     Phone: [phone]         Norah Brown MD MD Cardiology  10/18/19     Phone: [phone] Fax: [phone]         [address] [facility] [address] [facility] [address]    Darin Dickson, RN Lead Care Coordinator Primary Care - CC Admissions 11/4/19     Phone: [phone] Fax: [phone]                My Care Plans  Self Management and Treatment Plan  Goals and (Comments)  Goals        General    #1  Functional (pt-stated)     Notes - Note created  1/8/2020 10:50 AM by Darin Dickson, RN    Goal Statement: I will call for an appointment with the prothesis department for a stump  and to be fitted for a prothesis.  I will attend the appointments needed to complete this process.  Date Goal set: 1/8/2020  Date to Achieve By: 7/8/2020  Patient expressed understanding of goal: yes  Action steps to achieve this goal:  1. I will call for the first assessment appointment and attend.  2. I will attend all of the appointments needed to get the stump  process complete.  3. I will attend all of the appointments  needed to complete the process for the prothesis.                   Action Plans on File:                       Advance Care Plans/Directives Type:        My Medical and Care Information  Problem List   Patient Active Problem List   Diagnosis     Cerebral infarction (H)     Chronic hepatitis C without hepatic coma (H)     Tobacco use disorder     Chronic low back pain     Acute pancreatitis     Hyperlipidemia LDL goal <70     Hypertension goal BP (blood pressure) < 140/90     Nonischemic dilated cardiomyopathy (HCC)     Malignant neoplasm of prostate (H)     Erectile dysfunction     Eczema     PAD (peripheral artery disease) (H)     S/P BKA (below knee amputation) unilateral (H)     Encounter for counseling     Acute renal failure (H)     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    Care Coordination Start Date: 11/11/2019   Frequency of Care Coordination: monthly   Form Last Updated: 04/07/2020

## 2020-04-07 NOTE — PROGRESS NOTES
Clinic Care Coordination Contact    Follow Up Progress Note      Assessment: The patient is delivering his paperwork to be signed to the clinic. This will allow him to make the appointments necessary for having his prothesis made for his leg.  The patient is very excited to be able to get out of his wheelchair.  The patient states that currently he is having no other problems.  He denies headache, chest pain, SOB, or fever.  The patient is using social distancing and self quarantining to avoid the COVID 19 virus.    Medication reconciliation was completed with the patient and marked as reviewed.  Health maintenance was reviewed and questions were answered with the patient.  The assessment for hypertension was completed with the patient today as well as the social determinants of health and they were marked as reviewed.       Goals addressed this encounter:   Goals Addressed                 This Visit's Progress      #1  Functional (pt-stated)   On track     Goal Statement: I will call for an appointment with the prothesis department for a stump  and to be fitted for a prothesis.  I will attend the appointments needed to complete this process.  Date Goal set: 1/8/2020  Date to Achieve By: 7/8/2020  Patient expressed understanding of goal: yes  Action steps to achieve this goal:  1. I will call for the first assessment appointment and attend.  2. I will attend all of the appointments needed to get the stump  process complete.  3. I will attend all of the appointments needed to complete the process for the prothesis.                   Intervention/Education provided during outreach: Reinforced the importance of following the directions of the prosthetics office.       Outreach Frequency: monthly    Plan:   1.  The patient will turn in the paperwork for the provider to sign for the prothesis to be made.  2.  The patient will make the appointments needed for the fitting and use of the prosthesis.  3.    The patient will follow up with the PT department to ensure proper education so that he can stop the use of the wheelchair when appropriate.    RN  Care Coordinator will follow up in 4 weeks.          Darin Yan MSN, RN, PHN, CCM   Primary Care Clinical RN Care Coordinator  [facility]  4/7/2020   3:45 PM  [email or web address]  Office: [phone]

## 2020-04-07 NOTE — TELEPHONE ENCOUNTER
Patient called back stating he is dropping forms off at the  for Dr. Duke . The Paper work is for him being able to get a prosthetic leg.. Since Dr. Duke is out this week, Patient is asking to speak to Dr. Duke's nurse

## 2020-04-13 ENCOUNTER — VIRTUAL VISIT (OUTPATIENT)
Dept: FAMILY MEDICINE | Facility: CLINIC | Age: 65
End: 2020-04-13
Payer: COMMERCIAL

## 2020-04-13 DIAGNOSIS — I73.9 PAD (PERIPHERAL ARTERY DISEASE) (H): ICD-10-CM

## 2020-04-13 DIAGNOSIS — I63.89 CEREBRAL INFARCTION DUE TO OTHER MECHANISM (H): Primary | ICD-10-CM

## 2020-04-13 DIAGNOSIS — Z89.519 S/P BKA (BELOW KNEE AMPUTATION) UNILATERAL (H): ICD-10-CM

## 2020-04-13 DIAGNOSIS — I50.22 CHRONIC SYSTOLIC HEART FAILURE (H): ICD-10-CM

## 2020-04-13 PROCEDURE — 99442 ZZC PHYSICIAN TELEPHONE EVALUATION 11-20 MIN: CPT | Performed by: INTERNAL MEDICINE

## 2020-04-13 NOTE — PROGRESS NOTES
"Constantino Taylor is a 64 year old male who is being evaluated via a billable telephone visit.      The patient has been notified of following:     \"This telephone visit will be conducted via a call between you and your physician/provider. We have found that certain health care needs can be provided without the need for a physical exam.  This service lets us provide the care you need with a short phone conversation.  If a prescription is necessary we can send it directly to your pharmacy.  If lab work is needed we can place an order for that and you can then stop by our lab to have the test done at a later time.    Telephone visits are billed at different rates depending on your insurance coverage. During this emergency period, for some insurers they may be billed the same as an in-person visit.  Please reach out to your insurance provider with any questions.    If during the course of the call the physician/provider feels a telephone visit is not appropriate, you will not be charged for this service.\"    Patient has given verbal consent for Telephone visit?  YES  Clinic [facility] prosthesis  Initially was supposed to fax it over on Friday  Left side  Below the knee and initially thought     How would you like to obtain your AVS? MyChart    Subjective     Constantino Taylor is a 64 year old male who presents to clinic today for the following health issues:  Assessment   Print the physical    Forms; new paperwork for leg    This cannot be a face to face visit due to covid-19  However I will address the concerns      Patient Active Problem List   Diagnosis     Cerebral infarction (H)     Chronic hepatitis C without hepatic coma (H)     Tobacco use disorder     Chronic low back pain     Acute pancreatitis     Hyperlipidemia LDL goal <70     Hypertension goal BP (blood pressure) < 140/90     Nonischemic dilated cardiomyopathy (HCC)     Malignant neoplasm of prostate (H)     Erectile dysfunction     Eczema     PAD (peripheral " artery disease) (H)     S/P BKA (below knee amputation) unilateral (H)     Encounter for counseling     Acute renal failure (H)     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     Dyslipidemia     Nicotine dependence, uncomplicated     Pyelonephritis     Prostate cancer (H)     Osteomyelitis (H)     CKD (chronic kidney disease) stage 3, GFR 30-59 ml/min (H)     Past Surgical History:   Procedure Laterality Date     AMPUTATE LEG BELOW KNEE Left 2015    Procedure: AMPUTATE LEG BELOW KNEE;  Surgeon: Odessa Browning MD;  Location: [facility] OR     IRRIGATION AND DEBRIDEMENT LOWER EXTREMITY, COMBINED Left 10/29/2019    Procedure: IRRIGATION AND DEBRIDEMENT, LEFT LOWER EXTREMITY w/ Veriflow Wound Vac Placement.;  Surgeon: Michelle Matute MD;  Location: [facility] OR     removal of blood clots in arm         Social History     Tobacco Use     Smoking status: Former Smoker     Packs/day: 0.30     Years: 40.00     Pack years: 12.00     Types: Cigarettes     Last attempt to quit: 3/12/2018     Years since quittin.0     Smokeless tobacco: Former User   Substance Use Topics     Alcohol use: Yes     Alcohol/week: 2.0 - 6.0 standard drinks     Types: 2 - 6 Cans of beer per week     Comment: couple of beers per episode, several times a week     Family History   Problem Relation Age of Onset     Cancer Mother         brain     Cancer Brother          Current Outpatient Medications   Medication Sig Dispense Refill     acetaminophen (TYLENOL) 325 MG tablet Take 325-650 mg by mouth every 6 hours as needed for mild pain (Takes infrequently)       aspirin (ASPIRIN LOW DOSE) 81 MG EC tablet TAKE 1 TABLET(81 MG) BY MOUTH DAILY 90 tablet 3     atorvastatin (LIPITOR) 40 MG tablet Take 1 tablet (40 mg) by mouth At Bedtime 90 tablet 4     gabapentin 600 MG PO tablet Take 1 tablet (600 mg) by mouth 3  "times daily 270 tablet 4     lisinopril (PRINIVIL/ZESTRIL) 40 MG tablet Take 1 tablet (40 mg) by mouth daily 90 tablet 3     metoprolol succinate ER (TOPROL-XL) 100 MG 24 hr tablet Take 1 tablet (100 mg) by mouth daily 90 tablet 3     Multiple Vitamins-Minerals (ONE-A-DAY MENS 50+ ADVANTAGE) TABS Take 1 each by mouth daily 100 tablet 3     naloxone (EVZIO) 0.4 MG/0.4ML auto-injector Inject 0.4 mLs (0.4 mg) into the muscle as needed for opioid reversal every 2-3 minutes until assistance arrives 0.8 mL 1     order for DME Equipment being ordered: Wheelchair, manual 1 each 0     order for DME Equipment being ordered: self adhesive bandage 4\" by 8\" 30 each 11     order for DME Please dispense blood pressure machine and cuff. 1 each 0     oxyCODONE IR (ROXICODONE) 10 MG tablet Take 1 tablet (10 mg) by mouth every 6 hours as needed for moderate to severe pain or severe pain 120 tablet 0     polyethylene glycol (MIRALAX) powder Take 17 g by mouth as needed for constipation (Patient taking differently: Take 17 g by mouth as needed for constipation (1-2 times per month on average) ) 510 g 1     rivaroxaban ANTICOAGULANT (XARELTO) 20 MG TABS tablet Take 1 tablet (20 mg) by mouth daily (with dinner) 90 tablet 3     tamsulosin (FLOMAX) 0.4 MG capsule Take 1 capsule (0.4 mg) by mouth daily 90 capsule 3     No Known Allergies  Recent Labs   Lab Test 01/14/20  0944 12/11/19  1655  10/24/19  2327  03/02/19 01/12/18  0923  10/17/16  1143  05/10/15  1648  11/25/14  1203  01/06/14  1103 07/12/13  1109  11/15/12  0845   A1C  --   --   --   --   --   --   --   --   --   --   --   --   --  5.8  --  5.8 5.4  --   --    LDL  --   --   --   --   --  58  --  42  --  46  --   --    < >  --    < >  --   --    < >  --    HDL  --   --   --   --   --  35*  --  39*  --  41  --   --    < >  --    < >  --   --    < >  --    TRIG  --   --   --   --   --  97  --  74  --  91  --   --    < >  --    < >  --   --    < >  --    ALT 48 72*  --  14   < >  " --    < > 29   < >  --    < >  --    < >  --    < > 69 37  --  52   CR 1.28* 1.30*   < > 1.41*   < >  --    < > 1.64*   < > 1.53*   < > 1.90*   < > 1.02   < > 0.86 1.08   < > 0.87   GFRESTIMATED 60* 58*   < > 53*   < >  --    < > 43*   < > 47*   < > 37*   < > 75   < > >90 71   < > >90   GFRESTBLACK 69 68   < > 61   < >  --    < > 52*   < > 57*   < > 44*   < > >90   GFR Calc     < > >90 86   < > >90   POTASSIUM 4.0 4.0   < > 4.1   < >  --    < > 4.0   < > 4.6   < > 5.0   < > 4.5   < > 4.5 4.1   < > 3.6   TSH  --   --   --   --   --   --   --   --   --   --   --  0.99  --   --   --   --   --   --  1.18    < > = values in this interval not displayed.      BP Readings from Last 3 Encounters:   02/05/20 138/72   01/14/20 (!) 145/91   01/13/20 138/88    Wt Readings from Last 3 Encounters:   02/05/20 92.1 kg (203 lb)   01/14/20 92.3 kg (203 lb 6.4 oz)   12/11/19 91.6 kg (202 lb)                    Reviewed and updated as needed this visit by Provider         Review of Systems   ROS COMP: Constitutional, HEENT, cardiovascular, pulmonary, gi and gu systems are negative, except as otherwise noted.       Objective   Reported vitals:  There were no vitals taken for this visit.   healthy, alert and no distress  PSYCH: Alert and oriented times 3; coherent speech, normal   rate and volume, able to articulate logical thoughts, able   to abstract reason, no tangential thoughts, no hallucinations   or delusions  His affect is normal  RESP: No cough, no audible wheezing, able to talk in full sentences  Remainder of exam unable to be completed due to telephone visits    Cannot complete more exam due to COVID restrictions    B/P: Data Unavailable, T: Data Unavailable, P: Data Unavailable, R: Data Unavailable  Diagnostic Test Results:  Labs reviewed in Epic  No results found for any visits on 04/13/20.      Coexisting  Need for replacement  Motivated to use prosthetic  Amputation activities  Potential to return to once  received    Assessment/Plan:  There are no diagnoses linked to this encounter.    No follow-ups on file.    ICD-10-CM    1. Cerebral infarction due to other mechanism (H)  I63.89    2. Chronic systolic heart failure (H)  I50.22    3. PAD (peripheral artery disease) (H)  I73.9    4. S/P BKA (below knee amputation) unilateral (H)  Z89.519      This patient had a previous BKA with prosthesis due to ischemic cardiomyopathy and peripheral vascular disease  Patient used his prosthesis for mobility and walking in the home  Patient developed a CVA, new in march of 2019  This changed the way he walked and used the prosthesis.  He developed a pressure ulcer from rubbing and chaffing of the old prosthesis leading to a stump infection in October 2019  He has completed extensive outpatient treatment and physical therapy and recovered from this infection and regained strength in regards to the previous stroke      PAtient is currently undergoing evaluation for a repeat of the prosthesis as the wounds and infection have healed.  He is wheelchair bound without his prosthesis and needs to have the prosthesis refit to avoid the rubbing and chaffing and allow the next stages of PT to regain walking    Patient was using the prosthesis previously before the stroke every day and I anticipate will be able to regain ambulation with a proper fit and physical therapy eventually when the covid -19 restrictions are released    Patient has motivation to ambulate, was fully ambulatory with ADLs and other activities with the previous prosthesis and cannot transfer or do ADLS currently without risk of falls without the prosthesis  He can return to baseline activities as of Mrach 2019 with new prosthesis and appropriate physical therapy          Phone call duration:  15 minutes    Avery Duke MD

## 2020-04-14 ENCOUNTER — VIRTUAL VISIT (OUTPATIENT)
Dept: ONCOLOGY | Facility: CLINIC | Age: 65
End: 2020-04-14
Attending: PHYSICIAN ASSISTANT
Payer: COMMERCIAL

## 2020-04-14 DIAGNOSIS — C61 MALIGNANT NEOPLASM OF PROSTATE (H): ICD-10-CM

## 2020-04-14 DIAGNOSIS — C61 PROSTATE CANCER (H): Primary | ICD-10-CM

## 2020-04-14 LAB
ALBUMIN SERPL-MCNC: 3.9 G/DL (ref 3.4–5)
ALP SERPL-CCNC: 81 U/L (ref 40–150)
ALT SERPL W P-5'-P-CCNC: 27 U/L (ref 0–70)
ANION GAP SERPL CALCULATED.3IONS-SCNC: 5 MMOL/L (ref 3–14)
AST SERPL W P-5'-P-CCNC: 20 U/L (ref 0–45)
BASOPHILS # BLD AUTO: 0 10E9/L (ref 0–0.2)
BASOPHILS NFR BLD AUTO: 0.5 %
BILIRUB SERPL-MCNC: 0.4 MG/DL (ref 0.2–1.3)
BUN SERPL-MCNC: 21 MG/DL (ref 7–30)
CALCIUM SERPL-MCNC: 9 MG/DL (ref 8.5–10.1)
CHLORIDE SERPL-SCNC: 109 MMOL/L (ref 94–109)
CO2 SERPL-SCNC: 26 MMOL/L (ref 20–32)
CREAT SERPL-MCNC: 1.44 MG/DL (ref 0.66–1.25)
DIFFERENTIAL METHOD BLD: NORMAL
EOSINOPHIL # BLD AUTO: 0.3 10E9/L (ref 0–0.7)
EOSINOPHIL NFR BLD AUTO: 4.3 %
ERYTHROCYTE [DISTWIDTH] IN BLOOD BY AUTOMATED COUNT: 13.2 % (ref 10–15)
GFR SERPL CREATININE-BSD FRML MDRD: 52 ML/MIN/{1.73_M2}
GLUCOSE SERPL-MCNC: 128 MG/DL (ref 70–99)
HCT VFR BLD AUTO: 43.4 % (ref 40–53)
HGB BLD-MCNC: 14.4 G/DL (ref 13.3–17.7)
IMM GRANULOCYTES # BLD: 0 10E9/L (ref 0–0.4)
IMM GRANULOCYTES NFR BLD: 0.5 %
LDH SERPL L TO P-CCNC: 134 U/L (ref 85–227)
LYMPHOCYTES # BLD AUTO: 1.6 10E9/L (ref 0.8–5.3)
LYMPHOCYTES NFR BLD AUTO: 25.2 %
MCH RBC QN AUTO: 30.9 PG (ref 26.5–33)
MCHC RBC AUTO-ENTMCNC: 33.2 G/DL (ref 31.5–36.5)
MCV RBC AUTO: 93 FL (ref 78–100)
MONOCYTES # BLD AUTO: 0.5 10E9/L (ref 0–1.3)
MONOCYTES NFR BLD AUTO: 8.3 %
NEUTROPHILS # BLD AUTO: 3.8 10E9/L (ref 1.6–8.3)
NEUTROPHILS NFR BLD AUTO: 61.2 %
NRBC # BLD AUTO: 0 10*3/UL
NRBC BLD AUTO-RTO: 0 /100
PLATELET # BLD AUTO: 208 10E9/L (ref 150–450)
POTASSIUM SERPL-SCNC: 3.8 MMOL/L (ref 3.4–5.3)
PROT SERPL-MCNC: 8.3 G/DL (ref 6.8–8.8)
PSA SERPL-MCNC: 30.8 UG/L (ref 0–4)
RBC # BLD AUTO: 4.66 10E12/L (ref 4.4–5.9)
SODIUM SERPL-SCNC: 140 MMOL/L (ref 133–144)
WBC # BLD AUTO: 6.2 10E9/L (ref 4–11)

## 2020-04-14 PROCEDURE — 84403 ASSAY OF TOTAL TESTOSTERONE: CPT | Performed by: PHYSICIAN ASSISTANT

## 2020-04-14 PROCEDURE — 84153 ASSAY OF PSA TOTAL: CPT | Performed by: PHYSICIAN ASSISTANT

## 2020-04-14 PROCEDURE — 99441 ZZC PHYSICIAN TELEPHONE EVALUATION 5-10 MIN: CPT | Performed by: PHYSICIAN ASSISTANT

## 2020-04-14 PROCEDURE — 40000114 ZZH STATISTIC NO CHARGE CLINIC VISIT

## 2020-04-14 PROCEDURE — 80053 COMPREHEN METABOLIC PANEL: CPT | Performed by: PHYSICIAN ASSISTANT

## 2020-04-14 PROCEDURE — 85025 COMPLETE CBC W/AUTO DIFF WBC: CPT | Performed by: PHYSICIAN ASSISTANT

## 2020-04-14 PROCEDURE — 83615 LACTATE (LD) (LDH) ENZYME: CPT | Performed by: PHYSICIAN ASSISTANT

## 2020-04-14 PROCEDURE — 36415 COLL VENOUS BLD VENIPUNCTURE: CPT | Performed by: PHYSICIAN ASSISTANT

## 2020-04-14 NOTE — PROGRESS NOTES
"Constantino Taylor is a 64 year old male who is being evaluated via a billable telephone visit.      The patient has been notified of following:     \"This telephone visit will be conducted via a call between you and your physician/provider. We have found that certain health care needs can be provided without the need for a physical exam.  This service lets us provide the care you need with a short phone conversation.  If a prescription is necessary we can send it directly to your pharmacy.  If lab work is needed we can place an order for that and you can then stop by our lab to have the test done at a later time.    Telephone visits are billed at different rates depending on your insurance coverage. During this emergency period, for some insurers they may be billed the same as an in-person visit.  Please reach out to your insurance provider with any questions.    If during the course of the call the physician/provider feels a telephone visit is not appropriate, you will not be charged for this service.\"    *PATIENT HAS NO NEW NEEDS OR CONCERNS.*  Onelia Rose CMA on 4/14/2020 at 9:46 AM  CALL BACK # FOR ROOMING TEAM = [phone]      Patient has given verbal consent for Telephone visit?  Yes    How would you like to obtain your AVS? MyChart      Provider Note:   Apr 14, 2020     Please see previous notes for complete oncologic history.     TREATMENT SUMMARY:  He was was referred to Dr. Rutherford who did a biopsy on 05/01/2012, and pathology was consistent with prostate adenocarcinoma, Winchester score 3+4, without perineural invasion or angiolymphatic invasion, involving about 65% of the tissue on the right.  About 3-5% of the tissue on the left was also involved with Winchester 3+3=6 carcinoma. He received a 4-month shot of Lupron on 05/25/2012 by Dr. Rutherford. He opted for definitive radiation therapy and received 7560 cGy including 4500 cGy to the pelvis and an additional 3060 cGy boost to the prostate for his [id number] disease " from 8/29-10/26/2012. His PSA never dropped to 0. It was 0.34 on 1/6/14 at the lowest value. He has not followed up in the past and has ongoing issues with BP management, strokes, chronic afib, left leg amputation. etc.  Lupron:  #1-5/25/2012 (30mg)  #2-1/14/13(30 mg)  #3-8/14/13(30 mg)  #4-3/4/14 (45 mg)  Total duration: 28 months (Not continuous due to compliance)     CURRENT INTERVENTIONS:  Lupron started 10/14/19, plan for continuous androgen deprivation   Was due again on 1/14/20 though refused shot at that appt     Interim History:   He has been doing well. His main focus has been to trying and get back to his baseline with his leg. He had lots of complications with his BKA and they almost have to amputate above the knee. He is now doing better but he wants his one focus at this time to be trying to get his leg better and walking again with prothesis. He does not want to focus on his cancer yet.     He has not had any hot flashes in about a month which he is happy about.     He denies any urinary symptoms or new pain, especially in his bone.     ROS: 10 point ROS neg other than the symptoms noted above in the HPI.    Objectives:  General: patient sounds in no audible acute distress, alert and oriented, speech clear and fluid  Resp: Speaking in full sentences, no audible respiratory distress, no cough, no audible wheeze  Psych: able to articulate logical thoughts, able to abstract reason, no tangential thoughts, no hallucinations or delusions  His affect is normal    Labs:   Labs were personally reviewed     4/14/2020 09:09   Sodium 140   Potassium 3.8   Chloride 109   Carbon Dioxide 26   Urea Nitrogen 21   Creatinine 1.44 (H)   GFR Estimate 52 (L)   GFR Estimate If Black 60 (L)   Calcium 9.0   Anion Gap 5   Albumin 3.9   Protein Total 8.3   Bilirubin Total 0.4   Alkaline Phosphatase 81   ALT 27   AST 20   Lactate Dehydrogenase 134   PSA 30.80 (H)   Glucose 128 (H)   WBC 6.2   Hemoglobin 14.4   Hematocrit 43.4  "  Platelet Count 208   RBC Count 4.66   MCV 93   MCH 30.9   MCHC 33.2   RDW 13.2   Diff Method Automated Method   % Neutrophils 61.2   % Lymphocytes 25.2   % Monocytes 8.3   % Eosinophils 4.3   % Basophils 0.5   % Immature Granulocytes 0.5   Nucleated RBCs 0   Absolute Neutrophil 3.8   Absolute Lymphocytes 1.6   Absolute Monocytes 0.5   Absolute Eosinophils 0.3   Absolute Basophils 0.0   Abs Immature Granulocytes 0.0   Absolute Nucleated RBC 0.0        1/14/2020 09:44 4/14/2020 09:09   PSA 22.60 (H) 30.80 (H)       Imaging:  No new imaging     Assessment/Plan:    Prostate Cancer,   -initially on ADT though held in 2014 due to ED. PSA increased from 4.6 in Sep 2017 to 32 in October 2019. CT scan and bone scan in October 2019 showed no evidence of gross metastatic disease, which means he has biochemical progression only at this point, though without treatment he would eventually have metastatic and terminal disease. In October 2019, he agreed to go back on Lupron though upon return in January, he was against it again despite having a long discussion about risks of foregoing treatment.   -Testosterone came back elevated despite the Lupron shot on 10/14/19.   -today his PSA continues to rise (22.6->30.8 today). Testerone still pending.  -Discussed that he continues to have biochemical progression and that he will have metastatic disease if we do not do treatment now. We discussed that we could try Zytiga without Lupron though it would also likely cause similar SE to Lupron including fatigue, hot flashes and impotence. He is more interested in trying the Zytiga, though he does not want to start treatment until the Fall because then he can \"focus only on his cancer\" at that time. I explained that I do not recommend that and that he will be at high risk for metastatic disease and complications, though he continues to refuse to talk about trying any therapy at this time. I will schedule him for an appt with Dr. Rodriguez in " September to discuss treatment options, per his request. We discussed symptoms to watch for including new pain, especially bone pain and that he should call us if things change or he wants to start treatment sooner  -he also discussed going to [place] for a second opinion. I told him this was fine and we would support whatever he wanted to do     BKA  -was using prosthesis previously before the stroke every day  -currently undergoing evaluation for a repeat of the prosthesis as the wounds and infection have healed. He is wheelchair bound without his prosthesis and needs to have the prosthesis refit to avoid the rubbing and chaffing and allow the next stages of PT to regain walking  -working with PCP      Phone call duration: 10 minutes    Chasidy Alfaro PA-C

## 2020-04-14 NOTE — LETTER
"4/14/2020       RE: Constantino Taylor  [address]  [place] [address]     Dear Colleague,    Thank you for referring your patient, Constantino Taylor, to the [facility]. Please see a copy of my visit note below.    Constantino Taylor is a 64 year old male who is being evaluated via a billable telephone visit.      The patient has been notified of following:     \"This telephone visit will be conducted via a call between you and your physician/provider. We have found that certain health care needs can be provided without the need for a physical exam.  This service lets us provide the care you need with a short phone conversation.  If a prescription is necessary we can send it directly to your pharmacy.  If lab work is needed we can place an order for that and you can then stop by our lab to have the test done at a later time.    Telephone visits are billed at different rates depending on your insurance coverage. During this emergency period, for some insurers they may be billed the same as an in-person visit.  Please reach out to your insurance provider with any questions.    If during the course of the call the physician/provider feels a telephone visit is not appropriate, you will not be charged for this service.\"    *PATIENT HAS NO NEW NEEDS OR CONCERNS.*  Onelia Rose CMA on 4/14/2020 at 9:46 AM  CALL BACK # FOR ROOMING TEAM = [phone]      Patient has given verbal consent for Telephone visit?  Yes    How would you like to obtain your AVS? MyChart      Provider Note:   Apr 14, 2020     Please see previous notes for complete oncologic history.     TREATMENT SUMMARY:  He was was referred to Dr. Rutherford who did a biopsy on 05/01/2012, and pathology was consistent with prostate adenocarcinoma, Adonay score 3+4, without perineural invasion or angiolymphatic invasion, involving about 65% of the tissue on the right.  About 3-5% of the tissue on the left was also involved with " Adonay 3+3=6 carcinoma. He received a 4-month shot of Lupron on 05/25/2012 by Dr. Rutherford. He opted for definitive radiation therapy and received 7560 cGy including 4500 cGy to the pelvis and an additional 3060 cGy boost to the prostate for his [id number] disease from 8/29-10/26/2012. His PSA never dropped to 0. It was 0.34 on 1/6/14 at the lowest value. He has not followed up in the past and has ongoing issues with BP management, strokes, chronic afib, left leg amputation. etc.  Lupron:  #1-5/25/2012 (30mg)  #2-1/14/13(30 mg)  #3-8/14/13(30 mg)  #4-3/4/14 (45 mg)  Total duration: 28 months (Not continuous due to compliance)     CURRENT INTERVENTIONS:  Lupron started 10/14/19, plan for continuous androgen deprivation   Was due again on 1/14/20 though refused shot at that appt     Interim History:   He has been doing well. His main focus has been to trying and get back to his baseline with his leg. He had lots of complications with his BKA and they almost have to amputate above the knee. He is now doing better but he wants his one focus at this time to be trying to get his leg better and walking again with prothesis. He does not want to focus on his cancer yet.     He has not had any hot flashes in about a month which he is happy about.     He denies any urinary symptoms or new pain, especially in his bone.     ROS: 10 point ROS neg other than the symptoms noted above in the HPI.    Objectives:  General: patient sounds in no audible acute distress, alert and oriented, speech clear and fluid  Resp: Speaking in full sentences, no audible respiratory distress, no cough, no audible wheeze  Psych: able to articulate logical thoughts, able to abstract reason, no tangential thoughts, no hallucinations or delusions  His affect is normal    Labs:   Labs were personally reviewed     4/14/2020 09:09   Sodium 140   Potassium 3.8   Chloride 109   Carbon Dioxide 26   Urea Nitrogen 21   Creatinine 1.44 (H)   GFR Estimate 52 (L)   GFR  "Estimate If Black 60 (L)   Calcium 9.0   Anion Gap 5   Albumin 3.9   Protein Total 8.3   Bilirubin Total 0.4   Alkaline Phosphatase 81   ALT 27   AST 20   Lactate Dehydrogenase 134   PSA 30.80 (H)   Glucose 128 (H)   WBC 6.2   Hemoglobin 14.4   Hematocrit 43.4   Platelet Count 208   RBC Count 4.66   MCV 93   MCH 30.9   MCHC 33.2   RDW 13.2   Diff Method Automated Method   % Neutrophils 61.2   % Lymphocytes 25.2   % Monocytes 8.3   % Eosinophils 4.3   % Basophils 0.5   % Immature Granulocytes 0.5   Nucleated RBCs 0   Absolute Neutrophil 3.8   Absolute Lymphocytes 1.6   Absolute Monocytes 0.5   Absolute Eosinophils 0.3   Absolute Basophils 0.0   Abs Immature Granulocytes 0.0   Absolute Nucleated RBC 0.0        1/14/2020 09:44 4/14/2020 09:09   PSA 22.60 (H) 30.80 (H)       Imaging:  No new imaging     Assessment/Plan:    Prostate Cancer,   -initially on ADT though held in 2014 due to ED. PSA increased from 4.6 in Sep 2017 to 32 in October 2019. CT scan and bone scan in October 2019 showed no evidence of gross metastatic disease, which means he has biochemical progression only at this point, though without treatment he would eventually have metastatic and terminal disease. In October 2019, he agreed to go back on Lupron though upon return in January, he was against it again despite having a long discussion about risks of foregoing treatment.   -Testosterone came back elevated despite the Lupron shot on 10/14/19.   -today his PSA continues to rise (22.6->30.8 today). Testerone still pending.  -Discussed that he continues to have biochemical progression and that he will have metastatic disease if we do not do treatment now. We discussed that we could try Zytiga without Lupron though it would also likely cause similar SE to Lupron including fatigue, hot flashes and impotence. He is more interested in trying the Zytiga, though he does not want to start treatment until the Fall because then he can \"focus only on his cancer\" " at that time. I explained that I do not recommend that and that he will be at high risk for metastatic disease and complications, though he continues to refuse to talk about trying any therapy at this time. I will schedule him for an appt with Dr. Rodriguez in September to discuss treatment options, per his request. We discussed symptoms to watch for including new pain, especially bone pain and that he should call us if things change or he wants to start treatment sooner  -he also discussed going to [place] for a second opinion. I told him this was fine and we would support whatever he wanted to do     BKA  -was using prosthesis previously before the stroke every day  -currently undergoing evaluation for a repeat of the prosthesis as the wounds and infection have healed. He is wheelchair bound without his prosthesis and needs to have the prosthesis refit to avoid the rubbing and chaffing and allow the next stages of PT to regain walking  -working with PCP      Phone call duration: 10 minutes    Chasidy Alfaro PA-C    Again, thank you for allowing me to participate in the care of your patient.      Sincerely,    Chasidy Alfaro PA-C

## 2020-04-16 ENCOUNTER — TELEPHONE (OUTPATIENT)
Dept: FAMILY MEDICINE | Facility: CLINIC | Age: 65
End: 2020-04-16

## 2020-04-16 DIAGNOSIS — M54.40 CHRONIC LOW BACK PAIN WITH SCIATICA, SCIATICA LATERALITY UNSPECIFIED, UNSPECIFIED BACK PAIN LATERALITY: ICD-10-CM

## 2020-04-16 DIAGNOSIS — G89.29 CHRONIC LOW BACK PAIN WITH SCIATICA, SCIATICA LATERALITY UNSPECIFIED, UNSPECIFIED BACK PAIN LATERALITY: ICD-10-CM

## 2020-04-16 DIAGNOSIS — G89.18 ACUTE POST-OPERATIVE PAIN: ICD-10-CM

## 2020-04-16 LAB — TESTOST SERPL-MCNC: 530 NG/DL (ref 240–950)

## 2020-04-16 RX ORDER — OXYCODONE HYDROCHLORIDE 10 MG/1
10 TABLET ORAL EVERY 6 HOURS PRN
Qty: 120 TABLET | Refills: 0 | Status: SHIPPED | OUTPATIENT
Start: 2020-04-16 | End: 2020-05-18

## 2020-04-16 NOTE — TELEPHONE ENCOUNTER
Requested Prescriptions   Pending Prescriptions Disp Refills     oxyCODONE IR (ROXICODONE) 10 MG tablet 120 tablet 0     Sig: Take 1 tablet (10 mg) by mouth every 6 hours as needed for moderate to severe pain or severe pain       There is no refill protocol information for this order        Last Written Prescription Date:  3/17/2020  Last Fill Quantity: 120,  # refills: 0   Last office visit: 4/13/2020 with prescribing provider:     Future Office Visit:    Routing refill request to provider for review/approval because:  Drug not on the [facility] refill protocol       Sandra Chan RN  [facility]

## 2020-04-16 NOTE — TELEPHONE ENCOUNTER
Reason for Call:  Medication or medication refill:    Do you use a [facility]?  Name of the pharmacy and phone number for the current request:  [facility] [address]    Name of the medication requested: Oxycodone    Other request: Please call him if any problems with his request.  He states he always has to call it in and not call the pharmacy directly?    Can we leave a detailed message on this number? YES    Phone number patient can be reached at: Cell number on file:    Telephone Information:   Mobile [phone]       Best Time: anytime    Call taken on 4/16/2020 at 9:25 AM by Izabela Buchanan

## 2020-04-28 ENCOUNTER — TELEPHONE (OUTPATIENT)
Dept: FAMILY MEDICINE | Facility: CLINIC | Age: 65
End: 2020-04-28

## 2020-04-28 NOTE — TELEPHONE ENCOUNTER
Order printed from patient's chart under medication tab, Dr. Duke signed order and I faxed the order over to Renan at the number listed below.  Rosa Conti MA

## 2020-04-28 NOTE — TELEPHONE ENCOUNTER
Reason for Call:  Other: order    Detailed comments: Renan, care coordinator for patient through [employer], is requesting a hard copy of the signed DME for wheel chair so that she can work with the DME provider to get this approved. Please fax this to her at [phone]    Phone Number Patient can be reached at: Other phone number:  [phone]* (Renan's cell)    Best Time: anytime    Can we leave a detailed message on this number? YES    Call taken on 4/28/2020 at 10:10 AM by Juan Max

## 2020-05-12 ENCOUNTER — PATIENT OUTREACH (OUTPATIENT)
Dept: CARE COORDINATION | Facility: CLINIC | Age: 65
End: 2020-05-12

## 2020-05-12 NOTE — PROGRESS NOTES
Clinic Care Coordination Contact    Follow Up Progress Note      Assessment: The RN CC contacted the patient by phone.  The patient states that he is working daily on getting the prosthesis and the stump  on his leg 2-3 times a day.  He states that it is very painful at this point in time but he is working on it.    Medication reconciliation was completed with the patient and marked as reviewed.  Health maintenance was reviewed and questions were answered with the patient.  The assessment for hypertension was completed with the patient today as well as the social determinants of health and they were marked as reviewed.      Goals addressed this encounter:   Goals Addressed                 This Visit's Progress      #1  Functional (pt-stated)   On track     Goal Statement: I will call for an appointment with the prothesis department for a stump  and to be fitted for a prothesis.  I will attend the appointments needed to complete this process.  Date Goal set: 1/8/2020  Date to Achieve By: 7/8/2020  Patient expressed understanding of goal: yes  Action steps to achieve this goal:  1. I will call for the first assessment appointment and attend.  2. I will attend all of the appointments needed to get the stump  process complete.  3. I will attend all of the appointments needed to complete the process for the prothesis.                   Intervention/Education provided during outreach: Reviewed with the patient the importance of working on using the prosthesis every day so that his leg gets used to it and it becomes less painful to place on.     Outreach Frequency: monthly    Plan:   1.  The patient will continue to work with the prosthesis to try and decrease the amount of pain he has when it is placed on.  2.  The patient will take all medications as prescribed by the providers.  3.  The patient will monitor his blood pressure and bring a record into the provider the next time he has an  appointment.    RN  Care Coordinator will follow up in 4 weeks.          Darin Yan MSN, RN, PHN, CCM   Primary Care Clinical RN Care Coordinator  [facility]  5/12/2020   3:10 PM  [email or web address]  Office: [phone]

## 2020-05-18 DIAGNOSIS — M54.40 CHRONIC LOW BACK PAIN WITH SCIATICA, SCIATICA LATERALITY UNSPECIFIED, UNSPECIFIED BACK PAIN LATERALITY: ICD-10-CM

## 2020-05-18 DIAGNOSIS — G89.29 CHRONIC LOW BACK PAIN WITH SCIATICA, SCIATICA LATERALITY UNSPECIFIED, UNSPECIFIED BACK PAIN LATERALITY: ICD-10-CM

## 2020-05-18 DIAGNOSIS — G89.18 ACUTE POST-OPERATIVE PAIN: ICD-10-CM

## 2020-05-18 RX ORDER — OXYCODONE HYDROCHLORIDE 10 MG/1
10 TABLET ORAL EVERY 6 HOURS PRN
Qty: 120 TABLET | Refills: 0 | Status: SHIPPED | OUTPATIENT
Start: 2020-05-18 | End: 2020-06-17

## 2020-05-18 NOTE — TELEPHONE ENCOUNTER
Reason for Call:  Medication or medication refill:    Do you use a [facility]?  Name of the pharmacy [facility] [address]    Name of the medication requested: oxyCODONE IR (ROXICODONE) 10 MG tablet    Other request: Please call with questions.     Can we leave a detailed message on this number? YES    Phone number patient can be reached at: Home number on file [phone] (home)    Best Time: anytime    Call taken on 5/18/2020 at 8:27 AM by Paige Dumas

## 2020-05-18 NOTE — TELEPHONE ENCOUNTER
Requested Prescriptions   Pending Prescriptions Disp Refills     oxyCODONE IR (ROXICODONE) 10 MG tablet 120 tablet 0     Sig: Take 1 tablet (10 mg) by mouth every 6 hours as needed for moderate to severe pain or severe pain       There is no refill protocol information for this order        Routing to pcp.  Rosa Conti MA

## 2020-05-28 ENCOUNTER — TELEPHONE (OUTPATIENT)
Dept: FAMILY MEDICINE | Facility: CLINIC | Age: 65
End: 2020-05-28

## 2020-05-28 NOTE — TELEPHONE ENCOUNTER
Faxed notes from Virtual visit. Will wait to see if this is enough  Susan Daly  Team 3 Coordinator

## 2020-05-28 NOTE — TELEPHONE ENCOUNTER
Reason for Call:  Other    Detailed comments: Kike calling from [facility] regarding a fax she has received a few times from the clinic. She states that she last received a fax on 5/21 (unable to find documentation of this being sent) with chart notes to support patient's need for wheelchair.    However, she had sent a fax regarding what needs to be included in the face to face note in order for patient's insurance to cover the wheelchair. There were 9 questions tht needed to be answered in the documentation. Such as if a patient can't use a walker/cane, if wheelchair is to be used in the home, etc.    Please re-fax updated chart notes    Phone Number Patient can be reached at: Other phone number:  [phone]* (Kike, [facility])    Best Time: asap    Can we leave a detailed message on this number? YES    Call taken on 5/28/2020 at 11:00 AM by Juan Max

## 2020-06-01 DIAGNOSIS — N40.0 BENIGN PROSTATIC HYPERPLASIA, UNSPECIFIED WHETHER LOWER URINARY TRACT SYMPTOMS PRESENT: ICD-10-CM

## 2020-06-01 DIAGNOSIS — M54.40 CHRONIC LOW BACK PAIN WITH SCIATICA, SCIATICA LATERALITY UNSPECIFIED, UNSPECIFIED BACK PAIN LATERALITY: ICD-10-CM

## 2020-06-01 DIAGNOSIS — I63.19 CEREBRAL INFARCTION DUE TO EMBOLISM OF OTHER PRECEREBRAL ARTERY (H): ICD-10-CM

## 2020-06-01 DIAGNOSIS — G89.18 ACUTE POST-OPERATIVE PAIN: ICD-10-CM

## 2020-06-01 DIAGNOSIS — I48.91 ATRIAL FIBRILLATION WITH RVR (H): ICD-10-CM

## 2020-06-01 DIAGNOSIS — I42.0 DILATED CARDIOMYOPATHY (H): ICD-10-CM

## 2020-06-01 DIAGNOSIS — G89.29 CHRONIC LOW BACK PAIN WITH SCIATICA, SCIATICA LATERALITY UNSPECIFIED, UNSPECIFIED BACK PAIN LATERALITY: ICD-10-CM

## 2020-06-01 DIAGNOSIS — I48.20 CHRONIC ATRIAL FIBRILLATION (H): ICD-10-CM

## 2020-06-01 DIAGNOSIS — E63.9 POOR NUTRITION: ICD-10-CM

## 2020-06-01 DIAGNOSIS — I10 HYPERTENSION GOAL BP (BLOOD PRESSURE) < 140/90: ICD-10-CM

## 2020-06-01 RX ORDER — GABAPENTIN 600 MG/1
600 TABLET ORAL 3 TIMES DAILY
Qty: 90 TABLET | Refills: 0 | Status: SHIPPED | OUTPATIENT
Start: 2020-06-01 | End: 2021-08-09

## 2020-06-01 RX ORDER — OXYCODONE HYDROCHLORIDE 10 MG/1
10 TABLET ORAL EVERY 6 HOURS PRN
Qty: 120 TABLET | Refills: 0 | Status: CANCELLED | OUTPATIENT
Start: 2020-06-01

## 2020-06-01 RX ORDER — ACETAMINOPHEN 325 MG/1
325-650 TABLET ORAL EVERY 6 HOURS PRN
Qty: 100 TABLET | Refills: 0 | Status: SHIPPED | OUTPATIENT
Start: 2020-06-01

## 2020-06-01 RX ORDER — MV-MINS/FOLIC/LYCOPENE/GINKGO 400-300MCG
1 TABLET ORAL DAILY
Qty: 30 TABLET | Refills: 0 | Status: SHIPPED | OUTPATIENT
Start: 2020-06-01

## 2020-06-01 RX ORDER — METOPROLOL SUCCINATE 100 MG/1
100 TABLET, EXTENDED RELEASE ORAL DAILY
Qty: 30 TABLET | Refills: 0 | Status: SHIPPED | OUTPATIENT
Start: 2020-06-01 | End: 2020-08-31

## 2020-06-01 RX ORDER — TAMSULOSIN HYDROCHLORIDE 0.4 MG/1
0.4 CAPSULE ORAL DAILY
Qty: 30 CAPSULE | Refills: 0 | Status: SHIPPED | OUTPATIENT
Start: 2020-06-01 | End: 2021-04-27

## 2020-06-01 RX ORDER — LISINOPRIL 40 MG/1
40 TABLET ORAL DAILY
Qty: 30 TABLET | Refills: 0 | Status: SHIPPED | OUTPATIENT
Start: 2020-06-01 | End: 2020-08-31

## 2020-06-01 RX ORDER — ATORVASTATIN CALCIUM 40 MG/1
40 TABLET, FILM COATED ORAL AT BEDTIME
Qty: 30 TABLET | Refills: 0 | Status: SHIPPED | OUTPATIENT
Start: 2020-06-01 | End: 2020-08-31

## 2020-06-01 NOTE — TELEPHONE ENCOUNTER
Refilled all medications for 30 days. Did not refill narcotics (oxycodone) as it appears patient should have medication until 6/18/20. Can request if needed closer to date.   Cass Willoughby PA-C

## 2020-06-01 NOTE — TELEPHONE ENCOUNTER
[facility] . Need 1 month of medications to tide them over.     Timoteo Perez  [facility]   Ext #[phone], [phone]  #2

## 2020-06-15 ENCOUNTER — PATIENT OUTREACH (OUTPATIENT)
Dept: CARE COORDINATION | Facility: CLINIC | Age: 65
End: 2020-06-15

## 2020-06-15 NOTE — PROGRESS NOTES
Clinic Care Coordination Contact  [facility]/Voicemail       Clinical Data: Care Coordinator Outreach  Outreach attempted x 1.  Left message on patient's voicemail with call back information and requested return call.  Plan: Care Coordinator sent care coordination introduction letter on 4/7/2020 via mail. Care Coordinator will try to reach patient again in 3-5 business days.          Darin Yan MSN, RN, PHN, CCM   Primary Care Clinical RN Care Coordinator  [facility]  6/15/2020   11:00 AM  [email or web address]  Office: [phone]

## 2020-06-17 ENCOUNTER — TELEPHONE (OUTPATIENT)
Dept: FAMILY MEDICINE | Facility: CLINIC | Age: 65
End: 2020-06-17

## 2020-06-17 DIAGNOSIS — G89.29 CHRONIC LOW BACK PAIN WITH SCIATICA, SCIATICA LATERALITY UNSPECIFIED, UNSPECIFIED BACK PAIN LATERALITY: ICD-10-CM

## 2020-06-17 DIAGNOSIS — G89.18 ACUTE POST-OPERATIVE PAIN: ICD-10-CM

## 2020-06-17 DIAGNOSIS — M54.40 CHRONIC LOW BACK PAIN WITH SCIATICA, SCIATICA LATERALITY UNSPECIFIED, UNSPECIFIED BACK PAIN LATERALITY: ICD-10-CM

## 2020-06-17 RX ORDER — OXYCODONE HYDROCHLORIDE 10 MG/1
10 TABLET ORAL EVERY 6 HOURS PRN
Qty: 120 TABLET | Refills: 0 | Status: SHIPPED | OUTPATIENT
Start: 2020-06-17 | End: 2020-07-20

## 2020-06-17 NOTE — TELEPHONE ENCOUNTER
Reason for Call:  Medication or medication refill:    Do you use a [facility]?  Name of the pharmacy and phone number for the current request:  [facility] [id number] [facility] - [place], [place] - [address]  [phone]    Name of the medication requested: oxyCODONE IR (ROXICODONE) 10 MG tablet    Other request: Patient needing refill on above medication.     Can we leave a detailed message on this number? YES    Phone number patient can be reached at: Cell number on file:    Telephone Information:   Mobile [phone]       Best Time: anytime      Call taken on 6/17/2020 at 2:33 PM by Dany Berry

## 2020-06-17 NOTE — TELEPHONE ENCOUNTER
Requested Prescriptions   Pending Prescriptions Disp Refills     oxyCODONE IR (ROXICODONE) 10 MG tablet 120 tablet 0     Sig: Take 1 tablet (10 mg) by mouth every 6 hours as needed for moderate to severe pain or severe pain       There is no refill protocol information for this order        Last refill 5/18/20  Last OV 4/13/20    Routing refill request to provider for review/approval because:  Drug not on the [facility] refill protocol     Angelica SOTELON,RN  [facility]

## 2020-06-17 NOTE — TELEPHONE ENCOUNTER
Reason for Call:  Other     Detailed comments: patient needs a refill for oxyCODONE IR (ROXICODONE) please send to [facility] [id number] [facility] [place], [place] - [address]    Phone Number Patient can be reached at: Home number on file [phone] (home)    Best Time: any    Can we leave a detailed message on this number? YES    Call taken on 6/17/2020 at 8:15 AM by Luann Mercado

## 2020-06-18 RX ORDER — OXYCODONE HYDROCHLORIDE 10 MG/1
10 TABLET ORAL EVERY 6 HOURS PRN
Qty: 120 TABLET | Refills: 0 | OUTPATIENT
Start: 2020-06-18

## 2020-06-22 NOTE — PROGRESS NOTES
Clinic Care Coordination Contact    Follow Up Progress Note      Assessment: The RN CC contacted the patient by phone for a follow-up visit.  The patient is working with his prosthetics still trying to get it on and off his leg, as he says sometimes are painful and sometimes are not.  The RN CC discussed with the patient following the directions from the prosthetic department.  The importance of working with the prosthetic on a continuous basis was explained to the patient so that he can get up on his prostatic and out of the wheelchair.    Medication reconciliation was completed with the patient and marked as reviewed.  Health maintenance was reviewed and questions were answered with the patient.  The assessment for hypertension was completed with the patient today as well as the social determinants of health and they were marked as reviewed.      Goals addressed this encounter:   Goals Addressed                 This Visit's Progress      #1  Functional (pt-stated)   On track     Goal Statement: I will call for an appointment with the prothesis department for a stump  and to be fitted for a prothesis.  I will attend the appointments needed to complete this process.  Date Goal set: 1/8/2020  Date to Achieve By: 7/8/2020  Patient expressed understanding of goal: yes  Action steps to achieve this goal:  1. I will call for the first assessment appointment and attend.  2. I will attend all of the appointments needed to get the stump  process complete.  3. I will attend all of the appointments needed to complete the process for the prothesis.                   Intervention/Education provided during outreach: Reviewed the importance of using the prosthetic on a daily basis according to the directions from the prosthetic department in order to make this work and get out of the wheelchair.     Outreach Frequency: monthly    Plan:   1.  The patient will work with the prosthetic to make it work and to be able  to get it on and walk with it without pain.  2.  The patient will take all medications as prescribed by the providers.  3.  The patient will make and attend all recommended follow-up appointments with the providers.    RN  Care Coordinator will follow up in 4 weeks.              Darin Yan MSN, RN, PHN, [facility]   Primary Care Clinical RN Care Coordinator  [facility]  6/22/2020   2:22 PM  [email or web address]  Office: [phone]

## 2020-07-18 DIAGNOSIS — M54.40 CHRONIC LOW BACK PAIN WITH SCIATICA, SCIATICA LATERALITY UNSPECIFIED, UNSPECIFIED BACK PAIN LATERALITY: ICD-10-CM

## 2020-07-18 DIAGNOSIS — G89.29 CHRONIC LOW BACK PAIN WITH SCIATICA, SCIATICA LATERALITY UNSPECIFIED, UNSPECIFIED BACK PAIN LATERALITY: ICD-10-CM

## 2020-07-18 DIAGNOSIS — G89.18 ACUTE POST-OPERATIVE PAIN: ICD-10-CM

## 2020-07-18 NOTE — TELEPHONE ENCOUNTER
Reason for Call:  Medication or medication refill:    Do you use a [facility]?  [facility]    Name of the medication requested: oxyCODONE    Other request: n/a     Can we leave a detailed message on this number? YES    Phone number patient can be reached at: Home number on file [phone] (home)    Best Time: anytime     Call taken on 7/18/2020 at 10:56 AM by Estella Bean

## 2020-07-20 ENCOUNTER — PATIENT OUTREACH (OUTPATIENT)
Dept: CARE COORDINATION | Facility: CLINIC | Age: 65
End: 2020-07-20

## 2020-07-20 RX ORDER — OXYCODONE HYDROCHLORIDE 10 MG/1
10 TABLET ORAL EVERY 6 HOURS PRN
Qty: 120 TABLET | Refills: 0 | Status: SHIPPED | OUTPATIENT
Start: 2020-07-20 | End: 2020-08-17

## 2020-07-20 NOTE — TELEPHONE ENCOUNTER
Requested Prescriptions   Pending Prescriptions Disp Refills     oxyCODONE IR (ROXICODONE) 10 MG tablet 120 tablet 0     Sig: Take 1 tablet (10 mg) by mouth every 6 hours as needed for moderate to severe pain or severe pain       There is no refill protocol information for this order        Routing refill request to provider for review/approval because:  Drug not on the [facility] refill protocol   Angelica SiegelRN,BSN  [facility]

## 2020-07-20 NOTE — PROGRESS NOTES
Clinic Care Coordination Contact  [facility]/Voicemail       Clinical Data: Care Coordinator Outreach  Outreach attempted x 1.  Left message on patient's voicemail with call back information and requested return call.  Plan: Care Coordinator sent care coordination introduction letter on 4/7/2020 via [employer]. Care Coordinator will try to reach patient again in 10 business days.          Darin Yan MSN, RN, PHN, CCM   Primary Care Clinical RN Care Coordinator  [facility]  7/20/2020   2:18 PM  [email or web address]  Office: [phone]

## 2020-07-20 NOTE — LETTER
[facility]  Complex Care Plan  About Me:    Patient Name:  Constantino Taylor    YOB: 1955  Age:         64 year old   Joyce MRN:    [id number] Telephone Information:  Home Phone [phone]   Mobile [phone]       Address:  [address] [place] [id number] Email address:  [email or web address]      Emergency Contact(s)    Name Relationship Lgl Grd Work Phone Home Phone Mobile Phone   1. JUAN MIGUEL HENSON Sister No  [phone]   2. NONE Other   none            Primary language:  English     needed? No   [place] [facility]:  [phone] op. 1  Other communication barriers:    Preferred Method of Communication:  Mail  Current living arrangement:    Mobility Status/ Medical Equipment:      Health Maintenance  Health Maintenance Reviewed:      My Access Plan  Medical Emergency 911   Primary Clinic Line [facility] [place] - [phone]   24 Hour Appointment Line [phone] or  [phone]) (toll-free)   24 Hour Nurse Line [phone] (toll-free)   Preferred Urgent Care     Preferred Hospital     Preferred Pharmacy [id number] [address] [facility]     Behavioral Health Crisis Line The National Suicide Prevention Lifeline at 1-754.640.3496 or 911             My Care Team Members  Patient Care Team       Relationship Specialty Notifications Start End    Avery Duke MD PCP - General Internal Medicine  5/14/15     Phone: [phone] Fax: [phone]         [address] [facility] [address]    Avery Duke MD Assigned PCP   7/5/15     Phone: [phone] Fax: [phone]         [address] [facility] [address]    Piper Polk, RN Nurse Coordinator Vascular Surgery Abnormal results only, Admissions 7/16/15     Phone: [phone] Pager: [phone]        Odessa Browning MD MD  Vascular Surgery  8/10/15     Phone: [phone] Fax: [phone]         [address] [facility] [address] [facility] [address]    Norah Brown MD MD Cardiology  2/7/16     Phone: [phone] Fax: [phone]         [address] [facility] [address] [facility] [address]    Cheyenne Mansfield MD MD Internal Medicine  2/9/16     Quinn Rodriguez MD MD Oncology Admissions 9/25/17     Phone: [phone] Fax: [phone]         [address] [facility] [address] [facility] [address]    Bryanna Rubio, RN Specialty Care Coordinator Cardiology Admissions 10/18/19     Phone: [phone]         Norah Brown MD MD Cardiology  10/18/19     Phone: [phone] Fax: [phone]         [address] [facility] [address] [facility] [address]    Darin Dickson, RN Lead Care Coordinator Primary Care - CC Admissions 11/4/19     Phone: [phone] Fax: [phone]                My Care Plans  Self Management and Treatment Plan  Goals and (Comments)  Goals        General    #1  Functional (pt-stated)     Notes - Note created  1/8/2020 10:50 AM by Darin Dickson, RN    Goal Statement: I will call for an appointment with the prothesis department for a stump  and to be fitted for a prothesis.  I will attend the appointments needed to complete this process.  Date Goal set: 1/8/2020  Date to Achieve By: 7/8/2020  Patient expressed understanding of goal: yes  Action steps to achieve this goal:  1. I will call for the first assessment appointment and attend.  2. I will attend all of the appointments needed to get the stump  process complete.  3. I will attend all of the appointments needed to complete the process for the prothesis.                   Action Plans on File:                       Advance Care Plans/Directives Type:        My Medical and Care Information  Problem List   Patient Active Problem List   Diagnosis     Cerebral infarction (H)     Chronic hepatitis C without hepatic coma (H)     Tobacco use disorder     Chronic low  back pain     Acute pancreatitis     Hyperlipidemia LDL goal <70     Hypertension goal BP (blood pressure) < 140/90     Nonischemic dilated cardiomyopathy (HCC)     Malignant neoplasm of prostate (H)     Erectile dysfunction     Eczema     PAD (peripheral artery disease) (H)     S/P BKA (below knee amputation) unilateral (H)     Encounter for counseling     Acute renal failure (H)     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    Care Coordination Start Date: 11/11/2019   Frequency of Care Coordination: monthly   Form Last Updated: 07/20/2020

## 2020-07-24 NOTE — TELEPHONE ENCOUNTER
Okay to send over 2 different scripts for the patient?     Next appt 8/24/20   Requested Prescriptions   Pending Prescriptions Disp Refills     oxyCODONE IR (ROXICODONE) 10 MG tablet 90 tablet 0     Sig: Take 1 tablet (10 mg) by mouth every 6 hours as needed for moderate to severe pain 3 per day    There is no refill protocol information for this order        Last Written Prescription Date:  1/20/2018  Last Fill Quantity: 90,  # refills: 0   Last office visit: 1/25/2018 with prescribing provider:  1/25/2018   Future Office Visit:    Routing refill request to provider for review/approval because:  Drug not on the [facility] refill protocol       Sandra Chan RN  [facility]

## 2020-08-06 ENCOUNTER — PATIENT OUTREACH (OUTPATIENT)
Dept: NURSING | Facility: CLINIC | Age: 65
End: 2020-08-06
Payer: COMMERCIAL

## 2020-08-06 NOTE — PROGRESS NOTES
Clinic Care Coordination Contact    Follow Up Progress Note      Assessment: The RN CC contacted the patient by telephone for a follow-up visit.  The patient states that he is doing very well and his pain is at a baseline minimum.  The RN CC question the patient about the use of his prosthetic and the stump .  Patient responded that he is using it although it is quite painful still at this point and therefore he is not up on it yet.  The RN CC did encourage him to stay in contact with the prosthetic department to be able to go in for appointments as soon as they do open up.  Patient stated understanding and that he will will continue to work with the stump  and the prosthetic and will be in contact with the prosthetic department.    Medication reconciliation was completed with the patient and marked as reviewed.  Health maintenance was reviewed and questions were answered with the patient.  The assessment for hypertension was completed with the patient today as well as the social determinants of health and they were marked as reviewed.      Goals addressed this encounter:   Goals Addressed                 This Visit's Progress      #1  Functional (pt-stated)   On track     Goal Statement: I will call for an appointment with the prothesis department for a stump  and to be fitted for a prothesis.  I will attend the appointments needed to complete this process.  Date Goal set: 1/8/2020  Date to Achieve By: 12/8/2020  Patient expressed understanding of goal: yes  Action steps to achieve this goal:  1. I will call for the first assessment appointment and attend.  2. I will attend all of the appointments needed to get the stump  process complete.  3. I will attend all of the appointments needed to complete the process for the prothesis.                   Intervention/Education provided during outreach: The RN CC reviewed the importance of continued use of the prosthetic on a daily basis in  order to decrease the amount of pain.     Outreach Frequency: monthly    Plan:   1.  The patient will continue to use the stump  and prosthetic on a daily basis in order to reduce the pain that is associated with it.  2.  The patient will work with the prosthetic department once they open back up on the proper way to use the prosthetic.  3.  The patient will take all medications as prescribed by the providers.    RN  Care Coordinator will follow up in 4 weeks.        Darin Yan MSN, RN, PHN, CCM   Primary Care Clinical RN Care Coordinator  [facility]  8/6/2020   10:37 AM  [email or web address]  Office: [phone]

## 2020-08-17 ENCOUNTER — TELEPHONE (OUTPATIENT)
Dept: FAMILY MEDICINE | Facility: CLINIC | Age: 65
End: 2020-08-17

## 2020-08-17 DIAGNOSIS — M54.40 CHRONIC LOW BACK PAIN WITH SCIATICA, SCIATICA LATERALITY UNSPECIFIED, UNSPECIFIED BACK PAIN LATERALITY: ICD-10-CM

## 2020-08-17 DIAGNOSIS — G89.29 CHRONIC LOW BACK PAIN WITH SCIATICA, SCIATICA LATERALITY UNSPECIFIED, UNSPECIFIED BACK PAIN LATERALITY: ICD-10-CM

## 2020-08-17 DIAGNOSIS — G89.18 ACUTE POST-OPERATIVE PAIN: ICD-10-CM

## 2020-08-17 RX ORDER — OXYCODONE HYDROCHLORIDE 10 MG/1
10 TABLET ORAL EVERY 6 HOURS PRN
Qty: 120 TABLET | Refills: 0 | Status: SHIPPED | OUTPATIENT
Start: 2020-08-17 | End: 2020-08-17

## 2020-08-17 RX ORDER — OXYCODONE HYDROCHLORIDE 10 MG/1
10 TABLET ORAL EVERY 6 HOURS PRN
Qty: 120 TABLET | Refills: 0 | Status: SHIPPED | OUTPATIENT
Start: 2020-08-17 | End: 2020-09-17

## 2020-08-17 NOTE — TELEPHONE ENCOUNTER
Reason for Call:  Other prescription    Detailed comments: Patient called stating that he needs a refill for the oxyCODONE IR (ROXICODONE) 10 MG tablet and needs this as soon as possible. He would like this to be sent over to the [facility] [address] [facility] - [place], [place] - [address] when filled.    Phone Number Patient can be reached at: Cell number on file:    Telephone Information:   Mobile [phone]       Best Time: As soon as possible    Can we leave a detailed message on this number? YES    Call taken on 8/17/2020 at 8:54 AM by Klever Dawson

## 2020-08-17 NOTE — TELEPHONE ENCOUNTER
Nurse changed to local print due to issues with electronic prescribing    Requested Prescriptions   Pending Prescriptions Disp Refills     oxyCODONE IR (ROXICODONE) 10 MG tablet 120 tablet 0     Sig: Take 1 tablet (10 mg) by mouth every 6 hours as needed for moderate to severe pain or severe pain       There is no refill protocol information for this order        Last Written Prescription Date:  7/20/20  Last Fill Quantity: 120,  # refills: 0   Last office visit: 2/5/2020 with prescribing provider:  Srikanth and virtual visit 4/13/2020   Future Office Visit:          Routing refill request to provider for review/approval because:  Drug not on the [facility] refill protocol       Sandra Chan RN  Triage Nurse  [facility]  Appointment line: [phone]  [facility], 24 hour nurse line, available by calling clinic at [phone] and following prompts.

## 2020-08-29 DIAGNOSIS — I10 HYPERTENSION GOAL BP (BLOOD PRESSURE) < 140/90: ICD-10-CM

## 2020-08-29 DIAGNOSIS — I42.0 DILATED CARDIOMYOPATHY (H): ICD-10-CM

## 2020-08-29 DIAGNOSIS — I48.20 CHRONIC ATRIAL FIBRILLATION (H): ICD-10-CM

## 2020-08-29 DIAGNOSIS — I63.19 CEREBRAL INFARCTION DUE TO EMBOLISM OF OTHER PRECEREBRAL ARTERY (H): ICD-10-CM

## 2020-08-29 DIAGNOSIS — I48.91 ATRIAL FIBRILLATION WITH RVR (H): ICD-10-CM

## 2020-08-29 DIAGNOSIS — N40.0 BENIGN PROSTATIC HYPERPLASIA, UNSPECIFIED WHETHER LOWER URINARY TRACT SYMPTOMS PRESENT: ICD-10-CM

## 2020-08-31 RX ORDER — METOPROLOL SUCCINATE 100 MG/1
TABLET, EXTENDED RELEASE ORAL
Qty: 30 TABLET | Refills: 11 | Status: SHIPPED | OUTPATIENT
Start: 2020-08-31 | End: 2021-06-25

## 2020-08-31 RX ORDER — RIVAROXABAN 20 MG/1
TABLET, FILM COATED ORAL
Qty: 30 TABLET | Refills: 11 | Status: SHIPPED | OUTPATIENT
Start: 2020-08-31 | End: 2021-08-30

## 2020-08-31 RX ORDER — LISINOPRIL 40 MG/1
TABLET ORAL
Qty: 30 TABLET | Refills: 11 | Status: SHIPPED | OUTPATIENT
Start: 2020-08-31 | End: 2021-08-30

## 2020-08-31 RX ORDER — ATORVASTATIN CALCIUM 40 MG/1
TABLET, FILM COATED ORAL
Qty: 30 TABLET | Refills: 14 | Status: SHIPPED | OUTPATIENT
Start: 2020-08-31 | End: 2021-08-30

## 2020-08-31 NOTE — TELEPHONE ENCOUNTER
"Requested Prescriptions   Pending Prescriptions Disp Refills     metoprolol succinate ER (TOPROL-XL) 100 MG 24 hr tablet [Pharmacy Med Name: METOPROLOL SUCC  MG TAB] 30 tablet 11     Sig: TAKE 1 TABLET BY MOUTH EVERY DAY       Beta-Blockers Protocol Passed - 8/29/2020  2:17 PM        Passed - Blood pressure under 140/90 in past 12 months     BP Readings from Last 3 Encounters:   02/05/20 138/72   01/14/20 (!) 145/91   01/13/20 138/88                 Passed - Patient is age 6 or older        Passed - Recent (12 mo) or future (30 days) visit within the authorizing provider's specialty     Patient has had an office visit with the authorizing provider or a provider within the authorizing providers department within the previous 12 mos or has a future within next 30 days. See \"Patient Info\" tab in inbasket, or \"Choose Columns\" in Meds & Orders section of the refill encounter.              Passed - Medication is active on med list           lisinopril (ZESTRIL) 40 MG tablet [Pharmacy Med Name: LISINOPRIL 40 MG TABLET] 30 tablet 11     Sig: TAKE 1 TABLET BY MOUTH EVERY DAY       ACE Inhibitors (Including Combos) Protocol Failed - 8/29/2020  2:17 PM        Failed - Normal serum creatinine on file in past 12 months     Recent Labs   Lab Test 04/14/20  0909   CR 1.44*       Ok to refill medication if creatinine is low          Passed - Blood pressure under 140/90 in past 12 months     BP Readings from Last 3 Encounters:   02/05/20 138/72   01/14/20 (!) 145/91   01/13/20 138/88                 Passed - Recent (12 mo) or future (30 days) visit within the authorizing provider's specialty     Patient has had an office visit with the authorizing provider or a provider within the authorizing providers department within the previous 12 mos or has a future within next 30 days. See \"Patient Info\" tab in inbasket, or \"Choose Columns\" in Meds & Orders section of the refill encounter.              Passed - Medication is active on " "med list        Passed - Patient is age 18 or older        Passed - Normal serum potassium on file in past 12 months     Recent Labs   Lab Test 04/14/20  0909   POTASSIUM 3.8                XARELTO ANTICOAGULANT 20 MG TABS tablet [Pharmacy Med Name: XARELTO 20 MG TABLET] 30 tablet 11     Sig: TAKE 1 TABLET BY MOUTH DAILY WITH DINNER       Direct Oral Anticoagulant Agents Failed - 8/29/2020  2:17 PM        Failed - Creatinine Clearance greater than 50 ml/min on file in past 3 mos     No lab results found.          Failed - Serum creatinine less than or equal to 1.4 on file in past 3 mos     Recent Labs   Lab Test 04/14/20  0909   CR 1.44*       Ok to refill medication if creatinine is low          Passed - Normal Platelets on file in past 12 months     Recent Labs   Lab Test 04/14/20  0909                  Passed - Medication is active on med list        Passed - Patient is 18 years of age or older        Passed - Recent (6 mo) or future (30 days) visit within the authorizing provider's specialty           atorvastatin (LIPITOR) 40 MG tablet [Pharmacy Med Name: ATORVASTATIN 40 MG TABLET] 30 tablet 14     Sig: TAKE 1 TABLET BY MOUTH AT BEDTIME       Statins Protocol Failed - 8/29/2020  2:17 PM        Failed - LDL on file in past 12 months     Recent Labs   Lab Test 03/02/19   LDL 58             Passed - No abnormal creatine kinase in past 12 months     No lab results found.             Passed - Recent (12 mo) or future (30 days) visit within the authorizing provider's specialty     Patient has had an office visit with the authorizing provider or a provider within the authorizing providers department within the previous 12 mos or has a future within next 30 days. See \"Patient Info\" tab in inbasket, or \"Choose Columns\" in Meds & Orders section of the refill encounter.              Passed - Medication is active on med list        Passed - Patient is age 18 or older           aspirin (ASPIRIN LOW DOSE) 81 MG EC " "tablet [Pharmacy Med Name: ASPIRIN EC 81 MG TABLET] 30 tablet 11     Sig: TAKE 1 TABLET(81 MG) BY MOUTH DAILY       Analgesics (Non-Narcotic Tylenol and ASA Only) Passed - 8/29/2020  2:17 PM        Passed - Recent (12 mo) or future (30 days) visit within the authorizing provider's specialty     Patient has had an office visit with the authorizing provider or a provider within the authorizing providers department within the previous 12 mos or has a future within next 30 days. See \"Patient Info\" tab in inbasket, or \"Choose Columns\" in Meds & Orders section of the refill encounter.              Passed - Patient is age 20 years or older     If ASA is flagged for ages under 20 years old. Forward to provider for confirmation Ryes Syndrome is not a concern.              Passed - Medication is active on med list           Routing refill request to provider for review/approval because:  Failed protocol.  Angelica Siegel RN,BSN  [facility]    "

## 2020-09-03 ENCOUNTER — PATIENT OUTREACH (OUTPATIENT)
Dept: ONCOLOGY | Facility: CLINIC | Age: 65
End: 2020-09-03

## 2020-09-03 NOTE — PROGRESS NOTES
Oncology RN Care Coordination Note:     Patient left a voicemail stating he would like to get back on his oral chemotherapy.  Patient has a follow up appointment on 9/8/2020 with Dr. Rodriguez he can discuss his requests then.    Ana Redman RN BSN   [facility]  Nurse Coordinator

## 2020-09-08 ENCOUNTER — VIRTUAL VISIT (OUTPATIENT)
Dept: ONCOLOGY | Facility: CLINIC | Age: 65
End: 2020-09-08
Attending: INTERNAL MEDICINE
Payer: COMMERCIAL

## 2020-09-08 ENCOUNTER — PATIENT OUTREACH (OUTPATIENT)
Dept: NURSING | Facility: CLINIC | Age: 65
End: 2020-09-08
Payer: COMMERCIAL

## 2020-09-08 VITALS
OXYGEN SATURATION: 99 % | RESPIRATION RATE: 18 BRPM | BODY MASS INDEX: 25.87 KG/M2 | TEMPERATURE: 98.3 F | HEART RATE: 93 BPM | WEIGHT: 207 LBS | SYSTOLIC BLOOD PRESSURE: 160 MMHG | DIASTOLIC BLOOD PRESSURE: 106 MMHG

## 2020-09-08 VITALS — WEIGHT: 207 LBS | BODY MASS INDEX: 25.87 KG/M2

## 2020-09-08 DIAGNOSIS — C61 PROSTATE CANCER (H): Primary | ICD-10-CM

## 2020-09-08 DIAGNOSIS — C61 MALIGNANT NEOPLASM OF PROSTATE (H): ICD-10-CM

## 2020-09-08 LAB
ALBUMIN SERPL-MCNC: 3.5 G/DL (ref 3.4–5)
ALP SERPL-CCNC: 89 U/L (ref 40–150)
ALT SERPL W P-5'-P-CCNC: 26 U/L (ref 0–70)
ANION GAP SERPL CALCULATED.3IONS-SCNC: 5 MMOL/L (ref 3–14)
AST SERPL W P-5'-P-CCNC: 17 U/L (ref 0–45)
BASOPHILS # BLD AUTO: 0 10E9/L (ref 0–0.2)
BASOPHILS NFR BLD AUTO: 0.5 %
BILIRUB SERPL-MCNC: 0.4 MG/DL (ref 0.2–1.3)
BUN SERPL-MCNC: 21 MG/DL (ref 7–30)
CALCIUM SERPL-MCNC: 9.1 MG/DL (ref 8.5–10.1)
CHLORIDE SERPL-SCNC: 108 MMOL/L (ref 94–109)
CO2 SERPL-SCNC: 26 MMOL/L (ref 20–32)
CREAT SERPL-MCNC: 1.27 MG/DL (ref 0.66–1.25)
DIFFERENTIAL METHOD BLD: NORMAL
EOSINOPHIL # BLD AUTO: 0.2 10E9/L (ref 0–0.7)
EOSINOPHIL NFR BLD AUTO: 3.7 %
ERYTHROCYTE [DISTWIDTH] IN BLOOD BY AUTOMATED COUNT: 13.5 % (ref 10–15)
GFR SERPL CREATININE-BSD FRML MDRD: 60 ML/MIN/{1.73_M2}
GLUCOSE SERPL-MCNC: 108 MG/DL (ref 70–99)
HCT VFR BLD AUTO: 44.5 % (ref 40–53)
HGB BLD-MCNC: 15.2 G/DL (ref 13.3–17.7)
IMM GRANULOCYTES # BLD: 0 10E9/L (ref 0–0.4)
IMM GRANULOCYTES NFR BLD: 0.2 %
LDH SERPL L TO P-CCNC: 143 U/L (ref 85–227)
LYMPHOCYTES # BLD AUTO: 1.4 10E9/L (ref 0.8–5.3)
LYMPHOCYTES NFR BLD AUTO: 22.2 %
MCH RBC QN AUTO: 30.9 PG (ref 26.5–33)
MCHC RBC AUTO-ENTMCNC: 34.2 G/DL (ref 31.5–36.5)
MCV RBC AUTO: 90 FL (ref 78–100)
MONOCYTES # BLD AUTO: 0.6 10E9/L (ref 0–1.3)
MONOCYTES NFR BLD AUTO: 9.2 %
NEUTROPHILS # BLD AUTO: 4 10E9/L (ref 1.6–8.3)
NEUTROPHILS NFR BLD AUTO: 64.2 %
NRBC # BLD AUTO: 0 10*3/UL
NRBC BLD AUTO-RTO: 0 /100
PLATELET # BLD AUTO: 235 10E9/L (ref 150–450)
POTASSIUM SERPL-SCNC: 4.1 MMOL/L (ref 3.4–5.3)
PROT SERPL-MCNC: 8 G/DL (ref 6.8–8.8)
PSA SERPL-MCNC: 39.9 UG/L (ref 0–4)
RBC # BLD AUTO: 4.92 10E12/L (ref 4.4–5.9)
SODIUM SERPL-SCNC: 139 MMOL/L (ref 133–144)
WBC # BLD AUTO: 6.2 10E9/L (ref 4–11)

## 2020-09-08 PROCEDURE — 80053 COMPREHEN METABOLIC PANEL: CPT | Performed by: PHYSICIAN ASSISTANT

## 2020-09-08 PROCEDURE — 99214 OFFICE O/P EST MOD 30 MIN: CPT | Mod: 95 | Performed by: INTERNAL MEDICINE

## 2020-09-08 PROCEDURE — 84403 ASSAY OF TOTAL TESTOSTERONE: CPT | Performed by: PHYSICIAN ASSISTANT

## 2020-09-08 PROCEDURE — 83615 LACTATE (LD) (LDH) ENZYME: CPT | Performed by: PHYSICIAN ASSISTANT

## 2020-09-08 PROCEDURE — 84153 ASSAY OF PSA TOTAL: CPT | Performed by: PHYSICIAN ASSISTANT

## 2020-09-08 PROCEDURE — 85025 COMPLETE CBC W/AUTO DIFF WBC: CPT | Performed by: PHYSICIAN ASSISTANT

## 2020-09-08 PROCEDURE — 36415 COLL VENOUS BLD VENIPUNCTURE: CPT

## 2020-09-08 ASSESSMENT — PAIN SCALES - GENERAL
PAINLEVEL: SEVERE PAIN (6)
PAINLEVEL: SEVERE PAIN (6)

## 2020-09-08 NOTE — PROGRESS NOTES
"Constantino Taylor is a 64 year old male who is being evaluated via a billable telephone visit.      The patient has been notified of following:     \"This telephone visit will be conducted via a call between you and your physician/provider. We have found that certain health care needs can be provided without the need for a physical exam.  This service lets us provide the care you need with a short phone conversation.  If a prescription is necessary we can send it directly to your pharmacy.  If lab work is needed we can place an order for that and you can then stop by our lab to have the test done at a later time.    Telephone visits are billed at different rates depending on your insurance coverage. During this emergency period, for some insurers they may be billed the same as an in-person visit.  Please reach out to your insurance provider with any questions.    If during the course of the call the physician/provider feels a telephone visit is not appropriate, you will not be charged for this service.\"    Patient has given verbal consent for Telephone visit?  Yes    What phone number would you like to be contacted at? [phone]    How would you like to obtain your AVS? Mail a copy      LONA Christina     "

## 2020-09-08 NOTE — NURSING NOTE
Chief Complaint   Patient presents with     Lab Only     venipuncture, vitals checked     Eda Wren RN on 9/8/2020 at 9:28 AM

## 2020-09-08 NOTE — PROGRESS NOTES
Clinic Care Coordination Contact  Care Team Conversations    The RN CC began the conversation with the patient and got through a portion of the questions when the patient had to stop and answer a call from the oncologist.      Plan: For the RN CC to contact the patient by telephone again tomorrow to finish the conversation.    Darin Yan MSN, RN, PHN, CCM   Primary Care Clinical RN Care Coordinator  [facility]  9/8/2020   1:19 PM  [email or web address]  Office: [phone]
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
## 2020-09-09 ENCOUNTER — TELEPHONE (OUTPATIENT)
Dept: ONCOLOGY | Facility: CLINIC | Age: 65
End: 2020-09-09

## 2020-09-09 NOTE — TELEPHONE ENCOUNTER
PA Initiation    Medication: Abiraterone [place] Pending  Insurance Company: [employer] - Phone [phone] Fax [phone]  [facility] [address]  Filling Pharmacy Phone:    Filling Pharmacy Fax:    Start Date: 9/9/2020    Shantel Alexander CPhT  [facility]  Oncology Pharmacy [email or web address]  [phone] (phone  [phone] (fax

## 2020-09-09 NOTE — PROGRESS NOTES
Clinic Care Coordination Contact  [facility]/Voicemail    Referral Source: IP Handoff  Clinical Data: Care Coordinator Outreach  Outreach attempted x 2.  Left message on patient's voicemail with call back information and requested return call.  Plan: Care Coordinator sent care coordination introduction letter on 7/20/2020 via [employer]. Care Coordinator will try to reach patient again in 10 business days.        Darin Yan MSN, RN, PHN, CCM   Primary Care Clinical RN Care Coordinator  [facility]  9/9/2020   3:12 PM  [email or web address]  Office: [phone]
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
## 2020-09-10 LAB — TESTOST SERPL-MCNC: 592 NG/DL (ref 240–950)

## 2020-09-14 ENCOUNTER — TELEPHONE (OUTPATIENT)
Dept: FAMILY MEDICINE | Facility: CLINIC | Age: 65
End: 2020-09-14

## 2020-09-14 NOTE — TELEPHONE ENCOUNTER
Forms received from [id number] outdoors for Avery Duke MD.  Forms placed in provider 'sign me' folder.  Please fax forms to [phone] after completion.    Mary Funk CMA

## 2020-09-17 ENCOUNTER — TELEPHONE (OUTPATIENT)
Dept: FAMILY MEDICINE | Facility: CLINIC | Age: 65
End: 2020-09-17

## 2020-09-17 DIAGNOSIS — G89.18 ACUTE POST-OPERATIVE PAIN: ICD-10-CM

## 2020-09-17 DIAGNOSIS — G89.29 CHRONIC LOW BACK PAIN WITH SCIATICA, SCIATICA LATERALITY UNSPECIFIED, UNSPECIFIED BACK PAIN LATERALITY: ICD-10-CM

## 2020-09-17 DIAGNOSIS — M54.40 CHRONIC LOW BACK PAIN WITH SCIATICA, SCIATICA LATERALITY UNSPECIFIED, UNSPECIFIED BACK PAIN LATERALITY: ICD-10-CM

## 2020-09-17 RX ORDER — OXYCODONE HYDROCHLORIDE 10 MG/1
10 TABLET ORAL EVERY 6 HOURS PRN
Qty: 120 TABLET | Refills: 0 | Status: SHIPPED | OUTPATIENT
Start: 2020-09-17 | End: 2020-10-16

## 2020-09-17 NOTE — TELEPHONE ENCOUNTER
Reason for Call:  Medication or medication refill:    Do you use a [facility]?  Name of the pharmacy and phone number for the current request:       [facility] [id number] [place], [place] - [address]      Name of the medication requested: Oxycodone    Other request: Patient is requesting to have a refill of this medication sent to his pharmacy. Please call with any questions.     Can we leave a detailed message on this number? YES    Phone number patient can be reached at: Home number on file [phone] (home)    Best Time: any     Call taken on 9/17/2020 at 11:01 AM by Radha Aguilar

## 2020-09-17 NOTE — TELEPHONE ENCOUNTER
Prior Authorization Approval    Authorization Effective Date: 8/10/2020  Authorization Expiration Date: 9/9/2021  Medication: Abiraterone PA Approved  Approved Dose/Quantity: 259mg 120/30  Reference #: [id number]   Insurance Company: [employer] - Phone [phone] Fax [phone]  Expected CoPay: $0     CoPay Card Available:      Foundation Assistance Needed:    Which Pharmacy is filling the prescription (Not needed for infusion/clinic administered): [facility] [address]  Pharmacy Notified:    Patient Notified:        Shantel Alexander CPhT  [facility]  Oncology Pharmacy Liaison   [email or web address]  [phone] (phone  [phone] (fax

## 2020-09-17 NOTE — TELEPHONE ENCOUNTER
Requested Prescriptions   Pending Prescriptions Disp Refills     oxyCODONE IR (ROXICODONE) 10 MG tablet 120 tablet 0     Sig: Take 1 tablet (10 mg) by mouth every 6 hours as needed for moderate to severe pain or severe pain       There is no refill protocol information for this order        Routing refill request to provider for review/approval because:  Drug not on the [facility] refill protocol   Angelica SiegelRN,BSN  [facility]

## 2020-09-23 NOTE — TELEPHONE ENCOUNTER
Requested information faxed to number provided.  [facility]  Team 3 Coordinator

## 2020-09-25 ENCOUNTER — TELEPHONE (OUTPATIENT)
Dept: FAMILY MEDICINE | Facility: CLINIC | Age: 65
End: 2020-09-25
Payer: COMMERCIAL

## 2020-09-25 NOTE — TELEPHONE ENCOUNTER
Reason for Call:  Other / Med list request    Detailed comments: Renan Miranda called and requested a copy of patient's most recent medication list faxed to [phone].  Renan may be reached at [phone] on a confidential line if further info is required.    Phone Number Patient can be reached at: Home number on file [phone] (home)    Best Time: ASAP    Can we leave a detailed message on this number? YES    Call taken on 9/25/2020 at 3:53 PM by Tati So

## 2020-09-28 ENCOUNTER — TRANSFERRED RECORDS (OUTPATIENT)
Dept: HEALTH INFORMATION MANAGEMENT | Facility: CLINIC | Age: 65
End: 2020-09-28

## 2020-09-28 NOTE — TELEPHONE ENCOUNTER
Requested information faxed to number provided.  [facility]  Team 3 Coordinator

## 2020-09-29 ENCOUNTER — PATIENT OUTREACH (OUTPATIENT)
Dept: NURSING | Facility: CLINIC | Age: 65
End: 2020-09-29
Payer: COMMERCIAL

## 2020-09-29 ENCOUNTER — TELEPHONE (OUTPATIENT)
Dept: CARE COORDINATION | Facility: CLINIC | Age: 65
End: 2020-09-29

## 2020-09-29 DIAGNOSIS — Z12.11 SCREEN FOR COLON CANCER: Primary | ICD-10-CM

## 2020-09-29 NOTE — PROGRESS NOTES
Clinic Care Coordination Contact    Follow Up Progress Note      Assessment: The RN CC contacted the patient by phone for a follow-up phone visit.  The patient will be starting another form of chemotherapy for the malignancy in his prostate.  He has been unable to maintain compliance with the medications in the past due to a variety of reasons so the RN CC will follow closer.  The patient also requested orders for another colonoscopy to be done, so a telephone encounter will be sent to the primary care provider.  The patient has been working with his prosthetic fitting it on and wearing it about a half an hour at a time before the pain sets in.  Patient states that he is following the directions from the prosthetic department but does not feel that he is getting much further than that half an hour that he can tolerate the prostatic before pain.    Medication reconciliation was completed with the patient and marked as reviewed.  Health maintenance was reviewed and questions were answered with the patient.  The assessment for hypertension was completed with the patient today as well as the social determinants of health and they were marked as reviewed.      Goals addressed this encounter:   Goals Addressed                 This Visit's Progress      #1  Functional (pt-stated)   30%     Goal Statement: I will call for an appointment with the prothesis department for a stump  and to be fitted for a prothesis.  I will attend the appointments needed to complete this process.  Date Goal set: 1/8/2020  Date to Achieve By: 12/8/2020  Patient expressed understanding of goal: yes  Action steps to achieve this goal:  1. I will call for the first assessment appointment and attend.  2. I will attend all of the appointments needed to get the stump  process complete.  3. I will attend all of the appointments needed to complete the process for the prothesis.                   Intervention/Education provided during  outreach: Reviewed with the patient maybe the need to back up a little bit on the time he is on his prostatic to be successful for a longer time and then move the time back up.     Outreach Frequency: monthly    Plan:   1.  The patient will back down on the amount of time he is on his prostatic to 20 minutes a day, and practice this for a few weeks and then gradually increase the amount of time again in order to be successful.  2.  The patient will take all medications as prescribed by the providers including the new chemotherapy drug.  3.  The RN CC will contact the patient again, and be available for the patient should any questions arise.    RN Nurse Care Coordinator will follow up in 4 weeks.                Darin Yan MSN, RN, PHN, CCM   Primary Care Clinical RN Care Coordinator  [facility]  9/29/2020   12:55 PM  [email or web address]  Office: [phone]

## 2020-09-29 NOTE — TELEPHONE ENCOUNTER
The patient is requesting orders for a colonoscopy.  He is starting a new chemotherapy for his prostate cancer and thought that he should repeat this as well.    Please contact the patient with directions.        Darin Yan MSN, RN, PHN, CCM   Primary Care Clinical RN Care Coordinator  [facility]  9/29/2020   1:24 PM  [email or web address]  Office: [phone]

## 2020-10-13 ENCOUNTER — HOSPITAL ENCOUNTER (OUTPATIENT)
Facility: AMBULATORY SURGERY CENTER | Age: 65
End: 2020-10-13
Attending: INTERNAL MEDICINE
Payer: COMMERCIAL

## 2020-10-13 DIAGNOSIS — C61 PROSTATE CANCER (H): ICD-10-CM

## 2020-10-13 DIAGNOSIS — Z11.59 ENCOUNTER FOR SCREENING FOR OTHER VIRAL DISEASES: Primary | ICD-10-CM

## 2020-10-13 LAB
ALBUMIN SERPL-MCNC: 3.6 G/DL (ref 3.4–5)
ALP SERPL-CCNC: 98 U/L (ref 40–150)
ALT SERPL W P-5'-P-CCNC: 24 U/L (ref 0–70)
ANION GAP SERPL CALCULATED.3IONS-SCNC: 4 MMOL/L (ref 3–14)
AST SERPL W P-5'-P-CCNC: 18 U/L (ref 0–45)
BILIRUB SERPL-MCNC: 0.5 MG/DL (ref 0.2–1.3)
BUN SERPL-MCNC: 17 MG/DL (ref 7–30)
CALCIUM SERPL-MCNC: 8.7 MG/DL (ref 8.5–10.1)
CHLORIDE SERPL-SCNC: 107 MMOL/L (ref 94–109)
CO2 SERPL-SCNC: 27 MMOL/L (ref 20–32)
CREAT SERPL-MCNC: 1.51 MG/DL (ref 0.66–1.25)
GFR SERPL CREATININE-BSD FRML MDRD: 49 ML/MIN/{1.73_M2}
GLUCOSE SERPL-MCNC: 130 MG/DL (ref 70–99)
POTASSIUM SERPL-SCNC: 3.7 MMOL/L (ref 3.4–5.3)
PROT SERPL-MCNC: 8.1 G/DL (ref 6.8–8.8)
SODIUM SERPL-SCNC: 139 MMOL/L (ref 133–144)

## 2020-10-13 PROCEDURE — 80053 COMPREHEN METABOLIC PANEL: CPT | Performed by: PATHOLOGY

## 2020-10-13 NOTE — NURSING NOTE
Chief Complaint   Patient presents with     Blood Draw     Labs drawn via  by RN in lab.      eRgina Lind RN

## 2020-10-16 ENCOUNTER — VIRTUAL VISIT (OUTPATIENT)
Dept: ONCOLOGY | Facility: CLINIC | Age: 65
End: 2020-10-16
Attending: PHYSICIAN ASSISTANT
Payer: COMMERCIAL

## 2020-10-16 ENCOUNTER — TELEPHONE (OUTPATIENT)
Dept: ONCOLOGY | Facility: CLINIC | Age: 65
End: 2020-10-16

## 2020-10-16 DIAGNOSIS — G89.18 ACUTE POST-OPERATIVE PAIN: ICD-10-CM

## 2020-10-16 DIAGNOSIS — C61 PROSTATE CANCER (H): Primary | ICD-10-CM

## 2020-10-16 DIAGNOSIS — G89.29 CHRONIC LOW BACK PAIN WITH SCIATICA, SCIATICA LATERALITY UNSPECIFIED, UNSPECIFIED BACK PAIN LATERALITY: ICD-10-CM

## 2020-10-16 DIAGNOSIS — M54.40 CHRONIC LOW BACK PAIN WITH SCIATICA, SCIATICA LATERALITY UNSPECIFIED, UNSPECIFIED BACK PAIN LATERALITY: ICD-10-CM

## 2020-10-16 PROCEDURE — 99214 OFFICE O/P EST MOD 30 MIN: CPT | Mod: 95 | Performed by: PHYSICIAN ASSISTANT

## 2020-10-16 PROCEDURE — 999N001193 HC VIDEO/TELEPHONE VISIT; NO CHARGE

## 2020-10-16 RX ORDER — PROCHLORPERAZINE MALEATE 10 MG
10 TABLET ORAL EVERY 6 HOURS PRN
Qty: 30 TABLET | Refills: 2 | Status: SHIPPED | OUTPATIENT
Start: 2020-10-16 | End: 2022-09-30

## 2020-10-16 RX ORDER — OXYCODONE HYDROCHLORIDE 10 MG/1
10 TABLET ORAL EVERY 6 HOURS PRN
Qty: 120 TABLET | Refills: 0 | Status: SHIPPED | OUTPATIENT
Start: 2020-10-16 | End: 2020-11-19

## 2020-10-16 RX ORDER — PREDNISONE 5 MG/1
5 TABLET ORAL
Qty: 30 TABLET | Refills: 0 | Status: SHIPPED | OUTPATIENT
Start: 2020-10-16 | End: 2020-11-13

## 2020-10-16 RX ORDER — ABIRATERONE ACETATE 250 MG/1
500 TABLET ORAL DAILY
Qty: 60 TABLET | Refills: 0 | Status: SHIPPED | OUTPATIENT
Start: 2020-10-16 | End: 2020-11-13

## 2020-10-16 NOTE — TELEPHONE ENCOUNTER
Reason for Call:  Medication or medication refill:    Do you use a [facility]?  Name of the pharmacy and phone number for the current request:  [facility] [address] [place] [address]    Name of the medication requested: oxyCODONE IR (ROXICODONE)    Other request: No    Can we leave a detailed message on this number? YES    Phone number patient can be reached at: Home number on file [phone] (home)    Best Time: ASAP    Call taken on 10/16/2020 at 8:09 AM by Tati So

## 2020-10-16 NOTE — PROGRESS NOTES
"Constantino Tayolr is a 64 year old male who is being evaluated via a billable telephone visit.      The patient has been notified of following:     \"This telephone visit will be conducted via a call between you and your physician/provider. We have found that certain health care needs can be provided without the need for a physical exam.  This service lets us provide the care you need with a short phone conversation.  If a prescription is necessary we can send it directly to your pharmacy.  If lab work is needed we can place an order for that and you can then stop by our lab to have the test done at a later time.    Telephone visits are billed at different rates depending on your insurance coverage. During this emergency period, for some insurers they may be billed the same as an in-person visit.  Please reach out to your insurance provider with any questions.    If during the course of the call the physician/provider feels a telephone visit is not appropriate, you will not be charged for this service.\"    Patient has given verbal consent for Telephone visit?  Yes    What phone number would you like to be contacted at? [phone]    How would you like to obtain your AVS? Mail a copy    Patient had no vitals to report.   0/10 on pain scale.     No refills needed.     Carla Sanchez, [facility]      Provider Note:   Oct 16, 2020        REASON FOR VISIT: prostate cancer follow up     TREATMENT SUMMARY:  He was was referred to Dr. Rutherford who did a biopsy on 05/01/2012, and pathology was consistent with prostate adenocarcinoma, [place] score 3+4, without perineural invasion or angiolymphatic invasion, involving about 65% of the tissue on the right.  About 3-5% of the tissue on the left was also involved with [place] 3+3=6 carcinoma. He received a 4-month shot of Lupron on 05/25/2012 by Dr. Rutherford. He opted for definitive radiation therapy and received 7560 cGy including 4500 cGy to the pelvis and an additional 3060 cGy boost to " the prostate for his tG2H1O3 disease from 8/29-10/26/2012. His PSA never dropped to 0. It was 0.34 on 1/6/14 at the lowest value. He has not followed up in the past and has ongoing issues with BP management, strokes, chronic afib, left leg amputation. etc.  Lupron:  #1-5/25/2012 (30mg)  #2-1/14/13(30 mg)  #3-8/14/13(30 mg)  #4-3/4/14 (45 mg)  Total duration: 28 months (Not continuous due to compliance)     CURRENT INTERVENTIONS:  Lupron started 10/14/19, plan for continuous androgen deprivation   Was due again on 1/14/20 though refused shot at that appt     Recent Dr Rodriguez appointment plan was to start Zytiga.      Interim History:   He has been doing well. He has not received the Zytiga yet and has not heard from pharmacy.  His main focus has been to trying and get back to his baseline with his leg. He had lots of complications with his BKA and they almost have to amputate above the knee. He is now doing better but he wants his one focus at this time to be trying to get his leg better and walking again with prothesis. He is taking oxycodone about 3-4 a day.  He is having good BMs, taking a stool softener daily.  He has not wanted to get lupron again due to decreased libido and hot flashes.  He has a colonoscopy set up in November.      He denies any urinary symptoms or new pain, especially in his bone. He has not received the Zytiga.      ROS: 10 point ROS neg other than the symptoms noted above in the HPI.     Objectives:  General: patient sounds in no audible acute distress, alert and oriented, speech clear and fluid  Resp: Speaking in full sentences, no audible respiratory distress, no cough, no audible wheeze  Psych: able to articulate logical thoughts, able to abstract reason, no tangential thoughts, no hallucinations or delusions  His affect is normal     Labs:      1/14/2020 09:44 4/14/2020 09:09 9/8/2020 09:15   PSA 22.60 (H) 30.80 (H) 39.90 (H)      10/13/2020 07:02   Sodium 139   Potassium 3.7   Chloride  107   Carbon Dioxide 27   Urea Nitrogen 17   Creatinine 1.51 (H)   GFR Estimate 49 (L)   GFR Estimate If Black 56 (L)   Calcium 8.7   Anion Gap 4   Albumin 3.6   Protein Total 8.1   Bilirubin Total 0.5   Alkaline Phosphatase 98   ALT 24   AST 18   Glucose 130 (H)     Imaging:  No new imaging      Assessment/Plan:     Prostate Cancer, biochemical relapse  -initially on ADT though held in 2014 due to ED. PSA increased from 4.6 in Sep 2017 to 32 in October 2019. CT scan and bone scan in October 2019 showed no evidence of gross metastatic disease, which means he has biochemical progression only at this point, though without treatment he would eventually have metastatic and terminal disease. In October 2019, he agreed to go back on Lupron though upon return in January, he was against it again despite having a long discussion about risks of foregoing treatment.   -Testosterone came back elevated despite the Lupron shot on 10/14/19.   -PSA and testosterone continue to elevate  -Discussed that he continues to have biochemical progression and that he will have metastatic disease if we do not do treatment now. We discussed that we could try Zytiga without Lupron though it would also likely cause similar SE to Lupron including fatigue, hot flashes and impotence. He wanted to wait until his infection healed better, he feels ready to focus on the rest of his life now.   - no new bone pains or pelvic concerns.  Feels some pain in his prostate sometimes. No Urinary issues.  Will discuss with oral pharmacy where we are at with the Zytiga and discussed side effect profile and follow up with labs q2 weeks the first two months.   - we should obtain new baseline CT CAP And bone scan, his creat is up, likely 2/2 to his atrophic kidney, but should assess for intra-abdominal metastasis     BKA  -was using prosthesis previously before the stroke every day  -currently undergoing evaluation for a repeat of the prosthesis as the wounds and  infection have healed. He is wheelchair bound without his prosthesis and needs to have the prosthesis refit to avoid the rubbing and chaffing and allow the next stages of PT to regain walking  -working with PCP       Phone call duration: 10 minutes    Debbi Short PA-C

## 2020-10-16 NOTE — TELEPHONE ENCOUNTER
Oral Chemotherapy Monitoring Program.    Placed call to patient with regard to new Zytiga treatment, for usual chemotherapy teaching.  He wasn't able to talk right now.    Will try again later or on Monday.    Rachel Rivera, PharmD, BCOP, [facility]  Clinical Pharmacy Specialist  [facility]  Pager [phone]  Phone [phone]

## 2020-10-21 ENCOUNTER — TELEPHONE (OUTPATIENT)
Dept: ONCOLOGY | Facility: CLINIC | Age: 65
End: 2020-10-21

## 2020-10-21 DIAGNOSIS — C61 PROSTATE CANCER (H): Primary | ICD-10-CM

## 2020-10-21 DIAGNOSIS — Z79.899 ENCOUNTER FOR LONG-TERM (CURRENT) USE OF MEDICATIONS: ICD-10-CM

## 2020-10-21 NOTE — ORAL ONC MGMT
"Oral Chemotherapy Monitoring Program    Zytiga 500mg daily with breakfast.    Lab Monitoring Plan  CMP and CBC every 2 weeks x 6 then monthly  Subjective/Objective:  Constantino Taylor is a 64 year old male contacted by phone for an initial visit for oral chemotherapy education.     ORAL CHEMOTHERAPY 10/21/2020   Assessment Type New Teach   Diagnosis Code Prostate Cancer   Providers Dr. Rodriguez   [facility] Name[facility]   Drug Name Zytiga (Abiraterone)   Dose 500 mg   Any new drug interactions? Yes   Pharmacist Intervention? Yes   Intervention(s) Patient Education   Is the dose as ordered appropriate for the patient? No   Pharmacist intervention for dose adjustment? Yes   Intervention(s) Dose clarified/confermed with MD       Last PHQ-2 Score on record:   PHQ-2 ( 1999 Pfizer) 1/2/2020 1/2/2017   Q1: Little interest or pleasure in doing things 0 0   Q2: Feeling down, depressed or hopeless 0 0   PHQ-2 Score 0 0       Vitals:  BP:   BP Readings from Last 1 Encounters:   09/08/20 (!) 160/106     Wt Readings from Last 1 Encounters:   09/08/20 93.9 kg (207 lb)     Estimated body surface area is 2.23 meters squared as calculated from the following:    Height as of 10/24/19: 1.905 m (6' 3\").    Weight as of 9/8/20: 93.9 kg (207 lb).    Labs:  _  Result Component Current Result Ref Range   Sodium 139 (10/13/2020) 133 - 144 mmol/L     _  Result Component Current Result Ref Range   Potassium 3.7 (10/13/2020) 3.4 - 5.3 mmol/L     _  Result Component Current Result Ref Range   Calcium 8.7 (10/13/2020) 8.5 - 10.1 mg/dL     No results found for Mag within last 30 days.     No results found for Phos within last 30 days.     _  Result Component Current Result Ref Range   Albumin 3.6 (10/13/2020) 3.4 - 5.0 g/dL     _  Result Component Current Result Ref Range   Urea Nitrogen 17 (10/13/2020) 7 - 30 mg/dL     _  Result Component Current Result Ref Range   Creatinine 1.51 (H) (10/13/2020) 0.66 - 1.25 mg/dL       _  Result Component " Current Result Ref Range   AST 18 (10/13/2020) 0 - 45 U/L     _  Result Component Current Result Ref Range   ALT 24 (10/13/2020) 0 - 70 U/L     _  Result Component Current Result Ref Range   Bilirubin Total 0.5 (10/13/2020) 0.2 - 1.3 mg/dL       No results found for WBC within last 30 days.     No results found for HGB within last 30 days.     No results found for PLT within last 30 days.     No results found for ANC within last 30 days.       Assessment:  Patient is appropriate to start therapy.    Plan:  Basic chemotherapy teaching was reviewed with the patient including indication, start date of therapy, dose (clarified dose with Dr. Rodriguez and Constantino), administration, adverse effects, missed doses, food and drug interactions (including Tamsulosin, Metoprolol, Grapefruit and Grapefruit juice), monitoring, side effect management, office contact information, and safe handling. Written materials were provided and all questions answered to Constantino's stated satisfaction.    Follow-Up:  About one week after start of Zytiga.     Wilton Browne PharmD  [facility]  [phone]  October 21, 2020

## 2020-10-21 NOTE — ORAL ONC MGMT
Oral Chemotherapy Monitoring Program     Placed call to patient in follow up of Zytiga therapy.     Left message to please call back in follow-up of therapy. No patient or drug names were mentioned.     Wilton Browne PharmD  [facility]  [phone]  October 21, 2020

## 2020-10-29 ENCOUNTER — TELEPHONE (OUTPATIENT)
Dept: ONCOLOGY | Facility: CLINIC | Age: 65
End: 2020-10-29

## 2020-10-29 NOTE — TELEPHONE ENCOUNTER
"Oral Chemotherapy Monitoring Program    Subjective/Objective:  Constantino Taylor is a 64 year old male contacted by phone for a follow-up visit for oral chemotherapy. Constantino details that he did not receive the Zytiga therapy until Monday, 10/26/2020, and subsequently started taking therapy on Tuesday, 10/27/2020.     ORAL CHEMOTHERAPY 10/21/2020 10/29/2020   Assessment Type New Teach Other   Diagnosis Code Prostate Cancer Prostate Cancer   Providers Dr. Jennifer Rodriugez   [facility]   Drug Name Zytiga (Abiraterone) Zytiga (Abiraterone)   Dose 500 mg 500 mg   Current Schedule Daily Daily   Cycle Details Continuous Continuous   Start Date of Last Cycle - 10/27/2020   Any new drug interactions? Yes -   Pharmacist Intervention? Yes -   Intervention(s) Patient Education -   Is the dose as ordered appropriate for the patient? No -   Pharmacist intervention for dose adjustment? Yes -   Intervention(s) Dose clarified/confermed with MD -       Last PHQ-2 Score on record:   PHQ-2 ( 1999 Pfizer) 1/2/2020 1/2/2017   Q1: Little interest or pleasure in doing things 0 0   Q2: Feeling down, depressed or hopeless 0 0   PHQ-2 Score 0 0       Vitals:  BP:   BP Readings from Last 1 Encounters:   09/08/20 (!) 160/106     Wt Readings from Last 1 Encounters:   09/08/20 93.9 kg (207 lb)     Estimated body surface area is 2.23 meters squared as calculated from the following:    Height as of 10/24/19: 1.905 m (6' 3\").    Weight as of 9/8/20: 93.9 kg (207 lb).    Labs:  _  Result Component Current Result Ref Range   Sodium 139 (10/13/2020) 133 - 144 mmol/L     _  Result Component Current Result Ref Range   Potassium 3.7 (10/13/2020) 3.4 - 5.3 mmol/L     _  Result Component Current Result Ref Range   Calcium 8.7 (10/13/2020) 8.5 - 10.1 mg/dL     No results found for Mag within last 30 days.     No results found for Phos within last 30 days.     _  Result Component Current Result Ref Range   Albumin 3.6 (10/13/2020) 3.4 - " 5.0 g/dL     _  Result Component Current Result Ref Range   Urea Nitrogen 17 (10/13/2020) 7 - 30 mg/dL     _  Result Component Current Result Ref Range   Creatinine 1.51 (H) (10/13/2020) 0.66 - 1.25 mg/dL     _  Result Component Current Result Ref Range   AST 18 (10/13/2020) 0 - 45 U/L     _  Result Component Current Result Ref Range   ALT 24 (10/13/2020) 0 - 70 U/L     _  Result Component Current Result Ref Range   Bilirubin Total 0.5 (10/13/2020) 0.2 - 1.3 mg/dL     No results found for WBC within last 30 days.     No results found for HGB within last 30 days.     No results found for PLT within last 30 days.     No results found for ANC within last 30 days.       Assessment/Plan:  Patient recently started on Zytiga therapy. Will reach out next week following completion of lab work to discuss results and complete initial assessment.     Follow-Up:  11/5: labs and initial assessment.       Pallavi Lynn PharmD  Oral Chemotherapy Monitoring Program  [facility]  [phone]

## 2020-10-30 ENCOUNTER — PATIENT OUTREACH (OUTPATIENT)
Dept: CARE COORDINATION | Facility: CLINIC | Age: 65
End: 2020-10-30

## 2020-10-30 NOTE — LETTER
[facility]  Complex Care Plan  About Me:    Patient Name:  Constantino Taylor    YOB: 1955  Age:         64 year old   Joyce MRN:    [id number] Telephone Information:  Home Phone [phone]   Mobile [phone]       Address:  [facility] [address] [address] Email address:  [email or web address]      Emergency Contact(s)    Name Relationship Lgl Grd Work Phone Home Phone Mobile Phone   1. JUAN MIGUEL HENSON Sister No  [phone]   2. NONE Other   none            Primary language:  English     needed? No   [facility] [employer]:  [phone] op. 1  Other communication barriers:    Preferred Method of Communication:  Mail  Current living arrangement:    Mobility Status/ Medical Equipment:      Health Maintenance  Health Maintenance Reviewed:      My Access Plan  Medical Emergency 911   Primary Clinic Line [facility] - [phone]   24 Hour Appointment Line [phone] or  [phone]) (toll-free)   24 Hour Nurse Line [phone] (toll-free)   Preferred Urgent Care     Preferred Hospital     Preferred Pharmacy [id number] - [address] AT SEC OF [facility]     Behavioral Health Crisis Line The National Suicide Prevention Lifeline at 1-628.610.5031 or 911             My Care Team Members  Patient Care Team       Relationship Specialty Notifications Start End    Avery Duke MD PCP - General Internal Medicine  5/14/15     Phone: [phone] Fax: [phone]         [address] [facility] [address]    Avery Duke MD Assigned PCP   7/5/15     Phone: [phone] Fax: [phone]         [address] [facility] [address]    Piper Polk, RN Nurse Coordinator Vascular Surgery Abnormal results only, Admissions 7/16/15     Phone: [phone] Pager: [phone]        Odessa Browning  MD PATTEN Vascular Surgery  8/10/15     Phone: [phone] Fax: [phone]         [address] [facility] [address] [facility] [address]    Norah Brown MD MD Cardiology  2/7/16     Phone: [phone] Fax: [phone]         [address] [facility] [address] [facility] [address]    Cheyenne Mansfield MD MD Internal Medicine  2/9/16     Quinn Rodriguez MD MD Oncology Admissions 9/25/17     Phone: [phone] Fax: [phone]         [address] [facility] [address] [facility] [address]    Bryanna Rubio, RN Specialty Care Coordinator Cardiology Admissions 10/18/19     Phone: [phone]         Norah Brown MD MD Cardiology  10/18/19     Phone: [phone] Fax: [phone]         [address] [facility] [address] [facility] [address]    Darin Dickson, GAY Lead Care Coordinator Primary Care - CC Admissions 11/4/19     Phone: [phone] Fax: [phone]        Michelle Matute MD Assigned Heart and Vascular Provider   10/23/20     Phone: [phone] Fax: [phone]         [address] [facility] [address]    Quinn Rodriguez MD Assigned Cancer Care Provider   10/23/20     Phone: [phone] Fax: [phone]         [address] [facility] [address] [facility] [address]            My Care Plans  Self Management and Treatment Plan  Goals and (Comments)  Goals        General    #1  Functional (pt-stated)     Notes - Note edited  8/6/2020 10:31 AM by Darin Dickson RN    Goal Statement: I will call for an appointment with the prothesis department for a stump  and to be fitted for a prothesis.  I will attend the appointments needed to complete this process.  Date Goal set: 1/8/2020  Date to Achieve By: 12/8/2020  Patient expressed understanding of goal: yes  Action steps to achieve this goal:  1. I will call for the first assessment appointment and attend.  2. I will attend all of the appointments needed to get the stump  process complete.  3. I will attend all of the appointments needed to complete the  process for the prothesis.                   Action Plans on File:                       Advance Care Plans/Directives Type:        My Medical and Care Information  Problem List   Patient Active Problem List   Diagnosis     Cerebral infarction (H)     Chronic hepatitis C without hepatic coma (H)     Tobacco use disorder     Chronic low back pain     Acute pancreatitis     Hyperlipidemia LDL goal <70     Hypertension goal BP (blood pressure) < 140/90     Nonischemic dilated cardiomyopathy (HCC)     Malignant neoplasm of prostate (H)     Erectile dysfunction     Eczema     PAD (peripheral artery disease) (H)     S/P BKA (below knee amputation) unilateral (H)     Encounter for counseling     Acute renal failure (H)     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    Care Coordination Start Date: 11/11/2019   Frequency of Care Coordination: monthly   Form Last Updated: 10/30/2020

## 2020-10-30 NOTE — PROGRESS NOTES
Clinic Care Coordination Contact  [facility]/Voicemail       Clinical Data: Care Coordinator Outreach  Outreach attempted x 2.  Left message on patient's voicemail with call back information and requested return call.  Plan: Care Coordinator sent care coordination introduction letter on 10/30/2020 via [employer] App. Care Coordinator will try to reach patient again in 10 business days.          Darin Yan MSN, RN, PHN, CCM   Primary Care Clinical RN Care Coordinator  [facility]  10/30/2020   1:35 PM  [email or web address]  Office: [phone]

## 2020-11-05 ENCOUNTER — TELEPHONE (OUTPATIENT)
Dept: ONCOLOGY | Facility: CLINIC | Age: 65
End: 2020-11-05

## 2020-11-05 DIAGNOSIS — Z79.899 ENCOUNTER FOR LONG-TERM (CURRENT) USE OF MEDICATIONS: ICD-10-CM

## 2020-11-05 DIAGNOSIS — C61 PROSTATE CANCER (H): ICD-10-CM

## 2020-11-05 LAB
ALBUMIN SERPL-MCNC: 3.7 G/DL (ref 3.4–5)
ALP SERPL-CCNC: 84 U/L (ref 40–150)
ALT SERPL W P-5'-P-CCNC: 41 U/L (ref 0–70)
ANION GAP SERPL CALCULATED.3IONS-SCNC: 3 MMOL/L (ref 3–14)
AST SERPL W P-5'-P-CCNC: 24 U/L (ref 0–45)
BASOPHILS # BLD AUTO: 0 10E9/L (ref 0–0.2)
BASOPHILS NFR BLD AUTO: 0.5 %
BILIRUB SERPL-MCNC: 0.7 MG/DL (ref 0.2–1.3)
BUN SERPL-MCNC: 19 MG/DL (ref 7–30)
CALCIUM SERPL-MCNC: 8.8 MG/DL (ref 8.5–10.1)
CHLORIDE SERPL-SCNC: 109 MMOL/L (ref 94–109)
CO2 SERPL-SCNC: 28 MMOL/L (ref 20–32)
CREAT SERPL-MCNC: 1.38 MG/DL (ref 0.66–1.25)
DIFFERENTIAL METHOD BLD: NORMAL
EOSINOPHIL # BLD AUTO: 0.3 10E9/L (ref 0–0.7)
EOSINOPHIL NFR BLD AUTO: 4.4 %
ERYTHROCYTE [DISTWIDTH] IN BLOOD BY AUTOMATED COUNT: 12.3 % (ref 10–15)
GFR SERPL CREATININE-BSD FRML MDRD: 54 ML/MIN/{1.73_M2}
GLUCOSE SERPL-MCNC: 109 MG/DL (ref 70–99)
HCT VFR BLD AUTO: 43.3 % (ref 40–53)
HGB BLD-MCNC: 14.6 G/DL (ref 13.3–17.7)
IMM GRANULOCYTES # BLD: 0 10E9/L (ref 0–0.4)
IMM GRANULOCYTES NFR BLD: 0.2 %
LYMPHOCYTES # BLD AUTO: 1.8 10E9/L (ref 0.8–5.3)
LYMPHOCYTES NFR BLD AUTO: 28.8 %
MCH RBC QN AUTO: 30.7 PG (ref 26.5–33)
MCHC RBC AUTO-ENTMCNC: 33.7 G/DL (ref 31.5–36.5)
MCV RBC AUTO: 91 FL (ref 78–100)
MONOCYTES # BLD AUTO: 0.5 10E9/L (ref 0–1.3)
MONOCYTES NFR BLD AUTO: 8.3 %
NEUTROPHILS # BLD AUTO: 3.6 10E9/L (ref 1.6–8.3)
NEUTROPHILS NFR BLD AUTO: 57.8 %
NRBC # BLD AUTO: 0 10*3/UL
NRBC BLD AUTO-RTO: 0 /100
PLATELET # BLD AUTO: 184 10E9/L (ref 150–450)
POTASSIUM SERPL-SCNC: 3.8 MMOL/L (ref 3.4–5.3)
PROT SERPL-MCNC: 8 G/DL (ref 6.8–8.8)
RBC # BLD AUTO: 4.76 10E12/L (ref 4.4–5.9)
SODIUM SERPL-SCNC: 140 MMOL/L (ref 133–144)
WBC # BLD AUTO: 6.2 10E9/L (ref 4–11)

## 2020-11-05 PROCEDURE — 85025 COMPLETE CBC W/AUTO DIFF WBC: CPT | Performed by: INTERNAL MEDICINE

## 2020-11-05 PROCEDURE — 80053 COMPREHEN METABOLIC PANEL: CPT | Performed by: INTERNAL MEDICINE

## 2020-11-05 NOTE — ORAL ONC MGMT
Oral Chemotherapy Monitoring Program    Subjective/Objective:  Constantino Taylor is a 64 year old male contacted by phone for a follow-up visit for oral chemotherapy.  Constantino states that he is doing well. He only notes pain on his left side after he takes the Zytiga. This pain is only a little bothersome but does not impact any activities. He states he takes 2 tablets on an empty stomach.    ORAL CHEMOTHERAPY 10/21/2020 10/29/2020 11/5/2020   Assessment Type New Teach Other Initial Follow up   Diagnosis Code Prostate Cancer Prostate Cancer Prostate Cancer   Providers Dr. Jennifer Rodriguez   [facility]   Drug Name Zytiga (Abiraterone) Zytiga (Abiraterone) Zytiga (Abiraterone)   Dose 500 mg 500 mg 500 mg   Current Schedule Daily Daily Daily   Cycle Details Continuous Continuous Continuous   Start Date of Last Cycle - 10/27/2020 10/27/2020   Adherence Assessment - - Non-adherent   Reason for Non-adherence - - Other   Adherence Intervention Recommended - - Medication education   Adverse Effects - - Other (See Note for Details)   Other (See Note for Details) - - pain in left side    Pharmacist intervention(other) - - No   Any new drug interactions? Yes - No   Pharmacist Intervention? Yes - -   Intervention(s) Patient Education - -   Is the dose as ordered appropriate for the patient? No - -   Pharmacist intervention for dose adjustment? Yes - -   Intervention(s) Dose clarified/confermed with MD - -   Is the patient currently in pain? - - Assessed in last 30 days.   Has the patient been assessed within the past 6 months for depression? - - Yes   Has the patient missed any days of school, work, or other routine activity? - - No   Since the last time we talked, have you been hospitalized or used the emergency room? - - No       Last PHQ-2 Score on record:   PHQ-2 ( 1999 Pfizer) 1/2/2020 1/2/2017   Q1: Little interest or pleasure in doing things 0 0   Q2: Feeling down, depressed or hopeless 0  0   PHQ-2 Score 0 0     Labs:  _  Result Component Current Result Ref Range   Sodium 140 (11/5/2020) 133 - 144 mmol/L     _  Result Component Current Result Ref Range   Potassium 3.8 (11/5/2020) 3.4 - 5.3 mmol/L     _  Result Component Current Result Ref Range   Calcium 8.8 (11/5/2020) 8.5 - 10.1 mg/dL   _  Result Component Current Result Ref Range   Albumin 3.7 (11/5/2020) 3.4 - 5.0 g/dL     _  Result Component Current Result Ref Range   Urea Nitrogen 19 (11/5/2020) 7 - 30 mg/dL     _  Result Component Current Result Ref Range   Creatinine 1.38 (H) (11/5/2020) 0.66 - 1.25 mg/dL     _  Result Component Current Result Ref Range   AST 24 (11/5/2020) 0 - 45 U/L     _  Result Component Current Result Ref Range   ALT 41 (11/5/2020) 0 - 70 U/L     _  Result Component Current Result Ref Range   Bilirubin Total 0.7 (11/5/2020) 0.2 - 1.3 mg/dL     _  Result Component Current Result Ref Range   WBC 6.2 (11/5/2020) 4.0 - 11.0 10e9/L     _  Result Component Current Result Ref Range   Hemoglobin 14.6 (11/5/2020) 13.3 - 17.7 g/dL     _  Result Component Current Result Ref Range   Platelet Count 184 (11/5/2020) 150 - 450 10e9/L     _  Result Component Current Result Ref Range   Absolute Neutrophil 3.6 (11/5/2020) 1.6 - 8.3 10e9/L         Assessment/Plan:  Constantino is tolerating Zytiga with mild side effects. Unclear if the pain on left side is from Zytiga. He has been taking the drug incorrectly. Informed him to take the dose with food. He verbalized understanding. Continue abiraterone 500mg daily with food.    Follow-Up:  11/19 Labs    Refill Due:  11/13 Angel Wolfe, Pharm. D., BCOP

## 2020-11-05 NOTE — NURSING NOTE
Chief Complaint   Patient presents with     Blood Draw     Labs drawn via  by RN.     Labs drawn with vpt by rn.      Prince Altamirano RN

## 2020-11-12 ENCOUNTER — HOSPITAL ENCOUNTER (OUTPATIENT)
Dept: NUCLEAR MEDICINE | Facility: CLINIC | Age: 65
Setting detail: NUCLEAR MEDICINE
End: 2020-11-12
Attending: PHYSICIAN ASSISTANT
Payer: COMMERCIAL

## 2020-11-12 ENCOUNTER — HOSPITAL ENCOUNTER (OUTPATIENT)
Dept: CT IMAGING | Facility: CLINIC | Age: 65
Discharge: HOME | End: 2020-11-12
Attending: PHYSICIAN ASSISTANT | Admitting: PHYSICIAN ASSISTANT
Payer: COMMERCIAL

## 2020-11-12 DIAGNOSIS — C61 PROSTATE CANCER (H): ICD-10-CM

## 2020-11-12 PROCEDURE — 999N000128 HC STATISTIC PERIPHERAL IV START W/O US GUIDANCE

## 2020-11-12 PROCEDURE — A9503 TC99M MEDRONATE: HCPCS | Performed by: PHYSICIAN ASSISTANT

## 2020-11-12 PROCEDURE — 78306 BONE IMAGING WHOLE BODY: CPT

## 2020-11-12 PROCEDURE — 71250 CT THORAX DX C-: CPT

## 2020-11-12 PROCEDURE — 71250 CT THORAX DX C-: CPT | Mod: 26 | Performed by: RADIOLOGY

## 2020-11-12 PROCEDURE — 78306 BONE IMAGING WHOLE BODY: CPT | Mod: 26

## 2020-11-12 PROCEDURE — 343N000001 HC RX 343: Performed by: PHYSICIAN ASSISTANT

## 2020-11-12 PROCEDURE — 74176 CT ABD & PELVIS W/O CONTRAST: CPT | Mod: 26 | Performed by: RADIOLOGY

## 2020-11-12 RX ORDER — TC 99M MEDRONATE 20 MG/10ML
20-30 INJECTION, POWDER, LYOPHILIZED, FOR SOLUTION INTRAVENOUS ONCE
Status: COMPLETED | OUTPATIENT
Start: 2020-11-12 | End: 2020-11-12

## 2020-11-12 RX ADMIN — TC 99M MEDRONATE 23.9 MCI.: 20 INJECTION, POWDER, LYOPHILIZED, FOR SOLUTION INTRAVENOUS at 10:33

## 2020-11-13 DIAGNOSIS — C61 PROSTATE CANCER (H): Primary | ICD-10-CM

## 2020-11-13 RX ORDER — ABIRATERONE ACETATE 250 MG/1
500 TABLET ORAL DAILY
Qty: 60 TABLET | Refills: 0 | Status: SHIPPED | OUTPATIENT
Start: 2020-11-13 | End: 2020-12-10

## 2020-11-13 RX ORDER — PREDNISONE 5 MG/1
5 TABLET ORAL
Qty: 30 TABLET | Refills: 0 | Status: SHIPPED | OUTPATIENT
Start: 2020-11-13 | End: 2020-12-10

## 2020-11-16 ENCOUNTER — VIRTUAL VISIT (OUTPATIENT)
Dept: ONCOLOGY | Facility: CLINIC | Age: 65
End: 2020-11-16
Attending: PHYSICIAN ASSISTANT
Payer: COMMERCIAL

## 2020-11-16 DIAGNOSIS — C61 PROSTATE CANCER (H): Primary | ICD-10-CM

## 2020-11-16 PROCEDURE — 99214 OFFICE O/P EST MOD 30 MIN: CPT | Mod: 95 | Performed by: PHYSICIAN ASSISTANT

## 2020-11-16 PROCEDURE — 999N001193 HC VIDEO/TELEPHONE VISIT; NO CHARGE

## 2020-11-16 NOTE — PROGRESS NOTES
"Constantino Taylor is a 64 year old male who is being evaluated via a billable telephone visit.      The patient has been notified of following:     \"This telephone visit will be conducted via a call between you and your physician/provider. We have found that certain health care needs can be provided without the need for a physical exam.  This service lets us provide the care you need with a short phone conversation.  If a prescription is necessary we can send it directly to your pharmacy.  If lab work is needed we can place an order for that and you can then stop by our lab to have the test done at a later time.    Telephone visits are billed at different rates depending on your insurance coverage. During this emergency period, for some insurers they may be billed the same as an in-person visit.  Please reach out to your insurance provider with any questions.    If during the course of the call the physician/provider feels a telephone visit is not appropriate, you will not be charged for this service.\"    Patient has given verbal consent for Telephone visit?  Yes    What phone number would you like to be contacted at? [phone]    How would you like to obtain your AVS? Mail a copy    Jesenia Perez JANKI    Provider Note:   Nov 16, 2020          REASON FOR VISIT: prostate cancer follow up     TREATMENT SUMMARY:  He was was referred to Dr. Rutherford who did a biopsy on 05/01/2012, and pathology was consistent with prostate adenocarcinoma, [place] score 3+4, without perineural invasion or angiolymphatic invasion, involving about 65% of the tissue on the right.  About 3-5% of the tissue on the left was also involved with [place] 3+3=6 carcinoma. He received a 4-month shot of Lupron on 05/25/2012 by Dr. Rutherford. He opted for definitive radiation therapy and received 7560 cGy including 4500 cGy to the pelvis and an additional 3060 cGy boost to the prostate for his [id number] disease from 8/29-10/26/2012. His PSA never dropped to 0. " "It was 0.34 on 1/6/14 at the lowest value. He has not followed up in the past and has ongoing issues with BP management, strokes, chronic afib, left leg amputation. etc.  Lupron:  #1-5/25/2012 (30mg)  #2-1/14/13(30 mg)  #3-8/14/13(30 mg)  #4-3/4/14 (45 mg)  Total duration: 28 months (Not continuous due to compliance)     CURRENT INTERVENTIONS:  Lupron started 10/14/19, plan for continuous androgen deprivation   Was due again on 1/14/20 though refused shot at that appt      Recent Dr Rodriguez appointment plan was to start Zytiga--started 10/27/2020     Interim History:   Constantino has been taking the Zytiga for 3 weeks now.  He states about a week ago, he started getting a \"stomach ache\".  He is taking his Zytiga 500 mg on an empty stomach and states this is what his bottle tells him.  He denies having prednisone at home.  He is taking compazine 10 mg in the AM, this helps but basically the stomach ache starts after his AM dose of Zytiga and lasts all day, he eats less because of this.  He is very frustrated with his care and feels like no one cares about him.  He feels people keep changing the directions on what he is suppose to do and none us are consistent.  He is taking oxycodone about 3-4 a day for his amputation.  He is having good BMs, taking a stool softener daily.  He has not wanted to get lupron again due to decreased libido and hot flashes.  He has a colonoscopy set up in November.      He denies any urinary symptoms or new pain, especially in his bone.      ROS: 10 point ROS neg other than the symptoms noted above in the HPI.     Objectives:  General: Constantino was very upset and yelling at times, then would be very calm   Resp: Speaking in full sentences, no audible respiratory distress, no cough, no audible wheeze  Psych: frustrated and accusatory at times, then reasonable and thoughtful as well     Labs:      11/5/2020 07:05   Sodium 140   Potassium 3.8   Chloride 109   Carbon Dioxide 28   Urea Nitrogen 19 " "  Creatinine 1.38 (H)   GFR Estimate 54 (L)   GFR Estimate If Black 63   Calcium 8.8   Anion Gap 3   Albumin 3.7   Protein Total 8.0   Bilirubin Total 0.7   Alkaline Phosphatase 84   ALT 41   AST 24       Imaging:  BONE IMPRESSION: No scintigraphic evidence of osteoblastic metastatic  disease.     CT CAP IMPRESSION: In this patient with history of prostate cancer status  post definitive radiation therapy in 2012, now with rising  prostate-specific antigen:  1. No evidence of metastatic disease in the chest, abdomen, or pelvis.  2. Ill-defined fat stranding centered about the SMA and pancreatic  body, nonspecific. Possibly related to high-grade stenosis of the  proximal SMA demonstrated on 10/9/2019. Less likely representing acute  pancreatitis.  3. Left renal atrophy. Multiple right renal cortical defects.  4. Consider Axumin PET Scan which is more sensitive for Protaste  Cancer.       Assessment/Plan:     Prostate Cancer, biochemical relapse  -initially on ADT though held in 2014 due to ED. PSA increased from 4.6 in Sep 2017 to 32 in October 2019. CT scan and bone scan in October 2019 showed no evidence of gross metastatic disease, which means he has biochemical progression only at this point, though without treatment he would eventually have metastatic and terminal disease. In October 2019, he agreed to go back on Lupron though upon return in January, he was against it again despite having a long discussion about risks of foregoing treatment. Declined taking Lupron.  PSA and testosterone continue to elevate.  Discussed his September PSA was ~40 and he stated we were \"lying\" to him.  I reviewed the note from September that Dr Rodriguez discussed his escalating PSA and discussed Zytiga.  He feels like no one is telling him the truth.  I suggested an in-person visit so that we could review the numbers together.  I discussed his CT and bone scan do no snow evidence of metastatic prostate cancer, we are treating biochemical " progression.    -Discussed that he should be taking the Zytiga WITH food - he is taking 500 mg at this time, but we'll back off to 250 mg WITH FOOD and 5 mg of prednisone in the AM.  He can take compazine BID for nausea.  He can use TUMS or pepcid for dyspepsia.    - Iwill have oral pharmacy keep a close eye on him.   - will do in person visit next time as I think that would help.  He was very angry today and not feeling like we were telling him the truth about his PSA.         Phone call duration: 29 minutes    Debbi Short PA-C

## 2020-11-16 NOTE — LETTER
"    11/16/2020         RE: Constantino Taylor  [address] [place] [id number]        Dear Colleague,    Thank you for referring your patient, Constantino Taylor, to the [facility]. Please see a copy of my visit note below.    Constantino Taylor is a 64 year old male who is being evaluated via a billable telephone visit.      The patient has been notified of following:     \"This telephone visit will be conducted via a call between you and your physician/provider. We have found that certain health care needs can be provided without the need for a physical exam.  This service lets us provide the care you need with a short phone conversation.  If a prescription is necessary we can send it directly to your pharmacy.  If lab work is needed we can place an order for that and you can then stop by our lab to have the test done at a later time.    Telephone visits are billed at different rates depending on your insurance coverage. During this emergency period, for some insurers they may be billed the same as an in-person visit.  Please reach out to your insurance provider with any questions.    If during the course of the call the physician/provider feels a telephone visit is not appropriate, you will not be charged for this service.\"    Patient has given verbal consent for Telephone visit?  Yes    What phone number would you like to be contacted at? [phone]    How would you like to obtain your AVS? Mail a copy    Jesenia ZAMORANO    Provider Note:   Nov 16, 2020          REASON FOR VISIT: prostate cancer follow up     TREATMENT SUMMARY:  He was was referred to Dr. Rutherford who did a biopsy on 05/01/2012, and pathology was consistent with prostate adenocarcinoma, [place] score 3+4, without perineural invasion or angiolymphatic invasion, involving about 65% of the tissue on the right.  About 3-5% of the tissue on the left was also involved with [place] 3+3=6 carcinoma. He " "received a 4-month shot of Lupron on 05/25/2012 by Dr. Rutherford. He opted for definitive radiation therapy and received 7560 cGy including 4500 cGy to the pelvis and an additional 3060 cGy boost to the prostate for his [id number] disease from 8/29-10/26/2012. His PSA never dropped to 0. It was 0.34 on 1/6/14 at the lowest value. He has not followed up in the past and has ongoing issues with BP management, strokes, chronic afib, left leg amputation. etc.  Lupron:  #1-5/25/2012 (30mg)  #2-1/14/13(30 mg)  #3-8/14/13(30 mg)  #4-3/4/14 (45 mg)  Total duration: 28 months (Not continuous due to compliance)     CURRENT INTERVENTIONS:  Lupron started 10/14/19, plan for continuous androgen deprivation   Was due again on 1/14/20 though refused shot at that appt      Recent Dr Rodriguez appointment plan was to start Zytiga--started 10/27/2020     Interim History:   Constantino has been taking the Zytiga for 3 weeks now.  He states about a week ago, he started getting a \"stomach ache\".  He is taking his Zytiga 500 mg on an empty stomach and states this is what his bottle tells him.  He denies having prednisone at home.  He is taking compazine 10 mg in the AM, this helps but basically the stomach ache starts after his AM dose of Zytiga and lasts all day, he eats less because of this.  He is very frustrated with his care and feels like no one cares about him.  He feels people keep changing the directions on what he is suppose to do and none us are consistent.  He is taking oxycodone about 3-4 a day for his amputation.  He is having good BMs, taking a stool softener daily.  He has not wanted to get lupron again due to decreased libido and hot flashes.  He has a colonoscopy set up in November.      He denies any urinary symptoms or new pain, especially in his bone.      ROS: 10 point ROS neg other than the symptoms noted above in the HPI.     Objectives:  General: Constantino was very upset and yelling at times, then would be very calm   Resp: Speaking " "in full sentences, no audible respiratory distress, no cough, no audible wheeze  Psych: frustrated and accusatory at times, then reasonable and thoughtful as well     Labs:      11/5/2020 07:05   Sodium 140   Potassium 3.8   Chloride 109   Carbon Dioxide 28   Urea Nitrogen 19   Creatinine 1.38 (H)   GFR Estimate 54 (L)   GFR Estimate If Black 63   Calcium 8.8   Anion Gap 3   Albumin 3.7   Protein Total 8.0   Bilirubin Total 0.7   Alkaline Phosphatase 84   ALT 41   AST 24       Imaging:  BONE IMPRESSION: No scintigraphic evidence of osteoblastic metastatic  disease.     CT CAP IMPRESSION: In this patient with history of prostate cancer status  post definitive radiation therapy in 2012, now with rising  prostate-specific antigen:  1. No evidence of metastatic disease in the chest, abdomen, or pelvis.  2. Ill-defined fat stranding centered about the SMA and pancreatic  body, nonspecific. Possibly related to high-grade stenosis of the  proximal SMA demonstrated on 10/9/2019. Less likely representing acute  pancreatitis.  3. Left renal atrophy. Multiple right renal cortical defects.  4. Consider Axumin PET Scan which is more sensitive for Protaste  Cancer.       Assessment/Plan:     Prostate Cancer, biochemical relapse  -initially on ADT though held in 2014 due to ED. PSA increased from 4.6 in Sep 2017 to 32 in October 2019. CT scan and bone scan in October 2019 showed no evidence of gross metastatic disease, which means he has biochemical progression only at this point, though without treatment he would eventually have metastatic and terminal disease. In October 2019, he agreed to go back on Lupron though upon return in January, he was against it again despite having a long discussion about risks of foregoing treatment. Declined taking Lupron.  PSA and testosterone continue to elevate.  Discussed his September PSA was ~40 and he stated we were \"lying\" to him.  I reviewed the note from September that Dr Rodriguez discussed his " escalating PSA and discussed Zytiga.  He feels like no one is telling him the truth.  I suggested an in-person visit so that we could review the numbers together.  I discussed his CT and bone scan do no snow evidence of metastatic prostate cancer, we are treating biochemical progression.    -Discussed that he should be taking the Zytiga WITH food - he is taking 500 mg at this time, but we'll back off to 250 mg WITH FOOD and 5 mg of prednisone in the AM.  He can take compazine BID for nausea.  He can use TUMS or pepcid for dyspepsia.    - Iwill have oral pharmacy keep a close eye on him.   - will do in person visit next time as I think that would help.  He was very angry today and not feeling like we were telling him the truth about his PSA.         Phone call duration: 29 minutes    Debbi Short PA-C

## 2020-11-18 ENCOUNTER — PATIENT OUTREACH (OUTPATIENT)
Dept: NURSING | Facility: CLINIC | Age: 65
End: 2020-11-18
Payer: COMMERCIAL

## 2020-11-18 NOTE — PROGRESS NOTES
Clinic Care Coordination Contact    Follow Up Progress Note      Assessment: The patient called the RN CC nurse care coordinator this morning she will ensure that he has refill for his narcotic had been received by the provider.  The RN CC did note that the narcotic had been ordered it has not been addressed by the provider at this time.  Per the request of the patient the RN CC added a note to the request that he needs it as soon as possible as the pain from his oncology problems is getting much much worse.  The patient stated that he declined the narcotics from oncology as he knew he already had the request in to his PCP.  The patient states that otherwise he is doing well and continues to work on walking with his prosthetic.    Medication reconciliation was completed with the patient and marked as reviewed.  Health maintenance was reviewed and questions were answered with the patient.  The assessment for hypertension was completed with the patient today as well as the social determinants of health and they were marked as reviewed.      Goals addressed this encounter:   Goals Addressed                 This Visit's Progress      #1  Functional (pt-stated)   40%     Goal Statement: I will call for an appointment with the prothesis department for a stump  and to be fitted for a prothesis.  I will attend the appointments needed to complete this process.  Date Goal set: 1/8/2020  Date to Achieve By: 12/8/2020  Patient expressed understanding of goal: yes  Action steps to achieve this goal:  1. I will call for the first assessment appointment and attend.  2. I will attend all of the appointments needed to get the stump  process complete.  3. I will attend all of the appointments needed to complete the process for the prothesis.                   Intervention/Education provided during outreach: Reviewed the need for follow-up appointments and requests for narcotics through his PCP.  The patient states  understanding.     Outreach Frequency: monthly    Plan:   1.  The patient will be available to answer any questions regarding his request for narcotics from the provider.  2.  The patient will continue to work on his prostatic use with daily wearing of it and walking.  3.  Patient will make and attend all follow-up appointments as recommended by the providers.    RN CC Nurse Care Coordinator will follow up in 4 weeks.            Darin Yan MSN, RN, PHN, CCM   Primary Care Clinical RN Care Coordinator  [facility]  11/18/2020   9:15 AM  [email or web address]  Office: [phone]

## 2020-11-19 ENCOUNTER — VIRTUAL VISIT (OUTPATIENT)
Dept: FAMILY MEDICINE | Facility: CLINIC | Age: 65
End: 2020-11-19
Payer: COMMERCIAL

## 2020-11-19 VITALS
SYSTOLIC BLOOD PRESSURE: 163 MMHG | RESPIRATION RATE: 20 BRPM | HEART RATE: 71 BPM | WEIGHT: 196.1 LBS | BODY MASS INDEX: 24.51 KG/M2 | DIASTOLIC BLOOD PRESSURE: 90 MMHG | OXYGEN SATURATION: 98 % | TEMPERATURE: 96 F

## 2020-11-19 DIAGNOSIS — G89.18 ACUTE POST-OPERATIVE PAIN: ICD-10-CM

## 2020-11-19 DIAGNOSIS — C61 PROSTATE CANCER (H): Primary | ICD-10-CM

## 2020-11-19 DIAGNOSIS — G89.29 CHRONIC LOW BACK PAIN WITH SCIATICA, SCIATICA LATERALITY UNSPECIFIED, UNSPECIFIED BACK PAIN LATERALITY: ICD-10-CM

## 2020-11-19 DIAGNOSIS — Z11.59 ENCOUNTER FOR SCREENING FOR OTHER VIRAL DISEASES: ICD-10-CM

## 2020-11-19 DIAGNOSIS — M54.40 CHRONIC LOW BACK PAIN WITH SCIATICA, SCIATICA LATERALITY UNSPECIFIED, UNSPECIFIED BACK PAIN LATERALITY: ICD-10-CM

## 2020-11-19 DIAGNOSIS — C61 PROSTATE CANCER (H): ICD-10-CM

## 2020-11-19 LAB
ALBUMIN SERPL-MCNC: 3.7 G/DL (ref 3.4–5)
ALP SERPL-CCNC: 118 U/L (ref 40–150)
ALT SERPL W P-5'-P-CCNC: 27 U/L (ref 0–70)
ANION GAP SERPL CALCULATED.3IONS-SCNC: 9 MMOL/L (ref 3–14)
AST SERPL W P-5'-P-CCNC: 19 U/L (ref 0–45)
BASOPHILS # BLD AUTO: 0 10E9/L (ref 0–0.2)
BASOPHILS NFR BLD AUTO: 0.2 %
BILIRUB SERPL-MCNC: 0.7 MG/DL (ref 0.2–1.3)
BUN SERPL-MCNC: 14 MG/DL (ref 7–30)
CALCIUM SERPL-MCNC: 10 MG/DL (ref 8.5–10.1)
CHLORIDE SERPL-SCNC: 105 MMOL/L (ref 94–109)
CO2 SERPL-SCNC: 23 MMOL/L (ref 20–32)
CREAT SERPL-MCNC: 1.29 MG/DL (ref 0.66–1.25)
DIFFERENTIAL METHOD BLD: NORMAL
EOSINOPHIL # BLD AUTO: 0.1 10E9/L (ref 0–0.7)
EOSINOPHIL NFR BLD AUTO: 1.9 %
ERYTHROCYTE [DISTWIDTH] IN BLOOD BY AUTOMATED COUNT: 11.9 % (ref 10–15)
GFR SERPL CREATININE-BSD FRML MDRD: 59 ML/MIN/{1.73_M2}
GLUCOSE SERPL-MCNC: 125 MG/DL (ref 70–99)
HCT VFR BLD AUTO: 46 % (ref 40–53)
HGB BLD-MCNC: 15.8 G/DL (ref 13.3–17.7)
IMM GRANULOCYTES # BLD: 0 10E9/L (ref 0–0.4)
IMM GRANULOCYTES NFR BLD: 0.2 %
LYMPHOCYTES # BLD AUTO: 1.7 10E9/L (ref 0.8–5.3)
LYMPHOCYTES NFR BLD AUTO: 26.9 %
MCH RBC QN AUTO: 30.4 PG (ref 26.5–33)
MCHC RBC AUTO-ENTMCNC: 34.3 G/DL (ref 31.5–36.5)
MCV RBC AUTO: 89 FL (ref 78–100)
MONOCYTES # BLD AUTO: 0.6 10E9/L (ref 0–1.3)
MONOCYTES NFR BLD AUTO: 8.7 %
NEUTROPHILS # BLD AUTO: 4 10E9/L (ref 1.6–8.3)
NEUTROPHILS NFR BLD AUTO: 62.1 %
NRBC # BLD AUTO: 0 10*3/UL
NRBC BLD AUTO-RTO: 0 /100
PLATELET # BLD AUTO: 249 10E9/L (ref 150–450)
POTASSIUM SERPL-SCNC: 4.2 MMOL/L (ref 3.4–5.3)
PROT SERPL-MCNC: 8.9 G/DL (ref 6.8–8.8)
PSA SERPL-MCNC: 15.9 UG/L (ref 0–4)
RBC # BLD AUTO: 5.2 10E12/L (ref 4.4–5.9)
SODIUM SERPL-SCNC: 138 MMOL/L (ref 133–144)
WBC # BLD AUTO: 6.5 10E9/L (ref 4–11)

## 2020-11-19 PROCEDURE — 99214 OFFICE O/P EST MOD 30 MIN: CPT | Mod: TEL | Performed by: INTERNAL MEDICINE

## 2020-11-19 PROCEDURE — 85025 COMPLETE CBC W/AUTO DIFF WBC: CPT | Performed by: PHYSICIAN ASSISTANT

## 2020-11-19 PROCEDURE — 84153 ASSAY OF PSA TOTAL: CPT | Performed by: PHYSICIAN ASSISTANT

## 2020-11-19 PROCEDURE — 36415 COLL VENOUS BLD VENIPUNCTURE: CPT

## 2020-11-19 PROCEDURE — U0003 INFECTIOUS AGENT DETECTION BY NUCLEIC ACID (DNA OR RNA); SEVERE ACUTE RESPIRATORY SYNDROME CORONAVIRUS 2 (SARS-COV-2) (CORONAVIRUS DISEASE [COVID-19]), AMPLIFIED PROBE TECHNIQUE, MAKING USE OF HIGH THROUGHPUT TECHNOLOGIES AS DESCRIBED BY CMS-2020-01-R: HCPCS | Mod: 90 | Performed by: PATHOLOGY

## 2020-11-19 PROCEDURE — 99000 SPECIMEN HANDLING OFFICE-LAB: CPT | Performed by: PATHOLOGY

## 2020-11-19 PROCEDURE — 80053 COMPREHEN METABOLIC PANEL: CPT | Performed by: PHYSICIAN ASSISTANT

## 2020-11-19 RX ORDER — OXYCODONE HYDROCHLORIDE 10 MG/1
10 TABLET ORAL EVERY 6 HOURS PRN
Qty: 120 TABLET | Refills: 0 | Status: SHIPPED | OUTPATIENT
Start: 2020-11-19 | End: 2020-12-18

## 2020-11-19 ASSESSMENT — PAIN SCALES - GENERAL: PAINLEVEL: SEVERE PAIN (7)

## 2020-11-19 NOTE — PROGRESS NOTES
"Constantino Taylor is a 64 year old male who is being evaluated via a billable telephone visit.      The patient has been notified of following:     \"This telephone visit will be conducted via a call between you and your physician/provider. We have found that certain health care needs can be provided without the need for a physical exam.  This service lets us provide the care you need with a short phone conversation.  If a prescription is necessary we can send it directly to your pharmacy.  If lab work is needed we can place an order for that and you can then stop by our lab to have the test done at a later time.    Telephone visits are billed at different rates depending on your insurance coverage. During this emergency period, for some insurers they may be billed the same as an in-person visit.  Please reach out to your insurance provider with any questions.    If during the course of the call the physician/provider feels a telephone visit is not appropriate, you will not be charged for this service.\"    Patient has given verbal consent for Telephone visit?  Yes    What phone number would you like to be contacted at? [phone]    How would you like to obtain your AVS? MyChart    Subjective     Constantino Taylor is a 64 year old male who presents via phone visit today for the following health issues:    HPI        Medication recheck    Staying in home   Went to doctor today and covid test this morning   Colonoscopy 23 4 days from now   [facility]oscopy at [facility]  No mention preop  Colonoscopy  Started taking the chemo for the cancer 3 weeks  Not eating real well.  Stomach is all messed  Wants to go to the [facility] [place]  {    Review of Systems   Constitutional, HEENT, cardiovascular, pulmonary, gi and gu systems are negative, except as otherwise noted.       Objective            healthy, alert and no distress  PSYCH: Alert and oriented times 3; coherent speech, normal   rate and volume, able to articulate " logical thoughts, able   to abstract reason, no tangential thoughts, no hallucinations   or delusions  His affect is normal  RESP: No cough, no audible wheezing, able to talk in full sentences  Remainder of exam unable to be completed due to telephone visits    Results for orders placed or performed in visit on 11/19/20   Asymptomatic COVID-19 Virus (Coronavirus) by PCR     Status: None    Specimen: Nasopharyngeal   Result Value Ref Range    COVID-19 Virus PCR to [facility] - Source Nasopharyngeal     COVID-19 Virus PCR to [facility] - Result Not Detected    Results for orders placed or performed in visit on 11/19/20   PSA tumor marker     Status: Abnormal   Result Value Ref Range    PSA 15.90 (H) 0 - 4 ug/L   Comprehensive metabolic panel     Status: Abnormal   Result Value Ref Range    Sodium 138 133 - 144 mmol/L    Potassium 4.2 3.4 - 5.3 mmol/L    Chloride 105 94 - 109 mmol/L    Carbon Dioxide 23 20 - 32 mmol/L    Anion Gap 9 3 - 14 mmol/L    Glucose 125 (H) 70 - 99 mg/dL    Urea Nitrogen 14 7 - 30 mg/dL    Creatinine 1.29 (H) 0.66 - 1.25 mg/dL    GFR Estimate 59 (L) >60 mL/min/[1.73_m2]    GFR Estimate If Black 68 >60 mL/min/[1.73_m2]    Calcium 10.0 8.5 - 10.1 mg/dL    Bilirubin Total 0.7 0.2 - 1.3 mg/dL    Albumin 3.7 3.4 - 5.0 g/dL    Protein Total 8.9 (H) 6.8 - 8.8 g/dL    Alkaline Phosphatase 118 40 - 150 U/L    ALT 27 0 - 70 U/L    AST 19 0 - 45 U/L   CBC with platelets differential     Status: None   Result Value Ref Range    WBC 6.5 4.0 - 11.0 10e9/L    RBC Count 5.20 4.4 - 5.9 10e12/L    Hemoglobin 15.8 13.3 - 17.7 g/dL    Hematocrit 46.0 40.0 - 53.0 %    MCV 89 78 - 100 fl    MCH 30.4 26.5 - 33.0 pg    MCHC 34.3 31.5 - 36.5 g/dL    RDW 11.9 10.0 - 15.0 %    Platelet Count 249 150 - 450 10e9/L    Diff Method Automated Method     % Neutrophils 62.1 %    % Lymphocytes 26.9 %    % Monocytes 8.7 %    % Eosinophils 1.9 %    % Basophils 0.2 %    % Immature Granulocytes 0.2 %    Nucleated RBCs 0 0 /100    Absolute  Neutrophil 4.0 1.6 - 8.3 10e9/L    Absolute Lymphocytes 1.7 0.8 - 5.3 10e9/L    Absolute Monocytes 0.6 0.0 - 1.3 10e9/L    Absolute Eosinophils 0.1 0.0 - 0.7 10e9/L    Absolute Basophils 0.0 0.0 - 0.2 10e9/L    Abs Immature Granulocytes 0.0 0 - 0.4 10e9/L    Absolute Nucleated RBC 0.0            Assessment/Plan:    Assessment & Plan     Constantino was seen today for recheck.    Diagnoses and all orders for this visit:    Prostate cancer (H)  -     Oncology/Hematology Adult Referral; Future    Acute post-operative pain  -     oxyCODONE IR (ROXICODONE) 10 MG tablet; Take 1 tablet (10 mg) by mouth every 6 hours as needed for moderate to severe pain or severe pain    Chronic low back pain with sciatica, sciatica laterality unspecified, unspecified back pain laterality  -     oxyCODONE IR (ROXICODONE) 10 MG tablet; Take 1 tablet (10 mg) by mouth every 6 hours as needed for moderate to severe pain or severe pain        patient would like a referral to [facility] for a second opinion   I reinforced that his markers are improving and he should continue to follow this treatment protocol despite the side effects until the opinion is complete    Also described the process of referral to [place]    Patient is a complex case with evidence of response to treatment           Regular exercise    No follow-ups on file.    Avery Duke MD  [facility]    Phone call duration:  24 minutes

## 2020-11-19 NOTE — NURSING NOTE
Chief Complaint   Patient presents with     Lab Only     labs drawn via  by RN in lab     BP (!) 163/90 (BP Location: Left arm, Patient Position: Sitting, Cuff Size: Adult Large)   Pulse 71   Temp 96  F (35.6  C) (Tympanic)   Resp 20   Wt 89 kg (196 lb 1.6 oz)   SpO2 98%   BMI 24.51 kg/m      Labs drawn via venipuncture. Pt tolerated well.    Ana Laura Jimenez RN on 11/19/2020 at 6:48 AM

## 2020-11-20 LAB
SARS-COV-2 RNA SPEC QL NAA+PROBE: NOT DETECTED
SPECIMEN SOURCE: NORMAL

## 2020-12-03 DIAGNOSIS — C61 PROSTATE CANCER (H): ICD-10-CM

## 2020-12-03 DIAGNOSIS — Z79.899 ENCOUNTER FOR LONG-TERM (CURRENT) USE OF MEDICATIONS: ICD-10-CM

## 2020-12-03 LAB
ALBUMIN SERPL-MCNC: 3.8 G/DL (ref 3.4–5)
ALP SERPL-CCNC: 115 U/L (ref 40–150)
ALT SERPL W P-5'-P-CCNC: 19 U/L (ref 0–70)
ANION GAP SERPL CALCULATED.3IONS-SCNC: 7 MMOL/L (ref 3–14)
AST SERPL W P-5'-P-CCNC: 11 U/L (ref 0–45)
BASOPHILS # BLD AUTO: 0 10E9/L (ref 0–0.2)
BASOPHILS NFR BLD AUTO: 0.3 %
BILIRUB SERPL-MCNC: 0.6 MG/DL (ref 0.2–1.3)
BUN SERPL-MCNC: 14 MG/DL (ref 7–30)
CALCIUM SERPL-MCNC: 9.7 MG/DL (ref 8.5–10.1)
CHLORIDE SERPL-SCNC: 105 MMOL/L (ref 94–109)
CO2 SERPL-SCNC: 26 MMOL/L (ref 20–32)
CREAT SERPL-MCNC: 1.29 MG/DL (ref 0.66–1.25)
DIFFERENTIAL METHOD BLD: NORMAL
EOSINOPHIL # BLD AUTO: 0.1 10E9/L (ref 0–0.7)
EOSINOPHIL NFR BLD AUTO: 1.9 %
ERYTHROCYTE [DISTWIDTH] IN BLOOD BY AUTOMATED COUNT: 12.7 % (ref 10–15)
GFR SERPL CREATININE-BSD FRML MDRD: 59 ML/MIN/{1.73_M2}
GLUCOSE SERPL-MCNC: 136 MG/DL (ref 70–99)
HCT VFR BLD AUTO: 45.5 % (ref 40–53)
HGB BLD-MCNC: 15.6 G/DL (ref 13.3–17.7)
IMM GRANULOCYTES # BLD: 0 10E9/L (ref 0–0.4)
IMM GRANULOCYTES NFR BLD: 0.3 %
LYMPHOCYTES # BLD AUTO: 1.5 10E9/L (ref 0.8–5.3)
LYMPHOCYTES NFR BLD AUTO: 23.4 %
MCH RBC QN AUTO: 31.1 PG (ref 26.5–33)
MCHC RBC AUTO-ENTMCNC: 34.3 G/DL (ref 31.5–36.5)
MCV RBC AUTO: 91 FL (ref 78–100)
MONOCYTES # BLD AUTO: 0.7 10E9/L (ref 0–1.3)
MONOCYTES NFR BLD AUTO: 10.2 %
NEUTROPHILS # BLD AUTO: 4.1 10E9/L (ref 1.6–8.3)
NEUTROPHILS NFR BLD AUTO: 63.9 %
NRBC # BLD AUTO: 0 10*3/UL
NRBC BLD AUTO-RTO: 0 /100
PLATELET # BLD AUTO: 247 10E9/L (ref 150–450)
POTASSIUM SERPL-SCNC: 3.9 MMOL/L (ref 3.4–5.3)
PROT SERPL-MCNC: 9.2 G/DL (ref 6.8–8.8)
RBC # BLD AUTO: 5.02 10E12/L (ref 4.4–5.9)
SODIUM SERPL-SCNC: 138 MMOL/L (ref 133–144)
WBC # BLD AUTO: 6.4 10E9/L (ref 4–11)

## 2020-12-03 PROCEDURE — 85025 COMPLETE CBC W/AUTO DIFF WBC: CPT | Performed by: INTERNAL MEDICINE

## 2020-12-03 PROCEDURE — 80053 COMPREHEN METABOLIC PANEL: CPT | Performed by: INTERNAL MEDICINE

## 2020-12-03 NOTE — PROGRESS NOTES
Chief Complaint   Patient presents with     Lab Only     Vpt blood drawn.     Venipuncture labs drawn from Karin Wetzel MA

## 2020-12-06 ENCOUNTER — HEALTH MAINTENANCE LETTER (OUTPATIENT)
Age: 65
End: 2020-12-06

## 2020-12-07 VITALS
DIASTOLIC BLOOD PRESSURE: 87 MMHG | RESPIRATION RATE: 16 BRPM | TEMPERATURE: 98.2 F | BODY MASS INDEX: 25.25 KG/M2 | OXYGEN SATURATION: 99 % | SYSTOLIC BLOOD PRESSURE: 143 MMHG | WEIGHT: 202 LBS | HEART RATE: 110 BPM

## 2020-12-07 DIAGNOSIS — C61 PROSTATE CANCER (H): ICD-10-CM

## 2020-12-07 DIAGNOSIS — Z79.899 ENCOUNTER FOR LONG-TERM (CURRENT) USE OF MEDICATIONS: ICD-10-CM

## 2020-12-07 LAB
ALBUMIN SERPL-MCNC: 3.7 G/DL (ref 3.4–5)
ALP SERPL-CCNC: 106 U/L (ref 40–150)
ALT SERPL W P-5'-P-CCNC: 38 U/L (ref 0–70)
ANION GAP SERPL CALCULATED.3IONS-SCNC: 5 MMOL/L (ref 3–14)
AST SERPL W P-5'-P-CCNC: 27 U/L (ref 0–45)
BASOPHILS # BLD AUTO: 0 10E9/L (ref 0–0.2)
BASOPHILS NFR BLD AUTO: 0.2 %
BILIRUB SERPL-MCNC: 0.4 MG/DL (ref 0.2–1.3)
BUN SERPL-MCNC: 14 MG/DL (ref 7–30)
CALCIUM SERPL-MCNC: 9 MG/DL (ref 8.5–10.1)
CHLORIDE SERPL-SCNC: 110 MMOL/L (ref 94–109)
CO2 SERPL-SCNC: 25 MMOL/L (ref 20–32)
CREAT SERPL-MCNC: 1.24 MG/DL (ref 0.66–1.25)
DIFFERENTIAL METHOD BLD: NORMAL
EOSINOPHIL # BLD AUTO: 0.2 10E9/L (ref 0–0.7)
EOSINOPHIL NFR BLD AUTO: 3.5 %
ERYTHROCYTE [DISTWIDTH] IN BLOOD BY AUTOMATED COUNT: 12.6 % (ref 10–15)
GFR SERPL CREATININE-BSD FRML MDRD: 61 ML/MIN/{1.73_M2}
GLUCOSE SERPL-MCNC: 116 MG/DL (ref 70–99)
HCT VFR BLD AUTO: 43.3 % (ref 40–53)
HGB BLD-MCNC: 14.6 G/DL (ref 13.3–17.7)
IMM GRANULOCYTES # BLD: 0 10E9/L (ref 0–0.4)
IMM GRANULOCYTES NFR BLD: 0.2 %
LYMPHOCYTES # BLD AUTO: 1.4 10E9/L (ref 0.8–5.3)
LYMPHOCYTES NFR BLD AUTO: 28.5 %
MCH RBC QN AUTO: 30.6 PG (ref 26.5–33)
MCHC RBC AUTO-ENTMCNC: 33.7 G/DL (ref 31.5–36.5)
MCV RBC AUTO: 91 FL (ref 78–100)
MONOCYTES # BLD AUTO: 0.5 10E9/L (ref 0–1.3)
MONOCYTES NFR BLD AUTO: 9.3 %
NEUTROPHILS # BLD AUTO: 2.9 10E9/L (ref 1.6–8.3)
NEUTROPHILS NFR BLD AUTO: 58.3 %
NRBC # BLD AUTO: 0 10*3/UL
NRBC BLD AUTO-RTO: 0 /100
PLATELET # BLD AUTO: 232 10E9/L (ref 150–450)
POTASSIUM SERPL-SCNC: 4.1 MMOL/L (ref 3.4–5.3)
PROT SERPL-MCNC: 8.4 G/DL (ref 6.8–8.8)
RBC # BLD AUTO: 4.77 10E12/L (ref 4.4–5.9)
SODIUM SERPL-SCNC: 140 MMOL/L (ref 133–144)
WBC # BLD AUTO: 4.9 10E9/L (ref 4–11)

## 2020-12-07 PROCEDURE — 85025 COMPLETE CBC W/AUTO DIFF WBC: CPT | Performed by: INTERNAL MEDICINE

## 2020-12-07 PROCEDURE — 80053 COMPREHEN METABOLIC PANEL: CPT | Performed by: INTERNAL MEDICINE

## 2020-12-07 ASSESSMENT — PAIN SCALES - GENERAL: PAINLEVEL: NO PAIN (0)

## 2020-12-07 NOTE — NURSING NOTE
Chief Complaint   Patient presents with     Blood Draw     Labs drawn from venipuncture by RN in lab. Vitals taken.      Labs drawn by venipuncture by RN in lab.  Vital signs taken.     Cara Dumas RN

## 2020-12-08 ENCOUNTER — VIRTUAL VISIT (OUTPATIENT)
Dept: ONCOLOGY | Facility: CLINIC | Age: 65
End: 2020-12-08
Attending: INTERNAL MEDICINE
Payer: COMMERCIAL

## 2020-12-08 DIAGNOSIS — C61 PROSTATE CANCER (H): Primary | ICD-10-CM

## 2020-12-08 PROCEDURE — 999N001193 HC VIDEO/TELEPHONE VISIT; NO CHARGE

## 2020-12-08 PROCEDURE — 99214 OFFICE O/P EST MOD 30 MIN: CPT | Mod: TEL | Performed by: INTERNAL MEDICINE

## 2020-12-08 NOTE — PROGRESS NOTES
"Constantino Taylor is a 65 year old male who is being evaluated via a billable telephone visit.      The patient has been notified of following:     \"This telephone visit will be conducted via a call between you and your physician/provider. We have found that certain health care needs can be provided without the need for a physical exam.  This service lets us provide the care you need with a short phone conversation.  If a prescription is necessary we can send it directly to your pharmacy.  If lab work is needed we can place an order for that and you can then stop by our lab to have the test done at a later time.    Telephone visits are billed at different rates depending on your insurance coverage. During this emergency period, for some insurers they may be billed the same as an in-person visit.  Please reach out to your insurance provider with any questions.    If during the course of the call the physician/provider feels a telephone visit is not appropriate, you will not be charged for this service.\"    Patient has given verbal consent for Telephone visit?  Yes    What phone number would you like to be contacted at? [phone]    How would you like to obtain your AVS? Mail a copy    Jesenia ZAMORANO      "
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
## 2020-12-10 DIAGNOSIS — C61 PROSTATE CANCER (H): Primary | ICD-10-CM

## 2020-12-10 RX ORDER — PREDNISONE 5 MG/1
5 TABLET ORAL
Qty: 30 TABLET | Refills: 0 | Status: SHIPPED | OUTPATIENT
Start: 2020-12-10 | End: 2021-01-18

## 2020-12-10 RX ORDER — ABIRATERONE ACETATE 250 MG/1
250 TABLET ORAL 2 TIMES DAILY
Qty: 60 TABLET | Refills: 0 | Status: SHIPPED | OUTPATIENT
Start: 2020-12-10 | End: 2021-01-18

## 2020-12-13 ENCOUNTER — TELEPHONE (OUTPATIENT)
Dept: ONCOLOGY | Facility: CLINIC | Age: 65
End: 2020-12-13

## 2020-12-17 DIAGNOSIS — G89.18 ACUTE POST-OPERATIVE PAIN: ICD-10-CM

## 2020-12-17 DIAGNOSIS — M54.40 CHRONIC LOW BACK PAIN WITH SCIATICA, SCIATICA LATERALITY UNSPECIFIED, UNSPECIFIED BACK PAIN LATERALITY: ICD-10-CM

## 2020-12-17 DIAGNOSIS — G89.29 CHRONIC LOW BACK PAIN WITH SCIATICA, SCIATICA LATERALITY UNSPECIFIED, UNSPECIFIED BACK PAIN LATERALITY: ICD-10-CM

## 2020-12-17 NOTE — TELEPHONE ENCOUNTER
Controlled Substance Refill Request for  oxyCODONE IR (ROXICODONE) 10 MG tablet  Problem List Complete:  Yes   checked in past 3 months?  No, route to RN    Chronic low back pain  Problem Detail    Noted:  1/6/2011   Priority:  Medium   Relevant Medications    acetaminophen (TYLENOL) 325 MG tablet   gabapentin (NEURONTIN) 600 MG tablet   aspirin (ASPIRIN LOW DOSE) 81 MG EC tablet   oxyCODONE IR (ROXICODONE) 10 MG tablet   predniSONE (DELTASONE) 5 MG tablet   Last Encounter with Chronic low back pain    Visit Information     Provider Department Center   11/19/2020 Avery Duke MD [facility]

## 2020-12-17 NOTE — TELEPHONE ENCOUNTER
Reason for call:  Medication     If this is a refill request, has the caller requested the refill from the pharmacy already? Yes     Will the patient be using a [facility]? No     Name of the pharmacy and phone number for the current request: [facility] [phone]    Name of the medication requested: oxyCODONE IR (ROXICODONE) 10 MG tablet    Other request: na    Phone number to reach patient:  Home number on file [phone] (home)    Best Time:  any    Can we leave a detailed message on this number?  YES    Travel screening: Not Applicable

## 2020-12-18 RX ORDER — OXYCODONE HYDROCHLORIDE 10 MG/1
10 TABLET ORAL EVERY 6 HOURS PRN
Qty: 120 TABLET | Refills: 0 | Status: SHIPPED | OUTPATIENT
Start: 2020-12-18 | End: 2021-01-15

## 2020-12-18 NOTE — TELEPHONE ENCOUNTER
RX [place] program ([place]) reviewed: Writer is not a delegate under PCP, not able to run  report.  Epic: last prescribed 11/19 for 120 tabs    [place] profile:  [email or web address]

## 2020-12-18 NOTE — TELEPHONE ENCOUNTER
PDMP reviewed. Patient has cancer and followed at [facility]. On stable doses. Needs follow-up for CSA and updated problem list. Will give one Rx to get patient through to follow-up.     Signed Prescriptions:                        Disp   Refills    oxyCODONE IR (ROXICODONE) 10 MG tablet     120 ta*0        Sig: Take 1 tablet (10 mg) by mouth every 6 hours as           needed for moderate to severe pain or severe pain           . No further refills until follow-up appointment  Authorizing Provider: ESTEFANI NICOLE

## 2020-12-23 ENCOUNTER — PATIENT OUTREACH (OUTPATIENT)
Dept: NURSING | Facility: CLINIC | Age: 65
End: 2020-12-23
Payer: COMMERCIAL

## 2020-12-23 NOTE — PROGRESS NOTES
Clinic Care Coordination Contact    Follow Up Progress Note      Assessment: The RN CC contacted the patient by phone for a follow up call .  Patient states that he has started taking the chemo or will start taking the chemo after the holidays.  He did not want to feel ill from the side effects over the [place] holiday.  The patient has been working with his prosthetic to make sure that he is doing it every day.  There is still some pain associated with getting that leg on and off and standing on it so he is making certain that he is working every day.  The patient currently has no questions regarding medications or concerns.  And he still does have his PCA working with him.    Medication reconciliation was completed with the patient and marked as reviewed.  Health maintenance was reviewed and questions were answered with the patient.  The assessment for hypertension was completed with the patient today as well as the social determinants of health and they were marked as reviewed.      Goals addressed this encounter:   Goals Addressed                 This Visit's Progress      #1  Functional (pt-stated)   50%     Goal Statement: I will call for an appointment with the prothesis department for a stump  and to be fitted for a prothesis.  I will attend the appointments needed to complete this process.  Date Goal set: 1/8/2020  Date to Achieve By: 12/8/2020  Patient expressed understanding of goal: yes  Action steps to achieve this goal:  1. I will call for the first assessment appointment and attend.  2. I will attend all of the appointments needed to get the stump  process complete.  3. I will attend all of the appointments needed to complete the process for the prothesis.                   Intervention/Education provided during outreach: The RN CC reviewed the rationale for ensuring that he is using his prosthetic according to the directions from the providers.     Outreach Frequency:  monthly    Plan:   1.  The patient will use his prosthetic leg every day gradually increasing the amount of time that he tolerates it.  2.  The patient will take all medications as prescribed by the providers including his upcoming chemotherapy.  3.  The patient will make and attend all follow-up appointments as indicated by the providers.    RN CC Nurse Care Coordinator will follow up in 4 weeks.        Darin TEJADA, RN, PHN, CCM   Primary Care Clinical RN Care Coordinator  [facility]  12/23/2020   2:46 PM  [email or web address]  Office: [phone]

## 2020-12-30 ENCOUNTER — TELEPHONE (OUTPATIENT)
Dept: FAMILY MEDICINE | Facility: CLINIC | Age: 65
End: 2020-12-30

## 2020-12-30 NOTE — ORAL ONC MGMT
Called Constantino and he said he just started retaking Zytiga today. He is taking 2 tablets ([id number]) in the morning with breakfast but knows that he can take 1 pill in the morning and 1 at night. Will call back in 1 week to assess how it is going.     Estiven Rubin  Pharmacy Intern  Oral Chemotherapy Monitoring Program  [facility]  [phone]

## 2021-01-06 ENCOUNTER — TELEPHONE (OUTPATIENT)
Dept: ONCOLOGY | Facility: CLINIC | Age: 66
End: 2021-01-06

## 2021-01-06 DIAGNOSIS — C61 PROSTATE CANCER (H): Primary | ICD-10-CM

## 2021-01-06 NOTE — ORAL ONC MGMT
Oral Chemotherapy Monitoring Program    Subjective/Objective:  Constantino Taylor is a 65 year old male contacted by phone for a follow-up visit for oral chemotherapy. I spoke with Constantino who said he is taking 2 tablets of Zytiga with breakfast at 8 am every morning with no missed doses recently. He is doing well with no noted side effects. He specifically denied nausea and constipation or diarrhea.     ORAL CHEMOTHERAPY 10/21/2020 10/29/2020 11/5/2020 11/13/2020 12/13/2020 12/30/2020 1/6/2021   Assessment Type New Teach Other Initial Follow up Refill Chart Review Monthly Follow up Monthly Follow up   Diagnosis Code Prostate Cancer Prostate Cancer Prostate Cancer Prostate Cancer Prostate Cancer Prostate Cancer Prostate Cancer   Providers Dr. Jennifer Rodriguez [facility]   [facility]   Drug Name Zytiga (Abiraterone) Zytiga (Abiraterone) Zytiga (Abiraterone) Zytiga (Abiraterone) Zytiga (Abiraterone) Zytiga (Abiraterone) Zytiga (Abiraterone)   Dose 500 mg 500 mg 500 mg 500 mg 250 mg 250 mg 500 mg   Current Schedule Daily Daily Daily Daily BID BID AM   Cycle Details Continuous Continuous Continuous Continuous Continuous Continuous Continuous   Start Date of Last Cycle - 10/27/2020 10/27/2020 - - 12/30/2020 12/30/2020   Planned next cycle start date - - - - - 1/29/2021 1/29/2021   Doses missed in last 2 weeks - - - - - - 0   Adherence Assessment - - Non-adherent - - - Adherent   Reason for Non-adherence - - Other - - - -   Adherence Intervention Recommended - - Medication education - - - -   Adverse Effects - - Other (See Note for Details) - - - No AE identified during assessment   Other (See Note for Details) - - pain in left side  - - - -   Pharmacist intervention(other) - - No - - - -   Any new drug interactions? Yes - No - - - No   Pharmacist Intervention? Yes - - - - - -   Intervention(s) Patient Education - - - - - -   Is the dose as ordered  "appropriate for the patient? No - - - - - -   Pharmacist intervention for dose adjustment? Yes - - - - - -   Intervention(s) Dose clarified/confermed with MD - - - - - -   Is the patient currently in pain? - - Assessed in last 30 days. - - - -   Has the patient been assessed within the past 6 months for depression? - - Yes - - - -   Has the patient missed any days of school, work, or other routine activity? - - No - - - -   Since the last time we talked, have you been hospitalized or used the emergency room? - - No - - - -       Last PHQ-2 Score on record:   PHQ-2 ( 1999 [employer]) 1/2/2020 1/2/2017   Q1: Little interest or pleasure in doing things 0 0   Q2: Feeling down, depressed or hopeless 0 0   PHQ-2 Score 0 0       Vitals:  BP:   BP Readings from Last 1 Encounters:   12/07/20 (!) 143/87     Wt Readings from Last 1 Encounters:   12/07/20 91.6 kg (202 lb)     Estimated body surface area is 2.2 meters squared as calculated from the following:    Height as of 10/24/19: 1.905 m (6' 3\").    Weight as of 12/7/20: 91.6 kg (202 lb).    Labs:  No results found for NA within last 30 days.     No results found for K within last 30 days.     No results found for CA within last 30 days.     No results found for Mag within last 30 days.     No results found for Phos within last 30 days.     No results found for ALBUMIN within last 30 days.     No results found for BUN within last 30 days.     No results found for CR within last 30 days.     No results found for AST within last 30 days.     No results found for ALT within last 30 days.     No results found for BILITOTAL within last 30 days.     No results found for WBC within last 30 days.     No results found for HGB within last 30 days.     No results found for PLT within last 30 days.     No results found for ANC within last 30 days.         Assessment/Plan:  Constantino is tolerating Zytiga restart well with no noted side effects.    Follow-Up:  -Review labs on 1/18  -Review " Kristen hair on 1/20    Refill Due:  By 1/29    Dariela Diamond  Pharmacy Intern  [facility]  [phone]

## 2021-01-15 ENCOUNTER — TELEPHONE (OUTPATIENT)
Dept: FAMILY MEDICINE | Facility: CLINIC | Age: 66
End: 2021-01-15

## 2021-01-15 DIAGNOSIS — G89.4 CHRONIC PAIN SYNDROME: ICD-10-CM

## 2021-01-15 DIAGNOSIS — M54.40 CHRONIC LOW BACK PAIN WITH SCIATICA, SCIATICA LATERALITY UNSPECIFIED, UNSPECIFIED BACK PAIN LATERALITY: ICD-10-CM

## 2021-01-15 DIAGNOSIS — G89.18 ACUTE POST-OPERATIVE PAIN: ICD-10-CM

## 2021-01-15 DIAGNOSIS — G89.29 CHRONIC LOW BACK PAIN WITH SCIATICA, SCIATICA LATERALITY UNSPECIFIED, UNSPECIFIED BACK PAIN LATERALITY: ICD-10-CM

## 2021-01-15 RX ORDER — OXYCODONE HYDROCHLORIDE 10 MG/1
10 TABLET ORAL EVERY 6 HOURS PRN
Qty: 120 TABLET | Refills: 0 | Status: SHIPPED | OUTPATIENT
Start: 2021-01-15 | End: 2021-02-19

## 2021-01-15 NOTE — TELEPHONE ENCOUNTER
Controlled Substance Refill Request for oxyCODONE IR (ROXICODONE) 10 MG tablet  Problem List Complete:    No     PROVIDER TO CONSIDER COMPLETION OF PROBLEM LIST AND OVERVIEW/CONTROLLED SUBSTANCE AGREEMENT    Last Written Prescription Date:  12/18/2020  Last Fill Quantity: 120,   # refills: 0    THE MOST RECENT OFFICE VISIT MUST BE WITHIN THE PAST 3 MONTHS. AT LEAST ONE FACE TO FACE VISIT MUST OCCUR EVERY 6 MONTHS. ADDITIONAL VISITS CAN BE VIRTUAL.  (THIS STATEMENT SHOULD BE DELETED.)    Last Office Visit with [facility] primary care provider: 11/19/2020    Future Office visit:     Controlled substance agreement:   Encounter-Level CSA - 01/02/2017:    Controlled Substance Agreement - Scan on 1/3/2017  1:03 PM: CONTROLLED SUBSTANCE AGREEMENT     Patient-Level CSA:    There are no patient-level csa.         Last Urine Drug Screen:   Pain Drug SCR UR W RPTD Meds   Date Value Ref Range Status   03/09/2017   Final    FINAL  (Note)  ====================================================================  [facility] COMP DRUG ANALYSIS,UR  ====================================================================  Test                             Result       Flag       Units  Drug Present not Declared for Prescription Verification   Alcohol, Ethyl                 0.077        UNEXPECTED g/dL    Sources of ethyl alcohol include alcoholic beverages or as a    fermentation product of glucose; glucose was not detected in this    specimen. Ethyl alcohol result should be interpreted in the    context of all available clinical and behavioral information.     Carboxy-THC                    370          UNEXPECTED ng/mg creat    Carboxy-THC is a metabolite of tetrahydrocannabinol  (THC).    Source of THC is most commonly illicit, but THC is also present    in a scheduled prescription medication.     Metoprolol                     PRESENT      UNEXPECTED    Drug Absent but Declared for Prescription Verification   Oxycodone                      Not  Detected UNEXPECTED ng/mg creat  ====================================================================  Test                      Result    Flag   Units      Ref Range   Creatinine              231              mg/dL      >=20  ====================================================================  Declared Medications:  The flagging and interpretation on this report are based on the  following declared medications.  Unexpected results may arise from  inaccuracies in the declared medications.    **Note: The testing scope of this panel includes these medications:    Oxycodone  ====================================================================  For clinical consultation, please call [phone].  ====================================================================  Analysis performed by [employer] [place] [address]     ,   [facility] Drug Analysis UR   Date Value Ref Range Status   01/12/2018 FINAL  Final     Comment:     (Note)  ====================================================================  COMPREHENSIVE DRUG ANALYSIS,UR  ====================================================================  Test                             Result       Flag       Units        Drug Present   Carboxy-THC                    185                     ng/mg creat    Carboxy-THC is a metabolite of tetrahydrocannabinol  (THC).    Source of THC is most commonly illicit, but THC is also present    in a scheduled prescription medication.   Metoprolol                     PRESENT                              ====================================================================  Test                      Result    Flag   Units      Ref Range        Creatinine              247              mg/dL      >=20            ====================================================================  For clinical consultation, please call [phone].  ====================================================================  Analysis  performed by [facility] [place] [address]     ,   Cannabinoids (35-nll-8-carboxy-9-THC)   Date Value Ref Range Status   08/15/2019 Detected, Abnormal Result (A) NDET^Not Detected ng/mL Final     Comment:     Cutoff for a positive cannabinoid is greater than 50 ng/ml.  This is an unconfirmed screening result to be used for medical purposes only.   Order [id number] for confirmation or individual confirmation tests to [employer].       Phencyclidine (Phencyclidine)   Date Value Ref Range Status   08/15/2019 Not Detected NDET^Not Detected ng/mL Final     Comment:     Cutoff for a negative PCP is 25 ng/mL or less.     Cocaine (Benzoylecgonine)   Date Value Ref Range Status   08/15/2019 Not Detected NDET^Not Detected ng/mL Final     Comment:     Cutoff for a negative cocaine is 150 ng/ml or less.     Methamphetamine (d-Methamphetamine)   Date Value Ref Range Status   08/15/2019 Not Detected NDET^Not Detected ng/mL Final     Comment:     Cutoff for a negative methamphetamine is 500 ng/ml or less.     Opiates (Morphine)   Date Value Ref Range Status   08/15/2019 Not Detected NDET^Not Detected ng/mL Final     Comment:     Cutoff for a negative opiate is 100 ng/ml or less.     Amphetamine (d-Amphetamine)   Date Value Ref Range Status   08/15/2019 Not Detected NDET^Not Detected ng/mL Final     Comment:     Cutoff for a negative amphetamine is 500 ng/mL or less.     Benzodiazepines (Nordiazepam)   Date Value Ref Range Status   08/15/2019 Not Detected NDET^Not Detected ng/mL Final     Comment:     Cutoff for a negative benzodiazepine is 150 ng/ml or less.     Tricyclic Antidepressants (Desipramine)   Date Value Ref Range Status   08/15/2019 Not Detected NDET^Not Detected ng/mL Final     Comment:     Cutoff for a negative tricyclic antidepressant is 300 ng/ml or less.     Methadone (Methadone)   Date Value Ref Range Status   08/15/2019 Not Detected NDET^Not Detected ng/mL Final     Comment:     Cutoff for a negative  methadone is 200 ng/ml or less.     Barbiturates (Butalbital)   Date Value Ref Range Status   08/15/2019 Not Detected NDET^Not Detected ng/mL Final     Comment:     Cutoff for a negative barbituate is 200 ng/ml or less.     Oxycodone (Oxycodone)   Date Value Ref Range Status   08/15/2019 Detected, Abnormal Result (A) NDET^Not Detected ng/mL Final     Comment:     Cutoff for a positive oxycodone is greater than 100 ng/ml.  This is an unconfirmed screening result to be used for medical purposes only.   Order [id number] for confirmation or individual confirmation tests to [facility].       Propoxyphene (Norpropoxyphene)   Date Value Ref Range Status   08/15/2019 Not Detected NDET^Not Detected ng/mL Final     Comment:     Cutoff for a negative propoxyphene is 300 ng/ml or less     Buprenorphine (Buprenorphine)   Date Value Ref Range Status   08/15/2019 Not Detected NDET^Not Detected ng/mL Final     Comment:     Cutoff for a negative buprenorphine is 10 ng/ml or less        Processing:  Rx to be electronically transmitted to pharmacy by provider      [email or web address]       checked in past 3 months?  No, route to RN

## 2021-01-15 NOTE — TELEPHONE ENCOUNTER
Reason for Call:  Medication or medication refill:    Do you use a [facility]?  Name of the pharmacy and phone number for the current request:  [facility]    Name of the medication requested: oxyCODONE IR (ROXICODONE) 10 MG tablet    Can we leave a detailed message on this number? YES    Phone number patient can be reached at: Home number on file [phone] (home)    Best Time: Anytime    Call taken on 1/15/2021 at 9:56 AM by Cailin Wood

## 2021-01-18 VITALS
WEIGHT: 212.9 LBS | OXYGEN SATURATION: 97 % | DIASTOLIC BLOOD PRESSURE: 96 MMHG | HEART RATE: 114 BPM | BODY MASS INDEX: 26.61 KG/M2 | SYSTOLIC BLOOD PRESSURE: 153 MMHG | TEMPERATURE: 99.2 F

## 2021-01-18 DIAGNOSIS — C61 PROSTATE CANCER (H): ICD-10-CM

## 2021-01-18 DIAGNOSIS — Z79.899 ENCOUNTER FOR LONG-TERM (CURRENT) USE OF MEDICATIONS: ICD-10-CM

## 2021-01-18 DIAGNOSIS — C61 PROSTATE CANCER (H): Primary | ICD-10-CM

## 2021-01-18 LAB
ALBUMIN SERPL-MCNC: 3.6 G/DL (ref 3.4–5)
ALP SERPL-CCNC: 99 U/L (ref 40–150)
ALT SERPL W P-5'-P-CCNC: 34 U/L (ref 0–70)
ANION GAP SERPL CALCULATED.3IONS-SCNC: 4 MMOL/L (ref 3–14)
AST SERPL W P-5'-P-CCNC: 22 U/L (ref 0–45)
BASOPHILS # BLD AUTO: 0 10E9/L (ref 0–0.2)
BASOPHILS NFR BLD AUTO: 0.6 %
BILIRUB SERPL-MCNC: 0.4 MG/DL (ref 0.2–1.3)
BUN SERPL-MCNC: 21 MG/DL (ref 7–30)
CALCIUM SERPL-MCNC: 8.8 MG/DL (ref 8.5–10.1)
CHLORIDE SERPL-SCNC: 109 MMOL/L (ref 94–109)
CO2 SERPL-SCNC: 24 MMOL/L (ref 20–32)
CREAT SERPL-MCNC: 1.25 MG/DL (ref 0.66–1.25)
DIFFERENTIAL METHOD BLD: NORMAL
EOSINOPHIL # BLD AUTO: 0.2 10E9/L (ref 0–0.7)
EOSINOPHIL NFR BLD AUTO: 4.5 %
ERYTHROCYTE [DISTWIDTH] IN BLOOD BY AUTOMATED COUNT: 13.5 % (ref 10–15)
GFR SERPL CREATININE-BSD FRML MDRD: 61 ML/MIN/{1.73_M2}
GLUCOSE SERPL-MCNC: 134 MG/DL (ref 70–99)
HCT VFR BLD AUTO: 42.2 % (ref 40–53)
HGB BLD-MCNC: 14.5 G/DL (ref 13.3–17.7)
IMM GRANULOCYTES # BLD: 0 10E9/L (ref 0–0.4)
IMM GRANULOCYTES NFR BLD: 0.4 %
LYMPHOCYTES # BLD AUTO: 1.8 10E9/L (ref 0.8–5.3)
LYMPHOCYTES NFR BLD AUTO: 33.1 %
MCH RBC QN AUTO: 31.3 PG (ref 26.5–33)
MCHC RBC AUTO-ENTMCNC: 34.4 G/DL (ref 31.5–36.5)
MCV RBC AUTO: 91 FL (ref 78–100)
MONOCYTES # BLD AUTO: 0.7 10E9/L (ref 0–1.3)
MONOCYTES NFR BLD AUTO: 12.2 %
NEUTROPHILS # BLD AUTO: 2.6 10E9/L (ref 1.6–8.3)
NEUTROPHILS NFR BLD AUTO: 49.2 %
NRBC # BLD AUTO: 0 10*3/UL
NRBC BLD AUTO-RTO: 0 /100
PLATELET # BLD AUTO: 235 10E9/L (ref 150–450)
POTASSIUM SERPL-SCNC: 3.8 MMOL/L (ref 3.4–5.3)
PROT SERPL-MCNC: 8.2 G/DL (ref 6.8–8.8)
RBC # BLD AUTO: 4.64 10E12/L (ref 4.4–5.9)
SODIUM SERPL-SCNC: 137 MMOL/L (ref 133–144)
WBC # BLD AUTO: 5.3 10E9/L (ref 4–11)

## 2021-01-18 PROCEDURE — 80053 COMPREHEN METABOLIC PANEL: CPT | Performed by: INTERNAL MEDICINE

## 2021-01-18 PROCEDURE — 85025 COMPLETE CBC W/AUTO DIFF WBC: CPT | Performed by: INTERNAL MEDICINE

## 2021-01-18 PROCEDURE — 84153 ASSAY OF PSA TOTAL: CPT | Performed by: INTERNAL MEDICINE

## 2021-01-18 RX ORDER — ABIRATERONE ACETATE 250 MG/1
250 TABLET ORAL 2 TIMES DAILY
Qty: 60 TABLET | Refills: 0 | Status: SHIPPED | OUTPATIENT
Start: 2021-01-18 | End: 2021-02-17

## 2021-01-18 RX ORDER — PREDNISONE 5 MG/1
5 TABLET ORAL
Qty: 30 TABLET | Refills: 0 | Status: CANCELLED | OUTPATIENT
Start: 2021-01-18

## 2021-01-18 RX ORDER — ABIRATERONE ACETATE 250 MG/1
250 TABLET ORAL 2 TIMES DAILY
Qty: 60 TABLET | Refills: 0 | Status: CANCELLED | OUTPATIENT
Start: 2021-01-18

## 2021-01-18 RX ORDER — PREDNISONE 5 MG/1
5 TABLET ORAL
Qty: 30 TABLET | Refills: 0 | Status: SHIPPED | OUTPATIENT
Start: 2021-01-18 | End: 2021-01-20

## 2021-01-18 ASSESSMENT — PAIN SCALES - GENERAL: PAINLEVEL: SEVERE PAIN (7)

## 2021-01-20 ENCOUNTER — VIRTUAL VISIT (OUTPATIENT)
Dept: ONCOLOGY | Facility: CLINIC | Age: 66
End: 2021-01-20
Attending: INTERNAL MEDICINE
Payer: COMMERCIAL

## 2021-01-20 DIAGNOSIS — C61 PROSTATE CANCER (H): Primary | ICD-10-CM

## 2021-01-20 LAB — PSA SERPL-MCNC: 20.3 UG/L (ref 0–4)

## 2021-01-20 PROCEDURE — 99214 OFFICE O/P EST MOD 30 MIN: CPT | Mod: 95 | Performed by: PHYSICIAN ASSISTANT

## 2021-01-20 PROCEDURE — 999N001193 HC VIDEO/TELEPHONE VISIT; NO CHARGE

## 2021-01-20 RX ORDER — PREDNISONE 5 MG/1
5 TABLET ORAL
Qty: 30 TABLET | Refills: 0 | Status: SHIPPED | OUTPATIENT
Start: 2021-01-20 | End: 2021-02-17

## 2021-01-20 NOTE — PROGRESS NOTES
"Constantino is a 65 year old who is being evaluated via a billable telephone visit.      What phone number would you like to be contacted at? [phone]  How would you like to obtain your AVS? Mail a copy     Vitals - Patient Reported  Weight (Patient Reported): 96.2 kg (212 lb)  Height (Patient Reported): 190.5 cm (6' 3\")  BMI (Based on Pt Reported Ht/Wt): 26.5  Pain Score: No Pain (0)    Jesenia Perez JANKI    Provider Note:   Jan 20, 2021          REASON FOR VISIT: prostate cancer follow up     TREATMENT SUMMARY:  He was was referred to Dr. Rutherford who did a biopsy on 05/01/2012, and pathology was consistent with prostate adenocarcinoma, Adonay score 3+4, without perineural invasion or angiolymphatic invasion, involving about 65% of the tissue on the right.  About 3-5% of the tissue on the left was also involved with [place] 3+3=6 carcinoma. He received a 4-month shot of Lupron on 05/25/2012 by Dr. Rutherford. He opted for definitive radiation therapy and received 7560 cGy including 4500 cGy to the pelvis and an additional 3060 cGy boost to the prostate for his [id number] disease from 8/29-10/26/2012. His PSA never dropped to 0. It was 0.34 on 1/6/14 at the lowest value. He has not followed up in the past and has ongoing issues with BP management, strokes, chronic afib, left leg amputation. etc.  Lupron:  #1-5/25/2012 (30mg)  #2-1/14/13(30 mg)  #3-8/14/13(30 mg)  #4-3/4/14 (45 mg)  Total duration: 28 months (Not continuous due to compliance)     CURRENT INTERVENTIONS:  Lupron started 10/14/19, plan for continuous androgen deprivation   Was due again on 1/14/20 though refused shot at that appt      Recent Dr Rodriguez appointment plan was to start Zytiga--started 10/27/2020     Interim History:   Constantino has been taking the Zytiga for 3 months now.  Per the charting there was small break at his last appointment however he has been compliant now with his Zytiga and prednisone.  He seems to be tolerating it just fine.  We discussed a " couple months ago he had been having stomach pain and nausea, he states this is no longer occurring.  He did have some nausea yesterday but it is not a daily concern.  Otherwise appetite is good.  He is taking oxycodone about 3-4 a day for his amputation.  He is having good BMs, taking a stool softener daily.  He has not wanted to get lupron again due to decreased libido and hot flashes.      He denies any urinary symptoms or new pain, especially in his bone.      ROS: 10 point ROS neg other than the symptoms noted above in the HPI.     Objectives:  General: Constantino was very upset and yelling at times, then would be very calm   Resp: Speaking in full sentences, no audible respiratory distress, no cough, no audible wheeze  Psych: frustrated and accusatory at times, then reasonable and thoughtful as well     Labs:      11/19/2020 06:45 12/3/2020 09:40 12/7/2020 09:41 1/18/2021 09:07   Sodium 138 138 140 137   Potassium 4.2 3.9 4.1 3.8   Chloride 105 105 110 (H) 109   Carbon Dioxide 23 26 25 24   Urea Nitrogen 14 14 14 21   Creatinine 1.29 (H) 1.29 (H) 1.24 1.25   GFR Estimate 59 (L) 59 (L) 61 61   GFR Estimate If Black 68 68 71 71   Calcium 10.0 9.7 9.0 8.8   Anion Gap 9 7 5 4   Albumin 3.7 3.8 3.7 3.6   Protein Total 8.9 (H) 9.2 (H) 8.4 8.2   Bilirubin Total 0.7 0.6 0.4 0.4   Alkaline Phosphatase 118 115 106 99   ALT 27 19 38 34   AST 19 11 27 22   PSA 15.90 (H)   20.30 (H)   Glucose 125 (H) 136 (H) 116 (H) 134 (H)   WBC 6.5 6.4 4.9 5.3   Hemoglobin 15.8 15.6 14.6 14.5   Hematocrit 46.0 45.5 43.3 42.2   Platelet Count 249 247 232 235   RBC Count 5.20 5.02 4.77 4.64   MCV 89 91 91 91          Assessment/Plan:     Prostate Cancer, biochemical relapse  -initially on ADT though held in 2014 due to ED. PSA increased from 4.6 in Sep 2017 to 32 in October 2019. CT scan and bone scan in October 2019 showed no evidence of gross metastatic disease, which means he has biochemical progression only at this point, though without  "treatment he would eventually have metastatic and terminal disease. In October 2019, he agreed to go back on Lupron though upon return in January, he was against it again despite having a long discussion about risks of foregoing treatment. Declined taking Lupron.  PSA and testosterone continue to elevate.  Discussed his September PSA was ~40 and he stated we were \"lying\" to him. His CT and bone scan do no snow evidence of metastatic prostate cancer, we are treating biochemical progression.    Constantino eventually started Zytiga and prednisone in October 2020.  -He continues to take Zytiga WITH food - he is taking 500 mg WITH FOOD and 5 mg of prednisone in the AM.    The dyspepsia/nausea he was having earlier on has seemed to dissipate.  We reviewed his PSA today which has gone up a little bit.  He was very upset about this change however we discussed that treating metastatic prostate cancer the PSA will decline and then typically plateau.  He also had a small break which could have contributed to the increase as well.  I discussed our recommendations would be that he continue on with the Zytiga without a change in we recheck his labs in 1 month.  He was agreeable to this.  - I will have oral pharmacy keep a close eye on him.            Phone call duration: 15 minutes    "

## 2021-02-04 ENCOUNTER — PATIENT OUTREACH (OUTPATIENT)
Dept: NURSING | Facility: CLINIC | Age: 66
End: 2021-02-04
Payer: COMMERCIAL

## 2021-02-04 NOTE — LETTER
[facility]  Complex Care Plan  About Me:    Patient Name:  Constantino Taylor    YOB: 1955  Age:         65 year old   Joyce MRN:    [id number] Telephone Information:  Home Phone [phone]   Mobile [phone]       Address:  [address] [address] Email address:  [email or web address]      Emergency Contact(s)    Name Relationship Lgl Grd Work Phone Home Phone Mobile Phone   1JUAN MIGUEL ROBERTS Sister No  [phone]           Primary language:  English     needed? No   [employer]:  [phone] op. 1  Other communication barriers: Cognitive impairment  Preferred Method of Communication:  Mail  Current living arrangement: I live alone  Mobility Status/ Medical Equipment: Independent w/Device    Health Maintenance  Health Maintenance Reviewed: Due/Overdue   Health Maintenance Due   Topic Date Due     HEPATITIS B IMMUNIZATION (3 of 3 - Hep B Twinrix risk 3-dose series) 07/01/2013     PREVENTIVE CARE VISIT  01/06/2015     HF ACTION PLAN  10/17/2019     LIPID  03/02/2020     URINE DRUG SCREEN  08/15/2020     PHQ-2  01/01/2021         My Access Plan  Medical Emergency 911   Primary Clinic Line [facility] - [phone]   24 Hour Appointment Line [phone] or  [phone]) (toll-free)   24 Hour Nurse Line [phone] (toll-free)   Preferred Urgent Care     Preferred Hospital [facility]  [phone]   Preferred Pharmacy [id number] - [address] AT [facility]     Behavioral Health Crisis Line The National Suicide Prevention Lifeline at 1-331.689.6994 or 911             My Care Team Members  Patient Care Team       Relationship Specialty Notifications Start End    Avery Duke MD PCP - General Internal Medicine  5/14/15     Phone:  [phone] Fax: [phone]         [address] [facility] [address]    Avery Duke MD Assigned PCP   7/5/15     Phone: [phone] Fax: [phone]         [address] [facility] [address]    Piper Polk, RN Nurse Coordinator Vascular Surgery Abnormal results only, Admissions 7/16/15     Phone: [phone] Pager: [phone]        Odessa Browning MD MD Vascular Surgery  8/10/15     Phone: [phone] Fax: [phone]         [address] [facility] [address] [facility] [address]    Norah Brown MD MD Cardiology  2/7/16     Phone: [phone] Fax: [phone]         [address] [facility] [address] [facility] [address]    Cheyenne Mansfield MD MD Internal Medicine  2/9/16     Quinn Rodriguez MD MD Oncology Admissions 9/25/17     Phone: [phone] Fax: [phone]         [address] [facility] [address] [facility] [address]    Bryanna Rubio, RN Specialty Care Coordinator Cardiology Admissions 10/18/19     Phone: [phone]         Norah Brown MD MD Cardiology  10/18/19     Phone: [phone] Fax: [phone]         [address] [facility] [address] [facility] [address]    Darin Dickson, RN Lead Care Coordinator Primary Care - CC Admissions 11/4/19     Phone: [phone] Fax: [phone]        Quinn Rodriguez MD Assigned Cancer Care Provider   10/23/20     Phone: [phone] Fax: [phone]         [address] [facility] [address] [facility] [address]    Michelle Matute MD Assigned Heart and Vascular Provider   11/15/20     Phone: [phone] Fax: [phone]         [address] [facility] [address]    Lon Davis MD Assigned Infectious Disease Provider   1/10/21     Phone: [phone] Fax: [phone]         [address] [place] [address]            My Care Plans  Self Management and Treatment Plan  Goals and (Comments)  Goals        General    #1  Functional (pt-stated)     Notes - Note edited  2/4/2021  9:50 AM by Darin Dickson, RN    Goal  Statement: I will call for an appointment with the prothesis department for a stump  and to be fitted for a prothesis.  I will attend the appointments needed to complete this process.  Date Goal set: 1/8/2020  Date to Achieve By: 12/8/2021  Patient expressed understanding of goal: yes  Action steps to achieve this goal:  1. I will call for the first assessment appointment and attend.  2. I will attend all of the appointments needed to get the stump  process complete.  3. I will attend all of the appointments needed to complete the process for the prothesis.                   Action Plans on File:                       Advance Care Plans/Directives Type:        My Medical and Care Information  Problem List   Patient Active Problem List   Diagnosis     Cerebral infarction (H)     Chronic hepatitis C without hepatic coma (H)     Tobacco use disorder     Chronic low back pain     Acute pancreatitis     Hyperlipidemia LDL goal <70     Hypertension goal BP (blood pressure) < 140/90     Nonischemic dilated cardiomyopathy (HCC)     Malignant neoplasm of prostate (H)     Erectile dysfunction     Eczema     PAD (peripheral artery disease) (H)     S/P BKA (below knee amputation) unilateral (H)     Encounter for counseling     Acute renal failure (H)     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    Care Coordination Start Date: 11/11/2019   Frequency of Care Coordination: monthly   Form Last Updated: 02/04/2021

## 2021-02-04 NOTE — PROGRESS NOTES
Clinic Care Coordination Contact    Follow Up Progress Note      Assessmeent: The RN CC nurse care coordinator contacted the patient by phone for a follow up call.  The patient has been working with his prosthetic and is feeling more comfortable using it and having less pain.  The patient has been making and attending all of the appointments associated with his prostate cancer.  There are no questions or concerns at this time.      Medication reconciliation was completed with the patient and marked as reviewed.  Health maintenance was reviewed and questions were answered with the patient.  The assessment for hy was completed with the patient today as well as the social determinants of health and they were marked as reviewed.         Goals addressed this encounter:   Goals Addressed                 This Visit's Progress      #1  Functional (pt-stated)        Goal Statement: I will call for an appointment with the prothesis department for a stump  and to be fitted for a prothesis.  I will attend the appointments needed to complete this process.  Date Goal set: 1/8/2020  Date to Achieve By: 12/8/2021  Patient expressed understanding of goal: yes  Action steps to achieve this goal:  1. I will call for the first assessment appointment and attend.  2. I will attend all of the appointments needed to get the stump  process complete.  3. I will attend all of the appointments needed to complete the process for the prothesis.                   Intervention/Education provided during outreach: The patient has a good understanding of the disease process.      Outreach Frequency: monthly    Plan:   1.  The patient will take all medications including the chemotherapy for his cancer.  2.  The patient will continue to work with the prosthetic in order to be able to walk.  3.  The patient will make and attend all recommended follow up appointments.      RN CC Nurse Care Coordinator will follow up in 4  ramandeep.            Darin Yan MSN, RN, PHN, [facility]   Primary Care Clinical RN Care Coordinator  [facility]  2/4/2021   10:40 AM  [email or web address]  Office: [phone]

## 2021-02-11 ENCOUNTER — OFFICE VISIT (OUTPATIENT)
Dept: FAMILY MEDICINE | Facility: CLINIC | Age: 66
End: 2021-02-11
Payer: COMMERCIAL

## 2021-02-11 VITALS
TEMPERATURE: 98.5 F | HEART RATE: 96 BPM | BODY MASS INDEX: 26.95 KG/M2 | DIASTOLIC BLOOD PRESSURE: 80 MMHG | WEIGHT: 210 LBS | RESPIRATION RATE: 16 BRPM | HEIGHT: 74 IN | OXYGEN SATURATION: 99 % | SYSTOLIC BLOOD PRESSURE: 130 MMHG

## 2021-02-11 DIAGNOSIS — E78.5 DYSLIPIDEMIA: ICD-10-CM

## 2021-02-11 DIAGNOSIS — C61 MALIGNANT NEOPLASM OF PROSTATE (H): Primary | ICD-10-CM

## 2021-02-11 DIAGNOSIS — G89.4 CHRONIC PAIN SYNDROME: ICD-10-CM

## 2021-02-11 LAB
AMPHETAMINES UR QL: NOT DETECTED NG/ML
BARBITURATES UR QL SCN: NOT DETECTED NG/ML
BENZODIAZ UR QL SCN: NOT DETECTED NG/ML
BUPRENORPHINE UR QL: NOT DETECTED NG/ML
CANNABINOIDS UR QL: ABNORMAL NG/ML
COCAINE UR QL SCN: NOT DETECTED NG/ML
D-METHAMPHET UR QL: NOT DETECTED NG/ML
METHADONE UR QL SCN: NOT DETECTED NG/ML
OPIATES UR QL SCN: NOT DETECTED NG/ML
OXYCODONE UR QL SCN: ABNORMAL NG/ML
PCP UR QL SCN: NOT DETECTED NG/ML
PROPOXYPH UR QL: NOT DETECTED NG/ML
TRICYCLICS UR QL SCN: NOT DETECTED NG/ML

## 2021-02-11 PROCEDURE — 99396 PREV VISIT EST AGE 40-64: CPT | Performed by: INTERNAL MEDICINE

## 2021-02-11 PROCEDURE — 80306 DRUG TEST PRSMV INSTRMNT: CPT | Performed by: INTERNAL MEDICINE

## 2021-02-11 ASSESSMENT — ENCOUNTER SYMPTOMS
HEMATURIA: 0
CHILLS: 0
COUGH: 0
HEMATOCHEZIA: 0
ABDOMINAL PAIN: 1
DIARRHEA: 0

## 2021-02-11 ASSESSMENT — MIFFLIN-ST. JEOR: SCORE: 1817.3

## 2021-02-11 NOTE — PROGRESS NOTES
SUBJECTIVE:   CC: Constantino Taylor is an 65 year old male who presents for preventative health visit.       Patient has been advised of split billing requirements and indicates understanding: Yes  Healthy Habits:     Getting at least 3 servings of Calcium per day:  Yes    Bi-annual eye exam:  Yes    Dental care twice a year:  NO    Sleep apnea or symptoms of sleep apnea:  Daytime drowsiness    Diet:  Low fat/cholesterol    Frequency of exercise:  2-3 days/week    Duration of exercise:  Less than 15 minutes    Taking medications regularly:  Yes    Medication side effects:  Muscle aches    PHQ-2 Total Score: 0    Additional concerns today:  No    Patient would like to request a referral for [facility] [address] for further care.  Cancer treatment and prostate cancer  2 years with prostate cancer  Then wants to get the colon cancer screening  [facility]      Today's PHQ-2 Score:   PHQ-2 (  [employer]) 2021   Q1: Little interest or pleasure in doing things 0   Q2: Feeling down, depressed or hopeless 0   PHQ-2 Score 0   Q1: Little interest or pleasure in doing things Not at all   Q2: Feeling down, depressed or hopeless Not at all   PHQ-2 Score 0       Abuse: Current or Past(Physical, Sexual or Emotional)- No  Do you feel safe in your environment? Yes        Social History     Tobacco Use     Smoking status: Former Smoker     Packs/day: 0.30     Years: 40.00     Pack years: 12.00     Types: Cigarettes     Quit date: 3/12/2018     Years since quittin.9     Smokeless tobacco: Former User   Substance Use Topics     Alcohol use: Yes     Alcohol/week: 2.0 - 6.0 standard drinks     Types: 2 - 6 Cans of beer per week     Comment: couple of beers per episode, several times a week         Alcohol Use 2021   Prescreen: >3 drinks/day or >7 drinks/week? No   Prescreen: >3 drinks/day or >7 drinks/week? -       Last PSA:   PSA   Date Value Ref Range Status   2021 20.30 (H) 0 - 4 ug/L Final     Comment:      Assay Method:  Chemiluminescence using Siemens Vista analyzer       Reviewed orders with patient. Reviewed health maintenance and updated orders accordingly - Yes  Lab work is in process  Labs reviewed in EPIC  BP Readings from Last 3 Encounters:   02/11/21 130/80   01/18/21 (!) 153/96   12/07/20 (!) 143/87    Wt Readings from Last 3 Encounters:   02/11/21 95.3 kg (210 lb)   01/18/21 96.6 kg (212 lb 14.4 oz)   12/07/20 91.6 kg (202 lb)                  Patient Active Problem List   Diagnosis     Cerebral infarction (H)     Chronic hepatitis C without hepatic coma (H)     Tobacco use disorder     Chronic low back pain     Acute pancreatitis     Hyperlipidemia LDL goal <70     Hypertension goal BP (blood pressure) < 140/90     Nonischemic dilated cardiomyopathy (HCC)     Malignant neoplasm of prostate (H)     Erectile dysfunction     Eczema     PAD (peripheral artery disease) (H)     S/P BKA (below knee amputation) unilateral (H)     Encounter for counseling     Acute renal failure (H)     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     Dyslipidemia     Nicotine dependence, uncomplicated     Pyelonephritis     Prostate cancer (H)     Osteomyelitis (H)     CKD (chronic kidney disease) stage 3, GFR 30-59 ml/min     Chronic pain syndrome     Past Surgical History:   Procedure Laterality Date     AMPUTATE LEG BELOW KNEE Left 6/19/2015    Procedure: AMPUTATE LEG BELOW KNEE;  Surgeon: Odessa Browning MD;  Location: [facility] OR     IRRIGATION AND DEBRIDEMENT LOWER EXTREMITY, COMBINED Left 10/29/2019    Procedure: IRRIGATION AND DEBRIDEMENT, LEFT LOWER EXTREMITY w/ Veriflow Wound Vac Placement.;  Surgeon: Michelle Matute MD;  Location: [facility] OR     removal of blood clots in arm         Social History     Tobacco Use     Smoking status: Former Smoker     Packs/day: 0.30     Years: 40.00     " Pack years: 12.00     Types: Cigarettes     Quit date: 3/12/2018     Years since quittin.9     Smokeless tobacco: Former User   Substance Use Topics     Alcohol use: Yes     Alcohol/week: 2.0 - 6.0 standard drinks     Types: 2 - 6 Cans of beer per week     Comment: couple of beers per episode, several times a week     Family History   Problem Relation Age of Onset     Cancer Mother         brain     Cancer Brother          Current Outpatient Medications   Medication Sig Dispense Refill     abiraterone (ZYTIGA) 250 MG tablet Take 1 tablet (250 mg) by mouth 2 times daily 60 tablet 0     acetaminophen (TYLENOL) 325 MG tablet Take 1-2 tablets (325-650 mg) by mouth every 6 hours as needed for mild pain (Takes infrequently) 100 tablet 0     aspirin (ASPIRIN LOW DOSE) 81 MG EC tablet TAKE 1 TABLET(81 MG) BY MOUTH DAILY 30 tablet 11     atorvastatin (LIPITOR) 40 MG tablet TAKE 1 TABLET BY MOUTH AT BEDTIME 30 tablet 14     gabapentin (NEURONTIN) 600 MG tablet Take 1 tablet (600 mg) by mouth 3 times daily 90 tablet 0     lisinopril (ZESTRIL) 40 MG tablet TAKE 1 TABLET BY MOUTH EVERY DAY 30 tablet 11     metoprolol succinate ER (TOPROL-XL) 100 MG 24 hr tablet TAKE 1 TABLET BY MOUTH EVERY DAY 30 tablet 11     Multiple Vitamins-Minerals (ONE-A-DAY MENS 50+ ADVANTAGE) TABS Take 1 each by mouth daily 30 tablet 0     naloxone (EVZIO) 0.4 MG/0.4ML auto-injector Inject 0.4 mLs (0.4 mg) into the muscle as needed for opioid reversal every 2-3 minutes until assistance arrives 0.8 mL 1     order for DME Equipment being ordered: Wheelchair, manual 1 each 0     order for DME Equipment being ordered: self adhesive bandage 4\" by 8\" 30 each 11     order for DME Please dispense blood pressure machine and cuff. 1 each 0     oxyCODONE IR (ROXICODONE) 10 MG tablet Take 1 tablet (10 mg) by mouth every 6 hours as needed for moderate to severe pain or severe pain . No further refills until follow-up appointment 120 tablet 0     polyethylene " glycol (MIRALAX) powder Take 17 g by mouth as needed for constipation (Patient taking differently: Take 17 g by mouth as needed for constipation (1-2 times per month on average) ) 510 g 1     predniSONE (DELTASONE) 5 MG tablet Take 1 tablet (5 mg) by mouth daily (with breakfast) 30 tablet 0     prochlorperazine (COMPAZINE) 10 MG tablet Take 1 tablet (10 mg) by mouth every 6 hours as needed (Nausea/Vomiting) 30 tablet 2     tamsulosin (FLOMAX) 0.4 MG capsule Take 1 capsule (0.4 mg) by mouth daily 30 capsule 0     XARELTO ANTICOAGULANT 20 MG TABS tablet TAKE 1 TABLET BY MOUTH DAILY WITH DINNER 30 tablet 11     No Known Allergies    Reviewed and updated as needed this visit by clinical staff  Tobacco  Allergies  Meds   Med Hx  Surg Hx  Fam Hx  Soc Hx        Reviewed and updated as needed this visit by Provider                    Review of Systems   Constitutional: Negative for chills.   HENT: Positive for congestion.    Respiratory: Negative for cough.    Cardiovascular: Positive for chest pain.   Gastrointestinal: Positive for abdominal pain. Negative for diarrhea and hematochezia.   Genitourinary: Negative for hematuria.     CONSTITUTIONAL: NEGATIVE for fever, chills, change in weight  INTEGUMENTARY/SKIN: NEGATIVE for worrisome rashes, moles or lesions  EYES: NEGATIVE for vision changes or irritation  ENT: NEGATIVE for ear, mouth and throat problems  RESP: NEGATIVE for significant cough or SOB  CV: NEGATIVE for chest pain, palpitations or peripheral edema  GI: NEGATIVE for nausea, abdominal pain, heartburn, or change in bowel habits   male: negative for dysuria, hematuria, decreased urinary stream, erectile dysfunction, urethral discharge  MUSCULOSKELETAL: NEGATIVE for significant arthralgias or myalgia  NEURO: NEGATIVE for weakness, dizziness or paresthesias  PSYCHIATRIC: NEGATIVE for changes in mood or affect    OBJECTIVE:   There were no vitals taken for this visit.    Physical Exam  GENERAL: healthy,  alert and no distress  EYES: Eyes grossly normal to inspection, PERRL and conjunctivae and sclerae normal  HENT: ear canals and TM's normal, nose and mouth without ulcers or lesions  NECK: no adenopathy, no asymmetry, masses, or scars and thyroid normal to palpation  RESP: lungs clear to auscultation - no rales, rhonchi or wheezes  CV: regular rate and rhythm, normal S1 S2, no S3 or S4, no murmur, click or rub, no peripheral edema and peripheral pulses strong  ABDOMEN: soft, nontender, no hepatosplenomegaly, no masses and bowel sounds normal  MS: no gross musculoskeletal defects noted, no edema  SKIN: no suspicious lesions or rashes  NEURO: Normal strength and tone, mentation intact and speech normal  BACK: no CVA tenderness, no paralumbar tenderness  PSYCH: mentation appears normal, affect normal/bright  LYMPH: no cervical, supraclavicular, axillary, or inguinal adenopathy    Diagnostic Test Results:  Labs reviewed in Epic  Results for orders placed or performed in visit on 02/11/21   Drug Abuse Screen Panel 13, Urine (Pain Care Package)     Status: Abnormal   Result Value Ref Range    Cannabinoids ([id number]-THC) Detected, Abnormal Result (A) NDET^Not Detected ng/mL    Phencyclidine (Phencyclidine) Not Detected NDET^Not Detected ng/mL    Cocaine (Benzoylecgonine) Not Detected NDET^Not Detected ng/mL    Methamphetamine (d-Methamphetamine) Not Detected NDET^Not Detected ng/mL    Opiates (Morphine) Not Detected NDET^Not Detected ng/mL    Amphetamine (d-Amphetamine) Not Detected NDET^Not Detected ng/mL    Benzodiazepines (Nordiazepam) Not Detected NDET^Not Detected ng/mL    Tricyclic Antidepressants (Desipramine) Not Detected NDET^Not Detected ng/mL    Methadone (Methadone) Not Detected NDET^Not Detected ng/mL    Barbiturates (Butalbital) Not Detected NDET^Not Detected ng/mL    Oxycodone (Oxycodone) Detected, Abnormal Result (A) NDET^Not Detected ng/mL    Propoxyphene (Norpropoxyphene) Not Detected NDET^Not  "Detected ng/mL    Buprenorphine (Buprenorphine) Not Detected NDET^Not Detected ng/mL       ASSESSMENT/PLAN:   Constantino was seen today for physical.    Diagnoses and all orders for this visit:    Malignant neoplasm of prostate (H)  -     Oncology/Hematology Adult Referral; Future    Chronic pain syndrome  -     Drug Abuse Screen Panel 13, Urine (Pain Care Package)    Dyslipidemia  -     Cancel: Lipid panel reflex to direct LDL Fasting; Future  -     Lipid panel reflex to direct LDL Fasting; Future        Patient has been advised of split billing requirements and indicates understanding: Yes  COUNSELING:   Reviewed preventive health counseling, as reflected in patient instructions       Regular exercise       Healthy diet/nutrition       Vision screening       Hearing screening       Family planning       Consider Hep C screening for all patients one time for ages 18-79 years       Colon cancer screening    Estimated body mass index is 26.61 kg/m  as calculated from the following:    Height as of 10/24/19: 1.905 m (6' 3\").    Weight as of 1/18/21: 96.6 kg (212 lb 14.4 oz).     Weight management plan: Discussed healthy diet and exercise guidelines    He reports that he quit smoking about 2 years ago. His smoking use included cigarettes. He has a 12.00 pack-year smoking history. He has quit using smokeless tobacco.      Counseling Resources:  ATP IV Guidelines  Pooled Cohorts Equation Calculator  FRAX Risk Assessment  ICSI Preventive Guidelines  Dietary Guidelines for Americans, 2010  USDA's MyPlate  ASA Prophylaxis  Lung CA Screening    Avery Duke MD  [facility]  "

## 2021-02-11 NOTE — LETTER
[facility]  02/11/21    Patient: Constantino Taylor  YOB: 1955  Medical Record Number: [id number]                                                                  Opioid / Opioid Plus Controlled Substance Agreement    I understand that my care provider has prescribed an opioid (narcotic) controlled substance to help manage my condition(s). I am taking this medicine to help me function or work. I know this is strong medicine, and that it can cause serious side effects. Opioid medicine can be sedating, addicting and may cause a dependency on the drug. They can affect my ability to drive or think, and cause depression. They need to be taken exactly as prescribed. Combining opioids with certain medicines or chemicals (such as cocaine, sedatives and tranquilizers, sleeping pills, meth) can be dangerous or even fatal. Also, if I stop opioids suddenly, I may have severe withdrawal symptoms. Last, I understand that opioids do not work for all types of pain nor for all patients. If not helpful, I may be asked to stop them.        The risks, benefits, and side effects of these medicine(s) were explained to me. I agree that:    1. I will take part in other treatments as advised by my care team. This may be psychiatry or counseling, physical therapy, behavioral therapy, group treatment or a referral to a pain clinic. I will reduce or stop my medicine when my care team tells me to do so.  2. I will take my medicines as prescribed. I will not change the dose or schedule unless my care team tells me to. There will be no refills if I  run out early.   I may be contactedwithout warning and asked to complete a urine drug test or pill count at any time.   3. I will keep all my appointments, and understand this is part of the monitoring of opioids. My care team may require an office visit for EVERY opioid/controlled substance refill. If I miss appointments or don t follow instructions, my care team  may stop my medicine.  4. I will not ask other providers to prescribe controlled substances, and I will not accept controlled substances from other people. If I need another prescribed controlled substance for a new reason, I will tell my care team within 1 business day.  5. I will use one pharmacy to fill all of my controlled substance prescriptions, and it is up to me to make sure that I do not run out of my medicines on weekends or holidays. If my care team is willing to refill my opioid prescription without a visit, I must request refills only during office hours, refills may take up to 3 days to process, and it may take up to 5 to 7 days for my medicine to be mailed and ready at my pharmacy. Prescriptions will not be mailed anywhere except my pharmacy.        [id number]  Rev 12/18         Registration to scan to EHR                             Page 1 of 2               Controlled Substance Agreement [facility]  02/11/21  Patient: Constantino Taylor  YOB: 1955  Medical Record Number: [id number]                                                                  6. I am responsible for my prescriptions. If the medicine/prescription is lost or stolen, it will not be replaced. I also agree not to share controlled substance medicines with anyone.  7. I agree to not use ANY illegal or recreational drugs. This includes marijuana, cocaine, bath salts or other drugs. I agree not to use alcohol unless my care team says I may.          I agree to give urine samples whenever asked. If I don t give a urine sample, the care team may stop my medicine.    8. If I enroll in the [place] Medical Marijuana program, I will tell my care team. I will also sign an agreement to share my medical records with my care team.   9. I will bring in my list of medicines (or my medicine bottles) each time I come to the clinic.   10. I will tell my care team right away if I become pregnant or have a new  medical problem treated outside of my regular clinic.  11. I understand that this medicine can affect my thinking and judgment. It may be unsafe for me to drive, use machinery and do dangerous tasks. I will not do any of these things until I know how the medicine affects me. If my dose changes, I will wait to see how it affects me. I will contact my care team if I have concerns about medicine side effects.    I understand that if I do not follow any of the conditions above, my prescriptions or treatment may be stopped.      I agree that my provider, clinic care team, and pharmacy may work with any city, state or federal law enforcement agency that investigates the misuse, sale, or other diversion of my controlled medicine. I will allow my provider to discuss my care with or share a copy of this agreement with any other treating provider, pharmacy or emergency room where I receive care. I agree to give up (waive) any right of privacy or confidentiality with respect to these consents.     I have read this agreement and have asked questions about anything I did not understand.      ________________________________________________________________________  Patient signature - Date/Time -  Constantino Taylor                                      ________________________________________________________________________  Witness signature                                                            ________________________________________________________________________  Provider signature - Avery Duke MD      [id number]  Rev 12/18         Registration to scan to EHR                         Page 2 of 2                   Controlled Substance Agreement Opioid           Page 1 of 2  Opioid Pain Medicines (also known as Narcotics)  What You Need to Know    What are opioids?   Opioids are pain medicines that must be prescribed by a doctor.  They are also known as narcotics.    Examples are:     morphine (MS Contin,  Josefina)    oxycodone (Oxycontin)    oxycodone and acetaminophen (Percocet)    hydrocodone and acetaminophen (Vicodin, Norco)     fentanyl patch (Duragesic)     hydromorphone (Dilaudid)     methadone     What do opioids do well?   Opioids are best for short-term pain after a surgery or injury. They also work well for cancer pain. Unlike other pain medicines, they do not cause liver or kidney failure or ulcers. They may help some people with long-lasting (chronic) pain.     What do opioids NOT do well?   Opioids never get rid of pain entirely, and they do not work well for most patients with chronic pain. Opioids do not reduce swelling, one of the causes of pain. They also don t work well for nerve pain.                           For informational purposes only.  Not to replace the advice of your care provider.  Copyright [facility]. All right reserved. [employer] [id number] 02/18.      Page 2 of 2    Risks and side effects   Talk to your doctor before you start or decide to keep taking one of these medicines. Side effects include:    Lowering your breathing rate enough to cause death    Overdose, including death, especially if taking higher than prescribed doses    Long-term opioid use    Worse depression symptoms; less pleasure in things you usually enjoy    Feeling tired or sluggish    Slower thoughts or cloudy thinking    Being more sensitive to pain over time; pain is harder to control    Trouble sleeping or restless sleep    Changes in hormone levels (for example, less testosterone)    Changes in sex drive or ability to have sex    Constipation    Unsafe driving    Itching and sweating    Feeling dizzy    Nausea, vomiting and dry mouth    What else should I know about opioids?  When someone takes opioids for too long or too often, they become dependent. This means that if you stop or reduce the medicine too quickly, you will have withdrawal symptoms.    Dependence is not the same as addiction.  Addiction is when people keep using a substance that harms their body, their mind or their relations with others. If you have a history of drug or alcohol abuse, taking opioids can cause a relapse.    Over time, opioids don t work as well. Most people will need higher and higher doses. The higher the dose, the more serious the side effects. We don t know the long-term effects of opioids.      Prescribed opioids aren't the best way to manage chronic pain    Other ways to manage pain include:      Ibuprofen or acetaminophen.  You should always try this first.      Treat health problems that may be causing pain.      acupuncture or massage, deep breathing, meditation, visual imagery, aromatherapy.      Use heat or ice at the pain site      Physical therapy and exercise      Stop smoking      See a counselor or therapist                                                  People who have used opioids for a long time may have a lower quality of life, worse depression, higher levels of pain and more visits to doctors.    Never share your opioids with others. Be sure to store opioids in a secure place, locked if possible.Young children can easily swallow them and overdose.     You can overdose on opioids.  Signs of overdose include decrease or loss of consciousness, slowed breathing, trouble waking and blue lips.  If someone is worried about overdose, they should call 911.    If you are at risk for overdose, you may get naloxone (Narcan, a medicine that reverses the effects of opioids.  If you overdose, a friend or family member can give you Narcan while waiting for the ambulance.  They need to know the signs of overdose and how to give Narcan.    While you're taking opioids:    Don't use alcohol or street drugs. Taking them together can cause death.    Don't take any of these medicines unless your doctor says its okay.  Taking these with opioids can cause death.    Benzodiazepines (such as lorazepam         or  diazepam)    Muscle relaxers (such as cyclobenzaprine)    sleeping pills    other opioids    Safe disposal of opioids  Find your area drug take-back program, your pharmacy mail-back program, buy a special disposal bag (such as Deterra) from your pharmacy or flush them down the toilet.  Use the guidelines at:  www.fda.gov/drugs/resourcesforyou

## 2021-02-17 ENCOUNTER — VIRTUAL VISIT (OUTPATIENT)
Dept: ONCOLOGY | Facility: CLINIC | Age: 66
End: 2021-02-17
Attending: PHYSICIAN ASSISTANT
Payer: COMMERCIAL

## 2021-02-17 VITALS
DIASTOLIC BLOOD PRESSURE: 112 MMHG | HEART RATE: 66 BPM | RESPIRATION RATE: 18 BRPM | WEIGHT: 215.3 LBS | TEMPERATURE: 98.2 F | SYSTOLIC BLOOD PRESSURE: 198 MMHG | BODY MASS INDEX: 27.64 KG/M2 | OXYGEN SATURATION: 98 %

## 2021-02-17 DIAGNOSIS — G89.18 ACUTE POST-OPERATIVE PAIN: ICD-10-CM

## 2021-02-17 DIAGNOSIS — C61 PROSTATE CANCER (H): ICD-10-CM

## 2021-02-17 DIAGNOSIS — G89.29 CHRONIC LOW BACK PAIN WITH SCIATICA, SCIATICA LATERALITY UNSPECIFIED, UNSPECIFIED BACK PAIN LATERALITY: ICD-10-CM

## 2021-02-17 DIAGNOSIS — C61 PROSTATE CANCER (H): Primary | ICD-10-CM

## 2021-02-17 DIAGNOSIS — Z79.899 ENCOUNTER FOR LONG-TERM (CURRENT) USE OF MEDICATIONS: ICD-10-CM

## 2021-02-17 DIAGNOSIS — M54.40 CHRONIC LOW BACK PAIN WITH SCIATICA, SCIATICA LATERALITY UNSPECIFIED, UNSPECIFIED BACK PAIN LATERALITY: ICD-10-CM

## 2021-02-17 DIAGNOSIS — E78.5 DYSLIPIDEMIA: ICD-10-CM

## 2021-02-17 LAB
ALBUMIN SERPL-MCNC: 3.6 G/DL (ref 3.4–5)
ALP SERPL-CCNC: 83 U/L (ref 40–150)
ALT SERPL W P-5'-P-CCNC: 24 U/L (ref 0–70)
ANION GAP SERPL CALCULATED.3IONS-SCNC: 7 MMOL/L (ref 3–14)
AST SERPL W P-5'-P-CCNC: 14 U/L (ref 0–45)
BASOPHILS # BLD AUTO: 0 10E9/L (ref 0–0.2)
BASOPHILS NFR BLD AUTO: 0.4 %
BILIRUB SERPL-MCNC: 0.4 MG/DL (ref 0.2–1.3)
BUN SERPL-MCNC: 21 MG/DL (ref 7–30)
CALCIUM SERPL-MCNC: 8.9 MG/DL (ref 8.5–10.1)
CHLORIDE SERPL-SCNC: 111 MMOL/L (ref 94–109)
CHOLEST SERPL-MCNC: 183 MG/DL
CO2 SERPL-SCNC: 22 MMOL/L (ref 20–32)
CREAT SERPL-MCNC: 1.21 MG/DL (ref 0.66–1.25)
DIFFERENTIAL METHOD BLD: NORMAL
EOSINOPHIL # BLD AUTO: 0.2 10E9/L (ref 0–0.7)
EOSINOPHIL NFR BLD AUTO: 2.6 %
ERYTHROCYTE [DISTWIDTH] IN BLOOD BY AUTOMATED COUNT: 13.4 % (ref 10–15)
GFR SERPL CREATININE-BSD FRML MDRD: 63 ML/MIN/{1.73_M2}
GLUCOSE SERPL-MCNC: 140 MG/DL (ref 70–99)
HCT VFR BLD AUTO: 43.9 % (ref 40–53)
HDLC SERPL-MCNC: 50 MG/DL
HGB BLD-MCNC: 15 G/DL (ref 13.3–17.7)
IMM GRANULOCYTES # BLD: 0 10E9/L (ref 0–0.4)
IMM GRANULOCYTES NFR BLD: 0.1 %
LDLC SERPL CALC-MCNC: 110 MG/DL
LYMPHOCYTES # BLD AUTO: 2.4 10E9/L (ref 0.8–5.3)
LYMPHOCYTES NFR BLD AUTO: 31 %
MCH RBC QN AUTO: 30.8 PG (ref 26.5–33)
MCHC RBC AUTO-ENTMCNC: 34.2 G/DL (ref 31.5–36.5)
MCV RBC AUTO: 90 FL (ref 78–100)
MONOCYTES # BLD AUTO: 0.6 10E9/L (ref 0–1.3)
MONOCYTES NFR BLD AUTO: 8.2 %
NEUTROPHILS # BLD AUTO: 4.4 10E9/L (ref 1.6–8.3)
NEUTROPHILS NFR BLD AUTO: 57.7 %
NONHDLC SERPL-MCNC: 133 MG/DL
NRBC # BLD AUTO: 0 10*3/UL
NRBC BLD AUTO-RTO: 0 /100
PLATELET # BLD AUTO: 218 10E9/L (ref 150–450)
POTASSIUM SERPL-SCNC: 3.7 MMOL/L (ref 3.4–5.3)
PROT SERPL-MCNC: 8 G/DL (ref 6.8–8.8)
PSA SERPL-MCNC: 30.7 UG/L (ref 0–4)
RBC # BLD AUTO: 4.87 10E12/L (ref 4.4–5.9)
SODIUM SERPL-SCNC: 141 MMOL/L (ref 133–144)
TRIGL SERPL-MCNC: 113 MG/DL
WBC # BLD AUTO: 7.6 10E9/L (ref 4–11)

## 2021-02-17 PROCEDURE — 36415 COLL VENOUS BLD VENIPUNCTURE: CPT

## 2021-02-17 PROCEDURE — 99214 OFFICE O/P EST MOD 30 MIN: CPT | Mod: TEL | Performed by: PHYSICIAN ASSISTANT

## 2021-02-17 PROCEDURE — 80061 LIPID PANEL: CPT | Mod: TEL | Performed by: INTERNAL MEDICINE

## 2021-02-17 PROCEDURE — 80053 COMPREHEN METABOLIC PANEL: CPT | Performed by: PHYSICIAN ASSISTANT

## 2021-02-17 PROCEDURE — 84153 ASSAY OF PSA TOTAL: CPT | Performed by: PHYSICIAN ASSISTANT

## 2021-02-17 PROCEDURE — 999N001193 HC VIDEO/TELEPHONE VISIT; NO CHARGE

## 2021-02-17 PROCEDURE — 85025 COMPLETE CBC W/AUTO DIFF WBC: CPT | Performed by: PHYSICIAN ASSISTANT

## 2021-02-17 RX ORDER — ABIRATERONE ACETATE 250 MG/1
250 TABLET ORAL 2 TIMES DAILY
Qty: 60 TABLET | Refills: 0 | Status: CANCELLED | OUTPATIENT
Start: 2021-02-17

## 2021-02-17 RX ORDER — ABIRATERONE ACETATE 250 MG/1
250 TABLET ORAL 2 TIMES DAILY
Qty: 60 TABLET | Refills: 0 | Status: SHIPPED | OUTPATIENT
Start: 2021-02-17 | End: 2021-03-22

## 2021-02-17 RX ORDER — PREDNISONE 5 MG/1
5 TABLET ORAL
Qty: 30 TABLET | Refills: 0 | Status: CANCELLED | OUTPATIENT
Start: 2021-02-17

## 2021-02-17 RX ORDER — PREDNISONE 5 MG/1
5 TABLET ORAL
Qty: 30 TABLET | Refills: 0 | Status: SHIPPED | OUTPATIENT
Start: 2021-02-17 | End: 2021-03-22

## 2021-02-17 ASSESSMENT — PAIN SCALES - GENERAL: PAINLEVEL: SEVERE PAIN (7)

## 2021-02-17 NOTE — PROGRESS NOTES
Constantino is a 65 year old who is being evaluated via a billable telephone visit.      What phone number would you like to be contacted at? [phone]    How would you like to obtain your AVS? [facility]     BP (!) 198/112   Pulse 66   Temp 98.2  F (36.8  C) (Oral)   Resp 18   Wt 97.7 kg (215 lb 4.8 oz)   SpO2 98%   BMI 27.64 kg/m      Refill on Compazine.     Pancho Bartlett LPN    Feb 17, 2021             REASON FOR VISIT: prostate cancer follow up     TREATMENT SUMMARY:  He was was referred to Dr. Rutherford who did a biopsy on 05/01/2012, and pathology was consistent with prostate adenocarcinoma, [place] score 3+4, without perineural invasion or angiolymphatic invasion, involving about 65% of the tissue on the right.  About 3-5% of the tissue on the left was also involved with [place] 3+3=6 carcinoma. He received a 4-month shot of Lupron on 05/25/2012 by Dr. Rutherford. He opted for definitive radiation therapy and received 7560 cGy including 4500 cGy to the pelvis and an additional 3060 cGy boost to the prostate for his [id number] disease from 8/29-10/26/2012. His PSA never dropped to 0. It was 0.34 on 1/6/14 at the lowest value. He has not followed up in the past and has ongoing issues with BP management, strokes, chronic afib, left leg amputation. etc.  Lupron:  #1-5/25/2012 (30mg)  #2-1/14/13(30 mg)  #3-8/14/13(30 mg)  #4-3/4/14 (45 mg)  Total duration: 28 months (Not continuous due to compliance)     CURRENT INTERVENTIONS:  Lupron started 10/14/19, plan for continuous androgen deprivation   Was due again on 1/14/20 though refused shot at that appt      Recent Dr Rodriguez appointment plan was to start Zytiga--started 10/27/2020     Interim History:   Constantino has been taking the Zytiga for 4 months now.  He tells me he has been 100% compliant now with his Zytiga and prednisone.  He seems to be tolerating it just fine.  We discussed a couple months ago he had been having stomach pain and nausea, he states this is no longer  occurring.      Otherwise appetite is good.  He is taking oxycodone about 3-4 a day for his amputation.  He is having good BMs, taking a stool softener daily.  He has not wanted to get lupron again due to decreased libido and hot flashes.      He denies any urinary symptoms or new pain, especially in his bone. His BP was elevated for labs this AM, but he hadn't taken his BP medications yet.  He just check at home after taking his AM medications and he was 160/90, coming back down but not quite at his baseline.      ROS: 10 point ROS neg other than the symptoms noted above in the HPI.     Objectives:  Voice is clear and strong. No audible stridor, wheezing, or respiratory distress. The remainder of PE was deferred due to PHE.        Labs:       2/17/2021 08:13   Sodium 141   Potassium 3.7   Chloride 111 (H)   Carbon Dioxide 22   Urea Nitrogen 21   Creatinine 1.21   GFR Estimate 63   GFR Estimate If Black 73   Calcium 8.9   Anion Gap 7   Albumin 3.6   Protein Total 8.0   Bilirubin Total 0.4   Alkaline Phosphatase 83   ALT 24   AST 14   PSA 30.70 (H)   Glucose 140 (H)   WBC 7.6   Hemoglobin 15.0   Hematocrit 43.9   Platelet Count 218   RBC Count 4.87   MCV 90   MCH 30.8   MCHC 34.2   RDW 13.4   Diff Method Automated Method   % Neutrophils 57.7   % Lymphocytes 31.0   % Monocytes 8.2   % Eosinophils 2.6   % Basophils 0.4   % Immature Granulocytes 0.1   Nucleated RBCs 0   Absolute Neutrophil 4.4      9/8/2020 09:15 11/19/2020 06:45 1/18/2021 09:07 2/17/2021 08:13   PSA 39.90 (H) 15.90 (H) 20.30 (H) 30.70 (H)      Assessment/Plan:     Prostate Cancer, biochemical relapse  -initially on ADT though held in 2014 due to ED. PSA increased from 4.6 in Sep 2017 to 32 in October 2019. CT scan and bone scan in October 2019 showed no evidence of gross metastatic disease, which means he has biochemical progression only at this point, though without treatment he would eventually have metastatic and terminal disease. In  October 2019, he agreed to go back on Lupron though upon return in January, he was against it again despite having a long discussion about risks of foregoing treatment. Declined taking Lupron.  PSA and testosterone continue to elevate.   Constantino eventually started Zytiga and prednisone in November of 2020, his baseline CT and Bone scan showed no disease.  -He continues to take Zytiga WITH food - he is taking 500 mg WITH FOOD and 5 mg of prednisone in the AM.    The dyspepsia/nausea he was having earlier on has seemed to dissipate.  We reviewed his PSA today which has gone up a little bit.  He feels we don't know how to manage prostate cancer.  He would like a second opinion down at [place].  I told him what the standard of care in the scenario is, there are other options for us to consider.  I do wonder if he is being 100% compliant with the medications.  We will have him back in 2 weeks and I will check a testosterone total.  It should be near undetectable if he is actually taking it.   - I will have oral pharmacy keep a close eye on him.       Kristen Short PA-C  Phone call: 15 min

## 2021-02-17 NOTE — NURSING NOTE
Chief Complaint   Patient presents with     Blood Draw     Vitals, blood drawn via VPT by LPN.      Pt's BP was done x3. Last one was done with another BP machine patient thought machine was not working correctly, stating his BP is never that high. Writer told patient that a page needed to be sent to provider to see what we should do. 888 was paged patient is also clammy and sweaty. Paramedic came to assess and patient did not want to stay long enough to hear back from provider. Paramedic educated patient on if he is experiences any other symptoms to go to ED. Patient left AMA. Provider did call back and was updated.     SUZI Cabral LPN

## 2021-02-17 NOTE — TELEPHONE ENCOUNTER
Patient calling needs a refill on oxyCODONE IR (ROXICODONE) 10 MG tablet uses [facility] you can reach patient at [phone].  Ananya Tate,

## 2021-02-18 NOTE — TELEPHONE ENCOUNTER
Controlled Substance Refill Request for oxyCODONE IR (ROXICODONE) 10 MG tablet  Problem List Complete:  Yes  Chronic pain syndromeNoted 1/15/2021  [G89.4]  Overview  Patient is followed by Avery Duke MD for ongoing prescription of pain medication. All refills should only be approved by this provider, or covering partner.    Medication(s): oxycodone 10 mg every 6 hours.   Maximum quantity per month: 120  Clinic visit frequency required: Q 6 months     Controlled substance agreement:  Encounter-Level CSA - 01/02/2017:   Controlled Substance Agreement - Scan on 1/3/2017 1:03 PM: CONTROLLED SUBSTANCE AGREEMENT    Patient-Level CSA:   There are no patient-level csa.      Pain Clinic evaluation in the past: Yes  Date/Location:     DIRE Total Score(s):  DIRE SCORE 1/15/2021   DIRE Total Score 15       Last MNP website verification: done on 1/15/2021  [email or web address]     checked in past 3 months?  No, route to GAY Negro CMA

## 2021-02-19 RX ORDER — OXYCODONE HYDROCHLORIDE 10 MG/1
10 TABLET ORAL EVERY 6 HOURS PRN
Qty: 120 TABLET | Refills: 0 | Status: SHIPPED | OUTPATIENT
Start: 2021-02-19 | End: 2021-03-16

## 2021-02-19 NOTE — TELEPHONE ENCOUNTER
Patient is calling again for this medication-- he is in a lot of pain and needs this ASAP.  Would like me to state that his PHA levels went up from 20 to 30.    Please call with any concerns [phone]

## 2021-02-21 ENCOUNTER — TELEPHONE (OUTPATIENT)
Dept: ONCOLOGY | Facility: CLINIC | Age: 66
End: 2021-02-21

## 2021-03-01 DIAGNOSIS — C61 PROSTATE CANCER (H): ICD-10-CM

## 2021-03-01 LAB
ALBUMIN SERPL-MCNC: 3.7 G/DL (ref 3.4–5)
ALP SERPL-CCNC: 88 U/L (ref 40–150)
ALT SERPL W P-5'-P-CCNC: 23 U/L (ref 0–70)
ANION GAP SERPL CALCULATED.3IONS-SCNC: 6 MMOL/L (ref 3–14)
AST SERPL W P-5'-P-CCNC: 18 U/L (ref 0–45)
BASOPHILS # BLD AUTO: 0 10E9/L (ref 0–0.2)
BASOPHILS NFR BLD AUTO: 0.4 %
BILIRUB SERPL-MCNC: 0.3 MG/DL (ref 0.2–1.3)
BUN SERPL-MCNC: 20 MG/DL (ref 7–30)
CALCIUM SERPL-MCNC: 9.4 MG/DL (ref 8.5–10.1)
CHLORIDE SERPL-SCNC: 110 MMOL/L (ref 94–109)
CO2 SERPL-SCNC: 24 MMOL/L (ref 20–32)
CREAT SERPL-MCNC: 1.33 MG/DL (ref 0.66–1.25)
DIFFERENTIAL METHOD BLD: NORMAL
EOSINOPHIL # BLD AUTO: 0.2 10E9/L (ref 0–0.7)
EOSINOPHIL NFR BLD AUTO: 2.6 %
ERYTHROCYTE [DISTWIDTH] IN BLOOD BY AUTOMATED COUNT: 12.7 % (ref 10–15)
GFR SERPL CREATININE-BSD FRML MDRD: 56 ML/MIN/{1.73_M2}
GLUCOSE SERPL-MCNC: 132 MG/DL (ref 70–99)
HCT VFR BLD AUTO: 44.4 % (ref 40–53)
HGB BLD-MCNC: 15.1 G/DL (ref 13.3–17.7)
IMM GRANULOCYTES # BLD: 0 10E9/L (ref 0–0.4)
IMM GRANULOCYTES NFR BLD: 0.3 %
LYMPHOCYTES # BLD AUTO: 2.2 10E9/L (ref 0.8–5.3)
LYMPHOCYTES NFR BLD AUTO: 28.6 %
MCH RBC QN AUTO: 30.8 PG (ref 26.5–33)
MCHC RBC AUTO-ENTMCNC: 34 G/DL (ref 31.5–36.5)
MCV RBC AUTO: 90 FL (ref 78–100)
MONOCYTES # BLD AUTO: 0.6 10E9/L (ref 0–1.3)
MONOCYTES NFR BLD AUTO: 8.1 %
NEUTROPHILS # BLD AUTO: 4.6 10E9/L (ref 1.6–8.3)
NEUTROPHILS NFR BLD AUTO: 60 %
NRBC # BLD AUTO: 0 10*3/UL
NRBC BLD AUTO-RTO: 0 /100
PLATELET # BLD AUTO: 236 10E9/L (ref 150–450)
POTASSIUM SERPL-SCNC: 3.7 MMOL/L (ref 3.4–5.3)
PROT SERPL-MCNC: 8.3 G/DL (ref 6.8–8.8)
PSA SERPL-MCNC: 32.2 UG/L (ref 0–4)
RBC # BLD AUTO: 4.91 10E12/L (ref 4.4–5.9)
SODIUM SERPL-SCNC: 140 MMOL/L (ref 133–144)
WBC # BLD AUTO: 7.7 10E9/L (ref 4–11)

## 2021-03-01 PROCEDURE — 80053 COMPREHEN METABOLIC PANEL: CPT | Performed by: PATHOLOGY

## 2021-03-01 PROCEDURE — 36415 COLL VENOUS BLD VENIPUNCTURE: CPT | Performed by: PATHOLOGY

## 2021-03-01 PROCEDURE — 84153 ASSAY OF PSA TOTAL: CPT | Performed by: PATHOLOGY

## 2021-03-01 PROCEDURE — 85025 COMPLETE CBC W/AUTO DIFF WBC: CPT | Performed by: PATHOLOGY

## 2021-03-02 LAB — TESTOST SERPL-MCNC: 176 NG/DL (ref 240–950)

## 2021-03-09 ENCOUNTER — VIRTUAL VISIT (OUTPATIENT)
Dept: ONCOLOGY | Facility: CLINIC | Age: 66
End: 2021-03-09
Attending: INTERNAL MEDICINE
Payer: COMMERCIAL

## 2021-03-09 DIAGNOSIS — N18.31 STAGE 3A CHRONIC KIDNEY DISEASE (H): ICD-10-CM

## 2021-03-09 DIAGNOSIS — E78.5 DYSLIPIDEMIA: ICD-10-CM

## 2021-03-09 DIAGNOSIS — C61 PROSTATE CANCER (H): Primary | ICD-10-CM

## 2021-03-09 DIAGNOSIS — Z79.899 ENCOUNTER FOR LONG-TERM (CURRENT) USE OF MEDICATIONS: ICD-10-CM

## 2021-03-09 PROCEDURE — 99214 OFFICE O/P EST MOD 30 MIN: CPT | Mod: TEL | Performed by: INTERNAL MEDICINE

## 2021-03-09 PROCEDURE — 999N001193 HC VIDEO/TELEPHONE VISIT; NO CHARGE
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
## 2021-03-09 NOTE — PROGRESS NOTES
Constantino is a 65 year old who is being evaluated via a billable telephone visit.      What phone number would you like to be contacted at? [phone]  How would you like to obtain your AVS? Mail a copy
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
## 2021-03-10 ENCOUNTER — PATIENT OUTREACH (OUTPATIENT)
Dept: CARE COORDINATION | Facility: CLINIC | Age: 66
End: 2021-03-10

## 2021-03-10 NOTE — PROGRESS NOTES
Clinic Care Coordination Contact  [facility]       Clinical Data: Care Coordinator Outreach  Outreach attempted x 1.  Left message with the patient to call with call back information and requested return call.  Plan: Care Coordinator sent care coordination introduction letter on 12/20/19 via [employer]. Care Coordinator will try to reach patient again in 10 business days.        Darin Yan MSN, RN, PHN, CCM   Primary Care Clinical RN Care Coordinator  [facility]  3/10/2021   9:59 AM  [email or web address]  Office: [phone]

## 2021-03-12 ENCOUNTER — DOCUMENTATION ONLY (OUTPATIENT)
Dept: ONCOLOGY | Facility: CLINIC | Age: 66
End: 2021-03-12

## 2021-03-16 ENCOUNTER — TELEPHONE (OUTPATIENT)
Dept: FAMILY MEDICINE | Facility: CLINIC | Age: 66
End: 2021-03-16

## 2021-03-16 DIAGNOSIS — G89.18 ACUTE POST-OPERATIVE PAIN: ICD-10-CM

## 2021-03-16 DIAGNOSIS — M54.40 CHRONIC LOW BACK PAIN WITH SCIATICA, SCIATICA LATERALITY UNSPECIFIED, UNSPECIFIED BACK PAIN LATERALITY: ICD-10-CM

## 2021-03-16 DIAGNOSIS — G89.29 CHRONIC LOW BACK PAIN WITH SCIATICA, SCIATICA LATERALITY UNSPECIFIED, UNSPECIFIED BACK PAIN LATERALITY: ICD-10-CM

## 2021-03-16 RX ORDER — OXYCODONE HYDROCHLORIDE 10 MG/1
10 TABLET ORAL EVERY 6 HOURS PRN
Qty: 120 TABLET | Refills: 0 | Status: SHIPPED | OUTPATIENT
Start: 2021-03-16 | End: 2021-04-19

## 2021-03-22 DIAGNOSIS — C61 PROSTATE CANCER (H): Primary | ICD-10-CM

## 2021-03-22 DIAGNOSIS — C61 PROSTATE CANCER (H): ICD-10-CM

## 2021-03-22 RX ORDER — PREDNISONE 5 MG/1
5 TABLET ORAL
Qty: 30 TABLET | Refills: 0 | Status: SHIPPED | OUTPATIENT
Start: 2021-03-22 | End: 2021-04-21

## 2021-03-22 RX ORDER — ABIRATERONE ACETATE 250 MG/1
250 TABLET ORAL 2 TIMES DAILY
Qty: 60 TABLET | Refills: 0 | Status: SHIPPED | OUTPATIENT
Start: 2021-03-22 | End: 2021-03-23

## 2021-03-23 DIAGNOSIS — C61 PROSTATE CANCER (H): ICD-10-CM

## 2021-03-23 RX ORDER — ABIRATERONE ACETATE 250 MG/1
500 TABLET ORAL DAILY
Qty: 60 TABLET | Refills: 0 | OUTPATIENT
Start: 2021-03-23 | End: 2021-03-23

## 2021-03-23 RX ORDER — ABIRATERONE ACETATE 250 MG/1
500 TABLET ORAL DAILY
Qty: 60 TABLET | Refills: 0 | Status: SHIPPED | OUTPATIENT
Start: 2021-03-23 | End: 2021-04-21

## 2021-03-24 DIAGNOSIS — C61 PROSTATE CANCER (H): Primary | ICD-10-CM

## 2021-04-01 ENCOUNTER — PATIENT OUTREACH (OUTPATIENT)
Dept: CARE COORDINATION | Facility: CLINIC | Age: 66
End: 2021-04-01

## 2021-04-01 NOTE — PROGRESS NOTES
Clinic Care Coordination Contact  [facility]/Voicemail       Clinical Data: Care Coordinator Outreach  Outreach attempted x 3.  Left message on patient's voicemail with call back information and requested return call.  Plan: Care Coordinator sent care coordination introduction letter on 12/20/2019 via [employer]. Care Coordinator will try to reach patient again in 1 month.    The CHW will contact and set up an appointment with the RN CC for an annual assessment as that is overdue at this time.      Darin Yan MSN, RN, PHN, CCM   Primary Care Clinical RN Care Coordinator  [facility]  4/1/2021   12:22 PM  [email or web address]  Office: [phone]

## 2021-04-02 ENCOUNTER — PATIENT OUTREACH (OUTPATIENT)
Dept: NURSING | Facility: CLINIC | Age: 66
End: 2021-04-02
Payer: COMMERCIAL

## 2021-04-02 NOTE — PROGRESS NOTES
Clinic Care Coordination Contact  [facility]/Voicemail       Clinical Data: CHW Outreach  Outreach attempted x 1.  CHW spoke with patient briefly patient is out of town for Aunts . He asked for a call back sometimes next week. CHW calling to set up annual assessment appointment for CC RN.    Plan: CHW  will try to reach patient again in 5-7 business days.    Esther BHAKTA Community Health Worker  Clinic Care Coordination  [facility] : [place], [place] & [place]  Phone: [phone]

## 2021-04-09 ENCOUNTER — PATIENT OUTREACH (OUTPATIENT)
Dept: NURSING | Facility: CLINIC | Age: 66
End: 2021-04-09
Payer: COMMERCIAL

## 2021-04-09 NOTE — PROGRESS NOTES
Clinic Care Coordination Contact  Community Health Worker Follow Up    CHW called and scheduled annual assessment for CC RN. 04/13/21 At 11:30 am    Esther BHAKTA Community Health Worker  Clinic Care Coordination  [facility] : [place], [place] & [address]  Phone: [phone]

## 2021-04-13 ENCOUNTER — PATIENT OUTREACH (OUTPATIENT)
Dept: NURSING | Facility: CLINIC | Age: 66
End: 2021-04-13
Payer: COMMERCIAL

## 2021-04-13 NOTE — PROGRESS NOTES
Clinic Care Coordination Contact    Clinic Care Coordination Contact  OUTREACH    Referral Information:  Referral Source: IP Handoff    Primary Diagnosis: Orthopedic    Chief Complaint   Patient presents with     Clinic Care Coordination - Initial     RN CC nurse care coordinator (Darin)        Universal Utilization: The patient uses the [facility] system and the [facility] system.  Clinic Utilization  Difficulty keeping appointments:: No  Compliance Concerns: No  No-Show Concerns: No  No PCP office visit in Past Year: No  Utilization    Last refreshed: 4/13/2021 12:17 PM: Hospital Admissions 0           Last refreshed: 4/13/2021 12:17 PM: ED Visits 0           Last refreshed: 4/13/2021 12:17 PM: No Show Count (past year) 1              Current as of: 4/13/2021 12:17 PM              Clinical Concerns:  Current Medical Concerns: The patient is currently being treated for prostate cancer with an oral medication.  The patient states that he was asked to use the Lupron again and he requested not to.  He is concerned regarding his prostate cancer progression and therefore is seeking a second opinion from [facility].  The patient's appointment with male is in July.  The patient states that he is still trying to work with the prosthetic and not being very successful.  Therefore the patient was given a prescription from the prosthetic office requesting a different style of prosthetic that needs to be signed by the provider.  Therefore the patient will be making an appointment with the PCP in order to discuss this and have the prescription signed if that is appropriate.  The patient has PCA care 4 hours/day and at this time that is unchanged.  This is his annual assessment, the introductory letter and care plan will be sent to the patient via [employer].    Medication reconciliation status: Medications reviewed and reconciled.  Continue medications without change.     Medication reconciliation was completed with the  patient and marked as reviewed.  Health maintenance was reviewed and questions were answered with the patient.  The assessment for hypertension was completed with the patient today as well as the social determinants of health and they were marked as reviewed.    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  Education Provided to patient: Encouraged to follow through with seeing the PCP and getting the prosthetic prescription signed.  Pain  Pain (GOAL):: Yes  Type: Acute on Chronic  Location of chronic pain:: stump of amputated leg  Radiating: No  Progression: Constant  Description of pain: Throbbing, Aching  Chronic pain severity:: 7  Limitation of routine activities due to chronic pain:: Yes  Description: Unable to perform most daily activities (chores, hobbies, social activities, driving)  Alleviating Factors: Rest, Ice, Pain Medication  Aggravating Factors: Activity, Positioning  Health Maintenance Reviewed: Due/Overdue   Health Maintenance Due   Topic Date Due     HEPATITIS B  IMMUNIZATION (3 of 3 - Hep B Twinrix risk 3-dose series) 07/01/2013     HF ACTION PLAN  10/17/2019     COVID-19 Vaccine (2 - Moderna 2-dose series) 04/15/2021       Clinical Pathway: None    Medication Management:  Patient is knowledgeable on medications and is adherent.  No financial concerns reported at this time.  Medication reconciliation was completed at this time with the patient and there are no questions or concerns at this time.    Functional Status:  Dependent ADLs:: Wheelchair-independent  Dependent IADLs:: Medication Management, Money Management, Transportation, Meal Preparation, Shopping, Laundry, Cooking, Cleaning  Bed or wheelchair confined:: No  Mobility Status: Independent w/Device  Fallen 2 or more times in the past year?: No  Any fall with injury in the past year?: No    Living Situation:  Current living arrangement:: I live alone  Type of residence:: Apartment - handicap accessible    Lifestyle & Psychosocial Needs:  Lifestyle     Physical activity     Days per week: 0 days     Minutes per session: 0 min     Stress: Only a little     Social Needs     Financial resource strain: Not hard at all     Food insecurity     Worry: Never true     Inability: Never true     Transportation needs     Medical: No     Non-medical: No     Diet:: Regular  Inadequate nutrition (GOAL):: No  Tube Feeding: No  Inadequate activity/exercise (GOAL):: No  Significant changes in sleep pattern (GOAL): No  Transportation means:: Medical transport  Financial/Insurance concerns (GOAL):: No  Yazidi or spiritual beliefs that impact treatment:: No  Mental health DX:: No  Mental health management concern (GOAL):: No  Chemical Dependency Status: No Current Concerns  Informal Support system:: Family   Socioeconomic History     Marital status: Single     Spouse name: Not on file     Number of children: Not on file     Years of education: Not on file     Highest education level: Not on file     Tobacco Use     Smoking status:  Former Smoker     Packs/day: 0.30     Years: 40.00     Pack years: 12.00     Types: Cigarettes     Quit date: 3/12/2018     Years since quitting: 3.0     Smokeless tobacco: Former User   Substance and Sexual Activity     Alcohol use: Yes     Alcohol/week: 2.0 - 6.0 standard drinks     Types: 2 - 6 Cans of beer per week     Comment: couple of beers per episode, several times a week     Drug use: Yes     Types: Marijuana     Comment: pot about 3 days per week, heroin couple of months ago     Sexual activity: Yes               Resources and Interventions:  Current Resources:   Skilled Home Care Services: Skilled Nursing, Physicial Therapy, Occupational Therapy  Community Resources: Home Care, PCA  Supplies used at home:: Wound Care Supplies  Equipment Currently Used at Home: wheelchair, manual, prosthesis  Employment Status: disabled)   )    Advance Care Plan/Directive  Advanced Care Plans/Directives on file:: No  Advanced Care Plan/Directive Status: Considering Options    Referrals Placed: None     Goals:   Goals        General    #1  Functional (pt-stated)     Notes - Note edited  2/4/2021  9:50 AM by Darin Dickson RN    Goal Statement: I will call for an appointment with the prothesis department for a stump  and to be fitted for a prothesis.  I will attend the appointments needed to complete this process.  Date Goal set: 1/8/2020  Date to Achieve By: 12/8/2021  Patient expressed understanding of goal: yes  Action steps to achieve this goal:  1. I will call for the first assessment appointment and attend.  2. I will attend all of the appointments needed to get the stump  process complete.  3. I will attend all of the appointments needed to complete the process for the prothesis.            #2  Pain Management (pt-stated)     Notes - Note created  4/13/2021 12:51 PM by Darin Dickson, RN    Goal Statement: I will add additional modalities to my pain management program in addition to the pain  medication, and in order to reduce the pain from a 7/10 toward a 4/10.  Date Goal set: 4/13/2021  Barriers: Has never tried ice or heat before.  Strengths: Engaged in care coordination  Date to Achieve By: 10/13/2021  Patient expressed understanding of goal: Yes  Action steps to achieve this goal:  1. I will take all my pain medications as prescribed by the providers.  2. I will use other modalities such as ice or heat, with 20 minutes on at least 2-3 times a day.  3. I will try to continue working with the prosthetic department on getting the new prosthetic.  The patient has the prescriptions to take to the primary care provider.        #3  Other (pt-stated)     Notes - Note created  4/13/2021 12:55 PM by Darin Dickson RN    Goal Statement: I will make my appointment for a colonoscopy following the completion of my prostate cancer treatment.  Date Goal set: 4/13/2021  Barriers: Concerned about the prep  Strengths: Engaged in care coordination.  Date to Achieve By: 10/13/2021  Patient expressed understanding of goal: Yes  Action steps to achieve this goal:  1. I will speak with the PCP regarding orders for a colonoscopy following my treatment for prostate cancer.  2. I will seek a second opinion from the [facility], my appointment is in July.  3. I will schedule the colonoscopy earlier if indicated from the PCP.              Patient/Caregiver understanding: Patient has a very good understanding of the disease process.    Outreach Frequency: monthly  Future Appointments              In 3 weeks [facility]    In 4 weeks Quinn Rodriguez MD [facility]          Plan: 1.  The patient will follow up with all appointments as recommended by the providers.  2.  The patient will use other modalities such as ice and heat for pain management.  3.  The patient will speak with his PCP regarding the colonoscopy and waiting until after his prostate  cancer has been treated.        Darin Yan MSN, RN, PHN, CCM   Primary Care Clinical RN Care Coordinator  [facility]  4/13/2021   1:05 PM  [email or web address]  Office: [phone]

## 2021-04-13 NOTE — LETTER
formerly [facility]  Complex Care Plan  About Me:    Patient Name:  Constantino Taylor    YOB: 1955  Age:         65 year old   Joyce MRN:    [id number] Telephone Information:  Home Phone [phone]   Mobile [phone]       Address:  [facility] [address] [address] Email address:  [email or web address]      Emergency Contact(s)    Name Relationship Lgl Grd Work Phone Home Phone Mobile Phone   1JUAN MIGUEL ROBERTS Sister No  [phone]           Primary language:  English     needed? No   [facility] [employer] [facility]:  [phone] op. 1  Other communication barriers: Cognitive impairment  Preferred Method of Communication:  Mail  Current living arrangement: I live alone  Mobility Status/ Medical Equipment: Independent w/Device    Health Maintenance  Health Maintenance Reviewed: Due/Overdue   Health Maintenance Due   Topic Date Due     HEPATITIS B IMMUNIZATION (3 of 3 - Hep B Twinrix risk 3-dose series) 07/01/2013     HF ACTION PLAN  10/17/2019     COVID-19 Vaccine (2 - Moderna 2-dose series) 04/15/2021         My Access Plan  Medical Emergency 911   Primary Clinic Line [facility] - [phone]   24 Hour Appointment Line [phone] or  [phone]) (toll-free)   24 Hour Nurse Line [phone] (toll-free)   Preferred Urgent Care     Preferred Hospital [facility]  [phone]   Preferred Pharmacy [id number] [address] AT [facility]     Behavioral Health Crisis Line The National Suicide Prevention Lifeline at 1-313.900.7098 or 911             My Care Team Members  Patient Care Team       Relationship Specialty Notifications Start End    Avery Duke MD PCP - General Internal Medicine  5/14/15     Phone: [phone] Fax: [phone] [address] [facility] [address]  [facility] [address]    Avery Duke MD Assigned PCP   7/5/15     Phone: [phone] Fax: [phone]         [address] [facility] [address]    Piper Polk, RN Nurse Coordinator Vascular Surgery Abnormal results only, Admissions 7/16/15     Phone: [phone] Pager: [phone]        Odessa Browning MD MD Vascular Surgery  8/10/15     Phone: [phone] Fax: [phone]         [address] [facility] [address] [facility] [address]    Norah Brown MD MD Cardiology  2/7/16     Phone: [phone] Fax: [phone]         [address] [facility] [address] [facility] [address]    Cheyenne Mansfield MD MD Internal Medicine  2/9/16     Quinn Rodriguez MD MD Oncology Admissions 9/25/17     Phone: [phone] Fax: [phone]         [address] [facility] [address] [facility] [address]    Bryanna Rubio, RN Specialty Care Coordinator Cardiology Admissions 10/18/19     Phone: [phone]         oNrah Brown MD MD Cardiology  10/18/19     Phone: [phone] Fax: [phone]         [address] [facility] [address] [facility] [address]    Darin Dickson, RN Lead Care Coordinator Primary Care - CC Admissions 11/4/19     Phone: [phone] Fax: [phone]        Michelle Matute MD Assigned Heart and Vascular Provider   10/23/20     Phone: [phone] Fax: [phone]         [address] [facility] [address]    Debbi Short PAKasiaC Assigned Cancer Care Provider   3/17/21     Phone: [phone] Fax: [phone]         [address] [facility] [address] [facility] [address]            My Care Plans  Self Management and Treatment Plan  Goals and (Comments)  Goals        General    #1  Functional (pt-stated)     Notes - Note edited  2/4/2021  9:50 AM by Darin Dickson RN    Goal Statement: I will call for an appointment with the prothesis department for a stump  and to be fitted for a prothesis.  I will attend the appointments needed to complete this process.  Date Goal set: 1/8/2020  Date to Achieve  By: 12/8/2021  Patient expressed understanding of goal: yes  Action steps to achieve this goal:  1. I will call for the first assessment appointment and attend.  2. I will attend all of the appointments needed to get the stump  process complete.  3. I will attend all of the appointments needed to complete the process for the prothesis.            #2  Pain Management (pt-stated)     Notes - Note created  4/13/2021 12:51 PM by Darin Dickson, RN    Goal Statement: I will add additional modalities to my pain management program in addition to the pain medication, and in order to reduce the pain from a 7/10 toward a 4/10.  Date Goal set: 4/13/2021  Barriers: Has never tried ice or heat before.  Strengths: Engaged in care coordination  Date to Achieve By: 10/13/2021  Patient expressed understanding of goal: Yes  Action steps to achieve this goal:  1. I will take all my pain medications as prescribed by the providers.  2. I will use other modalities such as ice or heat, with 20 minutes on at least 2-3 times a day.  3. I will try to continue working with the prosthetic department on getting the new prosthetic.  The patient has the prescriptions to take to the primary care provider.        #3  Other (pt-stated)     Notes - Note created  4/13/2021 12:55 PM by Darin Dickson, RN    Goal Statement: I will make my appointment for a colonoscopy following the completion of my prostate cancer treatment.  Date Goal set: 4/13/2021  Barriers: Concerned about the prep  Strengths: Engaged in care coordination.  Date to Achieve By: 10/13/2021  Patient expressed understanding of goal: Yes  Action steps to achieve this goal:  1. I will speak with the PCP regarding orders for a colonoscopy following my treatment for prostate cancer.  2. I will seek a second opinion from the [facility], my appointment is in July.  3. I will schedule the colonoscopy earlier if indicated from the PCP.               Action Plans on File:                        Advance Care Plans/Directives Type:        My Medical and Care Information  Problem List   Patient Active Problem List   Diagnosis     Cerebral infarction (H)     Chronic hepatitis C without hepatic coma (H)     Tobacco use disorder     Chronic low back pain     Acute pancreatitis     Hyperlipidemia LDL goal <70     Hypertension goal BP (blood pressure) < 140/90     Nonischemic dilated cardiomyopathy (HCC)     Malignant neoplasm of prostate (H)     Erectile dysfunction     Eczema     PAD (peripheral artery disease) (H)     S/P BKA (below knee amputation) unilateral (H)     Encounter for counseling     Acute renal failure (H)     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    Care Coordination Start Date: 11/11/2019   Frequency of Care Coordination: monthly   Form Last Updated: 04/13/2021

## 2021-04-13 NOTE — LETTER
[facility] CARE COORDINATION  [address] [facility]  [place] [address]    April 13, 2021    Constantino Taylor  [address]   [place] [address]      Dear Constantino,    I am a clinic care coordinator who works with Avery Duke MD at [facility]. I wanted to thank you for spending the time to talk with me.  Below is a description of clinic care coordination and how I can further assist you.      The clinic care coordination team is made up of a registered nurse,  and community health worker who understand the health care system. The goal of clinic care coordination is to help you manage your health and improve access to the health care system in the most efficient manner. The team can assist you in meeting your health care goals by providing education, coordinating services, strengthening the communication among your providers and supporting you with any resource needs.    Please feel free to contact me at [phone] with any questions or concerns. We are focused on providing you with the highest-quality healthcare experience possible and that all starts with you.     Sincerely,     Darin Yan MSN, RN, PHN, [facility]   Primary Care Clinical RN Care Coordinator  [facility]  4/13/2021   1:01 PM  [email or web address]  Office: [phone]      Enclosed: I have enclosed a copy of the Complex Care Plan. This has helpful information and goals that we have talked about. Please keep this in an easy to access place to use as needed.

## 2021-04-19 DIAGNOSIS — M54.40 CHRONIC LOW BACK PAIN WITH SCIATICA, SCIATICA LATERALITY UNSPECIFIED, UNSPECIFIED BACK PAIN LATERALITY: ICD-10-CM

## 2021-04-19 DIAGNOSIS — G89.29 CHRONIC LOW BACK PAIN WITH SCIATICA, SCIATICA LATERALITY UNSPECIFIED, UNSPECIFIED BACK PAIN LATERALITY: ICD-10-CM

## 2021-04-19 DIAGNOSIS — G89.18 ACUTE POST-OPERATIVE PAIN: ICD-10-CM

## 2021-04-19 RX ORDER — OXYCODONE HYDROCHLORIDE 10 MG/1
10 TABLET ORAL EVERY 6 HOURS PRN
Qty: 120 TABLET | Refills: 0 | Status: SHIPPED | OUTPATIENT
Start: 2021-04-19 | End: 2021-05-20

## 2021-04-21 DIAGNOSIS — C61 PROSTATE CANCER (H): Primary | ICD-10-CM

## 2021-04-21 RX ORDER — ABIRATERONE ACETATE 250 MG/1
500 TABLET ORAL DAILY
Qty: 60 TABLET | Refills: 0 | OUTPATIENT
Start: 2021-04-21 | End: 2021-04-21

## 2021-04-21 RX ORDER — ABIRATERONE ACETATE 250 MG/1
500 TABLET ORAL DAILY
Qty: 60 TABLET | Refills: 0 | Status: SHIPPED | OUTPATIENT
Start: 2021-04-21 | End: 2021-05-28

## 2021-04-21 RX ORDER — PREDNISONE 5 MG/1
5 TABLET ORAL
Qty: 30 TABLET | Refills: 0 | Status: SHIPPED | OUTPATIENT
Start: 2021-04-21 | End: 2021-05-28

## 2021-04-27 ENCOUNTER — OFFICE VISIT (OUTPATIENT)
Dept: FAMILY MEDICINE | Facility: CLINIC | Age: 66
End: 2021-04-27
Payer: COMMERCIAL

## 2021-04-27 VITALS
HEART RATE: 119 BPM | DIASTOLIC BLOOD PRESSURE: 89 MMHG | OXYGEN SATURATION: 99 % | WEIGHT: 210 LBS | TEMPERATURE: 97.4 F | SYSTOLIC BLOOD PRESSURE: 162 MMHG | BODY MASS INDEX: 26.96 KG/M2

## 2021-04-27 DIAGNOSIS — I10 HYPERTENSION GOAL BP (BLOOD PRESSURE) < 140/90: Primary | ICD-10-CM

## 2021-04-27 DIAGNOSIS — I50.22 CHRONIC SYSTOLIC HEART FAILURE (H): ICD-10-CM

## 2021-04-27 DIAGNOSIS — I48.0 PAROXYSMAL ATRIAL FIBRILLATION (H): ICD-10-CM

## 2021-04-27 DIAGNOSIS — C61 MALIGNANT NEOPLASM OF PROSTATE (H): ICD-10-CM

## 2021-04-27 PROCEDURE — 99214 OFFICE O/P EST MOD 30 MIN: CPT | Performed by: INTERNAL MEDICINE

## 2021-04-27 RX ORDER — DOXAZOSIN 4 MG/1
4 TABLET ORAL AT BEDTIME
Qty: 31 TABLET | Refills: 11 | Status: SHIPPED | OUTPATIENT
Start: 2021-04-27 | End: 2021-08-30

## 2021-04-27 NOTE — PROGRESS NOTES
Assessment & Plan   Problem List Items Addressed This Visit     Atrial fibrillation (H)    Chronic systolic heart failure (H)    Hypertension goal BP (blood pressure) < 140/90 - Primary    Relevant Medications    doxazosin (CARDURA) 4 MG tablet    Malignant neoplasm of prostate (H)         Patient wants to consider medical marajuana program as he is using currently and finds it helps reduce the need for the narcotic medications    956}     Work on weight loss  Regular exercise    No follow-ups on file.    Avery Duke MD  [facility] [place] M[place] is a 65 year old who presents for the following health issues     HPI     Discuss forms  Sleeping issues due to urinating at night  Heading to [place] - July 6-7   zytiga   Trying to eat 0   Worried about the eye site  Affecting eyesite        Review of Systems   Constitutional, HEENT, cardiovascular, pulmonary, gi and gu systems are negative, except as otherwise noted.      Objective    BP (!) 162/89   Pulse 119   Temp 97.4  F (36.3  C)   Wt 95.3 kg (210 lb)   SpO2 99%   BMI 26.96 kg/m    Body mass index is 26.96 kg/m .  Physical Exam   GENERAL: healthy, alert and no distress  EYES: Eyes grossly normal to inspection, PERRL and conjunctivae and sclerae normal  HENT: ear canals and TM's normal, nose and mouth without ulcers or lesions  NECK: no adenopathy, no asymmetry, masses, or scars and thyroid normal to palpation  RESP: lungs clear to auscultation - no rales, rhonchi or wheezes  CV: regular rate and rhythm, normal S1 S2, no S3 or S4, no murmur, click or rub, no peripheral edema and peripheral pulses strong  ABDOMEN: soft, nontender, no hepatosplenomegaly, no masses and bowel sounds normal  MS: left limb s/p amputation and fitted with prosthesis  SKIN: no suspicious lesions or rashes  NEURO: Normal strength and tone, mentation intact and speech normal  BACK: no CVA tenderness, no paralumbar tenderness  PSYCH: mentation appears  normal, affect normal/bright  LYMPH: no cervical, supraclavicular, axillary, or inguinal adenopathy    No results found for any visits on 04/27/21.

## 2021-04-27 NOTE — PATIENT INSTRUCTIONS
[email or web address]    Medical cannabis contacts

## 2021-04-29 ENCOUNTER — TELEPHONE (OUTPATIENT)
Dept: ONCOLOGY | Facility: CLINIC | Age: 66
End: 2021-04-29

## 2021-04-29 NOTE — TELEPHONE ENCOUNTER
Oral Chemotherapy Monitoring Program    Subjective/Objective:  Constantino Taylor is a 65 year old male contacted by phone for a follow-up visit for oral chemotherapy.  Constantino confirmed his dose of abiraterone. He reported 3 missed doses since receiving his last bottle in March. Constantino noted that he has not been sleeping well because he has to get up frequently during the night to urinate.He saw a doctor for this yesterday. He said that he was prescribed a new med. Upon looking into his chart, the patient was started on doxazosin 4mg. Constantino denied any other side effects or concerns with abiraterone.    ORAL CHEMOTHERAPY 2/17/2021 2/21/2021 3/12/2021 3/22/2021 3/23/2021 4/21/2021 4/29/2021   Assessment Type Refill Chart Review Chart Review Refill Incoming phone call;Refill;Other Refill Monthly Follow up   Diagnosis Code Prostate Cancer Prostate Cancer Prostate Cancer Prostate Cancer Prostate Cancer Prostate Cancer Prostate Cancer   Providers Jennifer Rodriguez   [facility]   Drug Name Zytiga (Abiraterone) Zytiga (Abiraterone) Zytiga (Abiraterone) Zytiga (abiraterone) Zytiga (abiraterone) Zytiga (abiraterone) Zytiga (abiraterone)   Dose 500 mg 500 mg 500 mg 500 mg 500 mg 500 mg 500 mg   Current Schedule AM AM AM AM AM AM AM   Cycle Details Continuous Continuous Continuous Continuous Continuous Continuous Continuous   Start Date of Last Cycle - - - - - - -   Planned next cycle start date - - - - - - -   Doses missed in last 2 weeks - - - - - - 2   Adherence Assessment - - - - - - Non-adherent   Reason for Non-adherence - - - - - - Patient forgets   Adherence Intervention Recommended - - - - - - Alarm   Adverse Effects - - - - - - No AE identified during assessment   Other (See Note for Details) - - - - - - -   Pharmacist intervention(other) - - - - - - -   Any new drug interactions? - - - - - - No   Pharmacist Intervention?  "- - - - - - -   Intervention(s) - - - - - - -   Is the dose as ordered appropriate for the patient? - - - - - - Yes   Pharmacist intervention for dose adjustment? - - - - - - -   Intervention(s) - - - - - - -   Is the patient currently in pain? - - - - - - -   Has the patient been assessed within the past 6 months for depression? - - - - - - -   Has the patient missed any days of school, work, or other routine activity? - - - - - - -   Since the last time we talked, have you been hospitalized or used the emergency room? - - - - - - -       Last PHQ-2 Score on record:   PHQ-2 ( [facility]) 2/11/2021 1/2/2020   Q1: Little interest or pleasure in doing things 0 0   Q2: Feeling down, depressed or hopeless 0 0   PHQ-2 Score 0 0   Q1: Little interest or pleasure in doing things Not at all -   Q2: Feeling down, depressed or hopeless Not at all -   PHQ-2 Score 0 -       Vitals:  BP:   BP Readings from Last 1 Encounters:   04/27/21 (!) 162/89     Wt Readings from Last 1 Encounters:   04/27/21 95.3 kg (210 lb)     Estimated body surface area is 2.23 meters squared as calculated from the following:    Height as of 2/11/21: 1.88 m (6' 2\").    Weight as of 4/27/21: 95.3 kg (210 lb).    Assessment/Plan:  Constantino is tolerating abiraterone therapy well. Non-adherence continues to be an issue. We discussed the importance of taking the medication daily and setting an alarm to help remember. Constantino will start doxazosin 4mg daily today or tomorrow for nighttime awakenings and high blood pressure. Per UpToDate, nocturia occurs in 6% of patients on abiraterone. Hypertension also occurs in 9-37% of patients. There are no drug-drug interaction between doxazosin and abiraterone (UpToDate). He should continue therapy as planned. Continue to monitor blood pressure and nocturia    Follow-Up:  Review labs and Jennifer appt 5/11    Tiffany Vargas  Pharmacy Intern  [facility]  [phone]      "

## 2021-05-12 ENCOUNTER — PATIENT OUTREACH (OUTPATIENT)
Dept: NURSING | Facility: CLINIC | Age: 66
End: 2021-05-12
Payer: COMMERCIAL

## 2021-05-12 NOTE — PROGRESS NOTES
Clinic Care Coordination Contact    Follow Up Progress Note      Assessment: The RN CC nurse care coordinator contacted the patient by phone for a follow up call.  The patient states that he is going to [facility] in [place] for a second opinion the beginning of July as he is unhappy with the treatment of his prostate cancer.  The RN CC also question the patient about his prosthetic.  The patient states that he has been seeing the prosthetic department and they gave him a different stump cover, which makes wearing the prosthetic easier.  The patient is still working on being totally comfortable with the prosthetic.  The patient states that he has pain level is a 7/10, and this is where it tends to stay.  He is using pain medications as well as other modalities of ice and/or heat as needed.  The patient is also using cannabis and finds this as a good substitute to the opioids.  At this time he has not been cleared for the use of medical cannabis but he is looking into it.    Medication reconciliation was completed with the patient and marked as reviewed.  Health maintenance was reviewed and questions were answered with the patient.  The assessment for hypertension was completed with the patient today as well as the social determinants of health and they were marked as reviewed.      Goals addressed this encounter:   Goals Addressed                 This Visit's Progress      #1  Functional (pt-stated)   50%     Goal Statement: I will call for an appointment with the prothesis department for a stump  and to be fitted for a prothesis.  I will attend the appointments needed to complete this process.  Date Goal set: 1/8/2020  Date to Achieve By: 12/8/2021  Patient expressed understanding of goal: yes  Action steps to achieve this goal:  1. I will call for the first assessment appointment and attend.  2. I will attend all of the appointments needed to get the stump  process complete.  3. I will attend all of  the appointments needed to complete the process for the prothesis.              #2  Pain Management (pt-stated)   40%     Goal Statement: I will add additional modalities to my pain management program in addition to the pain medication, and in order to reduce the pain from a 7/10 toward a 4/10.  Date Goal set: 4/13/2021  Barriers: Has never tried ice or heat before.  Strengths: Engaged in care coordination  Date to Achieve By: 10/13/2021  Patient expressed understanding of goal: Yes  Action steps to achieve this goal:  1. I will take all my pain medications as prescribed by the providers.  2. I will use other modalities such as ice or heat, with 20 minutes on at least 2-3 times a day.  3. I will try to continue working with the prosthetic department on getting the new prosthetic.  The patient has the prescriptions to take to the primary care provider.          #3  Other (pt-stated)   10%     Goal Statement: I will make my appointment for a colonoscopy following the completion of my prostate cancer treatment.  Date Goal set: 4/13/2021  Barriers: Concerned about the prep  Strengths: Engaged in care coordination.  Date to Achieve By: 10/13/2021  Patient expressed understanding of goal: Yes  Action steps to achieve this goal:  1. I will speak with the PCP regarding orders for a colonoscopy following my treatment for prostate cancer.  2. I will seek a second opinion from the [facility], my appointment is in July.  3. I will schedule the colonoscopy earlier if indicated from the PCP.               Intervention/Education provided during outreach: Reviewed with the patient pain management with medications and other modalities.     Outreach Frequency: monthly    Plan:   1.  The patient will work with the prosthetic department on finding the correct fit for his prosthetic so that if he is able to use it.  2.  The patient will use pain medication and other modalities to control his pain level.  3.  The patient will follow up  with all providers as recommended.    RN CC Nurse Care Coordinator will follow up in 4 weeks.        Darin Yan MSN, RN, PHN, [place]   Primary Care Clinical RN Care Coordinator  [facility]  5/12/2021   3:44 PM  [email or web address]  Office: [phone]

## 2021-05-17 ENCOUNTER — TELEPHONE (OUTPATIENT)
Dept: ONCOLOGY | Facility: CLINIC | Age: 66
End: 2021-05-17

## 2021-05-17 DIAGNOSIS — N18.31 STAGE 3A CHRONIC KIDNEY DISEASE (H): ICD-10-CM

## 2021-05-17 DIAGNOSIS — Z79.899 ENCOUNTER FOR LONG-TERM (CURRENT) USE OF MEDICATIONS: ICD-10-CM

## 2021-05-17 DIAGNOSIS — C61 PROSTATE CANCER (H): ICD-10-CM

## 2021-05-17 LAB
ALBUMIN SERPL-MCNC: 3.8 G/DL (ref 3.4–5)
ALP SERPL-CCNC: 93 U/L (ref 40–150)
ALT SERPL W P-5'-P-CCNC: 25 U/L (ref 0–70)
ANION GAP SERPL CALCULATED.3IONS-SCNC: 5 MMOL/L (ref 3–14)
AST SERPL W P-5'-P-CCNC: 15 U/L (ref 0–45)
BASOPHILS # BLD AUTO: 0 10E9/L (ref 0–0.2)
BASOPHILS NFR BLD AUTO: 0.6 %
BILIRUB SERPL-MCNC: 0.5 MG/DL (ref 0.2–1.3)
BUN SERPL-MCNC: 15 MG/DL (ref 7–30)
CALCIUM SERPL-MCNC: 9.2 MG/DL (ref 8.5–10.1)
CHLORIDE SERPL-SCNC: 110 MMOL/L (ref 94–109)
CO2 SERPL-SCNC: 26 MMOL/L (ref 20–32)
CREAT SERPL-MCNC: 1.31 MG/DL (ref 0.66–1.25)
DIFFERENTIAL METHOD BLD: NORMAL
EOSINOPHIL # BLD AUTO: 0.2 10E9/L (ref 0–0.7)
EOSINOPHIL NFR BLD AUTO: 3 %
ERYTHROCYTE [DISTWIDTH] IN BLOOD BY AUTOMATED COUNT: 12.9 % (ref 10–15)
GFR SERPL CREATININE-BSD FRML MDRD: 57 ML/MIN/{1.73_M2}
GLUCOSE SERPL-MCNC: 98 MG/DL (ref 70–99)
HCT VFR BLD AUTO: 41.3 % (ref 40–53)
HGB BLD-MCNC: 13.8 G/DL (ref 13.3–17.7)
IMM GRANULOCYTES # BLD: 0 10E9/L (ref 0–0.4)
IMM GRANULOCYTES NFR BLD: 0.2 %
LYMPHOCYTES # BLD AUTO: 1.3 10E9/L (ref 0.8–5.3)
LYMPHOCYTES NFR BLD AUTO: 23.8 %
MCH RBC QN AUTO: 31 PG (ref 26.5–33)
MCHC RBC AUTO-ENTMCNC: 33.4 G/DL (ref 31.5–36.5)
MCV RBC AUTO: 93 FL (ref 78–100)
MONOCYTES # BLD AUTO: 0.5 10E9/L (ref 0–1.3)
MONOCYTES NFR BLD AUTO: 9.3 %
NEUTROPHILS # BLD AUTO: 3.4 10E9/L (ref 1.6–8.3)
NEUTROPHILS NFR BLD AUTO: 63.1 %
NRBC # BLD AUTO: 0 10*3/UL
NRBC BLD AUTO-RTO: 0 /100
PLATELET # BLD AUTO: 218 10E9/L (ref 150–450)
POTASSIUM SERPL-SCNC: 4 MMOL/L (ref 3.4–5.3)
PROT SERPL-MCNC: 8.3 G/DL (ref 6.8–8.8)
PSA SERPL-MCNC: 24.9 UG/L (ref 0–4)
RBC # BLD AUTO: 4.45 10E12/L (ref 4.4–5.9)
SODIUM SERPL-SCNC: 140 MMOL/L (ref 133–144)
WBC # BLD AUTO: 5.4 10E9/L (ref 4–11)

## 2021-05-17 PROCEDURE — 80053 COMPREHEN METABOLIC PANEL: CPT | Performed by: PATHOLOGY

## 2021-05-17 PROCEDURE — 36415 COLL VENOUS BLD VENIPUNCTURE: CPT | Performed by: PATHOLOGY

## 2021-05-17 PROCEDURE — 84153 ASSAY OF PSA TOTAL: CPT | Performed by: PATHOLOGY

## 2021-05-17 PROCEDURE — 85025 COMPLETE CBC W/AUTO DIFF WBC: CPT | Performed by: PATHOLOGY

## 2021-05-17 NOTE — TELEPHONE ENCOUNTER
Oral Chemotherapy Monitoring Program  Lab Follow Up    Reviewed lab results from today and sent a [employer] message to the patient on the results.    ORAL CHEMOTHERAPY 2/21/2021 3/12/2021 3/22/2021 3/23/2021 4/21/2021 4/29/2021 5/17/2021   Assessment Type Chart Review Chart Review Refill Incoming phone call;Refill;Other Refill Monthly Follow up Lab Monitoring;Other   Diagnosis Code Prostate Cancer Prostate Cancer Prostate Cancer Prostate Cancer Prostate Cancer Prostate Cancer Prostate Cancer   Providers Jennifer Rodriguez   [facility]   Drug Name Zytiga (Abiraterone) Zytiga (Abiraterone) Zytiga (abiraterone) Zytiga (abiraterone) Zytiga (abiraterone) Zytiga (abiraterone) Zytiga (abiraterone)   Dose 500 mg 500 mg 500 mg 500 mg 500 mg 500 mg 500 mg   Current Schedule AM AM AM AM AM AM AM   Cycle Details Continuous Continuous Continuous Continuous Continuous Continuous Continuous   Start Date of Last Cycle - - - - - - -   Planned next cycle start date - - - - - - -   Doses missed in last 2 weeks - - - - - 2 -   Adherence Assessment - - - - - Non-adherent -   Reason for Non-adherence - - - - - Patient forgets -   Adherence Intervention Recommended - - - - - Alarm -   Adverse Effects - - - - - No AE identified during assessment -   Other (See Note for Details) - - - - - - -   Pharmacist intervention(other) - - - - - - -   Any new drug interactions? - - - - - No -   Pharmacist Intervention? - - - - - - -   Intervention(s) - - - - - - -   Is the dose as ordered appropriate for the patient? - - - - - Yes -   Pharmacist intervention for dose adjustment? - - - - - - -   Intervention(s) - - - - - - -   Is the patient currently in pain? - - - - - - -   Has the patient been assessed within the past 6 months for depression? - - - - - - -   Has the patient missed any days of school, work, or other routine activity? - - - - - - -   Since  the last time we talked, have you been hospitalized or used the emergency room? - - - - - - -       Labs:  _  Result Component Current Result Ref Range   Sodium 140 (5/17/2021) 133 - 144 mmol/L     _  Result Component Current Result Ref Range   Potassium 4.0 (5/17/2021) 3.4 - 5.3 mmol/L     _  Result Component Current Result Ref Range   Calcium 9.2 (5/17/2021) 8.5 - 10.1 mg/dL     No results found for Mag within last 30 days.     No results found for Phos within last 30 days.     _  Result Component Current Result Ref Range   Albumin 3.8 (5/17/2021) 3.4 - 5.0 g/dL     _  Result Component Current Result Ref Range   Urea Nitrogen 15 (5/17/2021) 7 - 30 mg/dL     _  Result Component Current Result Ref Range   Creatinine 1.31 (H) (5/17/2021) 0.66 - 1.25 mg/dL     _  Result Component Current Result Ref Range   AST 15 (5/17/2021) 0 - 45 U/L     _  Result Component Current Result Ref Range   ALT 25 (5/17/2021) 0 - 70 U/L     _  Result Component Current Result Ref Range   Bilirubin Total 0.5 (5/17/2021) 0.2 - 1.3 mg/dL     _  Result Component Current Result Ref Range   WBC 5.4 (5/17/2021) 4.0 - 11.0 10e9/L     _  Result Component Current Result Ref Range   Hemoglobin 13.8 (5/17/2021) 13.3 - 17.7 g/dL     _  Result Component Current Result Ref Range   Platelet Count 218 (5/17/2021) 150 - 450 10e9/L     _  Result Component Current Result Ref Range   Absolute Neutrophil 3.4 (5/17/2021) 1.6 - 8.3 10e9/L       Assessment & Plan:  No concerning abnormalities. Continue Zytiga therapy as planned.     Follow-Up:  6/1: appt with Dr. Jennifer Lynn, PharmD, BCACP  Oral Chemotherapy Monitoring Program  [facility]  [phone]

## 2021-05-18 DIAGNOSIS — G89.18 ACUTE POST-OPERATIVE PAIN: ICD-10-CM

## 2021-05-18 DIAGNOSIS — G89.29 CHRONIC LOW BACK PAIN WITH SCIATICA, SCIATICA LATERALITY UNSPECIFIED, UNSPECIFIED BACK PAIN LATERALITY: ICD-10-CM

## 2021-05-18 DIAGNOSIS — M54.40 CHRONIC LOW BACK PAIN WITH SCIATICA, SCIATICA LATERALITY UNSPECIFIED, UNSPECIFIED BACK PAIN LATERALITY: ICD-10-CM

## 2021-05-18 NOTE — TELEPHONE ENCOUNTER
Routing refill request to provider for review/approval because:  Drug not on the [facility] refill protocol     Requested Prescriptions   Pending Prescriptions Disp Refills     oxyCODONE IR (ROXICODONE) 10 MG tablet 120 tablet 0     Sig: Take 1 tablet (10 mg) by mouth every 6 hours as needed for moderate to severe pain or severe pain . No further refills until follow-up appointment       There is no refill protocol information for this order

## 2021-05-19 LAB — TESTOST SERPL-MCNC: 162 NG/DL (ref 240–950)

## 2021-05-19 NOTE — TELEPHONE ENCOUNTER
Patient called inquiring about the refill status of the oxyCODONE IR (ROXICODONE) 10 MG tablet.  He is completely out of this medication. Requesting refill to be sent to his local [facility] today.

## 2021-05-20 RX ORDER — OXYCODONE HYDROCHLORIDE 10 MG/1
10 TABLET ORAL EVERY 6 HOURS PRN
Qty: 120 TABLET | Refills: 0 | Status: SHIPPED | OUTPATIENT
Start: 2021-05-20 | End: 2021-06-25

## 2021-05-28 DIAGNOSIS — C61 PROSTATE CANCER (H): Primary | ICD-10-CM

## 2021-05-28 RX ORDER — ABIRATERONE ACETATE 250 MG/1
500 TABLET ORAL DAILY
Qty: 60 TABLET | Refills: 0 | Status: SHIPPED | OUTPATIENT
Start: 2021-05-28 | End: 2021-07-02

## 2021-05-28 RX ORDER — PREDNISONE 5 MG/1
5 TABLET ORAL
Qty: 30 TABLET | Refills: 0 | Status: SHIPPED | OUTPATIENT
Start: 2021-05-28 | End: 2021-07-02

## 2021-06-01 ENCOUNTER — VIRTUAL VISIT (OUTPATIENT)
Dept: ONCOLOGY | Facility: CLINIC | Age: 66
End: 2021-06-01
Attending: INTERNAL MEDICINE
Payer: COMMERCIAL

## 2021-06-01 DIAGNOSIS — C61 PROSTATE CANCER (H): Primary | ICD-10-CM

## 2021-06-01 PROCEDURE — 999N001193 HC VIDEO/TELEPHONE VISIT; NO CHARGE

## 2021-06-01 PROCEDURE — 99214 OFFICE O/P EST MOD 30 MIN: CPT | Mod: TEL | Performed by: INTERNAL MEDICINE
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
## 2021-06-01 NOTE — PROGRESS NOTES
"Constantino is a 65 year old who is being evaluated via a billable telephone visit.      What phone number would you like to be contacted at? [phone]  How would you like to obtain your AVS? MyChart     Vitals - Patient Reported  Weight (Patient Reported): 95.3 kg (210 lb)  Height (Patient Reported): 188 cm (6' 2\")  BMI (Based on Pt Reported Ht/Wt): 26.96  Pain Score: Severe Pain (7)(Left Leg)  Pain Loc: Other - see comment    Refills : Karlee Smith CMA June 1, 2021  2:31 PM         "

## 2021-06-03 ENCOUNTER — DOCUMENTATION ONLY (OUTPATIENT)
Dept: ONCOLOGY | Facility: CLINIC | Age: 66
End: 2021-06-03
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
## 2021-06-08 ENCOUNTER — TELEPHONE (OUTPATIENT)
Dept: ONCOLOGY | Facility: CLINIC | Age: 66
End: 2021-06-08

## 2021-06-08 NOTE — ORAL ONC MGMT
Oral Chemotherapy Monitoring Program    Placed call to patient in follow up of abiraterone therapy.    Left message to please call back in follow up of therapy. No patient or drug names were mentioned.    Norah Pineda  Pharmacy Intern  [facility]  [phone]

## 2021-06-14 ENCOUNTER — TELEPHONE (OUTPATIENT)
Dept: ONCOLOGY | Facility: CLINIC | Age: 66
End: 2021-06-14

## 2021-06-14 NOTE — ORAL ONC MGMT
Oral Chemotherapy Monitoring Program    Placed call to patient in follow up of abiraterone therapy.    Patient picked up phone call but said he is unavailable to talk right now. He said he would be available for a call back tomorrow 6/14 afternoon.    Norah Pineda  Pharmacy Intern  Oral Chemotherapy Monitoring Program   [facility]  [phone]

## 2021-06-15 ENCOUNTER — PATIENT OUTREACH (OUTPATIENT)
Dept: NURSING | Facility: CLINIC | Age: 66
End: 2021-06-15
Payer: COMMERCIAL

## 2021-06-15 NOTE — PROGRESS NOTES
Clinic Care Coordination Contact    Follow Up Progress Note      Assessment: The RN CC nurse care coordinator contacted patient by phone for a follow-up call.  Patient states that he is still taking the chemotherapy drug from the [facility] oncology department.  He has a second opinion at the oncology department for [facility] on July 6 and 7.  The patient states that he is still working on the prosthesis, there is still some pain.  The patient is also going to have a follow-up with Dr. Duke his PCP to discuss his colonoscopy and whether he should have it before he goes to [facility] or after he is done with his treatment for his prostate cancer.    Medication reconciliation was completed with the patient and marked as reviewed.  Health maintenance was reviewed and questions were answered with the patient.  The assessment for hypertension was completed with the patient today as well as the social determinants of health and they were marked as reviewed.      Goals addressed this encounter:   Goals Addressed                 This Visit's Progress      #1  Functional (pt-stated)   60%      Goal Statement: I will call for an appointment with the prothesis department for a stump  and to be fitted for a prothesis.  I will attend the appointments needed to complete this process.  Date Goal set: 1/8/2020  Date to Achieve By: 12/8/2021  Patient expressed understanding of goal: yes  Action steps to achieve this goal:  1. I will call for the first assessment appointment and attend. Completed  2. I will attend all of the appointments needed to get the stump  process complete.  3. I will attend all of the appointments needed to complete the process for the prothesis.              #2  Pain Management (pt-stated)   50%     Goal Statement: I will add additional modalities to my pain management program in addition to the pain medication, and in order to reduce the pain from a 7/10 toward a 4/10.  Date Goal set:  4/13/2021  Barriers: Has never tried ice or heat before.  Strengths: Engaged in care coordination  Date to Achieve By: 10/13/2021  Patient expressed understanding of goal: Yes  Action steps to achieve this goal:  1. I will take all my pain medications as prescribed by the providers.  2. I will use other modalities such as ice or heat, with 20 minutes on at least 2-3 times a day.  3. I will try to continue working with the prosthetic department on getting the new prosthetic.  The patient has the prescriptions to take to the primary care provider.          #3  Other (pt-stated)   20%     Goal Statement: I will make my appointment for a colonoscopy following the completion of my prostate cancer treatment.  Date Goal set: 4/13/2021  Barriers: Concerned about the prep  Strengths: Engaged in care coordination.  Date to Achieve By: 10/13/2021  Patient expressed understanding of goal: Yes  Action steps to achieve this goal:  1. I will speak with the PCP regarding orders for a colonoscopy following my treatment for prostate cancer.  2. I will seek a second opinion from the [facility], my appointment is in July.  3. I will schedule the colonoscopy earlier if indicated from the PCP.               Intervention/Education provided during outreach: The RN CC encouraged Constantino to see Dr. Duke for an annual exam.     Outreach Frequency: 6 weeks    Plan:   1.  The patient needs to make an appointment with Dr. Duke for an annual exam.  2.  The patient will take all medications as prescribed by the providers.  3.  The patient will seek a second opinion from [facility] July 6-7.    Following this patient will be delegated to the CHW to follow every 4 weeks.    RN CC nurse Care Coordinator will follow up in 6 weeks.          Darin Yan MSN, RN, PHN, CCM   Primary Care Clinical RN Care Coordinator  [facility]  6/15/2021   3:52 PM  [email or web address]  Office: [phone]

## 2021-06-16 ENCOUNTER — TELEPHONE (OUTPATIENT)
Dept: ONCOLOGY | Facility: CLINIC | Age: 66
End: 2021-06-16

## 2021-06-16 NOTE — ORAL ONC MGMT
Oral Chemotherapy Monitoring Program    Subjective/Objective:  Constantino Taylor is a 65 year old male contacted by phone for a follow-up visit for oral chemotherapy. Constantino confirms he takes 2 tablets of abiraterone daily with breakfast with 2 missed doses in the past 2 weeks because he forgot to take them. For the last week, he has been experiencing headache for about 5 out of 7 days and taking 1 dose of Tylenol has been effective at relieving it, but he is unsure if the headache is associated with taking abiraterone. He has a blood pressure machine at home but he does not use it at home. He says he only uses it if he has a bad headache to see if his blood pressure is really high. Explained to patient abiraterone can worsen increased blood pressure and encouraged him to begin monitoring blood pressure every day. Constantino verbally agreed to use it daily.     When asked of his nocturia problem, he states he still gets up about 4 times every night to urinate. He believes he is taking a medication (does not remember name) for this problem and was told that the medication needs to build up to take effect. He states he has been taking the medication every day for the past month but it does not work. This medication is likely doxazosin prescribed from Dr. Duke's visit on 4/27.    ORAL CHEMOTHERAPY 4/21/2021 4/29/2021 5/17/2021 5/28/2021 6/3/2021 6/8/2021 6/16/2021   Assessment Type Refill Monthly Follow up Lab Monitoring;Other Refill Other Other Monthly Follow up   Diagnosis Code Prostate Cancer Prostate Cancer Prostate Cancer Prostate Cancer - Prostate Cancer Prostate Cancer   Providers Dr. Jennifer Rodriguez - Dr. Jennifer Rodriguez   [facility]   Drug Name Zytiga (abiraterone) Zytiga (abiraterone) Zytiga (abiraterone) Zytiga (abiraterone) - Zytiga (abiraterone) Zytiga (abiraterone)   Dose 500 mg 500 mg 500 mg 500 mg - 500 mg 500 mg   Current Schedule AM  "AM AM AM - AM AM   Cycle Details Continuous Continuous Continuous Continuous - Continuous Continuous   Start Date of Last Cycle - - - - - - -   Planned next cycle start date - - - - - - -   Doses missed in last 2 weeks - 2 - - - - 2   Adherence Assessment - Non-adherent - - - - -   Reason for Non-adherence - Patient forgets - - - - -   Adherence Intervention Recommended - Alarm - - - - -   Adverse Effects - No AE identified during assessment - - - - -   Other (See Note for Details) - - - - - - -   Pharmacist intervention(other) - - - - - - -   Any new drug interactions? - No - - - - -   Pharmacist Intervention? - - - - - - -   Intervention(s) - - - - - - -   Is the dose as ordered appropriate for the patient? - Yes - - - - -   Pharmacist intervention for dose adjustment? - - - - - - -   Intervention(s) - - - - - - -   Is the patient currently in pain? - - - - - - -   Has the patient been assessed within the past 6 months for depression? - - - - - - -   Has the patient missed any days of school, work, or other routine activity? - - - - - - -   Since the last time we talked, have you been hospitalized or used the emergency room? - - - - - - -       Last PHQ-2 Score on record:   PHQ-2 ( 1999 Pfizer) 2/11/2021 1/2/2020   Q1: Little interest or pleasure in doing things 0 0   Q2: Feeling down, depressed or hopeless 0 0   PHQ-2 Score 0 0   Q1: Little interest or pleasure in doing things Not at all -   Q2: Feeling down, depressed or hopeless Not at all -   PHQ-2 Score 0 -       Vitals:  BP:   BP Readings from Last 1 Encounters:   04/27/21 (!) 162/89     Wt Readings from Last 1 Encounters:   04/27/21 95.3 kg (210 lb)     Estimated body surface area is 2.23 meters squared as calculated from the following:    Height as of 2/11/21: 1.88 m (6' 2\").    Weight as of 4/27/21: 95.3 kg (210 lb).    Labs:  No results found for NA within last 30 days.     No results found for K within last 30 days.     No results found for CA within " last 30 days.     No results found for Mag within last 30 days.     No results found for Phos within last 30 days.     No results found for ALBUMIN within last 30 days.     No results found for BUN within last 30 days.     No results found for CR within last 30 days.     No results found for AST within last 30 days.     No results found for ALT within last 30 days.     No results found for BILITOTAL within last 30 days.     No results found for WBC within last 30 days.     No results found for HGB within last 30 days.     No results found for PLT within last 30 days.     No results found for ANC within last 30 days.         Assessment/Plan:  Constantino is tolerating abiraterone therapy well with no concerning adverse effects. He missed 2 doses of abiraterone in the past 2 weeks due to forgetfulness since the last oral chemo follow-up on 4/27. Continue abiraterone therapy as planned.    His nocturia problem is persistent with no noticed improvement. However, he did not seem to be concerned and did not express desire for change in therapy at this time. Unfortunately he does not use his blood pressure cuff at home so it is unknown whether his hypertension is controlled and I have discussed the need to measure blood pressure daily.    Follow-Up:  Sent IB message to Dr. Rodriguez and copied Dr. Duke about the patient's nocturia problem. Sent IB message to scheduling team to reach out to Constantino to schedule monthly labs with him.  Review labs to be scheduled around 6/17.     Norah Pineda  Pharmacy Intern  [facility]  [phone]

## 2021-06-21 ENCOUNTER — TELEPHONE (OUTPATIENT)
Dept: FAMILY MEDICINE | Facility: CLINIC | Age: 66
End: 2021-06-21

## 2021-06-21 DIAGNOSIS — C61 MALIGNANT NEOPLASM OF PROSTATE (H): Primary | ICD-10-CM

## 2021-06-21 DIAGNOSIS — R97.20 ELEVATED PROSTATE SPECIFIC ANTIGEN (PSA): ICD-10-CM

## 2021-06-21 NOTE — TELEPHONE ENCOUNTER
Reason for call:  Order   Order or referral being requested: [facility] see below  Has the patient been seen by the PCP for this problem? Not Applicable    Additional comments: See below    Phone: [phone]  Fax: [phone]    Can we leave a detailed message on this number?  Not Applicable    Travel screening: Not Applicable

## 2021-06-24 NOTE — TELEPHONE ENCOUNTER
Call from July at [place], looking for [employer] insurance referral for patient, see information below. Please call if any questions to [phone]

## 2021-06-25 ENCOUNTER — OFFICE VISIT (OUTPATIENT)
Dept: FAMILY MEDICINE | Facility: CLINIC | Age: 66
End: 2021-06-25
Payer: COMMERCIAL

## 2021-06-25 ENCOUNTER — TELEPHONE (OUTPATIENT)
Dept: FAMILY MEDICINE | Facility: CLINIC | Age: 66
End: 2021-06-25

## 2021-06-25 VITALS
WEIGHT: 213 LBS | OXYGEN SATURATION: 100 % | SYSTOLIC BLOOD PRESSURE: 174 MMHG | DIASTOLIC BLOOD PRESSURE: 112 MMHG | BODY MASS INDEX: 27.35 KG/M2 | HEART RATE: 93 BPM

## 2021-06-25 DIAGNOSIS — M54.40 CHRONIC LOW BACK PAIN WITH SCIATICA, SCIATICA LATERALITY UNSPECIFIED, UNSPECIFIED BACK PAIN LATERALITY: ICD-10-CM

## 2021-06-25 DIAGNOSIS — C61 PROSTATE CANCER (H): ICD-10-CM

## 2021-06-25 DIAGNOSIS — Z89.519 S/P BKA (BELOW KNEE AMPUTATION) UNILATERAL (H): ICD-10-CM

## 2021-06-25 DIAGNOSIS — B18.2 CHRONIC HEPATITIS C WITHOUT HEPATIC COMA (H): ICD-10-CM

## 2021-06-25 DIAGNOSIS — I48.0 PAROXYSMAL ATRIAL FIBRILLATION (H): Primary | ICD-10-CM

## 2021-06-25 DIAGNOSIS — I63.89 CEREBRAL INFARCTION DUE TO OTHER MECHANISM (H): ICD-10-CM

## 2021-06-25 DIAGNOSIS — G89.18 ACUTE POST-OPERATIVE PAIN: ICD-10-CM

## 2021-06-25 DIAGNOSIS — I42.0 NONISCHEMIC DILATED CARDIOMYOPATHY (H): ICD-10-CM

## 2021-06-25 DIAGNOSIS — G89.29 CHRONIC LOW BACK PAIN WITH SCIATICA, SCIATICA LATERALITY UNSPECIFIED, UNSPECIFIED BACK PAIN LATERALITY: ICD-10-CM

## 2021-06-25 PROCEDURE — 99214 OFFICE O/P EST MOD 30 MIN: CPT | Performed by: INTERNAL MEDICINE

## 2021-06-25 RX ORDER — CARVEDILOL 25 MG/1
25 TABLET ORAL 2 TIMES DAILY WITH MEALS
Qty: 62 TABLET | Refills: 4 | Status: SHIPPED | OUTPATIENT
Start: 2021-06-25 | End: 2021-08-30

## 2021-06-25 RX ORDER — OXYCODONE HYDROCHLORIDE 10 MG/1
10 TABLET ORAL EVERY 6 HOURS PRN
Qty: 120 TABLET | Refills: 0 | Status: SHIPPED | OUTPATIENT
Start: 2021-06-25 | End: 2021-07-28

## 2021-06-25 NOTE — TELEPHONE ENCOUNTER
I have submitted both insurance referrals to [employer], all information in referral request.     Dates 06/25/21-12/31/21 x99

## 2021-06-25 NOTE — TELEPHONE ENCOUNTER
Huddled with the provider. He has the order. He will give the order back after completing supportive chart notes from the appointment from June 25, 2021.    Terese Rothman,

## 2021-06-25 NOTE — PROGRESS NOTES
Assessment & Plan   Problem List Items Addressed This Visit     Atrial fibrillation (H) - Primary    Relevant Medications    carvedilol (COREG) 25 MG tablet    Cerebral infarction (H)    Chronic hepatitis C without hepatic coma (H)    Chronic low back pain    Relevant Medications    oxyCODONE IR (ROXICODONE) 10 MG tablet    Nonischemic dilated cardiomyopathy (HCC)    Prostate cancer (H)    S/P BKA (below knee amputation) unilateral (H)      Other Visit Diagnoses     Acute post-operative pain        Relevant Medications    oxyCODONE IR (ROXICODONE) 10 MG tablet         Patient with BKA and slippage from the old prosthesis that makes him a risk for falls and for erosion of the stump  This is a face to face evaluation to document the need for a new prothesis with stabilizing pin  1. Patient with left BKA for PVD with loose fitting prosthesis that rubs and can lead to skin breakdown  Is evaluated for a new prosthesis with pin and screw to stabilize the prosthesis  2.   3. Patient with healed BKA stump with scar tissue formation   4. Patient with melany cardiovascular exam except for BP which medication has been changed today to carvedilol  5.  Arm strength is 5 out of 5 , working on the total gym at home to strengthen upper body  6.  Patient uses gustavo for stability of gait.  Has gait limitations due to previous CVA and equilibrium.  With a stable prosthesis and gustavo patient has stable gait  7. The reason to replace the socket is because of the motion and rubbing and mobility in the current prosthesis .  The current prosthesis slips and leads to changes in gait and risk of falling    Patient has had previosu CVA involveing right side of the bodu        Patient is abble to use prosthesis to ambulate with gustavo as stability agent  Patient was ambulatory prior to amputation except for time with symptoamtic PVD  Patient able to do ADLs in the house but does use the wheelchair when soreness starts in the stump due to  the slippage of the stump    Goals for ambulation are to wallk outside and around the block to the store and adls through the day       Patient has the ability for ambulation with variable randy.  [place] most environemental barriers, variable randy, vovational therapeutic and exercise activitites .    Walk 1-3 miles through the day, 50 stairs a day, yard work/gardening on terraced on hilly terrain, hiking and shopping at the store    Regular exercise    No follow-ups on file.    Avery Duke MD  [facility] [place] is a 65 year old who presents for the following health issues     HPI     Forms for new leg  Medications for Bp and pain medication  Metoprolol goes blind   Pain medication needs update   1. Patient with left BKA for PVD with loose fitting prosthesis that rubs and can lead to skin breakdown  Is evaluated for a new prosthesis with pin and screw to stabilize the prosthesis  2.   3. Patient with healed BKA stump with scar tissue formation   4. Patient with melany cardiovascular exam except for BP which medication has been changed today to carvedilol  5.  Arm strength is 5 out of 5 , working on the total gym at home to strengthen upper body  6.  Patient uses gustavo for stability of gait.  Has gait limitations due to previous CVA and equilibrium.  With a stable prosthesis and gustavo patient has stable gait  7. The reason to replace the socket is because of the motion and rubbing and mobility in the current prosthesis .  The current prosthesis slips and leads to changes in gait and risk of falling    Patient has had previosu CVA involveing right side of the bodu        Patient is abble to use prosthesis to ambulate with gustavo as stability agent  Patient was ambulatory prior to amputation except for time with symptoamtic PVD  Patient able to do ADLs in the house but does use the wheelchair when soreness starts in the stump due to the slippage of the stump    Goals  for ambulation are to wallk outside and around the block to the store and adls through the day       Patient has the ability for ambulation with variable randy.  [place] most environemental barriers, variable randy, vovational therapeutic and exercise activitites .    Walk 1-3 miles through the day, 50 stairs a day, yard work/gardening on terraced on hilly terrain, hiking and shopping at the store      Review of Systems   Constitutional, HEENT, cardiovascular, pulmonary, gi and gu systems are negative, except as otherwise noted.      Objective    BP (!) 174/112 (BP Location: Right arm, Patient Position: Chair, Cuff Size: Adult Large)   Pulse 93   Wt 96.6 kg (213 lb)   SpO2 100%   BMI 27.35 kg/m    Body mass index is 27.35 kg/m .  Physical Exam   GENERAL: healthy, alert and no distress  EYES: Eyes grossly normal to inspection, PERRL and conjunctivae and sclerae normal  HENT: ear canals and TM's normal, nose and mouth without ulcers or lesions  NECK: no adenopathy, no asymmetry, masses, or scars and thyroid normal to palpation  RESP: lungs clear to auscultation - no rales, rhonchi or wheezes  CV: regular rate and rhythm, normal S1 S2, no S3 or S4, no murmur, click or rub, no peripheral edema and peripheral pulses strong  ABDOMEN: soft, nontender, no hepatosplenomegaly, no masses and bowel sounds normal  MS: left stump with scar formation clean dry and intact    SKIN: no suspicious lesions or rashes    No results found for any visits on 06/25/21.

## 2021-06-25 NOTE — TELEPHONE ENCOUNTER
I do not see why patient would need insurance referral, I checked [place] to see if patient is restricted and site down. I will try again later.

## 2021-06-25 NOTE — TELEPHONE ENCOUNTER
Reason for call:  Other   Patient called regarding (reason for call): Patient was in clinic today. Dr. Duke signed orders for the patient and he took them with him. The Prosthesis company needs this faxed to them.       Additional comments: [phone] fax number    Phone number to reach patient:  Cell number on file:    Telephone Information:   Mobile [phone]       Best Time:      Can we leave a detailed message on this number?  YES    Travel screening: Not Applicable

## 2021-06-28 NOTE — TELEPHONE ENCOUNTER
The Form has been completed by the provider, confirmed faxed to the fax number on the form and listed below and a copy has been sent to be added to the chart. Terese Rothman,

## 2021-07-01 ENCOUNTER — TELEPHONE (OUTPATIENT)
Dept: ONCOLOGY | Facility: CLINIC | Age: 66
End: 2021-07-01

## 2021-07-01 NOTE — TELEPHONE ENCOUNTER
Oral Chemotherapy Monitoring Program     Placed call to Constantino Taylor to discuss when he plans to get labs completed for Zytiga oral chemotherapy.     He details that he would like to schedule an appointment for Monday, July 12th at 9am. I sent a message to the scheduling team to have this arranged.       Pallavi Lynn, PharmD  Oral Chemotherapy Monitoring Program  [facility]  [phone]  July 1, 2021

## 2021-07-02 DIAGNOSIS — C61 PROSTATE CANCER (H): Primary | ICD-10-CM

## 2021-07-02 RX ORDER — ABIRATERONE ACETATE 250 MG/1
500 TABLET ORAL
Qty: 60 TABLET | Refills: 0 | Status: SHIPPED | OUTPATIENT
Start: 2021-07-02 | End: 2021-11-29

## 2021-07-02 RX ORDER — PREDNISONE 5 MG/1
5 TABLET ORAL
Qty: 30 TABLET | Refills: 0 | Status: SHIPPED | OUTPATIENT
Start: 2021-07-02 | End: 2021-08-30

## 2021-07-22 ENCOUNTER — PATIENT OUTREACH (OUTPATIENT)
Dept: NURSING | Facility: CLINIC | Age: 66
End: 2021-07-22
Payer: COMMERCIAL

## 2021-07-22 NOTE — PROGRESS NOTES
Clinic Care Coordination Contact  Community Health Worker Follow Up    Goals:   Goals Addressed as of 7/22/2021 at 3:07 PM                    Today    6/15/21       #2  Pain Management (pt-stated)   60%  50%    Added 4/13/21 by Darin Dickson, RN      .Goal Statement: I will add additional modalities to my pain management program in addition to the pain medication, and in order to reduce the pain from a 7/10 toward a 4/10.  Date Goal set: 4/13/2021  Barriers: Has never tried ice or heat before.  Strengths: Engaged in care coordination  Date to Achieve By: 10/13/2021  Patient expressed understanding of goal: Yes  Action steps to achieve this goal:  1. I will take all my pain medications as prescribed by the providers.  2. I will use other modalities such as ice or heat, with 20 minutes on at least 2-3 times a day.  3. I will try to continue working with the prosthetic department on getting the new prosthetic.  The patient has the prescriptions to take to the primary care provider.           #3  Other (pt-stated)   50%  20%    Added 4/13/21 by Darin Dickson, RN      Goal Statement: I will make my appointment for a colonoscopy following the completion of my prostate cancer treatment.  Date Goal set: 4/13/2021  Barriers: Concerned about the prep  Strengths: Engaged in care coordination.  Date to Achieve By: 10/13/2021  Patient expressed understanding of goal: Yes  Action steps to achieve this goal:  1. I will speak with the PCP regarding orders for a colonoscopy following my treatment for prostate cancer.  2. I will seek a second opinion from the [facility], my appointment is in July.  3. I will schedule the colonoscopy earlier if indicated from the PCP.            CHW spoke with patient he said he was doing good. He said he did speak with PCP about getting an order for a colonoscopy he just needs to schedule the appointment for the colonoscopy. He is taking pain medications as needed and using ice and heat when needed  as well.    Patient said he went to the [facility] on June 6th and 7th for a 2nd opinion. Patient did not have any new questions or concerns at this time.    Intervention and Education during outreach: CHW advised patient to call with non-emergent concerns.    CHW Plan: CHW will call in 1 month.    Esther BHAKTA Community Health Worker  Clinic Care Coordination  [facility] : [place], [place] & [place]  Phone: [phone]

## 2021-07-27 DIAGNOSIS — G89.29 CHRONIC LOW BACK PAIN WITH SCIATICA, SCIATICA LATERALITY UNSPECIFIED, UNSPECIFIED BACK PAIN LATERALITY: ICD-10-CM

## 2021-07-27 DIAGNOSIS — M54.40 CHRONIC LOW BACK PAIN WITH SCIATICA, SCIATICA LATERALITY UNSPECIFIED, UNSPECIFIED BACK PAIN LATERALITY: ICD-10-CM

## 2021-07-27 DIAGNOSIS — G89.18 ACUTE POST-OPERATIVE PAIN: ICD-10-CM

## 2021-07-27 NOTE — TELEPHONE ENCOUNTER
Reason for call:  Medication   If this is a refill request, has the caller requested the refill from the pharmacy already? No  Will the patient be using a [facility]? No  Name of the pharmacy and phone number for the current request:    [facility] [id number] [place] [facility] - [place], [place] - [address]        Name of the medication requested: oxyCODONE IR (ROXICODONE) 10 MG tablet    Other request: Pt calling asking for monthly refill    Phone number to reach patient:  Home number on file [phone] (home)    Best Time:  anytime    Can we leave a detailed message on this number?  YES    Travel screening: Not Applicable

## 2021-07-28 RX ORDER — OXYCODONE HYDROCHLORIDE 10 MG/1
10 TABLET ORAL EVERY 6 HOURS PRN
Qty: 120 TABLET | Refills: 0 | Status: SHIPPED | OUTPATIENT
Start: 2021-07-28 | End: 2021-08-30

## 2021-07-30 ENCOUNTER — PATIENT OUTREACH (OUTPATIENT)
Dept: CARE COORDINATION | Facility: CLINIC | Age: 66
End: 2021-07-30

## 2021-07-30 NOTE — PROGRESS NOTES
Clinic Care Coordination Contact  Care Team Conversations    Care Coordination Clinician Chart Review  Situation: Patient chart reviewed by care coordinator.       Background: Care Coordination initial assessment and enrollment to Care Coordination was 11/14/19.   Patient centered goals were developed with participation from patient.  RN CC handed patient off to CHW for continued outreach every 30 days.        Assessment: Per chart review, patient outreach completed by CC CHW on 7/22/21.  Patient is actively working to accomplish goals.  Patient's goals remain appropriate and relevant at this time.   Patient is due for updated Complex Care Plan.  Annual assessment will be due 4/13/22.      Goals        #1  Functional (pt-stated)        Goal Statement: I will call for an appointment with the prothesis department for a stump  and to be fitted for a prothesis.  I will attend the appointments needed to complete this process.  Date Goal set: 1/8/2020  Date to Achieve By: 12/8/2021  Patient expressed understanding of goal: yes  Action steps to achieve this goal:  1. I will call for the first assessment appointment and attend. Completed  2. I will attend all of the appointments needed to get the stump  process complete.  3. I will attend all of the appointments needed to complete the process for the prothesis.               #2  Pain Management (pt-stated)       .Goal Statement: I will add additional modalities to my pain management program in addition to the pain medication, and in order to reduce the pain from a 7/10 toward a 4/10.  Date Goal set: 4/13/2021  Barriers: Has never tried ice or heat before.  Strengths: Engaged in care coordination  Date to Achieve By: 10/13/2021  Patient expressed understanding of goal: Yes  Action steps to achieve this goal:  1. I will take all my pain medications as prescribed by the providers.  2. I will use other modalities such as ice or heat, with 20 minutes on at least  2-3 times a day.  3. I will try to continue working with the prosthetic department on getting the new prosthetic.  The patient has the prescriptions to take to the primary care provider.           #3  Other (pt-stated)       Goal Statement: I will make my appointment for a colonoscopy following the completion of my prostate cancer treatment.  Date Goal set: 4/13/2021  Barriers: Concerned about the prep  Strengths: Engaged in care coordination.  Date to Achieve By: 10/13/2021  Patient expressed understanding of goal: Yes  Action steps to achieve this goal:  1. I will speak with the PCP regarding orders for a colonoscopy following my treatment for prostate cancer.  2. I will seek a second opinion from the [facility], my appointment is in July.  3. I will schedule the colonoscopy earlier if indicated from the PCP.                  Plan/Recommendations: The patient will continue working with Care Coordination to achieve goals as above.  CHW will involve RN CC as needed or if patient is ready to move to maintenance.  RN CC will continue to monitor progress to goals and CHW outreaches every 6 weeks.   Care Plan updated and mailed to patient: Yes, Frank Yan MSN, RN, PHN, CCM   Primary Care Clinical RN Care Coordinator  [facility]  7/30/2021   8:12 AM  [email or web address]  Office: [phone]

## 2021-07-30 NOTE — LETTER
[facility] CARE COORDINATION  [address] [facility] [address]  [place] [address]    July 30, 2021        Constantino Taylor  [address]  [place] [address]          Dear Sebas Meeks is an updated Complex Care Plan for your continued enrollment in Care Coordination. Please let us know if you have additional questions, concerns, or goals that we can assist with.    Sincerely,    Darin Yan MSN, RN, PHN, CCM   Primary Care Clinical RN Care Coordinator  [facility]  7/30/2021   8:07 AM  [email or web address]  Office: [phone]              [facility]  Complex Care Plan  About Me:    Patient Name:  Constantino Taylor    YOB: 1955  Age:         65 year old   [place] MRN:    [id number] Telephone Information:  Home Phone [phone]   Mobile [phone]       Address:  [facility] [address] [place] [address] Email address:  [email or web address]      Emergency Contact(s)    Name Relationship Lgl Grd Work Phone Home Phone Mobile Phone   1JUAN MIGUEL ROBERTS Sister No  [phone]           Primary language:  English     needed? No   [employer]:  [phone] op. 1  Other communication barriers:    Preferred Method of Communication:  Mail  Current living arrangement:    Mobility Status/ Medical Equipment:      Health Maintenance  Health Maintenance Reviewed:      My Access Plan  Medical Emergency 911   Primary Clinic Line [facility] - [phone]   24 Hour Appointment Line [phone] or  [phone]) (toll-free)   24 Hour Nurse Line [phone] (toll-free)   Preferred Urgent Care     Preferred Hospital     Preferred Pharmacy [id number] - [place], [place] - [address] AT [address] [facility]     Behavioral Health Crisis Line The National Suicide Prevention Lifeline at 1-740.185.2099 or 911              My Care Team Members  Patient Care Team       Relationship Specialty Notifications Start End    Avery Duke MD PCP - General Internal Medicine  5/14/15     Phone: [phone] Fax: [phone] [address] [facility] [address]    Piper Polk, RN Nurse Coordinator Vascular Surgery Abnormal results only, Admissions 7/16/15     Phone: [phone] Pager: [phone]        Odessa Browning MD MD Vascular Surgery  8/10/15     Phone: [phone] Fax: [phone]         [address] [facility] [address] [facility] [address]    Norah Brown MD MD Cardiology  2/7/16     Phone: [phone] Fax: [phone]         [address] [facility] [address] [facility] [address]    Quinn Rodriguez MD MD Oncology Admissions 9/25/17     Phone: [phone] Fax: [phone]         [address] [facility] [address] [facility] [address]    Bryanna Rubio, RN Specialty Care Coordinator Cardiology Admissions 10/18/19     Phone: [phone]         Norah Brown MD MD Cardiology  10/18/19     Phone: [phone] Fax: [phone]         [address] [facility] [address] [facility] [address]    Darin Dickson, RN Lead Care Coordinator Primary Care - CC Admissions 11/4/19     Phone: [phone] Fax: [phone]        Avery Duke MD Assigned PCP   4/18/21     Phone: [phone] Fax: [phone] [address] [facility] [address]    Esther Lind MA Community Health Worker Primary Care - CC Admissions 6/15/21     Quinn Rodriguez MD Assigned Cancer Care Provider   7/16/21     Phone: [phone] Fax: [phone]         [address] [facility] [address] [facility] [address]            My Care Plans  Self Management and Treatment Plan  Goals and (Comments)  Goals        General     #1  Functional (pt-stated)      Notes - Note edited  6/15/2021 10:41 AM by Darin Dickson RN      Goal Statement: I will call for an appointment with the prothesis department for a stump  and to be fitted for a  prothesis.  I will attend the appointments needed to complete this process.  Date Goal set: 1/8/2020  Date to Achieve By: 12/8/2021  Patient expressed understanding of goal: yes  Action steps to achieve this goal:  1. I will call for the first assessment appointment and attend. Completed  2. I will attend all of the appointments needed to get the stump  process complete.  3. I will attend all of the appointments needed to complete the process for the prothesis.             #2  Pain Management (pt-stated)      Notes - Note edited  7/22/2021  2:40 PM by Esther Lind MA     .Goal Statement: I will add additional modalities to my pain management program in addition to the pain medication, and in order to reduce the pain from a 7/10 toward a 4/10.  Date Goal set: 4/13/2021  Barriers: Has never tried ice or heat before.  Strengths: Engaged in care coordination  Date to Achieve By: 10/13/2021  Patient expressed understanding of goal: Yes  Action steps to achieve this goal:  1. I will take all my pain medications as prescribed by the providers.  2. I will use other modalities such as ice or heat, with 20 minutes on at least 2-3 times a day.  3. I will try to continue working with the prosthetic department on getting the new prosthetic.  The patient has the prescriptions to take to the primary care provider.         #3  Other (pt-stated)      Notes - Note created  4/13/2021 12:55 PM by Darin Dickson, RN     Goal Statement: I will make my appointment for a colonoscopy following the completion of my prostate cancer treatment.  Date Goal set: 4/13/2021  Barriers: Concerned about the prep  Strengths: Engaged in care coordination.  Date to Achieve By: 10/13/2021  Patient expressed understanding of goal: Yes  Action steps to achieve this goal:  1. I will speak with the PCP regarding orders for a colonoscopy following my treatment for prostate cancer.  2. I will seek a second opinion from the [facility], my appointment  is in July.  3. I will schedule the colonoscopy earlier if indicated from the PCP.               Action Plans on File:                       Advance Care Plans/Directives Type:        My Medical and Care Information  Problem List   Patient Active Problem List   Diagnosis     Cerebral infarction (H)     Chronic hepatitis C without hepatic coma (H)     Tobacco use disorder     Chronic low back pain     Acute pancreatitis     Hyperlipidemia LDL goal <70     Hypertension goal BP (blood pressure) < 140/90     Nonischemic dilated cardiomyopathy (HCC)     Malignant neoplasm of prostate (H)     Erectile dysfunction     Eczema     PAD (peripheral artery disease) (H)     S/P BKA (below knee amputation) unilateral (H)     Encounter for counseling     Acute renal failure (H)     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    Care Coordination Start Date: 11/11/2019   Frequency of Care Coordination: 6 weeks   Form Last Updated: 07/30/2021

## 2021-08-07 DIAGNOSIS — G89.29 CHRONIC LOW BACK PAIN WITH SCIATICA, SCIATICA LATERALITY UNSPECIFIED, UNSPECIFIED BACK PAIN LATERALITY: ICD-10-CM

## 2021-08-07 DIAGNOSIS — M54.40 CHRONIC LOW BACK PAIN WITH SCIATICA, SCIATICA LATERALITY UNSPECIFIED, UNSPECIFIED BACK PAIN LATERALITY: ICD-10-CM

## 2021-08-09 RX ORDER — GABAPENTIN 600 MG/1
TABLET ORAL
Qty: 90 TABLET | Refills: 0 | Status: SHIPPED | OUTPATIENT
Start: 2021-08-09 | End: 2021-08-30

## 2021-08-09 NOTE — TELEPHONE ENCOUNTER
Routing refill request to provider for review/approval because:  Drug not on the [facility] refill protocol           Pending Prescriptions:                       Disp   Refills    gabapentin (NEURONTIN) 600 MG tablet [Phar*90 tab*14       Sig: TAKE 1 TABLET BY MOUTH THREE TIMES A DAY        Arpan Pierson RN

## 2021-08-18 LAB
ALT SERPL-CCNC: 21 U/L (ref 7–55)
AST SERPL-CCNC: 24 U/L (ref 8–48)

## 2021-08-25 ENCOUNTER — PATIENT OUTREACH (OUTPATIENT)
Dept: NURSING | Facility: CLINIC | Age: 66
End: 2021-08-25
Payer: COMMERCIAL

## 2021-08-25 NOTE — PROGRESS NOTES
Clinic Care Coordination Contact    Community Health Worker Follow Up    Care Gaps:     Health Maintenance Due   Topic Date Due     HEPATITIS B IMMUNIZATION (3 of 3 - Hep B Twinrix risk 3-dose series) 07/01/2013     COVID-19 Vaccine (2 - Moderna 2-dose series) 04/15/2021     INFLUENZA VACCINE (1) 09/01/2021       CHW will discuss at next outreach    Goals:   Goals Addressed as of 8/25/2021 at 1:28 PM                    Today    7/22/21       #1  Functional (pt-stated)   60%      Added 1/8/20 by Darin Dickson, RN       Goal Statement: I will call for an appointment with the prothesis department for a stump  and to be fitted for a prothesis.  I will attend the appointments needed to complete this process.  Date Goal set: 1/8/2020  Date to Achieve By: 12/8/2021  Patient expressed understanding of goal: yes  Action steps to achieve this goal:  1. I will call for the first assessment appointment and attend. Completed  2. I will attend all of the appointments needed to get the stump  process complete.  3. I will attend all of the appointments needed to complete the process for the prothesis.               #2  Pain Management (pt-stated)   70%  60%    Added 4/13/21 by Darin Dickson, RN      .Goal Statement: I will add additional modalities to my pain management program in addition to the pain medication, and in order to reduce the pain from a 7/10 toward a 4/10.  Date Goal set: 4/13/2021  Barriers: Has never tried ice or heat before.  Strengths: Engaged in care coordination  Date to Achieve By: 10/13/2021  Patient expressed understanding of goal: Yes  Action steps to achieve this goal:  1. I will take all my pain medications as prescribed by the providers.  2. I will use other modalities such as ice or heat, with 20 minutes on at least 2-3 times a day.  3. I will try to continue working with the prosthetic department on getting the new prosthetic.  The patient has the prescriptions to take to the primary  care provider.           #3  Other (pt-stated)   50%  50%    Added 4/13/21 by Darin Dickson RN      Goal Statement: I will make my appointment for a colonoscopy following the completion of my prostate cancer treatment.  Date Goal set: 4/13/2021  Barriers: Concerned about the prep  Strengths: Engaged in care coordination.  Date to Achieve By: 10/13/2021  Patient expressed understanding of goal: Yes  Action steps to achieve this goal:  1. I will speak with the PCP regarding orders for a colonoscopy following my treatment for prostate cancer.  2. I will seek a second opinion from the [facility], my appointment is in July.  3. I will schedule the colonoscopy earlier if indicated from the PCP.            CHW spoke with patient he said he is doing good. CHW asked patient has he scheduled colonoscopy? He said no not yet he said he will see his doctor on the 30 th of August and discuss it with him. He said he is no longer working with the prosthetic department. He said he will ne going thru [facility].    CHW asked did patient have any new question or concerns CHW could help with today? He said no    Intervention and Education during outreach: CHW advised patient to call clinic with non- emergent questions or concerns.    CHW Plan: CHW will ca;; in 1 month.    CRYSTAL Kitchen  Clinic Care Coordination  [facility]: [place], [place] & [place]  Phone: [phone]

## 2021-08-30 ENCOUNTER — OFFICE VISIT (OUTPATIENT)
Dept: FAMILY MEDICINE | Facility: CLINIC | Age: 66
End: 2021-08-30
Payer: COMMERCIAL

## 2021-08-30 VITALS
SYSTOLIC BLOOD PRESSURE: 137 MMHG | DIASTOLIC BLOOD PRESSURE: 67 MMHG | WEIGHT: 211 LBS | OXYGEN SATURATION: 100 % | HEART RATE: 72 BPM | BODY MASS INDEX: 27.09 KG/M2

## 2021-08-30 DIAGNOSIS — I48.20 CHRONIC ATRIAL FIBRILLATION (H): ICD-10-CM

## 2021-08-30 DIAGNOSIS — F11.90 CHRONIC, CONTINUOUS USE OF OPIOIDS: ICD-10-CM

## 2021-08-30 DIAGNOSIS — I10 HYPERTENSION GOAL BP (BLOOD PRESSURE) < 140/90: ICD-10-CM

## 2021-08-30 DIAGNOSIS — G89.18 ACUTE POST-OPERATIVE PAIN: ICD-10-CM

## 2021-08-30 DIAGNOSIS — I63.19 CEREBRAL INFARCTION DUE TO EMBOLISM OF OTHER PRECEREBRAL ARTERY (H): ICD-10-CM

## 2021-08-30 DIAGNOSIS — G89.29 CHRONIC LOW BACK PAIN WITH SCIATICA, SCIATICA LATERALITY UNSPECIFIED, UNSPECIFIED BACK PAIN LATERALITY: ICD-10-CM

## 2021-08-30 DIAGNOSIS — M54.40 CHRONIC LOW BACK PAIN WITH SCIATICA, SCIATICA LATERALITY UNSPECIFIED, UNSPECIFIED BACK PAIN LATERALITY: ICD-10-CM

## 2021-08-30 DIAGNOSIS — I48.0 PAROXYSMAL ATRIAL FIBRILLATION (H): ICD-10-CM

## 2021-08-30 PROCEDURE — 99214 OFFICE O/P EST MOD 30 MIN: CPT | Performed by: INTERNAL MEDICINE

## 2021-08-30 RX ORDER — DOXAZOSIN 4 MG/1
4 TABLET ORAL AT BEDTIME
Qty: 90 TABLET | Refills: 4 | Status: SHIPPED | OUTPATIENT
Start: 2021-08-30 | End: 2023-04-17

## 2021-08-30 RX ORDER — GABAPENTIN 600 MG/1
TABLET ORAL
Qty: 270 TABLET | Refills: 4 | Status: SHIPPED | OUTPATIENT
Start: 2021-08-30 | End: 2023-04-17

## 2021-08-30 RX ORDER — LISINOPRIL 40 MG/1
40 TABLET ORAL DAILY
Qty: 90 TABLET | Refills: 4 | Status: SHIPPED | OUTPATIENT
Start: 2021-08-30 | End: 2022-09-21

## 2021-08-30 RX ORDER — ATORVASTATIN CALCIUM 40 MG/1
40 TABLET, FILM COATED ORAL AT BEDTIME
Qty: 90 TABLET | Refills: 4 | Status: SHIPPED | OUTPATIENT
Start: 2021-08-30 | End: 2022-09-14

## 2021-08-30 RX ORDER — CARVEDILOL 25 MG/1
25 TABLET ORAL 2 TIMES DAILY WITH MEALS
Qty: 186 TABLET | Refills: 4 | Status: SHIPPED | OUTPATIENT
Start: 2021-08-30 | End: 2022-09-21

## 2021-08-30 RX ORDER — OXYCODONE HYDROCHLORIDE 10 MG/1
10 TABLET ORAL EVERY 6 HOURS PRN
Qty: 120 TABLET | Refills: 0 | Status: SHIPPED | OUTPATIENT
Start: 2021-08-30 | End: 2021-09-30

## 2021-08-30 NOTE — PROGRESS NOTES
"    Assessment & Plan   Problem List Items Addressed This Visit     Atrial fibrillation (H)    Relevant Medications    carvedilol (COREG) 25 MG tablet    rivaroxaban ANTICOAGULANT (XARELTO ANTICOAGULANT) 20 MG TABS tablet    Cerebral infarction (H)    Relevant Medications    atorvastatin (LIPITOR) 40 MG tablet    Chronic low back pain    Relevant Medications    gabapentin (NEURONTIN) 600 MG tablet    oxyCODONE IR (ROXICODONE) 10 MG tablet    Hypertension goal BP (blood pressure) < 140/90    Relevant Medications    doxazosin (CARDURA) 4 MG tablet    lisinopril (ZESTRIL) 40 MG tablet      Other Visit Diagnoses     Acute post-operative pain        Relevant Medications    oxyCODONE IR (ROXICODONE) 10 MG tablet                  BMI:   Estimated body mass index is 27.09 kg/m  as calculated from the following:    Height as of 2/11/21: 1.88 m (6' 2\").    Weight as of this encounter: 95.7 kg (211 lb).   Weight management plan: Discussed healthy diet and exercise guidelines    Work on weight loss  Regular exercise    No follow-ups on file.    Avery Duke MD  [facility] RACHANA Meeks is a 65 year old who presents for the following health issues     HPI     Medication review  firmagon treatment    Patient continues with treatment of developing metastatic prostate cancer .  Receiving firmagon treatment   May need system chemotherapy if there is progression        Review of Systems   Constitutional, HEENT, cardiovascular, pulmonary, gi and gu systems are negative, except as otherwise noted.      Objective    /67 (BP Location: Right arm, Patient Position: Chair, Cuff Size: Adult Large)   Pulse 72   Wt 95.7 kg (211 lb)   SpO2 100%   BMI 27.09 kg/m    Body mass index is 27.09 kg/m .  Physical Exam   GENERAL: healthy, alert and no distress  EYES: Eyes grossly normal to inspection, PERRL and conjunctivae and sclerae normal  HENT: ear canals and TM's normal, nose and mouth without ulcers " or lesions  NECK: no adenopathy, no asymmetry, masses, or scars and thyroid normal to palpation  RESP: lungs clear to auscultation - no rales, rhonchi or wheezes  CV: regular rate and rhythm, normal S1 S2, no S3 or S4, no murmur, click or rub, no peripheral edema and peripheral pulses strong  ABDOMEN: soft, nontender, no hepatosplenomegaly, no masses and bowel sounds normal  MS: new prosthesis left leg   With pin.  No skin breakdown    SKIN: no suspicious lesions or rashes  NEURO: Normal strength and tone, mentation intact and speech normal  BACK: no CVA tenderness, no paralumbar tenderness  PSYCH: mentation appears normal, affect normal/bright  LYMPH: no cervical, supraclavicular, axillary, or inguinal adenopathy    No results found for any visits on 08/30/21.

## 2021-09-02 DIAGNOSIS — I10 HYPERTENSION GOAL BP (BLOOD PRESSURE) < 140/90: ICD-10-CM

## 2021-09-02 DIAGNOSIS — I42.0 DILATED CARDIOMYOPATHY (H): ICD-10-CM

## 2021-09-02 DIAGNOSIS — I63.19 CEREBRAL INFARCTION DUE TO EMBOLISM OF OTHER PRECEREBRAL ARTERY (H): ICD-10-CM

## 2021-09-02 DIAGNOSIS — I48.20 CHRONIC ATRIAL FIBRILLATION (H): ICD-10-CM

## 2021-09-02 DIAGNOSIS — N40.0 BENIGN PROSTATIC HYPERPLASIA, UNSPECIFIED WHETHER LOWER URINARY TRACT SYMPTOMS PRESENT: ICD-10-CM

## 2021-09-02 DIAGNOSIS — I48.91 ATRIAL FIBRILLATION WITH RVR (H): ICD-10-CM

## 2021-09-07 ENCOUNTER — TELEPHONE (OUTPATIENT)
Dept: FAMILY MEDICINE | Facility: CLINIC | Age: 66
End: 2021-09-07

## 2021-09-08 NOTE — TELEPHONE ENCOUNTER
Metoprolol was discontinued 6/25/21 - patient is no longer taking. Refill refused.     Estrella Ratliff, RN, BSN, PHN  [facility]: [place]

## 2021-09-10 ENCOUNTER — PATIENT OUTREACH (OUTPATIENT)
Dept: CARE COORDINATION | Facility: CLINIC | Age: 66
End: 2021-09-10

## 2021-09-10 NOTE — PROGRESS NOTES
Clinic Care Coordination Contact  Care Team Conversations    Care Coordination Clinician Chart Review  Situation: Patient chart reviewed by care coordinator.       Background: Care Coordination initial assessment and enrollment to Care Coordination was 11/14/19.   Patient centered goals were developed with participation from patient.  RN CC handed patient off to CHW for continued outreach every 30 days.        Assessment: Per chart review, patient outreach completed by CC CHW on 8/25/21.  Patient is actively working to accomplish goals.  Patient's goals remain appropriate and relevant at this time.   Patient is not due for updated Plan of Care.  Annual assessment will be due 4/13/22.      Goals        #1  Functional (pt-stated)        Goal Statement: I will call for an appointment with the prothesis department for a stump  and to be fitted for a prothesis.  I will attend the appointments needed to complete this process.  Date Goal set: 1/8/2020  Date to Achieve By: 12/8/2021  Patient expressed understanding of goal: yes  Action steps to achieve this goal:  1. I will call for the first assessment appointment and attend. Completed  2. I will attend all of the appointments needed to get the stump  process complete.  3. I will attend all of the appointments needed to complete the process for the prothesis.               #2  Pain Management (pt-stated)       .Goal Statement: I will add additional modalities to my pain management program in addition to the pain medication, and in order to reduce the pain from a 7/10 toward a 4/10.  Date Goal set: 4/13/2021  Barriers: Has never tried ice or heat before.  Strengths: Engaged in care coordination  Date to Achieve By: 10/13/2021  Patient expressed understanding of goal: Yes  Action steps to achieve this goal:  1. I will take all my pain medications as prescribed by the providers.  2. I will use other modalities such as ice or heat, with 20 minutes on at least  2-3 times a day.  3. I will try to continue working with the prosthetic department on getting the new prosthetic.  The patient has the prescriptions to take to the primary care provider.           #3  Other (pt-stated)       Goal Statement: I will make my appointment for a colonoscopy following the completion of my prostate cancer treatment.  Date Goal set: 4/13/2021  Barriers: Concerned about the prep  Strengths: Engaged in care coordination.  Date to Achieve By: 10/13/2021  Patient expressed understanding of goal: Yes  Action steps to achieve this goal:  1. I will speak with the PCP regarding orders for a colonoscopy following my treatment for prostate cancer.  2. I will seek a second opinion from the [facility], my appointment is in July.  3. I will schedule the colonoscopy earlier if indicated from the PCP.                  Plan/Recommendations: The patient will continue working with Care Coordination to achieve goals as above.  CHW will involve RN CC as needed or if patient is ready to move to maintenance.  RN CC will continue to monitor progress to goals and CHW outreaches every 6 weeks.   Plan of Care updated and mailed to patient: Loly Yan MSN, RN, PHN, CCM   Primary Care Clinical RN Care Coordinator  [facility]  9/10/2021   8:12 AM  [email or web address]  Office: [phone]

## 2021-09-25 ENCOUNTER — HEALTH MAINTENANCE LETTER (OUTPATIENT)
Age: 66
End: 2021-09-25

## 2021-09-28 ENCOUNTER — PATIENT OUTREACH (OUTPATIENT)
Dept: CARE COORDINATION | Facility: CLINIC | Age: 66
End: 2021-09-28

## 2021-09-28 NOTE — PROGRESS NOTES
Clinic Care Coordination Contact  [facility]/Voicemail     Clinical Data: CHW Outreach  Outreach attempted x 1. Left message on patient's voicemail with call back information and requested return call.    Plan: CHW will try to reach patient again in 5-7 business days.    Esther BHAKTA, W  Clinic Care Coordination  [facility] Clinics: [place], [place] & [place]  Phone: [phone]    Notes:  - How are you doing?  - Did you attend appointment on 08/30 with PCP and discuss colonoscopy?  - Taking pain med's a prescribed?  - Using ice or heat when needed for 20 minutes.   - Are you working with [facility] for new prosthetic     Care Gaps:  Flu shot

## 2021-09-28 NOTE — LETTER
[facility] CARE COORDINATION  [address] [address]    October 14, 2021    Constantino Taylor  [address]   [facility] [address]        Jayla Meeks,    I have been attempting to reach you since our last contact. I would appreciate if you would give me a call at [phone] and let me know if you would like to continue working with the Care Coordination team.      All of us at [facility] are invested in your health and are here to assist you in meeting your goals.     Sincerely,    CRYSTAL Kitchen  Clinic Care Coordination  [facility]: [place], [place] & [place]  Phone: [phone]

## 2021-09-30 DIAGNOSIS — G89.29 CHRONIC LOW BACK PAIN WITH SCIATICA, SCIATICA LATERALITY UNSPECIFIED, UNSPECIFIED BACK PAIN LATERALITY: ICD-10-CM

## 2021-09-30 DIAGNOSIS — G89.18 ACUTE POST-OPERATIVE PAIN: ICD-10-CM

## 2021-09-30 DIAGNOSIS — M54.40 CHRONIC LOW BACK PAIN WITH SCIATICA, SCIATICA LATERALITY UNSPECIFIED, UNSPECIFIED BACK PAIN LATERALITY: ICD-10-CM

## 2021-09-30 RX ORDER — OXYCODONE HYDROCHLORIDE 10 MG/1
10 TABLET ORAL EVERY 6 HOURS PRN
Qty: 120 TABLET | Refills: 0 | Status: SHIPPED | OUTPATIENT
Start: 2021-09-30 | End: 2021-10-01

## 2021-09-30 NOTE — TELEPHONE ENCOUNTER
Patient called very upset because his medication order was sent to the wrong pharmacy. Patient would like it sent to    [facility] pharmacy [address]   [facility]    oxyCODONE IR (ROXICODONE) 10 MG tablet 120 tablet 0 9/30/2021  No   Sig - Route: Take 1 tablet (10 mg) by mouth every 6 hours as needed for moderate to severe pain or severe pain . No further refills until follow-up appointment - Oral   Sent to pharmacy as: oxyCODONE HCl 10 MG Oral Tablet (ROXICODONE)   Class: E-Prescribe   Earliest Fill Date: 9/30/2021   Order: [id number]   E-Prescribing Status: Receipt confirmed by pharmacy (9/30/2021  8:49 AM CDT)          Regina Flowers RN on 9/30/2021 at 10:48 AM

## 2021-09-30 NOTE — TELEPHONE ENCOUNTER
Pending Prescriptions:                       Disp   Refills    oxyCODONE IR (ROXICODONE) 10 MG tablet    120 ta*0            Sig: Take 1 tablet (10 mg) by mouth every 6 hours as           needed for moderate to severe pain or severe pain           . No further refills until follow-up appointment    Routing to PCP.    Rosa Conti MA

## 2021-09-30 NOTE — TELEPHONE ENCOUNTER
Patient needs  Refill on his oxyCODONE IR (ROXICODONE) 10 MG tablet sent to a new pharmacy [facility] [address]  Phone [phone].  Ananya Tate,

## 2021-10-01 RX ORDER — OXYCODONE HYDROCHLORIDE 10 MG/1
10 TABLET ORAL EVERY 6 HOURS PRN
Qty: 120 TABLET | Refills: 0 | Status: SHIPPED | OUTPATIENT
Start: 2021-10-01 | End: 2021-10-29

## 2021-10-06 ENCOUNTER — TRANSFERRED RECORDS (OUTPATIENT)
Dept: HEALTH INFORMATION MANAGEMENT | Facility: CLINIC | Age: 66
End: 2021-10-06

## 2021-10-06 NOTE — PROGRESS NOTES
Clinic Care Coordination Contact  [facility]/Voicemail       Clinical Data: CHW Outreach  Outreach attempted x 2. Left message on patient's voicemail with call back information and requested return call.    Plan: CHW will try to reach patient again in 5-7 business days.    Esther BHAKTA, W  Clinic Care Coordination  [facility] Clinics: [place], [place] & [place]  Phone: [phone]    Notes:  - How are you doing?  - Did you attend appointment on 08/30 with PCP and discuss colonoscopy?  - Taking pain med's a prescribed?  - Using ice or heat when needed for 20 minutes.   - Are you working with [facility] for new prosthetic      Care Gaps:  Flu shot

## 2021-10-07 LAB
ALT SERPL-CCNC: 22 U/L (ref 7–55)
AST SERPL-CCNC: 23 U/L (ref 8–48)

## 2021-10-08 ENCOUNTER — MEDICAL CORRESPONDENCE (OUTPATIENT)
Dept: HEALTH INFORMATION MANAGEMENT | Facility: CLINIC | Age: 66
End: 2021-10-08
Payer: COMMERCIAL

## 2021-10-14 NOTE — PROGRESS NOTES
Clinic Care Coordination Contact  [facility]/Voicemail       Clinical Data: CHW Outreach  Outreach attempted x 3.  Left message on patient's voicemail with call back information and requested return call.    Plan: CHW will send unable to contact letter with care coordinator contact information via [employer].     CHW will try to reach patient again in 10 business days.    CRYSTAL Kitchen  Clinic Care Coordination  [facility]: [place], [place] & [place]  Phone: [phone]    Notes:  - How are you doing?  - Did you attend appointment on 08/30 with PCP and discuss colonoscopy?  - Taking pain med's a prescribed?  - Using ice or heat when needed for 20 minutes.   - Are you working with [facility] for new prosthetic      Care Gaps:  Flu shot

## 2021-10-18 ENCOUNTER — MEDICAL CORRESPONDENCE (OUTPATIENT)
Dept: HEALTH INFORMATION MANAGEMENT | Facility: CLINIC | Age: 66
End: 2021-10-18
Payer: COMMERCIAL

## 2021-10-29 ENCOUNTER — TELEPHONE (OUTPATIENT)
Dept: FAMILY MEDICINE | Facility: CLINIC | Age: 66
End: 2021-10-29

## 2021-10-29 DIAGNOSIS — M54.40 CHRONIC LOW BACK PAIN WITH SCIATICA, SCIATICA LATERALITY UNSPECIFIED, UNSPECIFIED BACK PAIN LATERALITY: ICD-10-CM

## 2021-10-29 DIAGNOSIS — G89.18 ACUTE POST-OPERATIVE PAIN: ICD-10-CM

## 2021-10-29 DIAGNOSIS — G89.29 CHRONIC LOW BACK PAIN WITH SCIATICA, SCIATICA LATERALITY UNSPECIFIED, UNSPECIFIED BACK PAIN LATERALITY: ICD-10-CM

## 2021-10-29 RX ORDER — OXYCODONE HYDROCHLORIDE 10 MG/1
10 TABLET ORAL EVERY 6 HOURS PRN
Qty: 120 TABLET | Refills: 0 | Status: SHIPPED | OUTPATIENT
Start: 2021-10-29 | End: 2021-11-29

## 2021-10-29 NOTE — TELEPHONE ENCOUNTER
Okay, med refill sent as requested.  He should follow-up with Dr. Duke then as scheduled.    Brett Cast MD     Rash: Care Instructions Your Care Instructions A rash is any irritation or inflammation of the skin. Rashes have many possible causes, including allergy, infection, illness, heat, and emotional stress. Follow-up care is a key part of your treatment and safety. Be sure to make and go to all appointments, and call your doctor if you are having problems. It's also a good idea to know your test results and keep a list of the medicines you take. How can you care for yourself at home? · Wash the area with water only. Soap can make dryness and itching worse. Pat dry. · Put cold, wet cloths on the rash to reduce itching. · Keep cool, and stay out of the sun. · Leave the rash open to the air as much of the time as possible. · Sometimes petroleum jelly (Vaseline) can help relieve the discomfort caused by a rash. A moisturizing lotion, such as Cetaphil, also may help. Calamine lotion may help for rashes caused by contact with something (such as a plant or soap) that irritated the skin. Use it 3 or 4 times a day. · If your doctor prescribed a cream, use it as directed. If your doctor prescribed medicine, take it exactly as directed. · If your rash itches so badly that it interferes with your normal activities, take an over-the-counter antihistamine, such as diphenhydramine (Benadryl) or loratadine (Claritin). Read and follow all instructions on the label. When should you call for help? Call your doctor now or seek immediate medical care if: 
  · You have signs of infection, such as: 
? Increased pain, swelling, warmth, or redness. ? Red streaks leading from the area. ? Pus draining from the area. ? A fever.  
  · You have joint pain along with the rash.  
 Watch closely for changes in your health, and be sure to contact your doctor if: 
  · Your rash is changing or getting worse. For example, call if you have pain along with the rash, the rash is spreading, or you have new blisters.   · You do not get better after 1 week. Where can you learn more? Go to http://mike-marcos.info/ Enter K560 in the search box to learn more about \"Rash: Care Instructions. \" Current as of: October 30, 2019Content Version: 12.4 © 3343-5108 [employer]. Care instructions adapted under license by [employer] (which disclaims liability or warranty for this information). If you have questions about a medical condition or this instruction, always ask your healthcare professional. [address] any warranty or liability for your use of this information.

## 2021-10-29 NOTE — PROGRESS NOTES
Clinic Care Coordination Contact  Care Team Conversations    The RN CC will close the case as the patient has not responded.    Chart marked accordingly.      Dairn Yan MSN, RN, PHN, CCM   Primary Care Clinical RN Care Coordinator  [facility]  10/29/2021   2:27 PM  [email or web address]  Office: [phone]

## 2021-10-29 NOTE — TELEPHONE ENCOUNTER
Need Clarification from the patient.    oxyCODONE IR (ROXICODONE) 10 MG tablet 120 tablet 0 10/1/2021  No   Sig - Route: Take 1 tablet (10 mg) by mouth every 6 hours as needed for moderate to severe pain or severe pain . No further refills until follow-up appointment - Oral      was sent to [id number] - [place], [place] - [address] [place], [place] [address]    [id number] [place] [address] [place]. Please clarify why he needs this sent?    Terese Rothman,

## 2021-10-29 NOTE — PROGRESS NOTES
Clinic Care Coordination Contact  [facility]/Voicemail       Clinical Data: CHW Outreach  Outreach attempted x 4. Left  Final message on patient's voicemail with call back information and requested return call.    Plan: CHW will send a message to CC RN and see what next steps will be.    CRYSTAL Kitchen  Clinic Care Coordination  [facility]: [place] & [place]  Phone: [phone]    Notes:  - How are you doing?  - Did you attend appointment on 08/30 with PCP and discuss colonoscopy?  - Taking pain med's a prescribed?  - Using ice or heat when needed for 20 minutes.   - Are you working with [facility] for new prosthetic      Care Gaps:  Flu shot

## 2021-10-29 NOTE — TELEPHONE ENCOUNTER
Reason for Call:  Medication or medication refill:    Do you use a [facility]?  Name of the pharmacy and phone number for the current [id number] [place] [address] [facility]    Name of the medication requested: oxyCODONE IR (ROXICODONE) 10 MG tablet    Other request:  No     Can we leave a detailed message on this number? YES    Phone number patient can be reached at: Home number on file [phone] (home)    Best Time:  Anytime      Call taken on 10/29/2021 at 10:13 AM by Jayleen Wright

## 2021-10-29 NOTE — TELEPHONE ENCOUNTER
Pt calling asking about medication refill. Has appointment on 11/29/21 with Srikanth (first available) and is wondering if covering provider can refill medication since it is due for refill on 30th. Routing high priority due to time constraints.  Valentine Conner-  [place]

## 2021-10-29 NOTE — TELEPHONE ENCOUNTER
Called and left a message for the patient to call the clinic to get an appointment scheduled.    Last script was sent with : No further refills until follow-up appointment    Please send this message back to the care team once the patient is scheduled.     Terese Rothman,

## 2021-11-15 ENCOUNTER — TRANSFERRED RECORDS (OUTPATIENT)
Dept: HEALTH INFORMATION MANAGEMENT | Facility: CLINIC | Age: 66
End: 2021-11-15
Payer: COMMERCIAL

## 2021-11-24 ENCOUNTER — TRANSFERRED RECORDS (OUTPATIENT)
Dept: HEALTH INFORMATION MANAGEMENT | Facility: CLINIC | Age: 66
End: 2021-11-24
Payer: COMMERCIAL

## 2021-11-29 ENCOUNTER — OFFICE VISIT (OUTPATIENT)
Dept: FAMILY MEDICINE | Facility: CLINIC | Age: 66
End: 2021-11-29
Payer: COMMERCIAL

## 2021-11-29 VITALS
WEIGHT: 224 LBS | SYSTOLIC BLOOD PRESSURE: 172 MMHG | OXYGEN SATURATION: 96 % | HEART RATE: 62 BPM | BODY MASS INDEX: 28.76 KG/M2 | DIASTOLIC BLOOD PRESSURE: 114 MMHG

## 2021-11-29 DIAGNOSIS — F11.90 CHRONIC, CONTINUOUS USE OF OPIOIDS: ICD-10-CM

## 2021-11-29 DIAGNOSIS — G89.18 ACUTE POST-OPERATIVE PAIN: ICD-10-CM

## 2021-11-29 DIAGNOSIS — I73.9 PAD (PERIPHERAL ARTERY DISEASE) (H): ICD-10-CM

## 2021-11-29 DIAGNOSIS — M54.40 CHRONIC LOW BACK PAIN WITH SCIATICA, SCIATICA LATERALITY UNSPECIFIED, UNSPECIFIED BACK PAIN LATERALITY: ICD-10-CM

## 2021-11-29 DIAGNOSIS — Z86.0100 HISTORY OF COLONIC POLYPS: Primary | ICD-10-CM

## 2021-11-29 DIAGNOSIS — G89.29 CHRONIC LOW BACK PAIN WITH SCIATICA, SCIATICA LATERALITY UNSPECIFIED, UNSPECIFIED BACK PAIN LATERALITY: ICD-10-CM

## 2021-11-29 PROCEDURE — 99214 OFFICE O/P EST MOD 30 MIN: CPT | Performed by: INTERNAL MEDICINE

## 2021-11-29 RX ORDER — OXYCODONE HYDROCHLORIDE 10 MG/1
10 TABLET ORAL EVERY 6 HOURS PRN
Qty: 120 TABLET | Refills: 0 | Status: SHIPPED | OUTPATIENT
Start: 2021-11-29 | End: 2021-12-29

## 2021-11-29 NOTE — PROGRESS NOTES
Assessment & Plan   Problem List Items Addressed This Visit     Chronic low back pain    Relevant Medications    oxyCODONE IR (ROXICODONE) 10 MG tablet    Chronic, continuous use of opioids    Relevant Medications    oxyCODONE IR (ROXICODONE) 10 MG tablet    PAD (peripheral artery disease) (H)      Other Visit Diagnoses     History of colonic polyps    -  Primary    Relevant Orders    Adult Gastro Ref - Procedure Only    Acute post-operative pain        Relevant Medications    oxyCODONE IR (ROXICODONE) 10 MG tablet         Patient in need of scooter   Old one is wearing out   See below for needs     Continue under current opiate program   UTD on package requirements           Work on weight loss  Regular exercise    No follow-ups on file.    Avery Duke MD  [facility] RACHANA Meeks is a 66 year old who presents for the following health issues     HPI     Medication recheck   Mobility issues; wants scooter     Working with scooter for repeat  Face to Face:  Patient had scooter 5 years and wearing out  Needs face to face  Scooter needs everyday use, ride around housing, go to the store.  Uses in the home, go to room during times without prosthesis  Needs for ADL and mobility   Left BKA with prosthesis  And some complications creating chronic pain   Needs periods of time with the prosthesis off    Working with the [employer] , works for [employer]  / (   [phone]          Review of Systems   Constitutional, HEENT, cardiovascular, pulmonary, gi and gu systems are negative, except as otherwise noted.      Objective    BP (!) 172/114 (BP Location: Right arm, Patient Position: Chair, Cuff Size: Adult Large)   Pulse 62   Wt 101.6 kg (224 lb)   SpO2 96%   BMI 28.76 kg/m    Body mass index is 28.76 kg/m .  Physical Exam   GENERAL: healthy, alert and no distress  EYES: Eyes grossly normal to inspection, PERRL and conjunctivae and sclerae normal  HENT: ear  canals and TM's normal, nose and mouth without ulcers or lesions  NECK: no adenopathy, no asymmetry, masses, or scars and thyroid normal to palpation  RESP: lungs clear to auscultation - no rales, rhonchi or wheezes  CV: regular rate and rhythm, normal S1 S2, no S3 or S4, no murmur, click or rub, no peripheral edema and peripheral pulses strong  ABDOMEN: soft, nontender, no hepatosplenomegaly, no masses and bowel sounds normal  SKIN: no suspicious lesions or rashes  Left BKA sensitive at stump without skin breakdown or lesion      No results found for any visits on 11/29/21.

## 2021-12-02 NOTE — TELEPHONE ENCOUNTER
Hydrocodone not on medication list, see other encounter requesting oxycodone, has a different pharmacy requested in that encounter.       This encounter is timed a few minutes earlier than the second one so assume the newer one is correct.    Closing this one and will deal with oxycodone request in other encounter.    Janet Tucker RN  [facility]       General

## 2021-12-09 ENCOUNTER — TELEPHONE (OUTPATIENT)
Dept: FAMILY MEDICINE | Facility: CLINIC | Age: 66
End: 2021-12-09
Payer: COMMERCIAL

## 2021-12-09 NOTE — TELEPHONE ENCOUNTER
Renan had faxed over paperwork for patient to get power wheel chair,BP cuff, shower chair Please call and let her know if you received.  Ananya Tate,

## 2021-12-10 DIAGNOSIS — I10 HYPERTENSION GOAL BP (BLOOD PRESSURE) < 140/90: Primary | ICD-10-CM

## 2021-12-10 DIAGNOSIS — Z89.519 S/P BKA (BELOW KNEE AMPUTATION) UNILATERAL (H): ICD-10-CM

## 2021-12-10 NOTE — PROGRESS NOTES
DME (Durable Medical Equipment) Orders and Documentation  Orders Placed This Encounter   Procedures     Bath Seat Order     Home Blood Pressure Monitor Order      The patient was assessed and it was determined the patient is in need of the following listed DME Supplies/Equipment. Please complete supporting documentation below to demonstrate medical necessity.      DME All Other Item(s) Documentation    List reason for need and supporting documentation for medical necessity below for each DME item.     1. Patient in need of power scooter for use when prosthesis is unreliable, and home bath seat and blood pressure monitor

## 2021-12-22 ENCOUNTER — TELEPHONE (OUTPATIENT)
Dept: FAMILY MEDICINE | Facility: CLINIC | Age: 66
End: 2021-12-22
Payer: COMMERCIAL

## 2021-12-22 NOTE — TELEPHONE ENCOUNTER
Routing to Dr. Duke team for assistance.    Inez from [facility] called asking for a renewal for insurance coverage for the pt to continue receiving out of network care (needs unlimited care for 2022). Pt currently has an authorization on file that expires 12/31/21. They would like the same accommodations for pt to have coverage for his appointments at [facility]. She said they will fax information to the team as well with more information. Team fax number given [phone]. They would like a confirmation fax sent once request is filed with [employer].    For questions please call [phone]. [facility] fax [phone].    Charley PALMER RN, BSN  [facility]

## 2021-12-22 NOTE — LETTER
Constantino Taylor  1955     Constantino has a complex medical history related to his prostate cancer and vascular issues.  He was referred to urology at [place] through his oncologist at [facility] due to progression of disease despite hormone suppression therapy. He has continued care there since the initial evaluation and desires to continue there for ongoing treatments and evaluations.  Events in this history include    February 20, 2012: Elevated PSA 8.02 ng/mL    May 1, 2012: Patient underwent prostate biopsy at [facility]. Pathology revealed [place] 3 + 4 as well as [place] 3 plus six prostate adenocarcinoma.    May 25, 2012: Patient received four month leuprolide injection    June 22, 2012: Patient underwent prostate MRI. There was extracapsular extension with involvement of bilateral seminal vesicles. Also noted was bilateral osteonecrosis of the hips with mild subchondral collapse, left greater than right.    October 26, 2012: Patient underwent definitive radiotherapy treatments. He received a total of 7560 cGy, 4500 cGy to the pelvis an additional 3060 cGy boost to the prostate. Patient reportedly continued on androgen deprivation therapy despite intermittent breaks due to noncompliance.  Lupron dosing:  #1-5/25/2012 (30 mg)  #2-1/14/13(30 mg)  #3-8/14/13(30 mg)  #4-3/4/14 (45 mg)    November 15, 2012: PSA 3.82 ng/mL.    January 6, 2014: PSA radha 0.34 ng/mL.     October 14, 2019: Patient was advised to restart androgen deprivation therapy. He was given one leuprolide injection and refused subsequent injections. Nuclear medicine bone scan was negative for evidence of osseous metastatic disease. CT of the chest, abdomen pelvis was negative for evidence of metastatic prostate cancer. There was chronic occlusion of left renal, bilateral internal iliac, left common iliac, left common femoral, and proximal superficial femoral artery. Noncalcified plaque of the SMA results in  approximately 80% stenosis.    September 8, 2020: PSA 39.9 ng/mL    October 27, 2020: Patient was initiated on abiraterone.     November 12, 2020: PSA 15.9 ng/mL. Patient underwent CT of the chest, abdomen pelvis at outside facility which revealed no evidence of metastatic disease in the chest abdomen or pelvis. However, there was ill-defined fat stranding about the SMA and pancreatic body, which is nonspecific. 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 Nuclear medicine bone scan was negative for evidence of osseous metastatic disease.    November 16, 2020: Patient met with his oncology office. At that time, patient indicated he was taking Zytiga 500 mg without food and in the absence of prednisone. Patient was then recommended to take Zytiga 250 mg with food along with prednisone 5 mg daily. Patient continued off leuprolide.    January 18, 2021: PSA 20.3 ng/ml.     March 1, 2021: PSA 32.2 ng/ml. Testosterone 176.                 ARISTIDES JULES MD         January 19, 2022

## 2021-12-23 NOTE — TELEPHONE ENCOUNTER
The fax from [facility] was received.     Provider is out of office until 12/27/2021.    Terese Rothman,

## 2021-12-28 NOTE — TELEPHONE ENCOUNTER
Huddled with the provider. He will review. Faxed information on the PCP's desk. Terese Rothman,          [email or web address]    [email or web address]

## 2021-12-29 DIAGNOSIS — F11.90 CHRONIC, CONTINUOUS USE OF OPIOIDS: ICD-10-CM

## 2021-12-29 DIAGNOSIS — M54.40 CHRONIC LOW BACK PAIN WITH SCIATICA, SCIATICA LATERALITY UNSPECIFIED, UNSPECIFIED BACK PAIN LATERALITY: ICD-10-CM

## 2021-12-29 DIAGNOSIS — G89.29 CHRONIC LOW BACK PAIN WITH SCIATICA, SCIATICA LATERALITY UNSPECIFIED, UNSPECIFIED BACK PAIN LATERALITY: ICD-10-CM

## 2021-12-29 DIAGNOSIS — G89.18 ACUTE POST-OPERATIVE PAIN: ICD-10-CM

## 2021-12-29 RX ORDER — OXYCODONE HYDROCHLORIDE 10 MG/1
10 TABLET ORAL EVERY 6 HOURS PRN
Qty: 120 TABLET | Refills: 0 | Status: SHIPPED | OUTPATIENT
Start: 2021-12-29 | End: 2022-01-31

## 2021-12-29 NOTE — TELEPHONE ENCOUNTER
Reason for Call:  Medication or medication refill:    Do you use a [facility]?  Name of the pharmacy and phone number for the current request:  [facility]    Name of the medication requested: Oxycodone    Other request:     Can we leave a detailed message on this number? NO    Phone number patient can be reached at: Cell number on file:    Telephone Information:   Mobile [phone]       Best Time:     Call taken on 12/29/2021 at 8:40 AM by Alaina Shaffer

## 2021-12-29 NOTE — TELEPHONE ENCOUNTER
He is calling to check on his script. He is currently out of medications. Please send over ASAP.    [phone] ok to leave a message.    Terese Rothman,

## 2021-12-30 ENCOUNTER — OFFICE VISIT (OUTPATIENT)
Dept: FAMILY MEDICINE | Facility: CLINIC | Age: 66
End: 2021-12-30
Payer: COMMERCIAL

## 2021-12-30 ENCOUNTER — MEDICAL CORRESPONDENCE (OUTPATIENT)
Dept: HEALTH INFORMATION MANAGEMENT | Facility: CLINIC | Age: 66
End: 2021-12-30

## 2021-12-30 VITALS
WEIGHT: 215 LBS | BODY MASS INDEX: 27.6 KG/M2 | SYSTOLIC BLOOD PRESSURE: 132 MMHG | DIASTOLIC BLOOD PRESSURE: 79 MMHG | OXYGEN SATURATION: 97 % | HEART RATE: 89 BPM

## 2021-12-30 DIAGNOSIS — J11.00 INFLUENZAL PNEUMONIA: ICD-10-CM

## 2021-12-30 DIAGNOSIS — Z23 COVID-19 VACCINE ADMINISTERED: ICD-10-CM

## 2021-12-30 DIAGNOSIS — N18.31 STAGE 3A CHRONIC KIDNEY DISEASE (H): Primary | ICD-10-CM

## 2021-12-30 DIAGNOSIS — C61 PROSTATE CANCER (H): ICD-10-CM

## 2021-12-30 DIAGNOSIS — F11.90 CHRONIC, CONTINUOUS USE OF OPIOIDS: ICD-10-CM

## 2021-12-30 DIAGNOSIS — I42.0 NONISCHEMIC DILATED CARDIOMYOPATHY (H): ICD-10-CM

## 2021-12-30 DIAGNOSIS — M86.662: ICD-10-CM

## 2021-12-30 PROCEDURE — 0064A COVID-19,PF,MODERNA (18+ YRS BOOSTER .25ML): CPT | Performed by: INTERNAL MEDICINE

## 2021-12-30 PROCEDURE — 99214 OFFICE O/P EST MOD 30 MIN: CPT | Performed by: INTERNAL MEDICINE

## 2021-12-30 PROCEDURE — 91306 COVID-19,PF,MODERNA (18+ YRS BOOSTER .25ML): CPT | Performed by: INTERNAL MEDICINE

## 2021-12-30 NOTE — PROGRESS NOTES
Assessment & Plan   Problem List Items Addressed This Visit     Chronic, continuous use of opioids    CKD (chronic kidney disease) stage 3, GFR 30-59 ml/min (H) - Primary    Nonischemic dilated cardiomyopathy (HCC)    Prostate cancer (H)      Other Visit Diagnoses     Chronic osteomyelitis of left lower leg (H)        Influenzal pneumonia        COVID-19 vaccine administered        Relevant Orders    COVID-19,PF,MODERNA (18+ YRS BOOSTER .25ML) (Completed)         I spent 20 minutes completing paperwork for insurance and referral and ongoing cares  Patient receiving comprehensive care for prostate cancer at [facility]   Completed referrals to continue care             Work on weight loss  Regular exercise    Return in about 4 weeks (around 1/27/2022) for BP Recheck.    Avery Duke MD  [facility] RACHANA Meeks is a 66 year old who presents for the following health issues     HPI     ANGELINA Anne [phone]      Hospital Follow-up Visit:    Hospital/Nursing Home/IP Rehab Facility: [facility]  Date of Admission: 12/17/21  Date of Discharge: 12/21/21  Reason(s) for Admission: Pneumonia      Was your hospitalization related to COVID-19? No   Problems taking medications regularly:  None  Medication changes since discharge: None  Problems adhering to non-medication therapy:  None    Summary of hospitalization:  [facility] discharge summary reviewed  Diagnostic Tests/Treatments reviewed.  Follow up needed: none  Other Healthcare Providers Involved in Patient s Care:         None  Update since discharge: improved.       Post Discharge Medication Reconciliation: discharge medications reconciled and changed, per note/orders.  Plan of care communicated with patient                Review of Systems   Constitutional, HEENT, cardiovascular, pulmonary, gi and gu systems are negative, except as otherwise noted.      Objective    /79 (BP Location: Right arm,  Patient Position: Chair, Cuff Size: Adult Large)   Pulse 89   Wt 97.5 kg (215 lb)   SpO2 97%   BMI 27.60 kg/m    Body mass index is 27.6 kg/m .  Physical Exam   GENERAL: healthy, alert and no distress  EYES: Eyes grossly normal to inspection, PERRL and conjunctivae and sclerae normal  HENT: ear canals and TM's normal, nose and mouth without ulcers or lesions  NECK: no adenopathy, no asymmetry, masses, or scars and thyroid normal to palpation  RESP: lungs clear to auscultation - no rales, rhonchi or wheezes  CV: regular rate and rhythm, normal S1 S2, no S3 or S4, no murmur, click or rub, no peripheral edema and peripheral pulses strong  ABDOMEN: soft, nontender, no hepatosplenomegaly, no masses and bowel sounds normal  MS: no gross musculoskeletal defects noted, no edema  SKIN: no suspicious lesions or rashes  NEURO: Normal strength and tone, mentation intact and speech normal  BACK: no CVA tenderness, no paralumbar tenderness  PSYCH: mentation appears normal, affect normal/bright  LYMPH: no cervical, supraclavicular, axillary, or inguinal adenopathy    No results found for any visits on 12/30/21.

## 2021-12-30 NOTE — TELEPHONE ENCOUNTER
[employer] form completed and confirmed faxed to [phone]. A copy of the completed form has been sent to [employer].        Sent a copy to be added to the chart.     Sent a fax letting [facility] know this was sent to [employer]. Gave the team direct fax of [phone].    Terese Rothman,

## 2022-01-07 NOTE — TELEPHONE ENCOUNTER
Reason for call:  Other   Patient called regarding (reason for call): Jean Aguirre [phone]  Additional comments:     They need the medical reasons why from the referral.    They needs this by end of day Monday 1/10/2022    A letter would be ok. They just need to know why.     Fax: [phone]    Phone number to reach Jean Aguirre [phone]    Best Time:      Can we leave a detailed message on this number?  YES    Travel screening: Not Applicable

## 2022-01-20 ENCOUNTER — TELEPHONE (OUTPATIENT)
Dept: FAMILY MEDICINE | Facility: CLINIC | Age: 67
End: 2022-01-20
Payer: COMMERCIAL

## 2022-01-20 DIAGNOSIS — Z89.519 S/P BKA (BELOW KNEE AMPUTATION) UNILATERAL (H): Primary | ICD-10-CM

## 2022-01-20 DIAGNOSIS — M54.40 CHRONIC LOW BACK PAIN WITH SCIATICA, SCIATICA LATERALITY UNSPECIFIED, UNSPECIFIED BACK PAIN LATERALITY: ICD-10-CM

## 2022-01-20 DIAGNOSIS — G89.29 CHRONIC LOW BACK PAIN WITH SCIATICA, SCIATICA LATERALITY UNSPECIFIED, UNSPECIFIED BACK PAIN LATERALITY: ICD-10-CM

## 2022-01-20 NOTE — TELEPHONE ENCOUNTER
A request for chart notes for power wheel chair has come in from [employer] for the provider to complete and sign for: Wheelchair Scooter Order     Who is the order/form from & contact information?    [facility] - [address] [address]  Phone: [phone]  Fax: [phone]    This was faxed to [facility]    Please send encounter back to the team after the form has been completed.

## 2022-01-27 ENCOUNTER — MEDICAL CORRESPONDENCE (OUTPATIENT)
Dept: HEALTH INFORMATION MANAGEMENT | Facility: CLINIC | Age: 67
End: 2022-01-27
Payer: COMMERCIAL

## 2022-01-31 DIAGNOSIS — G89.18 ACUTE POST-OPERATIVE PAIN: ICD-10-CM

## 2022-01-31 DIAGNOSIS — F11.90 CHRONIC, CONTINUOUS USE OF OPIOIDS: ICD-10-CM

## 2022-01-31 DIAGNOSIS — M54.40 CHRONIC LOW BACK PAIN WITH SCIATICA, SCIATICA LATERALITY UNSPECIFIED, UNSPECIFIED BACK PAIN LATERALITY: ICD-10-CM

## 2022-01-31 DIAGNOSIS — G89.29 CHRONIC LOW BACK PAIN WITH SCIATICA, SCIATICA LATERALITY UNSPECIFIED, UNSPECIFIED BACK PAIN LATERALITY: ICD-10-CM

## 2022-01-31 RX ORDER — OXYCODONE HYDROCHLORIDE 10 MG/1
10 TABLET ORAL EVERY 6 HOURS PRN
Qty: 120 TABLET | Refills: 0 | Status: SHIPPED | OUTPATIENT
Start: 2022-01-31 | End: 2022-02-28

## 2022-01-31 NOTE — TELEPHONE ENCOUNTER
Reason for Call:  Medication or medication refill:    Do you use a [facility]?  Name of the pharmacy and phone number for the current request:  [facility] in [place]   Name of the medication requested: Oxycodone     Other request: N/A    Can we leave a detailed message on this number? NO    Phone number patient can be reached at: Home number on file [phone] (home)    Best Time: Anytime     Call taken on 1/31/2022 at 5:35 AM by Esperanza Bautista  ;

## 2022-01-31 NOTE — TELEPHONE ENCOUNTER
Patient calling- he states he always calls his narcotic prescription same day for it to be filled right away by his PCP.  He reports he was told he could not request this through [facility] and he does not have to wait 3 business days for refills.    He wanted to make sure his [facility] message was sent to PCP- verified it was- was thankful and ended call.    Not sure why patient was told this way to request for refills, protocol for refill should allow for him to either request through pharmacy or [facility] with 3 business day turnaround.       Karin Chacon, RN

## 2022-02-02 ENCOUNTER — TRANSFERRED RECORDS (OUTPATIENT)
Dept: HEALTH INFORMATION MANAGEMENT | Facility: CLINIC | Age: 67
End: 2022-02-02
Payer: COMMERCIAL

## 2022-02-04 ENCOUNTER — MEDICAL CORRESPONDENCE (OUTPATIENT)
Dept: HEALTH INFORMATION MANAGEMENT | Facility: CLINIC | Age: 67
End: 2022-02-04
Payer: COMMERCIAL

## 2022-02-07 ENCOUNTER — MEDICAL CORRESPONDENCE (OUTPATIENT)
Dept: HEALTH INFORMATION MANAGEMENT | Facility: CLINIC | Age: 67
End: 2022-02-07
Payer: COMMERCIAL

## 2022-02-17 PROBLEM — F11.90 CHRONIC, CONTINUOUS USE OF OPIOIDS: Status: ACTIVE | Noted: 2021-08-31

## 2022-02-25 ENCOUNTER — TELEPHONE (OUTPATIENT)
Dept: FAMILY MEDICINE | Facility: CLINIC | Age: 67
End: 2022-02-25
Payer: COMMERCIAL

## 2022-02-25 NOTE — TELEPHONE ENCOUNTER
Regina called from [employer] needs Face to Face records faxed to her Attn: Regina.  Ananya Tate,

## 2022-02-28 DIAGNOSIS — G89.18 ACUTE POST-OPERATIVE PAIN: ICD-10-CM

## 2022-02-28 DIAGNOSIS — M54.40 CHRONIC LOW BACK PAIN WITH SCIATICA, SCIATICA LATERALITY UNSPECIFIED, UNSPECIFIED BACK PAIN LATERALITY: ICD-10-CM

## 2022-02-28 DIAGNOSIS — F11.90 CHRONIC, CONTINUOUS USE OF OPIOIDS: ICD-10-CM

## 2022-02-28 DIAGNOSIS — G89.29 CHRONIC LOW BACK PAIN WITH SCIATICA, SCIATICA LATERALITY UNSPECIFIED, UNSPECIFIED BACK PAIN LATERALITY: ICD-10-CM

## 2022-02-28 RX ORDER — OXYCODONE HYDROCHLORIDE 10 MG/1
10 TABLET ORAL EVERY 6 HOURS PRN
Qty: 120 TABLET | Refills: 0 | Status: SHIPPED | OUTPATIENT
Start: 2022-02-28 | End: 2022-03-29

## 2022-03-04 ENCOUNTER — MEDICAL CORRESPONDENCE (OUTPATIENT)
Dept: HEALTH INFORMATION MANAGEMENT | Facility: CLINIC | Age: 67
End: 2022-03-04
Payer: COMMERCIAL

## 2022-03-12 ENCOUNTER — HEALTH MAINTENANCE LETTER (OUTPATIENT)
Age: 67
End: 2022-03-12

## 2022-03-16 ENCOUNTER — TRANSFERRED RECORDS (OUTPATIENT)
Dept: HEALTH INFORMATION MANAGEMENT | Facility: CLINIC | Age: 67
End: 2022-03-16
Payer: COMMERCIAL

## 2022-03-29 ENCOUNTER — OFFICE VISIT (OUTPATIENT)
Dept: FAMILY MEDICINE | Facility: CLINIC | Age: 67
End: 2022-03-29
Payer: COMMERCIAL

## 2022-03-29 VITALS
WEIGHT: 214 LBS | HEIGHT: 74 IN | SYSTOLIC BLOOD PRESSURE: 124 MMHG | DIASTOLIC BLOOD PRESSURE: 79 MMHG | OXYGEN SATURATION: 100 % | BODY MASS INDEX: 27.46 KG/M2 | HEART RATE: 67 BPM

## 2022-03-29 DIAGNOSIS — F11.90 CHRONIC, CONTINUOUS USE OF OPIOIDS: ICD-10-CM

## 2022-03-29 DIAGNOSIS — G89.4 CHRONIC PAIN SYNDROME: Primary | ICD-10-CM

## 2022-03-29 DIAGNOSIS — N18.31 STAGE 3A CHRONIC KIDNEY DISEASE (H): ICD-10-CM

## 2022-03-29 DIAGNOSIS — I48.0 PAROXYSMAL ATRIAL FIBRILLATION (H): ICD-10-CM

## 2022-03-29 DIAGNOSIS — B18.2 CHRONIC HEPATITIS C WITHOUT HEPATIC COMA (H): ICD-10-CM

## 2022-03-29 DIAGNOSIS — I42.0 NONISCHEMIC DILATED CARDIOMYOPATHY (H): ICD-10-CM

## 2022-03-29 DIAGNOSIS — M54.40 CHRONIC LOW BACK PAIN WITH SCIATICA, SCIATICA LATERALITY UNSPECIFIED, UNSPECIFIED BACK PAIN LATERALITY: ICD-10-CM

## 2022-03-29 DIAGNOSIS — I73.9 PAD (PERIPHERAL ARTERY DISEASE) (H): ICD-10-CM

## 2022-03-29 DIAGNOSIS — Z79.899 ENCOUNTER FOR LONG-TERM (CURRENT) USE OF MEDICATIONS: ICD-10-CM

## 2022-03-29 DIAGNOSIS — G89.29 CHRONIC LOW BACK PAIN WITH SCIATICA, SCIATICA LATERALITY UNSPECIFIED, UNSPECIFIED BACK PAIN LATERALITY: ICD-10-CM

## 2022-03-29 DIAGNOSIS — Z13.220 SCREENING FOR HYPERLIPIDEMIA: ICD-10-CM

## 2022-03-29 DIAGNOSIS — C61 PROSTATE CANCER (H): ICD-10-CM

## 2022-03-29 DIAGNOSIS — M86.662: ICD-10-CM

## 2022-03-29 DIAGNOSIS — Z89.519 S/P BKA (BELOW KNEE AMPUTATION) UNILATERAL (H): ICD-10-CM

## 2022-03-29 DIAGNOSIS — G89.18 ACUTE POST-OPERATIVE PAIN: ICD-10-CM

## 2022-03-29 LAB
ANION GAP SERPL CALCULATED.3IONS-SCNC: 5 MMOL/L (ref 3–14)
BUN SERPL-MCNC: 19 MG/DL (ref 7–30)
CALCIUM SERPL-MCNC: 9.3 MG/DL (ref 8.5–10.1)
CHLORIDE BLD-SCNC: 110 MMOL/L (ref 94–109)
CHOLEST SERPL-MCNC: 105 MG/DL
CO2 SERPL-SCNC: 25 MMOL/L (ref 20–32)
CREAT SERPL-MCNC: 1.42 MG/DL (ref 0.66–1.25)
CREAT UR-MCNC: 375 MG/DL
CREAT UR-MCNC: 376 MG/DL
FASTING STATUS PATIENT QL REPORTED: YES
GFR SERPL CREATININE-BSD FRML MDRD: 55 ML/MIN/1.73M2
GLUCOSE BLD-MCNC: 140 MG/DL (ref 70–99)
HDLC SERPL-MCNC: 40 MG/DL
HGB BLD-MCNC: 14.5 G/DL (ref 13.3–17.7)
LDLC SERPL CALC-MCNC: 46 MG/DL
MICROALBUMIN UR-MCNC: 138 MG/L
MICROALBUMIN/CREAT UR: 36.7 MG/G CR (ref 0–17)
NONHDLC SERPL-MCNC: 65 MG/DL
POTASSIUM BLD-SCNC: 3.8 MMOL/L (ref 3.4–5.3)
SODIUM SERPL-SCNC: 140 MMOL/L (ref 133–144)
TRIGL SERPL-MCNC: 97 MG/DL

## 2022-03-29 PROCEDURE — 0013A COVID-19,PF,MODERNA (18+ YRS PRIMARY SERIES .5ML): CPT | Performed by: INTERNAL MEDICINE

## 2022-03-29 PROCEDURE — 80048 BASIC METABOLIC PNL TOTAL CA: CPT | Performed by: INTERNAL MEDICINE

## 2022-03-29 PROCEDURE — 80307 DRUG TEST PRSMV CHEM ANLYZR: CPT | Performed by: INTERNAL MEDICINE

## 2022-03-29 PROCEDURE — 82043 UR ALBUMIN QUANTITATIVE: CPT | Performed by: INTERNAL MEDICINE

## 2022-03-29 PROCEDURE — 85018 HEMOGLOBIN: CPT | Performed by: INTERNAL MEDICINE

## 2022-03-29 PROCEDURE — 91301 COVID-19,PF,MODERNA (18+ YRS PRIMARY SERIES .5ML): CPT | Performed by: INTERNAL MEDICINE

## 2022-03-29 PROCEDURE — 36415 COLL VENOUS BLD VENIPUNCTURE: CPT | Performed by: INTERNAL MEDICINE

## 2022-03-29 PROCEDURE — 80061 LIPID PANEL: CPT | Performed by: INTERNAL MEDICINE

## 2022-03-29 PROCEDURE — 99214 OFFICE O/P EST MOD 30 MIN: CPT | Performed by: INTERNAL MEDICINE

## 2022-03-29 RX ORDER — OXYCODONE HYDROCHLORIDE 10 MG/1
10 TABLET ORAL EVERY 6 HOURS PRN
Qty: 120 TABLET | Refills: 0 | Status: SHIPPED | OUTPATIENT
Start: 2022-03-29 | End: 2022-04-29

## 2022-03-29 NOTE — PROGRESS NOTES
"  Assessment & Plan   Problem List Items Addressed This Visit     Atrial fibrillation (H)    Chronic hepatitis C without hepatic coma (H)    Chronic low back pain    Relevant Medications    oxyCODONE IR (ROXICODONE) 10 MG tablet    Chronic pain syndrome - Primary    Relevant Orders    [id number] - Urine Drug Confirmation Panel (Comprehensive)    Chronic, continuous use of opioids    Relevant Medications    oxyCODONE IR (ROXICODONE) 10 MG tablet    CKD (chronic kidney disease) stage 3, GFR 30-59 ml/min (H)    Relevant Orders    Hemoglobin (Completed)    Basic metabolic panel  (Ca, Cl, CO2, Creat, Gluc, K, Na, BUN) (Completed)    Albumin Random Urine Quantitative with Creat Ratio (Completed)    Nonischemic dilated cardiomyopathy (HCC)    PAD (peripheral artery disease) (H)    Prostate cancer (H)    S/P BKA (below knee amputation) unilateral (H)      Other Visit Diagnoses     Encounter for long-term (current) use of medications        Screening for hyperlipidemia        Relevant Orders    Lipid panel reflex to direct LDL Fasting (Completed)    Chronic osteomyelitis of left lower leg (H)        Relevant Medications    oxyCODONE IR (ROXICODONE) 10 MG tablet    Acute post-operative pain        Relevant Medications    oxyCODONE IR (ROXICODONE) 10 MG tablet                  BMI:   Estimated body mass index is 27.48 kg/m  as calculated from the following:    Height as of this encounter: 1.88 m (6' 2\").    Weight as of this encounter: 97.1 kg (214 lb).   Weight management plan: Discussed healthy diet and exercise guidelines    Work on weight loss  Regular exercise    No follow-ups on file.    Avery Duke MD  [facility] RACHANA Meeks is a 66 year old who presents for the following health issues     HPI   May clinic on the 4th   PSA up 47    Review of Systems   Constitutional, HEENT, cardiovascular, pulmonary, gi and gu systems are negative, except as otherwise noted.      Objective    BP " "124/79 (BP Location: Right arm, Patient Position: Chair, Cuff Size: Adult Large)   Pulse 67   Ht 1.88 m (6' 2\")   Wt 97.1 kg (214 lb)   SpO2 100%   BMI 27.48 kg/m    Body mass index is 27.48 kg/m .  Physical Exam   GENERAL: healthy, alert and no distress  EYES: Eyes grossly normal to inspection, PERRL and conjunctivae and sclerae normal  HENT: ear canals and TM's normal, nose and mouth without ulcers or lesions  NECK: no adenopathy, no asymmetry, masses, or scars and thyroid normal to palpation  RESP: lungs clear to auscultation - no rales, rhonchi or wheezes  CV: regular rate and rhythm, normal S1 S2, no S3 or S4, no murmur, click or rub, no peripheral edema and peripheral pulses strong  ABDOMEN: soft, nontender, no hepatosplenomegaly, no masses and bowel sounds normal  MS: prosthesis in place and fitting and working    NEURO: neuropathy in the peripheral hands and feet     PSYCH: mentation appears normal, affect normal/bright  LYMPH: no cervical, supraclavicular, axillary, or inguinal adenopathy    Results for orders placed or performed in visit on 03/29/22   Albumin Random Urine Quantitative with Creat Ratio     Status: Abnormal   Result Value Ref Range    Creatinine Urine mg/dL 376 mg/dL    Albumin Urine mg/L 138 mg/L    Albumin Urine mg/g Cr 36.70 (H) 0.00 - 17.00 mg/g Cr   Basic metabolic panel  (Ca, Cl, CO2, Creat, Gluc, K, Na, BUN)     Status: Abnormal   Result Value Ref Range    Sodium 140 133 - 144 mmol/L    Potassium 3.8 3.4 - 5.3 mmol/L    Chloride 110 (H) 94 - 109 mmol/L    Carbon Dioxide (CO2) 25 20 - 32 mmol/L    Anion Gap 5 3 - 14 mmol/L    Urea Nitrogen 19 7 - 30 mg/dL    Creatinine 1.42 (H) 0.66 - 1.25 mg/dL    Calcium 9.3 8.5 - 10.1 mg/dL    Glucose 140 (H) 70 - 99 mg/dL    GFR Estimate 55 (L) >60 mL/min/1.73m2   Hemoglobin     Status: Normal   Result Value Ref Range    Hemoglobin 14.5 13.3 - 17.7 g/dL   Lipid panel reflex to direct LDL Fasting     Status: None   Result Value Ref Range    " Cholesterol 105 <200 mg/dL    Triglycerides 97 <150 mg/dL    Direct Measure HDL 40 >=40 mg/dL    LDL Cholesterol Calculated 46 <=100 mg/dL    Non HDL Cholesterol 65 <130 mg/dL    Patient Fasting > 8hrs? Yes     Narrative    Cholesterol  Desirable:  <200 mg/dL    Triglycerides  Normal:  Less than 150 mg/dL  Borderline High:  150-199 mg/dL  High:  200-499 mg/dL  Very High:  Greater than or equal to 500 mg/dL    Direct Measure HDL  Female:  Greater than or equal to 50 mg/dL   Male:  Greater than or equal to 40 mg/dL    LDL Cholesterol  Desirable:  <100mg/dL  Above Desirable:  100-129 mg/dL   Borderline High:  130-159 mg/dL   High:  160-189 mg/dL   Very High:  >= 190 mg/dL    Non HDL Cholesterol  Desirable:  130 mg/dL  Above Desirable:  130-159 mg/dL  Borderline High:  160-189 mg/dL  High:  190-219 mg/dL  Very High:  Greater than or equal to 220 mg/dL   Urine Creatinine for Drug Screen Panel     Status: None   Result Value Ref Range    Creatinine Urine for Drug Screen 375 mg/dL   [id number] - Urine Drug Confirmation Panel (Comprehensive)     Status: None (In process)    Narrative    The following orders were created for panel order [id number] - Urine Drug Confirmation Panel (Comprehensive).  Procedure                               Abnormality         Status                     ---------                               -----------         ------                     Urine Drug Confirmation ...[[id number]]                      In process                 Urine Creatinine for Bladimir...[[id number]]                      Final result                 Please view results for these tests on the individual orders.

## 2022-03-31 ENCOUNTER — TELEPHONE (OUTPATIENT)
Dept: FAMILY MEDICINE | Facility: CLINIC | Age: 67
End: 2022-03-31
Payer: COMMERCIAL

## 2022-03-31 NOTE — TELEPHONE ENCOUNTER
Received a Faxed request    Care Coordinator Renan Anne with [facility]    Phone: [phone] Fax: [phone]    Checking when Constantino got his last Tetanus Booster.

## 2022-03-31 NOTE — TELEPHONE ENCOUNTER
Last booster was:    TD (ADULT, 7+)                        11/19/2019      Sent this information back. Terese Rothman,

## 2022-04-01 LAB
OXYCODONE UR CFM-MCNC: 4860 NG/ML
OXYCODONE/CREAT UR: 1296 NG/MG {CREAT}
OXYMORPHONE UR CFM-MCNC: 1520 NG/ML
OXYMORPHONE/CREAT UR: 405 NG/MG {CREAT}

## 2022-04-28 DIAGNOSIS — G89.18 ACUTE POST-OPERATIVE PAIN: ICD-10-CM

## 2022-04-28 DIAGNOSIS — G89.29 CHRONIC LOW BACK PAIN WITH SCIATICA, SCIATICA LATERALITY UNSPECIFIED, UNSPECIFIED BACK PAIN LATERALITY: ICD-10-CM

## 2022-04-28 DIAGNOSIS — M54.40 CHRONIC LOW BACK PAIN WITH SCIATICA, SCIATICA LATERALITY UNSPECIFIED, UNSPECIFIED BACK PAIN LATERALITY: ICD-10-CM

## 2022-04-28 DIAGNOSIS — F11.90 CHRONIC, CONTINUOUS USE OF OPIOIDS: ICD-10-CM

## 2022-04-28 NOTE — TELEPHONE ENCOUNTER
Routing refill request to provider for review/approval because:  Drug not on the [facility] refill protocol       ASHLEIGH BuchananN GAY  [facility]

## 2022-04-29 RX ORDER — OXYCODONE HYDROCHLORIDE 10 MG/1
10 TABLET ORAL EVERY 6 HOURS PRN
Qty: 120 TABLET | Refills: 0 | Status: SHIPPED | OUTPATIENT
Start: 2022-04-29 | End: 2022-05-31

## 2022-05-31 ENCOUNTER — OFFICE VISIT (OUTPATIENT)
Dept: FAMILY MEDICINE | Facility: CLINIC | Age: 67
End: 2022-05-31
Payer: COMMERCIAL

## 2022-05-31 VITALS
HEART RATE: 101 BPM | BODY MASS INDEX: 27.35 KG/M2 | WEIGHT: 213 LBS | SYSTOLIC BLOOD PRESSURE: 132 MMHG | DIASTOLIC BLOOD PRESSURE: 82 MMHG | OXYGEN SATURATION: 100 %

## 2022-05-31 DIAGNOSIS — G89.29 CHRONIC LOW BACK PAIN WITH SCIATICA, SCIATICA LATERALITY UNSPECIFIED, UNSPECIFIED BACK PAIN LATERALITY: ICD-10-CM

## 2022-05-31 DIAGNOSIS — M54.40 CHRONIC LOW BACK PAIN WITH SCIATICA, SCIATICA LATERALITY UNSPECIFIED, UNSPECIFIED BACK PAIN LATERALITY: ICD-10-CM

## 2022-05-31 DIAGNOSIS — G89.18 ACUTE POST-OPERATIVE PAIN: ICD-10-CM

## 2022-05-31 DIAGNOSIS — F11.90 CHRONIC, CONTINUOUS USE OF OPIOIDS: ICD-10-CM

## 2022-05-31 PROCEDURE — 99214 OFFICE O/P EST MOD 30 MIN: CPT | Performed by: INTERNAL MEDICINE

## 2022-05-31 RX ORDER — OXYCODONE HYDROCHLORIDE 10 MG/1
10 TABLET ORAL EVERY 6 HOURS PRN
Qty: 120 TABLET | Refills: 0 | Status: SHIPPED | OUTPATIENT
Start: 2022-05-31 | End: 2022-07-08

## 2022-05-31 ASSESSMENT — PATIENT HEALTH QUESTIONNAIRE - PHQ9: SUM OF ALL RESPONSES TO PHQ QUESTIONS 1-9: 0

## 2022-05-31 NOTE — LETTER
Opioid / Opioid Plus Controlled Substance Agreement    This is an agreement between you and your provider about the safe and appropriate use of controlled substance/opioids prescribed by your care team. Controlled substances are medicines that can cause physical and mental dependence (abuse).    There are strict laws about having and using these medicines. We here at [facility] are committing to working with you in your efforts to get better. To support you in this work, we ll help you schedule regular office appointments for medicine refills. If we must cancel or change your appointment for any reason, we ll make sure you have enough medicine to last until your next appointment.     As a Provider, I will:    Listen carefully to your concerns and treat you with respect.     Recommend a treatment plan that I believe is in your best interest. This plan may involve therapies other than opioid pain medication.     Talk with you often about the possible benefits, and the risk of harm of any medicine that we prescribe for you.     Provide a plan on how to taper (discontinue or go off) using this medicine if the decision is made to stop its use.    As a Patient, I understand that opioid(s):     Are a controlled substance prescribed by my care team to help me function or work and manage my condition(s).     Are strong medicines and can cause serious side effects such as:    Drowsiness, which can seriously affect my driving ability    A lower breathing rate, enough to cause death    Harm to my thinking ability     Depression     Abuse of and addiction to this medicine    Need to be taken exactly as prescribed. Combining opioids with certain medicines or chemicals (such as illegal drugs, sedatives, sleeping pills, and benzodiazepines) can be dangerous or even fatal. If I stop opioids suddenly, I may have severe withdrawal symptoms.    Do not work for all types of pain nor for all patients. If they re not helpful, I may  be asked to stop them.        The risks, benefits and side effects of these medicine(s) were explained to me. I agree that:  1. I will take part in other treatments as advised by my care team. This may be psychiatry or counseling, physical therapy, behavioral therapy, group treatment or a referral to a specialist.     2. I will keep all my appointments. I understand that this is part of the monitoring of opioids. My care team may require an office visit for EVERY opioid/controlled substance refill. If I miss appointments or don t follow instructions, my care team may stop my medicine.    3. I will take my medicines as prescribed. I will not change the dose or schedule unless my care team tells me to. There will be no refills if I run out early.     4. I may be asked to come to the clinic and complete a urine drug test or complete a pill count at any time. If I don t give a urine sample or participate in a pill count, the care team may stop my medicine.    5. I will only receive prescriptions from this clinic for chronic pain. If I am treated by another provider for acute pain issues, I will tell them that I am taking opioid pain medication for chronic pain and that I have a treatment agreement with this provider. I will inform my [facility] care team within one business day if I am given a prescription for any pain medication by another healthcare provider. My [facility] care team can contact other providers and pharmacists about my use of any medicines.    6. It is up to me to make sure that I don t run out of my medicines on weekends or holidays. If my care team is willing to refill my opioid prescription without a visit, I must request refills only during office hours. Refills may take up to 3 business days to process. I will use one pharmacy to fill all my opioid and other controlled substance prescriptions. I will notify the clinic about any changes to my insurance or medication  availability.    7. I am responsible for my prescriptions. If the medicine/prescription is lost, stolen or destroyed, it will not be replaced. I also agree not to share controlled substance medicines with anyone.    8. I am aware I should not use any illegal or recreational drugs. I agree not to drink alcohol unless my care team says I can.       9. If I enroll in the [place] [facility] Cannabis program, I will tell my care team prior to my next refill.     10. I will tell my care team right away if I become pregnant, have a new medical problem treated outside of my regular clinic, or have a change in my medications.    11. I understand that this medicine can affect my thinking, judgment and reaction time. Alcohol and drugs affect the brain and body, which can affect the safety of my driving. Being under the influence of alcohol or drugs can affect my decision-making, behaviors, personal safety, and the safety of others. Driving while impaired (DWI) can occur if a person is driving, operating, or in physical control of a car, motorcycle, boat, snowmobile, ATV, motorbike, off-road vehicle, or any other motor vehicle ([place] Statute 169A.20). I understand the risk if I choose to drive or operate any vehicle or machinery.    I understand that if I do not follow any of the conditions above, my prescriptions or treatment may be stopped or changed.          Opioids  What You Need to Know    What are opioids?   Opioids are pain medicines that must be prescribed by a doctor. They are also known as narcotics.     Examples are:   1. morphine (MS Contin, Josefina)  2. oxycodone (Oxycontin)  3. oxycodone and acetaminophen (Percocet)  4. hydrocodone and acetaminophen (Vicodin, Norco)   5. fentanyl patch (Duragesic)   6. hydromorphone (Dilaudid)   7. methadone  8. codeine (Tylenol #3)     What do opioids do well?   Opioids are best for severe short-term pain such as after a surgery or injury. They may work well for cancer pain. They may  help some people with long-lasting (chronic) pain.     What do opioids NOT do well?   Opioids never get rid of pain entirely, and they don t work well for most patients with chronic pain. Opioids don t reduce swelling, one of the causes of pain.                                    Other ways to manage chronic pain and improve function include:       Treat the health problem that may be causing pain    Anti-inflammation medicines, which reduce swelling and tenderness, such as ibuprofen (Advil, Motrin) or naproxen (Aleve)    Acetaminophen (Tylenol)    Antidepressants and anti-seizure medicines, especially for nerve pain    Topical treatments such as patches or creams    Injections or nerve blocks    Chiropractic or osteopathic treatment    Acupuncture, massage, deep breathing, meditation, visual imagery, aromatherapy    Use heat or ice at the pain site    Physical therapy     Exercise    Stop smoking    Take part in therapy       Risks and side effects     Talk to your doctor before you start or decide to keep taking opioids. Possible side effects include:      Lowering your breathing rate enough to cause death    Overdose, including death, especially if taking higher than prescribed doses    Worse depression symptoms; less pleasure in things you usually enjoy    Feeling tired or sluggish    Slower thoughts or cloudy thinking    Being more sensitive to pain over time; pain is harder to control    Trouble sleeping or restless sleep    Changes in hormone levels (for example, less testosterone)    Changes in sex drive or ability to have sex    Constipation    Unsafe driving    Itching and sweating    Dizziness    Nausea, throwing up and dry mouth    What else should I know about opioids?    Opioids may lead to dependence, tolerance, or addiction.      Dependence means that if you stop or reduce the medicine too quickly, you will have withdrawal symptoms. These include loose poop (diarrhea), jitters, flu-like symptoms,  nervousness and tremors. Dependence is not the same as addiction.                       Tolerance means needing higher doses over time to get the same effect. This may increase the chance of serious side effects.      Addiction is when people improperly use a substance that harms their body, their mind or their relations with others. Use of opiates can cause a relapse of addiction if you have a history of drug or alcohol abuse.      People who have used opioids for a long time may have a lower quality of life, worse depression, higher levels of pain and more visits to doctors.    You can overdose on opioids. Take these steps to lower your risk of overdose:    1. Recognize the signs:  Signs of overdose include decrease or loss of consciousness (blackout), slowed breathing, trouble waking up and blue lips. If someone is worried about overdose, they should call 911.    2. Talk to your doctor about Narcan (naloxone).   If you are at risk for overdose, you may be given a prescription for Narcan. This medicine very quickly reverses the effects of opioids.   If you overdose, a friend or family member can give you Narcan while waiting for the ambulance. They need to know the signs of overdose and how to give Narcan.     3. Don't use alcohol or street drugs.   Taking them with opioids can cause death.    4. Do not take any of these medicines unless your doctor says it s OK. Taking these with opioids can cause death:    Benzodiazepines, such as lorazepam (Ativan), alprazolam (Xanax) or diazepam (Valium)    Muscle relaxers, such as cyclobenzaprine (Flexeril)    Sleeping pills like zolpidem (Ambien)     Other opioids      How to keep you and other people safe while taking opioids:    1. Never share your opioids with others.  Opioid medicines are regulated by the Drug Enforcement Agency (V[place]ES[place]). Selling or sharing medications is a criminal act.    2. Be sure to store opioids in a secure place, locked up if possible. Young children  can easily swallow them and overdose.    3. When you are traveling with your medicines, keep them in the original bottles. If you use a pill box, be sure you also carry a copy of your medicine list from your clinic or pharmacy.    4. Safe disposal of opioids    Most pharmacies have places to get rid of medicine, called disposal kiosks. Medicine disposal options are also available in every [facility]. Search your county and  medication disposal  to find more options. You can find more details at:  [email or web address]     I agree that my provider, clinic care team, and pharmacy may work with any city, state or federal law enforcement agency that investigates the misuse, sale, or other diversion of my controlled medicine. I will allow my provider to discuss my care with, or share a copy of, this agreement with any other treating provider, pharmacy or emergency room where I receive care.    I have read this agreement and have asked questions about anything I did not understand.    _______________________________________________________  Patient Signature - Constantino Taylor _____________________                   Date     _______________________________________________________  Provider Signature - Avery Duke MD   _____________________                   Date     _______________________________________________________  Witness Signature (required if provider not present while patient signing)   _____________________                   Date

## 2022-06-13 ENCOUNTER — MYC MEDICAL ADVICE (OUTPATIENT)
Dept: CARDIOLOGY | Facility: CLINIC | Age: 67
End: 2022-06-13
Payer: COMMERCIAL

## 2022-06-30 DIAGNOSIS — M54.40 CHRONIC LOW BACK PAIN WITH SCIATICA, SCIATICA LATERALITY UNSPECIFIED, UNSPECIFIED BACK PAIN LATERALITY: Primary | ICD-10-CM

## 2022-06-30 DIAGNOSIS — G89.29 CHRONIC LOW BACK PAIN WITH SCIATICA, SCIATICA LATERALITY UNSPECIFIED, UNSPECIFIED BACK PAIN LATERALITY: Primary | ICD-10-CM

## 2022-06-30 RX ORDER — OXYCODONE HYDROCHLORIDE 5 MG/1
TABLET ORAL
COMMUNITY
Start: 2021-08-30 | End: 2022-06-30

## 2022-06-30 NOTE — TELEPHONE ENCOUNTER
Reason for Call:  Medication or medication refill:    Do you use a [facility]?  Name of the pharmacy and phone number for the current request:  [facility] [place] [address]    Name of the medication requested: oxycodone    Other request: patient requesting refill. Declined calling pharmacy    Can we leave a detailed message on this number? YES    Phone number patient can be reached at: Home number on file [phone] (home)    Best Time: any    Call taken on 6/30/2022 at 8:12 AM by Bisi Boyer

## 2022-07-01 RX ORDER — OXYCODONE HYDROCHLORIDE 5 MG/1
10 TABLET ORAL EVERY 6 HOURS PRN
Qty: 120 TABLET | Refills: 0 | Status: SHIPPED | OUTPATIENT
Start: 2022-07-01 | End: 2022-07-08

## 2022-07-01 NOTE — TELEPHONE ENCOUNTER
Patient calling to check on status of request. States that he is currently out of medication and should have been due for refill 6/30/22. States that he needs medication refilled before the holiday weekend. Please advise.     Ceci Brian RN   [facility]

## 2022-07-07 ENCOUNTER — TELEPHONE (OUTPATIENT)
Dept: FAMILY MEDICINE | Facility: CLINIC | Age: 67
End: 2022-07-07

## 2022-07-07 DIAGNOSIS — M54.40 CHRONIC LOW BACK PAIN WITH SCIATICA, SCIATICA LATERALITY UNSPECIFIED, UNSPECIFIED BACK PAIN LATERALITY: ICD-10-CM

## 2022-07-07 DIAGNOSIS — G89.18 ACUTE POST-OPERATIVE PAIN: ICD-10-CM

## 2022-07-07 DIAGNOSIS — G89.29 CHRONIC LOW BACK PAIN WITH SCIATICA, SCIATICA LATERALITY UNSPECIFIED, UNSPECIFIED BACK PAIN LATERALITY: ICD-10-CM

## 2022-07-07 DIAGNOSIS — F11.90 CHRONIC, CONTINUOUS USE OF OPIOIDS: ICD-10-CM

## 2022-07-07 NOTE — TELEPHONE ENCOUNTER
Please advise. Quantity for new prescription (2-5 mg tabs every 6 hours) is 120, same as previous (for 1-10 mg tab every 6 hours)

## 2022-07-07 NOTE — TELEPHONE ENCOUNTER
Fax message: Pt was concerned about Oxycodone script that was sent over for 5 MG tabs and not 10 MG. Please clarify.

## 2022-07-08 RX ORDER — OXYCODONE HYDROCHLORIDE 10 MG/1
10 TABLET ORAL EVERY 6 HOURS PRN
Qty: 120 TABLET | Refills: 0 | Status: SHIPPED | OUTPATIENT
Start: 2022-07-08 | End: 2022-07-29

## 2022-07-29 ENCOUNTER — TELEPHONE (OUTPATIENT)
Dept: FAMILY MEDICINE | Facility: CLINIC | Age: 67
End: 2022-07-29

## 2022-07-29 DIAGNOSIS — M54.40 CHRONIC LOW BACK PAIN WITH SCIATICA, SCIATICA LATERALITY UNSPECIFIED, UNSPECIFIED BACK PAIN LATERALITY: ICD-10-CM

## 2022-07-29 DIAGNOSIS — F11.90 CHRONIC, CONTINUOUS USE OF OPIOIDS: ICD-10-CM

## 2022-07-29 DIAGNOSIS — G89.29 CHRONIC LOW BACK PAIN WITH SCIATICA, SCIATICA LATERALITY UNSPECIFIED, UNSPECIFIED BACK PAIN LATERALITY: ICD-10-CM

## 2022-07-29 DIAGNOSIS — G89.18 ACUTE POST-OPERATIVE PAIN: ICD-10-CM

## 2022-07-29 RX ORDER — OXYCODONE HYDROCHLORIDE 10 MG/1
10 TABLET ORAL EVERY 6 HOURS PRN
Qty: 120 TABLET | Refills: 0 | Status: SHIPPED | OUTPATIENT
Start: 2022-07-29 | End: 2022-09-30

## 2022-08-29 ENCOUNTER — TELEPHONE (OUTPATIENT)
Dept: FAMILY MEDICINE | Facility: CLINIC | Age: 67
End: 2022-08-29

## 2022-09-14 DIAGNOSIS — I63.19 CEREBRAL INFARCTION DUE TO EMBOLISM OF OTHER PRECEREBRAL ARTERY (H): ICD-10-CM

## 2022-09-14 DIAGNOSIS — I48.91 ATRIAL FIBRILLATION WITH RVR (H): ICD-10-CM

## 2022-09-14 DIAGNOSIS — I48.20 CHRONIC ATRIAL FIBRILLATION (H): ICD-10-CM

## 2022-09-14 DIAGNOSIS — I10 HYPERTENSION GOAL BP (BLOOD PRESSURE) < 140/90: ICD-10-CM

## 2022-09-14 DIAGNOSIS — N40.0 BENIGN PROSTATIC HYPERPLASIA, UNSPECIFIED WHETHER LOWER URINARY TRACT SYMPTOMS PRESENT: ICD-10-CM

## 2022-09-14 DIAGNOSIS — I42.0 DILATED CARDIOMYOPATHY (H): ICD-10-CM

## 2022-09-14 RX ORDER — ATORVASTATIN CALCIUM 40 MG/1
TABLET, FILM COATED ORAL
Qty: 90 TABLET | Refills: 1 | Status: SHIPPED | OUTPATIENT
Start: 2022-09-14 | End: 2023-03-31

## 2022-09-15 RX ORDER — RIVAROXABAN 20 MG/1
TABLET, FILM COATED ORAL
Qty: 90 TABLET | Refills: 4 | Status: SHIPPED | OUTPATIENT
Start: 2022-09-15 | End: 2023-07-27

## 2022-09-16 NOTE — TELEPHONE ENCOUNTER
Can you refill [facility]    Thank you  
Controlled Substance Refill Request for oxyCODONE IR (ROXICODONE) 10 MG tablet  Problem List Complete:  Yes  Chronic pain syndromeNoted 1/15/2021  [G89.4]  Overview  Patient is followed by Avery Duke MD for ongoing prescription of pain medication. All refills should only be approved by this provider, or covering partner.    Medication(s): oxycodone 10 mg every 6 hours.   Maximum quantity per month: 120  Clinic visit frequency required: Q 6 months     Controlled substance agreement:  Encounter-Level CSA - 01/02/2017:   Controlled Substance Agreement - Scan on 1/3/2017 1:03 PM: CONTROLLED SUBSTANCE AGREEMENT    Patient-Level CSA:   There are no patient-level csa.      Pain Clinic evaluation in the past: Yes  Date/Location:     DIRE Total Score(s):  DIRE SCORE 1/15/2021   DIRE Total Score 15       Last MNP website verification: done on 1/15/2021  [email or web address]      checked in past 3 months?  No, route to GAY Negro CMA          
no

## 2022-09-19 DIAGNOSIS — I10 HYPERTENSION GOAL BP (BLOOD PRESSURE) < 140/90: ICD-10-CM

## 2022-09-19 DIAGNOSIS — I48.0 PAROXYSMAL ATRIAL FIBRILLATION (H): ICD-10-CM

## 2022-09-20 DIAGNOSIS — I48.0 PAROXYSMAL ATRIAL FIBRILLATION (H): ICD-10-CM

## 2022-09-20 DIAGNOSIS — I10 HYPERTENSION GOAL BP (BLOOD PRESSURE) < 140/90: ICD-10-CM

## 2022-09-21 RX ORDER — LISINOPRIL 40 MG/1
TABLET ORAL
Qty: 90 TABLET | Refills: 4 | Status: SHIPPED | OUTPATIENT
Start: 2022-09-21 | End: 2023-04-17

## 2022-09-21 RX ORDER — CARVEDILOL 25 MG/1
25 TABLET ORAL 2 TIMES DAILY WITH MEALS
Qty: 186 TABLET | Refills: 4 | Status: SHIPPED | OUTPATIENT
Start: 2022-09-21 | End: 2023-07-27

## 2022-09-21 RX ORDER — CARVEDILOL 25 MG/1
TABLET ORAL
Qty: 186 TABLET | Refills: 4 | Status: SHIPPED | OUTPATIENT
Start: 2022-09-21 | End: 2023-08-29

## 2022-09-21 RX ORDER — LISINOPRIL 40 MG/1
40 TABLET ORAL DAILY
Qty: 90 TABLET | Refills: 4 | Status: SHIPPED | OUTPATIENT
Start: 2022-09-21 | End: 2023-07-27

## 2022-09-30 ENCOUNTER — OFFICE VISIT (OUTPATIENT)
Dept: FAMILY MEDICINE | Facility: CLINIC | Age: 67
End: 2022-09-30
Payer: COMMERCIAL

## 2022-09-30 VITALS
WEIGHT: 207 LBS | HEART RATE: 75 BPM | TEMPERATURE: 96.8 F | DIASTOLIC BLOOD PRESSURE: 82 MMHG | SYSTOLIC BLOOD PRESSURE: 130 MMHG | BODY MASS INDEX: 26.58 KG/M2 | OXYGEN SATURATION: 100 %

## 2022-09-30 DIAGNOSIS — G89.29 CHRONIC LOW BACK PAIN WITH SCIATICA, SCIATICA LATERALITY UNSPECIFIED, UNSPECIFIED BACK PAIN LATERALITY: ICD-10-CM

## 2022-09-30 DIAGNOSIS — M54.40 CHRONIC LOW BACK PAIN WITH SCIATICA, SCIATICA LATERALITY UNSPECIFIED, UNSPECIFIED BACK PAIN LATERALITY: ICD-10-CM

## 2022-09-30 DIAGNOSIS — F11.90 CHRONIC, CONTINUOUS USE OF OPIOIDS: ICD-10-CM

## 2022-09-30 DIAGNOSIS — G89.18 ACUTE POST-OPERATIVE PAIN: ICD-10-CM

## 2022-09-30 DIAGNOSIS — R10.84 ABDOMINAL PAIN, GENERALIZED: Primary | ICD-10-CM

## 2022-09-30 PROCEDURE — 99214 OFFICE O/P EST MOD 30 MIN: CPT | Performed by: INTERNAL MEDICINE

## 2022-09-30 RX ORDER — POLYETHYLENE GLYCOL 3350 17 G/17G
1 POWDER, FOR SOLUTION ORAL DAILY
Qty: 850 G | Refills: 11 | Status: SHIPPED | OUTPATIENT
Start: 2022-09-30

## 2022-09-30 RX ORDER — OXYCODONE HYDROCHLORIDE 10 MG/1
10 TABLET ORAL EVERY 8 HOURS PRN
Qty: 90 TABLET | Refills: 0 | Status: SHIPPED | OUTPATIENT
Start: 2022-09-30 | End: 2022-11-01

## 2022-09-30 RX ORDER — ONDANSETRON 4 MG/1
4 TABLET, ORALLY DISINTEGRATING ORAL EVERY 8 HOURS PRN
Qty: 20 TABLET | Refills: 3 | Status: SHIPPED | OUTPATIENT
Start: 2022-09-30

## 2022-09-30 NOTE — PROGRESS NOTES
"  Assessment & Plan   Problem List Items Addressed This Visit     Chronic low back pain    Relevant Medications    oxyCODONE IR (ROXICODONE) 10 MG tablet    Chronic, continuous use of opioids    Relevant Medications    oxyCODONE IR (ROXICODONE) 10 MG tablet      Other Visit Diagnoses     Abdominal pain, generalized    -  Primary    Relevant Medications    polyethylene glycol (MIRALAX) 17 GM/Dose powder    ondansetron (ZOFRAN ODT) 4 MG ODT tab    Acute post-operative pain        Relevant Medications    oxyCODONE IR (ROXICODONE) 10 MG tablet                  BMI:   Estimated body mass index is 26.58 kg/m  as calculated from the following:    Height as of 3/29/22: 1.88 m (6' 2\").    Weight as of this encounter: 93.9 kg (207 lb).   Weight management plan: Discussed healthy diet and exercise guidelines    Work on weight loss  Regular exercise    No follow-ups on file.    Avery Duke MD  [facility] RACHANA Meeks is a 66 year old, presenting for the following health issues:  Hospital F/U      HPI     Blood clot and arterial occlusion   Up into the operating   Hospital Follow-up Visit:  Dizzy half the time as well   Wasn't taking medication - bad stomach ache   was off the xarelto   4-5 days then the clots start coming   Not eating for a few weeks  Discharge Procedure Orders   Normal activity as tolerated     Lifting restrictions   No pushing, pulling, or lifting with your left arm using more than 10 lbs of force x 3 weeks.     Lifting restictions: 10 pounds     Showering instructions   Ok to shower daily, but no swimming, hot tubs, suaunas, etc. X 10 weeks.     Return to previous diet   Continue on the same type of diet and foods as you were eating before your admission.     Any questions or concerns     Other restrictions   Per vasc surgery if any     Return to previous diet   Continue on the same type of diet and foods as you were eating before your admission.     Discharge "     Disposition? Returning Home/Self Care   Expected Discharge Date? 9/18/2022     Discharge     Disposition? Returning Home/Self Care   Expected Discharge Date? 9/18/2022     Wound care   Your incision is located on your left arm. There is an AquaCell dressing over the wound. This dressing is impregnated with silver and helps to prevent infection. The dressing is to be left in place for 5 days. You may shower with this dressing. On , Wednesday, Sept 21st, you may remove the dressing. When removing, start from on end and pull toward the middle. Once you are at the middle, start from the other end and pull toward the middle until the dressing is removed. Once the dressing has eyad removed you may shower and get the incision wet. Be very gentle and cleanse the wound daily with mild soap and water. After your shower, make sure the wound is fully dry, then cover it with a dry gauze to keep it clean and dry during the day. Change the dressing daily and as needed if there is drainage. If you have questions about the dressing or removal call the [facility] office at [phone] for assistance  Bowel moved     Hospital/Nursing Home/IP Rehab Facility: [facility]  Date of Admission: 9/16/22  Date of Discharge: 9/18/22  Reason(s) for Admission: Blood Clot; Arterial Occlusion    Was your hospitalization related to COVID-19? No   Problems taking medications regularly:  None  Medication changes since discharge: None  Problems adhering to non-medication therapy:  None    Summary of hospitalization:  [facility] discharge summary reviewed  Diagnostic Tests/Treatments reviewed.  Follow up needed: none  Other Healthcare Providers Involved in Patient s Care:         None  Update since discharge: improved.   Post Medication Reconciliation Status:        Plan of care communicated with patient         Review of Systems   Constitutional, HEENT, cardiovascular, pulmonary, gi and gu systems are  negative, except as otherwise noted.      Objective    /82   Pulse 75   Temp 96.8  F (36  C)   Wt 93.9 kg (207 lb)   SpO2 100%   BMI 26.58 kg/m    Body mass index is 26.58 kg/m .  Physical Exam   GENERAL: healthy, alert and no distress  EYES: Eyes grossly normal to inspection, PERRL and conjunctivae and sclerae normal  HENT: ear canals and TM's normal, nose and mouth without ulcers or lesions  NECK: no adenopathy, no asymmetry, masses, or scars and thyroid normal to palpation  RESP: lungs clear to auscultation - no rales, rhonchi or wheezes  CV: regular rate and rhythm, normal S1 S2, no S3 or S4, no murmur, click or rub, no peripheral edema and peripheral pulses strong  ABDOMEN: soft, nontender, no hepatosplenomegaly, no masses and bowel sounds normal  MS: no gross musculoskeletal defects noted, no edema  SKIN: no suspicious lesions or rashes  NEURO: Normal strength and tone, mentation intact and speech normal  BACK: no CVA tenderness, no paralumbar tenderness  PSYCH: mentation appears normal, affect normal/bright  LYMPH: no cervical, supraclavicular, axillary, or inguinal adenopathy    No results found for any visits on 09/30/22.

## 2022-11-01 DIAGNOSIS — G89.29 CHRONIC LOW BACK PAIN WITH SCIATICA, SCIATICA LATERALITY UNSPECIFIED, UNSPECIFIED BACK PAIN LATERALITY: ICD-10-CM

## 2022-11-01 DIAGNOSIS — F11.90 CHRONIC, CONTINUOUS USE OF OPIOIDS: ICD-10-CM

## 2022-11-01 DIAGNOSIS — M54.40 CHRONIC LOW BACK PAIN WITH SCIATICA, SCIATICA LATERALITY UNSPECIFIED, UNSPECIFIED BACK PAIN LATERALITY: ICD-10-CM

## 2022-11-01 DIAGNOSIS — G89.18 ACUTE POST-OPERATIVE PAIN: ICD-10-CM

## 2022-11-01 RX ORDER — OXYCODONE HYDROCHLORIDE 10 MG/1
10 TABLET ORAL EVERY 8 HOURS PRN
Qty: 90 TABLET | Refills: 0 | Status: SHIPPED | OUTPATIENT
Start: 2022-11-01 | End: 2022-12-02

## 2022-11-01 NOTE — TELEPHONE ENCOUNTER
Reason for Call:  Other prescription    Detailed comments: patient is needing a refill on his oxycodone meds. He uses [facility] [address].     Phone Number Patient can be reached at: Cell number on file:    Telephone Information:   Mobile [phone]       Best Time: any time     Can we leave a detailed message on this number? YES    Call taken on 11/1/2022 at 11:08 AM by Massimo Keller

## 2022-11-11 ENCOUNTER — PATIENT OUTREACH (OUTPATIENT)
Dept: GERIATRIC MEDICINE | Facility: CLINIC | Age: 67
End: 2022-11-11

## 2022-11-11 NOTE — LETTER
November 11, 2022    Important Med[employer] Information    CONSTANTINO NASCIMENTO  [address]   [facility] [address]  A Partner in Your Care  Dear Constantino,  Thank you for choosing [employer] for your health plan coverage! I am excited to welcome you as a member of [employer] .  My name is Francy Rowell RN, PHN  and I will be working with you as your Care Coordinator. [facility] partners with [employer] to provide members with Care Coordination services.  As your Care Coordinator, I can:    Work with you to create a Care Plan to keep you healthy and safe    Help you make appointments to see health care providers     Support you and your family in making health care decisions    Find community services that may interest you    Identify health benefits you are eligible for  What happens next?  To get started, I will call you. I ll ask you a few questions about your health and schedule a time to meet. You will have a chance to ask me questions, too.  Questions?  Call me at [phone] Monday-Friday between 8am and 5pm. TTY: 711. I look forward to speaking with you soon.  Sincerely,      Francy Rowell RN, PHN    E-mail: [email or web address]  Phone: [phone]      [facility]    cc: member records                                                                                                                CB5 ([facility]) (5-2020)    Civil Rights Notice  Discrimination is against the law. [employer] does not discriminate on the basis of any of the following:    Race    Color    National Origin    Creed    Anglican    Age    Public Assistance Status    Receipt of Health Care Services    Disability (including physical or mental impairment)    Sex (including sex stereotypes and gender identity)    Marital Status    Political Beliefs    Medical Condition    Genetic Information    Sexual Orientation    Claims Experience    Medical History    Health Status    Auxiliary Aids and Services:  [employer]  provides auxiliary aids and services, like qualified interpreters or information in accessible formats, free of charge and in a timely manner, to ensure an equal opportunity to participate in our health care programs. Contact Medica at [employer]ginx/contact medicaid or call [phone] (toll free); TTY:716 or at [email or web address].    Language Assistance Services:  [facility] provides translated documents and spoken language interpreting, free of charge and in a timely manner, when language assistance services are necessary to ensure limited English speakers have meaningful access to our information and services. Contact [employer] at [phone] (toll free); TTY: 380 or Nginx/contactAria Glassworkscaid.     Civil Rights Complaints  You have the right to file a discrimination complaint if you believe you were treated in a discriminatory way by [employer]. You may contact any of the following four agencies directly to file a discrimination complaint.      U.S. Department of Health and Human Services  Office for Civil Rights (OCR)  You have the right to file a complaint with the OCR, a federal agency, if you believe you have been discriminated against because of any of the following:    Race    Disability    Color    Sex    National Origin    Age    Orthodoxy (in some cases)    Contact the OCR directly to file a complaint:         Director         U.S. Department of Health and Human Services  Office for Civil Rights         [address] [place] [address]         Customer Response Center: Toll-free: [phone]          TDD: [phone]         Email: [email or web address]    [place] Department of Human Rights (MDHR)  In [place], you have the right to file a complaint with the [facility] if you believe you have been discriminated against because of any of the following:      Race    Color    National Origin    Orthodoxy    Creed    Sex    Sexual Orientation    Marital  Status    Public Assistance Status    Disability    Contact the MD directly to file a complaint:         [place] Department of Human Rights         [address] [address]         [phone] (voice)          [phone] (toll free)         711 or [phone] ([place] Relay)         [phone] (Fax)         [email or web address] (Email)     [place] Department of Human Services (DHS)  You have the right to file a complaint with [facility] if you believe you have been discriminated against in our health care programs because of any of the following:    Race    Color    National Origin    Creed    Amish    Age    Public Assistance Status    Receipt of Health Care Services    Disability (including physical or mental impairment)    Sex (including sex stereotypes and gender identity)    Marital Status    Political Beliefs    Medical Condition    Genetic Information    Sexual Orientation    Claims Experience    Medical History    Health Status    Complaints must be in writing and filed within 180 days of the date you discovered the alleged discrimination. The complaint must contain your name and address and describe the discrimination you are complaining about. After we get your complaint, we will review it and notify you in writing about whether we have authority to investigate. If we do, we will investigate the complaint.      [facility] will notify you in writing of the investigation s outcome. You have a right to appeal the outcome if you disagree with the decision. To appeal, you must send a written request to have [facility] review the investigation outcome. Be brief and state why you disagree with the decision. Include additional information you think is important.      If you file a complaint in this way, the people who work for the agency named in the complaint cannot retaliate against you. This means they cannot punish you in any way for filing a complaint. Filing a complaint in  this way does not stop you from seeking out other legal or administration actions.     Contact DHS directly to file a discrimination complaint:        Civil Rights Coordinator        [place] Department of Human Services        Equal Opportunity and Access Division        [address] [address]        [place] [address] [phone] (voice) or use your preferred relay service     [facility] Complaint Notice   You have the right to file a complaint with [employer] if you believe you have been discriminated against because of any of the following:       Medical condition    Health status    Receipt of health care services    Claims experience    Medical history    Genetic information    Disability (including mental or physical impairment)    Marital status    Age    Sex (including sex stereotypes and gender identity)    Sexual orientation    National origin    Race    Color    Yarsani    Creed    Public assistance status    Political beliefs    You can file a complaint and ask for help in filing a complaint in person or by mail, phone, fax, or email at:     [facility] Civil Rights Coordinator  [employer]  [address], [place] [address] [phone] (voice and fax) or TTV:519  Email: [email or web address]    American Indians can begin or continue to use [place] and [facility] ([facility]) clinics. We will not require prior approval or impose any conditions for you to get services at these clinics. For elders age 65 years and older this includes Elderly Waiver (EW) services accessed through the [place]. If a doctor or other provider in a [place] or [facility] clinic refers you to a provider in our network, we will not require you to see your primary care provider prior to the referral.

## 2022-11-11 NOTE — PROGRESS NOTES
[facility] Care Coordination Contact    Member became effective with  [facility] on 11/01/2022 with [facility] [facility].  Previous Health Plan: [facility]  Previous Care System: [facility]  Previous care coordinators name and number: CADI CC Hair Kumari or Belen Chong - no # available for either  Waiver Type: CADI  Last MMIS Entry: Date 10/19/2022 and Type reassessment  MMIS visit date (and type) if different from above: same as above  Services Listed in MMIS:   A3 F MNCHSE-CSP 37 F CASE MGMT 04 F HOME MEALS  8 F HOMEMAKER 49 F WVRAC ALVARADO 03 F MA TRANS  68 F PCA SUPER 20 F PERSONAL  UTF received: No: Requested on 10/11/2022 by Natalia Patterson - Natalia will follow up with transfer docs.     Charley Boyle  Care Management Specialist  [facility]  [phone]

## 2022-11-22 ENCOUNTER — PATIENT OUTREACH (OUTPATIENT)
Dept: GERIATRIC MEDICINE | Facility: CLINIC | Age: 67
End: 2022-11-22

## 2022-11-22 NOTE — LETTER
November 23, 2022    Important [employer] Information    RAD NASCIMENTO  [address]   [facility] [address]  I'm Here to Help  Dear aRd,  My name is Francy Rowell RN, PHN, and I am your [employer] Care Coordinator. When we talked, you told me that you are not interested in meeting with me to complete a health risk assessment.  As a Care Coordinator, I am here to help. My role is to make sure that your health plan is working for you. I am available to:     Review your health care needs with you over the phone or in-person     Provide support for and information about covered services or supplies to help keep you safe and healthy in your home    Answer questions about your insurance     Help you find a provider, such as a doctor or dentist, to meet your unique needs    I have included a copy of a Health Risk Assessment. Please fill it out and return it in the included envelope.  I have also included a copy of a document that provides you with more information about my role as your Care Coordinator and how I can help you with your medical, social and everyday needs.    Questions?  Call me at [phone] Monday-Friday between 8am and 5pm. TTY: 711. If you d like, a friend or family member may call for you.   You may also call [employer] Customer Service at [phone] or [phone] (toll free) from 8 a.m. - 8 p.m. Central, seven days a week. Access to representatives may be limited at times. TTY: 711.  Sincerely,    Francy Rowell RN, PHN    E-mail: [email or web address]  Phone: [phone]      [facility]    cc: member records                                                                                          CB5 ([facility]) (5-2020)    Civil Rights Notice  Discrimination is against the law. [employer] does not discriminate on the basis of any of the following:    Race    Color    National Origin    Creed    Muslim    Age    Public Assistance Status    Receipt of Health Care  Services    Disability (including physical or mental impairment)    Sex (including sex stereotypes and gender identity)    Marital Status    Political Beliefs    Medical Condition    Genetic Information    Sexual Orientation    Claims Experience    Medical History    Health Status    Auxiliary Aids and Services:  [employer] provides auxiliary aids and services, like qualified interpreters or information in accessible formats, free of charge and in a timely manner, to ensure an equal opportunity to participate in our health care programs. Contact [employer]ica at [place]/contact medicaid or call [phone] (toll free); TTY:260 or at [place]/contactmedicaid.    Language Assistance Services:  [employer] provides translated documents and spoken language interpreting, free of charge and in a timely manner, when language assistance services are necessary to ensure limited English speakers have meaningful access to our information and services. Contact [employer] at [phone] (toll free); TTY: 956 or [email or web address]/contactmedicaid.     Civil Rights Complaints  You have the right to file a discrimination complaint if you believe you were treated in a discriminatory way by [employer]. You may contact any of the following four agencies directly to file a discrimination complaint.          U.S. Department of Health and Human Services  Office for Civil Rights (OCR)  You have the right to file a complaint with the OCR, a federal agency, if you believe you have been discriminated against because of any of the following:    Race    Disability    Color    Sex    National Origin    Age    Religious (in some cases)    Contact the OCR directly to file a complaint:         Director         U.S. Department of Health and Human Services  Office for Civil Rights         [address] [place] [address]         Customer Response Center: Toll-free: [phone]          TDD: [phone]          Email: [email or web address]    [place] Department of Human Rights (MDHR)  In [place], you have the right to file a complaint with the MDHR if you believe you have been discriminated against because of any of the following:      Race    Color    National Origin    Zoroastrianism    Creed    Sex    Sexual Orientation    Marital Status    Public Assistance Status    Disability    Contact the MDHR directly to file a complaint:         [place] Department of Human Rights         [address] [address]         [phone] (voice)          [phone] (toll free)         908 or [phone] ([place] Relay)         [phone] (Fax)         [email or web address] (Email)     [place] Department of Human Services (DHS)  You have the right to file a complaint with [facility] if you believe you have been discriminated against in our health care programs because of any of the following:    Race    Color    National Origin    Creed    Zoroastrianism    Age    Public Assistance Status    Receipt of Health Care Services    Disability (including physical or mental impairment)    Sex (including sex stereotypes and gender identity)    Marital Status    Political Beliefs    Medical Condition    Genetic Information    Sexual Orientation    Claims Experience    Medical History    Health Status    Complaints must be in writing and filed within 180 days of the date you discovered the alleged discrimination. The complaint must contain your name and address and describe the discrimination you are complaining about. After we get your complaint, we will review it and notify you in writing about whether we have authority to investigate. If we do, we will investigate the complaint.      [facility] will notify you in writing of the investigation s outcome. You have a right to appeal the outcome if you disagree with the decision. To appeal, you must send a written request to have [facility] review the investigation outcome. Be  brief and state why you disagree with the decision. Include additional information you think is important.      If you file a complaint in this way, the people who work for the agency named in the complaint cannot retaliate against you. This means they cannot punish you in any way for filing a complaint. Filing a complaint in this way does not stop you from seeking out other legal or administration actions.     Contact DHS directly to file a discrimination complaint:        Civil Rights Coordinator        [facility]        Equal Opportunity and Access Division        [address] [address]        [place] [address] [phone] (voice) or use your preferred relay service     [facility] Complaint Notice   You have the right to file a complaint with [employer] if you believe you have been discriminated against because of any of the following:       Medical condition    Health status    Receipt of health care services    Claims experience    Medical history    Genetic information    Disability (including mental or physical impairment)    Marital status    Age    Sex (including sex stereotypes and gender identity)    Sexual orientation    National origin    Race    Color    Cheondoism    Creed    Public assistance status    Political beliefs    You can file a complaint and ask for help in filing a complaint in person or by mail, phone, fax, or email at:     [facility] Civil Rights Coordinator  [facility]  [address], [place] [address] [phone] (voice and fax) or TTH:511  Email: [email or web address]              American Indians can begin or continue to use [place] and [facility] ([facility]) clinics. We will not require prior approval or impose any conditions for you to get services at these clinics. For elders age 65 years and older this includes Elderly Waiver (EW) services accessed through the [place]. If a doctor or other provider in a [place]  or I[facility] clinic refers you to a provider in our network, we will not require you to see your primary care provider prior to the referral.

## 2022-11-22 NOTE — PROGRESS NOTES
"[facility] Care Coordination Contact      [facility] Care System Change (Transfer)    Member is new enrollee to [email or web address]. Member transferred from [facility] system.    [facility] Refusal Telephone Assessment    Member refused home visit HRA on 11/22/22. States \"I just had an assessment by my CADI CM and not interested in another one\"    ER visits: Yes -  [facility]  Hospitalizations: Yes -  [facility]  TCU stays: No  Significant health status changes: No  Falls/Injuries: No  ADL/IADL changes: No  Changes in services: No    Member currently receiving the following services: Homemaking Meals PCA though CADI.    Informal support(s): N/A    Advanced Care Planning discussion, complete code section.    [facility] Health Plan sponsored benefits: Shared information re: Silver Sneakers/gym memberships, ASA, Calcium +D.    Follow-Up Plan: Member informed of future contact, plan to f/u with member with a 6 month telephone assessment and offer a home visit.  Contact information shared with member and family, encouraged member to call with any questions or concerns at any time.    Requested CMS to mail refusal letter.    Follow-Up Plan: Member informed of future contact, plan to f/u with member with at next regularly scheduled contact.  Contact information shared with member and family, encouraged member to call with any questions or concerns.    Francy Rowell RN BSN PHN  [facility] Care Coordinator  [phone]  Fax:[phone]      "

## 2022-11-23 NOTE — PROGRESS NOTES
"[facility] Care Coordination Contact    Per CC, mailed client a \"Refusal of Home Visit\" letter.    Mailed member [employer] Self Report Health Risk Assessment with self addressed return envelope & supporting documents as required.    Charley Boyle  Care Management Specialist  [facility]  [phone]      "

## 2022-12-02 DIAGNOSIS — F11.90 CHRONIC, CONTINUOUS USE OF OPIOIDS: ICD-10-CM

## 2022-12-02 DIAGNOSIS — G89.29 CHRONIC LOW BACK PAIN WITH SCIATICA, SCIATICA LATERALITY UNSPECIFIED, UNSPECIFIED BACK PAIN LATERALITY: ICD-10-CM

## 2022-12-02 DIAGNOSIS — G89.18 ACUTE POST-OPERATIVE PAIN: ICD-10-CM

## 2022-12-02 DIAGNOSIS — M54.40 CHRONIC LOW BACK PAIN WITH SCIATICA, SCIATICA LATERALITY UNSPECIFIED, UNSPECIFIED BACK PAIN LATERALITY: ICD-10-CM

## 2022-12-02 RX ORDER — OXYCODONE HYDROCHLORIDE 10 MG/1
10 TABLET ORAL EVERY 8 HOURS PRN
Qty: 90 TABLET | Refills: 0 | Status: SHIPPED | OUTPATIENT
Start: 2022-12-02 | End: 2022-12-12

## 2022-12-08 NOTE — PROGRESS NOTES
[facility] Care Coordination Contact    CMS left vm with JUANJOSE BLUM on 11/21/22 requesting transfer paperwork. No paperwork has been received as of today. CC left JUANJOSE BLUM, Belen falk requesting transfer papers.      Francy Rowell RN BSN PHN  [facility] Care Coordinator  [phone]  Fax:[phone]

## 2022-12-12 ENCOUNTER — OFFICE VISIT (OUTPATIENT)
Dept: FAMILY MEDICINE | Facility: CLINIC | Age: 67
End: 2022-12-12
Payer: COMMERCIAL

## 2022-12-12 VITALS
DIASTOLIC BLOOD PRESSURE: 74 MMHG | SYSTOLIC BLOOD PRESSURE: 136 MMHG | TEMPERATURE: 98.7 F | HEART RATE: 84 BPM | HEIGHT: 74 IN | WEIGHT: 201.8 LBS | OXYGEN SATURATION: 100 % | BODY MASS INDEX: 25.9 KG/M2

## 2022-12-12 DIAGNOSIS — N18.31 STAGE 3A CHRONIC KIDNEY DISEASE (H): Primary | ICD-10-CM

## 2022-12-12 DIAGNOSIS — G89.18 ACUTE POST-OPERATIVE PAIN: ICD-10-CM

## 2022-12-12 DIAGNOSIS — G89.29 CHRONIC LOW BACK PAIN WITH SCIATICA, SCIATICA LATERALITY UNSPECIFIED, UNSPECIFIED BACK PAIN LATERALITY: ICD-10-CM

## 2022-12-12 DIAGNOSIS — F11.90 CHRONIC, CONTINUOUS USE OF OPIOIDS: ICD-10-CM

## 2022-12-12 DIAGNOSIS — M54.40 CHRONIC LOW BACK PAIN WITH SCIATICA, SCIATICA LATERALITY UNSPECIFIED, UNSPECIFIED BACK PAIN LATERALITY: ICD-10-CM

## 2022-12-12 DIAGNOSIS — Z11.3 ROUTINE SCREENING FOR STI (SEXUALLY TRANSMITTED INFECTION): ICD-10-CM

## 2022-12-12 LAB
ANION GAP SERPL CALCULATED.3IONS-SCNC: 2 MMOL/L (ref 3–14)
BUN SERPL-MCNC: 16 MG/DL (ref 7–30)
CALCIUM SERPL-MCNC: 9 MG/DL (ref 8.5–10.1)
CHLORIDE BLD-SCNC: 110 MMOL/L (ref 94–109)
CO2 SERPL-SCNC: 27 MMOL/L (ref 20–32)
CREAT SERPL-MCNC: 1.26 MG/DL (ref 0.66–1.25)
GFR SERPL CREATININE-BSD FRML MDRD: 63 ML/MIN/1.73M2
GLUCOSE BLD-MCNC: 108 MG/DL (ref 70–99)
POTASSIUM BLD-SCNC: 4.1 MMOL/L (ref 3.4–5.3)
SODIUM SERPL-SCNC: 139 MMOL/L (ref 133–144)

## 2022-12-12 PROCEDURE — 91313 COVID-19 VACCINE BIVALENT BOOSTER 18+ (MODERNA): CPT | Performed by: INTERNAL MEDICINE

## 2022-12-12 PROCEDURE — 80048 BASIC METABOLIC PNL TOTAL CA: CPT | Performed by: INTERNAL MEDICINE

## 2022-12-12 PROCEDURE — 90472 IMMUNIZATION ADMIN EACH ADD: CPT | Performed by: INTERNAL MEDICINE

## 2022-12-12 PROCEDURE — 90662 IIV NO PRSV INCREASED AG IM: CPT | Performed by: INTERNAL MEDICINE

## 2022-12-12 PROCEDURE — 36415 COLL VENOUS BLD VENIPUNCTURE: CPT | Performed by: INTERNAL MEDICINE

## 2022-12-12 PROCEDURE — 86803 HEPATITIS C AB TEST: CPT | Performed by: INTERNAL MEDICINE

## 2022-12-12 PROCEDURE — 90471 IMMUNIZATION ADMIN: CPT | Performed by: INTERNAL MEDICINE

## 2022-12-12 PROCEDURE — 99214 OFFICE O/P EST MOD 30 MIN: CPT | Mod: 25 | Performed by: INTERNAL MEDICINE

## 2022-12-12 PROCEDURE — 87902 NFCT AGT GNTYP ALYS HEP C: CPT | Performed by: INTERNAL MEDICINE

## 2022-12-12 PROCEDURE — 87389 HIV-1 AG W/HIV-1&-2 AB AG IA: CPT | Performed by: INTERNAL MEDICINE

## 2022-12-12 PROCEDURE — 90677 PCV20 VACCINE IM: CPT | Performed by: INTERNAL MEDICINE

## 2022-12-12 PROCEDURE — 0134A COVID-19 VACCINE BIVALENT BOOSTER 18+ (MODERNA): CPT | Performed by: INTERNAL MEDICINE

## 2022-12-12 RX ORDER — OXYCODONE HYDROCHLORIDE 10 MG/1
10 TABLET ORAL EVERY 8 HOURS PRN
Qty: 90 TABLET | Refills: 0 | Status: SHIPPED | OUTPATIENT
Start: 2022-12-12 | End: 2023-01-20

## 2022-12-12 NOTE — PROGRESS NOTES
Assessment & Plan   Problem List Items Addressed This Visit     Chronic low back pain    Relevant Medications    oxyCODONE IR (ROXICODONE) 10 MG tablet    Chronic, continuous use of opioids    Relevant Medications    oxyCODONE IR (ROXICODONE) 10 MG tablet    CKD (chronic kidney disease) stage 3, GFR 30-59 ml/min (H) - Primary    Relevant Orders    Basic metabolic panel  (Ca, Cl, CO2, Creat, Gluc, K, Na, BUN) (Completed)   Other Visit Diagnoses     Acute post-operative pain        Relevant Medications    oxyCODONE IR (ROXICODONE) 10 MG tablet    Routine screening for STI (sexually transmitted infection)        Relevant Orders    NEISSERIA GONORRHOEA PCR    CHLAMYDIA TRACHOMATIS PCR    HIV Antigen Antibody Combo    Hepatitis C Screen Reflex to HCV RNA Quant and Genotype    Chlamydia trachomatis PCR    Neisseria gonorrhoeae PCR                  Work on weight loss  Regular exercise    No follow-ups on file.    Avery Duke MD  [facility] RACHANA Meeks is a 67 year old accompanied by his self, presenting for the following health issues:  Urinary Problem (Having burning sensation with urinary x 2 months)      HPI     Genitourinary - Male  Onset/Duration: 2 months  Description:   Dysuria (painful urination): YES- slightly pain and burning sensation with urination}  Hematuria (blood in urine): No  Frequency: No  Waking at night to urinate: YES  Hesitancy (delay in urine): YES- sometimes  Retention (unable to empty): YES- sometimes  Decrease in urinary flow: YES- sometimes  Incontinence: No  Progression of Symptoms:  same  Accompanying Signs & Symptoms:  Fever: No  Back/Flank pain: YES  Urethral discharge: No  Testicle lumps/masses/pain: No  Nausea and/or vomiting: No  Abdominal pain: No  History:   History of frequent UTI s: No  History of kidney stones: No  History of hernias: No  Personal or Family history of Prostate problems: YES- prostate cancer  Sexually active:  "YES  Precipitating or alleviating factors: None  Therapies tried and outcome: none        Review of Systems   Constitutional, HEENT, cardiovascular, pulmonary, gi and gu systems are negative, except as otherwise noted.      Objective    /74   Pulse 84   Temp 98.7  F (37.1  C) (Oral)   Ht 1.88 m (6' 2.02\")   Wt 91.5 kg (201 lb 12.8 oz)   SpO2 100%   BMI 25.90 kg/m    Body mass index is 25.9 kg/m .  Physical Exam   GENERAL: healthy, alert and no distress  EYES: Eyes grossly normal to inspection, PERRL and conjunctivae and sclerae normal  HENT: ear canals and TM's normal, nose and mouth without ulcers or lesions  NECK: no adenopathy, no asymmetry, masses, or scars and thyroid normal to palpation  RESP: lungs clear to auscultation - no rales, rhonchi or wheezes  CV: regular rate and rhythm, normal S1 S2, no S3 or S4, no murmur, click or rub, no peripheral edema and peripheral pulses strong  ABDOMEN: soft, nontender, no hepatosplenomegaly, no masses and bowel sounds normal  MS: no gross musculoskeletal defects noted, no edema  SKIN: no suspicious lesions or rashes  NEURO: Normal strength and tone, mentation intact and speech normal  BACK: no CVA tenderness, no paralumbar tenderness  PSYCH: mentation appears normal, affect normal/bright    Results for orders placed or performed in visit on 12/12/22   Basic metabolic panel  (Ca, Cl, CO2, Creat, Gluc, K, Na, BUN)     Status: Abnormal   Result Value Ref Range    Sodium 139 133 - 144 mmol/L    Potassium 4.1 3.4 - 5.3 mmol/L    Chloride 110 (H) 94 - 109 mmol/L    Carbon Dioxide (CO2) 27 20 - 32 mmol/L    Anion Gap 2 (L) 3 - 14 mmol/L    Urea Nitrogen 16 7 - 30 mg/dL    Creatinine 1.26 (H) 0.66 - 1.25 mg/dL    Calcium 9.0 8.5 - 10.1 mg/dL    Glucose 108 (H) 70 - 99 mg/dL    GFR Estimate 63 >60 mL/min/1.73m2                   "

## 2022-12-13 ENCOUNTER — LAB (OUTPATIENT)
Dept: LAB | Facility: CLINIC | Age: 67
End: 2022-12-13
Payer: COMMERCIAL

## 2022-12-13 DIAGNOSIS — Z11.3 ROUTINE SCREENING FOR STI (SEXUALLY TRANSMITTED INFECTION): ICD-10-CM

## 2022-12-13 LAB
HCV AB SERPL QL IA: REACTIVE
HIV 1+2 AB+HIV1 P24 AG SERPL QL IA: NONREACTIVE

## 2022-12-13 PROCEDURE — 87591 N.GONORRHOEAE DNA AMP PROB: CPT

## 2022-12-13 PROCEDURE — 87491 CHLMYD TRACH DNA AMP PROBE: CPT

## 2022-12-14 LAB
C TRACH DNA SPEC QL NAA+PROBE: NEGATIVE
HCV RNA SERPL NAA+PROBE-ACNC: NOT DETECTED IU/ML
N GONORRHOEA DNA SPEC QL NAA+PROBE: NEGATIVE

## 2022-12-16 NOTE — RESULT ENCOUNTER NOTE
Constantino,    You do not have hepatitis C or HIV. Your blood glucose and kidney tests are fine.     Kayla Enamorado MD

## 2022-12-21 ENCOUNTER — PATIENT OUTREACH (OUTPATIENT)
Dept: GERIATRIC MEDICINE | Facility: CLINIC | Age: 67
End: 2022-12-21

## 2022-12-21 NOTE — PROGRESS NOTES
[facility] Care Coordination Contact    Received vm from JUANJOSE Burk CM with [facility]. Belen is requesting authorization for unspecified home care services for the duration of 11/1/22 to present.This Care Coordinator as well as CMS have made several attempts to obtain transfer documents and service information from Belen, but no return calls till now. This CC left Belen a vm requesting to PCA assessment and other transfer docs.     Francy Rowell RN BSN PHN  [facility] Care Coordinator  [phone]  Fax:[phone]

## 2022-12-21 NOTE — PROGRESS NOTES
[facility] Care Coordination Contact    Received call from Kristel with [facility] requesting a temporary PCA auth for November and December due to being out network provider. CC informed Kristel that CC left the CADI CM a vm requesting PCA assessment which is needed in order to work with the health plan for an auth. Kristel stated she has the most current PCA assessment and CSP on file and offered to share. CC shared e-mail address and and received the docs from Kristel.      Kristel also shared that member had changed insurance to [employer] effective 1/1/23. CC attempted to clarify insurance change with member, however, member stated he is too busy to discuss at the moment and asked to be called some other time.     Francy Rowell RN BSN PHN  [facility] Care Coordinator  [phone]  Fax:[phone]

## 2022-12-26 NOTE — PROGRESS NOTES
[facility] Care Coordination Contact    12/22/22. Informed CMS to write an auth for 4 hrs/day of PCA with date span of 11/1/22-12/31/22. Kristel with [employer] was notified.    Francy Rowell RN BSN PHN  [facility] Care Coordinator  [phone]  Fax:[phone]

## 2023-01-16 ENCOUNTER — PATIENT OUTREACH (OUTPATIENT)
Dept: GERIATRIC MEDICINE | Facility: CLINIC | Age: 68
End: 2023-01-16

## 2023-01-16 NOTE — PROGRESS NOTES
[facility] Care Coordination Contact    No longer active with [facility] case management effective 12/31/22.  Reason for community disenrollment: Change Insurance. Enrolled on [employer] effective 1/1/23.     Francy Rowell RN BSN PHN  [facility] Care Coordinator  [phone]  Fax:[phone]

## 2023-01-20 ENCOUNTER — TELEPHONE (OUTPATIENT)
Dept: FAMILY MEDICINE | Facility: CLINIC | Age: 68
End: 2023-01-20
Payer: COMMERCIAL

## 2023-01-20 DIAGNOSIS — G89.18 ACUTE POST-OPERATIVE PAIN: ICD-10-CM

## 2023-01-20 DIAGNOSIS — G89.29 CHRONIC LOW BACK PAIN WITH SCIATICA, SCIATICA LATERALITY UNSPECIFIED, UNSPECIFIED BACK PAIN LATERALITY: ICD-10-CM

## 2023-01-20 DIAGNOSIS — M54.40 CHRONIC LOW BACK PAIN WITH SCIATICA, SCIATICA LATERALITY UNSPECIFIED, UNSPECIFIED BACK PAIN LATERALITY: ICD-10-CM

## 2023-01-20 DIAGNOSIS — F11.90 CHRONIC, CONTINUOUS USE OF OPIOIDS: ICD-10-CM

## 2023-01-20 RX ORDER — OXYCODONE HYDROCHLORIDE 10 MG/1
10 TABLET ORAL EVERY 8 HOURS PRN
Qty: 90 TABLET | Refills: 0 | Status: SHIPPED | OUTPATIENT
Start: 2023-01-20 | End: 2023-11-17

## 2023-01-20 NOTE — TELEPHONE ENCOUNTER
Medication Question or Refill        What medication are you calling about (include dose and sig)?: Oxycodone    Controlled Substance Agreement on file:   CSA -- Patient Level:     [Media Unavailable] Controlled Substance Agreement - Opioid - Scan on 5/31/2022 11:29 AM   [Media Unavailable] Controlled Substance Agreement - Opioid - Scan on 2/11/2021 12:41 PM       Who prescribed the medication?: Dr. Duke    Do you need a refill? Yes: Pt is out    When did you use the medication last? n/a    Patient offered an appointment? No    Do you have any questions or concerns?  No    Preferred Pharmacy:    [id number] [facility] - [place], [place] - [address] [place] [address]  Phone: [phone] Fax: [phone]        Could we send this information to you in [facility] or would you prefer to receive a phone call?:   Patient would prefer a phone call   Okay to leave a detailed message?: Yes at Home number on file [phone] (home)

## 2023-01-20 NOTE — TELEPHONE ENCOUNTER
Reason for Call:  Appointment Request    Patient requesting this type of appt:  Preventive     Requested provider: Avery Duke    Reason patient unable to be scheduled: Not within requested timeframe    When does patient want to be seen/preferred time: 3-7 days    Comments: would like wellness check, no appts available until end of Feb.    Could we send this information to you in [facility] or would you prefer to receive a phone call?:   No preference   Okay to leave a detailed message?: Yes at Cell number on file:    Telephone Information:   Mobile [phone]       Call taken on 1/20/2023 at 10:06 AM by Dorothy Borrego

## 2023-02-08 NOTE — NURSING NOTE
"Oncology Rooming Note    October 14, 2019 2:28 PM   Constantino Taylor is a 63 year old male who presents for:    Chief Complaint   Patient presents with     Oncology Clinic Visit     Return - Malignant neoplasm of prostate       Blood Draw     Labs drawn via  by RN in lab. VS taken.      Initial Vitals: BP (!) 155/94   Pulse 97   Temp 98.8  F (37.1  C) (Oral)   Resp 16   SpO2 97%  Estimated body mass index is 26 kg/m  as calculated from the following:    Height as of 9/12/18: 1.905 m (6' 3\").    Weight as of 10/2/19: 94.3 kg (208 lb). There is no height or weight on file to calculate BSA.  No Pain (0) Comment: Data Unavailable   No LMP for male patient.  Allergies reviewed: Yes  Medications reviewed: Yes    Medications: Medication refills not needed today.  Pharmacy name entered into [email or web address]:    [id number] - [place], [place] - [address] AT [facility] OF [facility]  [id number] - [facility] [address] AT [facility] OF [address] [facility] - [place], [place] - [address]  WRITTEN PRESCRIPTION REQUESTED  [id number] - [place], [place] - [address] AT [facility] [address] [facility] [address] [place] [facility] - [place], [place] - [address]    Clinical concerns: Lab Results       Po Foster, EMT              " [address] [facility]  Phone: [phone] Jayson BYERS PA-C        February 8, 2023     Patient: Jessa Cárdenas   YOB: 1994   Date of Visit: 2/8/2023       To Whom It May Concern: It is my medical opinion that Fatou Curtis will need to be off work on 2/8/2023, 2/9/2023, 2/10/2023. Her son has received surgery on 2/8/2023 and is needed to help with recovery. If you have any questions or concerns, please don't hesitate to call.     Sincerely,        Perfecto Bauer PA-C

## 2023-02-13 ENCOUNTER — OFFICE VISIT (OUTPATIENT)
Dept: FAMILY MEDICINE | Facility: CLINIC | Age: 68
End: 2023-02-13
Payer: COMMERCIAL

## 2023-02-13 VITALS
TEMPERATURE: 97 F | DIASTOLIC BLOOD PRESSURE: 98 MMHG | BODY MASS INDEX: 25.93 KG/M2 | HEART RATE: 94 BPM | OXYGEN SATURATION: 99 % | RESPIRATION RATE: 18 BRPM | HEIGHT: 74 IN | WEIGHT: 202 LBS | SYSTOLIC BLOOD PRESSURE: 158 MMHG

## 2023-02-13 DIAGNOSIS — N18.31 STAGE 3A CHRONIC KIDNEY DISEASE (H): Primary | ICD-10-CM

## 2023-02-13 DIAGNOSIS — I10 HYPERTENSION GOAL BP (BLOOD PRESSURE) < 140/90: ICD-10-CM

## 2023-02-13 DIAGNOSIS — G89.4 CHRONIC PAIN SYNDROME: ICD-10-CM

## 2023-02-13 DIAGNOSIS — E78.5 HYPERLIPIDEMIA LDL GOAL <70: ICD-10-CM

## 2023-02-13 DIAGNOSIS — Z89.519 S/P BKA (BELOW KNEE AMPUTATION) UNILATERAL (H): ICD-10-CM

## 2023-02-13 DIAGNOSIS — I48.0 PAROXYSMAL ATRIAL FIBRILLATION (H): ICD-10-CM

## 2023-02-13 LAB
ALBUMIN SERPL-MCNC: 3.9 G/DL (ref 3.4–5)
ALP SERPL-CCNC: 112 U/L (ref 40–150)
ALT SERPL W P-5'-P-CCNC: 24 U/L (ref 0–70)
AMPHETAMINES UR QL: NOT DETECTED
ANION GAP SERPL CALCULATED.3IONS-SCNC: 4 MMOL/L (ref 3–14)
AST SERPL W P-5'-P-CCNC: 17 U/L (ref 0–45)
BARBITURATES UR QL SCN: NOT DETECTED
BASOPHILS # BLD AUTO: 0 10E3/UL (ref 0–0.2)
BASOPHILS NFR BLD AUTO: 0 %
BENZODIAZ UR QL SCN: NOT DETECTED
BILIRUB SERPL-MCNC: 0.7 MG/DL (ref 0.2–1.3)
BUN SERPL-MCNC: 19 MG/DL (ref 7–30)
BUPRENORPHINE UR QL: NOT DETECTED
CALCIUM SERPL-MCNC: 9.5 MG/DL (ref 8.5–10.1)
CANNABINOIDS UR QL: DETECTED
CHLORIDE BLD-SCNC: 115 MMOL/L (ref 94–109)
CHOLEST SERPL-MCNC: 114 MG/DL
CO2 SERPL-SCNC: 22 MMOL/L (ref 20–32)
COCAINE UR QL SCN: NOT DETECTED
CREAT SERPL-MCNC: 1.41 MG/DL (ref 0.66–1.25)
CREAT UR-MCNC: 338 MG/DL
D-METHAMPHET UR QL: NOT DETECTED
EOSINOPHIL # BLD AUTO: 0.2 10E3/UL (ref 0–0.7)
EOSINOPHIL NFR BLD AUTO: 4 %
ERYTHROCYTE [DISTWIDTH] IN BLOOD BY AUTOMATED COUNT: 14.6 % (ref 10–15)
FASTING STATUS PATIENT QL REPORTED: YES
GFR SERPL CREATININE-BSD FRML MDRD: 55 ML/MIN/1.73M2
GLUCOSE BLD-MCNC: 131 MG/DL (ref 70–99)
HCT VFR BLD AUTO: 44.7 % (ref 40–53)
HDLC SERPL-MCNC: 41 MG/DL
HGB BLD-MCNC: 15.4 G/DL (ref 13.3–17.7)
LDLC SERPL CALC-MCNC: 54 MG/DL
LYMPHOCYTES # BLD AUTO: 1.5 10E3/UL (ref 0.8–5.3)
LYMPHOCYTES NFR BLD AUTO: 26 %
MCH RBC QN AUTO: 31.1 PG (ref 26.5–33)
MCHC RBC AUTO-ENTMCNC: 34.5 G/DL (ref 31.5–36.5)
MCV RBC AUTO: 90 FL (ref 78–100)
METHADONE UR QL SCN: NOT DETECTED
MICROALBUMIN UR-MCNC: 302 MG/L
MICROALBUMIN/CREAT UR: 89.35 MG/G CR (ref 0–17)
MONOCYTES # BLD AUTO: 0.5 10E3/UL (ref 0–1.3)
MONOCYTES NFR BLD AUTO: 9 %
NEUTROPHILS # BLD AUTO: 3.4 10E3/UL (ref 1.6–8.3)
NEUTROPHILS NFR BLD AUTO: 60 %
NONHDLC SERPL-MCNC: 73 MG/DL
OPIATES UR QL SCN: NOT DETECTED
OXYCODONE UR QL SCN: DETECTED
PCP UR QL SCN: NOT DETECTED
PLATELET # BLD AUTO: 219 10E3/UL (ref 150–450)
POTASSIUM BLD-SCNC: 4.2 MMOL/L (ref 3.4–5.3)
PROPOXYPH UR QL: NOT DETECTED
PROT SERPL-MCNC: 8.2 G/DL (ref 6.8–8.8)
RBC # BLD AUTO: 4.95 10E6/UL (ref 4.4–5.9)
SODIUM SERPL-SCNC: 141 MMOL/L (ref 133–144)
TRICYCLICS UR QL SCN: NOT DETECTED
TRIGL SERPL-MCNC: 93 MG/DL
WBC # BLD AUTO: 5.7 10E3/UL (ref 4–11)

## 2023-02-13 PROCEDURE — 82570 ASSAY OF URINE CREATININE: CPT | Mod: 59 | Performed by: INTERNAL MEDICINE

## 2023-02-13 PROCEDURE — 85025 COMPLETE CBC W/AUTO DIFF WBC: CPT | Performed by: INTERNAL MEDICINE

## 2023-02-13 PROCEDURE — 99397 PER PM REEVAL EST PAT 65+ YR: CPT | Performed by: INTERNAL MEDICINE

## 2023-02-13 PROCEDURE — 80061 LIPID PANEL: CPT | Performed by: INTERNAL MEDICINE

## 2023-02-13 PROCEDURE — 82043 UR ALBUMIN QUANTITATIVE: CPT | Performed by: INTERNAL MEDICINE

## 2023-02-13 PROCEDURE — 36415 COLL VENOUS BLD VENIPUNCTURE: CPT | Performed by: INTERNAL MEDICINE

## 2023-02-13 PROCEDURE — 80053 COMPREHEN METABOLIC PANEL: CPT | Performed by: INTERNAL MEDICINE

## 2023-02-13 PROCEDURE — 80306 DRUG TEST PRSMV INSTRMNT: CPT | Performed by: INTERNAL MEDICINE

## 2023-02-13 RX ORDER — OXYCODONE HYDROCHLORIDE 10 MG/1
10 TABLET ORAL EVERY 6 HOURS PRN
Qty: 120 TABLET | Refills: 0 | Status: SHIPPED | OUTPATIENT
Start: 2023-02-13 | End: 2023-02-21

## 2023-02-13 ASSESSMENT — ENCOUNTER SYMPTOMS
HEADACHES: 1
HEMATURIA: 0
SHORTNESS OF BREATH: 0
HEARTBURN: 1
EYE PAIN: 0
CHILLS: 1
FEVER: 0
SORE THROAT: 1
JOINT SWELLING: 0
ARTHRALGIAS: 1
DYSURIA: 0
DIZZINESS: 1
HEMATOCHEZIA: 0
NERVOUS/ANXIOUS: 1
CONSTIPATION: 0
FREQUENCY: 0
DIARRHEA: 0
PARESTHESIAS: 1
NAUSEA: 0
ABDOMINAL PAIN: 0
PALPITATIONS: 0
MYALGIAS: 0
WEAKNESS: 0
COUGH: 1

## 2023-02-13 ASSESSMENT — ANXIETY QUESTIONNAIRES
1. FEELING NERVOUS, ANXIOUS, OR ON EDGE: NOT AT ALL
6. BECOMING EASILY ANNOYED OR IRRITABLE: SEVERAL DAYS
8. IF YOU CHECKED OFF ANY PROBLEMS, HOW DIFFICULT HAVE THESE MADE IT FOR YOU TO DO YOUR WORK, TAKE CARE OF THINGS AT HOME, OR GET ALONG WITH OTHER PEOPLE?: NOT DIFFICULT AT ALL
GAD7 TOTAL SCORE: 2
IF YOU CHECKED OFF ANY PROBLEMS ON THIS QUESTIONNAIRE, HOW DIFFICULT HAVE THESE PROBLEMS MADE IT FOR YOU TO DO YOUR WORK, TAKE CARE OF THINGS AT HOME, OR GET ALONG WITH OTHER PEOPLE: NOT DIFFICULT AT ALL
7. FEELING AFRAID AS IF SOMETHING AWFUL MIGHT HAPPEN: NOT AT ALL
3. WORRYING TOO MUCH ABOUT DIFFERENT THINGS: NOT AT ALL
GAD7 TOTAL SCORE: 2
2. NOT BEING ABLE TO STOP OR CONTROL WORRYING: NOT AT ALL
4. TROUBLE RELAXING: NOT AT ALL
7. FEELING AFRAID AS IF SOMETHING AWFUL MIGHT HAPPEN: NOT AT ALL
5. BEING SO RESTLESS THAT IT IS HARD TO SIT STILL: SEVERAL DAYS

## 2023-02-13 ASSESSMENT — ACTIVITIES OF DAILY LIVING (ADL)
CURRENT_FUNCTION: HOUSEWORK REQUIRES ASSISTANCE
CURRENT_FUNCTION: LAUNDRY REQUIRES ASSISTANCE
CURRENT_FUNCTION: BATHING REQUIRES ASSISTANCE

## 2023-02-13 NOTE — PROGRESS NOTES
"SUBJECTIVE:   Constantino is a 67 year old who presents for Preventive Visit.      Are you in the first 12 months of your Medicare coverage?  No    Healthy Habits:     In general, how would you rate your overall health?  Good    Frequency of exercise:  None    Do you usually eat at least 4 servings of fruit and vegetables a day, include whole grains    & fiber and avoid regularly eating high fat or \"junk\" foods?  Yes    Taking medications regularly:  Yes    Medication side effects:  Lightheadedness    Ability to successfully perform activities of daily living:  Housework requires assistance, bathing requires assistance and laundry requires assistance    Home Safety:  No safety concerns identified    Hearing Impairment:  Need to ask people to speak up or repeat themselves and no hearing concerns    In the past 6 months, have you been bothered by leaking of urine? Yes    In general, how would you rate your overall mental or emotional health?  Fair      PHQ-2 Total Score: 0    Additional concerns today:  No    This is a face to face evaluation for replacement of left BKA prosthesis   Patient has had previous prothesis and uses successfully.  However there has been volume loss in residual limb causing ill fitting socket and symptoms   Height and weight document.  No baseline changes   Residual limb clean dry and intact with volume loss    Have you ever done Advance Care Planning? (For example, a Health Directive, POLST, or a discussion with a medical provider or your loved ones about your wishes): No, advance care planning information given to patient to review.  Patient plans to discuss their wishes with loved ones or provider.         Fall risk  Fallen 2 or more times in the past year?: No  Any fall with injury in the past year?: No    Cognitive Screening 0  1) Repeat 3 items (Leader, Season, Table)    2) Clock draw: NORMAL  3) 3 item recall: Recalls 2 objects   Results: NORMAL clock, 1-2 items recalled: COGNITIVE " IMPAIRMENT LESS LIKELY    Answers for HPI/ROS submitted by the patient on 2023  MAGDALENA 7 TOTAL SCORE: 2    Mini-CogTM Copyright ABHINAV Guallpa. Licensed by the author for use in [facility]; reprinted with permission (ignacio@[facility]). All rights reserved.      Do you have sleep apnea, excessive snoring or daytime drowsiness?: no    Reviewed and updated as needed this visit by clinical staff   Tobacco  Allergies  Meds              Reviewed and updated as needed this visit by Provider                 Social History     Tobacco Use     Smoking status: Former     Packs/day: 0.30     Years: 40.00     Pack years: 12.00     Types: Cigarettes     Quit date: 3/12/2018     Years since quittin.9     Passive exposure: Past     Smokeless tobacco: Former   Substance Use Topics     Alcohol use: Yes     Alcohol/week: 2.0 - 6.0 standard drinks     Types: 2 - 6 Cans of beer per week     Comment: couple of beers per episode, several times a week     If you drink alcohol do you typically have >3 drinks per day or >7 drinks per week? No    Alcohol Use 2023   Prescreen: >3 drinks/day or >7 drinks/week? No   Prescreen: >3 drinks/day or >7 drinks/week? -         Current providers sharing in care for this patient include:   Patient Care Team:  Avery Duke MD as PCP - General (Internal Medicine)  Piper Polk, RN as Nurse Coordinator (Vascular Surgery)  Odessa Browning MD as MD (Vascular Surgery)  Norah Brown MD as MD (Cardiology)  Quinn Rodriguez MD as MD (Oncology)  Bryanna Rubio, RN as Specialty Care Coordinator (Cardiology)  Norah Brown MD as MD (Cardiology)  Avery Duke MD as Assigned PCP  Avery Duke MD as Assigned Pain Medication Provider    The following health maintenance items are reviewed in Epic and correct as of today:  Health Maintenance   Topic Date Due     HEPATITIS B IMMUNIZATION (3 of 3 - Hep B Twinrix risk 3-dose series) 2013     LUNG CANCER SCREENING   11/12/2021     PHQ-9  05/31/2023     TREATMENT AGREEMENT FOR CHRONIC PAIN MANAGEMENT  05/31/2023     BMP  08/13/2023     MEDICARE ANNUAL WELLNESS VISIT  12/12/2023     ANNUAL REVIEW OF HM ORDERS  12/12/2023     LIPID  02/13/2024     MICROALBUMIN  02/13/2024     MAGDALENA ASSESSMENT  02/13/2024     URINE DRUG SCREEN  02/13/2024     FALL RISK ASSESSMENT  02/13/2024     HEMOGLOBIN  02/13/2024     ADVANCE CARE PLANNING  02/13/2028     DTAP/TDAP/TD IMMUNIZATION (4 - Td or Tdap) 11/19/2029     COLORECTAL CANCER SCREENING  03/16/2032     HIV SCREENING  Completed     PHQ-2 (once per calendar year)  Completed     INFLUENZA VACCINE  Completed     Pneumococcal Vaccine: 65+ Years  Completed     URINALYSIS  Completed     ZOSTER IMMUNIZATION  Completed     AORTIC ANEURYSM SCREENING (SYSTEM ASSIGNED)  Completed     COVID-19 Vaccine  Completed     IPV IMMUNIZATION  Aged Out     MENINGITIS IMMUNIZATION  Aged Out     Lab work is in process  Labs reviewed in EPIC  BP Readings from Last 3 Encounters:   02/13/23 (!) 158/98   12/12/22 136/74   09/30/22 130/82    Wt Readings from Last 3 Encounters:   02/13/23 91.6 kg (202 lb)   12/12/22 91.5 kg (201 lb 12.8 oz)   09/30/22 93.9 kg (207 lb)                  Patient Active Problem List   Diagnosis     Cerebral infarction (H)     Chronic hepatitis C without hepatic coma (H)     Tobacco use disorder     Chronic low back pain     Acute pancreatitis     Hyperlipidemia LDL goal <70     Hypertension goal BP (blood pressure) < 140/90     Nonischemic dilated cardiomyopathy (HCC)     Malignant neoplasm of prostate (H)     Erectile dysfunction     Eczema     PAD (peripheral artery disease) (H)     S/P BKA (below knee amputation) unilateral (H)     Encounter for counseling     Acute renal failure (H)     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     Dyslipidemia      Nicotine dependence, uncomplicated     Pyelonephritis     Prostate cancer (H)     Osteomyelitis (H)     CKD (chronic kidney disease) stage 3, GFR 30-59 ml/min (H)     Chronic pain syndrome     Chronic, continuous use of opioids     Past Surgical History:   Procedure Laterality Date     AMPUTATE LEG BELOW KNEE Left 2015    Procedure: AMPUTATE LEG BELOW KNEE;  Surgeon: Odessa Browning MD;  Location: [facility] OR     IRRIGATION AND DEBRIDEMENT LOWER EXTREMITY, COMBINED Left 10/29/2019    Procedure: IRRIGATION AND DEBRIDEMENT, LEFT LOWER EXTREMITY w/ Veriflow Wound Vac Placement.;  Surgeon: Michelle Matute MD;  Location: [facility] OR     removal of blood clots in arm         Social History     Tobacco Use     Smoking status: Former     Packs/day: 0.30     Years: 40.00     Pack years: 12.00     Types: Cigarettes     Quit date: 3/12/2018     Years since quittin.9     Passive exposure: Past     Smokeless tobacco: Former   Substance Use Topics     Alcohol use: Yes     Alcohol/week: 2.0 - 6.0 standard drinks     Types: 2 - 6 Cans of beer per week     Comment: couple of beers per episode, several times a week     Family History   Problem Relation Age of Onset     Cancer Mother         brain     Cancer Brother          Current Outpatient Medications   Medication Sig Dispense Refill     acetaminophen (TYLENOL) 325 MG tablet Take 1-2 tablets (325-650 mg) by mouth every 6 hours as needed for mild pain (Takes infrequently) 100 tablet 0     aspirin (ASPIRIN LOW DOSE) 81 MG EC tablet TAKE 1 TABLET BY MOUTH EVERY DAY 90 tablet 1     atorvastatin (LIPITOR) 40 MG tablet TAKE 1 TABLET BY MOUTH AT BEDTIME 90 tablet 1     carvedilol (COREG) 25 MG tablet Take 1 tablet (25 mg) by mouth 2 times daily (with meals) 186 tablet 4     carvedilol (COREG) 25 MG tablet TAKE 1 TABLET BY MOUTH TWICE A DAY WITH MEALS 186 tablet 4     doxazosin (CARDURA) 4 MG tablet Take 1 tablet (4 mg) by mouth At Bedtime 90 tablet 4     gabapentin (NEURONTIN) 600 MG  "tablet TAKE 1 TABLET BY MOUTH THREE TIMES A  tablet 4     lisinopril (ZESTRIL) 40 MG tablet Take 1 tablet (40 mg) by mouth daily 90 tablet 4     lisinopril (ZESTRIL) 40 MG tablet TAKE 1 TABLET BY MOUTH EVERY DAY 90 tablet 4     Multiple Vitamins-Minerals (ONE-A-DAY MENS 50+ ADVANTAGE) TABS Take 1 each by mouth daily 30 tablet 0     naloxone (EVZIO) 0.4 MG/0.4ML auto-injector Inject 0.4 mLs (0.4 mg) into the muscle as needed for opioid reversal every 2-3 minutes until assistance arrives 0.8 mL 1     ondansetron (ZOFRAN ODT) 4 MG ODT tab Take 1 tablet (4 mg) by mouth every 8 hours as needed for nausea 20 tablet 3     order for DME Equipment being ordered: Wheelchair, manual 1 each 0     order for DME Equipment being ordered: self adhesive bandage 4\" by 8\" 30 each 11     order for DME Please dispense blood pressure machine and cuff. 1 each 0     oxyCODONE IR (ROXICODONE) 10 MG tablet Take 1 tablet (10 mg) by mouth every 6 hours as needed for severe pain (7-10) 120 tablet 0     oxyCODONE IR (ROXICODONE) 10 MG tablet Take 1 tablet (10 mg) by mouth every 8 hours as needed for moderate to severe pain or severe pain (7-10) . No further refills until follow-up appointment 90 tablet 0     polyethylene glycol (MIRALAX) 17 GM/Dose powder Take 17 g (1 capful) by mouth daily 850 g 11     XARELTO ANTICOAGULANT 20 MG TABS tablet TAKE 1 TABLET BY MOUTH EVERY DAY WITH DINNER 90 tablet 4     No Known Allergies          Review of Systems   Constitutional: Positive for chills. Negative for fever.   HENT: Positive for congestion and sore throat. Negative for ear pain and hearing loss.    Eyes: Negative for pain and visual disturbance.   Respiratory: Positive for cough. Negative for shortness of breath.    Cardiovascular: Negative for chest pain, palpitations and peripheral edema.   Gastrointestinal: Positive for heartburn. Negative for abdominal pain, constipation, diarrhea, hematochezia and nausea.   Genitourinary: Positive for " "urgency. Negative for dysuria, frequency, genital sores and hematuria.   Musculoskeletal: Positive for arthralgias. Negative for joint swelling and myalgias.   Skin: Negative for rash.   Neurological: Positive for dizziness, headaches and paresthesias. Negative for weakness.   Psychiatric/Behavioral: Negative for mood changes. The patient is nervous/anxious.      Constitutional, HEENT, cardiovascular, pulmonary, gi and gu systems are negative, except as otherwise noted.    OBJECTIVE:   BP (!) 158/98   Pulse 94   Temp 97  F (36.1  C)   Resp 18   Ht 1.88 m (6' 2\")   Wt 91.6 kg (202 lb)   SpO2 99%   BMI 25.94 kg/m   Estimated body mass index is 25.94 kg/m  as calculated from the following:    Height as of this encounter: 1.88 m (6' 2\").    Weight as of this encounter: 91.6 kg (202 lb).  Physical Exam  GENERAL: healthy, alert and no distress  EYES: Eyes grossly normal to inspection, PERRL and conjunctivae and sclerae normal  HENT: ear canals and TM's normal, nose and mouth without ulcers or lesions  NECK: no adenopathy, no asymmetry, masses, or scars and thyroid normal to palpation  RESP: lungs clear to auscultation - no rales, rhonchi or wheezes  CV: regular rate and rhythm, normal S1 S2, no S3 or S4, no murmur, click or rub, no peripheral edema and peripheral pulses strong  ABDOMEN: soft, nontender, no hepatosplenomegaly, no masses and bowel sounds normal  MS: Patient with 5/5 strength and ROM in the upper extremitiy   Gait andbalance affected by loose socket and ill fitting prosthesis     Patient has shown motivation for prosthesis use as he has been using for over 5 years.  This is a replacement   Patient is able to ambulate with use of gustavo and scooter to supplement the ill fitting prosthesis   Patient will be able to return to full ambulatory activities after replacement   Patient wo be using prosthesis for ADLs, daily errands, walking on uneven surfaces with I n6 months     He is working on Level 3 " ambulation activites and transeverse most environemental barriers with variable cadance and walk 1-2 miles on uneven teraain and other ADls         Diagnostic Test Results:  Labs reviewed in Epic  Results for orders placed or performed in visit on 02/13/23   Comprehensive metabolic panel (BMP + Alb, Alk Phos, ALT, AST, Total. Bili, TP)     Status: Abnormal   Result Value Ref Range    Sodium 141 133 - 144 mmol/L    Potassium 4.2 3.4 - 5.3 mmol/L    Chloride 115 (H) 94 - 109 mmol/L    Carbon Dioxide (CO2) 22 20 - 32 mmol/L    Anion Gap 4 3 - 14 mmol/L    Urea Nitrogen 19 7 - 30 mg/dL    Creatinine 1.41 (H) 0.66 - 1.25 mg/dL    Calcium 9.5 8.5 - 10.1 mg/dL    Glucose 131 (H) 70 - 99 mg/dL    Alkaline Phosphatase 112 40 - 150 U/L    AST 17 0 - 45 U/L    ALT 24 0 - 70 U/L    Protein Total 8.2 6.8 - 8.8 g/dL    Albumin 3.9 3.4 - 5.0 g/dL    Bilirubin Total 0.7 0.2 - 1.3 mg/dL    GFR Estimate 55 (L) >60 mL/min/1.73m2   Albumin Random Urine Quantitative with Creat Ratio     Status: Abnormal   Result Value Ref Range    Creatinine Urine mg/dL 338 mg/dL    Albumin Urine mg/L 302 mg/L    Albumin Urine mg/g Cr 89.35 (H) 0.00 - 17.00 mg/g Cr   Drug Abuse Screen Panel 13, Urine (Pain Care Package) - lab collect     Status: Abnormal   Result Value Ref Range    Cannabinoids (36-fuh-9-carboxy-9-THC) Detected (A) Not Detected, Indeterminate    Phencyclidine Not Detected Not Detected, Indeterminate    Cocaine (Benzoylecgonine) Not Detected Not Detected, Indeterminate    Methamphetamine (d-Methamphetamine) Not Detected Not Detected, Indeterminate    Opiates (Morphine) Not Detected Not Detected, Indeterminate    Amphetamine (d-Amphetamine) Not Detected Not Detected, Indeterminate    Benzodiazepines (Nordiazepam) Not Detected Not Detected, Indeterminate    Tricyclic Antidepressants (Desipramine) Not Detected Not Detected, Indeterminate    Methadone Not Detected Not Detected, Indeterminate    Barbiturates (Butalbital) Not Detected Not  Detected, Indeterminate    Oxycodone Detected (A) Not Detected, Indeterminate    Propoxyphene (Norpropoxyphene) Not Detected Not Detected, Indeterminate    Buprenorphine Not Detected Not Detected, Indeterminate   Lipid panel reflex to direct LDL Fasting     Status: None   Result Value Ref Range    Cholesterol 114 <200 mg/dL    Triglycerides 93 <150 mg/dL    Direct Measure HDL 41 >=40 mg/dL    LDL Cholesterol Calculated 54 <=100 mg/dL    Non HDL Cholesterol 73 <130 mg/dL    Patient Fasting > 8hrs? Yes     Narrative    Cholesterol  Desirable:  <200 mg/dL    Triglycerides  Normal:  Less than 150 mg/dL  Borderline High:  150-199 mg/dL  High:  200-499 mg/dL  Very High:  Greater than or equal to 500 mg/dL    Direct Measure HDL  Female:  Greater than or equal to 50 mg/dL   Male:  Greater than or equal to 40 mg/dL    LDL Cholesterol  Desirable:  <100mg/dL  Above Desirable:  100-129 mg/dL   Borderline High:  130-159 mg/dL   High:  160-189 mg/dL   Very High:  >= 190 mg/dL    Non HDL Cholesterol  Desirable:  130 mg/dL  Above Desirable:  130-159 mg/dL  Borderline High:  160-189 mg/dL  High:  190-219 mg/dL  Very High:  Greater than or equal to 220 mg/dL   CBC with platelets and differential     Status: None   Result Value Ref Range    WBC Count 5.7 4.0 - 11.0 10e3/uL    RBC Count 4.95 4.40 - 5.90 10e6/uL    Hemoglobin 15.4 13.3 - 17.7 g/dL    Hematocrit 44.7 40.0 - 53.0 %    MCV 90 78 - 100 fL    MCH 31.1 26.5 - 33.0 pg    MCHC 34.5 31.5 - 36.5 g/dL    RDW 14.6 10.0 - 15.0 %    Platelet Count 219 150 - 450 10e3/uL    % Neutrophils 60 %    % Lymphocytes 26 %    % Monocytes 9 %    % Eosinophils 4 %    % Basophils 0 %    Absolute Neutrophils 3.4 1.6 - 8.3 10e3/uL    Absolute Lymphocytes 1.5 0.8 - 5.3 10e3/uL    Absolute Monocytes 0.5 0.0 - 1.3 10e3/uL    Absolute Eosinophils 0.2 0.0 - 0.7 10e3/uL    Absolute Basophils 0.0 0.0 - 0.2 10e3/uL   CBC with platelets and differential     Status: None    Narrative    The following orders  were created for panel order CBC with platelets and differential.  Procedure                               Abnormality         Status                     ---------                               -----------         ------                     CBC with platelets and d...[[id number]]                      Final result                 Please view results for these tests on the individual orders.       ASSESSMENT / PLAN:   Constantino was seen today for physical.    Diagnoses and all orders for this visit:    Stage 3a chronic kidney disease (H)  -     CBC with platelets and differential; Future  -     CBC with platelets and differential    Paroxysmal atrial fibrillation (H)    Hyperlipidemia LDL goal <70  -     Lipid panel reflex to direct LDL Fasting; Future  -     Lipid panel reflex to direct LDL Fasting    Hypertension goal BP (blood pressure) < 140/90  -     Comprehensive metabolic panel (BMP + Alb, Alk Phos, ALT, AST, Total. Bili, TP); Future  -     Comprehensive metabolic panel (BMP + Alb, Alk Phos, ALT, AST, Total. Bili, TP)    S/P BKA (below knee amputation) unilateral (H)    Chronic pain syndrome  -     Albumin Random Urine Quantitative with Creat Ratio; Future  -     Drug Abuse Screen Panel 13, Urine (Pain Care Package) - lab collect; Future  -     oxyCODONE IR (ROXICODONE) 10 MG tablet; Take 1 tablet (10 mg) by mouth every 6 hours as needed for severe pain (7-10)  -     Albumin Random Urine Quantitative with Creat Ratio  -     Drug Abuse Screen Panel 13, Urine (Pain Care Package) - lab collect              COUNSELING:  Reviewed preventive health counseling, as reflected in patient instructions       Regular exercise       Healthy diet/nutrition       Vision screening       Hearing screening       Bladder control       Colon cancer screening        He reports that he quit smoking about 4 years ago. His smoking use included cigarettes. He has a 12.00 pack-year smoking history. He has been exposed to tobacco smoke. He  has quit using smokeless tobacco.      Appropriate preventive services were discussed with this patient, including applicable screening as appropriate for cardiovascular disease, diabetes, osteopenia/osteoporosis, and glaucoma.  As appropriate for age/gender, discussed screening for colorectal cancer, prostate cancer, breast cancer, and cervical cancer. Checklist reviewing preventive services available has been given to the patient.    Reviewed patients plan of care and provided an AVS. The Basic Care Plan (routine screening as documented in Health Maintenance) for Constantino meets the Care Plan requirement. This Care Plan has been established and reviewed with the Patient.          Avery Duke MD  [facility]    Identified Health Risks:

## 2023-02-13 NOTE — PATIENT INSTRUCTIONS
[email or web address]        [place]    [address] [facility] [address], [place] [address]  [phone]    For hours and more information, visit [employer].  [place]    [address] [address]  [phone]    For hours and more information, visit [employer].  [place]    [address] [facility], [address] [address], [place] [address]  [phone]    For hours and more information, visit [employer].  [place]    [address] [facility], [address], [place] [address]  [phone]    For hours and more information, visit [employer].  [place]    [address] [place] [address]  [phone]    For hours and more information, visit [employer].  [place]    [address] [place], [place] [address] [phone]    For hours and more information, visit [employer].  [place]    [address] [facility], [address], [place] [address]  [phone]    For hours and more information, visit [employer].  [address], [place] [address]  [phone]    For hours and more information, visit [employer].  [place]    [address] [place] [address]  [phone]    For hours and more information, visit [employer].  [place]    [address] [facility], [place] [address]  [phone]    For hours and more information, visit [employer].  [place]    [address] [facility] [address]  [phone]    For hours and more information, visit [employer].  [place]    [address] [place] [address]  [phone]    For hours and more information, visit [employer].

## 2023-02-13 NOTE — LETTER
Opioid / Opioid Plus Controlled Substance Agreement    This is an agreement between you and your provider about the safe and appropriate use of controlled substance/opioids prescribed by your care team. Controlled substances are medicines that can cause physical and mental dependence (abuse).    There are strict laws about having and using these medicines. We here at [facility] are committing to working with you in your efforts to get better. To support you in this work, we ll help you schedule regular office appointments for medicine refills. If we must cancel or change your appointment for any reason, we ll make sure you have enough medicine to last until your next appointment.     As a Provider, I will:    Listen carefully to your concerns and treat you with respect.     Recommend a treatment plan that I believe is in your best interest. This plan may involve therapies other than opioid pain medication.     Talk with you often about the possible benefits, and the risk of harm of any medicine that we prescribe for you.     Provide a plan on how to taper (discontinue or go off) using this medicine if the decision is made to stop its use.    As a Patient, I understand that opioid(s):     Are a controlled substance prescribed by my care team to help me function or work and manage my condition(s).     Are strong medicines and can cause serious side effects such as:    Drowsiness, which can seriously affect my driving ability    A lower breathing rate, enough to cause death    Harm to my thinking ability     Depression     Abuse of and addiction to this medicine    Need to be taken exactly as prescribed. Combining opioids with certain medicines or chemicals (such as illegal drugs, sedatives, sleeping pills, and benzodiazepines) can be dangerous or even fatal. If I stop opioids suddenly, I may have severe withdrawal symptoms.    Do not work for all types of pain nor for all patients. If they re not helpful, I may  be asked to stop them.        The risks, benefits and side effects of these medicine(s) were explained to me. I agree that:  1. I will take part in other treatments as advised by my care team. This may be psychiatry or counseling, physical therapy, behavioral therapy, group treatment or a referral to a specialist.     2. I will keep all my appointments. I understand that this is part of the monitoring of opioids. My care team may require an office visit for EVERY opioid/controlled substance refill. If I miss appointments or don t follow instructions, my care team may stop my medicine.    3. I will take my medicines as prescribed. I will not change the dose or schedule unless my care team tells me to. There will be no refills if I run out early.     4. I may be asked to come to the clinic and complete a urine drug test or complete a pill count at any time. If I don t give a urine sample or participate in a pill count, the care team may stop my medicine.    5. I will only receive prescriptions from this clinic for chronic pain. If I am treated by another provider for acute pain issues, I will tell them that I am taking opioid pain medication for chronic pain and that I have a treatment agreement with this provider. I will inform my [facility] care team within one business day if I am given a prescription for any pain medication by another healthcare provider. My [facility] care team can contact other providers and pharmacists about my use of any medicines.    6. It is up to me to make sure that I don t run out of my medicines on weekends or holidays. If my care team is willing to refill my opioid prescription without a visit, I must request refills only during office hours. Refills may take up to 3 business days to process. I will use one pharmacy to fill all my opioid and other controlled substance prescriptions. I will notify the clinic about any changes to my insurance or medication  availability.    7. I am responsible for my prescriptions. If the medicine/prescription is lost, stolen or destroyed, it will not be replaced. I also agree not to share controlled substance medicines with anyone.    8. I am aware I should not use any illegal or recreational drugs. I agree not to drink alcohol unless my care team says I can.       9. If I enroll in the [place] [facility] program, I will tell my care team prior to my next refill.     10. I will tell my care team right away if I become pregnant, have a new medical problem treated outside of my regular clinic, or have a change in my medications.    11. I understand that this medicine can affect my thinking, judgment and reaction time. Alcohol and drugs affect the brain and body, which can affect the safety of my driving. Being under the influence of alcohol or drugs can affect my decision-making, behaviors, personal safety, and the safety of others. Driving while impaired (DWI) can occur if a person is driving, operating, or in physical control of a car, motorcycle, boat, snowmobile, ATV, motorbike, off-road vehicle, or any other motor vehicle ([place] Statute 169A.20). I understand the risk if I choose to drive or operate any vehicle or machinery.    I understand that if I do not follow any of the conditions above, my prescriptions or treatment may be stopped or changed.          Opioids  What You Need to Know    What are opioids?   Opioids are pain medicines that must be prescribed by a doctor. They are also known as narcotics.     Examples are:   1. morphine (MS Contin, Josefina)  2. oxycodone (Oxycontin)  3. oxycodone and acetaminophen (Percocet)  4. hydrocodone and acetaminophen (Vicodin, Norco)   5. fentanyl patch (Duragesic)   6. hydromorphone (Dilaudid)   7. methadone  8. codeine (Tylenol #3)     What do opioids do well?   Opioids are best for severe short-term pain such as after a surgery or injury. They may work well for cancer pain. They may  help some people with long-lasting (chronic) pain.     What do opioids NOT do well?   Opioids never get rid of pain entirely, and they don t work well for most patients with chronic pain. Opioids don t reduce swelling, one of the causes of pain.                                    Other ways to manage chronic pain and improve function include:       Treat the health problem that may be causing pain    Anti-inflammation medicines, which reduce swelling and tenderness, such as ibuprofen (Advil, Motrin) or naproxen (Aleve)    Acetaminophen (Tylenol)    Antidepressants and anti-seizure medicines, especially for nerve pain    Topical treatments such as patches or creams    Injections or nerve blocks    Chiropractic or osteopathic treatment    Acupuncture, massage, deep breathing, meditation, visual imagery, aromatherapy    Use heat or ice at the pain site    Physical therapy     Exercise    Stop smoking    Take part in therapy       Risks and side effects     Talk to your doctor before you start or decide to keep taking opioids. Possible side effects include:      Lowering your breathing rate enough to cause death    Overdose, including death, especially if taking higher than prescribed doses    Worse depression symptoms; less pleasure in things you usually enjoy    Feeling tired or sluggish    Slower thoughts or cloudy thinking    Being more sensitive to pain over time; pain is harder to control    Trouble sleeping or restless sleep    Changes in hormone levels (for example, less testosterone)    Changes in sex drive or ability to have sex    Constipation    Unsafe driving    Itching and sweating    Dizziness    Nausea, throwing up and dry mouth    What else should I know about opioids?    Opioids may lead to dependence, tolerance, or addiction.      Dependence means that if you stop or reduce the medicine too quickly, you will have withdrawal symptoms. These include loose poop (diarrhea), jitters, flu-like symptoms,  nervousness and tremors. Dependence is not the same as addiction.                       Tolerance means needing higher doses over time to get the same effect. This may increase the chance of serious side effects.      Addiction is when people improperly use a substance that harms their body, their mind or their relations with others. Use of opiates can cause a relapse of addiction if you have a history of drug or alcohol abuse.      People who have used opioids for a long time may have a lower quality of life, worse depression, higher levels of pain and more visits to doctors.    You can overdose on opioids. Take these steps to lower your risk of overdose:    1. Recognize the signs:  Signs of overdose include decrease or loss of consciousness (blackout), slowed breathing, trouble waking up and blue lips. If someone is worried about overdose, they should call 911.    2. Talk to your doctor about Narcan (naloxone).   If you are at risk for overdose, you may be given a prescription for Narcan. This medicine very quickly reverses the effects of opioids.   If you overdose, a friend or family member can give you Narcan while waiting for the ambulance. They need to know the signs of overdose and how to give Narcan.     3. Don't use alcohol or street drugs.   Taking them with opioids can cause death.    4. Do not take any of these medicines unless your doctor says it s OK. Taking these with opioids can cause death:    Benzodiazepines, such as lorazepam (Ativan), alprazolam (Xanax) or diazepam (Valium)    Muscle relaxers, such as cyclobenzaprine (Flexeril)    Sleeping pills like zolpidem (Ambien)     Other opioids      How to keep you and other people safe while taking opioids:    1. Never share your opioids with others.  Opioid medicines are regulated by the Drug Enforcement Agency (VANES[employer]). Selling or sharing medications is a criminal act.    2. Be sure to store opioids in a secure place, locked up if possible. Young children  can easily swallow them and overdose.    3. When you are traveling with your medicines, keep them in the original bottles. If you use a pill box, be sure you also carry a copy of your medicine list from your clinic or pharmacy.    4. Safe disposal of opioids    Most pharmacies have places to get rid of medicine, called disposal kiosks. Medicine disposal options are also available in every [facility]. Search your county and  medication disposal  to find more options. You can find more details at:  [email or web address]     I agree that my provider, clinic care team, and pharmacy may work with any city, state or federal law enforcement agency that investigates the misuse, sale, or other diversion of my controlled medicine. I will allow my provider to discuss my care with, or share a copy of, this agreement with any other treating provider, pharmacy or emergency room where I receive care.    I have read this agreement and have asked questions about anything I did not understand.    _______________________________________________________  Patient Signature - Constantino Taylor _____________________                   Date     _______________________________________________________  Provider Signature - Avery Duke MD   _____________________                   Date     _______________________________________________________  Witness Signature (required if provider not present while patient signing)   _____________________                   Date

## 2023-02-17 ENCOUNTER — TELEPHONE (OUTPATIENT)
Dept: FAMILY MEDICINE | Facility: CLINIC | Age: 68
End: 2023-02-17
Payer: COMMERCIAL

## 2023-02-17 DIAGNOSIS — G89.4 CHRONIC PAIN SYNDROME: ICD-10-CM

## 2023-02-17 NOTE — TELEPHONE ENCOUNTER
Fax message: Insurance limited pt to 7 days Oxycodone this fill, so he only received 28 tablets. Please send RX for the difference (92) tablets.

## 2023-02-22 RX ORDER — OXYCODONE HYDROCHLORIDE 10 MG/1
10 TABLET ORAL EVERY 6 HOURS PRN
Qty: 92 TABLET | Refills: 0 | Status: SHIPPED | OUTPATIENT
Start: 2023-02-22 | End: 2023-03-27

## 2023-03-27 DIAGNOSIS — G89.4 CHRONIC PAIN SYNDROME: ICD-10-CM

## 2023-03-27 RX ORDER — OXYCODONE HYDROCHLORIDE 10 MG/1
10 TABLET ORAL EVERY 6 HOURS PRN
Qty: 92 TABLET | Refills: 0 | Status: SHIPPED | OUTPATIENT
Start: 2023-03-27 | End: 2023-04-27

## 2023-03-27 NOTE — TELEPHONE ENCOUNTER
Medication Question or Refill    Contacts       Type Contact Phone/Fax    03/27/2023 08:24 AM CDT Phone (Incoming) Constantino Taylor (Self) [phone] (M)          What medication are you calling about (include dose and sig)?: oxyCODONE IR (ROXICODONE) 10 MG tablet    Preferred Pharmacy:[place] [address] [place] [facility] [place] [address] [place] [address]  Phone: [phone] Fax: [phone]    Patient is currently out of medications at this time please send over ASAP.   Controlled Substance Agreement on file:   CSA -- Patient Level:     [Media Unavailable] Controlled Substance Agreement - Opioid - Scan on 5/31/2022 11:29 AM   [Media Unavailable] Controlled Substance Agreement - Opioid - Scan on 2/11/2021 12:41 PM       Who prescribed the medication?:Avery Duke    Do you need a refill? Yes    Could we send this information to you in [facility] or would you prefer to receive a phone call?:   Patient would prefer a phone call   Okay to leave a detailed message?: Yes at Cell number on file:    Telephone Information:   Mobile [phone]

## 2023-04-03 ENCOUNTER — TELEPHONE (OUTPATIENT)
Dept: FAMILY MEDICINE | Facility: CLINIC | Age: 68
End: 2023-04-03
Payer: COMMERCIAL

## 2023-04-03 NOTE — TELEPHONE ENCOUNTER
A Order/Orders came in via Fax to be completed by the provider:  [facility]  [facility]  [address] [place] [address], [facility] [address]  [place] [address]  Phone: [phone]  Fax: [phone]           This was received at [facility] Team    What number is listed as a contact on the form? Karin Jon phone: [phone]    Please fax to [phone]    Additional comments: Patient is scheduled to see the provider on, 4/17/2023. May need chart notes CMN.     The Order/Orders has been started for the provider and placed at their desk. Terese Rothman,     **Please send the encounter back to the care team when the request is completed.

## 2023-04-04 ENCOUNTER — MEDICAL CORRESPONDENCE (OUTPATIENT)
Dept: HEALTH INFORMATION MANAGEMENT | Facility: CLINIC | Age: 68
End: 2023-04-04
Payer: COMMERCIAL

## 2023-04-10 ENCOUNTER — TELEPHONE (OUTPATIENT)
Dept: FAMILY MEDICINE | Facility: CLINIC | Age: 68
End: 2023-04-10
Payer: COMMERCIAL

## 2023-04-10 NOTE — TELEPHONE ENCOUNTER
Patient Quality Outreach    Patient is due for the following:   Hypertension -  Hypertension follow-up visit      Topic Date Due     Hepatitis B Vaccine (3 of 3 - Hep B Twinrix risk 3-dose series) 07/01/2013       Next Steps:   Patient has upcoming appointment, these items will be addressed at that time.    Type of outreach:    No outreached needed      Questions for provider review:    None     Rosa Conti CMA  Chart routed to Care Team.

## 2023-04-17 ENCOUNTER — OFFICE VISIT (OUTPATIENT)
Dept: FAMILY MEDICINE | Facility: CLINIC | Age: 68
End: 2023-04-17
Payer: COMMERCIAL

## 2023-04-17 ENCOUNTER — TRANSFERRED RECORDS (OUTPATIENT)
Dept: HEALTH INFORMATION MANAGEMENT | Facility: CLINIC | Age: 68
End: 2023-04-17

## 2023-04-17 VITALS
BODY MASS INDEX: 25.15 KG/M2 | TEMPERATURE: 97.2 F | OXYGEN SATURATION: 98 % | DIASTOLIC BLOOD PRESSURE: 102 MMHG | HEIGHT: 74 IN | SYSTOLIC BLOOD PRESSURE: 148 MMHG | HEART RATE: 97 BPM | RESPIRATION RATE: 18 BRPM | WEIGHT: 196 LBS

## 2023-04-17 DIAGNOSIS — I10 HYPERTENSION GOAL BP (BLOOD PRESSURE) < 140/90: ICD-10-CM

## 2023-04-17 DIAGNOSIS — Z01.818 PREOP GENERAL PHYSICAL EXAM: Primary | ICD-10-CM

## 2023-04-17 DIAGNOSIS — I42.0 DILATED CARDIOMYOPATHY (H): ICD-10-CM

## 2023-04-17 DIAGNOSIS — I48.20 CHRONIC ATRIAL FIBRILLATION (H): ICD-10-CM

## 2023-04-17 DIAGNOSIS — N40.0 BENIGN PROSTATIC HYPERPLASIA, UNSPECIFIED WHETHER LOWER URINARY TRACT SYMPTOMS PRESENT: ICD-10-CM

## 2023-04-17 DIAGNOSIS — I63.19 CEREBRAL INFARCTION DUE TO EMBOLISM OF OTHER PRECEREBRAL ARTERY (H): ICD-10-CM

## 2023-04-17 DIAGNOSIS — I48.91 ATRIAL FIBRILLATION WITH RVR (H): ICD-10-CM

## 2023-04-17 DIAGNOSIS — G89.29 CHRONIC LOW BACK PAIN WITH SCIATICA, SCIATICA LATERALITY UNSPECIFIED, UNSPECIFIED BACK PAIN LATERALITY: ICD-10-CM

## 2023-04-17 DIAGNOSIS — M54.40 CHRONIC LOW BACK PAIN WITH SCIATICA, SCIATICA LATERALITY UNSPECIFIED, UNSPECIFIED BACK PAIN LATERALITY: ICD-10-CM

## 2023-04-17 LAB
ALBUMIN SERPL-MCNC: 3.7 G/DL (ref 3.4–5)
ALP SERPL-CCNC: 101 U/L (ref 40–150)
ALT SERPL W P-5'-P-CCNC: 26 U/L (ref 0–70)
ANION GAP SERPL CALCULATED.3IONS-SCNC: 1 MMOL/L (ref 3–14)
AST SERPL W P-5'-P-CCNC: 20 U/L (ref 0–45)
BASOPHILS # BLD AUTO: 0 10E3/UL (ref 0–0.2)
BASOPHILS NFR BLD AUTO: 0 %
BILIRUB DIRECT SERPL-MCNC: 0.2 MG/DL (ref 0–0.2)
BILIRUB SERPL-MCNC: 1 MG/DL (ref 0.2–1.3)
BUN SERPL-MCNC: 18 MG/DL (ref 7–30)
CALCIUM SERPL-MCNC: 9.3 MG/DL (ref 8.5–10.1)
CHLORIDE BLD-SCNC: 108 MMOL/L (ref 94–109)
CO2 SERPL-SCNC: 26 MMOL/L (ref 20–32)
CREAT SERPL-MCNC: 1.28 MG/DL (ref 0.66–1.25)
EOSINOPHIL # BLD AUTO: 0.2 10E3/UL (ref 0–0.7)
EOSINOPHIL NFR BLD AUTO: 4 %
ERYTHROCYTE [DISTWIDTH] IN BLOOD BY AUTOMATED COUNT: 14 % (ref 10–15)
GFR SERPL CREATININE-BSD FRML MDRD: 62 ML/MIN/1.73M2
GLUCOSE BLD-MCNC: 103 MG/DL (ref 70–99)
HCT VFR BLD AUTO: 43.9 % (ref 40–53)
HGB BLD-MCNC: 14.9 G/DL (ref 13.3–17.7)
LYMPHOCYTES # BLD AUTO: 1.5 10E3/UL (ref 0.8–5.3)
LYMPHOCYTES NFR BLD AUTO: 26 %
MCH RBC QN AUTO: 31 PG (ref 26.5–33)
MCHC RBC AUTO-ENTMCNC: 33.9 G/DL (ref 31.5–36.5)
MCV RBC AUTO: 92 FL (ref 78–100)
MONOCYTES # BLD AUTO: 0.5 10E3/UL (ref 0–1.3)
MONOCYTES NFR BLD AUTO: 9 %
NEUTROPHILS # BLD AUTO: 3.6 10E3/UL (ref 1.6–8.3)
NEUTROPHILS NFR BLD AUTO: 62 %
PLATELET # BLD AUTO: 199 10E3/UL (ref 150–450)
POTASSIUM BLD-SCNC: 3.9 MMOL/L (ref 3.4–5.3)
PROT SERPL-MCNC: 8.4 G/DL (ref 6.8–8.8)
RBC # BLD AUTO: 4.8 10E6/UL (ref 4.4–5.9)
SODIUM SERPL-SCNC: 135 MMOL/L (ref 133–144)
WBC # BLD AUTO: 5.8 10E3/UL (ref 4–11)

## 2023-04-17 PROCEDURE — 82248 BILIRUBIN DIRECT: CPT | Performed by: INTERNAL MEDICINE

## 2023-04-17 PROCEDURE — 80053 COMPREHEN METABOLIC PANEL: CPT | Performed by: INTERNAL MEDICINE

## 2023-04-17 PROCEDURE — 85025 COMPLETE CBC W/AUTO DIFF WBC: CPT | Performed by: INTERNAL MEDICINE

## 2023-04-17 PROCEDURE — 99214 OFFICE O/P EST MOD 30 MIN: CPT | Performed by: INTERNAL MEDICINE

## 2023-04-17 PROCEDURE — 36415 COLL VENOUS BLD VENIPUNCTURE: CPT | Performed by: INTERNAL MEDICINE

## 2023-04-17 PROCEDURE — 93000 ELECTROCARDIOGRAM COMPLETE: CPT | Performed by: INTERNAL MEDICINE

## 2023-04-17 RX ORDER — GABAPENTIN 600 MG/1
TABLET ORAL
Qty: 270 TABLET | Refills: 4 | Status: SHIPPED | OUTPATIENT
Start: 2023-04-17

## 2023-04-17 ASSESSMENT — ANXIETY QUESTIONNAIRES
IF YOU CHECKED OFF ANY PROBLEMS ON THIS QUESTIONNAIRE, HOW DIFFICULT HAVE THESE PROBLEMS MADE IT FOR YOU TO DO YOUR WORK, TAKE CARE OF THINGS AT HOME, OR GET ALONG WITH OTHER PEOPLE: NOT DIFFICULT AT ALL
GAD7 TOTAL SCORE: 0
GAD7 TOTAL SCORE: 0
6. BECOMING EASILY ANNOYED OR IRRITABLE: NOT AT ALL
5. BEING SO RESTLESS THAT IT IS HARD TO SIT STILL: NOT AT ALL
7. FEELING AFRAID AS IF SOMETHING AWFUL MIGHT HAPPEN: NOT AT ALL
3. WORRYING TOO MUCH ABOUT DIFFERENT THINGS: NOT AT ALL
1. FEELING NERVOUS, ANXIOUS, OR ON EDGE: NOT AT ALL
2. NOT BEING ABLE TO STOP OR CONTROL WORRYING: NOT AT ALL

## 2023-04-17 ASSESSMENT — PATIENT HEALTH QUESTIONNAIRE - PHQ9
SUM OF ALL RESPONSES TO PHQ QUESTIONS 1-9: 3
5. POOR APPETITE OR OVEREATING: NOT AT ALL

## 2023-04-17 NOTE — PATIENT INSTRUCTIONS
1. Aspirin ( stop 7 days before the procedure)  (  ) \  2. Xarelto stop 2 days before ( stop on 2023)  3. MAKE SURE YOU TAKE COREG AND LISINOPRIL THAT MORNING  For informational purposes only. Not to replace the advice of your health care provider. Copyright   ,  [facility]. All rights reserved. Clinically reviewed by Cathy Leonard MD. [employer] [id number] - REV .  Preparing for Your Surgery  Getting started  A nurse will call you to review your health history and instructions. They will give you an arrival time based on your scheduled surgery time. Please be ready to share:  Your doctor's clinic name and phone number  Your medical, surgical, and anesthesia history  A list of allergies and sensitivities  A list of medicines, including herbal treatments and over-the-counter drugs  Whether the patient has a legal guardian (ask how to send us the papers in advance)  Please tell us if you're pregnant--or if there's any chance you might be pregnant. Some surgeries may injure a fetus (unborn baby), so they require a pregnancy test. Surgeries that are safe for a fetus don't always need a test, and you can choose whether to have one.   If you have a child who's having surgery, please ask for a copy of Preparing for Your Child's Surgery.    Preparing for surgery  Within 10 to 30 days of surgery: Have a pre-op exam (sometimes called an H&P, or History and Physical). This can be done at a clinic or pre-operative center.  If you're having a , you may not need this exam. Talk to your care team.  At your pre-op exam, talk to your care team about all medicines you take. If you need to stop any medicines before surgery, ask when to start taking them again.  We do this for your safety. Many medicines can make you bleed too much during surgery. Some change how well surgery (anesthesia) drugs work.  Call your insurance company to let them know you're having surgery. (If you don't have  insurance, call [phone].)  Call your clinic if there's any change in your health. This includes signs of a cold or flu (sore throat, runny nose, cough, rash, fever). It also includes a scrape or scratch near the surgery site.  If you have questions on the day of surgery, call your hospital or surgery center.  Eating and drinking guidelines  For your safety: Unless your surgeon tells you otherwise, follow the guidelines below.  Eat and drink as usual until 8 hours before you arrive for surgery. After that, no food or milk.  Drink clear liquids until 2 hours before you arrive. These are liquids you can see through, like water, Gatorade, and Propel Water. They also include plain black coffee and tea (no cream or milk), candy, and breath mints. You can spit out gum when you arrive.  If you drink alcohol: Stop drinking it the night before surgery.  If your care team tells you to take medicine on the morning of surgery, it's okay to take it with a sip of water.  Preventing infection  Shower or bathe the night before and morning of your surgery. Follow the instructions your clinic gave you. (If no instructions, use regular soap.)  Don't shave or clip hair near your surgery site. We'll remove the hair if needed.  Don't smoke or vape the morning of surgery. You may chew nicotine gum up to 2 hours before surgery. A nicotine patch is okay.  Note: Some surgeries require you to completely quit smoking and nicotine. Check with your surgeon.  Your care team will make every effort to keep you safe from infection. We will:  Clean our hands often with soap and water (or an alcohol-based hand rub).  Clean the skin at your surgery site with a special soap that kills germs.  Give you a special gown to keep you warm. (Cold raises the risk of infection.)  Wear special hair covers, masks, gowns and gloves during surgery.  Give antibiotic medicine, if prescribed. Not all surgeries need antibiotics.  What to bring on the day of  surgery  Photo ID and insurance card  Copy of your health care directive, if you have one  Glasses and hearing aids (bring cases)  You can't wear contacts during surgery  Inhaler and eye drops, if you use them (tell us about these when you arrive)  CPAP machine or breathing device, if you use them  A few personal items, if spending the night  If you have . . .  A pacemaker, ICD (cardiac defibrillator) or other implant: Bring the ID card.  An implanted stimulator: Bring the remote control.  A legal guardian: Bring a copy of the certified (court-stamped) guardianship papers.  Please remove any jewelry, including body piercings. Leave jewelry and other valuables at home.  If you're going home the day of surgery  You must have a responsible adult drive you home. They should stay with you overnight as well.  If you don't have someone to stay with you, and you aren't safe to go home alone, we may keep you overnight. Insurance often won't pay for this.  After surgery  If it's hard to control your pain or you need more pain medicine, please call your surgeon's office.  Questions?   If you have any questions for your care team, list them here: _________________________________________________________________________________________________________________________________________________________________________ ____________________________________ ____________________________________ ____________________________________    For informational purposes only. Not to replace the advice of your health care provider. Copyright   2003, 2019 [facility]. All rights reserved. Clinically reviewed by Cathy Leonard MD. "[employer] [id number] - REV 12/22.  Preparing for Your Surgery  Getting started  A nurse will call you to review your health history and instructions. They will give you an arrival time based on your scheduled surgery time. Please be ready to share:  Your doctor's clinic name and phone number  Your medical, surgical,  and anesthesia history  A list of allergies and sensitivities  A list of medicines, including herbal treatments and over-the-counter drugs  Whether the patient has a legal guardian (ask how to send us the papers in advance)  Please tell us if you're pregnant--or if there's any chance you might be pregnant. Some surgeries may injure a fetus (unborn baby), so they require a pregnancy test. Surgeries that are safe for a fetus don't always need a test, and you can choose whether to have one.   If you have a child who's having surgery, please ask for a copy of Preparing for Your Child's Surgery.    Preparing for surgery  Within 10 to 30 days of surgery: Have a pre-op exam (sometimes called an H&P, or History and Physical). This can be done at a clinic or pre-operative center.  If you're having a , you may not need this exam. Talk to your care team.  At your pre-op exam, talk to your care team about all medicines you take. If you need to stop any medicines before surgery, ask when to start taking them again.  We do this for your safety. Many medicines can make you bleed too much during surgery. Some change how well surgery (anesthesia) drugs work.  Call your insurance company to let them know you're having surgery. (If you don't have insurance, call [phone].)  Call your clinic if there's any change in your health. This includes signs of a cold or flu (sore throat, runny nose, cough, rash, fever). It also includes a scrape or scratch near the surgery site.  If you have questions on the day of surgery, call your hospital or surgery center.  Eating and drinking guidelines  For your safety: Unless your surgeon tells you otherwise, follow the guidelines below.  Eat and drink as usual until 8 hours before you arrive for surgery. After that, no food or milk.  Drink clear liquids until 2 hours before you arrive. These are liquids you can see through, like water, Gatorade, and Propel Water. They also include plain  black coffee and tea (no cream or milk), candy, and breath mints. You can spit out gum when you arrive.  If you drink alcohol: Stop drinking it the night before surgery.  If your care team tells you to take medicine on the morning of surgery, it's okay to take it with a sip of water.  Preventing infection  Shower or bathe the night before and morning of your surgery. Follow the instructions your clinic gave you. (If no instructions, use regular soap.)  Don't shave or clip hair near your surgery site. We'll remove the hair if needed.  Don't smoke or vape the morning of surgery. You may chew nicotine gum up to 2 hours before surgery. A nicotine patch is okay.  Note: Some surgeries require you to completely quit smoking and nicotine. Check with your surgeon.  Your care team will make every effort to keep you safe from infection. We will:  Clean our hands often with soap and water (or an alcohol-based hand rub).  Clean the skin at your surgery site with a special soap that kills germs.  Give you a special gown to keep you warm. (Cold raises the risk of infection.)  Wear special hair covers, masks, gowns and gloves during surgery.  Give antibiotic medicine, if prescribed. Not all surgeries need antibiotics.  What to bring on the day of surgery  Photo ID and insurance card  Copy of your health care directive, if you have one  Glasses and hearing aids (bring cases)  You can't wear contacts during surgery  Inhaler and eye drops, if you use them (tell us about these when you arrive)  CPAP machine or breathing device, if you use them  A few personal items, if spending the night  If you have . . .  A pacemaker, ICD (cardiac defibrillator) or other implant: Bring the ID card.  An implanted stimulator: Bring the remote control.  A legal guardian: Bring a copy of the certified (court-stamped) guardianship papers.  Please remove any jewelry, including body piercings. Leave jewelry and other valuables at home.  If you're going home  the day of surgery  You must have a responsible adult drive you home. They should stay with you overnight as well.  If you don't have someone to stay with you, and you aren't safe to go home alone, we may keep you overnight. Insurance often won't pay for this.  After surgery  If it's hard to control your pain or you need more pain medicine, please call your surgeon's office.  Questions?   If you have any questions for your care team, list them here: _________________________________________________________________________________________________________________________________________________________________________ ____________________________________ ____________________________________ ____________________________________

## 2023-04-17 NOTE — PROGRESS NOTES
Assessment & Plan   Problem List Items Addressed This Visit    None  Visit Diagnoses     Preop general physical exam    -  Primary    Relevant Orders    EKG 12-lead complete w/read - Clinics (Completed)    Basic metabolic panel  (Ca, Cl, CO2, Creat, Gluc, K, Na, BUN)    Hepatic panel (Albumin, ALT, AST, Bili, Alk Phos, TP)    CBC with platelets and differential               This is also a face to face to document the need for repair of scooter and parts   Scooter is used  when prosthetic is not being used .    Uses daily   Had worn parts that are placing him at risk of injury and falling          Pathology of EGD- Ugalde's but no dysplasia    Pt notified of results.
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
## 2023-04-24 ENCOUNTER — TELEPHONE (OUTPATIENT)
Dept: FAMILY MEDICINE | Facility: CLINIC | Age: 68
End: 2023-04-24
Payer: COMMERCIAL

## 2023-04-24 NOTE — TELEPHONE ENCOUNTER
order has come in from [employer] [facility] for the provider to complete and sign for: [employer] orders        Who is the order/form from & contact information?  [employer] [facility]  [address]  [place], [place], [address]  Phone: [phone]  Fax: [phone]  This was faxed to [facility]    Please send encounter back to the team after the form has been completed.

## 2023-04-27 DIAGNOSIS — G89.4 CHRONIC PAIN SYNDROME: ICD-10-CM

## 2023-04-27 RX ORDER — OXYCODONE HYDROCHLORIDE 10 MG/1
10 TABLET ORAL EVERY 6 HOURS PRN
Qty: 92 TABLET | Refills: 0 | Status: SHIPPED | OUTPATIENT
Start: 2023-04-27 | End: 2023-05-30

## 2023-04-27 NOTE — TELEPHONE ENCOUNTER
Medication Question or Refill    Contacts       Type Contact Phone/Fax    04/27/2023 09:01 AM CDT Phone (Incoming) Constantino Taylor S (Self) [phone] (M)          What medication are you calling about (include dose and sig)?: oxyCODONE IR (ROXICODONE) 10 MG tablet    Preferred Pharmacy: [place] [address] [place] [facility] [place] [address] [address] [place] [address] [address] [facility] [address]  Phone: [phone] Fax: [phone]  Controlled Substance Agreement on file:   CSA -- Patient Level:     [Media Unavailable] Controlled Substance Agreement - Opioid - Scan on 5/31/2022 11:29 AM   [Media Unavailable] Controlled Substance Agreement - Opioid - Scan on 2/11/2021 12:41 PM       Who prescribed the medication?: Avery Duke  Do you need a refill? Yes  When did you use the medication last? yesterday  Patient offered an appointment? No  Do you have any questions or concerns?  No    Patient is currently out of medications. Please send over ASA  Could we send this information to you in [facility] or would you prefer to receive a phone call?:   Patient would prefer a phone call   Okay to leave a detailed message?: Yes at Cell number on file:    Telephone Information:   Mobile [phone]

## 2023-05-04 DIAGNOSIS — I63.19 CEREBRAL INFARCTION DUE TO EMBOLISM OF OTHER PRECEREBRAL ARTERY (H): ICD-10-CM

## 2023-05-04 RX ORDER — ATORVASTATIN CALCIUM 40 MG/1
TABLET, FILM COATED ORAL
Qty: 90 TABLET | Refills: 2 | Status: SHIPPED | OUTPATIENT
Start: 2023-05-04 | End: 2023-07-27

## 2023-05-30 ENCOUNTER — TELEPHONE (OUTPATIENT)
Dept: FAMILY MEDICINE | Facility: CLINIC | Age: 68
End: 2023-05-30
Payer: COMMERCIAL

## 2023-05-30 DIAGNOSIS — G89.4 CHRONIC PAIN SYNDROME: ICD-10-CM

## 2023-05-30 RX ORDER — OXYCODONE HYDROCHLORIDE 10 MG/1
10 TABLET ORAL EVERY 6 HOURS PRN
Qty: 92 TABLET | Refills: 0 | Status: SHIPPED | OUTPATIENT
Start: 2023-05-30 | End: 2023-06-27

## 2023-05-30 NOTE — TELEPHONE ENCOUNTER
Patient is out of oxycodone and requesting a refill    Please call him back once addressed    Cheri Watson RN  [facility]

## 2023-06-06 ENCOUNTER — TELEPHONE (OUTPATIENT)
Dept: FAMILY MEDICINE | Facility: CLINIC | Age: 68
End: 2023-06-06
Payer: COMMERCIAL

## 2023-06-06 NOTE — TELEPHONE ENCOUNTER
Got patient scheduled for this Thursday 6/8/23 at 9:40 with Dr. Duke.     Future Appointments    Encounter Information    Provider Department Center   6/8/2023 10:00 AM (Arrive by 9:40 AM) Avery Duke MD [facility]     Lyla Zheng MA

## 2023-06-06 NOTE — TELEPHONE ENCOUNTER
Reason for Call:  Appointment Request    Patient requesting this type of appt: Chronic Diease Management/Medication/Follow-Up    Requested provider: Avery Duke    Reason patient unable to be scheduled: Not within requested timeframe    When does patient want to be seen/preferred time: 1-2 days    Comments: Pt needs an appt for med check; blood thinner before going to [facility] for cancer.    Could we send this information to you in [employer] or would you prefer to receive a phone call?:   Patient would prefer a phone call   Okay to leave a detailed message?: No at Cell number on file:    Telephone Information:   Mobile [phone]       Call taken on 6/6/2023 at 11:26 AM by Ericka Cohn

## 2023-06-08 ENCOUNTER — OFFICE VISIT (OUTPATIENT)
Dept: FAMILY MEDICINE | Facility: CLINIC | Age: 68
End: 2023-06-08
Payer: COMMERCIAL

## 2023-06-08 VITALS
WEIGHT: 197 LBS | HEART RATE: 106 BPM | BODY MASS INDEX: 25.28 KG/M2 | OXYGEN SATURATION: 99 % | HEIGHT: 74 IN | TEMPERATURE: 98.6 F | RESPIRATION RATE: 18 BRPM | SYSTOLIC BLOOD PRESSURE: 154 MMHG | DIASTOLIC BLOOD PRESSURE: 89 MMHG

## 2023-06-08 DIAGNOSIS — I42.0 NONISCHEMIC DILATED CARDIOMYOPATHY (H): ICD-10-CM

## 2023-06-08 DIAGNOSIS — B18.2 CHRONIC HEPATITIS C WITHOUT HEPATIC COMA (H): ICD-10-CM

## 2023-06-08 DIAGNOSIS — I73.9 PAD (PERIPHERAL ARTERY DISEASE) (H): ICD-10-CM

## 2023-06-08 DIAGNOSIS — M86.662: ICD-10-CM

## 2023-06-08 DIAGNOSIS — I48.0 PAROXYSMAL ATRIAL FIBRILLATION (H): ICD-10-CM

## 2023-06-08 DIAGNOSIS — C61 PROSTATE CANCER (H): ICD-10-CM

## 2023-06-08 PROCEDURE — 99214 OFFICE O/P EST MOD 30 MIN: CPT | Performed by: INTERNAL MEDICINE

## 2023-06-08 ASSESSMENT — ANXIETY QUESTIONNAIRES
7. FEELING AFRAID AS IF SOMETHING AWFUL MIGHT HAPPEN: NOT AT ALL
GAD7 TOTAL SCORE: 5
GAD7 TOTAL SCORE: 5
3. WORRYING TOO MUCH ABOUT DIFFERENT THINGS: SEVERAL DAYS
4. TROUBLE RELAXING: SEVERAL DAYS
7. FEELING AFRAID AS IF SOMETHING AWFUL MIGHT HAPPEN: NOT AT ALL
2. NOT BEING ABLE TO STOP OR CONTROL WORRYING: NOT AT ALL
5. BEING SO RESTLESS THAT IT IS HARD TO SIT STILL: SEVERAL DAYS
1. FEELING NERVOUS, ANXIOUS, OR ON EDGE: SEVERAL DAYS
GAD7 TOTAL SCORE: 5
IF YOU CHECKED OFF ANY PROBLEMS ON THIS QUESTIONNAIRE, HOW DIFFICULT HAVE THESE PROBLEMS MADE IT FOR YOU TO DO YOUR WORK, TAKE CARE OF THINGS AT HOME, OR GET ALONG WITH OTHER PEOPLE: NOT DIFFICULT AT ALL
6. BECOMING EASILY ANNOYED OR IRRITABLE: SEVERAL DAYS
8. IF YOU CHECKED OFF ANY PROBLEMS, HOW DIFFICULT HAVE THESE MADE IT FOR YOU TO DO YOUR WORK, TAKE CARE OF THINGS AT HOME, OR GET ALONG WITH OTHER PEOPLE?: NOT DIFFICULT AT ALL

## 2023-06-08 NOTE — PROGRESS NOTES
"  Assessment & Plan   Problem List Items Addressed This Visit     Atrial fibrillation (H)    Chronic hepatitis C without hepatic coma (H)    Nonischemic dilated cardiomyopathy (HCC)    PAD (peripheral artery disease) (H)    Prostate cancer (H)   Other Visit Diagnoses     Chronic osteomyelitis of left lower leg (H)             I reviewed and summarized the instructions coming from the specialists at [place] in regards to his upcoming testing for prostate Cancer .  If the tumor is localized on the upcoming imaging. It looks like a radio active seed therapy is recommended     If not, may need systemic theray although has failed previous treatments              BMI:   Estimated body mass index is 25.29 kg/m  as calculated from the following:    Height as of this encounter: 1.88 m (6' 2\").    Weight as of this encounter: 89.4 kg (197 lb).   Weight management plan: Discussed healthy diet and exercise guidelines    Work on weight loss  Regular exercise    Avery Duke MD  [facility] RACHANA Meeks is a 67 year old, presenting for the following health issues:  Medication Follow-up        6/8/2023     9:48 AM   Additional Questions   Roomed by Joan     History of Present Illness       Reason for visit:  Medication Recheck    He eats 2-3 servings of fruits and vegetables daily.He consumes 1 sweetened beverage(s) daily.He exercises with enough effort to increase his heart rate 9 or less minutes per day.  He exercises with enough effort to increase his heart rate 3 or less days per week.   He is taking medications regularly.  Today's MAGDALENA-7 Score: 5     Left side hurting   meication          Review of Systems   Constitutional, HEENT, cardiovascular, pulmonary, gi and gu systems are negative, except as otherwise noted.      Objective    BP (!) 154/89 (BP Location: Left arm, Patient Position: Chair, Cuff Size: Adult Regular)   Pulse 106   Temp 98.6  F (37  C)   Resp 18   Ht 1.88 m (6' 2\")   Wt " 89.4 kg (197 lb)   SpO2 99%   BMI 25.29 kg/m    Body mass index is 25.29 kg/m .  Physical Exam   GENERAL: healthy, alert and no distress  EYES: Eyes grossly normal to inspection, PERRL and conjunctivae and sclerae normal  HENT: ear canals and TM's normal, nose and mouth without ulcers or lesions  NECK: no adenopathy, no asymmetry, masses, or scars and thyroid normal to palpation  RESP: lungs clear to auscultation - no rales, rhonchi or wheezes  CV: regular rate and rhythm, normal S1 S2, no S3 or S4, no murmur, click or rub, no peripheral edema and peripheral pulses strong  ABDOMEN: soft, nontender, no hepatosplenomegaly, no masses and bowel sounds normal  MS: bka [facility] health y      No results found for any visits on 06/08/23.

## 2023-06-08 NOTE — PATIENT INSTRUCTIONS
Stop aspirin after 6 /11/2023  Stop xarelto on 6/14/2023  Antibiotics start 6/13/2023 taking twice a day and then take an extra dose 1 hour before the biopsy

## 2023-06-27 DIAGNOSIS — G89.4 CHRONIC PAIN SYNDROME: ICD-10-CM

## 2023-06-27 RX ORDER — OXYCODONE HYDROCHLORIDE 10 MG/1
10 TABLET ORAL EVERY 6 HOURS PRN
Qty: 92 TABLET | Refills: 0 | Status: SHIPPED | OUTPATIENT
Start: 2023-06-27 | End: 2023-07-27

## 2023-06-27 NOTE — TELEPHONE ENCOUNTER
Medication Question or Refill    Contacts       Type Contact Phone/Fax    06/27/2023 07:26 AM CDT Phone (Incoming) Constantino Taylor (Self) [phone] (M)          What medication are you calling about (include dose and sig)?:  oxyCODONE IR (ROXICODONE) 10 MG tablet  Take 1 tablet (10 mg) by mouth every 6 hours as needed for severe pain - Oral      Preferred Pharmacy:      [place] [address] [place] [facility] [place] [address] [address] [place] [address] [facility] [address]  Phone: [phone] Fax: [phone]        Controlled Substance Agreement on file:   CSA -- Patient Level:     [Media Unavailable] Controlled Substance Agreement - Opioid - Scan on 5/31/2022 11:29 AM   [Media Unavailable] Controlled Substance Agreement - Opioid - Scan on 2/11/2021 12:41 PM       Who prescribed the medication?: Dr Duke    Do you need a refill? Yes    When did you use the medication last? 2 days prior    Patient offered an appointment? Yes: Patient was just in clinic on 06/08/2023    Do you have any questions or concerns?  Yes: Patient is out of medication       Could we send this information to you in [facility] or would you prefer to receive a phone call?:   No preference   Okay to leave a detailed message?: Yes at Cell number on file:    Telephone Information:   Mobile [phone]

## 2023-07-27 DIAGNOSIS — I10 HYPERTENSION GOAL BP (BLOOD PRESSURE) < 140/90: ICD-10-CM

## 2023-07-27 DIAGNOSIS — I48.0 PAROXYSMAL ATRIAL FIBRILLATION (H): ICD-10-CM

## 2023-07-27 DIAGNOSIS — I63.19 CEREBRAL INFARCTION DUE TO EMBOLISM OF OTHER PRECEREBRAL ARTERY (H): ICD-10-CM

## 2023-07-27 DIAGNOSIS — G89.4 CHRONIC PAIN SYNDROME: ICD-10-CM

## 2023-07-27 DIAGNOSIS — I48.20 CHRONIC ATRIAL FIBRILLATION (H): ICD-10-CM

## 2023-07-27 RX ORDER — OXYCODONE HYDROCHLORIDE 10 MG/1
10 TABLET ORAL EVERY 6 HOURS PRN
Qty: 92 TABLET | Refills: 0 | Status: SHIPPED | OUTPATIENT
Start: 2023-07-27 | End: 2023-08-28

## 2023-07-27 RX ORDER — CARVEDILOL 25 MG/1
25 TABLET ORAL 2 TIMES DAILY WITH MEALS
Qty: 186 TABLET | Refills: 4 | Status: SHIPPED | OUTPATIENT
Start: 2023-07-27 | End: 2024-08-28

## 2023-07-27 RX ORDER — ATORVASTATIN CALCIUM 40 MG/1
40 TABLET, FILM COATED ORAL AT BEDTIME
Qty: 90 TABLET | Refills: 4 | Status: SHIPPED | OUTPATIENT
Start: 2023-07-27 | End: 2024-08-19

## 2023-07-27 RX ORDER — LISINOPRIL 40 MG/1
40 TABLET ORAL DAILY
Qty: 90 TABLET | Refills: 4 | Status: SHIPPED | OUTPATIENT
Start: 2023-07-27 | End: 2024-09-26

## 2023-07-27 NOTE — TELEPHONE ENCOUNTER
Medication Question or Refill  Contacts         Type Contact Phone/Fax    07/27/2023 08:03 AM CDT Phone (Incoming) Constantino Taylor (Self) [phone] (M)        What medication are you calling about (include dose and sig)?: oxyCODONE IR (ROXICODONE) 10 MG tablet     Preferred Pharmacy:  [place] [address] [place] [facility] [place] [address] [place] [address]  Phone: [phone] Fax: [phone]  Controlled Substance Agreement on file:   CSA -- Patient Level:     [Media Unavailable] Controlled Substance Agreement - Opioid - Scan on 5/31/2022 11:29 AM   [Media Unavailable] Controlled Substance Agreement - Opioid - Scan on 2/11/2021 12:41 PM     Who prescribed the medication?: Avery Duke   Do you need a refill? Yes  Do you have any questions or concerns?  Yes: He is out of medications  Could we send this information to you in [facility] or would you prefer to receive a phone call?:   Patient would prefer a phone call   Okay to leave a detailed message?: Yes at Cell number on file:    Telephone Information:   Mobile [phone]

## 2023-08-28 DIAGNOSIS — G89.4 CHRONIC PAIN SYNDROME: ICD-10-CM

## 2023-08-28 RX ORDER — OXYCODONE HYDROCHLORIDE 10 MG/1
10 TABLET ORAL EVERY 6 HOURS PRN
Qty: 92 TABLET | Refills: 0 | Status: SHIPPED | OUTPATIENT
Start: 2023-08-28 | End: 2023-09-26

## 2023-08-28 NOTE — TELEPHONE ENCOUNTER
Medication Question or Refill    Contacts         Type Contact Phone/Fax    08/28/2023 07:10 AM CDT Phone (Incoming) Constantino Taylor S (Self) [phone] (M)        What medication are you calling about (include dose and sig)?: oxyCODONE IR (ROXICODONE) 10 MG tablet     Preferred Pharmacy:   [place] [address] [place] [facility] [place] [address] [place] [address] [address] [facility] [address]  Phone: [phone] Fax: [phone]    Controlled Substance Agreement on file:   CSA -- Patient Level:     [Media Unavailable] Controlled Substance Agreement - Opioid - Scan on 5/31/2022 11:29 AM   [Media Unavailable] Controlled Substance Agreement - Opioid - Scan on 2/11/2021 12:41 PM       Who prescribed the medication?: Avery Duke  Do you need a refill? Yes  Do you have any questions or concerns?  Yes: Patient had surgery at [facility] on Friday 8/25/2023  He is now cancer free a 2nd time.   He is also out of medications.  Could we send this information to you in [place] or would you prefer to receive a phone call?:   Patient would prefer a phone call   Okay to leave a detailed message?: Yes at Cell number on file:    Telephone Information:   Mobile [phone]

## 2023-08-29 ENCOUNTER — OFFICE VISIT (OUTPATIENT)
Dept: FAMILY MEDICINE | Facility: CLINIC | Age: 68
End: 2023-08-29
Payer: COMMERCIAL

## 2023-08-29 VITALS
DIASTOLIC BLOOD PRESSURE: 93 MMHG | OXYGEN SATURATION: 99 % | RESPIRATION RATE: 18 BRPM | BODY MASS INDEX: 24.39 KG/M2 | TEMPERATURE: 97.9 F | HEART RATE: 96 BPM | SYSTOLIC BLOOD PRESSURE: 160 MMHG | WEIGHT: 190 LBS

## 2023-08-29 DIAGNOSIS — R31.1 BENIGN ESSENTIAL MICROSCOPIC HEMATURIA: Primary | ICD-10-CM

## 2023-08-29 DIAGNOSIS — C61 PROSTATE CANCER (H): ICD-10-CM

## 2023-08-29 DIAGNOSIS — Z23 NEED FOR PROPHYLACTIC VACCINATION AGAINST HEPATITIS B VIRUS: ICD-10-CM

## 2023-08-29 DIAGNOSIS — Z23 NEED FOR PROPHYLACTIC VACCINATION AGAINST HEPATITIS A: ICD-10-CM

## 2023-08-29 LAB
ALBUMIN UR-MCNC: NEGATIVE MG/DL
APPEARANCE UR: CLEAR
BILIRUB UR QL STRIP: NEGATIVE
COLOR UR AUTO: YELLOW
GLUCOSE UR STRIP-MCNC: NEGATIVE MG/DL
HGB UR QL STRIP: ABNORMAL
KETONES UR STRIP-MCNC: NEGATIVE MG/DL
LEUKOCYTE ESTERASE UR QL STRIP: NEGATIVE
NITRATE UR QL: NEGATIVE
PH UR STRIP: 5.5 [PH] (ref 5–7)
RBC #/AREA URNS AUTO: ABNORMAL /HPF
SP GR UR STRIP: >=1.03 (ref 1–1.03)
SQUAMOUS #/AREA URNS AUTO: ABNORMAL /LPF
UROBILINOGEN UR STRIP-ACNC: 0.2 E.U./DL
WBC #/AREA URNS AUTO: ABNORMAL /HPF

## 2023-08-29 PROCEDURE — 81001 URINALYSIS AUTO W/SCOPE: CPT | Performed by: INTERNAL MEDICINE

## 2023-08-29 PROCEDURE — 99214 OFFICE O/P EST MOD 30 MIN: CPT | Performed by: INTERNAL MEDICINE

## 2023-08-29 NOTE — PROGRESS NOTES
Assessment & Plan   Problem List Items Addressed This Visit       Prostate cancer (H)    Relevant Orders    UA Macroscopic with reflex to Microscopic and Culture - Lab Collect (Completed)    UA Microscopic with Reflex to Culture (Completed)     Other Visit Diagnoses       Benign essential microscopic hematuria    -  Primary    Relevant Orders    UA Macroscopic with reflex to Microscopic and Culture - Lab Collect (Completed)    UA Microscopic with Reflex to Culture (Completed)    Need for prophylactic vaccination against hepatitis A        Need for prophylactic vaccination against hepatitis B virus               Treat if signs of infection   However, hematuria may be related to the brachytherapy              Work on weight loss  Regular exercise    Avery Duke MD  [facility] RACHANA Meeks is a 67 year old, presenting for the following health issues:  RECHECK (Follow up from [facility])        8/29/2023    10:08 AM   Additional Questions   Roomed by Joan EDWARDS     Recheck/Post Op -- From [facility]  Bracytherapy - cancer free   5 hours both   Peeing a lot   Sometimes peeing   120/80    Got low the other day   150-160   140-150 sometimes 160   ---------------------------------            Review of Systems   Constitutional, HEENT, cardiovascular, pulmonary, gi and gu systems are negative, except as otherwise noted.      Objective    BP (!) 160/93 (BP Location: Right arm, Patient Position: Chair, Cuff Size: Adult Large)   Pulse 96   Temp 97.9  F (36.6  C)   Resp 18   Wt 86.2 kg (190 lb)   SpO2 99%   BMI 24.39 kg/m    Body mass index is 24.39 kg/m .  Physical Exam   GENERAL: healthy, alert and no distress  EYES: Eyes grossly normal to inspection, PERRL and conjunctivae and sclerae normal  HENT: ear canals and TM's normal, nose and mouth without ulcers or lesions  NECK: no adenopathy, no asymmetry, masses, or scars and thyroid normal to palpation  RESP: lungs clear to  auscultation - no rales, rhonchi or wheezes  CV: regular rate and rhythm, normal S1 S2, no S3 or S4, no murmur, click or rub, no peripheral edema and peripheral pulses strong  ABDOMEN: soft, nontender, no hepatosplenomegaly, no masses and bowel sounds normal  MS: no gross musculoskeletal defects noted, no edema  SKIN: no suspicious lesions or rashes  NEURO: Normal strength and tone, mentation intact and speech normal  BACK: no CVA tenderness, no paralumbar tenderness  PSYCH: mentation appears normal, affect normal/bright  LYMPH: no cervical, supraclavicular, axillary, or inguinal adenopathy    Results for orders placed or performed in visit on 08/29/23   UA Macroscopic with reflex to Microscopic and Culture - Lab Collect     Status: Abnormal    Specimen: Urine, Midstream   Result Value Ref Range    Color Urine Yellow Colorless, Straw, Light Yellow, Yellow    Appearance Urine Clear Clear    Glucose Urine Negative Negative mg/dL    Bilirubin Urine Negative Negative    Ketones Urine Negative Negative mg/dL    Specific Gravity Urine >=1.030 1.003 - 1.035    Blood Urine Large (A) Negative    pH Urine 5.5 5.0 - 7.0    Protein Albumin Urine Negative Negative mg/dL    Urobilinogen Urine 0.2 0.2, 1.0 E.U./dL    Nitrite Urine Negative Negative    Leukocyte Esterase Urine Negative Negative   UA Microscopic with Reflex to Culture     Status: Abnormal   Result Value Ref Range    RBC Urine  (A) 0-2 /HPF /HPF    WBC Urine 0-5 0-5 /HPF /HPF    Squamous Epithelials Urine Few (A) None Seen /LPF    Narrative    Urine Culture not indicated

## 2023-09-14 ENCOUNTER — MYC MEDICAL ADVICE (OUTPATIENT)
Dept: FAMILY MEDICINE | Facility: CLINIC | Age: 68
End: 2023-09-14
Payer: COMMERCIAL

## 2023-09-14 NOTE — TELEPHONE ENCOUNTER
Patient Quality Outreach    Patient is due for the following:   Hypertension -  Hypertension follow-up visit      Topic Date Due    Hepatitis A Vaccine (3 of 3 - Hep A Twinrix risk 3-dose series) 07/01/2013    Hepatitis B Vaccine (3 of 3 - Hep B Twinrix risk 3-dose series) 07/01/2013    COVID-19 Vaccine (6 - Moderna series) 04/12/2023    Flu Vaccine (1) 09/01/2023     Chronic Opioid Use -  Treatment Agreement (CSA)    Next Steps:   Schedule a office visit for HTN and Pain Management    Type of outreach:    Sent myThings message.      Questions for provider review:    None           Rosa Conti CMA  Chart routed to Care Team.

## 2023-09-25 ENCOUNTER — TELEPHONE (OUTPATIENT)
Dept: FAMILY MEDICINE | Facility: CLINIC | Age: 68
End: 2023-09-25
Payer: COMMERCIAL

## 2023-09-25 NOTE — TELEPHONE ENCOUNTER
Spoke with patient and informed him Dr. Duke is out of the office for 2 weeks and his next opening isn't until November. He wants to speak with his team to see if he can be fit in but does not have the procedure scheduled yet.

## 2023-09-25 NOTE — TELEPHONE ENCOUNTER
Reason for Call:  Appointment Request    Patient requesting this type of appt: Pre-op    Requested provider: Avery Duke    Reason patient unable to be scheduled: Not within requested timeframe    When does patient want to be seen/preferred time: 1-2 weeks    Comments: pt. Needs a pre-op for a dental procedure.    Could we send this information to you in [employer] or would you prefer to receive a phone call?:   Patient would prefer a phone call   Okay to leave a detailed message?: Yes at Cell number on file:    Telephone Information:   Mobile [phone]       Call taken on 9/25/2023 at 1:22 PM by Joselin Sesay

## 2023-09-26 ENCOUNTER — TELEPHONE (OUTPATIENT)
Dept: FAMILY MEDICINE | Facility: CLINIC | Age: 68
End: 2023-09-26
Payer: COMMERCIAL

## 2023-09-26 DIAGNOSIS — G89.4 CHRONIC PAIN SYNDROME: ICD-10-CM

## 2023-09-26 RX ORDER — OXYCODONE HYDROCHLORIDE 10 MG/1
10 TABLET ORAL EVERY 6 HOURS PRN
Qty: 92 TABLET | Refills: 0 | Status: SHIPPED | OUTPATIENT
Start: 2023-09-26 | End: 2023-10-18

## 2023-09-26 NOTE — TELEPHONE ENCOUNTER
Patient Quality Outreach    Patient is due for the following:   Hypertension -  Hypertension follow-up visit  Chronic Opioid Use -  Treatment Agreement (CSA)    Next Steps:   Patient was scheduled for HTN and Pain Management     Type of outreach:    Phone, spoke to patient/parent. Schedule pt for 10/18      Questions for provider review:    Yadiel Conti CMA  Chart routed to Care Team.

## 2023-09-26 NOTE — TELEPHONE ENCOUNTER
Spoke with pt got him schedule for 10/18 to discuss surgery and medications changes for surgery.    Rosa Conti MA

## 2023-10-18 ENCOUNTER — OFFICE VISIT (OUTPATIENT)
Dept: FAMILY MEDICINE | Facility: CLINIC | Age: 68
End: 2023-10-18
Payer: COMMERCIAL

## 2023-10-18 VITALS
WEIGHT: 200 LBS | HEART RATE: 104 BPM | TEMPERATURE: 97.6 F | RESPIRATION RATE: 18 BRPM | OXYGEN SATURATION: 100 % | SYSTOLIC BLOOD PRESSURE: 138 MMHG | DIASTOLIC BLOOD PRESSURE: 84 MMHG | BODY MASS INDEX: 25.67 KG/M2 | HEIGHT: 74 IN

## 2023-10-18 DIAGNOSIS — Z23 NEED FOR PROPHYLACTIC VACCINATION AGAINST HEPATITIS A: ICD-10-CM

## 2023-10-18 DIAGNOSIS — Z23 NEED FOR PROPHYLACTIC VACCINATION AGAINST HEPATITIS A AND HEPATITIS B IN ADULT: ICD-10-CM

## 2023-10-18 DIAGNOSIS — N18.31 STAGE 3A CHRONIC KIDNEY DISEASE (H): Primary | ICD-10-CM

## 2023-10-18 DIAGNOSIS — G89.4 CHRONIC PAIN SYNDROME: ICD-10-CM

## 2023-10-18 DIAGNOSIS — Z23 NEED FOR PROPHYLACTIC VACCINATION AGAINST HEPATITIS B VIRUS: ICD-10-CM

## 2023-10-18 DIAGNOSIS — Z01.818 PREOP GENERAL PHYSICAL EXAM: ICD-10-CM

## 2023-10-18 DIAGNOSIS — Z29.11 NEED FOR VACCINATION AGAINST RESPIRATORY SYNCYTIAL VIRUS: ICD-10-CM

## 2023-10-18 LAB
BASO+EOS+MONOS # BLD AUTO: ABNORMAL 10*3/UL
BASO+EOS+MONOS NFR BLD AUTO: ABNORMAL %
BASOPHILS # BLD AUTO: 0 10E3/UL (ref 0–0.2)
BASOPHILS NFR BLD AUTO: 1 %
EOSINOPHIL # BLD AUTO: 0.3 10E3/UL (ref 0–0.7)
EOSINOPHIL NFR BLD AUTO: 5 %
ERYTHROCYTE [DISTWIDTH] IN BLOOD BY AUTOMATED COUNT: 13.3 % (ref 10–15)
HCT VFR BLD AUTO: 36.8 % (ref 40–53)
HGB BLD-MCNC: 12.5 G/DL (ref 13.3–17.7)
IMM GRANULOCYTES # BLD: 0 10E3/UL
IMM GRANULOCYTES NFR BLD: 0 %
LYMPHOCYTES # BLD AUTO: 2 10E3/UL (ref 0.8–5.3)
LYMPHOCYTES NFR BLD AUTO: 34 %
MCH RBC QN AUTO: 31.1 PG (ref 26.5–33)
MCHC RBC AUTO-ENTMCNC: 34 G/DL (ref 31.5–36.5)
MCV RBC AUTO: 92 FL (ref 78–100)
MONOCYTES # BLD AUTO: 0.6 10E3/UL (ref 0–1.3)
MONOCYTES NFR BLD AUTO: 10 %
NEUTROPHILS # BLD AUTO: 3.1 10E3/UL (ref 1.6–8.3)
NEUTROPHILS NFR BLD AUTO: 52 %
PLATELET # BLD AUTO: 217 10E3/UL (ref 150–450)
RBC # BLD AUTO: 4.02 10E6/UL (ref 4.4–5.9)
WBC # BLD AUTO: 6.1 10E3/UL (ref 4–11)

## 2023-10-18 PROCEDURE — 90480 ADMN SARSCOV2 VAC 1/ONLY CMP: CPT | Performed by: INTERNAL MEDICINE

## 2023-10-18 PROCEDURE — 90662 IIV NO PRSV INCREASED AG IM: CPT | Performed by: INTERNAL MEDICINE

## 2023-10-18 PROCEDURE — 80053 COMPREHEN METABOLIC PANEL: CPT | Performed by: INTERNAL MEDICINE

## 2023-10-18 PROCEDURE — G0008 ADMIN INFLUENZA VIRUS VAC: HCPCS | Performed by: INTERNAL MEDICINE

## 2023-10-18 PROCEDURE — 85025 COMPLETE CBC W/AUTO DIFF WBC: CPT | Performed by: INTERNAL MEDICINE

## 2023-10-18 PROCEDURE — 91320 SARSCV2 VAC 30MCG TRS-SUC IM: CPT | Performed by: INTERNAL MEDICINE

## 2023-10-18 PROCEDURE — 99214 OFFICE O/P EST MOD 30 MIN: CPT | Mod: 25 | Performed by: INTERNAL MEDICINE

## 2023-10-18 PROCEDURE — 36415 COLL VENOUS BLD VENIPUNCTURE: CPT | Performed by: INTERNAL MEDICINE

## 2023-10-18 RX ORDER — OXYCODONE HYDROCHLORIDE 10 MG/1
10 TABLET ORAL EVERY 6 HOURS PRN
Qty: 92 TABLET | Refills: 0 | Status: SHIPPED | OUTPATIENT
Start: 2023-10-18 | End: 2024-03-28

## 2023-10-18 RX ORDER — RESPIRATORY SYNCYTIAL VIRUS VACCINE 120MCG/0.5
0.5 KIT INTRAMUSCULAR ONCE
Qty: 1 EACH | Refills: 0 | Status: SHIPPED | OUTPATIENT
Start: 2023-10-18 | End: 2023-10-18

## 2023-10-18 NOTE — PROGRESS NOTES
Assessment & Plan   Problem List Items Addressed This Visit       Chronic pain syndrome    Relevant Medications    oxyCODONE IR (ROXICODONE) 10 MG tablet    CKD (chronic kidney disease) stage 3, GFR 30-59 ml/min (H) - Primary    Relevant Orders    CBC with platelets and differential (Completed)    Comprehensive metabolic panel (BMP + Alb, Alk Phos, ALT, AST, Total. Bili, TP) (Completed)     Other Visit Diagnoses       Need for prophylactic vaccination against hepatitis A        Need for prophylactic vaccination against hepatitis B virus        Need for vaccination against respiratory syncytial virus        Preop general physical exam        Need for prophylactic vaccination against hepatitis A and hepatitis B in adult                          Work on weight loss  Regular exercise    Avery Duke MD  [facility] RACHANA Meeks is a 67 year old, presenting for the following health issues:  Medication Follow-up        10/18/2023     3:36 PM   Additional Questions   Roomed by Joan EDWARDS       Pain History:  When did you first notice your pain? Years  Have you seen this provider for your pain in the past? Yes   Where in your body do you have pain? Leg and back  Are you seeing anyone else for your pain? No    Xarelto           4/15/2019    11:02 AM 5/31/2022     7:00 AM 4/17/2023    12:56 PM   PHQ-9 SCORE   PHQ-9 Total Score 5 0 3           2/13/2023     8:41 AM 4/17/2023    12:56 PM 6/8/2023     9:49 AM   MAGDALENA-7 SCORE   Total Score 2 (minimal anxiety)  5 (mild anxiety)   Total Score 2 0 5               4/15/2019    11:02 AM 5/31/2022     7:00 AM 4/17/2023    12:56 PM   PHQ-9 SCORE   PHQ-9 Total Score 5 0 3           2/13/2023     8:41 AM 4/17/2023    12:56 PM 6/8/2023     9:49 AM   MAGDALENA-7 SCORE   Total Score 2 (minimal anxiety)  5 (mild anxiety)   Total Score 2 0 5       Chronic Pain Follow Up:    Location of pain: legs, low back   Analgesia/pain control:    - Recent changes:  none     "- Overall control: Tolerable with discomfort    - Current treatments: none   Adherence:     - Do you ever take more pain medicine than prescribed? No    - When did you take your last dose of pain medicine?  today   Adverse effects: No   PDMP Review         Value Time User    State PDMP site checked  Yes 9/26/2023  8:56 AM Cass Willoughby PA-C          Last CSA Agreement:   CSA -- Patient Level:     [Media Unavailable] Controlled Substance Agreement - Opioid - Scan on 10/18/2023  4:43 PM   [Media Unavailable] Controlled Substance Agreement - Opioid - Scan on 5/31/2022 11:29 AM   [Media Unavailable] Controlled Substance Agreement - Opioid - Scan on 2/11/2021 12:41 PM       Last UDS: 2/13/2023      How many servings of fruits and vegetables do you eat daily?  0-1  On average, how many sweetened beverages do you drink each day (Examples: soda, juice, sweet tea, etc.  Do NOT count diet or artificially sweetened beverages)?   0  How many days per week do you exercise enough to make your heart beat faster? 3 or less  How many minutes a day do you exercise enough to make your heart beat faster? 9 or less  How many days per week do you miss taking your medication? 0        Review of Systems   Constitutional, HEENT, cardiovascular, pulmonary, gi and gu systems are negative, except as otherwise noted.      Objective    /84   Pulse 104   Temp 97.6  F (36.4  C)   Resp 18   Ht 1.88 m (6' 2\")   Wt 90.7 kg (200 lb)   SpO2 100%   BMI 25.68 kg/m    Body mass index is 25.68 kg/m .  Physical Exam   GENERAL: healthy, alert and no distress  EYES: Eyes grossly normal to inspection, PERRL and conjunctivae and sclerae normal  HENT: ear canals and TM's normal, nose and mouth without ulcers or lesions  NECK: no adenopathy, no asymmetry, masses, or scars and thyroid normal to palpation  RESP: lungs clear to auscultation - no rales, rhonchi or wheezes  CV: regular rate and rhythm, normal S1 S2, no S3 or S4, no murmur, click or rub, " no peripheral edema and peripheral pulses strong  ABDOMEN: soft, nontender, no hepatosplenomegaly, no masses and bowel sounds normal  MS: left stump intact       Results for orders placed or performed in visit on 10/18/23   Comprehensive metabolic panel (BMP + Alb, Alk Phos, ALT, AST, Total. Bili, TP)     Status: Abnormal   Result Value Ref Range    Sodium 143 135 - 145 mmol/L    Potassium 4.4 3.4 - 5.3 mmol/L    Carbon Dioxide (CO2) 26 22 - 29 mmol/L    Anion Gap 10 7 - 15 mmol/L    Urea Nitrogen 31.3 (H) 8.0 - 23.0 mg/dL    Creatinine 1.37 (H) 0.67 - 1.17 mg/dL    GFR Estimate 57 (L) >60 mL/min/1.73m2    Calcium 10.0 8.8 - 10.2 mg/dL    Chloride 107 98 - 107 mmol/L    Glucose 84 70 - 99 mg/dL    Alkaline Phosphatase 102 40 - 129 U/L    AST 24 0 - 45 U/L    ALT 19 0 - 70 U/L    Protein Total 7.9 6.4 - 8.3 g/dL    Albumin 4.6 3.5 - 5.2 g/dL    Bilirubin Total 0.4 <=1.2 mg/dL   CBC with platelets and differential     Status: Abnormal   Result Value Ref Range    WBC Count 6.1 4.0 - 11.0 10e3/uL    RBC Count 4.02 (L) 4.40 - 5.90 10e6/uL    Hemoglobin 12.5 (L) 13.3 - 17.7 g/dL    Hematocrit 36.8 (L) 40.0 - 53.0 %    MCV 92 78 - 100 fL    MCH 31.1 26.5 - 33.0 pg    MCHC 34.0 31.5 - 36.5 g/dL    RDW 13.3 10.0 - 15.0 %    Platelet Count 217 150 - 450 10e3/uL    % Neutrophils 52 %    % Lymphocytes 34 %    % Monocytes 10 %    Mids % (Monos, Eos, Basos)      % Eosinophils 5 %    % Basophils 1 %    % Immature Granulocytes 0 %    Absolute Neutrophils 3.1 1.6 - 8.3 10e3/uL    Absolute Lymphocytes 2.0 0.8 - 5.3 10e3/uL    Absolute Monocytes 0.6 0.0 - 1.3 10e3/uL    Mids Abs (Monos, Eos, Basos)      Absolute Eosinophils 0.3 0.0 - 0.7 10e3/uL    Absolute Basophils 0.0 0.0 - 0.2 10e3/uL    Absolute Immature Granulocytes 0.0 <=0.4 10e3/uL   CBC with platelets and differential     Status: Abnormal    Narrative    The following orders were created for panel order CBC with platelets and differential.  Procedure                                Abnormality         Status                     ---------                               -----------         ------                     CBC with platelets and d...[[id number]]  Abnormal            Final result                 Please view results for these tests on the individual orders.

## 2023-10-18 NOTE — LETTER
Opioid / Opioid Plus Controlled Substance Agreement    This is an agreement between you and your provider about the safe and appropriate use of controlled substance/opioids prescribed by your care team. Controlled substances are medicines that can cause physical and mental dependence (abuse).    There are strict laws about having and using these medicines. We here at [facility] are committing to working with you in your efforts to get better. To support you in this work, we ll help you schedule regular office appointments for medicine refills. If we must cancel or change your appointment for any reason, we ll make sure you have enough medicine to last until your next appointment.     As a Provider, I will:  Listen carefully to your concerns and treat you with respect.   Recommend a treatment plan that I believe is in your best interest. This plan may involve therapies other than opioid pain medication.   Talk with you often about the possible benefits, and the risk of harm of any medicine that we prescribe for you.   Provide a plan on how to taper (discontinue or go off) using this medicine if the decision is made to stop its use.    As a Patient, I understand that opioid(s):   Are a controlled substance prescribed by my care team to help me function or work and manage my condition(s).   Are strong medicines and can cause serious side effects such as:  Drowsiness, which can seriously affect my driving ability  A lower breathing rate, enough to cause death  Harm to my thinking ability   Depression   Abuse of and addiction to this medicine  Need to be taken exactly as prescribed. Combining opioids with certain medicines or chemicals (such as illegal drugs, sedatives, sleeping pills, and benzodiazepines) can be dangerous or even fatal. If I stop opioids suddenly, I may have severe withdrawal symptoms.  Do not work for all types of pain nor for all patients. If they re not helpful, I may be asked to stop  them.        The risks, benefits and side effects of these medicine(s) were explained to me. I agree that:  I will take part in other treatments as advised by my care team. This may be psychiatry or counseling, physical therapy, behavioral therapy, group treatment or a referral to a specialist.     I will keep all my appointments. I understand that this is part of the monitoring of opioids. My care team may require an office visit for EVERY opioid/controlled substance refill. If I miss appointments or don t follow instructions, my care team may stop my medicine.    I will take my medicines as prescribed. I will not change the dose or schedule unless my care team tells me to. There will be no refills if I run out early.     I may be asked to come to the clinic and complete a urine drug test or complete a pill count at any time. If I don t give a urine sample or participate in a pill count, the care team may stop my medicine.    I will only receive prescriptions from this clinic for chronic pain. If I am treated by another provider for acute pain issues, I will tell them that I am taking opioid pain medication for chronic pain and that I have a treatment agreement with this provider. I will inform my [facility] care team within one business day if I am given a prescription for any pain medication by another healthcare provider. My [facility] care team can contact other providers and pharmacists about my use of any medicines.    It is up to me to make sure that I don t run out of my medicines on weekends or holidays. If my care team is willing to refill my opioid prescription without a visit, I must request refills only during office hours. Refills may take up to 3 business days to process. I will use one pharmacy to fill all my opioid and other controlled substance prescriptions. I will notify the clinic about any changes to my insurance or medication availability.    I am responsible for my  prescriptions. If the medicine/prescription is lost, stolen or destroyed, it will not be replaced. I also agree not to share controlled substance medicines with anyone.    I am aware I should not use any illegal or recreational drugs. I agree not to drink alcohol unless my care team says I can.       If I enroll in the [place] [facility] program, I will tell my care team prior to my next refill.     I will tell my care team right away if I become pregnant, have a new medical problem treated outside of my regular clinic, or have a change in my medications.    I understand that this medicine can affect my thinking, judgment and reaction time. Alcohol and drugs affect the brain and body, which can affect the safety of my driving. Being under the influence of alcohol or drugs can affect my decision-making, behaviors, personal safety, and the safety of others. Driving while impaired (DWI) can occur if a person is driving, operating, or in physical control of a car, motorcycle, boat, snowmobile, ATV, motorbike, off-road vehicle, or any other motor vehicle ([place] Statute 169A.20). I understand the risk if I choose to drive or operate any vehicle or machinery.    I understand that if I do not follow any of the conditions above, my prescriptions or treatment may be stopped or changed.          Opioids  What You Need to Know    What are opioids?   Opioids are pain medicines that must be prescribed by a doctor. They are also known as narcotics.     Examples are:   morphine (MS Contin, Josefina)  oxycodone (Oxycontin)  oxycodone and acetaminophen (Percocet)  hydrocodone and acetaminophen (Vicodin, Norco)   fentanyl patch (Duragesic)   hydromorphone (Dilaudid)   methadone  codeine (Tylenol #3)     What do opioids do well?   Opioids are best for severe short-term pain such as after a surgery or injury. They may work well for cancer pain. They may help some people with long-lasting (chronic) pain.     What do opioids NOT do  well?   Opioids never get rid of pain entirely, and they don t work well for most patients with chronic pain. Opioids don t reduce swelling, one of the causes of pain.                                    Other ways to manage chronic pain and improve function include:     Treat the health problem that may be causing pain  Anti-inflammation medicines, which reduce swelling and tenderness, such as ibuprofen (Advil, Motrin) or naproxen (Aleve)  Acetaminophen (Tylenol)  Antidepressants and anti-seizure medicines, especially for nerve pain  Topical treatments such as patches or creams  Injections or nerve blocks  Chiropractic or osteopathic treatment  Acupuncture, massage, deep breathing, meditation, visual imagery, aromatherapy  Use heat or ice at the pain site  Physical therapy   Exercise  Stop smoking  Take part in therapy       Risks and side effects     Talk to your doctor before you start or decide to keep taking opioids. Possible side effects include:    Lowering your breathing rate enough to cause death  Overdose, including death, especially if taking higher than prescribed doses  Worse depression symptoms; less pleasure in things you usually enjoy  Feeling tired or sluggish  Slower thoughts or cloudy thinking  Being more sensitive to pain over time; pain is harder to control  Trouble sleeping or restless sleep  Changes in hormone levels (for example, less testosterone)  Changes in sex drive or ability to have sex  Constipation  Unsafe driving  Itching and sweating  Dizziness  Nausea, throwing up and dry mouth    What else should I know about opioids?    Opioids may lead to dependence, tolerance, or addiction.    Dependence means that if you stop or reduce the medicine too quickly, you will have withdrawal symptoms. These include loose poop (diarrhea), jitters, flu-like symptoms, nervousness and tremors. Dependence is not the same as addiction.                     Tolerance means needing higher doses over time to  get the same effect. This may increase the chance of serious side effects.    Addiction is when people improperly use a substance that harms their body, their mind or their relations with others. Use of opiates can cause a relapse of addiction if you have a history of drug or alcohol abuse.    People who have used opioids for a long time may have a lower quality of life, worse depression, higher levels of pain and more visits to doctors.    You can overdose on opioids. Take these steps to lower your risk of overdose:    Recognize the signs:  Signs of overdose include decrease or loss of consciousness (blackout), slowed breathing, trouble waking up and blue lips. If someone is worried about overdose, they should call 911.    Talk to your doctor about Narcan (naloxone).   If you are at risk for overdose, you may be given a prescription for Narcan. This medicine very quickly reverses the effects of opioids.   If you overdose, a friend or family member can give you Narcan while waiting for the ambulance. They need to know the signs of overdose and how to give Narcan.     Don't use alcohol or street drugs.   Taking them with opioids can cause death.    Do not take any of these medicines unless your doctor says it s OK. Taking these with opioids can cause death:  Benzodiazepines, such as lorazepam (Ativan), alprazolam (Xanax) or diazepam (Valium)  Muscle relaxers, such as cyclobenzaprine (Flexeril)  Sleeping pills like zolpidem (Ambien)   Other opioids      How to keep you and other people safe while taking opioids:    Never share your opioids with others.  Opioid medicines are regulated by the Drug Enforcement Agency ([employer]ES[employer]). Selling or sharing medications is a criminal act.    2. Be sure to store opioids in a secure place, locked up if possible. Young children can easily swallow them and overdose.    3. When you are traveling with your medicines, keep them in the original bottles. If you use a pill box, be sure you also  carry a copy of your medicine list from your clinic or pharmacy.    4. Safe disposal of opioids    Most pharmacies have places to get rid of medicine, called disposal kiosks. Medicine disposal options are also available in every [facility]. Search your county and  medication disposal  to find more options. You can find more details at:  [email or web address]-medications     I agree that my provider, clinic care team, and pharmacy may work with any city, state or federal law enforcement agency that investigates the misuse, sale, or other diversion of my controlled medicine. I will allow my provider to discuss my care with, or share a copy of, this agreement with any other treating provider, pharmacy or emergency room where I receive care.    I have read this agreement and have asked questions about anything I did not understand.    _______________________________________________________  Patient Signature - Constantino Taylor _____________________                   Date     _______________________________________________________  Provider Signature - Avery Duke MD   _____________________                   Date     _______________________________________________________  Witness Signature (required if provider not present while patient signing)   _____________________                   Date

## 2023-10-18 NOTE — PATIENT INSTRUCTIONS
Preparing for Your Surgery  Getting started  A nurse will call you to review your health history and instructions. They will give you an arrival time based on your scheduled surgery time. Please be ready to share:  Your doctor's clinic name and phone number  Your medical, surgical, and anesthesia history  A list of allergies and sensitivities  A list of medicines, including herbal treatments and over-the-counter drugs  Whether the patient has a legal guardian (ask how to send us the papers in advance)  Please tell us if you're pregnant--or if there's any chance you might be pregnant. Some surgeries may injure a fetus (unborn baby), so they require a pregnancy test. Surgeries that are safe for a fetus don't always need a test, and you can choose whether to have one.   If you have a child who's having surgery, please ask for a copy of Preparing for Your Child's Surgery.    Preparing for surgery  Within 10 to 30 days of surgery: Have a pre-op exam (sometimes called an H&P, or History and Physical). This can be done at a clinic or pre-operative center.  If you're having a , you may not need this exam. Talk to your care team.  At your pre-op exam, talk to your care team about all medicines you take. If you need to stop any medicines before surgery, ask when to start taking them again.  We do this for your safety. Many medicines can make you bleed too much during surgery. Some change how well surgery (anesthesia) drugs work.  Call your insurance company to let them know you're having surgery. (If you don't have insurance, call [phone].)  Call your clinic if there's any change in your health. This includes signs of a cold or flu (sore throat, runny nose, cough, rash, fever). It also includes a scrape or scratch near the surgery site.  If you have questions on the day of surgery, call your hospital or surgery center.  Eating and drinking guidelines  For your safety: Unless your surgeon tells you otherwise,  follow the guidelines below.  Eat and drink as usual until 8 hours before you arrive for surgery. After that, no food or milk.  Drink clear liquids until 2 hours before you arrive. These are liquids you can see through, like water, Gatorade, and Propel Water. They also include plain black coffee and tea (no cream or milk), candy, and breath mints. You can spit out gum when you arrive.  If you drink alcohol: Stop drinking it the night before surgery.  If your care team tells you to take medicine on the morning of surgery, it's okay to take it with a sip of water.  Preventing infection  Shower or bathe the night before and morning of your surgery. Follow the instructions your clinic gave you. (If no instructions, use regular soap.)  Don't shave or clip hair near your surgery site. We'll remove the hair if needed.  Don't smoke or vape the morning of surgery. You may chew nicotine gum up to 2 hours before surgery. A nicotine patch is okay.  Note: Some surgeries require you to completely quit smoking and nicotine. Check with your surgeon.  Your care team will make every effort to keep you safe from infection. We will:  Clean our hands often with soap and water (or an alcohol-based hand rub).  Clean the skin at your surgery site with a special soap that kills germs.  Give you a special gown to keep you warm. (Cold raises the risk of infection.)  Wear special hair covers, masks, gowns and gloves during surgery.  Give antibiotic medicine, if prescribed. Not all surgeries need antibiotics.  What to bring on the day of surgery  Photo ID and insurance card  Copy of your health care directive, if you have one  Glasses and hearing aids (bring cases)  You can't wear contacts during surgery  Inhaler and eye drops, if you use them (tell us about these when you arrive)  CPAP machine or breathing device, if you use them  A few personal items, if spending the night  If you have . . .  A pacemaker, ICD (cardiac defibrillator) or other  implant: Bring the ID card.  An implanted stimulator: Bring the remote control.  A legal guardian: Bring a copy of the certified (court-stamped) guardianship papers.  Please remove any jewelry, including body piercings. Leave jewelry and other valuables at home.  If you're going home the day of surgery  You must have a responsible adult drive you home. They should stay with you overnight as well.  If you don't have someone to stay with you, and you aren't safe to go home alone, we may keep you overnight. Insurance often won't pay for this.  After surgery  If it's hard to control your pain or you need more pain medicine, please call your surgeon's office.  Questions?   If you have any questions for your care team, list them here: _________________________________________________________________________________________________________________________________________________________________________ ____________________________________ ____________________________________ ____________________________________  For informational purposes only. Not to replace the advice of your health care provider. Copyright   2003, 2019 [employer]. All rights reserved. Clinically reviewed by Cathy Leonard MD. [employer] 605192 - REV 12/22.    How to Take Your Medication Before Surgery  -     -, rivaroxaban (Xarelto): Bleeding risk is moderate or high for this procedure AND CrCl  (>=) 50 mL/min. HOLD 2 days before surgery.   Hold aspirin 7 days before surgery
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
## 2023-10-19 LAB
ALBUMIN SERPL BCG-MCNC: 4.6 G/DL (ref 3.5–5.2)
ALP SERPL-CCNC: 102 U/L (ref 40–129)
ALT SERPL W P-5'-P-CCNC: 19 U/L (ref 0–70)
ANION GAP SERPL CALCULATED.3IONS-SCNC: 10 MMOL/L (ref 7–15)
AST SERPL W P-5'-P-CCNC: 24 U/L (ref 0–45)
BILIRUB SERPL-MCNC: 0.4 MG/DL
BUN SERPL-MCNC: 31.3 MG/DL (ref 8–23)
CALCIUM SERPL-MCNC: 10 MG/DL (ref 8.8–10.2)
CHLORIDE SERPL-SCNC: 107 MMOL/L (ref 98–107)
CREAT SERPL-MCNC: 1.37 MG/DL (ref 0.67–1.17)
DEPRECATED HCO3 PLAS-SCNC: 26 MMOL/L (ref 22–29)
EGFRCR SERPLBLD CKD-EPI 2021: 57 ML/MIN/1.73M2
GLUCOSE SERPL-MCNC: 84 MG/DL (ref 70–99)
POTASSIUM SERPL-SCNC: 4.4 MMOL/L (ref 3.4–5.3)
PROT SERPL-MCNC: 7.9 G/DL (ref 6.4–8.3)
SODIUM SERPL-SCNC: 143 MMOL/L (ref 135–145)

## 2023-11-17 DIAGNOSIS — F11.90 CHRONIC, CONTINUOUS USE OF OPIOIDS: ICD-10-CM

## 2023-11-17 DIAGNOSIS — G89.18 ACUTE POST-OPERATIVE PAIN: ICD-10-CM

## 2023-11-17 DIAGNOSIS — M54.40 CHRONIC LOW BACK PAIN WITH SCIATICA, SCIATICA LATERALITY UNSPECIFIED, UNSPECIFIED BACK PAIN LATERALITY: ICD-10-CM

## 2023-11-17 DIAGNOSIS — G89.29 CHRONIC LOW BACK PAIN WITH SCIATICA, SCIATICA LATERALITY UNSPECIFIED, UNSPECIFIED BACK PAIN LATERALITY: ICD-10-CM

## 2023-11-19 RX ORDER — OXYCODONE HYDROCHLORIDE 10 MG/1
10 TABLET ORAL EVERY 8 HOURS PRN
Qty: 90 TABLET | Refills: 0 | Status: SHIPPED | OUTPATIENT
Start: 2023-11-19 | End: 2023-12-21

## 2023-12-01 ENCOUNTER — TELEPHONE (OUTPATIENT)
Dept: FAMILY MEDICINE | Facility: CLINIC | Age: 68
End: 2023-12-01
Payer: COMMERCIAL

## 2023-12-01 NOTE — TELEPHONE ENCOUNTER
Form will be placed at the provider's desk for him to address on his return to office on 12/6/2023. Terese Rothman,

## 2023-12-01 NOTE — TELEPHONE ENCOUNTER
A order has come in for the provider to complete and sign for: [employer] orders    Who is the order/form from & contact information?    [employer] - [address], [address]  [place], [place], [address]  Phone: [phone]  Fax: [phone]    This was faxed to [facility]    The care team is working with the provider to address the request.

## 2023-12-06 ENCOUNTER — MEDICAL CORRESPONDENCE (OUTPATIENT)
Dept: HEALTH INFORMATION MANAGEMENT | Facility: CLINIC | Age: 68
End: 2023-12-06

## 2023-12-06 ENCOUNTER — MEDICAL CORRESPONDENCE (OUTPATIENT)
Dept: HEALTH INFORMATION MANAGEMENT | Facility: CLINIC | Age: 68
End: 2023-12-06
Payer: COMMERCIAL

## 2023-12-21 DIAGNOSIS — F11.90 CHRONIC, CONTINUOUS USE OF OPIOIDS: ICD-10-CM

## 2023-12-21 DIAGNOSIS — M54.40 CHRONIC LOW BACK PAIN WITH SCIATICA, SCIATICA LATERALITY UNSPECIFIED, UNSPECIFIED BACK PAIN LATERALITY: ICD-10-CM

## 2023-12-21 DIAGNOSIS — G89.18 ACUTE POST-OPERATIVE PAIN: ICD-10-CM

## 2023-12-21 DIAGNOSIS — G89.29 CHRONIC LOW BACK PAIN WITH SCIATICA, SCIATICA LATERALITY UNSPECIFIED, UNSPECIFIED BACK PAIN LATERALITY: ICD-10-CM

## 2023-12-21 RX ORDER — OXYCODONE HYDROCHLORIDE 10 MG/1
10 TABLET ORAL EVERY 8 HOURS PRN
Qty: 90 TABLET | Refills: 0 | Status: SHIPPED | OUTPATIENT
Start: 2023-12-21 | End: 2024-01-22

## 2023-12-21 NOTE — TELEPHONE ENCOUNTER
Medication Question or Refill    Contacts         Type Contact Phone/Fax    12/21/2023 08:01 AM CST Phone (Incoming) Constantino Taylor S (Self) [phone] (M)        What medication are you calling about (include dose and sig)?: oxyCODONE IR (ROXICODONE) 10 MG tablet     Preferred Pharmacy:   [place] [address] [place] [facility] - [place], [place] - [address] [place] [address]  Phone: [phone] Fax: [phone]    Controlled Substance Agreement on file:   CSA -- Patient Level:     [Media Unavailable] Controlled Substance Agreement - Opioid - Scan on 10/18/2023  4:43 PM   [Media Unavailable] Controlled Substance Agreement - Opioid - Scan on 5/31/2022 11:29 AM   [Media Unavailable] Controlled Substance Agreement - Opioid - Scan on 2/11/2021 12:41 PM       Who prescribed the medication?: Avery Srikanth   Do you need a refill? Yes  Do you have any questions or concerns?  Yes: he is out of medication currently and would like this sent over ASA due to the Holiday and him getting a ride to the pharmacy.  Could we send this information to you in [facility] or would you prefer to receive a phone call?:   Patient would prefer a phone call   Okay to leave a detailed message?: Yes at Cell number on file:    Telephone Information:   Mobile [phone]

## 2024-01-18 ENCOUNTER — OFFICE VISIT (OUTPATIENT)
Dept: FAMILY MEDICINE | Facility: CLINIC | Age: 69
End: 2024-01-18
Payer: COMMERCIAL

## 2024-01-18 VITALS — SYSTOLIC BLOOD PRESSURE: 132 MMHG | TEMPERATURE: 98.4 F | DIASTOLIC BLOOD PRESSURE: 82 MMHG | HEART RATE: 88 BPM

## 2024-01-18 DIAGNOSIS — B18.2 CHRONIC HEPATITIS C WITHOUT HEPATIC COMA (H): ICD-10-CM

## 2024-01-18 DIAGNOSIS — C61 PROSTATE CANCER (H): ICD-10-CM

## 2024-01-18 DIAGNOSIS — N18.31 STAGE 3A CHRONIC KIDNEY DISEASE (H): ICD-10-CM

## 2024-01-18 DIAGNOSIS — I50.22 CHRONIC SYSTOLIC HEART FAILURE (H): ICD-10-CM

## 2024-01-18 DIAGNOSIS — I73.9 PAD (PERIPHERAL ARTERY DISEASE) (H): ICD-10-CM

## 2024-01-18 DIAGNOSIS — Z29.11 NEED FOR VACCINATION AGAINST RESPIRATORY SYNCYTIAL VIRUS: ICD-10-CM

## 2024-01-18 DIAGNOSIS — G89.4 CHRONIC PAIN SYNDROME: ICD-10-CM

## 2024-01-18 DIAGNOSIS — I42.0 NONISCHEMIC DILATED CARDIOMYOPATHY (H): ICD-10-CM

## 2024-01-18 DIAGNOSIS — E78.5 HYPERLIPIDEMIA LDL GOAL <70: ICD-10-CM

## 2024-01-18 DIAGNOSIS — Z89.519 S/P BKA (BELOW KNEE AMPUTATION) UNILATERAL (H): ICD-10-CM

## 2024-01-18 DIAGNOSIS — I25.10 ATHEROSCLEROSIS OF CORONARY ARTERY OF NATIVE HEART WITHOUT ANGINA PECTORIS, UNSPECIFIED VESSEL OR LESION TYPE: Primary | ICD-10-CM

## 2024-01-18 DIAGNOSIS — I48.0 PAROXYSMAL ATRIAL FIBRILLATION (H): ICD-10-CM

## 2024-01-18 PROCEDURE — 82570 ASSAY OF URINE CREATININE: CPT | Performed by: INTERNAL MEDICINE

## 2024-01-18 PROCEDURE — G0481 DRUG TEST DEF 8-14 CLASSES: HCPCS | Performed by: INTERNAL MEDICINE

## 2024-01-18 PROCEDURE — 99214 OFFICE O/P EST MOD 30 MIN: CPT | Performed by: INTERNAL MEDICINE

## 2024-01-18 PROCEDURE — 80061 LIPID PANEL: CPT | Performed by: INTERNAL MEDICINE

## 2024-01-18 PROCEDURE — 36415 COLL VENOUS BLD VENIPUNCTURE: CPT | Performed by: INTERNAL MEDICINE

## 2024-01-18 PROCEDURE — 82043 UR ALBUMIN QUANTITATIVE: CPT | Performed by: INTERNAL MEDICINE

## 2024-01-18 PROCEDURE — 80053 COMPREHEN METABOLIC PANEL: CPT | Performed by: INTERNAL MEDICINE

## 2024-01-18 RX ORDER — RESPIRATORY SYNCYTIAL VIRUS VACCINE 120MCG/0.5
0.5 KIT INTRAMUSCULAR ONCE
Qty: 1 EACH | Refills: 0 | Status: SHIPPED | OUTPATIENT
Start: 2024-01-18 | End: 2024-01-18

## 2024-01-18 NOTE — PROGRESS NOTES
"  Assessment & Plan   Problem List Items Addressed This Visit       Atrial fibrillation (H)    Chronic hepatitis C without hepatic coma (H)    Chronic pain syndrome    Relevant Orders    Drug Confirmation Panel Urine with Creat - lab collect    Chronic systolic heart failure (H)    CKD (chronic kidney disease) stage 3, GFR 30-59 ml/min (H)    Relevant Orders    Comprehensive metabolic panel (BMP + Alb, Alk Phos, ALT, AST, Total. Bili, TP) (Completed)    Albumin Random Urine Quantitative with Creat Ratio (Completed)    Coronary atherosclerosis - Primary    Hyperlipidemia LDL goal <70    Relevant Orders    Lipid panel reflex to direct LDL Fasting (Completed)    Nonischemic dilated cardiomyopathy (HCC)    PAD (peripheral artery disease) (H24)    Prostate cancer (H)    S/P BKA (below knee amputation) unilateral (H)     Other Visit Diagnoses       Need for vaccination against respiratory syncytial virus                        BMI  Estimated body mass index is 25.68 kg/m  as calculated from the following:    Height as of 10/18/23: 1.88 m (6' 2\").    Weight as of 10/18/23: 90.7 kg (200 lb).   Weight management plan: Discussed healthy diet and exercise guidelines    Work on weight loss  Regular exercise      Ronak Meeks is a 68 year old, presenting for the following health issues:  Covid Concern        1/18/2024     8:47 AM   Additional Questions   Roomed by Joan     HPI     Follow up COVID  Augmentin and covid positive   Has partials in place     Needs the implants              Objective    /82   Pulse 88   Temp 98.4  F (36.9  C)   There is no height or weight on file to calculate BMI.    Review of Systems  Constitutional, HEENT, cardiovascular, pulmonary, gi and gu systems are negative, except as otherwise noted.  Physical Exam   GENERAL: alert and no distress  EYES: Eyes grossly normal to inspection, PERRL and conjunctivae and sclerae normal  HENT: ear canals and TM's normal, nose and mouth without ulcers " or lesions  NECK: no adenopathy, no asymmetry, masses, or scars  RESP: lungs clear to auscultation - no rales, rhonchi or wheezes  CV: regular rate and rhythm, normal S1 S2, no S3 or S4, no murmur, click or rub, no peripheral edema  ABDOMEN: soft, nontender, no hepatosplenomegaly, no masses and bowel sounds normal  MS: no gross musculoskeletal defects noted, no edema  SKIN: no suspicious lesions or rashes  NEURO: Normal strength and tone, mentation intact and speech normal  BACK: no CVA tenderness, no paralumbar tenderness  PSYCH: mentation appears normal, affect normal/bright  LYMPH: no cervical, supraclavicular, axillary, or inguinal adenopathy    Results for orders placed or performed in visit on 01/18/24   Comprehensive metabolic panel (BMP + Alb, Alk Phos, ALT, AST, Total. Bili, TP)     Status: Abnormal   Result Value Ref Range    Sodium 142 135 - 145 mmol/L    Potassium 4.4 3.4 - 5.3 mmol/L    Carbon Dioxide (CO2) 27 22 - 29 mmol/L    Anion Gap 8 7 - 15 mmol/L    Urea Nitrogen 17.2 8.0 - 23.0 mg/dL    Creatinine 1.31 (H) 0.67 - 1.17 mg/dL    GFR Estimate 60 (L) >60 mL/min/1.73m2    Calcium 9.8 8.8 - 10.2 mg/dL    Chloride 107 98 - 107 mmol/L    Glucose 131 (H) 70 - 99 mg/dL    Alkaline Phosphatase 102 40 - 150 U/L     (H) 0 - 45 U/L     (H) 0 - 70 U/L    Protein Total 8.0 6.4 - 8.3 g/dL    Albumin 4.0 3.5 - 5.2 g/dL    Bilirubin Total 0.2 <=1.2 mg/dL   Lipid panel reflex to direct LDL Fasting     Status: None   Result Value Ref Range    Cholesterol 130 <200 mg/dL    Triglycerides 106 <150 mg/dL    Direct Measure HDL 41 >=40 mg/dL    LDL Cholesterol Calculated 68 <=100 mg/dL    Non HDL Cholesterol 89 <130 mg/dL    Patient Fasting > 8hrs? Yes     Narrative    Cholesterol  Desirable:  <200 mg/dL    Triglycerides  Normal:  Less than 150 mg/dL  Borderline High:  150-199 mg/dL  High:  200-499 mg/dL  Very High:  Greater than or equal to 500 mg/dL    Direct Measure HDL  Female:  Greater than or equal to  50 mg/dL   Male:  Greater than or equal to 40 mg/dL    LDL Cholesterol  Desirable:  <100mg/dL  Above Desirable:  100-129 mg/dL   Borderline High:  130-159 mg/dL   High:  160-189 mg/dL   Very High:  >= 190 mg/dL    Non HDL Cholesterol  Desirable:  130 mg/dL  Above Desirable:  130-159 mg/dL  Borderline High:  160-189 mg/dL  High:  190-219 mg/dL  Very High:  Greater than or equal to 220 mg/dL   Albumin Random Urine Quantitative with Creat Ratio     Status: Abnormal   Result Value Ref Range    Creatinine Urine mg/dL 237.0 mg/dL    Albumin Urine mg/L 60.5 mg/L    Albumin Urine mg/g Cr 25.53 (H) 0.00 - 17.00 mg/g Cr   Urine Creatinine for Drug Screen Panel     Status: None   Result Value Ref Range    Creatinine Urine for Drug Screen 237 mg/dL   Drug Confirmation Panel Urine with Creat - lab collect     Status: None (In process)    Narrative    The following orders were created for panel order Drug Confirmation Panel Urine with Creat - lab collect.  Procedure                               Abnormality         Status                     ---------                               -----------         ------                     Urine Drug Confirmation ...[[id number]]                      In process                 Urine Creatinine for Bladimir...[[id number]]                      Final result                 Please view results for these tests on the individual orders.           Signed Electronically by: Avery Duke MD

## 2024-01-19 LAB
ALBUMIN SERPL BCG-MCNC: 4 G/DL (ref 3.5–5.2)
ALP SERPL-CCNC: 102 U/L (ref 40–150)
ALT SERPL W P-5'-P-CCNC: 252 U/L (ref 0–70)
ANION GAP SERPL CALCULATED.3IONS-SCNC: 8 MMOL/L (ref 7–15)
AST SERPL W P-5'-P-CCNC: 221 U/L (ref 0–45)
BILIRUB SERPL-MCNC: 0.2 MG/DL
BUN SERPL-MCNC: 17.2 MG/DL (ref 8–23)
CALCIUM SERPL-MCNC: 9.8 MG/DL (ref 8.8–10.2)
CHLORIDE SERPL-SCNC: 107 MMOL/L (ref 98–107)
CHOLEST SERPL-MCNC: 130 MG/DL
CREAT SERPL-MCNC: 1.31 MG/DL (ref 0.67–1.17)
CREAT UR-MCNC: 237 MG/DL
CREAT UR-MCNC: 237 MG/DL
DEPRECATED HCO3 PLAS-SCNC: 27 MMOL/L (ref 22–29)
EGFRCR SERPLBLD CKD-EPI 2021: 60 ML/MIN/1.73M2
FASTING STATUS PATIENT QL REPORTED: YES
GLUCOSE SERPL-MCNC: 131 MG/DL (ref 70–99)
HDLC SERPL-MCNC: 41 MG/DL
LDLC SERPL CALC-MCNC: 68 MG/DL
MICROALBUMIN UR-MCNC: 60.5 MG/L
MICROALBUMIN/CREAT UR: 25.53 MG/G CR (ref 0–17)
NONHDLC SERPL-MCNC: 89 MG/DL
POTASSIUM SERPL-SCNC: 4.4 MMOL/L (ref 3.4–5.3)
PROT SERPL-MCNC: 8 G/DL (ref 6.4–8.3)
SODIUM SERPL-SCNC: 142 MMOL/L (ref 135–145)
TRIGL SERPL-MCNC: 106 MG/DL

## 2024-01-22 DIAGNOSIS — F11.90 CHRONIC, CONTINUOUS USE OF OPIOIDS: ICD-10-CM

## 2024-01-22 DIAGNOSIS — G89.29 CHRONIC LOW BACK PAIN WITH SCIATICA, SCIATICA LATERALITY UNSPECIFIED, UNSPECIFIED BACK PAIN LATERALITY: ICD-10-CM

## 2024-01-22 DIAGNOSIS — G89.18 ACUTE POST-OPERATIVE PAIN: ICD-10-CM

## 2024-01-22 DIAGNOSIS — M54.40 CHRONIC LOW BACK PAIN WITH SCIATICA, SCIATICA LATERALITY UNSPECIFIED, UNSPECIFIED BACK PAIN LATERALITY: ICD-10-CM

## 2024-01-22 RX ORDER — OXYCODONE HYDROCHLORIDE 10 MG/1
10 TABLET ORAL EVERY 8 HOURS PRN
Qty: 90 TABLET | Refills: 0 | Status: SHIPPED | OUTPATIENT
Start: 2024-01-22 | End: 2024-02-22

## 2024-01-22 NOTE — TELEPHONE ENCOUNTER
Medication Question or Refill    Contacts         Type Contact Phone/Fax    01/22/2024 07:39 AM CST Phone (Incoming) Constantino Taylor (Self) [phone] (M)        What medication are you calling about (include dose and sig)?: oxyCODONE IR (ROXICODONE) 10 MG tablet     Preferred Pharmacy:   [place] [id number] [place] [facility] [place] [address] [address] [place] [address] [facility] [address]  Phone: [phone] Fax: [phone]    Controlled Substance Agreement on file:   CSA -- Patient Level:     [Media Unavailable] Controlled Substance Agreement - Opioid - Scan on 10/18/2023  4:43 PM   [Media Unavailable] Controlled Substance Agreement - Opioid - Scan on 5/31/2022 11:29 AM   [Media Unavailable] Controlled Substance Agreement - Opioid - Scan on 2/11/2021 12:41 PM     Who prescribed the medication?: Avery Duke   Do you need a refill? Yes  Do you have any questions or concerns?  Patient said he is out of medications   Could we send this information to you in [facility] or would you prefer to receive a phone call?:   Patient would prefer a phone call   Okay to leave a detailed message?: Yes at Cell number on file:    Telephone Information:   Mobile [phone]

## 2024-01-23 LAB
GABAPENTIN UR QL CFM: PRESENT
OXYCODONE UR CFM-MCNC: 5660 NG/ML
OXYCODONE/CREAT UR: 2388 NG/MG {CREAT}
OXYMORPHONE UR CFM-MCNC: 1200 NG/ML
OXYMORPHONE/CREAT UR: 506 NG/MG {CREAT}

## 2024-02-12 ENCOUNTER — PATIENT OUTREACH (OUTPATIENT)
Dept: GASTROENTEROLOGY | Facility: CLINIC | Age: 69
End: 2024-02-12
Payer: COMMERCIAL

## 2024-02-22 ENCOUNTER — TELEPHONE (OUTPATIENT)
Dept: FAMILY MEDICINE | Facility: CLINIC | Age: 69
End: 2024-02-22
Payer: COMMERCIAL

## 2024-02-22 DIAGNOSIS — M54.40 CHRONIC LOW BACK PAIN WITH SCIATICA, SCIATICA LATERALITY UNSPECIFIED, UNSPECIFIED BACK PAIN LATERALITY: ICD-10-CM

## 2024-02-22 DIAGNOSIS — F11.90 CHRONIC, CONTINUOUS USE OF OPIOIDS: ICD-10-CM

## 2024-02-22 DIAGNOSIS — G89.18 ACUTE POST-OPERATIVE PAIN: ICD-10-CM

## 2024-02-22 DIAGNOSIS — G89.29 CHRONIC LOW BACK PAIN WITH SCIATICA, SCIATICA LATERALITY UNSPECIFIED, UNSPECIFIED BACK PAIN LATERALITY: ICD-10-CM

## 2024-02-22 RX ORDER — OXYCODONE HYDROCHLORIDE 10 MG/1
10 TABLET ORAL EVERY 8 HOURS PRN
Qty: 90 TABLET | Refills: 0 | Status: SHIPPED | OUTPATIENT
Start: 2024-02-22 | End: 2024-03-28

## 2024-02-22 NOTE — TELEPHONE ENCOUNTER
LM on patient's VM with updates that refill has been sent    Cheri Watson RN  [facility]

## 2024-02-22 NOTE — TELEPHONE ENCOUNTER
Medication Question or Refill    Contacts         Type Contact Phone/Fax    02/22/2024 08:28 AM CST Phone (Incoming) Constantino Taylor S (Self) [phone] (M)        What medication are you calling about (include dose and sig)?: oxyCODONE IR (ROXICODONE) 10 MG tablet     Preferred Pharmacy:   [place] [id number] [place] [facility] [place] [address] [place] [address]  Phone: [phone] Fax: [phone]    Controlled Substance Agreement on file:   CSA -- Patient Level:     [Media Unavailable] Controlled Substance Agreement - Opioid - Scan on 10/18/2023  4:43 PM   [Media Unavailable] Controlled Substance Agreement - Opioid - Scan on 5/31/2022 11:29 AM   [Media Unavailable] Controlled Substance Agreement - Opioid - Scan on 2/11/2021 12:41 PM       Who prescribed the medication?: Avery Duke  Do you need a refill? Yes    Patient offered an appointment? No    Do you have any questions or concerns?  Yes: Patient is currently out of medications. Please send to the pharmacy.     [facility] we [facility] or would you prefer to receive a phone call?:   Patient would prefer a phone call   Okay to leave a detailed message?: Yes at Cell number on file:    Telephone Information:   Mobile [phone]

## 2024-02-22 NOTE — TELEPHONE ENCOUNTER
Patient calling again to check on the status of this refill.  Please call patient once this has been sent to pharmacy.    CIARA Cash  [facility]

## 2024-03-26 ENCOUNTER — TELEPHONE (OUTPATIENT)
Dept: FAMILY MEDICINE | Facility: CLINIC | Age: 69
End: 2024-03-26
Payer: COMMERCIAL

## 2024-03-26 NOTE — TELEPHONE ENCOUNTER
A order has come in for the provider to complete and sign for: [employer] orders     Who is the order/form from & contact information?     [employer] - [address], [address]  [place], [place], [address]  Phone: [phone]  Fax: [phone]     This was faxed to [facility]     The care team is working with the provider to address the request.

## 2024-03-27 ENCOUNTER — MEDICAL CORRESPONDENCE (OUTPATIENT)
Dept: HEALTH INFORMATION MANAGEMENT | Facility: CLINIC | Age: 69
End: 2024-03-27

## 2024-03-28 ENCOUNTER — OFFICE VISIT (OUTPATIENT)
Dept: FAMILY MEDICINE | Facility: CLINIC | Age: 69
End: 2024-03-28
Payer: COMMERCIAL

## 2024-03-28 VITALS
WEIGHT: 205 LBS | OXYGEN SATURATION: 98 % | HEART RATE: 81 BPM | TEMPERATURE: 97.2 F | DIASTOLIC BLOOD PRESSURE: 80 MMHG | BODY MASS INDEX: 26.31 KG/M2 | HEIGHT: 74 IN | SYSTOLIC BLOOD PRESSURE: 148 MMHG | RESPIRATION RATE: 18 BRPM

## 2024-03-28 DIAGNOSIS — I48.0 PAROXYSMAL ATRIAL FIBRILLATION (H): ICD-10-CM

## 2024-03-28 DIAGNOSIS — F11.90 CHRONIC, CONTINUOUS USE OF OPIOIDS: ICD-10-CM

## 2024-03-28 DIAGNOSIS — Z23 NEED FOR PROPHYLACTIC VACCINATION AGAINST HEPATITIS A: ICD-10-CM

## 2024-03-28 DIAGNOSIS — I10 HYPERTENSION GOAL BP (BLOOD PRESSURE) < 140/90: Primary | ICD-10-CM

## 2024-03-28 DIAGNOSIS — M54.40 CHRONIC LOW BACK PAIN WITH SCIATICA, SCIATICA LATERALITY UNSPECIFIED, UNSPECIFIED BACK PAIN LATERALITY: ICD-10-CM

## 2024-03-28 DIAGNOSIS — L20.84 INTRINSIC ATOPIC DERMATITIS: ICD-10-CM

## 2024-03-28 DIAGNOSIS — I63.89 CEREBRAL INFARCTION DUE TO OTHER MECHANISM (H): ICD-10-CM

## 2024-03-28 DIAGNOSIS — C61 PROSTATE CANCER (H): ICD-10-CM

## 2024-03-28 DIAGNOSIS — G89.18 ACUTE POST-OPERATIVE PAIN: ICD-10-CM

## 2024-03-28 DIAGNOSIS — G89.29 CHRONIC LOW BACK PAIN WITH SCIATICA, SCIATICA LATERALITY UNSPECIFIED, UNSPECIFIED BACK PAIN LATERALITY: ICD-10-CM

## 2024-03-28 DIAGNOSIS — F51.01 PRIMARY INSOMNIA: ICD-10-CM

## 2024-03-28 PROCEDURE — 99214 OFFICE O/P EST MOD 30 MIN: CPT | Performed by: INTERNAL MEDICINE

## 2024-03-28 RX ORDER — HYDROCHLOROTHIAZIDE 12.5 MG/1
12.5 TABLET ORAL DAILY
Qty: 31 TABLET | Refills: 2 | Status: SHIPPED | OUTPATIENT
Start: 2024-03-28 | End: 2024-04-22

## 2024-03-28 RX ORDER — OXYCODONE HYDROCHLORIDE 10 MG/1
10 TABLET ORAL EVERY 8 HOURS PRN
Qty: 90 TABLET | Refills: 0 | Status: SHIPPED | OUTPATIENT
Start: 2024-03-28 | End: 2024-04-29

## 2024-03-28 RX ORDER — LANOLIN ALCOHOL/MO/W.PET/CERES
3 CREAM (GRAM) TOPICAL
Qty: 30 TABLET | Refills: 3 | Status: SHIPPED | OUTPATIENT
Start: 2024-03-28

## 2024-03-28 RX ORDER — TRIAMCINOLONE ACETONIDE 1 MG/G
CREAM TOPICAL 2 TIMES DAILY
Qty: 80 G | Refills: 4 | Status: SHIPPED | OUTPATIENT
Start: 2024-03-28

## 2024-03-28 ASSESSMENT — ANXIETY QUESTIONNAIRES
GAD7 TOTAL SCORE: 5
6. BECOMING EASILY ANNOYED OR IRRITABLE: SEVERAL DAYS
GAD7 TOTAL SCORE: 5
7. FEELING AFRAID AS IF SOMETHING AWFUL MIGHT HAPPEN: NOT AT ALL
2. NOT BEING ABLE TO STOP OR CONTROL WORRYING: SEVERAL DAYS
5. BEING SO RESTLESS THAT IT IS HARD TO SIT STILL: SEVERAL DAYS
1. FEELING NERVOUS, ANXIOUS, OR ON EDGE: SEVERAL DAYS
3. WORRYING TOO MUCH ABOUT DIFFERENT THINGS: NOT AT ALL
IF YOU CHECKED OFF ANY PROBLEMS ON THIS QUESTIONNAIRE, HOW DIFFICULT HAVE THESE PROBLEMS MADE IT FOR YOU TO DO YOUR WORK, TAKE CARE OF THINGS AT HOME, OR GET ALONG WITH OTHER PEOPLE: SOMEWHAT DIFFICULT

## 2024-03-28 ASSESSMENT — PATIENT HEALTH QUESTIONNAIRE - PHQ9
SUM OF ALL RESPONSES TO PHQ QUESTIONS 1-9: 3
5. POOR APPETITE OR OVEREATING: SEVERAL DAYS

## 2024-03-28 NOTE — PROGRESS NOTES
Preventive Care Visit  [facility] RACHANA Duke MD, Internal Medicine - Pediatrics  Mar 28, 2024      Assessment & Plan     (I10) Hypertension goal BP (blood pressure) < 140/90  (primary encounter diagnosis)  Comment: Elevated blood pressure today after recheck. Patient has not been check it at home, reported shortness of breath with activities during assessment. No obvious fluid retention noted. Discussed increasing hydrochlorothiazide to 12.5 mg and patient agreed with the plan.  Plan: hydroCHLOROthiazide 12.5 MG tablet        Advised to check blood pressure at home and follow up in 6 weeks or sooner if need arises.      (M54.40,  G89.29) Chronic low back pain with sciatica, sciatica laterality unspecified, unspecified back pain laterality  Comment: Patient continue to experience pain needing 10 mg of oxycodone, Had discussion on tapering and possible discontinue oxycodone. Patient agree to the treatment and would initiate it next month/visit.  Plan: oxyCODONE IR (ROXICODONE) 10 MG tablet        Medication reordered today, plan to start tapering at the next visit.    (G89.18) Acute post-operative pain  Comment: See note above  Plan: oxyCODONE IR (ROXICODONE) 10 MG tablet            (F11.90) Chronic, continuous use of opioids  Comment: Discussion on tapering/ weaning ongoing.Patient is open to the plan.  Plan: oxyCODONE IR (ROXICODONE) 10 MG tablet        Reordered today, weaning plan will be set at next visit.    (L20.84) Intrinsic atopic dermatitis  Comment: Rash area appears dry and flaky, advised patient to use moisturizer and kenalog cream ordered today.  Plan: triamcinolone (KENALOG) 0.1 % external cream        Apply to the affected area as needed daily.    (F51.01) Primary insomnia  Comment: Reported 3-4 night inability to stay asleep at night, had discussion to increase physical activities and try melatonin as needed at night. Patient agrees to the plan  Plan: melatonin 3 MG  tablet              ICD-10-CM    1. Hypertension goal BP (blood pressure) < 140/90  I10 hydroCHLOROthiazide 12.5 MG tablet      2. Need for prophylactic vaccination against hepatitis A  Z23       3. Chronic low back pain with sciatica, sciatica laterality unspecified, unspecified back pain laterality  M54.40 oxyCODONE IR (ROXICODONE) 10 MG tablet    G89.29       4. Acute post-operative pain  G89.18 oxyCODONE IR (ROXICODONE) 10 MG tablet      5. Chronic, continuous use of opioids  F11.90 oxyCODONE IR (ROXICODONE) 10 MG tablet      6. Intrinsic atopic dermatitis  L20.84 triamcinolone (KENALOG) 0.1 % external cream      7. Primary insomnia  F51.01 melatonin 3 MG tablet      8. Cerebral infarction due to other mechanism (H)  I63.89       9. Paroxysmal atrial fibrillation (H)  I48.0       10. Prostate cancer (H)  C61         Patient is determined to be cancer free at this time     Patient is on stable treatment for the cardiomyopathy                    Work on weight loss  Regular exercise    Ronak Meeks is a 68 year old, presenting for the following:  Medication Follow-up        3/28/2024     7:51 AM   Additional Questions   Roomed by Joan         Health Care Directive  Patient does not have a Health Care Directive or Living Will: Discussed advance care planning with patient; however, patient declined at this time.    Ollie is a 68 years old male with past medical history of chronic heart failure, below knee amputation of the left leg, chronic low back pain, cancer of the prostate, cardiomyopathy, Afib and CVA. Patient presented today for follow up on pain management, rashes on the left leg, and paperwork to fill out for dental procedure.     Chronic Low back pain and stump pain  Patient reported that his pain is constantly there,he took his last oxycodone this morning and would like to get prescription on oxycodone. He stated that the pain is on the left stump. Patient rated the pain 6-7 and after taking  medication, it will be down to 2-3. He reported soreness on the stump that happen few days ago, pt stated that he took the prosthetic leg off for some days and the sore healed.   Rash  Patient reported rashes on the medial left lower leg. He reported that it can be itchy,dry and brings up flakes when he scratches on it. Patient denied bleeding, skin cut or wound on that leg.  Dental Procedure paperwork  Patient came in with a paperwork from dentist office for a dental procedure that will be on May 9th. He requested that the paperwork should be filled out and send directly to the dentist office. He denied any other concerns during this visit.      History of Present Illness       Reason for visit:  Doc want to see me    He eats 0-1 servings of fruits and vegetables daily.He consumes 2 sweetened beverage(s) daily.He exercises with enough effort to increase his heart rate 9 or less minutes per day.  He exercises with enough effort to increase his heart rate 3 or less days per week. He is missing 1 dose(s) of medications per week.              2/13/2023   General Health   How would you rate your overall physical health? Good         2/13/2023   Nutrition   At least 4 servings of fruits and vegetables/day Yes         2/13/2023   Exercise   Frequency of exercise: None            2/13/2023   Activities of Daily Living- Home Safety   Needs help with the following daily activites Housework    Bathing    Laundry   Safety concerns in the home None of the above         2/11/2021   Dental   Dentist two times every year? No         2/13/2023   Hearing Screening   Hearing concerns? Need to ask people to speak up or repeat themselves    No concerns            No data to display                     Today's PHQ-2 Score:       3/28/2024     7:41 AM   PHQ-2 ( 1999 [employer])   Q1: Little interest or pleasure in doing things 0   Q2: Feeling down, depressed or hopeless 0   PHQ-2 Score 0   Q1: Little interest or pleasure in doing things Not  at all   Q2: Feeling down, depressed or hopeless Not at all   PHQ-2 Score 0           2023   Substance Use   Alcohol more than 3/day or more than 7/wk No     Social History     Tobacco Use    Smoking status: Former     Packs/day: 0.30     Years: 40.00     Additional pack years: 0.00     Total pack years: 12.00     Types: Cigarettes     Quit date: 3/12/2018     Years since quittin.0     Passive exposure: Past    Smokeless tobacco: Former   Vaping Use    Vaping Use: Never used   Substance Use Topics    Alcohol use: Yes     Alcohol/week: 6.0 - 10.0 standard drinks of alcohol     Types: 2 Glasses of wine, 2 - 6 Cans of beer, 2 Shots of liquor per week     Comment: couple of beers per episode, several times a week    Drug use: Yes     Types: Marijuana     Comment: pot about 3 days per week, heroin couple of months ago       Last PSA:   PSA   Date Value Ref Range Status   2021 24.90 (H) 0 - 4 ug/L Final     Comment:     Assay Method:  Chemiluminescence using Siemens Vista analyzer     ASCVD Risk   The ASCVD Risk score (Giles ESCOBEDO, et al., 2019) failed to calculate for the following reasons:    The patient has a prior MI or stroke diagnosis              Reviewed and updated as needed this visit by Provider   Tobacco     Med Hx  Surg Hx  Fam Hx  Soc Hx Sexual Activity          Lab work is in process  Labs reviewed in EPIC  BP Readings from Last 3 Encounters:   24 (!) 148/80   24 132/82   10/18/23 138/84    Wt Readings from Last 3 Encounters:   24 93 kg (205 lb)   10/18/23 90.7 kg (200 lb)   23 86.2 kg (190 lb)                  Patient Active Problem List   Diagnosis    Cerebral infarction (H)    Chronic hepatitis C without hepatic coma (H)    Tobacco use disorder    Chronic low back pain    Acute pancreatitis    Hyperlipidemia LDL goal <70    Hypertension goal BP (blood pressure) < 140/90    Nonischemic dilated cardiomyopathy (HCC)    Malignant neoplasm of prostate (H)     Erectile dysfunction    Eczema    PAD (peripheral artery disease) (H24)    S/P BKA (below knee amputation) unilateral (H)    Encounter for counseling    Acute renal failure (H24)    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    Coronary atherosclerosis    Atrial fibrillation (H)    Chronic systolic heart failure (H)    Advanced directives, counseling/discussion    Dyslipidemia    Nicotine dependence, uncomplicated    Pyelonephritis    Prostate cancer (H)    Osteomyelitis (H)    CKD (chronic kidney disease) stage 3, GFR 30-59 ml/min (H)    Chronic pain syndrome    Chronic, continuous use of opioids     Past Surgical History:   Procedure Laterality Date    AMPUTATE LEG BELOW KNEE Left 2015    Procedure: AMPUTATE LEG BELOW KNEE;  Surgeon: Odessa Browning MD;  Location: [facility] OR    IRRIGATION AND DEBRIDEMENT LOWER EXTREMITY, COMBINED Left 10/29/2019    Procedure: IRRIGATION AND DEBRIDEMENT, LEFT LOWER EXTREMITY w/ Veriflow Wound Vac Placement.;  Surgeon: Michelle Matute MD;  Location: [facility] OR    removal of blood clots in arm         Social History     Tobacco Use    Smoking status: Former     Packs/day: 0.30     Years: 40.00     Additional pack years: 0.00     Total pack years: 12.00     Types: Cigarettes     Quit date: 3/12/2018     Years since quittin.0     Passive exposure: Past    Smokeless tobacco: Former   Substance Use Topics    Alcohol use: Yes     Alcohol/week: 6.0 - 10.0 standard drinks of alcohol     Types: 2 Glasses of wine, 2 - 6 Cans of beer, 2 Shots of liquor per week     Comment: couple of beers per episode, several times a week     Family History   Problem Relation Age of Onset    Cancer Mother         brain    Cancer Brother          Current Outpatient Medications   Medication Sig Dispense Refill    acetaminophen (TYLENOL) 325 MG tablet Take 1-2 tablets (325-650 mg) by mouth every 6 hours as needed for mild pain (Takes infrequently) 100 tablet 0    aspirin (ASPIRIN LOW DOSE) 81 MG EC tablet  "TAKE 1 TABLET BY MOUTH EVERY DAY 90 tablet 1    atorvastatin (LIPITOR) 40 MG tablet Take 1 tablet (40 mg) by mouth At Bedtime 90 tablet 4    carvedilol (COREG) 25 MG tablet Take 1 tablet (25 mg) by mouth 2 times daily (with meals) 186 tablet 4    gabapentin (NEURONTIN) 600 MG tablet TAKE 1 TABLET BY MOUTH THREE TIMES A  tablet 4    hydroCHLOROthiazide 12.5 MG tablet Take 1 tablet (12.5 mg) by mouth daily 31 tablet 2    lisinopril (ZESTRIL) 40 MG tablet Take 1 tablet (40 mg) by mouth daily 90 tablet 4    melatonin 3 MG tablet Take 1 tablet (3 mg) by mouth nightly as needed for sleep 30 tablet 3    Multiple Vitamins-Minerals (ONE-A-DAY MENS 50+ ADVANTAGE) TABS Take 1 each by mouth daily 30 tablet 0    naloxone (EVZIO) 0.4 MG/0.4ML auto-injector Inject 0.4 mLs (0.4 mg) into the muscle as needed for opioid reversal every 2-3 minutes until assistance arrives 0.8 mL 1    ondansetron (ZOFRAN ODT) 4 MG ODT tab Take 1 tablet (4 mg) by mouth every 8 hours as needed for nausea 20 tablet 3    order for DME Equipment being ordered: Wheelchair, manual 1 each 0    order for DME Equipment being ordered: self adhesive bandage 4\" by 8\" 30 each 11    order for DME Please dispense blood pressure machine and cuff. 1 each 0    oxyCODONE IR (ROXICODONE) 10 MG tablet Take 1 tablet (10 mg) by mouth every 8 hours as needed for moderate to severe pain or severe pain . No further refills until follow-up appointment 90 tablet 0    polyethylene glycol (MIRALAX) 17 GM/Dose powder Take 17 g (1 capful) by mouth daily 850 g 11    rivaroxaban ANTICOAGULANT (XARELTO ANTICOAGULANT) 20 MG TABS tablet Take 1 tablet (20 mg) by mouth daily (with dinner) 90 tablet 4    triamcinolone (KENALOG) 0.1 % external cream Apply topically 2 times daily 80 g 4     No Known Allergies  Current providers sharing in care for this patient include:  Patient Care Team:  Avery Duke MD as PCP - General (Internal Medicine)  Piper Polk RN as Nurse " Coordinator (Vascular Surgery)  Odessa Browning MD as MD (Vascular Surgery)  Norah Brown MD as MD (Cardiology)  Quinn Rodriguez MD as MD (Oncology)  Bryanna Rubio, RN as Specialty Care Coordinator (Cardiology)  Norah Brown MD as MD (Cardiology)  Avery Duke MD as Assigned PCP  Avery Duke MD as Assigned Pain Medication Provider    The following health maintenance items are reviewed in Epic and correct as of today:  Health Maintenance   Topic Date Due    HF ACTION PLAN  10/17/2019    LUNG CANCER SCREENING  11/12/2021    MEDICARE ANNUAL WELLNESS VISIT  02/13/2024    HEPATITIS A IMMUNIZATION (3 of 3 - Hep A Twinrix risk 3-dose series) 03/19/2024    BMP  07/18/2024    CBC  10/18/2024    HEMOGLOBIN  10/18/2024    CONTROLLED SUBSTANCE AGREEMENT FOR CHRONIC PAIN MANAGEMENT  10/18/2024    ALT  01/18/2025    LIPID  01/18/2025    MICROALBUMIN  01/18/2025    URINE DRUG SCREEN  01/18/2025    ANNUAL REVIEW OF HM ORDERS  01/18/2025    MAGDALENA ASSESSMENT  03/28/2025    FALL RISK ASSESSMENT  03/28/2025    PHQ-9  03/28/2025    GLUCOSE  01/18/2027    COLORECTAL CANCER SCREENING  03/16/2027    ADVANCE CARE PLANNING  02/14/2028    DTAP/TDAP/TD IMMUNIZATION (4 - Td or Tdap) 11/19/2029    TSH W/FREE T4 REFLEX  Completed    PHQ-2 (once per calendar year)  Completed    INFLUENZA VACCINE  Completed    Pneumococcal Vaccine: 65+ Years  Completed    URINALYSIS  Completed    ZOSTER IMMUNIZATION  Completed    HEPATITIS B IMMUNIZATION  Completed    RSV VACCINE (Pregnancy & 60+)  Completed    AORTIC ANEURYSM SCREENING (SYSTEM ASSIGNED)  Completed    COVID-19 Vaccine  Completed    IPV IMMUNIZATION  Aged Out    HPV IMMUNIZATION  Aged Out    MENINGITIS IMMUNIZATION  Aged Out    RSV MONOCLONAL ANTIBODY  Aged Out         Review of Systems  Constitutional, HEENT, cardiovascular, pulmonary, gi and gu systems are negative, except as otherwise noted.     Objective    Exam  BP (!) 148/80   Pulse 81   Temp 97.2  F (36.2  C)   " Resp 18   Ht 1.88 m (6' 2\")   Wt 93 kg (205 lb)   SpO2 98%   BMI 26.32 kg/m     Estimated body mass index is 26.32 kg/m  as calculated from the following:    Height as of this encounter: 1.88 m (6' 2\").    Weight as of this encounter: 93 kg (205 lb).    Physical Exam  GENERAL: alert and no distress  EYES: Eyes grossly normal to inspection, PERRL and conjunctivae and sclerae normal  HENT: ear canals and TM's normal, nose and mouth without ulcers or lesions  NECK: no adenopathy, no asymmetry, masses, or scars  RESP: lungs clear to auscultation - no rales, rhonchi or wheezes  CV: regular rate and rhythm, normal S1 S2, no S3 or S4, no murmur, click or rub, no peripheral edema  ABDOMEN: soft, nontender, no hepatosplenomegaly, no masses and bowel sounds normal  MS: no gross musculoskeletal defects noted, no edema. Left stump covered   MS: LLE exam shows BKA stump  SKIN: Rashes on the right lower leg  NEURO: Normal strength and tone, mentation intact and speech normal  PSYCH: mentation appears normal, affect normal/bright         No data to display                       Note by Wilian Diaz NP Student      Signed Electronically by: Avery Duke MD    "

## 2024-03-28 NOTE — PROGRESS NOTES
"  {PROVIDER CHARTING PREFERENCE:985817}    Ronak Meeks is a 68 year old, presenting for the following health issues:  Medication Follow-up        3/28/2024     7:51 AM   Additional Questions   Roomed by Joan     History of Present Illness       Reason for visit:  Doc want to see me/medication recheck    He eats 0-1 servings of fruits and vegetables daily.He consumes 2 sweetened beverage(s) daily.He exercises with enough effort to increase his heart rate 9 or less minutes per day.  He exercises with enough effort to increase his heart rate 3 or less days per week. He is missing 1 dose(s) of medications per week.         {SUPERLIST (Optional):604876}  {additonal problems for provider to add (Optional):609296}    {ROS Picklists (Optional):618161}      Objective    BP (!) 153/89 (BP Location: Left arm, Patient Position: Chair, Cuff Size: Adult Large)   Pulse 81   Temp 97.2  F (36.2  C)   Resp 18   Ht 1.88 m (6' 2\")   Wt 93 kg (205 lb)   SpO2 98%   BMI 26.32 kg/m    Body mass index is 26.32 kg/m .  Physical Exam   {Exam List (Optional):599641}    {Diagnostic Test Results (Optional):649811}        Signed Electronically by: Avery Duke MD  {Email feedback regarding this note to [email or web address]   :757570}  "

## 2024-04-10 ENCOUNTER — OFFICE VISIT (OUTPATIENT)
Dept: FAMILY MEDICINE | Facility: CLINIC | Age: 69
End: 2024-04-10
Payer: COMMERCIAL

## 2024-04-10 VITALS
HEART RATE: 78 BPM | SYSTOLIC BLOOD PRESSURE: 131 MMHG | WEIGHT: 203 LBS | DIASTOLIC BLOOD PRESSURE: 84 MMHG | HEIGHT: 74 IN | BODY MASS INDEX: 26.05 KG/M2 | OXYGEN SATURATION: 100 % | TEMPERATURE: 97.6 F

## 2024-04-10 DIAGNOSIS — Z01.818 PREOP GENERAL PHYSICAL EXAM: ICD-10-CM

## 2024-04-10 DIAGNOSIS — Z23 NEED FOR PROPHYLACTIC VACCINATION AGAINST HEPATITIS A: ICD-10-CM

## 2024-04-10 DIAGNOSIS — F43.22 ADJUSTMENT DISORDER WITH ANXIOUS MOOD: Primary | ICD-10-CM

## 2024-04-10 LAB
ALBUMIN SERPL BCG-MCNC: 4.5 G/DL (ref 3.5–5.2)
ALP SERPL-CCNC: 123 U/L (ref 40–150)
ALT SERPL W P-5'-P-CCNC: 40 U/L (ref 0–70)
ANION GAP SERPL CALCULATED.3IONS-SCNC: 10 MMOL/L (ref 7–15)
AST SERPL W P-5'-P-CCNC: 31 U/L (ref 0–45)
BASOPHILS # BLD AUTO: 0 10E3/UL (ref 0–0.2)
BASOPHILS NFR BLD AUTO: 0 %
BILIRUB SERPL-MCNC: 0.6 MG/DL
BUN SERPL-MCNC: 22.7 MG/DL (ref 8–23)
CALCIUM SERPL-MCNC: 10 MG/DL (ref 8.8–10.2)
CHLORIDE SERPL-SCNC: 104 MMOL/L (ref 98–107)
CREAT SERPL-MCNC: 1.39 MG/DL (ref 0.67–1.17)
DEPRECATED HCO3 PLAS-SCNC: 25 MMOL/L (ref 22–29)
EGFRCR SERPLBLD CKD-EPI 2021: 56 ML/MIN/1.73M2
EOSINOPHIL # BLD AUTO: 0.3 10E3/UL (ref 0–0.7)
EOSINOPHIL NFR BLD AUTO: 5 %
ERYTHROCYTE [DISTWIDTH] IN BLOOD BY AUTOMATED COUNT: 13.2 % (ref 10–15)
GLUCOSE SERPL-MCNC: 110 MG/DL (ref 70–99)
HCT VFR BLD AUTO: 39.6 % (ref 40–53)
HGB BLD-MCNC: 13.4 G/DL (ref 13.3–17.7)
IMM GRANULOCYTES # BLD: 0 10E3/UL
IMM GRANULOCYTES NFR BLD: 0 %
LYMPHOCYTES # BLD AUTO: 1.4 10E3/UL (ref 0.8–5.3)
LYMPHOCYTES NFR BLD AUTO: 25 %
MCH RBC QN AUTO: 29.6 PG (ref 26.5–33)
MCHC RBC AUTO-ENTMCNC: 33.8 G/DL (ref 31.5–36.5)
MCV RBC AUTO: 88 FL (ref 78–100)
MONOCYTES # BLD AUTO: 0.5 10E3/UL (ref 0–1.3)
MONOCYTES NFR BLD AUTO: 8 %
NEUTROPHILS # BLD AUTO: 3.6 10E3/UL (ref 1.6–8.3)
NEUTROPHILS NFR BLD AUTO: 62 %
PLATELET # BLD AUTO: 205 10E3/UL (ref 150–450)
POTASSIUM SERPL-SCNC: 4.7 MMOL/L (ref 3.4–5.3)
PROT SERPL-MCNC: 8.6 G/DL (ref 6.4–8.3)
RBC # BLD AUTO: 4.52 10E6/UL (ref 4.4–5.9)
SODIUM SERPL-SCNC: 139 MMOL/L (ref 135–145)
WBC # BLD AUTO: 5.8 10E3/UL (ref 4–11)

## 2024-04-10 PROCEDURE — 80053 COMPREHEN METABOLIC PANEL: CPT | Performed by: INTERNAL MEDICINE

## 2024-04-10 PROCEDURE — 93000 ELECTROCARDIOGRAM COMPLETE: CPT | Performed by: INTERNAL MEDICINE

## 2024-04-10 PROCEDURE — 99214 OFFICE O/P EST MOD 30 MIN: CPT | Mod: 25 | Performed by: INTERNAL MEDICINE

## 2024-04-10 PROCEDURE — 85025 COMPLETE CBC W/AUTO DIFF WBC: CPT | Performed by: INTERNAL MEDICINE

## 2024-04-10 PROCEDURE — 96127 BRIEF EMOTIONAL/BEHAV ASSMT: CPT | Performed by: INTERNAL MEDICINE

## 2024-04-10 PROCEDURE — 36415 COLL VENOUS BLD VENIPUNCTURE: CPT | Performed by: INTERNAL MEDICINE

## 2024-04-10 RX ORDER — VENLAFAXINE HYDROCHLORIDE 75 MG/1
75 CAPSULE, EXTENDED RELEASE ORAL DAILY
Qty: 90 CAPSULE | Refills: 4 | Status: SHIPPED | OUTPATIENT
Start: 2024-04-10

## 2024-04-10 ASSESSMENT — PATIENT HEALTH QUESTIONNAIRE - PHQ9
10. IF YOU CHECKED OFF ANY PROBLEMS, HOW DIFFICULT HAVE THESE PROBLEMS MADE IT FOR YOU TO DO YOUR WORK, TAKE CARE OF THINGS AT HOME, OR GET ALONG WITH OTHER PEOPLE: NOT DIFFICULT AT ALL
SUM OF ALL RESPONSES TO PHQ QUESTIONS 1-9: 2
SUM OF ALL RESPONSES TO PHQ QUESTIONS 1-9: 2
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
## 2024-04-10 NOTE — PATIENT INSTRUCTIONS
Preparing for Your Surgery  Getting started  A nurse will call you to review your health history and instructions. They will give you an arrival time based on your scheduled surgery time. Please be ready to share:  Your doctor's clinic name and phone number  Your medical, surgical, and anesthesia history  A list of allergies and sensitivities  A list of medicines, including herbal treatments and over-the-counter drugs  Whether the patient has a legal guardian (ask how to send us the papers in advance)  Please tell us if you're pregnant--or if there's any chance you might be pregnant. Some surgeries may injure a fetus (unborn baby), so they require a pregnancy test. Surgeries that are safe for a fetus don't always need a test, and you can choose whether to have one.   If you have a child who's having surgery, please ask for a copy of Preparing for Your Child's Surgery.    Preparing for surgery  Within 10 to 30 days of surgery: Have a pre-op exam (sometimes called an H&P, or History and Physical). This can be done at a clinic or pre-operative center.  If you're having a , you may not need this exam. Talk to your care team.  At your pre-op exam, talk to your care team about all medicines you take. If you need to stop any medicines before surgery, ask when to start taking them again.  We do this for your safety. Many medicines can make you bleed too much during surgery. Some change how well surgery (anesthesia) drugs work.  Call your insurance company to let them know you're having surgery. (If you don't have insurance, call [phone].)  Call your clinic if there's any change in your health. This includes signs of a cold or flu (sore throat, runny nose, cough, rash, fever). It also includes a scrape or scratch near the surgery site.  If you have questions on the day of surgery, call your hospital or surgery center.  Eating and drinking guidelines  For your safety: Unless your surgeon tells you otherwise,  follow the guidelines below.  Eat and drink as usual until 8 hours before you arrive for surgery. After that, no food or milk.  Drink clear liquids until 2 hours before you arrive. These are liquids you can see through, like water, Gatorade, and Propel Water. They also include plain black coffee and tea (no cream or milk), candy, and breath mints. You can spit out gum when you arrive.  If you drink alcohol: Stop drinking it the night before surgery.  If your care team tells you to take medicine on the morning of surgery, it's okay to take it with a sip of water.  Preventing infection  Shower or bathe the night before and morning of your surgery. Follow the instructions your clinic gave you. (If no instructions, use regular soap.)  Don't shave or clip hair near your surgery site. We'll remove the hair if needed.  Don't smoke or vape the morning of surgery. You may chew nicotine gum up to 2 hours before surgery. A nicotine patch is okay.  Note: Some surgeries require you to completely quit smoking and nicotine. Check with your surgeon.  Your care team will make every effort to keep you safe from infection. We will:  Clean our hands often with soap and water (or an alcohol-based hand rub).  Clean the skin at your surgery site with a special soap that kills germs.  Give you a special gown to keep you warm. (Cold raises the risk of infection.)  Wear special hair covers, masks, gowns and gloves during surgery.  Give antibiotic medicine, if prescribed. Not all surgeries need antibiotics.  What to bring on the day of surgery  Photo ID and insurance card  Copy of your health care directive, if you have one  Glasses and hearing aids (bring cases)  You can't wear contacts during surgery  Inhaler and eye drops, if you use them (tell us about these when you arrive)  CPAP machine or breathing device, if you use them  A few personal items, if spending the night  If you have . . .  A pacemaker, ICD (cardiac defibrillator) or other  implant: Bring the ID card.  An implanted stimulator: Bring the remote control.  A legal guardian: Bring a copy of the certified (court-stamped) guardianship papers.  Please remove any jewelry, including body piercings. Leave jewelry and other valuables at home.  If you're going home the day of surgery  You must have a responsible adult drive you home. They should stay with you overnight as well.  If you don't have someone to stay with you, and you aren't safe to go home alone, we may keep you overnight. Insurance often won't pay for this.  After surgery  If it's hard to control your pain or you need more pain medicine, please call your surgeon's office.  Questions?   If you have any questions for your care team, list them here: _________________________________________________________________________________________________________________________________________________________________________ ____________________________________ ____________________________________ ____________________________________  For informational purposes only. Not to replace the advice of your health care provider. Copyright   2003, 2019 [employer] [facility]. All rights reserved. Clinically reviewed by Cathy Leonadr MD. [employer]ART[employer] 409987 - REV 12/22.    How to Take Your Medication Before Surgery  - Take all of your medications before surgery except as noted below

## 2024-04-20 ENCOUNTER — HEALTH MAINTENANCE LETTER (OUTPATIENT)
Age: 69
End: 2024-04-20

## 2024-04-20 DIAGNOSIS — I10 HYPERTENSION GOAL BP (BLOOD PRESSURE) < 140/90: ICD-10-CM

## 2024-04-22 RX ORDER — HYDROCHLOROTHIAZIDE 12.5 MG/1
12.5 TABLET ORAL DAILY
Qty: 93 TABLET | Refills: 0 | Status: SHIPPED | OUTPATIENT
Start: 2024-04-22 | End: 2024-06-25

## 2024-04-29 DIAGNOSIS — F11.90 CHRONIC, CONTINUOUS USE OF OPIOIDS: ICD-10-CM

## 2024-04-29 DIAGNOSIS — G89.29 CHRONIC LOW BACK PAIN WITH SCIATICA, SCIATICA LATERALITY UNSPECIFIED, UNSPECIFIED BACK PAIN LATERALITY: ICD-10-CM

## 2024-04-29 DIAGNOSIS — M54.40 CHRONIC LOW BACK PAIN WITH SCIATICA, SCIATICA LATERALITY UNSPECIFIED, UNSPECIFIED BACK PAIN LATERALITY: ICD-10-CM

## 2024-04-29 DIAGNOSIS — G89.18 ACUTE POST-OPERATIVE PAIN: ICD-10-CM

## 2024-04-29 RX ORDER — OXYCODONE HYDROCHLORIDE 10 MG/1
10 TABLET ORAL EVERY 8 HOURS PRN
Qty: 90 TABLET | Refills: 0 | Status: SHIPPED | OUTPATIENT
Start: 2024-04-29 | End: 2024-05-30
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
## 2024-05-02 ENCOUNTER — MEDICAL CORRESPONDENCE (OUTPATIENT)
Dept: HEALTH INFORMATION MANAGEMENT | Facility: CLINIC | Age: 69
End: 2024-05-02
Payer: COMMERCIAL

## 2024-05-30 DIAGNOSIS — F11.90 CHRONIC, CONTINUOUS USE OF OPIOIDS: ICD-10-CM

## 2024-05-30 DIAGNOSIS — M54.40 CHRONIC LOW BACK PAIN WITH SCIATICA, SCIATICA LATERALITY UNSPECIFIED, UNSPECIFIED BACK PAIN LATERALITY: ICD-10-CM

## 2024-05-30 DIAGNOSIS — G89.29 CHRONIC LOW BACK PAIN WITH SCIATICA, SCIATICA LATERALITY UNSPECIFIED, UNSPECIFIED BACK PAIN LATERALITY: ICD-10-CM

## 2024-05-30 DIAGNOSIS — G89.18 ACUTE POST-OPERATIVE PAIN: ICD-10-CM

## 2024-05-30 RX ORDER — OXYCODONE HYDROCHLORIDE 10 MG/1
10 TABLET ORAL EVERY 8 HOURS PRN
Qty: 90 TABLET | Refills: 0 | Status: SHIPPED | OUTPATIENT
Start: 2024-05-30 | End: 2024-07-01

## 2024-05-30 NOTE — TELEPHONE ENCOUNTER
Pt calling to request a refill for Oxycodone. States he ran out of medication yesterday. RN advised pt to contact clinic prior to running out of medication and advised of 72 hour turnaround time for refills and that we cannot guarantee this can be refilled today. Pt verbalized understanding and states he will call in advance next time.    Routing to PCP as high priority as pt is out of medication.    Script pended. Pharmacy cued.    Wandy Beverly RN  [facility]

## 2024-06-03 ENCOUNTER — TELEPHONE (OUTPATIENT)
Dept: FAMILY MEDICINE | Facility: CLINIC | Age: 69
End: 2024-06-03
Payer: COMMERCIAL

## 2024-06-03 NOTE — TELEPHONE ENCOUNTER
Reason for Call:  Appointment Request    Patient requesting this type of appt:  Office Visit    Requested provider: Avery Duke    Reason patient unable to be scheduled: Not within requested timeframe    When does patient want to be seen/preferred time:  End of the Month    Comments: Med Check F/U    Could we send this information to you in [facility] or would you prefer to receive a phone call?:   Patient would prefer a phone call   Okay to leave a detailed message?: Yes at Home number on file [phone] (home)    Call taken on 6/3/2024 at 9:35 AM by Nini Singh

## 2024-06-14 NOTE — TELEPHONE ENCOUNTER
There was no TE in regards to holding patient's Xarelto prior to patient having the colonoscopy. They did find a polyp and are needing orders to hold patient's Xarelto per message below and if he needs bridging.    Routed to provider to advise.  Viviane Love RN  [facility]     Patient

## 2024-06-25 ENCOUNTER — OFFICE VISIT (OUTPATIENT)
Dept: FAMILY MEDICINE | Facility: CLINIC | Age: 69
End: 2024-06-25
Payer: COMMERCIAL

## 2024-06-25 VITALS
OXYGEN SATURATION: 100 % | RESPIRATION RATE: 20 BRPM | TEMPERATURE: 97.2 F | HEART RATE: 59 BPM | SYSTOLIC BLOOD PRESSURE: 144 MMHG | BODY MASS INDEX: 26.83 KG/M2 | DIASTOLIC BLOOD PRESSURE: 86 MMHG | WEIGHT: 209 LBS

## 2024-06-25 DIAGNOSIS — N40.0 BENIGN PROSTATIC HYPERPLASIA WITHOUT LOWER URINARY TRACT SYMPTOMS: Primary | ICD-10-CM

## 2024-06-25 DIAGNOSIS — Z23 NEED FOR PROPHYLACTIC VACCINATION AGAINST HEPATITIS A: ICD-10-CM

## 2024-06-25 DIAGNOSIS — M86.662: ICD-10-CM

## 2024-06-25 DIAGNOSIS — Z87.891 PERSONAL HISTORY OF TOBACCO USE: ICD-10-CM

## 2024-06-25 DIAGNOSIS — I10 HYPERTENSION GOAL BP (BLOOD PRESSURE) < 140/90: ICD-10-CM

## 2024-06-25 PROCEDURE — 99214 OFFICE O/P EST MOD 30 MIN: CPT | Performed by: INTERNAL MEDICINE

## 2024-06-25 PROCEDURE — G0296 VISIT TO DETERM LDCT ELIG: HCPCS | Performed by: INTERNAL MEDICINE

## 2024-06-25 RX ORDER — TAMSULOSIN HYDROCHLORIDE 0.4 MG/1
0.8 CAPSULE ORAL DAILY
COMMUNITY
End: 2024-06-25

## 2024-06-25 RX ORDER — HYDROCHLOROTHIAZIDE 12.5 MG/1
12.5 TABLET ORAL DAILY
Qty: 93 TABLET | Refills: 4 | Status: SHIPPED | OUTPATIENT
Start: 2024-06-25

## 2024-06-25 RX ORDER — TAMSULOSIN HYDROCHLORIDE 0.4 MG/1
0.8 CAPSULE ORAL DAILY
Qty: 180 CAPSULE | Refills: 4 | Status: SHIPPED | OUTPATIENT
Start: 2024-06-25

## 2024-06-25 NOTE — PROGRESS NOTES
Assessment & Plan   Problem List Items Addressed This Visit       Hypertension goal BP (blood pressure) < 140/90    Relevant Medications    hydroCHLOROthiazide 12.5 MG tablet     Other Visit Diagnoses       Benign prostatic hyperplasia without lower urinary tract symptoms    -  Primary    Relevant Medications    tamsulosin (FLOMAX) 0.4 MG capsule    Need for prophylactic vaccination against hepatitis A        Chronic osteomyelitis of left lower leg (H)        Personal history of tobacco use        Relevant Orders    Prof fee: Shared Decision Making for Lung Cancer Screening (Completed)    CT Chest Lung Cancer Scrn Low Dose wo                  Work on weight loss  Regular exercise      Ronak Meeks is a 68 year old, presenting for the following health issues:  Medication Follow-up        6/25/2024     9:55 AM   Additional Questions   Roomed by Joan     History of Present Illness       Reason for visit:  Med check    He eats 2-3 servings of fruits and vegetables daily.He consumes 0 sweetened beverage(s) daily.He exercises with enough effort to increase his heart rate 9 or less minutes per day.  He exercises with enough effort to increase his heart rate 3 or less days per week.   He is taking medications regularly.     Check on the cancer.   Dental pins going             Review of Systems  Constitutional, HEENT, cardiovascular, pulmonary, gi and gu systems are negative, except as otherwise noted.      Objective    BP (!) 144/86   Pulse 59   Temp 97.2  F (36.2  C)   Resp 20   Wt 94.8 kg (209 lb)   SpO2 100%   BMI 26.83 kg/m    Body mass index is 26.83 kg/m .  Physical Exam   GENERAL: alert and no distress  EYES: Eyes grossly normal to inspection, PERRL and conjunctivae and sclerae normal  HENT: ear canals and TM's normal, nose and mouth without ulcers or lesions  NECK: no adenopathy, no asymmetry, masses, or scars  RESP: lungs clear to auscultation - no rales, rhonchi or wheezes  CV: regular rate and  rhythm, normal S1 S2, no S3 or S4, no murmur, click or rub, no peripheral edema  ABDOMEN: soft, nontender, no hepatosplenomegaly, no masses and bowel sounds normal  MS: no gross musculoskeletal defects noted, no edema  SKIN: no suspicious lesions or rashes  NEURO: Normal strength and tone, mentation intact and speech normal  BACK: no CVA tenderness, no paralumbar tenderness  PSYCH: mentation appears normal, affect normal/bright  LYMPH: no cervical, supraclavicular, axillary, or inguinal adenopathy    No results found for any visits on 06/25/24.        Signed Electronically by: Avery Duke MD  Lung Cancer Screening Shared Decision Making Visit     Constantino Taylor, a 68 year old male, is eligible for lung cancer screening    History   Smoking Status    Former    Packs/day: 0.30    Years: 40.00    Types: Cigarettes    Quit date: 3/12/2018   Smokeless Tobacco    Former       I have discussed with patient the risks and benefits of screening for lung cancer with low-dose CT.     The risks include:    radiation exposure: one low dose chest CT has as much ionizing radiation as about 15 chest x-rays, or 6 months of background radiation living in [place]      false positives: most findings/nodules are NOT cancer, but some might still require additional diagnostic evaluation, including biopsy    over-diagnosis: some slow growing cancers that might never have been clinically significant will be detected and treated unnecessarily     The benefit of early detection of lung cancer is contingent upon adherence to annual screening or more frequent follow up if indicated.     Furthermore, to benefit from screening, Constantino must be willing and able to undergo diagnostic procedures, if indicated. Although no specific guide is available for determining severity of comorbidities, it is reasonable to withhold screening in patients who have greater mortality risk from other diseases.     We did discuss that the best way to prevent  lung cancer is to not smoke.    Some patients may value a numeric estimation of lung cancer risk when evaluating if lung cancer screening is right for them, here is one calculator:    ShouldIScreen

## 2024-06-25 NOTE — PATIENT INSTRUCTIONS
Lung Cancer Screening   Frequently Asked Questions  If you are at high-risk for lung cancer, getting screened with low-dose computed tomography (LDCT) every year can help save your life. This handout offers answers to some of the most common questions about lung cancer screening. If you have other questions, please call [phone] ([phone]).     What is it?  Lung cancer screening uses special X-ray technology to create an image of your lung tissue. The exam is quick and easy and takes less than 10 seconds. We don t give you any medicine or use any needles. You can eat before and after the exam. You don t need to change your clothes as long as the clothing on your chest doesn t contain metal. But, you do need to be able to hold your breath for at least 6 seconds during the exam.    What is the goal of lung cancer screening?  The goal of lung cancer screening is to save lives. Many times, lung cancer is not found until a person starts having physical symptoms. Lung cancer screening can help detect lung cancer in the earliest stages when it may be easier to treat.    Who should be screened for lung cancer?  We suggest lung cancer screening for anyone who is at high-risk for lung cancer. You are in the high-risk group if you:      are between the ages of 55 and 79, and    have smoked at least 1 pack of cigarettes a day for 20 or more years, and    still smoke or have quit within the past 15 years.    However, if you have a new cough or shortness of breath, you should talk to your doctor before being screened.    Why does it matter if I have symptoms?  Certain symptoms can be a sign that you have a condition in your lungs that should be checked and treated by your doctor. These symptoms include fever, chest pain, a new or changing cough, shortness of breath that you have never felt before, coughing up blood or unexplained weight loss. Having any of these symptoms can greatly affect the results of lung  cancer screening.       Should all smokers get an LDCT lung cancer screening exam?  It depends. Lung cancer screening is for a very specific group of men and women who have a history of heavy smoking over a long period of time (see  Who should be screened for lung cancer  above).  I am in the high-risk group, but have been diagnosed with cancer in the past. Is LDCT lung cancer screening right for me?  In some cases, you should not have LDCT lung screening, such as when your doctor is already following your cancer with CT scan studies. Your doctor will help you decide if LDCT lung screening is right for you.  Do I need to have a screening exam every year?  Yes. If you are in the high-risk group described earlier, you should get an LDCT lung cancer screening exam every year until you are 79, or are no longer willing or able to undergo screening and possible procedures to diagnose and treat lung cancer.  How effective is LDCT at preventing death from lung cancer?  Studies have shown that LDCT lung cancer screening can lower the risk of death from lung cancer by 20 percent in people who are at high-risk.  What are the risks?  There are some risks and limitations of LDCT lung cancer screening. We want to make sure you understand the risks and benefits, so please let us know if you have any questions. Your doctor may want to talk with you more about these risks.    Radiation exposure: As with any exam that uses radiation, there is a very small increased risk of cancer. The amount of radiation in LDCT is small--about the same amount a person would get from a mammogram. Your doctor orders the exam when he or she feels the potential benefits outweigh the risks.    False negatives: No test is perfect, including LDCT. It is possible that you may have a medical condition, including lung cancer, that is not found during your exam. This is called a false negative result.    False positives and more testing: LDCT very often finds  something in the lung that could be cancer, but in fact is not. This is called a false positive result. False positive tests often cause anxiety. To make sure these findings are not cancer, you may need to have more tests. These tests will be done only if you give us permission. Sometimes patients need a treatment that can have side effects, such as a biopsy. For more information on false positives, see  What can I expect from the results?     Findings not related to lung cancer: Your LDCT exam also takes pictures of areas of your body next to your lungs. In a very small number of cases, the CT scan will show an abnormal finding in one of these areas, such as your kidneys, adrenal glands, liver or thyroid. This finding may not be serious, but you may need more tests. Your doctor can help you decide what other tests you may need, if any.  What can I expect from the results?  About 1 out of 4 LDCT exams will find something that may need more tests. Most of the time, these findings are lung nodules. Lung nodules are very small collections of tissue in the lung. These nodules are very common, and the vast majority--more than 97 percent--are not cancer (benign). Most are normal lymph nodes or small areas of scarring from past infections.  But, if a small lung nodule is found to be cancer, the cancer can be cured more than 90 percent of the time. To know if the nodule is cancer, we may need to get more images before your next yearly screening exam. If the nodule has suspicious features (for example, it is large, has an odd shape or grows over time), we will refer you to a specialist for further testing.  Will my doctor also get the results?  Yes. Your doctor will get a copy of your results.  Is it okay to keep smoking now that there s a cancer screening exam?  No. Tobacco is one of the strongest cancer-causing agents. It causes not only lung cancer, but other cancers and cardiovascular (heart) diseases as well. The damage  caused by smoking builds over time. This means that the longer you smoke, the higher your risk of disease. While it is never too late to quit, the sooner you quit, the better.  Where can I find help to quit smoking?  The best way to prevent lung cancer is to stop smoking. If you have already quit smoking, congratulations and keep it up! For help on quitting smoking, please call [employer] at 4-515-QUITNOW (1-832.861.2513) or the American Cancer Society at 1-741.421.6079 to find local resources near you.  One-on-one health coaching:  If you d prefer to work individually with a health care provider on tobacco cessation, we offer:      Medication Therapy Management:  Our specially trained pharmacists work closely with you and your doctor to help you quit smoking.  Call [phone] or [phone] (toll free).

## 2024-06-30 DIAGNOSIS — F11.90 CHRONIC, CONTINUOUS USE OF OPIOIDS: ICD-10-CM

## 2024-06-30 DIAGNOSIS — G89.29 CHRONIC LOW BACK PAIN WITH SCIATICA, SCIATICA LATERALITY UNSPECIFIED, UNSPECIFIED BACK PAIN LATERALITY: ICD-10-CM

## 2024-06-30 DIAGNOSIS — G89.18 ACUTE POST-OPERATIVE PAIN: ICD-10-CM

## 2024-06-30 DIAGNOSIS — M54.40 CHRONIC LOW BACK PAIN WITH SCIATICA, SCIATICA LATERALITY UNSPECIFIED, UNSPECIFIED BACK PAIN LATERALITY: ICD-10-CM

## 2024-06-30 NOTE — TELEPHONE ENCOUNTER
Medication Question or Refill    Contacts       Contact Date/Time Type Contact Phone/Fax    06/30/2024 12:03 PM CDT Phone (Incoming) Constantino Taylor (Self) [phone] (M)            What medication are you calling about (include dose and sig)?: Oxycodone, 10 mg, 3 tabs a day as needed     Preferred Pharmacy:       [facility] [address] [place] [facility] [place] [address] [place] [address] [address] [facility] [address]  Phone: [phone] Fax: [phone]      Controlled Substance Agreement on file:   CSA -- Patient Level:     [Media Unavailable] Controlled Substance Agreement - Opioid - Scan on 10/18/2023  4:43 PM   [Media Unavailable] Controlled Substance Agreement - Opioid - Scan on 5/31/2022 11:29 AM   [Media Unavailable] Controlled Substance Agreement - Opioid - Scan on 2/11/2021 12:41 PM       Who prescribed the medication?: Dr. Duke    Do you need a refill? Yes    When did you use the medication last? Yesterday, 06/29/2024    Patient offered an appointment? Yes: patient just had an appointment     Do you have any questions or concerns?  Yes: Patient is currently out of medication       Could we send this information to you in [facility] or would you prefer to receive a phone call?:   Patient would prefer a phone call   Okay to leave a detailed message?: Yes at Home number on file [phone] (home)

## 2024-07-01 RX ORDER — OXYCODONE HYDROCHLORIDE 10 MG/1
10 TABLET ORAL EVERY 8 HOURS PRN
Qty: 90 TABLET | Refills: 0 | Status: SHIPPED | OUTPATIENT
Start: 2024-07-01 | End: 2024-07-30

## 2024-07-30 DIAGNOSIS — G89.18 ACUTE POST-OPERATIVE PAIN: ICD-10-CM

## 2024-07-30 DIAGNOSIS — M54.40 CHRONIC LOW BACK PAIN WITH SCIATICA, SCIATICA LATERALITY UNSPECIFIED, UNSPECIFIED BACK PAIN LATERALITY: ICD-10-CM

## 2024-07-30 DIAGNOSIS — G89.29 CHRONIC LOW BACK PAIN WITH SCIATICA, SCIATICA LATERALITY UNSPECIFIED, UNSPECIFIED BACK PAIN LATERALITY: ICD-10-CM

## 2024-07-30 DIAGNOSIS — F11.90 CHRONIC, CONTINUOUS USE OF OPIOIDS: ICD-10-CM

## 2024-07-30 NOTE — TELEPHONE ENCOUNTER
Medication Question or Refill    Contacts       Contact Date/Time Type Contact Phone/Fax    07/30/2024 01:23 PM CDT Phone (Incoming) Constantino Taylor (Self) [phone] (M)            What medication are you calling about (include dose and sig)?: Oxycodone IR 10 MG tablet     Preferred Pharmacy:     [facility] [address] [address] [facility] [place] [address] [address] [place] [address] [facility] [facility] [address]  Phone: [phone] Fax: [phone]    Controlled Substance Agreement on file:   CSA -- Patient Level:     [Media Unavailable] Controlled Substance Agreement - Opioid - Scan on 10/18/2023  4:43 PM   [Media Unavailable] Controlled Substance Agreement - Opioid - Scan on 5/31/2022 11:29 AM   [Media Unavailable] Controlled Substance Agreement - Opioid - Scan on 2/11/2021 12:41 PM       Who prescribed the medication?: Avery Duke   Do you need a refill? Yes  Patient offered an appointment? No  Just seen  Do you have any questions or concerns?  No    Could we send this information to you in [facility] or would you prefer to receive a phone call?:   Patient would prefer a phone call   Okay to leave a detailed message?: Yes at Cell number on file:    Telephone Information:   Mobile [phone]

## 2024-07-31 RX ORDER — OXYCODONE HYDROCHLORIDE 10 MG/1
10 TABLET ORAL EVERY 8 HOURS PRN
Qty: 90 TABLET | Refills: 0 | Status: SHIPPED | OUTPATIENT
Start: 2024-07-31 | End: 2024-09-04

## 2024-08-13 ENCOUNTER — ANCILLARY PROCEDURE (OUTPATIENT)
Dept: CT IMAGING | Facility: CLINIC | Age: 69
End: 2024-08-13
Attending: INTERNAL MEDICINE
Payer: COMMERCIAL

## 2024-08-13 DIAGNOSIS — Z87.891 PERSONAL HISTORY OF TOBACCO USE: ICD-10-CM

## 2024-08-13 PROCEDURE — 71271 CT THORAX LUNG CANCER SCR C-: CPT | Performed by: RADIOLOGY

## 2024-08-17 DIAGNOSIS — I63.19 CEREBRAL INFARCTION DUE TO EMBOLISM OF OTHER PRECEREBRAL ARTERY (H): ICD-10-CM

## 2024-08-19 RX ORDER — ATORVASTATIN CALCIUM 40 MG/1
40 TABLET, FILM COATED ORAL AT BEDTIME
Qty: 90 TABLET | Refills: 0 | Status: SHIPPED | OUTPATIENT
Start: 2024-08-19

## 2024-08-26 DIAGNOSIS — I48.0 PAROXYSMAL ATRIAL FIBRILLATION (H): ICD-10-CM

## 2024-08-28 RX ORDER — CARVEDILOL 25 MG/1
25 TABLET ORAL 2 TIMES DAILY WITH MEALS
Qty: 186 TABLET | Refills: 4 | Status: SHIPPED | OUTPATIENT
Start: 2024-08-28

## 2024-09-04 ENCOUNTER — TELEPHONE (OUTPATIENT)
Dept: FAMILY MEDICINE | Facility: CLINIC | Age: 69
End: 2024-09-04
Payer: COMMERCIAL

## 2024-09-04 DIAGNOSIS — G89.29 CHRONIC LOW BACK PAIN WITH SCIATICA, SCIATICA LATERALITY UNSPECIFIED, UNSPECIFIED BACK PAIN LATERALITY: ICD-10-CM

## 2024-09-04 DIAGNOSIS — G89.18 ACUTE POST-OPERATIVE PAIN: ICD-10-CM

## 2024-09-04 DIAGNOSIS — F11.90 CHRONIC, CONTINUOUS USE OF OPIOIDS: ICD-10-CM

## 2024-09-04 DIAGNOSIS — M54.40 CHRONIC LOW BACK PAIN WITH SCIATICA, SCIATICA LATERALITY UNSPECIFIED, UNSPECIFIED BACK PAIN LATERALITY: ICD-10-CM

## 2024-09-04 RX ORDER — OXYCODONE HYDROCHLORIDE 10 MG/1
10 TABLET ORAL EVERY 8 HOURS PRN
Qty: 90 TABLET | Refills: 0 | Status: SHIPPED | OUTPATIENT
Start: 2024-09-04

## 2024-09-04 NOTE — TELEPHONE ENCOUNTER
Patient is calling for a refill on his oxycodone.  He stated that he is all out of medication.    Cued up med and pharmacy.    Angelica BEAR RN  Triage Nurse  [facility]

## 2024-09-16 ENCOUNTER — OFFICE VISIT (OUTPATIENT)
Dept: FAMILY MEDICINE | Facility: CLINIC | Age: 69
End: 2024-09-16
Payer: COMMERCIAL

## 2024-09-16 ENCOUNTER — TELEPHONE (OUTPATIENT)
Dept: FAMILY MEDICINE | Facility: CLINIC | Age: 69
End: 2024-09-16

## 2024-09-16 VITALS
RESPIRATION RATE: 20 BRPM | DIASTOLIC BLOOD PRESSURE: 91 MMHG | BODY MASS INDEX: 24.77 KG/M2 | SYSTOLIC BLOOD PRESSURE: 157 MMHG | HEIGHT: 74 IN | WEIGHT: 193 LBS | OXYGEN SATURATION: 100 % | HEART RATE: 67 BPM | TEMPERATURE: 98.8 F

## 2024-09-16 DIAGNOSIS — N18.31 STAGE 3A CHRONIC KIDNEY DISEASE (H): ICD-10-CM

## 2024-09-16 DIAGNOSIS — Z23 NEED FOR PROPHYLACTIC VACCINATION AGAINST HEPATITIS A: ICD-10-CM

## 2024-09-16 DIAGNOSIS — I10 HYPERTENSION GOAL BP (BLOOD PRESSURE) < 140/90: ICD-10-CM

## 2024-09-16 DIAGNOSIS — N18.32 STAGE 3B CHRONIC KIDNEY DISEASE (H): Primary | ICD-10-CM

## 2024-09-16 DIAGNOSIS — I48.0 PAROXYSMAL ATRIAL FIBRILLATION (H): ICD-10-CM

## 2024-09-16 LAB
ANION GAP SERPL CALCULATED.3IONS-SCNC: 13 MMOL/L (ref 7–15)
BASOPHILS # BLD AUTO: 0 10E3/UL (ref 0–0.2)
BASOPHILS NFR BLD AUTO: 0 %
BUN SERPL-MCNC: 14.7 MG/DL (ref 8–23)
CALCIUM SERPL-MCNC: 9.5 MG/DL (ref 8.8–10.4)
CHLORIDE SERPL-SCNC: 105 MMOL/L (ref 98–107)
CREAT SERPL-MCNC: 1.26 MG/DL (ref 0.67–1.17)
EGFRCR SERPLBLD CKD-EPI 2021: 63 ML/MIN/1.73M2
EOSINOPHIL # BLD AUTO: 0.1 10E3/UL (ref 0–0.7)
EOSINOPHIL NFR BLD AUTO: 2 %
ERYTHROCYTE [DISTWIDTH] IN BLOOD BY AUTOMATED COUNT: 13.6 % (ref 10–15)
GLUCOSE SERPL-MCNC: 118 MG/DL (ref 70–99)
HCO3 SERPL-SCNC: 21 MMOL/L (ref 22–29)
HCT VFR BLD AUTO: 45.6 % (ref 40–53)
HGB BLD-MCNC: 15.6 G/DL (ref 13.3–17.7)
IMM GRANULOCYTES # BLD: 0 10E3/UL
IMM GRANULOCYTES NFR BLD: 0 %
INR PPP: 1.12 (ref 0.85–1.15)
LYMPHOCYTES # BLD AUTO: 0.9 10E3/UL (ref 0.8–5.3)
LYMPHOCYTES NFR BLD AUTO: 15 %
MCH RBC QN AUTO: 30.4 PG (ref 26.5–33)
MCHC RBC AUTO-ENTMCNC: 34.2 G/DL (ref 31.5–36.5)
MCV RBC AUTO: 89 FL (ref 78–100)
MONOCYTES # BLD AUTO: 0.4 10E3/UL (ref 0–1.3)
MONOCYTES NFR BLD AUTO: 7 %
NEUTROPHILS # BLD AUTO: 4.3 10E3/UL (ref 1.6–8.3)
NEUTROPHILS NFR BLD AUTO: 75 %
PLATELET # BLD AUTO: 226 10E3/UL (ref 150–450)
POTASSIUM SERPL-SCNC: 4.1 MMOL/L (ref 3.4–5.3)
RBC # BLD AUTO: 5.14 10E6/UL (ref 4.4–5.9)
SODIUM SERPL-SCNC: 139 MMOL/L (ref 135–145)
WBC # BLD AUTO: 5.7 10E3/UL (ref 4–11)

## 2024-09-16 PROCEDURE — 36415 COLL VENOUS BLD VENIPUNCTURE: CPT | Performed by: INTERNAL MEDICINE

## 2024-09-16 PROCEDURE — 85025 COMPLETE CBC W/AUTO DIFF WBC: CPT | Performed by: INTERNAL MEDICINE

## 2024-09-16 PROCEDURE — 99214 OFFICE O/P EST MOD 30 MIN: CPT | Performed by: INTERNAL MEDICINE

## 2024-09-16 PROCEDURE — 80048 BASIC METABOLIC PNL TOTAL CA: CPT | Performed by: INTERNAL MEDICINE

## 2024-09-16 PROCEDURE — 85610 PROTHROMBIN TIME: CPT | Performed by: INTERNAL MEDICINE

## 2024-09-16 NOTE — TELEPHONE ENCOUNTER
Pt calling from dental office asking for his INR results.     RN advised results are still in process. Pt wants a call back with results before 5pm tonight.     RN advised we will need these to results before calling    Patient verbalized understanding and in agreement with plan of care.     Whit Bates RN

## 2024-09-16 NOTE — TELEPHONE ENCOUNTER
Results still in process; will await result/provider note to outreach to patient.     ASHLEIGH BuchananN RN  [facility], [facility]

## 2024-09-16 NOTE — PROGRESS NOTES
"  Assessment & Plan   Problem List Items Addressed This Visit       Atrial fibrillation (H)    Relevant Orders    INR (Completed)    CKD (chronic kidney disease) stage 3, GFR 30-59 ml/min (H) - Primary    Relevant Orders    BASIC METABOLIC PANEL (Completed)    CBC with platelets and differential (Completed)    Hypertension goal BP (blood pressure) < 140/90     Other Visit Diagnoses       Need for prophylactic vaccination against hepatitis A                      Work on weight loss  Regular exercise      Ronak Meeks is a 68 year old, presenting for the following health issues:  Medication Follow-up        9/16/2024    10:04 AM   Additional Questions   Roomed by Joan     History of Present Illness       CKD: He is not using over the counter pain medicine.     Hyperlipidemia:  He presents for follow up of hyperlipidemia.   He is taking medication to lower cholesterol. He is not having myalgia or other side effects to statin medications.    Hypertension: He presents for follow up of hypertension.  He does check blood pressure  regularly outside of the clinic. Outside blood pressures have been over 140/90. He does not follow a low salt diet.     Vascular Disease:  He presents for follow up of vascular disease.    He takes nitroglycerin occasionally.  He takes daily aspirin.    He eats 2-3 servings of fruits and vegetables daily.He consumes 0 sweetened beverage(s) daily.He exercises with enough effort to increase his heart rate 9 or less minutes per day.  He exercises with enough effort to increase his heart rate 3 or less days per week.   He is taking medications regularly.               Review of Systems  Constitutional, HEENT, cardiovascular, pulmonary, gi and gu systems are negative, except as otherwise noted.      Objective    BP (!) 157/91 (BP Location: Right arm, Patient Position: Chair, Cuff Size: Adult Large)   Pulse 67   Temp 98.8  F (37.1  C)   Resp 20   Ht 1.88 m (6' 2\")   Wt 87.5 kg (193 lb)   " SpO2 100%   BMI 24.78 kg/m    Body mass index is 24.78 kg/m .  Physical Exam   GENERAL: alert and no distress  EYES: Eyes grossly normal to inspection, PERRL and conjunctivae and sclerae normal  HENT: ear canals and TM's normal, nose and mouth without ulcers or lesions  NECK: no adenopathy, no asymmetry, masses, or scars  RESP: lungs clear to auscultation - no rales, rhonchi or wheezes  CV: regular rate and rhythm, normal S1 S2, no S3 or S4, no murmur, click or rub, no peripheral edema  ABDOMEN: soft, nontender, no hepatosplenomegaly, no masses and bowel sounds normal  MS: no gross musculoskeletal defects noted, no edema  SKIN: no suspicious lesions or rashes  NEURO: Normal strength and tone, mentation intact and speech normal  BACK: no CVA tenderness, no paralumbar tenderness  PSYCH: mentation appears normal, affect normal/bright  LYMPH: no cervical, supraclavicular, axillary, or inguinal adenopathy    Results for orders placed or performed in visit on 09/16/24   BASIC METABOLIC PANEL     Status: Abnormal   Result Value Ref Range    Sodium 139 135 - 145 mmol/L    Potassium 4.1 3.4 - 5.3 mmol/L    Chloride 105 98 - 107 mmol/L    Carbon Dioxide (CO2) 21 (L) 22 - 29 mmol/L    Anion Gap 13 7 - 15 mmol/L    Urea Nitrogen 14.7 8.0 - 23.0 mg/dL    Creatinine 1.26 (H) 0.67 - 1.17 mg/dL    GFR Estimate 63 >60 mL/min/1.73m2    Calcium 9.5 8.8 - 10.4 mg/dL    Glucose 118 (H) 70 - 99 mg/dL   INR     Status: Normal   Result Value Ref Range    INR 1.12 0.85 - 1.15   CBC with platelets and differential     Status: None   Result Value Ref Range    WBC Count 5.7 4.0 - 11.0 10e3/uL    RBC Count 5.14 4.40 - 5.90 10e6/uL    Hemoglobin 15.6 13.3 - 17.7 g/dL    Hematocrit 45.6 40.0 - 53.0 %    MCV 89 78 - 100 fL    MCH 30.4 26.5 - 33.0 pg    MCHC 34.2 31.5 - 36.5 g/dL    RDW 13.6 10.0 - 15.0 %    Platelet Count 226 150 - 450 10e3/uL    % Neutrophils 75 %    % Lymphocytes 15 %    % Monocytes 7 %    % Eosinophils 2 %    % Basophils 0 %     % Immature Granulocytes 0 %    Absolute Neutrophils 4.3 1.6 - 8.3 10e3/uL    Absolute Lymphocytes 0.9 0.8 - 5.3 10e3/uL    Absolute Monocytes 0.4 0.0 - 1.3 10e3/uL    Absolute Eosinophils 0.1 0.0 - 0.7 10e3/uL    Absolute Basophils 0.0 0.0 - 0.2 10e3/uL    Absolute Immature Granulocytes 0.0 <=0.4 10e3/uL   CBC with platelets and differential     Status: None    Narrative    The following orders were created for panel order CBC with platelets and differential.  Procedure                               Abnormality         Status                     ---------                               -----------         ------                     CBC with platelets and d...[[id number]]                      Final result                 Please view results for these tests on the individual orders.           Signed Electronically by: Avery Duke MD

## 2024-09-18 NOTE — TELEPHONE ENCOUNTER
Spoke with pt, per pt he already saw result in mychart.     Thanks,  GAY Noriega  [facility]

## 2024-09-26 DIAGNOSIS — I10 HYPERTENSION GOAL BP (BLOOD PRESSURE) < 140/90: ICD-10-CM

## 2024-09-26 RX ORDER — LISINOPRIL 40 MG/1
40 TABLET ORAL DAILY
Qty: 90 TABLET | Refills: 4 | Status: SHIPPED | OUTPATIENT
Start: 2024-09-26

## 2024-10-14 ENCOUNTER — PATIENT OUTREACH (OUTPATIENT)
Dept: GERIATRIC MEDICINE | Facility: CLINIC | Age: 69
End: 2024-10-14
Payer: COMMERCIAL

## 2024-10-14 NOTE — PROGRESS NOTES
[facility] Care Coordination Contact    Member became effective with  [facility] on 10/1/2024 with [facility].  Previous Health Plan: [employer]  Previous Care System: [employer]  Previous care coordinators name and number: Emma Lockwood, [phone]  Waiver Type: CADI  UTF received: Yes: Received and saved to member file  Mailed welcome letter and Mailed [employer] Leave Behind document  Address/Phone discrepancy: No  [place] but not prepped for visit, please task CMS if needed.    Soniya Pitts  Care Management Specialist  [facility]  [phone]

## 2024-10-14 NOTE — LETTER
October 14, 2024    Important [employer] Information    CONSTANTINO NASCIMENTO  [address]   [facility] [address]    Your New Care Coordinator  Dear Constantino,  My name is CHEMA Amezcua, RN, PHN  and I am your new Care Coordinator. You may reach me by calling [phone]. I will be in touch with you shortly to address any questions you may have.   I have also been in contact with  Emma Lockwood , your previous care coordinator, to ensure a smooth transition.  Questions?  Call me at [phone] Monday-Friday between 8am and 5pm. TTY: [phone]. I look forward to working with you as a [employer]  member.  Sincerely,    CHEMA Amezcua, RN, PHN    E-mail: [email or web address]  Phone: [phone]      [facility]    cc: member records

## 2024-10-17 ENCOUNTER — PATIENT OUTREACH (OUTPATIENT)
Dept: GERIATRIC MEDICINE | Facility: CLINIC | Age: 69
End: 2024-10-17
Payer: COMMERCIAL

## 2024-10-17 NOTE — PROGRESS NOTES
"[facility] Care Coordination Contact    Per CC, mailed client an \"Unable to Contact\" letter.    Mailed member [employer] Member Engagement Questionnaire with self-addressed return envelope & [employer] Leave Behind document.     Soniya Pitts  Care Management Specialist  [facility]  [phone]         "

## 2024-10-17 NOTE — LETTER
October 17, 2024    Important [employer] Information    CONSTANTINO NASCIMENTO  [address]   [facility] [place] [address]  I've Tried to Contact You  Dear Constantino,  My name is  CHEMA Amezcua, RN, PHN, and I am your Care Coordinator. I have been trying to contact you, but have not been able to reach you.  As a Care Coordinator, I am here to help. My role is to make sure that your health plan is working for you. I am available to:  Review your health care needs with you over the phone or in-person   Provide support for and information about covered services or supplies to help keep you safe and healthy in your home  Answer questions about your insurance   Help you find a provider, such as a doctor or dentist, to meet your unique needs  I can also help you schedule a free physical at your clinic. To schedule an appointment, please call me at [phone] Monday-Friday between 8am-5pm TTY: 711.  I have included a copy of a Member Engagement Questionnaire. Please fill it out and return it in the included envelope I have also included a document that provides you with more information about my role as your Care Coordinator and how I can help with your medical, social and everyday needs.     Questions?  Please call me at the phone number listed above. If you d like, a friend or family member may call for you.  For general questions, call:   [employer] Member Services at [phone] between the hours of 8 a.m. to 9 p.m. Central, seven days a week. TTY: [phone].  Please note that access to a representative may be limited during certain times of the year.    Sincerely,    CHEMA Amezcua, RN, PHN    E-mail: [email or web address]  Phone: [phone]      [facility]      cc: member records                                                                      [employer] D[employer] Solution  and [employer] Access[employer]ility Solution  Enhanced are O D-SNPs that contract with both Medicare and the [place] Medical Assistance  (Medicaid) program to provide benefits of both programs to enrollees. Enrollment in [employer] D[employer] and [employer] Access[employer]ility Solution Enhanced depends on contract renewal.      2023 [employer].

## 2024-10-18 DIAGNOSIS — M54.40 CHRONIC LOW BACK PAIN WITH SCIATICA, SCIATICA LATERALITY UNSPECIFIED, UNSPECIFIED BACK PAIN LATERALITY: ICD-10-CM

## 2024-10-18 DIAGNOSIS — G89.29 CHRONIC LOW BACK PAIN WITH SCIATICA, SCIATICA LATERALITY UNSPECIFIED, UNSPECIFIED BACK PAIN LATERALITY: ICD-10-CM

## 2024-10-18 DIAGNOSIS — F11.90 CHRONIC, CONTINUOUS USE OF OPIOIDS: ICD-10-CM

## 2024-10-18 DIAGNOSIS — G89.18 ACUTE POST-OPERATIVE PAIN: ICD-10-CM

## 2024-10-18 NOTE — TELEPHONE ENCOUNTER
Patient called and stated that he is all out of oxycodone.    Angelica BSN RN  Triage Nurse  [facility]

## 2024-10-21 DIAGNOSIS — G89.29 CHRONIC LOW BACK PAIN WITH SCIATICA, SCIATICA LATERALITY UNSPECIFIED, UNSPECIFIED BACK PAIN LATERALITY: ICD-10-CM

## 2024-10-21 DIAGNOSIS — F11.90 CHRONIC, CONTINUOUS USE OF OPIOIDS: ICD-10-CM

## 2024-10-21 DIAGNOSIS — G89.18 ACUTE POST-OPERATIVE PAIN: ICD-10-CM

## 2024-10-21 DIAGNOSIS — M54.40 CHRONIC LOW BACK PAIN WITH SCIATICA, SCIATICA LATERALITY UNSPECIFIED, UNSPECIFIED BACK PAIN LATERALITY: ICD-10-CM

## 2024-10-21 RX ORDER — OXYCODONE HYDROCHLORIDE 10 MG/1
10 TABLET ORAL EVERY 8 HOURS PRN
Qty: 90 TABLET | Refills: 0 | Status: SHIPPED | OUTPATIENT
Start: 2024-10-21

## 2024-10-21 RX ORDER — OXYCODONE HYDROCHLORIDE 10 MG/1
10 TABLET ORAL EVERY 8 HOURS PRN
Qty: 90 TABLET | Refills: 0 | Status: SHIPPED | OUTPATIENT
Start: 2024-10-21 | End: 2024-10-21

## 2024-10-21 NOTE — TELEPHONE ENCOUNTER
Medication Question or Refill    What medication are you calling about (include dose and sig)?: Oxycodone IR 10<G tablet    Preferred Pharmacy:  [facility] - [place], [place] - [address] [place] [address]  Phone: [phone] Fax: [phone]      Controlled Substance Agreement on file:   CSA -- Patient Level:     [Media Unavailable] Controlled Substance Agreement - Opioid - Scan on 10/18/2023  4:43 PM   [Media Unavailable] Controlled Substance Agreement - Opioid - Scan on 5/31/2022 11:29 AM   [Media Unavailable] Controlled Substance Agreement - Opioid - Scan on 2/11/2021 12:41 PM       Who prescribed the medication?:     Do you need a refill? Yes    Patient offered an appointment? Yes Pt made Appt in January 9,24    Do you have any questions or concerns?  Yes just out of medication      Could we send this information to you in [facility] or would you prefer to receive a phone call?:   Patient would prefer a phone call   Okay to leave a detailed message?: Yes at Cell number on file:    Telephone Information:   Mobile [phone]

## 2024-10-25 ENCOUNTER — PATIENT OUTREACH (OUTPATIENT)
Dept: GERIATRIC MEDICINE | Facility: CLINIC | Age: 69
End: 2024-10-25
Payer: COMMERCIAL

## 2024-10-25 NOTE — PROGRESS NOTES
[facility] Care Coordination Contact    Completed 4 attempts to reach client with no response.  Member is officially unable to contact effective today.  Completed MMIS entry.  Completed health plan required [facility] POC.    Follow-up Plan: CC will attempt to reach member in six months.    This CC note routed to PCP, Avery Duke.    Melony Aguilar RN BSN PHN      [facility]  Care Coordinator  Phone:   [phone]  Fax:       [phone]

## 2024-11-06 ENCOUNTER — TELEPHONE (OUTPATIENT)
Dept: FAMILY MEDICINE | Facility: CLINIC | Age: 69
End: 2024-11-06
Payer: COMMERCIAL

## 2024-11-06 NOTE — TELEPHONE ENCOUNTER
Will huddle with provider, patient is scheduled to see on 11/8/2024 and with the PCP on return to clinic on 11/11/2024. Terese Rothman,

## 2024-11-06 NOTE — TELEPHONE ENCOUNTER
Order/Referral Request  (Durable Medical Equitment DME/ Certification of Medical Necessity CMN)  A Forms, Order/Orders, and Request for Medical Information came in via Fax to be addressed by the provider:  What is on the order: [employer], DME Repair request (time sensitive)     What Group/Provider/Facility is requesting:         [facility] [place]  [address]  [place], [place] [address]  Phone: [phone]  Fax: [phone]    Has the patient been seen by the provider for this: unknown Last office visit with the ordering provider: 6/5/2024  Does patient have an appointment scheduled?: Yes:    Nov 08, 2024 10:30 AM  (Arrive by 10:10 AM)  Provider Visit with MAGI Maxwell CNP  [facility] ([facility] ) [address] [place] [id number]  [phone]     Jan 09, 2025 10:20 AM  (Arrive by 10:00 AM)  Provider Visit with Avery Duke MD  [facility] ([facility] ) [address] [place] [id number]  [phone]     Received at [facility] Family Practice Red Team  Additional comments:   The Forms, Order/Orders, and Request for Medical Information has been started for the provider and placed at their desk. Terese Rothman,   **Please send the encounter back to the care team when the request is completed.

## 2024-11-15 ENCOUNTER — OFFICE VISIT (OUTPATIENT)
Dept: FAMILY MEDICINE | Facility: CLINIC | Age: 69
End: 2024-11-15
Payer: COMMERCIAL

## 2024-11-15 VITALS
TEMPERATURE: 97.7 F | HEART RATE: 79 BPM | SYSTOLIC BLOOD PRESSURE: 132 MMHG | RESPIRATION RATE: 22 BRPM | OXYGEN SATURATION: 100 % | DIASTOLIC BLOOD PRESSURE: 78 MMHG | BODY MASS INDEX: 24.39 KG/M2 | WEIGHT: 190 LBS

## 2024-11-15 DIAGNOSIS — Z23 NEED FOR PROPHYLACTIC VACCINATION AGAINST HEPATITIS A: ICD-10-CM

## 2024-11-15 DIAGNOSIS — I63.89 CEREBRAL INFARCTION DUE TO OTHER MECHANISM (H): ICD-10-CM

## 2024-11-15 DIAGNOSIS — G89.4 CHRONIC PAIN SYNDROME: Primary | ICD-10-CM

## 2024-11-15 DIAGNOSIS — I73.9 PAD (PERIPHERAL ARTERY DISEASE) (H): ICD-10-CM

## 2024-11-15 DIAGNOSIS — I10 HYPERTENSION GOAL BP (BLOOD PRESSURE) < 140/90: ICD-10-CM

## 2024-11-15 DIAGNOSIS — Z89.519 S/P BKA (BELOW KNEE AMPUTATION) UNILATERAL (H): ICD-10-CM

## 2024-11-15 DIAGNOSIS — F11.90 CHRONIC, CONTINUOUS USE OF OPIOIDS: ICD-10-CM

## 2024-11-15 DIAGNOSIS — C61 PROSTATE CANCER (H): ICD-10-CM

## 2024-11-15 DIAGNOSIS — I48.0 PAROXYSMAL ATRIAL FIBRILLATION (H): ICD-10-CM

## 2024-11-15 PROCEDURE — 99213 OFFICE O/P EST LOW 20 MIN: CPT | Performed by: INTERNAL MEDICINE

## 2024-11-15 ASSESSMENT — PAIN SCALES - GENERAL: PAINLEVEL_OUTOF10: NO PAIN (0)

## 2024-11-15 NOTE — PATIENT INSTRUCTIONS
Orlin Madrid APRN, CARLOS ANJONA., D.N.P.   [address] [place] [address]   Phone: [phone]   Fax: [phone]  Daowod Cantu M.D.   [address] [place] [id number]   Phone: [phone]

## 2024-11-15 NOTE — LETTER

## 2024-11-15 NOTE — PROGRESS NOTES
Assessment & Plan   Problem List Items Addressed This Visit       Atrial fibrillation (H)    Cerebral infarction (H)    Chronic pain syndrome - Primary    Chronic, continuous use of opioids    Hypertension goal BP (blood pressure) < 140/90    PAD (peripheral artery disease) (H)    Prostate cancer (H)    S/P BKA (below knee amputation) unilateral (H)     Other Visit Diagnoses       Need for prophylactic vaccination against hepatitis A                 Patient is stable in prosate cancer treatments   Is in need of repairs to his scooter at home including new battery and control panel     Patient uses as below     Stable healing in the BKA stump with intermittent use of the prosthesis limited by pain          Work on weight loss  Regular exercise      Ronak Meeks is a 68 year old, presenting for the following health issues:  Follow Up (/)        11/15/2024     3:22 PM   Additional Questions   Roomed by Inez   Accompanied by self         11/15/2024     3:22 PM   Patient Reported Additional Medications   Patient reports taking the following new medications none     History of Present Illness       Hypertension: He presents for follow up of hypertension.  He does check blood pressure  regularly outside of the clinic. Outside blood pressures have been over 140/90. He follows a low salt diet.     He eats 0-1 servings of fruits and vegetables daily.He consumes 1 sweetened beverage(s) daily.He exercises with enough effort to increase his heart rate 9 or less minutes per day.  He exercises with enough effort to increase his heart rate 3 or less days per week.   He is taking medications regularly.       Patient face to face for scooter repair   Patient with battery that keeps dying out     Needs gel 12 volt battery replacement and economy console assembly with ambient     Uses scooter every day outside the apartment and out and about in the community   Needs for daily activities of daily living           Follow up  on blood pressure and medication        Review of Systems  Constitutional, HEENT, cardiovascular, pulmonary, gi and gu systems are negative, except as otherwise noted.      Objective    /78   Pulse 79   Temp 97.7  F (36.5  C) (Oral)   Resp 22   Wt 86.2 kg (190 lb)   SpO2 100%   BMI 24.39 kg/m    Body mass index is 24.39 kg/m .  Physical Exam   GENERAL: alert and no distress  EYES: Eyes grossly normal to inspection, PERRL and conjunctivae and sclerae normal  HENT: ear canals and TM's normal, nose and mouth without ulcers or lesions  NECK: no adenopathy, no asymmetry, masses, or scars  RESP: lungs clear to auscultation - no rales, rhonchi or wheezes  CV: regular rate and rhythm, normal S1 S2, no S3 or S4, no murmur, click or rub, no peripheral edema  ABDOMEN: soft, nontender, no hepatosplenomegaly, no masses and bowel sounds normal  MS: BKA with healing stump       No results found for any visits on 11/15/24.        Signed Electronically by: Avery Duke MD

## 2024-12-04 DIAGNOSIS — I48.20 CHRONIC ATRIAL FIBRILLATION (H): ICD-10-CM

## 2024-12-04 RX ORDER — RIVAROXABAN 20 MG/1
20 TABLET, FILM COATED ORAL
Qty: 90 TABLET | Refills: 4 | Status: SHIPPED | OUTPATIENT
Start: 2024-12-04

## 2024-12-04 NOTE — TELEPHONE ENCOUNTER
Reason for call:  Other   Patient called regarding (reason for call): call back  Additional comments: patient needs this filled as soon as possible as he is out     Phone number to reach patient:  Home number on file [phone] (home)    Best Time:  any    Can we leave a detailed message on this number?  YES    Travel screening: Not Applicable

## 2024-12-12 ENCOUNTER — TELEPHONE (OUTPATIENT)
Dept: FAMILY MEDICINE | Facility: CLINIC | Age: 69
End: 2024-12-12
Payer: COMMERCIAL

## 2024-12-12 DIAGNOSIS — M54.40 CHRONIC LOW BACK PAIN WITH SCIATICA, SCIATICA LATERALITY UNSPECIFIED, UNSPECIFIED BACK PAIN LATERALITY: ICD-10-CM

## 2024-12-12 DIAGNOSIS — G89.29 CHRONIC LOW BACK PAIN WITH SCIATICA, SCIATICA LATERALITY UNSPECIFIED, UNSPECIFIED BACK PAIN LATERALITY: ICD-10-CM

## 2024-12-12 DIAGNOSIS — I63.19 CEREBRAL INFARCTION DUE TO EMBOLISM OF OTHER PRECEREBRAL ARTERY (H): ICD-10-CM

## 2024-12-12 DIAGNOSIS — G89.18 ACUTE POST-OPERATIVE PAIN: ICD-10-CM

## 2024-12-12 DIAGNOSIS — F11.90 CHRONIC, CONTINUOUS USE OF OPIOIDS: ICD-10-CM

## 2024-12-12 NOTE — TELEPHONE ENCOUNTER
Medication Question or Refill    Contacts       Contact Date/Time Type Contact Phone/Fax    12/12/2024 10:00 AM CST Phone (Incoming) BrandonConstantino (Self) [phone] (M)            What medication are you calling about (include dose and sig)?:  oxyCODONE IR (ROXICODONE) 10 MG tablet     Preferred Pharmacy:     [place] [address] [place] [address] [address] [place] [address] [facility] [address]  Phone: [phone] Fax: [phone]    Controlled Substance Agreement on file:   CSA -- Patient Level:     [Media Unavailable] Controlled Substance Agreement - Opioid - Scan on 11/18/2024 10:51 AM   [Media Unavailable] Controlled Substance Agreement - Opioid - Scan on 10/18/2023  4:43 PM   [Media Unavailable] Controlled Substance Agreement - Opioid - Scan on 5/31/2022 11:29 AM   [Media Unavailable] Controlled Substance Agreement - Opioid - Scan on 2/11/2021 12:41 PM       Who prescribed the medication?: Dr. Duke    Do you need a refill? Yes    When did you use the medication last? Last night    Patient offered an appointment? No    Do you have any questions or concerns?  No    Could we send this information to you in [facility] or would you prefer to receive a phone call?:   Patient would prefer a phone call   Okay to leave a detailed message?: Yes at Cell number on file:    Telephone Information:   Mobile [phone]     Cailin Wood,

## 2024-12-13 RX ORDER — ATORVASTATIN CALCIUM 40 MG/1
40 TABLET, FILM COATED ORAL AT BEDTIME
Qty: 90 TABLET | Refills: 0 | Status: SHIPPED | OUTPATIENT
Start: 2024-12-13

## 2024-12-13 NOTE — TELEPHONE ENCOUNTER
Please inform refill sent and will need fasting labs before next refill-Due in Jan.  Rachel Hooker RN

## 2024-12-13 NOTE — TELEPHONE ENCOUNTER
Left message informing pt that med has been refilled and advised on scheduling appt.  Alvaro Huggins MA on 12/13/2024 at 1:36 PM

## 2024-12-13 NOTE — TELEPHONE ENCOUNTER
Patient calling to request urgent refill of his Oxycodone, as he has been completely out since yesterday.    Patient explains he had requested refill through pharmacy 4 days ago, and requesting this to be addressed as soon as PCP available.    Patient last saw PCP in [place] on 11/15.    Please advise.    ASHLEIGH TreviñoN RN  [facility]

## 2024-12-13 NOTE — TELEPHONE ENCOUNTER
Patient calling as he is completely out of medication, requesting urgent refill.    ASHLEIGH TreviñoN RN  [facility]

## 2024-12-15 RX ORDER — OXYCODONE HYDROCHLORIDE 10 MG/1
10 TABLET ORAL EVERY 8 HOURS PRN
Qty: 90 TABLET | Refills: 0 | Status: SHIPPED | OUTPATIENT
Start: 2024-12-15

## 2025-01-16 ENCOUNTER — OFFICE VISIT (OUTPATIENT)
Dept: FAMILY MEDICINE | Facility: CLINIC | Age: 70
End: 2025-01-16
Payer: COMMERCIAL

## 2025-01-16 VITALS
RESPIRATION RATE: 20 BRPM | TEMPERATURE: 97.8 F | WEIGHT: 186 LBS | OXYGEN SATURATION: 99 % | HEART RATE: 74 BPM | BODY MASS INDEX: 23.13 KG/M2 | HEIGHT: 75 IN | DIASTOLIC BLOOD PRESSURE: 100 MMHG | SYSTOLIC BLOOD PRESSURE: 148 MMHG

## 2025-01-16 DIAGNOSIS — C61 PROSTATE CANCER (H): ICD-10-CM

## 2025-01-16 DIAGNOSIS — I63.19 CEREBRAL INFARCTION DUE TO EMBOLISM OF OTHER PRECEREBRAL ARTERY (H): ICD-10-CM

## 2025-01-16 DIAGNOSIS — Z89.519 S/P BKA (BELOW KNEE AMPUTATION) UNILATERAL (H): ICD-10-CM

## 2025-01-16 DIAGNOSIS — G89.18 ACUTE POST-OPERATIVE PAIN: ICD-10-CM

## 2025-01-16 DIAGNOSIS — F11.90 CHRONIC, CONTINUOUS USE OF OPIOIDS: ICD-10-CM

## 2025-01-16 DIAGNOSIS — G89.29 CHRONIC LOW BACK PAIN WITH SCIATICA, SCIATICA LATERALITY UNSPECIFIED, UNSPECIFIED BACK PAIN LATERALITY: ICD-10-CM

## 2025-01-16 DIAGNOSIS — E78.5 HYPERLIPIDEMIA LDL GOAL <100: Primary | ICD-10-CM

## 2025-01-16 DIAGNOSIS — N18.32 STAGE 3B CHRONIC KIDNEY DISEASE (H): ICD-10-CM

## 2025-01-16 DIAGNOSIS — B18.2 CHRONIC HEPATITIS C WITHOUT HEPATIC COMA (H): ICD-10-CM

## 2025-01-16 DIAGNOSIS — N18.31 STAGE 3A CHRONIC KIDNEY DISEASE (H): ICD-10-CM

## 2025-01-16 DIAGNOSIS — Z23 NEED FOR PROPHYLACTIC VACCINATION AGAINST HEPATITIS A: ICD-10-CM

## 2025-01-16 DIAGNOSIS — Z23 NEED FOR DIPHTHERIA-TETANUS-PERTUSSIS (TDAP) VACCINE: ICD-10-CM

## 2025-01-16 DIAGNOSIS — M54.40 CHRONIC LOW BACK PAIN WITH SCIATICA, SCIATICA LATERALITY UNSPECIFIED, UNSPECIFIED BACK PAIN LATERALITY: ICD-10-CM

## 2025-01-16 DIAGNOSIS — I50.22 CHRONIC SYSTOLIC HEART FAILURE (H): ICD-10-CM

## 2025-01-16 DIAGNOSIS — I48.0 PAROXYSMAL ATRIAL FIBRILLATION (H): ICD-10-CM

## 2025-01-16 LAB
ALBUMIN SERPL BCG-MCNC: 4.4 G/DL (ref 3.5–5.2)
ALP SERPL-CCNC: 140 U/L (ref 40–150)
ALT SERPL W P-5'-P-CCNC: 15 U/L (ref 0–70)
ANION GAP SERPL CALCULATED.3IONS-SCNC: 12 MMOL/L (ref 7–15)
AST SERPL W P-5'-P-CCNC: 21 U/L (ref 0–45)
BILIRUB SERPL-MCNC: 0.7 MG/DL
BUN SERPL-MCNC: 13.8 MG/DL (ref 8–23)
CALCIUM SERPL-MCNC: 9.8 MG/DL (ref 8.8–10.4)
CHLORIDE SERPL-SCNC: 105 MMOL/L (ref 98–107)
CHOLEST SERPL-MCNC: 109 MG/DL
CREAT SERPL-MCNC: 1.37 MG/DL (ref 0.67–1.17)
CREAT UR-MCNC: 371 MG/DL
CREAT UR-MCNC: 371 MG/DL
EGFRCR SERPLBLD CKD-EPI 2021: 57 ML/MIN/1.73M2
FASTING STATUS PATIENT QL REPORTED: YES
FASTING STATUS PATIENT QL REPORTED: YES
GLUCOSE SERPL-MCNC: 128 MG/DL (ref 70–99)
HCO3 SERPL-SCNC: 25 MMOL/L (ref 22–29)
HDLC SERPL-MCNC: 37 MG/DL
LDLC SERPL CALC-MCNC: 54 MG/DL
MICROALBUMIN UR-MCNC: 287 MG/L
MICROALBUMIN/CREAT UR: 77.36 MG/G CR (ref 0–17)
NONHDLC SERPL-MCNC: 72 MG/DL
POTASSIUM SERPL-SCNC: 4 MMOL/L (ref 3.4–5.3)
PROT SERPL-MCNC: 8.6 G/DL (ref 6.4–8.3)
PSA SERPL DL<=0.01 NG/ML-MCNC: 2.21 NG/ML (ref 0–4.5)
SODIUM SERPL-SCNC: 142 MMOL/L (ref 135–145)
TRIGL SERPL-MCNC: 89 MG/DL

## 2025-01-16 RX ORDER — OXYCODONE HYDROCHLORIDE 10 MG/1
10 TABLET ORAL EVERY 8 HOURS PRN
Qty: 90 TABLET | Refills: 0 | Status: SHIPPED | OUTPATIENT
Start: 2025-01-16

## 2025-01-16 RX ORDER — ATORVASTATIN CALCIUM 40 MG/1
40 TABLET, FILM COATED ORAL AT BEDTIME
Qty: 90 TABLET | Refills: 0 | Status: SHIPPED | OUTPATIENT
Start: 2025-01-16

## 2025-01-16 SDOH — HEALTH STABILITY: PHYSICAL HEALTH: ON AVERAGE, HOW MANY DAYS PER WEEK DO YOU ENGAGE IN MODERATE TO STRENUOUS EXERCISE (LIKE A BRISK WALK)?: 3 DAYS

## 2025-01-16 SDOH — HEALTH STABILITY: PHYSICAL HEALTH: ON AVERAGE, HOW MANY MINUTES DO YOU ENGAGE IN EXERCISE AT THIS LEVEL?: 10 MIN

## 2025-01-16 ASSESSMENT — SOCIAL DETERMINANTS OF HEALTH (SDOH): HOW OFTEN DO YOU GET TOGETHER WITH FRIENDS OR RELATIVES?: NEVER

## 2025-01-16 NOTE — PROGRESS NOTES
Preventive Care Visit  [facility] RACHANA Duke MD, Internal Medicine - Pediatrics  Jan 16, 2025      Assessment & Plan   Problem List Items Addressed This Visit       Atrial fibrillation (H)    Cerebral infarction (H)    Relevant Medications    atorvastatin (LIPITOR) 40 MG tablet    Chronic hepatitis C without hepatic coma (H)    Chronic low back pain    Relevant Medications    oxyCODONE IR (ROXICODONE) 10 MG tablet    Other Relevant Orders    [id number] - Urine Drug Confirmation Panel (Comprehensive)    Chronic systolic heart failure (H)    Chronic, continuous use of opioids    Relevant Medications    oxyCODONE IR (ROXICODONE) 10 MG tablet    Prostate cancer (H)    Relevant Orders    PSA, tumor marker    S/P BKA (below knee amputation) unilateral (H)    Stage 3b chronic kidney disease (H)    Relevant Orders    Comprehensive metabolic panel (BMP + Alb, Alk Phos, ALT, AST, Total. Bili, TP)     Other Visit Diagnoses       Hyperlipidemia LDL goal <100    -  Primary    Relevant Medications    atorvastatin (LIPITOR) 40 MG tablet    Other Relevant Orders    Lipid panel reflex to direct LDL Fasting    Need for prophylactic vaccination against hepatitis A        Acute post-operative pain        Relevant Medications    oxyCODONE IR (ROXICODONE) 10 MG tablet    Need for diphtheria-tetanus-pertussis (Tdap) vaccine        Relevant Medications    Tdap, tetanus-diphtheria-acell pertussis, (ADACEL) 5-2-15.5 LF-MCG/0.5 injection    CKD (chronic kidney disease) stage 3, GFR 30-59 ml/min (H)        Relevant Orders    Comprehensive metabolic panel (BMP + Alb, Alk Phos, ALT, AST, Total. Bili, TP)                    Counseling  Appropriate preventive services were addressed with this patient via screening, questionnaire, or discussion as appropriate for fall prevention, nutrition, physical activity, Tobacco-use cessation, social engagement, weight loss and cognition.  Checklist reviewing preventive services  available has been given to the patient.  Reviewed patient's diet, addressing concerns and/or questions.   He is at risk for lack of exercise and has been provided with information to increase physical activity for the benefit of his well-being.   Patient is at risk for social isolation and has been provided with information about the benefit of social connection.   The patient was instructed to see the dentist every 6 months.   Updated plan of care.  Patient reported difficulty with activities of daily living were addressed today.Information on urinary incontinence and treatment options given to patient.   I have reviewed Opioid Use Disorder and Substance Use Disorder risk factors and made any needed referrals.     Work on weight loss  Regular exercise      Ronak Meeks is a 69 year old, presenting for the following:  Physical and RECHECK (/)        1/16/2025    10:35 AM   Additional Questions   Roomed by Lexi PRATER CMA   Accompanied by Self         1/16/2025    10:35 AM   Patient Reported Additional Medications   Patient reports taking the following new medications None           HPI  Left side hurting   Not eating good - once a day   Appetite is down - not liking   Disjointed sleep 2 sleep period through the day   5 hours   Summertime gets outside   Inside watching TV  Exercise every 2-3 days   Afib - working at [employer]   Prosthesis   Lost weight -       Health Care Directive  Patient does not have a Health Care Directive: Discussed advance care planning with patient; information given to patient to review.      1/16/2025   General Health   How would you rate your overall physical health? Good   Feel stress (tense, anxious, or unable to sleep) Not at all         1/16/2025   Nutrition   Diet: Low salt         1/16/2025   Exercise   Days per week of moderate/strenous exercise 3 days   Average minutes spent exercising at this level 10 min         1/16/2025   Social Factors   Frequency of gathering with friends or  relatives Never   Worry food won't last until get money to buy more Yes   Food not last or not have enough money for food? No   Do you have housing? (Housing is defined as stable permanent housing and does not include staying ouside in a car, in a tent, in an abandoned building, in an overnight shelter, or couch-surfing.) Yes   Are you worried about losing your housing? No   Lack of transportation? No   Unable to get utilities (heat,electricity)? No   (!) FOOD SECURITY CONCERN PRESENT(!) SOCIAL CONNECTIONS CONCERN      1/16/2025   Fall Risk   Fallen 2 or more times in the past year? No    Trouble with walking or balance? No        Proxy-reported          1/16/2025   Activities of Daily Living- Home Safety   Needs help with the following daily activites Transportation    Shopping    Preparing meals    Housework    Bathing    Laundry    Medication administration   Safety concerns in the home None of the above       Multiple values from one day are sorted in reverse-chronological order         1/16/2025   Dental   Dentist two times every year? (!) NO         1/16/2025   Hearing Screening   Hearing concerns? None of the above         1/16/2025   Driving Risk Screening   Patient/family members have concerns about driving No         1/16/2025   General Alertness/Fatigue Screening   Have you been more tired than usual lately? No         1/16/2025   Urinary Incontinence Screening   Bothered by leaking urine in past 6 months Yes         1/16/2025   TB Screening   Were you born outside of the [place]? No         Today's PHQ-2 Score:       1/16/2025    10:35 AM   PHQ-2 ( 1999 [employer])   Q1: Little interest or pleasure in doing things 0    Q2: Feeling down, depressed or hopeless 0    PHQ-2 Score 0    Q1: Little interest or pleasure in doing things Not at all   Q2: Feeling down, depressed or hopeless Not at all   PHQ-2 Score 0       Proxy-reported           1/16/2025   Substance Use   Alcohol more than 3/day or more than 7/wk No   Do  you have a current opioid prescription? (!) YES   How severe/bad is pain from 1 to 10? 7/10   Do you use any other substances recreationally? (!) CANNABIS PRODUCTS       Social History     Tobacco Use    Smoking status: Former     Current packs/day: 0.00     Average packs/day: 0.3 packs/day for 40.0 years (12.0 ttl pk-yrs)     Types: Cigarettes     Start date: 3/12/1978     Quit date: 3/12/2018     Years since quittin.8     Passive exposure: Past    Smokeless tobacco: Former   Vaping Use    Vaping status: Never Used   Substance Use Topics    Alcohol use: Yes     Alcohol/week: 6.0 - 10.0 standard drinks of alcohol     Types: 2 Glasses of wine, 2 - 6 Cans of beer, 2 Shots of liquor per week     Comment: couple of beers per episode, several times a week    Drug use: Yes     Types: Marijuana     Comment: pot about 3 days per week, heroin couple of months ago       Last PSA:   PSA   Date Value Ref Range Status   2021 24.90 (H) 0 - 4 ug/L Final     Comment:     Assay Method:  Chemiluminescence using Siemens Vista analyzer     ASCVD Risk   The ASCVD Risk score (Giles ESCOBEDO, et al., 2019) failed to calculate for the following reasons:    Risk score cannot be calculated because patient has a medical history suggesting prior/existing ASCVD            Reviewed and updated as needed this visit by Provider                    Lab work is in process  Labs reviewed in EPIC  BP Readings from Last 3 Encounters:   25 (!) 148/100   11/15/24 132/78   24 (!) 157/91    Wt Readings from Last 3 Encounters:   25 84.4 kg (186 lb)   11/15/24 86.2 kg (190 lb)   24 87.5 kg (193 lb)                  Patient Active Problem List   Diagnosis    Cerebral infarction (H)    Chronic hepatitis C without hepatic coma (H)    Tobacco use disorder    Chronic low back pain    Acute pancreatitis    Hyperlipidemia LDL goal <70    Hypertension goal BP (blood pressure) < 140/90    Nonischemic dilated cardiomyopathy (HCC)     Malignant neoplasm of prostate (H)    Erectile dysfunction    Eczema    PAD (peripheral artery disease)    S/P BKA (below knee amputation) unilateral (H)    Encounter for counseling    Acute renal failure    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    Coronary atherosclerosis    Atrial fibrillation (H)    Chronic systolic heart failure (H)    Dyslipidemia    Nicotine dependence, uncomplicated    Pyelonephritis    Prostate cancer (H)    Osteomyelitis (H)    Stage 3b chronic kidney disease (H)    Chronic pain syndrome    Chronic, continuous use of opioids     Past Surgical History:   Procedure Laterality Date    AMPUTATE LEG BELOW KNEE Left 2015    Procedure: AMPUTATE LEG BELOW KNEE;  Surgeon: Odessa Browning MD;  Location: [facility] OR    IRRIGATION AND DEBRIDEMENT LOWER EXTREMITY, COMBINED Left 10/29/2019    Procedure: IRRIGATION AND DEBRIDEMENT, LEFT LOWER EXTREMITY w/ Veriflow Wound Vac Placement.;  Surgeon: Michelle Matute MD;  Location: [facility] OR    removal of blood clots in arm         Social History     Tobacco Use    Smoking status: Former     Current packs/day: 0.00     Average packs/day: 0.3 packs/day for 40.0 years (12.0 ttl pk-yrs)     Types: Cigarettes     Start date: 3/12/1978     Quit date: 3/12/2018     Years since quittin.8     Passive exposure: Past    Smokeless tobacco: Former   Substance Use Topics    Alcohol use: Yes     Alcohol/week: 6.0 - 10.0 standard drinks of alcohol     Types: 2 Glasses of wine, 2 - 6 Cans of beer, 2 Shots of liquor per week     Comment: couple of beers per episode, several times a week     Family History   Problem Relation Age of Onset    Cancer Mother         brain    Cancer Brother          Current Outpatient Medications   Medication Sig Dispense Refill    acetaminophen (TYLENOL) 325 MG tablet Take 1-2 tablets (325-650 mg) by mouth every 6 hours as needed for mild pain (Takes infrequently) 100 tablet 0    aspirin (ASPIRIN LOW DOSE) 81 MG EC tablet TAKE 1 TABLET  "BY MOUTH EVERY DAY 90 tablet 1    atorvastatin (LIPITOR) 40 MG tablet Take 1 tablet (40 mg) by mouth at bedtime. 90 tablet 0    carvedilol (COREG) 25 MG tablet TAKE 1 TABLET BY MOUTH TWICE A DAY WITH MEALS 186 tablet 4    gabapentin (NEURONTIN) 600 MG tablet TAKE 1 TABLET BY MOUTH THREE TIMES A  tablet 4    hydroCHLOROthiazide 12.5 MG tablet Take 1 tablet (12.5 mg) by mouth daily 93 tablet 4    lisinopril (ZESTRIL) 40 MG tablet TAKE 1 TABLET BY MOUTH EVERY DAY 90 tablet 4    melatonin 3 MG tablet Take 1 tablet (3 mg) by mouth nightly as needed for sleep 30 tablet 3    naloxone (EVZIO) 0.4 MG/0.4ML auto-injector Inject 0.4 mLs (0.4 mg) into the muscle as needed for opioid reversal every 2-3 minutes until assistance arrives 0.8 mL 1    ondansetron (ZOFRAN ODT) 4 MG ODT tab Take 1 tablet (4 mg) by mouth every 8 hours as needed for nausea 20 tablet 3    order for DME Equipment being ordered: Wheelchair, manual 1 each 0    order for DME Equipment being ordered: self adhesive bandage 4\" by 8\" 30 each 11    order for DME Please dispense blood pressure machine and cuff. 1 each 0    oxyCODONE IR (ROXICODONE) 10 MG tablet Take 1 tablet (10 mg) by mouth every 8 hours as needed for moderate to severe pain or severe pain. 90 tablet 0    polyethylene glycol (MIRALAX) 17 GM/Dose powder Take 17 g (1 capful) by mouth daily 850 g 11    tamsulosin (FLOMAX) 0.4 MG capsule Take 2 capsules (0.8 mg) by mouth daily 180 capsule 4    Tdap, tetanus-diphtheria-acell pertussis, (ADACEL) 5-2-15.5 LF-MCG/0.5 injection Inject 0.5 mLs into the muscle once for 1 dose. 0.5 mL 0    triamcinolone (KENALOG) 0.1 % external cream Apply topically 2 times daily 80 g 4    XARELTO ANTICOAGULANT 20 MG TABS tablet TAKE 1 TABLET BY MOUTH DAILY WITH DINNER 90 tablet 4     No Known Allergies  Current providers sharing in care for this patient include:  Patient Care Team:  Avery Duke MD as PCP - General (Internal Medicine)  Piper Polk RN as " Nurse Coordinator (Vascular Surgery)  Odessa Browning MD as MD (Vascular Surgery)  Norah Brown MD as MD (Cardiology)  Quinn Rodriguez MD as MD (Oncology)  Avery Duke MD as Assigned PCP  Avery Duke MD as Assigned Pain Medication Provider  Melony Aguilar, RN as Lead Care Coordinator (Primary Care - CC)    The following health maintenance items are reviewed in Epic and correct as of today:  Health Maintenance   Topic Date Due    HF ACTION PLAN  10/17/2019    MEDICARE ANNUAL WELLNESS VISIT  02/13/2024    HEPATITIS A IMMUNIZATION (3 of 3 - Hep A Twinrix risk 3-dose series) 03/19/2024    COVID-19 Vaccine (8 - 2024-25 season) 10/30/2024    LIPID  01/18/2025    MICROALBUMIN  01/18/2025    URINE DRUG SCREEN  01/18/2025    ANNUAL REVIEW OF HM ORDERS  01/18/2025    BMP  03/16/2025    MAGDALENA ASSESSMENT  03/28/2025    ALT  04/10/2025    PHQ-9  04/10/2025    LUNG CANCER SCREENING  08/13/2025    CBC  09/16/2025    HEMOGLOBIN  09/16/2025    CONTROLLED SUBSTANCE AGREEMENT FOR CHRONIC PAIN MANAGEMENT  11/18/2025    FALL RISK ASSESSMENT  01/16/2026    COLORECTAL CANCER SCREENING  03/16/2027    GLUCOSE  09/16/2027    DTAP/TDAP/TD IMMUNIZATION (4 - Td or Tdap) 11/19/2029    ADVANCE CARE PLANNING  01/16/2030    TSH W/FREE T4 REFLEX  Completed    PHQ-2 (once per calendar year)  Completed    INFLUENZA VACCINE  Completed    Pneumococcal Vaccine: 50+ Years  Completed    URINALYSIS  Completed    ZOSTER IMMUNIZATION  Completed    HEPATITIS B IMMUNIZATION  Completed    RSV VACCINE  Completed    AORTIC ANEURYSM SCREENING (SYSTEM ASSIGNED)  Completed    HPV IMMUNIZATION  Aged Out    MENINGITIS IMMUNIZATION  Aged Out    RSV MONOCLONAL ANTIBODY  Aged Out         Review of Systems  Constitutional, HEENT, cardiovascular, pulmonary, gi and gu systems are negative, except as otherwise noted.     Objective    Exam  BP (!) 167/113 (BP Location: Right arm, Patient Position: Sitting, Cuff Size: Adult Regular)   Pulse 74   Temp  "97.8  F (36.6  C) (Temporal)   Resp 20   Ht 1.905 m (6' 3\")   Wt 84.4 kg (186 lb)   SpO2 99%   BMI 23.25 kg/m     Estimated body mass index is 23.25 kg/m  as calculated from the following:    Height as of this encounter: 1.905 m (6' 3\").    Weight as of this encounter: 84.4 kg (186 lb).    Physical Exam  GENERAL: alert and no distress  EYES: Eyes grossly normal to inspection, PERRL and conjunctivae and sclerae normal  HENT: ear canals and TM's normal, nose and mouth without ulcers or lesions  NECK: no adenopathy, no asymmetry, masses, or scars  RESP: lungs clear to auscultation - no rales, rhonchi or wheezes  CV: regular rate and rhythm, normal S1 S2, no S3 or S4, no murmur, click or rub, no peripheral edema  ABDOMEN: soft, nontender, no hepatosplenomegaly, no masses and bowel sounds normal  MS: some hot spots lateral left thigh           1/16/2025   Mini Cog   Clock Draw Score 2 Normal   3 Item Recall 1 object recalled   Mini Cog Total Score 3              Signed Electronically by: Avery Duke MD    "

## 2025-01-21 LAB
OXYCODONE UR CFM-MCNC: 8860 NG/ML
OXYCODONE/CREAT UR: 2388 NG/MG {CREAT}
OXYMORPHONE UR CFM-MCNC: >7000 NG/ML
OXYMORPHONE/CREAT UR: ABNORMAL

## 2025-02-17 DIAGNOSIS — G89.18 ACUTE POST-OPERATIVE PAIN: ICD-10-CM

## 2025-02-17 DIAGNOSIS — G89.29 CHRONIC LOW BACK PAIN WITH SCIATICA, SCIATICA LATERALITY UNSPECIFIED, UNSPECIFIED BACK PAIN LATERALITY: ICD-10-CM

## 2025-02-17 DIAGNOSIS — M54.40 CHRONIC LOW BACK PAIN WITH SCIATICA, SCIATICA LATERALITY UNSPECIFIED, UNSPECIFIED BACK PAIN LATERALITY: ICD-10-CM

## 2025-02-17 DIAGNOSIS — F11.90 CHRONIC, CONTINUOUS USE OF OPIOIDS: ICD-10-CM

## 2025-02-17 RX ORDER — OXYCODONE HYDROCHLORIDE 10 MG/1
10 TABLET ORAL EVERY 8 HOURS PRN
Qty: 90 TABLET | Refills: 0 | Status: SHIPPED | OUTPATIENT
Start: 2025-02-17

## 2025-02-17 NOTE — TELEPHONE ENCOUNTER
Medication Question or Refill    Contacts       Contact Date/Time Type Contact Phone/Fax    02/17/2025 07:14 AM CST Phone (Incoming) Constantino Taylor S (Self) [phone] (M)        What medication are you calling about (include dose and sig)?: oxyCODONE IR (ROXICODONE) 10 MG tablet     Preferred Pharmacy: [place] [address] [place] [address] [employer] [place] [address] [facility] [address]  Phone: [phone] Fax: [phone]    Controlled Substance Agreement on file:   CSA -- Patient Level:     [Media Unavailable] Controlled Substance Agreement - Opioid - Scan on 11/18/2024 10:51 AM   [Media Unavailable] Controlled Substance Agreement - Opioid - Scan on 10/18/2023  4:43 PM   [Media Unavailable] Controlled Substance Agreement - Opioid - Scan on 5/31/2022 11:29 AM   [Media Unavailable] Controlled Substance Agreement - Opioid - Scan on 2/11/2021 12:41 PM       Who prescribed the medication?: Avery Duke     Do you need a refill? Yes     When did you use the medication last? Yesterday 2/16/2025    Patient offered an appointment? No    Do you have any questions or concerns?  Yes: he is out of medications. Look the last 2/16/2025. Please send over to his pharmacy.     Could we send this information to you in [employer] [place] or would you prefer to receive a phone call?:   Patient would prefer a phone call   Okay to leave a detailed message?: Yes at Cell number on file:    Telephone Information:   Mobile [phone]

## 2025-02-18 ENCOUNTER — TELEPHONE (OUTPATIENT)
Dept: FAMILY MEDICINE | Facility: CLINIC | Age: 70
End: 2025-02-18
Payer: COMMERCIAL

## 2025-02-18 NOTE — TELEPHONE ENCOUNTER
RN called patient and relayed provider's message. Patient verbalized understanding. Scheduled him for an appt on 3/5/25.    Emma LR RN  [facility]

## 2025-02-18 NOTE — TELEPHONE ENCOUNTER
Yes, please have him schedule an appointment for assessement - he can drop off the forms and I will fill out in the meantime

## 2025-02-18 NOTE — TELEPHONE ENCOUNTER
Forms/Letter Request    Type of form/letter:  Lost a lot of weight, needs a new leg    Have you been seen for this request: N/A    Do we have the form/letter: No    When is form/letter needed by: asap    How would you like the form/letter returned:  please contact patient. Patient wanted an appointment but the work flow denied and directed to send TE.     Patient Notified form requests are processed in 3-5 business days:Yes    Could we send this information to you in [employer] or would you prefer to receive a phone call?:   Patient would prefer a phone call   Okay to leave a detailed message?: Yes at Cell number on file:    Telephone Information:   Mobile [phone]

## 2025-02-18 NOTE — TELEPHONE ENCOUNTER
Routing to Dr. Duke    Patient states that over the last 6 months he has lost about 25 lbs which has been unintentional. He said he hasn't had much of an appetite. Due to the weight loss, his prosthetic leg no longer fits so he needs Dr. Duke to fill out a form (that the patient has) to have a new prosthetic leg fitted for him.    Appointments for Dr. Duke is full until early March.     Dr. Duke- Do you want the patient to come in for an appointment to evaluate ongoing weight loss and complete his forms?    Per office visit dated 1/16/25:  HPI  Left side hurting   Not eating good - once a day   Appetite is down - not liking   Disjointed sleep 2 sleep period through the day   5 hours   Summertime gets outside   Inside watching TV  Exercise every 2-3 days   Afib - working at [employer]   Prosthesis   Lost weight -     Emma LR RN  [facility]

## 2025-03-05 ENCOUNTER — OFFICE VISIT (OUTPATIENT)
Dept: FAMILY MEDICINE | Facility: CLINIC | Age: 70
End: 2025-03-05
Payer: COMMERCIAL

## 2025-03-05 VITALS
HEART RATE: 85 BPM | OXYGEN SATURATION: 100 % | HEIGHT: 75 IN | RESPIRATION RATE: 20 BRPM | DIASTOLIC BLOOD PRESSURE: 89 MMHG | WEIGHT: 196 LBS | BODY MASS INDEX: 24.37 KG/M2 | TEMPERATURE: 97.2 F | SYSTOLIC BLOOD PRESSURE: 150 MMHG

## 2025-03-05 DIAGNOSIS — Z23 NEED FOR PROPHYLACTIC VACCINATION AGAINST HEPATITIS A: ICD-10-CM

## 2025-03-05 DIAGNOSIS — I74.2 BRACHIAL ARTERY EMBOLUS (H): ICD-10-CM

## 2025-03-05 DIAGNOSIS — Z89.519 S/P BKA (BELOW KNEE AMPUTATION) UNILATERAL (H): ICD-10-CM

## 2025-03-05 DIAGNOSIS — I63.89 CEREBRAL INFARCTION DUE TO OTHER MECHANISM (H): ICD-10-CM

## 2025-03-05 DIAGNOSIS — C61 PROSTATE CANCER (H): ICD-10-CM

## 2025-03-05 DIAGNOSIS — N18.32 STAGE 3B CHRONIC KIDNEY DISEASE (H): Primary | ICD-10-CM

## 2025-03-05 DIAGNOSIS — I42.0 NONISCHEMIC DILATED CARDIOMYOPATHY (H): ICD-10-CM

## 2025-03-05 DIAGNOSIS — G89.4 CHRONIC PAIN SYNDROME: ICD-10-CM

## 2025-03-05 ASSESSMENT — PAIN SCALES - GENERAL: PAINLEVEL_OUTOF10: SEVERE PAIN (7)

## 2025-03-05 NOTE — PROGRESS NOTES
Assessment & Plan   Problem List Items Addressed This Visit       Brachial artery embolus (H)    Cerebral infarction (H)    Chronic pain syndrome    Nonischemic dilated cardiomyopathy (HCC)    Prostate cancer (H)    S/P BKA (below knee amputation) unilateral (H)    Stage 3b chronic kidney disease (H) - Primary     Other Visit Diagnoses       Need for prophylactic vaccination against hepatitis A                 See face to face to prosthesis assessment        Regular exercise      Ronak Meeks is a 69 year old, presenting for the following health issues:  Weight Loss  Fax [phone]            3/5/2025    10:35 AM   Additional Questions   Roomed by July ZAMORANO         3/5/2025   Forms   Any forms needing to be completed Yes     History of Present Illness       Reason for visit:  Unintentional weight loss  Symptom onset:  More than a month  Symptom progression:  Improving  Had these symptoms before:  No    He eats 0-1 servings of fruits and vegetables daily.He consumes 3 sweetened beverage(s) daily.He exercises with enough effort to increase his heart rate 10 to 19 minutes per day.  He exercises with enough effort to increase his heart rate 3 or less days per week. He is missing 1 dose(s) of medications per week.  He is not taking prescribed medications regularly due to remembering to take.      Face to Face evaltion for repair in the prosthesis    Patient with left BKA with prosthesis and knows how to use safely.  Now lost 25-30 pounds and needs refitting   Has the following associated diagnosis     Patient Active Problem List   Diagnosis    Cerebral infarction (H)    Chronic hepatitis C without hepatic coma (H)    Tobacco use disorder    Chronic low back pain    Acute pancreatitis    Hyperlipidemia LDL goal <70    Hypertension goal BP (blood pressure) < 140/90    Nonischemic dilated cardiomyopathy (HCC)    Malignant neoplasm of prostate (H)    Erectile dysfunction    Eczema    PAD (peripheral artery  "disease)    S/P BKA (below knee amputation) unilateral (H)    Encounter for counseling    Acute renal failure    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    Coronary atherosclerosis    Atrial fibrillation (H)    Chronic systolic heart failure (H)    Dyslipidemia    Nicotine dependence, uncomplicated    Pyelonephritis    Prostate cancer (H)    Osteomyelitis (H)    Stage 3b chronic kidney disease (H)    Chronic pain syndrome    Chronic, continuous use of opioids    Brachial artery embolus (H)     The medical necessity is that patient requires prosthesis for ambulation     Recent weight loss has led to loose fit and risk of wear and tear and break down in the stump   Patient is wiling and motivated to ambulate and has used the prosthesis in current state for over 5 years   Patient can safely ambulated with the prosthesis   Patient is fully ambulatory  with the use of prothesis   Patient will ambulate without skin break down with new fitting     Patient has level 3 ambulation for everyday activities with prosthesis can go to do errands, work out at the gym and exercise   Patient's current foot is over 5 years old and shows signs of irrarable wear and tear - out of warranty               Review of Systems  Constitutional, HEENT, cardiovascular, pulmonary, gi and gu systems are negative, except as otherwise noted.      Objective    BP (!) 150/89   Pulse 85   Temp 97.2  F (36.2  C) (Temporal)   Resp 20   Ht 1.905 m (6' 3\")   Wt 88.9 kg (196 lb)   SpO2 100%   BMI 24.50 kg/m    Body mass index is 24.5 kg/m .  Physical Exam   GENERAL: alert and no distress  NECK: no adenopathy, no asymmetry, masses, or scars  RESP: lungs clear to auscultation - no rales, rhonchi or wheezes  CV: regular rate and rhythm, normal S1 S2, no S3 or S4, no murmur, click or rub, no peripheral edema  ABDOMEN: soft, nontender, no hepatosplenomegaly, no masses and bowel sounds normal  MS: left BKA stump with scar in good shape without callous or " open wound or skin break down     Loose fitting in the soclket     SKIN: no skin breakdown       No results found for any visits on 03/05/25.        Signed Electronically by: Avery Duke MD

## 2025-03-17 ENCOUNTER — TELEPHONE (OUTPATIENT)
Dept: FAMILY MEDICINE | Facility: CLINIC | Age: 70
End: 2025-03-17
Payer: COMMERCIAL

## 2025-03-17 DIAGNOSIS — F11.90 CHRONIC, CONTINUOUS USE OF OPIOIDS: ICD-10-CM

## 2025-03-17 DIAGNOSIS — G89.18 ACUTE POST-OPERATIVE PAIN: ICD-10-CM

## 2025-03-17 DIAGNOSIS — G89.29 CHRONIC LOW BACK PAIN WITH SCIATICA, SCIATICA LATERALITY UNSPECIFIED, UNSPECIFIED BACK PAIN LATERALITY: ICD-10-CM

## 2025-03-17 DIAGNOSIS — M54.40 CHRONIC LOW BACK PAIN WITH SCIATICA, SCIATICA LATERALITY UNSPECIFIED, UNSPECIFIED BACK PAIN LATERALITY: ICD-10-CM

## 2025-03-17 RX ORDER — OXYCODONE HYDROCHLORIDE 10 MG/1
10 TABLET ORAL EVERY 8 HOURS PRN
Qty: 90 TABLET | Refills: 0 | Status: SHIPPED | OUTPATIENT
Start: 2025-03-17

## 2025-03-17 NOTE — TELEPHONE ENCOUNTER
Attempt #1 to call patient.     RN left voicemail and requested return call to [facility] at [phone]. When patient returns call, please review message from provider below.    Also sent pt a [employer] message with update. Can close this encounter if pt reads that message.    Emma LR RN  [facility]        Claudio, Simeon Walden MD  [facility] Care40 minutes ago (3:24 PM)       Refill sent to the pharmacy.    Simeon Snyder MD

## 2025-03-17 NOTE — TELEPHONE ENCOUNTER
Medication Question or Refill    Contacts       Contact Date/Time Type Contact Phone/Fax    03/17/2025 07:05 AM CDT Phone (Incoming) Constantino Taylor (Self) [phone] (M)            What medication are you calling about (include dose and sig)?: oxyCODONE IR (ROXICODONE) 10 MG tablet     Preferred Pharmacy:     [place] [id number] [place] [facility] [place] [address] [place] [address]  Phone: [phone] Fax: [phone]      Controlled Substance Agreement on file:   CSA -- Patient Level:     [Media Unavailable] Controlled Substance Agreement - Opioid - Scan on 11/18/2024 10:51 AM   [Media Unavailable] Controlled Substance Agreement - Opioid - Scan on 10/18/2023  4:43 PM   [Media Unavailable] Controlled Substance Agreement - Opioid - Scan on 5/31/2022 11:29 AM   [Media Unavailable] Controlled Substance Agreement - Opioid - Scan on 2/11/2021 12:41 PM       Who prescribed the medication?: Dr Duke    Do you need a refill? Yes    When did you use the medication last? 03/16/25    Patient offered an appointment? No    Do you have any questions or concerns?  No      Could we send this information to you in [facility] or would you prefer to receive a phone call?:   Patient would prefer a phone call   Okay to leave a detailed message?: Yes at Cell number on file:    Telephone Information:   Mobile [phone]       CIARA Cash  [facility]

## 2025-03-18 NOTE — TELEPHONE ENCOUNTER
Spoke with patient. Relayed provider's message as written. Patient verbalized understanding and has no further questions at this time.    ASHLEIGH TreviñoN RN  [facility]

## 2025-03-20 ENCOUNTER — TELEPHONE (OUTPATIENT)
Dept: FAMILY MEDICINE | Facility: CLINIC | Age: 70
End: 2025-03-20
Payer: COMMERCIAL

## 2025-03-20 NOTE — TELEPHONE ENCOUNTER
Order/Referral Request  (Durable Medical Equitment DME/ Certification of Medical Necessity CMN)  A Forms, Order/Orders, Prescription/Medication Request, and Request for Medical Information came in via Fax to be addressed by the provider:[facility] [employer]      What number is listed as a contact on the request form?  [employer]  [address] [place] [address]  Phone: [phone]  Fax: [phone]     Received at [facility] Red Team  Additional comments:   The Forms, Order/Orders, Prescription/Medication Request, and Request for Medical Information has been started for the provider and placed at their desk. Terese Rothman,   **Please send the encounter back to the care team when the request is completed. Thank you

## 2025-03-20 NOTE — TELEPHONE ENCOUNTER
Order/Referral Request  (Durable Medical Equitment DME/ Certification of Medical Necessity CMN)  A Forms, Order/Orders, Prescription/Medication Request, and Request for Medical Information came in via Fax to be addressed by the provider:  What is on the order: [facility] [employer] [facility] [employer] [facility] [place]    Power Scooter Repair       What Group/Provider/Facility is requesting:   [facility] [facility] [place]  [address] [address]  Phone: [phone]  Fax: [phone]    Has the patient been seen by the provider for this: Yes Last office visit with the ordering provider:   Does patient have an appointment scheduled?: No     Received at [facility] Red Team  Additional comments:   The Forms, Order/Orders, Prescription/Medication Request, and Request for Medical Information has been started for the provider and placed at their desk. Terese Rothman,   **Please send the encounter back to the care team when the request is completed.

## 2025-03-25 ENCOUNTER — MEDICAL CORRESPONDENCE (OUTPATIENT)
Dept: HEALTH INFORMATION MANAGEMENT | Facility: CLINIC | Age: 70
End: 2025-03-25
Payer: COMMERCIAL

## 2025-03-31 ENCOUNTER — MYC MEDICAL ADVICE (OUTPATIENT)
Dept: FAMILY MEDICINE | Facility: CLINIC | Age: 70
End: 2025-03-31
Payer: COMMERCIAL

## 2025-03-31 NOTE — TELEPHONE ENCOUNTER
Patient Quality Outreach    Patient is due for the following:   Hypertension -  Hypertension follow-up visit  Depression  -  PHQ-9 needed and Depression follow-up visit      Topic Date Due    Hepatitis A Vaccine (3 of 3 - Hep A Twinrix risk 3-dose series) 03/19/2024    COVID-19 Vaccine (8 - 2024-25 season) 10/30/2024       Action(s) Taken:   Schedule a office visit for blood pressure and depression recheck    Type of outreach:    Sent Me[employer]ty message.    Questions for provider review:    None         Rosa Conti CMA  Chart routed to None.

## 2025-04-21 DIAGNOSIS — G89.29 CHRONIC LOW BACK PAIN WITH SCIATICA, SCIATICA LATERALITY UNSPECIFIED, UNSPECIFIED BACK PAIN LATERALITY: ICD-10-CM

## 2025-04-21 DIAGNOSIS — M54.40 CHRONIC LOW BACK PAIN WITH SCIATICA, SCIATICA LATERALITY UNSPECIFIED, UNSPECIFIED BACK PAIN LATERALITY: ICD-10-CM

## 2025-04-21 DIAGNOSIS — F11.90 CHRONIC, CONTINUOUS USE OF OPIOIDS: ICD-10-CM

## 2025-04-21 DIAGNOSIS — G89.18 ACUTE POST-OPERATIVE PAIN: ICD-10-CM

## 2025-04-21 RX ORDER — OXYCODONE HYDROCHLORIDE 10 MG/1
10 TABLET ORAL EVERY 8 HOURS PRN
Qty: 90 TABLET | Refills: 0 | Status: SHIPPED | OUTPATIENT
Start: 2025-04-21

## 2025-04-21 NOTE — TELEPHONE ENCOUNTER
Medication Question or Refill        What medication are you calling about (include dose and sig)?:      oxyCODONE IR (ROXICODONE) 10 MG tablet       Preferred Pharmacy:       [place] [address] [place] [facility] [place] - [address] [place] [address]  Phone: [phone] Fax: [phone]      Controlled Substance Agreement on file:   CSA -- Patient Level:     [Media Unavailable] Controlled Substance Agreement - Opioid - Scan on 11/18/2024 10:51 AM   [Media Unavailable] Controlled Substance Agreement - Opioid - Scan on 10/18/2023  4:43 PM   [Media Unavailable] Controlled Substance Agreement - Opioid - Scan on 5/31/2022 11:29 AM   [Media Unavailable] Controlled Substance Agreement - Opioid - Scan on 2/11/2021 12:41 PM       Who prescribed the medication?: Avery Suero    Do you need a refill? Yes    When did you use the medication last?  Last night    Patient offered an appointment? No    Do you have any questions or concerns?  Yes: completely out of meds. Requesting refill in for today.       Could we send this information to you in [facility] or would you prefer to receive a phone call?:   Patient would prefer a phone call   Okay to leave a detailed message?: Yes at Cell number on file:    Telephone Information:   Mobile [phone]

## 2025-04-30 ENCOUNTER — PATIENT OUTREACH (OUTPATIENT)
Dept: GERIATRIC MEDICINE | Facility: CLINIC | Age: 70
End: 2025-04-30
Payer: COMMERCIAL

## 2025-04-30 NOTE — PROGRESS NOTES
[facility] Care Coordination Contact      [facility] Six-Month Telephone Assessment    6 month telephone assessment completed on 04/30/2025.    ER visits: No  Hospitalizations: No  TCU stays: No  Significant health status changes: N/A  Falls/Injuries: No  ADL/IADL changes: No  Changes in services: No    Caregiver Assessment follow up:  N/A    Goals: See Support Plan for goal progress documentation.      Will see member in 6 months for an annual health risk assessment.   Encouraged member to call CC with any questions or concerns in the meantime.     Melony Aguilar RN BSN PHN      [facility]  Care Coordinator  Phone:   [phone]  Fax:       [phone]

## 2025-05-21 ENCOUNTER — OFFICE VISIT (OUTPATIENT)
Dept: FAMILY MEDICINE | Facility: CLINIC | Age: 70
End: 2025-05-21
Payer: COMMERCIAL

## 2025-05-21 VITALS
BODY MASS INDEX: 23.5 KG/M2 | HEIGHT: 75 IN | HEART RATE: 83 BPM | DIASTOLIC BLOOD PRESSURE: 100 MMHG | WEIGHT: 189 LBS | SYSTOLIC BLOOD PRESSURE: 148 MMHG | OXYGEN SATURATION: 98 % | TEMPERATURE: 98.6 F | RESPIRATION RATE: 18 BRPM

## 2025-05-21 DIAGNOSIS — N18.31 STAGE 3A CHRONIC KIDNEY DISEASE (H): ICD-10-CM

## 2025-05-21 DIAGNOSIS — F11.90 CHRONIC, CONTINUOUS USE OF OPIOIDS: ICD-10-CM

## 2025-05-21 DIAGNOSIS — G89.29 CHRONIC LOW BACK PAIN WITH SCIATICA, SCIATICA LATERALITY UNSPECIFIED, UNSPECIFIED BACK PAIN LATERALITY: ICD-10-CM

## 2025-05-21 DIAGNOSIS — Z23 NEED FOR VACCINATION: ICD-10-CM

## 2025-05-21 DIAGNOSIS — I10 HYPERTENSION GOAL BP (BLOOD PRESSURE) < 140/90: ICD-10-CM

## 2025-05-21 DIAGNOSIS — C61 PROSTATE CANCER (H): ICD-10-CM

## 2025-05-21 DIAGNOSIS — M54.40 CHRONIC LOW BACK PAIN WITH SCIATICA, SCIATICA LATERALITY UNSPECIFIED, UNSPECIFIED BACK PAIN LATERALITY: ICD-10-CM

## 2025-05-21 DIAGNOSIS — N18.32 STAGE 3B CHRONIC KIDNEY DISEASE (H): Primary | ICD-10-CM

## 2025-05-21 DIAGNOSIS — G89.18 ACUTE POST-OPERATIVE PAIN: ICD-10-CM

## 2025-05-21 PROCEDURE — 99214 OFFICE O/P EST MOD 30 MIN: CPT | Performed by: INTERNAL MEDICINE

## 2025-05-21 PROCEDURE — 3080F DIAST BP >= 90 MM HG: CPT | Performed by: INTERNAL MEDICINE

## 2025-05-21 PROCEDURE — 3077F SYST BP >= 140 MM HG: CPT | Performed by: INTERNAL MEDICINE

## 2025-05-21 PROCEDURE — 1126F AMNT PAIN NOTED NONE PRSNT: CPT | Performed by: INTERNAL MEDICINE

## 2025-05-21 RX ORDER — HYDROCHLOROTHIAZIDE 12.5 MG/1
12.5 TABLET ORAL DAILY
Qty: 90 TABLET | Refills: 2 | Status: SHIPPED | OUTPATIENT
Start: 2025-05-21

## 2025-05-21 RX ORDER — OXYCODONE HYDROCHLORIDE 10 MG/1
10 TABLET ORAL EVERY 8 HOURS PRN
Qty: 90 TABLET | Refills: 0 | Status: SHIPPED | OUTPATIENT
Start: 2025-05-21

## 2025-05-21 ASSESSMENT — ANXIETY QUESTIONNAIRES
7. FEELING AFRAID AS IF SOMETHING AWFUL MIGHT HAPPEN: NOT AT ALL
1. FEELING NERVOUS, ANXIOUS, OR ON EDGE: NOT AT ALL
GAD7 TOTAL SCORE: 0
4. TROUBLE RELAXING: NOT AT ALL
2. NOT BEING ABLE TO STOP OR CONTROL WORRYING: NOT AT ALL
GAD7 TOTAL SCORE: 0
IF YOU CHECKED OFF ANY PROBLEMS ON THIS QUESTIONNAIRE, HOW DIFFICULT HAVE THESE PROBLEMS MADE IT FOR YOU TO DO YOUR WORK, TAKE CARE OF THINGS AT HOME, OR GET ALONG WITH OTHER PEOPLE: NOT DIFFICULT AT ALL
GAD7 TOTAL SCORE: 0
6. BECOMING EASILY ANNOYED OR IRRITABLE: NOT AT ALL
7. FEELING AFRAID AS IF SOMETHING AWFUL MIGHT HAPPEN: NOT AT ALL
8. IF YOU CHECKED OFF ANY PROBLEMS, HOW DIFFICULT HAVE THESE MADE IT FOR YOU TO DO YOUR WORK, TAKE CARE OF THINGS AT HOME, OR GET ALONG WITH OTHER PEOPLE?: NOT DIFFICULT AT ALL
5. BEING SO RESTLESS THAT IT IS HARD TO SIT STILL: NOT AT ALL
3. WORRYING TOO MUCH ABOUT DIFFERENT THINGS: NOT AT ALL

## 2025-05-21 ASSESSMENT — PAIN SCALES - GENERAL: PAINLEVEL_OUTOF10: NO PAIN (0)

## 2025-05-21 ASSESSMENT — PATIENT HEALTH QUESTIONNAIRE - PHQ9
SUM OF ALL RESPONSES TO PHQ QUESTIONS 1-9: 0
SUM OF ALL RESPONSES TO PHQ QUESTIONS 1-9: 0
10. IF YOU CHECKED OFF ANY PROBLEMS, HOW DIFFICULT HAVE THESE PROBLEMS MADE IT FOR YOU TO DO YOUR WORK, TAKE CARE OF THINGS AT HOME, OR GET ALONG WITH OTHER PEOPLE: NOT DIFFICULT AT ALL

## 2025-05-21 NOTE — PROGRESS NOTES
"  Assessment & Plan   Problem List Items Addressed This Visit       Chronic low back pain    Relevant Medications    oxyCODONE IR (ROXICODONE) 10 MG tablet    Chronic, continuous use of opioids    Relevant Medications    oxyCODONE IR (ROXICODONE) 10 MG tablet    Hypertension goal BP (blood pressure) < 140/90    Relevant Medications    hydroCHLOROthiazide 12.5 MG tablet    Prostate cancer (H)    Relevant Orders    Adult Oncology/Hematology  Referral    Stage 3b chronic kidney disease (H) - Primary     Other Visit Diagnoses         Need for vaccination          Acute post-operative pain        Relevant Medications    oxyCODONE IR (ROXICODONE) 10 MG tablet      Stage 3a chronic kidney disease (H)               Restart the low dose hydrochlorothiazide with risk of edema in the leg stump              Ronak Meeks is a 69 year old, presenting for the following health issues:  Medication Follow-up        5/21/2025     1:45 PM   Additional Questions   Roomed by Joan     History of Present Illness       Hypertension: He presents for follow up of hypertension.  He does check blood pressure  regularly outside of the clinic. Outside blood pressures have been over 140/90. He follows a low salt diet.     Reason for visit:  Medication Recheck, Also recent dizziness   He is taking medications regularly.        Feeling dizziness  2-3 hydrochlorothiazide               Review of Systems  Constitutional, HEENT, cardiovascular, pulmonary, gi and gu systems are negative, except as otherwise noted.      Objective    BP (!) 148/100   Pulse 83   Temp 98.6  F (37  C) (Temporal)   Resp 18   Ht 1.905 m (6' 3\")   Wt 85.7 kg (189 lb)   SpO2 98%   BMI 23.62 kg/m    Body mass index is 23.62 kg/m .  Physical Exam   GENERAL: alert and no distress  EYES: Eyes grossly normal to inspection, PERRL and conjunctivae and sclerae normal  HENT: ear canals and TM's normal, nose and mouth without ulcers or lesions  NECK: no adenopathy, " no asymmetry, masses, or scars  RESP: lungs clear to auscultation - no rales, rhonchi or wheezes  CV: regular rate and rhythm, normal S1 S2, no S3 or S4, no murmur, click or rub, no peripheral edema  ABDOMEN: soft, nontender, no hepatosplenomegaly, no masses and bowel sounds normal  MS: left stump in good shape   SKIN: no suspicious lesions or rashes  NEURO: Normal strength and tone, mentation intact and speech normal  BACK: no CVA tenderness, no paralumbar tenderness  PSYCH: mentation appears normal, affect normal/bright  LYMPH: no cervical, supraclavicular, axillary, or inguinal adenopathy    No results found for any visits on 05/21/25.        Signed Electronically by: Avery Duke MD

## 2025-05-21 NOTE — PATIENT INSTRUCTIONS
[facility]  [address] [place] [address]  Phone: [phone]

## 2025-06-07 PROBLEM — E78.5 DYSLIPIDEMIA: Status: RESOLVED | Noted: 2019-03-02 | Resolved: 2025-06-07

## 2025-06-10 PROBLEM — M86.9 OSTEOMYELITIS (H): Status: RESOLVED | Noted: 2019-10-24 | Resolved: 2025-06-10

## 2025-06-23 ENCOUNTER — PATIENT OUTREACH (OUTPATIENT)
Dept: GERIATRIC MEDICINE | Facility: CLINIC | Age: 70
End: 2025-06-23
Payer: COMMERCIAL

## 2025-06-23 DIAGNOSIS — M54.40 CHRONIC LOW BACK PAIN WITH SCIATICA, SCIATICA LATERALITY UNSPECIFIED, UNSPECIFIED BACK PAIN LATERALITY: ICD-10-CM

## 2025-06-23 DIAGNOSIS — G89.29 CHRONIC LOW BACK PAIN WITH SCIATICA, SCIATICA LATERALITY UNSPECIFIED, UNSPECIFIED BACK PAIN LATERALITY: ICD-10-CM

## 2025-06-23 DIAGNOSIS — G89.18 ACUTE POST-OPERATIVE PAIN: ICD-10-CM

## 2025-06-23 DIAGNOSIS — F11.90 CHRONIC, CONTINUOUS USE OF OPIOIDS: ICD-10-CM

## 2025-06-23 RX ORDER — OXYCODONE HYDROCHLORIDE 10 MG/1
10 TABLET ORAL EVERY 8 HOURS PRN
Qty: 90 TABLET | Refills: 0 | Status: SHIPPED | OUTPATIENT
Start: 2025-06-23

## 2025-06-23 NOTE — TELEPHONE ENCOUNTER
Requested Prescriptions   Pending Prescriptions Disp Refills    oxyCODONE IR (ROXICODONE) 10 MG tablet 90 tablet 0     Sig: Take 1 tablet (10 mg) by mouth every 8 hours as needed for moderate to severe pain or severe pain.       There is no refill protocol information for this order        Avery De Paz   [facility]

## 2025-06-23 NOTE — PROGRESS NOTES
[facility] Care Coordination Contact    CHW spoke with the member to remind members of the MA renewal paperwork because the eligibility review is due by (07/31/2025).   The member confirmed that the MA Renewal paperwork has been completed and sent back.       CRYSTAL Fontanez  [facility]  [phone]

## 2025-07-23 DIAGNOSIS — G89.18 ACUTE POST-OPERATIVE PAIN: ICD-10-CM

## 2025-07-23 DIAGNOSIS — M54.40 CHRONIC LOW BACK PAIN WITH SCIATICA, SCIATICA LATERALITY UNSPECIFIED, UNSPECIFIED BACK PAIN LATERALITY: ICD-10-CM

## 2025-07-23 DIAGNOSIS — F11.90 CHRONIC, CONTINUOUS USE OF OPIOIDS: ICD-10-CM

## 2025-07-23 DIAGNOSIS — G89.29 CHRONIC LOW BACK PAIN WITH SCIATICA, SCIATICA LATERALITY UNSPECIFIED, UNSPECIFIED BACK PAIN LATERALITY: ICD-10-CM

## 2025-07-24 RX ORDER — OXYCODONE HYDROCHLORIDE 10 MG/1
10 TABLET ORAL EVERY 8 HOURS PRN
Qty: 90 TABLET | Refills: 0 | Status: SHIPPED | OUTPATIENT
Start: 2025-07-24

## 2025-08-01 ENCOUNTER — TELEPHONE (OUTPATIENT)
Dept: FAMILY MEDICINE | Facility: CLINIC | Age: 70
End: 2025-08-01
Payer: COMMERCIAL

## 2025-08-05 ENCOUNTER — TELEPHONE (OUTPATIENT)
Dept: FAMILY MEDICINE | Facility: CLINIC | Age: 70
End: 2025-08-05
Payer: COMMERCIAL

## 2025-08-06 ENCOUNTER — TELEPHONE (OUTPATIENT)
Dept: FAMILY MEDICINE | Facility: CLINIC | Age: 70
End: 2025-08-06
Payer: COMMERCIAL

## 2025-08-07 PROBLEM — G89.4 CHRONIC PAIN SYNDROME: Status: RESOLVED | Noted: 2021-01-15 | Resolved: 2025-08-07

## 2025-08-07 PROBLEM — C61 PROSTATE CANCER (H): Status: RESOLVED | Noted: 2019-10-14 | Resolved: 2025-08-07

## 2025-08-22 DIAGNOSIS — G89.29 CHRONIC LOW BACK PAIN WITH SCIATICA, SCIATICA LATERALITY UNSPECIFIED, UNSPECIFIED BACK PAIN LATERALITY: ICD-10-CM

## 2025-08-22 DIAGNOSIS — F11.90 CHRONIC, CONTINUOUS USE OF OPIOIDS: ICD-10-CM

## 2025-08-22 DIAGNOSIS — G89.18 ACUTE POST-OPERATIVE PAIN: ICD-10-CM

## 2025-08-22 DIAGNOSIS — M54.40 CHRONIC LOW BACK PAIN WITH SCIATICA, SCIATICA LATERALITY UNSPECIFIED, UNSPECIFIED BACK PAIN LATERALITY: ICD-10-CM

## 2025-08-25 RX ORDER — OXYCODONE HYDROCHLORIDE 10 MG/1
10 TABLET ORAL EVERY 8 HOURS PRN
Qty: 90 TABLET | Refills: 0 | Status: SHIPPED | OUTPATIENT
Start: 2025-08-25

## 2025-08-26 ENCOUNTER — TELEPHONE (OUTPATIENT)
Dept: FAMILY MEDICINE | Facility: CLINIC | Age: 70
End: 2025-08-26
Payer: COMMERCIAL

## 2025-08-28 DIAGNOSIS — I48.0 PAROXYSMAL ATRIAL FIBRILLATION (H): ICD-10-CM

## 2025-08-28 RX ORDER — CARVEDILOL 25 MG/1
TABLET ORAL
Qty: 186 TABLET | Refills: 4 | Status: SHIPPED | OUTPATIENT
Start: 2025-08-28

## 2025-09-03 ENCOUNTER — PATIENT OUTREACH (OUTPATIENT)
Dept: GERIATRIC MEDICINE | Facility: CLINIC | Age: 70
End: 2025-09-03
Payer: COMMERCIAL

## (undated) DEVICE — LINEN TOWEL PACK X5 5464

## (undated) DEVICE — SUCTION TIP YANKAUER STR K87

## (undated) DEVICE — Device

## (undated) DEVICE — APPLICATOR COTTON TIP 6"X2 STERILE LF 6012

## (undated) DEVICE — PREP SKIN SCRUB TRAY 4461A

## (undated) DEVICE — DRAPE U SPLIT 74X120" 29440

## (undated) DEVICE — LINEN TOWEL PACK X6 WHITE 5487

## (undated) DEVICE — SOL WATER IRRIG 1000ML BOTTLE 2F7114

## (undated) DEVICE — GLOVE PROTEXIS MICRO 7.5  2D73PM75

## (undated) DEVICE — SPONGE LAP 18X18" X8435

## (undated) DEVICE — SUCTION MANIFOLD DORNOCH ULTRA CART UL-CL500

## (undated) DEVICE — STPL SKIN 35W ROTATING HEAD PRW35

## (undated) DEVICE — ESU GROUND PAD ADULT W/CORD E7507

## (undated) DEVICE — TEST TUBE W/SCREW CAP 17361

## (undated) DEVICE — DRAPE SHEET MED 44X70" 9355

## (undated) DEVICE — SOL NACL 0.9% IRRIG 1000ML BOTTLE 2F7124

## (undated) DEVICE — DRAPE SHEET REV FOLD 3/4 9349

## (undated) DEVICE — ESU ELEC BLADE 2.75" COATED/INSULATED E1455

## (undated) DEVICE — GLOVE PROTEXIS BLUE W/NEU-THERA 8.0  2D73EB80

## (undated) DEVICE — PREP POVIDONE IODINE SCRUB 7.5% 4OZ APL82212

## (undated) RX ORDER — FENTANYL CITRATE 50 UG/ML
INJECTION, SOLUTION INTRAMUSCULAR; INTRAVENOUS
Status: DISPENSED
Start: 2019-10-29

## (undated) RX ORDER — ESMOLOL HYDROCHLORIDE 10 MG/ML
INJECTION INTRAVENOUS
Status: DISPENSED
Start: 2019-10-29

## (undated) RX ORDER — HYDROMORPHONE HYDROCHLORIDE 1 MG/ML
INJECTION, SOLUTION INTRAMUSCULAR; INTRAVENOUS; SUBCUTANEOUS
Status: DISPENSED
Start: 2019-10-29